# Patient Record
Sex: FEMALE | Race: WHITE | Employment: UNEMPLOYED | URBAN - METROPOLITAN AREA
[De-identification: names, ages, dates, MRNs, and addresses within clinical notes are randomized per-mention and may not be internally consistent; named-entity substitution may affect disease eponyms.]

---

## 2017-02-15 ENCOUNTER — OFFICE VISIT (OUTPATIENT)
Dept: SURGERY | Age: 52
End: 2017-02-15

## 2017-02-15 VITALS
WEIGHT: 214 LBS | HEIGHT: 63 IN | HEART RATE: 80 BPM | BODY MASS INDEX: 37.92 KG/M2 | SYSTOLIC BLOOD PRESSURE: 136 MMHG | DIASTOLIC BLOOD PRESSURE: 88 MMHG

## 2017-02-15 DIAGNOSIS — E66.09 NON MORBID OBESITY DUE TO EXCESS CALORIES: ICD-10-CM

## 2017-02-15 DIAGNOSIS — E03.9 HYPOTHYROIDISM, UNSPECIFIED TYPE: ICD-10-CM

## 2017-02-15 LAB
ANION GAP SERPL CALCULATED.3IONS-SCNC: 15 MEQ/L (ref 7–13)
BUN BLDV-MCNC: 8 MG/DL (ref 6–20)
CALCIUM SERPL-MCNC: 10.2 MG/DL (ref 8.6–10.2)
CHLORIDE BLD-SCNC: 105 MEQ/L (ref 98–107)
CO2: 23 MEQ/L (ref 22–29)
CREAT SERPL-MCNC: 0.69 MG/DL (ref 0.5–0.9)
GFR AFRICAN AMERICAN: >60
GFR NON-AFRICAN AMERICAN: >60
GLUCOSE BLD-MCNC: 86 MG/DL (ref 74–109)
GLUCOSE BLD-MCNC: 87 MG/DL
HBA1C MFR BLD: 5.7 %
POTASSIUM SERPL-SCNC: 4.4 MEQ/L (ref 3.5–5.1)
SODIUM BLD-SCNC: 143 MEQ/L (ref 132–144)
T4 FREE: 1.13 NG/DL (ref 0.93–1.7)
TSH SERPL DL<=0.05 MIU/L-ACNC: 2.59 UIU/ML (ref 0.27–4.2)

## 2017-02-15 PROCEDURE — 82962 GLUCOSE BLOOD TEST: CPT | Performed by: INTERNAL MEDICINE

## 2017-02-15 PROCEDURE — 99213 OFFICE O/P EST LOW 20 MIN: CPT | Performed by: INTERNAL MEDICINE

## 2017-02-15 PROCEDURE — 83036 HEMOGLOBIN GLYCOSYLATED A1C: CPT | Performed by: INTERNAL MEDICINE

## 2017-02-15 RX ORDER — LEVOTHYROXINE SODIUM 0.12 MG/1
125 TABLET ORAL DAILY
Qty: 30 TABLET | Refills: 5 | Status: SHIPPED | OUTPATIENT
Start: 2017-02-15 | End: 2017-04-27 | Stop reason: SDUPTHER

## 2017-04-04 ENCOUNTER — HOSPITAL ENCOUNTER (EMERGENCY)
Age: 52
Discharge: HOME | End: 2017-04-04
Payer: COMMERCIAL

## 2017-04-04 ENCOUNTER — APPOINTMENT (OUTPATIENT)
Dept: CT IMAGING | Age: 52
End: 2017-04-04
Payer: COMMERCIAL

## 2017-04-04 ENCOUNTER — APPOINTMENT (OUTPATIENT)
Dept: GENERAL RADIOLOGY | Age: 52
End: 2017-04-04
Payer: COMMERCIAL

## 2017-04-04 VITALS
HEIGHT: 63 IN | SYSTOLIC BLOOD PRESSURE: 163 MMHG | RESPIRATION RATE: 20 BRPM | BODY MASS INDEX: 33.66 KG/M2 | OXYGEN SATURATION: 99 % | WEIGHT: 190 LBS | HEART RATE: 92 BPM | DIASTOLIC BLOOD PRESSURE: 122 MMHG | TEMPERATURE: 98.1 F
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
## 2017-04-04 DIAGNOSIS — M54.2 NECK PAIN: ICD-10-CM

## 2017-04-04 DIAGNOSIS — S09.90XA CLOSED HEAD INJURY, INITIAL ENCOUNTER: Primary | ICD-10-CM

## 2017-04-04 DIAGNOSIS — M25.561 ACUTE PAIN OF RIGHT KNEE: ICD-10-CM

## 2017-04-04 DIAGNOSIS — S60.221A CONTUSION OF RIGHT HAND, INITIAL ENCOUNTER: ICD-10-CM

## 2017-04-04 PROCEDURE — 73090 X-RAY EXAM OF FOREARM: CPT

## 2017-04-04 PROCEDURE — 73562 X-RAY EXAM OF KNEE 3: CPT

## 2017-04-04 PROCEDURE — 29125 APPL SHORT ARM SPLINT STATIC: CPT

## 2017-04-04 PROCEDURE — 96372 THER/PROPH/DIAG INJ SC/IM: CPT

## 2017-04-04 PROCEDURE — 73130 X-RAY EXAM OF HAND: CPT

## 2017-04-04 PROCEDURE — 70450 CT HEAD/BRAIN W/O DYE: CPT

## 2017-04-04 PROCEDURE — 99284 EMERGENCY DEPT VISIT MOD MDM: CPT

## 2017-04-04 PROCEDURE — 73590 X-RAY EXAM OF LOWER LEG: CPT

## 2017-04-04 PROCEDURE — 72125 CT NECK SPINE W/O DYE: CPT

## 2017-04-04 PROCEDURE — 6360000002 HC RX W HCPCS: Performed by: PHYSICIAN ASSISTANT

## 2017-04-04 RX ORDER — ONDANSETRON 4 MG/1
4 TABLET, ORALLY DISINTEGRATING ORAL ONCE
Status: COMPLETED | OUTPATIENT
Start: 2017-04-04 | End: 2017-04-04

## 2017-04-04 RX ORDER — ORPHENADRINE CITRATE 30 MG/ML
60 INJECTION INTRAMUSCULAR; INTRAVENOUS ONCE
Status: COMPLETED | OUTPATIENT
Start: 2017-04-04 | End: 2017-04-04

## 2017-04-04 RX ORDER — MORPHINE SULFATE 4 MG/ML
4 INJECTION, SOLUTION INTRAMUSCULAR; INTRAVENOUS ONCE
Status: COMPLETED | OUTPATIENT
Start: 2017-04-04 | End: 2017-04-04

## 2017-04-04 RX ORDER — CYCLOBENZAPRINE HCL 10 MG
10 TABLET ORAL 3 TIMES DAILY PRN
Qty: 12 TABLET | Refills: 0 | Status: SHIPPED | OUTPATIENT
Start: 2017-04-04 | End: 2017-04-14

## 2017-04-04 RX ADMIN — ORPHENADRINE CITRATE 60 MG: 30 INJECTION INTRAMUSCULAR; INTRAVENOUS at 02:38

## 2017-04-04 RX ADMIN — MORPHINE SULFATE 4 MG: 4 INJECTION, SOLUTION INTRAMUSCULAR; INTRAVENOUS at 02:38

## 2017-04-04 RX ADMIN — ONDANSETRON 4 MG: 4 TABLET, ORALLY DISINTEGRATING ORAL at 02:38

## 2017-04-04 ASSESSMENT — PAIN SCALES - GENERAL
PAINLEVEL_OUTOF10: 10
PAINLEVEL_OUTOF10: 10

## 2017-04-04 ASSESSMENT — ENCOUNTER SYMPTOMS
NAUSEA: 0
COLOR CHANGE: 1
APNEA: 0
VOMITING: 0
VOICE CHANGE: 0
PHOTOPHOBIA: 0
ABDOMINAL DISTENTION: 0
ANAL BLEEDING: 0
COUGH: 0
ABDOMINAL PAIN: 0
SHORTNESS OF BREATH: 0
EYE DISCHARGE: 0

## 2017-04-04 ASSESSMENT — PAIN DESCRIPTION - ORIENTATION: ORIENTATION: RIGHT

## 2017-04-04 ASSESSMENT — PAIN DESCRIPTION - LOCATION: LOCATION: ARM;KNEE

## 2017-04-04 ASSESSMENT — PAIN DESCRIPTION - PAIN TYPE: TYPE: ACUTE PAIN

## 2017-04-27 ENCOUNTER — OFFICE VISIT (OUTPATIENT)
Dept: FAMILY MEDICINE CLINIC | Age: 52
End: 2017-04-27

## 2017-04-27 VITALS
TEMPERATURE: 99 F | WEIGHT: 208 LBS | HEART RATE: 78 BPM | BODY MASS INDEX: 36.86 KG/M2 | DIASTOLIC BLOOD PRESSURE: 82 MMHG | RESPIRATION RATE: 22 BRPM | OXYGEN SATURATION: 99 % | HEIGHT: 63 IN | SYSTOLIC BLOOD PRESSURE: 130 MMHG

## 2017-04-27 DIAGNOSIS — E78.5 HYPERLIPIDEMIA, UNSPECIFIED HYPERLIPIDEMIA TYPE: ICD-10-CM

## 2017-04-27 DIAGNOSIS — Z12.11 SCREEN FOR COLON CANCER: ICD-10-CM

## 2017-04-27 DIAGNOSIS — E03.9 HYPOTHYROIDISM, UNSPECIFIED TYPE: ICD-10-CM

## 2017-04-27 DIAGNOSIS — F33.41 RECURRENT MAJOR DEPRESSIVE DISORDER, IN PARTIAL REMISSION (HCC): ICD-10-CM

## 2017-04-27 DIAGNOSIS — E11.9 TYPE 2 DIABETES MELLITUS WITHOUT COMPLICATION, WITHOUT LONG-TERM CURRENT USE OF INSULIN (HCC): ICD-10-CM

## 2017-04-27 DIAGNOSIS — M79.7 FIBROMYALGIA: ICD-10-CM

## 2017-04-27 DIAGNOSIS — K21.9 GASTROESOPHAGEAL REFLUX DISEASE WITHOUT ESOPHAGITIS: ICD-10-CM

## 2017-04-27 DIAGNOSIS — I10 ESSENTIAL HYPERTENSION: Primary | ICD-10-CM

## 2017-04-27 DIAGNOSIS — Z12.31 ENCOUNTER FOR SCREENING MAMMOGRAM FOR MALIGNANT NEOPLASM OF BREAST: ICD-10-CM

## 2017-04-27 LAB
CREATININE URINE: 328.3 MG/DL
MICROALBUMIN UR-MCNC: 2 MG/DL
MICROALBUMIN/CREAT UR-RTO: 6.1 MG/G (ref 0–30)

## 2017-04-27 PROCEDURE — 99214 OFFICE O/P EST MOD 30 MIN: CPT | Performed by: NURSE PRACTITIONER

## 2017-04-27 RX ORDER — LEVOTHYROXINE SODIUM 0.12 MG/1
125 TABLET ORAL DAILY
Qty: 30 TABLET | Refills: 5 | Status: SHIPPED | OUTPATIENT
Start: 2017-04-27 | End: 2019-05-08 | Stop reason: SDUPTHER

## 2017-04-27 RX ORDER — TRAZODONE HYDROCHLORIDE 100 MG/1
100 TABLET ORAL NIGHTLY
Qty: 30 TABLET | Refills: 5 | Status: ON HOLD | OUTPATIENT
Start: 2017-04-27 | End: 2017-07-27 | Stop reason: HOSPADM

## 2017-04-27 RX ORDER — ATORVASTATIN CALCIUM 10 MG/1
10 TABLET, FILM COATED ORAL DAILY
Qty: 30 TABLET | Refills: 5 | Status: ON HOLD | OUTPATIENT
Start: 2017-04-27 | End: 2017-07-27 | Stop reason: HOSPADM

## 2017-04-27 RX ORDER — METOPROLOL TARTRATE 50 MG/1
50 TABLET, FILM COATED ORAL 2 TIMES DAILY
Qty: 60 TABLET | Refills: 5 | Status: SHIPPED | OUTPATIENT
Start: 2017-04-27 | End: 2018-04-06 | Stop reason: ALTCHOICE

## 2017-04-27 RX ORDER — AMLODIPINE BESYLATE 10 MG/1
10 TABLET ORAL DAILY
Qty: 30 TABLET | Refills: 5 | Status: SHIPPED | OUTPATIENT
Start: 2017-04-27

## 2017-04-27 RX ORDER — DULOXETIN HYDROCHLORIDE 60 MG/1
60 CAPSULE, DELAYED RELEASE ORAL 2 TIMES DAILY
Qty: 60 CAPSULE | Refills: 5 | Status: SHIPPED | OUTPATIENT
Start: 2017-04-27 | End: 2017-07-28

## 2017-04-27 RX ORDER — OMEPRAZOLE 20 MG/1
20 CAPSULE, DELAYED RELEASE ORAL DAILY
Qty: 30 CAPSULE | Refills: 5 | Status: ON HOLD | OUTPATIENT
Start: 2017-04-27 | End: 2017-07-27 | Stop reason: HOSPADM

## 2017-04-27 ASSESSMENT — ENCOUNTER SYMPTOMS
CONSTIPATION: 0
DIARRHEA: 0
BLOOD IN STOOL: 0

## 2017-07-21 ENCOUNTER — HOSPITAL ENCOUNTER (INPATIENT)
Age: 52
LOS: 5 days | Discharge: HOME | DRG: 751 | End: 2017-07-27
Attending: PSYCHIATRY & NEUROLOGY | Admitting: PSYCHIATRY & NEUROLOGY
Payer: COMMERCIAL

## 2017-07-21 DIAGNOSIS — F33.41 RECURRENT MAJOR DEPRESSIVE DISORDER, IN PARTIAL REMISSION (HCC): ICD-10-CM

## 2017-07-21 DIAGNOSIS — T63.441A BEE STING REACTION, ACCIDENTAL OR UNINTENTIONAL, INITIAL ENCOUNTER: Primary | ICD-10-CM

## 2017-07-21 DIAGNOSIS — M79.7 FIBROMYALGIA: ICD-10-CM

## 2017-07-21 LAB
BASOPHILS ABSOLUTE: 0 K/UL (ref 0–0.2)
BASOPHILS RELATIVE PERCENT: 0.6 %
BILIRUBIN URINE: NEGATIVE
BLOOD, URINE: NEGATIVE
CHP ED QC CHECK: NORMAL
CLARITY: CLEAR
COLOR: YELLOW
EOSINOPHILS ABSOLUTE: 0.1 K/UL (ref 0–0.7)
EOSINOPHILS RELATIVE PERCENT: 1 %
GLUCOSE URINE: NEGATIVE MG/DL
HCG(URINE) PREGNANCY TEST: NEGATIVE
HCT VFR BLD CALC: 42.2 % (ref 37–47)
HEMOGLOBIN: 14.4 G/DL (ref 12–16)
KETONES, URINE: NEGATIVE MG/DL
LEUKOCYTE ESTERASE, URINE: NEGATIVE
LYMPHOCYTES ABSOLUTE: 2.4 K/UL (ref 1–4.8)
LYMPHOCYTES RELATIVE PERCENT: 31.7 %
MCH RBC QN AUTO: 29.5 PG (ref 27–31.3)
MCHC RBC AUTO-ENTMCNC: 34.1 % (ref 33–37)
MCV RBC AUTO: 86.7 FL (ref 82–100)
MONOCYTES ABSOLUTE: 0.6 K/UL (ref 0.2–0.8)
MONOCYTES RELATIVE PERCENT: 7.5 %
NEUTROPHILS ABSOLUTE: 4.4 K/UL (ref 1.4–6.5)
NEUTROPHILS RELATIVE PERCENT: 59.2 %
NITRITE, URINE: NEGATIVE
PDW BLD-RTO: 13.4 % (ref 11.5–14.5)
PH UA: 6 (ref 5–9)
PLATELET # BLD: 258 K/UL (ref 130–400)
PREGNANCY TEST URINE, POC: NORMAL
PROTEIN UA: NEGATIVE MG/DL
RBC # BLD: 4.87 M/UL (ref 4.2–5.4)
SPECIFIC GRAVITY UA: 1.01 (ref 1–1.03)
UROBILINOGEN, URINE: 0.2 E.U./DL
WBC # BLD: 7.4 K/UL (ref 4.8–10.8)

## 2017-07-21 PROCEDURE — 80053 COMPREHEN METABOLIC PANEL: CPT

## 2017-07-21 PROCEDURE — 6370000000 HC RX 637 (ALT 250 FOR IP): Performed by: NURSE PRACTITIONER

## 2017-07-21 PROCEDURE — 82550 ASSAY OF CK (CPK): CPT

## 2017-07-21 PROCEDURE — 36415 COLL VENOUS BLD VENIPUNCTURE: CPT

## 2017-07-21 PROCEDURE — 80307 DRUG TEST PRSMV CHEM ANLYZR: CPT

## 2017-07-21 PROCEDURE — 84703 CHORIONIC GONADOTROPIN ASSAY: CPT

## 2017-07-21 PROCEDURE — 84443 ASSAY THYROID STIM HORMONE: CPT

## 2017-07-21 PROCEDURE — G0480 DRUG TEST DEF 1-7 CLASSES: HCPCS

## 2017-07-21 PROCEDURE — 81003 URINALYSIS AUTO W/O SCOPE: CPT

## 2017-07-21 PROCEDURE — 99285 EMERGENCY DEPT VISIT HI MDM: CPT

## 2017-07-21 PROCEDURE — 85025 COMPLETE CBC W/AUTO DIFF WBC: CPT

## 2017-07-21 RX ORDER — HYDROCODONE BITARTRATE AND ACETAMINOPHEN 5; 325 MG/1; MG/1
1 TABLET ORAL ONCE
Status: COMPLETED | OUTPATIENT
Start: 2017-07-21 | End: 2017-07-21

## 2017-07-21 RX ORDER — DIPHENHYDRAMINE HCL 50 MG
50 CAPSULE ORAL ONCE
Status: COMPLETED | OUTPATIENT
Start: 2017-07-21 | End: 2017-07-21

## 2017-07-21 RX ADMIN — HYDROCODONE BITARTRATE AND ACETAMINOPHEN 1 TABLET: 5; 325 TABLET ORAL at 23:41

## 2017-07-21 RX ADMIN — DIPHENHYDRAMINE HYDROCHLORIDE 50 MG: 50 CAPSULE ORAL at 23:41

## 2017-07-21 ASSESSMENT — PAIN DESCRIPTION - ORIENTATION: ORIENTATION: RIGHT

## 2017-07-21 ASSESSMENT — PAIN DESCRIPTION - DESCRIPTORS: DESCRIPTORS: SHOOTING

## 2017-07-21 ASSESSMENT — PAIN SCALES - GENERAL: PAINLEVEL_OUTOF10: 8

## 2017-07-21 ASSESSMENT — PAIN DESCRIPTION - LOCATION: LOCATION: LEG

## 2017-07-21 ASSESSMENT — PAIN DESCRIPTION - PAIN TYPE: TYPE: ACUTE PAIN

## 2017-07-22 LAB
ACETAMINOPHEN LEVEL: <15 UG/ML (ref 10–30)
ALBUMIN SERPL-MCNC: 4.6 G/DL (ref 3.9–4.9)
ALP BLD-CCNC: 84 U/L (ref 40–130)
ALT SERPL-CCNC: 50 U/L (ref 0–33)
AMPHETAMINE SCREEN, URINE: NORMAL
ANION GAP SERPL CALCULATED.3IONS-SCNC: 12 MEQ/L (ref 7–13)
AST SERPL-CCNC: 29 U/L (ref 0–35)
BARBITURATE SCREEN URINE: NORMAL
BENZODIAZEPINE SCREEN, URINE: NORMAL
BILIRUB SERPL-MCNC: 0.4 MG/DL (ref 0–1.2)
BUN BLDV-MCNC: 15 MG/DL (ref 6–20)
CALCIUM SERPL-MCNC: 9.4 MG/DL (ref 8.6–10.2)
CANNABINOID SCREEN URINE: NORMAL
CHLORIDE BLD-SCNC: 102 MEQ/L (ref 98–107)
CO2: 26 MEQ/L (ref 22–29)
COCAINE METABOLITE SCREEN URINE: NORMAL
CREAT SERPL-MCNC: 0.76 MG/DL (ref 0.5–0.9)
ETHANOL PERCENT: NORMAL G/DL
ETHANOL: <10 MG/DL (ref 0–0.08)
GFR AFRICAN AMERICAN: >60
GFR NON-AFRICAN AMERICAN: >60
GLOBULIN: 2.6 G/DL (ref 2.3–3.5)
GLUCOSE BLD-MCNC: 111 MG/DL (ref 60–115)
GLUCOSE BLD-MCNC: 93 MG/DL (ref 74–109)
Lab: NORMAL
OPIATE SCREEN URINE: NORMAL
PERFORMED ON: NORMAL
PHENCYCLIDINE SCREEN URINE: NORMAL
POTASSIUM SERPL-SCNC: 3.1 MEQ/L (ref 3.5–5.1)
SALICYLATE, SERUM: <0.3 MG/DL (ref 15–30)
SODIUM BLD-SCNC: 140 MEQ/L (ref 132–144)
TOTAL CK: 108 U/L (ref 0–170)
TOTAL PROTEIN: 7.2 G/DL (ref 6.4–8.1)
TSH SERPL DL<=0.05 MIU/L-ACNC: 1.56 UIU/ML (ref 0.27–4.2)

## 2017-07-22 PROCEDURE — 1240000000 HC EMOTIONAL WELLNESS R&B

## 2017-07-22 PROCEDURE — 6370000000 HC RX 637 (ALT 250 FOR IP): Performed by: PERSONAL EMERGENCY RESPONSE ATTENDANT

## 2017-07-22 PROCEDURE — 6370000000 HC RX 637 (ALT 250 FOR IP): Performed by: EMERGENCY MEDICINE

## 2017-07-22 PROCEDURE — 6370000000 HC RX 637 (ALT 250 FOR IP): Performed by: STUDENT IN AN ORGANIZED HEALTH CARE EDUCATION/TRAINING PROGRAM

## 2017-07-22 PROCEDURE — 6370000000 HC RX 637 (ALT 250 FOR IP): Performed by: PSYCHIATRY & NEUROLOGY

## 2017-07-22 RX ORDER — ACETAMINOPHEN 500 MG
1000 TABLET ORAL ONCE
Status: COMPLETED | OUTPATIENT
Start: 2017-07-22 | End: 2017-07-22

## 2017-07-22 RX ORDER — ACETAMINOPHEN 325 MG/1
650 TABLET ORAL EVERY 4 HOURS PRN
Status: DISCONTINUED | OUTPATIENT
Start: 2017-07-22 | End: 2017-07-27 | Stop reason: HOSPADM

## 2017-07-22 RX ORDER — ALPRAZOLAM 0.5 MG/1
0.5 TABLET ORAL 4 TIMES DAILY
COMMUNITY

## 2017-07-22 RX ORDER — HYDROXYZINE PAMOATE 50 MG/1
50 CAPSULE ORAL 3 TIMES DAILY PRN
Status: DISCONTINUED | OUTPATIENT
Start: 2017-07-22 | End: 2017-07-27 | Stop reason: HOSPADM

## 2017-07-22 RX ORDER — HYDROCHLOROTHIAZIDE 25 MG/1
25 TABLET ORAL DAILY
COMMUNITY
End: 2018-04-06 | Stop reason: ALTCHOICE

## 2017-07-22 RX ORDER — HYDROCHLOROTHIAZIDE 25 MG/1
25 TABLET ORAL DAILY
Status: DISCONTINUED | OUTPATIENT
Start: 2017-07-22 | End: 2017-07-27 | Stop reason: HOSPADM

## 2017-07-22 RX ORDER — ALPRAZOLAM 0.5 MG/1
0.5 TABLET ORAL 4 TIMES DAILY
Status: DISCONTINUED | OUTPATIENT
Start: 2017-07-22 | End: 2017-07-23

## 2017-07-22 RX ORDER — DIPHENHYDRAMINE HCL 50 MG
50 CAPSULE ORAL ONCE
Status: COMPLETED | OUTPATIENT
Start: 2017-07-22 | End: 2017-07-22

## 2017-07-22 RX ORDER — LEVOTHYROXINE SODIUM 0.12 MG/1
125 TABLET ORAL ONCE
Status: COMPLETED | OUTPATIENT
Start: 2017-07-22 | End: 2017-07-22

## 2017-07-22 RX ORDER — POTASSIUM CHLORIDE 20 MEQ/1
40 TABLET, EXTENDED RELEASE ORAL ONCE
Status: COMPLETED | OUTPATIENT
Start: 2017-07-22 | End: 2017-07-22

## 2017-07-22 RX ORDER — SIMVASTATIN 20 MG
20 TABLET ORAL DAILY
Status: ON HOLD | COMMUNITY
End: 2018-07-20 | Stop reason: HOSPADM

## 2017-07-22 RX ORDER — ALPRAZOLAM 0.5 MG/1
0.5 TABLET ORAL ONCE
Status: COMPLETED | OUTPATIENT
Start: 2017-07-22 | End: 2017-07-22

## 2017-07-22 RX ORDER — METOPROLOL TARTRATE 50 MG/1
50 TABLET, FILM COATED ORAL 2 TIMES DAILY
Status: DISCONTINUED | OUTPATIENT
Start: 2017-07-22 | End: 2017-07-27 | Stop reason: HOSPADM

## 2017-07-22 RX ORDER — AMLODIPINE BESYLATE 10 MG/1
10 TABLET ORAL DAILY
Status: DISCONTINUED | OUTPATIENT
Start: 2017-07-22 | End: 2017-07-27 | Stop reason: HOSPADM

## 2017-07-22 RX ADMIN — HYDROXYZINE PAMOATE 50 MG: 50 CAPSULE ORAL at 21:08

## 2017-07-22 RX ADMIN — DIPHENHYDRAMINE HYDROCHLORIDE 50 MG: 50 CAPSULE ORAL at 11:28

## 2017-07-22 RX ADMIN — AMLODIPINE BESYLATE 10 MG: 10 TABLET ORAL at 15:00

## 2017-07-22 RX ADMIN — DIPHENHYDRAMINE HYDROCHLORIDE 50 MG: 50 CAPSULE ORAL at 06:17

## 2017-07-22 RX ADMIN — ALPRAZOLAM 0.5 MG: 0.5 TABLET ORAL at 05:16

## 2017-07-22 RX ADMIN — ACETAMINOPHEN 1000 MG: 500 TABLET ORAL at 05:15

## 2017-07-22 RX ADMIN — LEVOTHYROXINE SODIUM 125 MCG: 125 TABLET ORAL at 06:17

## 2017-07-22 RX ADMIN — ALPRAZOLAM 0.5 MG: 0.5 TABLET ORAL at 21:09

## 2017-07-22 RX ADMIN — ALPRAZOLAM 0.5 MG: 0.5 TABLET ORAL at 16:22

## 2017-07-22 RX ADMIN — HYDROCHLOROTHIAZIDE 25 MG: 25 TABLET ORAL at 15:00

## 2017-07-22 RX ADMIN — POTASSIUM CHLORIDE 40 MEQ: 1500 TABLET, EXTENDED RELEASE ORAL at 09:37

## 2017-07-22 ASSESSMENT — ENCOUNTER SYMPTOMS
COUGH: 0
COLOR CHANGE: 1
ABDOMINAL PAIN: 0
DIARRHEA: 0
VOMITING: 0
SHORTNESS OF BREATH: 0
NAUSEA: 0

## 2017-07-22 ASSESSMENT — SLEEP AND FATIGUE QUESTIONNAIRES
RESTFUL SLEEP: NO
DIFFICULTY ARISING: NO
DIFFICULTY FALLING ASLEEP: YES
DO YOU USE A SLEEP AID: YES
DIFFICULTY STAYING ASLEEP: YES
AVERAGE NUMBER OF SLEEP HOURS: 3
SLEEP PATTERN: DIFFICULTY FALLING ASLEEP;DISTURBED/INTERRUPTED SLEEP;NIGHTMARES/TERRORS
DO YOU HAVE DIFFICULTY SLEEPING: YES

## 2017-07-22 ASSESSMENT — PAIN SCALES - GENERAL
PAINLEVEL_OUTOF10: 6
PAINLEVEL_OUTOF10: 8

## 2017-07-23 PROCEDURE — 6370000000 HC RX 637 (ALT 250 FOR IP): Performed by: PHYSICIAN ASSISTANT

## 2017-07-23 PROCEDURE — 1240000000 HC EMOTIONAL WELLNESS R&B

## 2017-07-23 PROCEDURE — 6370000000 HC RX 637 (ALT 250 FOR IP): Performed by: INTERNAL MEDICINE

## 2017-07-23 PROCEDURE — 6370000000 HC RX 637 (ALT 250 FOR IP): Performed by: PSYCHIATRY & NEUROLOGY

## 2017-07-23 RX ORDER — QUETIAPINE FUMARATE 50 MG/1
50 TABLET, FILM COATED ORAL 2 TIMES DAILY
Status: DISCONTINUED | OUTPATIENT
Start: 2017-07-23 | End: 2017-07-27 | Stop reason: HOSPADM

## 2017-07-23 RX ORDER — DOCUSATE SODIUM 100 MG/1
100 CAPSULE, LIQUID FILLED ORAL 2 TIMES DAILY
Status: DISCONTINUED | OUTPATIENT
Start: 2017-07-23 | End: 2017-07-27 | Stop reason: HOSPADM

## 2017-07-23 RX ORDER — DULOXETIN HYDROCHLORIDE 60 MG/1
60 CAPSULE, DELAYED RELEASE ORAL NIGHTLY
Status: DISCONTINUED | OUTPATIENT
Start: 2017-07-23 | End: 2017-07-27 | Stop reason: HOSPADM

## 2017-07-23 RX ORDER — PHENAZOPYRIDINE HYDROCHLORIDE 200 MG/1
200 TABLET, FILM COATED ORAL
Status: DISCONTINUED | OUTPATIENT
Start: 2017-07-23 | End: 2017-07-27 | Stop reason: HOSPADM

## 2017-07-23 RX ORDER — ALPRAZOLAM 0.5 MG/1
0.5 TABLET ORAL 4 TIMES DAILY PRN
Status: DISCONTINUED | OUTPATIENT
Start: 2017-07-23 | End: 2017-07-27 | Stop reason: HOSPADM

## 2017-07-23 RX ORDER — DIAPER,BRIEF,INFANT-TODD,DISP
EACH MISCELLANEOUS 3 TIMES DAILY
Status: DISCONTINUED | OUTPATIENT
Start: 2017-07-23 | End: 2017-07-27 | Stop reason: HOSPADM

## 2017-07-23 RX ORDER — DIPHENHYDRAMINE HCL 25 MG
50 TABLET ORAL EVERY 6 HOURS PRN
Status: DISCONTINUED | OUTPATIENT
Start: 2017-07-23 | End: 2017-07-27 | Stop reason: HOSPADM

## 2017-07-23 RX ADMIN — ALPRAZOLAM 0.5 MG: 0.5 TABLET ORAL at 15:02

## 2017-07-23 RX ADMIN — PHENAZOPYRIDINE HYDROCHLORIDE 200 MG: 200 TABLET ORAL at 12:26

## 2017-07-23 RX ADMIN — DULOXETINE HYDROCHLORIDE 60 MG: 60 CAPSULE, DELAYED RELEASE ORAL at 20:56

## 2017-07-23 RX ADMIN — LEVOTHYROXINE SODIUM 125 MCG: 25 TABLET ORAL at 06:28

## 2017-07-23 RX ADMIN — AMLODIPINE BESYLATE 10 MG: 10 TABLET ORAL at 09:15

## 2017-07-23 RX ADMIN — HYDROCORTISONE: 1 CREAM TOPICAL at 13:49

## 2017-07-23 RX ADMIN — HYDROCHLOROTHIAZIDE 25 MG: 25 TABLET ORAL at 09:15

## 2017-07-23 RX ADMIN — DOCUSATE SODIUM 100 MG: 100 CAPSULE, LIQUID FILLED ORAL at 12:26

## 2017-07-23 RX ADMIN — DIPHENHYDRAMINE HCL 50 MG: 25 TABLET ORAL at 00:52

## 2017-07-23 RX ADMIN — DIPHENHYDRAMINE HCL 50 MG: 25 TABLET ORAL at 09:22

## 2017-07-23 RX ADMIN — QUETIAPINE FUMARATE 50 MG: 50 TABLET, FILM COATED ORAL at 20:56

## 2017-07-23 RX ADMIN — ALPRAZOLAM 0.5 MG: 0.5 TABLET ORAL at 09:15

## 2017-07-23 RX ADMIN — PHENAZOPYRIDINE HYDROCHLORIDE 200 MG: 200 TABLET ORAL at 17:31

## 2017-07-23 RX ADMIN — DOCUSATE SODIUM 100 MG: 100 CAPSULE, LIQUID FILLED ORAL at 20:56

## 2017-07-23 RX ADMIN — QUETIAPINE FUMARATE 50 MG: 50 TABLET, FILM COATED ORAL at 12:26

## 2017-07-24 LAB
BACTERIA: NORMAL /HPF
BILIRUBIN URINE: NEGATIVE
BLOOD, URINE: NEGATIVE
CLARITY: CLEAR
COLOR: ABNORMAL
EPITHELIAL CELLS, UA: NORMAL /HPF
GLUCOSE URINE: >=1000 MG/DL
KETONES, URINE: NEGATIVE MG/DL
LEUKOCYTE ESTERASE, URINE: NEGATIVE
MUCUS: PRESENT
NITRITE, URINE: POSITIVE
PH UA: 6 (ref 5–9)
PROTEIN UA: NEGATIVE MG/DL
RBC UA: NORMAL /HPF (ref 0–2)
SPECIFIC GRAVITY UA: 1.02 (ref 1–1.03)
UROBILINOGEN, URINE: 1 E.U./DL
WBC UA: NORMAL /HPF (ref 0–5)

## 2017-07-24 PROCEDURE — 2580000003 HC RX 258: Performed by: NURSE PRACTITIONER

## 2017-07-24 PROCEDURE — 6370000000 HC RX 637 (ALT 250 FOR IP): Performed by: PSYCHIATRY & NEUROLOGY

## 2017-07-24 PROCEDURE — 6360000002 HC RX W HCPCS: Performed by: NURSE PRACTITIONER

## 2017-07-24 PROCEDURE — 1240000000 HC EMOTIONAL WELLNESS R&B

## 2017-07-24 PROCEDURE — 6370000000 HC RX 637 (ALT 250 FOR IP): Performed by: NURSE PRACTITIONER

## 2017-07-24 PROCEDURE — 81001 URINALYSIS AUTO W/SCOPE: CPT

## 2017-07-24 PROCEDURE — 6370000000 HC RX 637 (ALT 250 FOR IP): Performed by: INTERNAL MEDICINE

## 2017-07-24 PROCEDURE — 6370000000 HC RX 637 (ALT 250 FOR IP): Performed by: PHYSICIAN ASSISTANT

## 2017-07-24 PROCEDURE — 99232 SBSQ HOSP IP/OBS MODERATE 35: CPT | Performed by: PSYCHIATRY & NEUROLOGY

## 2017-07-24 RX ORDER — ONDANSETRON 4 MG/1
4 TABLET, FILM COATED ORAL EVERY 8 HOURS PRN
Status: DISCONTINUED | OUTPATIENT
Start: 2017-07-24 | End: 2017-07-27 | Stop reason: HOSPADM

## 2017-07-24 RX ORDER — 0.9 % SODIUM CHLORIDE 0.9 %
500 INTRAVENOUS SOLUTION INTRAVENOUS ONCE
Status: COMPLETED | OUTPATIENT
Start: 2017-07-24 | End: 2017-07-24

## 2017-07-24 RX ORDER — ONDANSETRON 2 MG/ML
4 INJECTION INTRAMUSCULAR; INTRAVENOUS ONCE
Status: COMPLETED | OUTPATIENT
Start: 2017-07-24 | End: 2017-07-24

## 2017-07-24 RX ORDER — NITROFURANTOIN 25; 75 MG/1; MG/1
100 CAPSULE ORAL EVERY 12 HOURS SCHEDULED
Status: DISCONTINUED | OUTPATIENT
Start: 2017-07-24 | End: 2017-07-27 | Stop reason: HOSPADM

## 2017-07-24 RX ORDER — ONDANSETRON 2 MG/ML
4 INJECTION INTRAMUSCULAR; INTRAVENOUS EVERY 6 HOURS PRN
Status: DISCONTINUED | OUTPATIENT
Start: 2017-07-24 | End: 2017-07-27 | Stop reason: HOSPADM

## 2017-07-24 RX ADMIN — SODIUM CHLORIDE 500 ML: 9 INJECTION, SOLUTION INTRAVENOUS at 14:28

## 2017-07-24 RX ADMIN — QUETIAPINE FUMARATE 50 MG: 50 TABLET, FILM COATED ORAL at 10:07

## 2017-07-24 RX ADMIN — ALPRAZOLAM 0.5 MG: 0.5 TABLET ORAL at 17:07

## 2017-07-24 RX ADMIN — QUETIAPINE FUMARATE 50 MG: 50 TABLET, FILM COATED ORAL at 20:27

## 2017-07-24 RX ADMIN — DIPHENHYDRAMINE HCL 50 MG: 25 TABLET ORAL at 11:02

## 2017-07-24 RX ADMIN — ONDANSETRON 4 MG: 2 INJECTION INTRAMUSCULAR; INTRAVENOUS at 14:30

## 2017-07-24 RX ADMIN — ALPRAZOLAM 0.5 MG: 0.5 TABLET ORAL at 11:02

## 2017-07-24 RX ADMIN — DOCUSATE SODIUM 100 MG: 100 CAPSULE, LIQUID FILLED ORAL at 20:27

## 2017-07-24 RX ADMIN — ONDANSETRON HYDROCHLORIDE 4 MG: 4 TABLET, FILM COATED ORAL at 20:27

## 2017-07-24 RX ADMIN — PHENAZOPYRIDINE HYDROCHLORIDE 200 MG: 200 TABLET ORAL at 13:21

## 2017-07-24 RX ADMIN — DOCUSATE SODIUM 100 MG: 100 CAPSULE, LIQUID FILLED ORAL at 10:07

## 2017-07-24 RX ADMIN — HYDROCORTISONE: 1 CREAM TOPICAL at 13:23

## 2017-07-24 RX ADMIN — DIPHENHYDRAMINE HCL 50 MG: 25 TABLET ORAL at 17:07

## 2017-07-24 RX ADMIN — NITROFURANTOIN (MONOHYDRATE/MACROCRYSTALS) 100 MG: 75; 25 CAPSULE ORAL at 20:28

## 2017-07-24 RX ADMIN — DULOXETINE HYDROCHLORIDE 60 MG: 60 CAPSULE, DELAYED RELEASE ORAL at 20:27

## 2017-07-24 RX ADMIN — ACETAMINOPHEN 650 MG: 325 TABLET ORAL at 14:29

## 2017-07-24 RX ADMIN — PHENAZOPYRIDINE HYDROCHLORIDE 200 MG: 200 TABLET ORAL at 10:06

## 2017-07-24 RX ADMIN — PHENAZOPYRIDINE HYDROCHLORIDE 200 MG: 200 TABLET ORAL at 16:22

## 2017-07-24 RX ADMIN — SODIUM CHLORIDE 500 ML: 9 INJECTION, SOLUTION INTRAVENOUS at 20:45

## 2017-07-24 RX ADMIN — HYDROCORTISONE: 1 CREAM TOPICAL at 11:02

## 2017-07-24 ASSESSMENT — PAIN SCALES - GENERAL: PAINLEVEL_OUTOF10: 8

## 2017-07-25 PROBLEM — F33.2 SEVERE EPISODE OF RECURRENT MAJOR DEPRESSIVE DISORDER, WITHOUT PSYCHOTIC FEATURES (HCC): Status: ACTIVE | Noted: 2017-07-25

## 2017-07-25 LAB
GLUCOSE BLD-MCNC: 109 MG/DL (ref 60–115)
PERFORMED ON: NORMAL

## 2017-07-25 PROCEDURE — 6370000000 HC RX 637 (ALT 250 FOR IP): Performed by: PSYCHIATRY & NEUROLOGY

## 2017-07-25 PROCEDURE — 6370000000 HC RX 637 (ALT 250 FOR IP): Performed by: PHYSICIAN ASSISTANT

## 2017-07-25 PROCEDURE — 99232 SBSQ HOSP IP/OBS MODERATE 35: CPT | Performed by: PSYCHIATRY & NEUROLOGY

## 2017-07-25 PROCEDURE — 1240000000 HC EMOTIONAL WELLNESS R&B

## 2017-07-25 PROCEDURE — 6370000000 HC RX 637 (ALT 250 FOR IP): Performed by: INTERNAL MEDICINE

## 2017-07-25 PROCEDURE — 6370000000 HC RX 637 (ALT 250 FOR IP): Performed by: NURSE PRACTITIONER

## 2017-07-25 RX ADMIN — HYDROCHLOROTHIAZIDE 25 MG: 25 TABLET ORAL at 09:08

## 2017-07-25 RX ADMIN — DIPHENHYDRAMINE HCL 50 MG: 25 TABLET ORAL at 09:26

## 2017-07-25 RX ADMIN — AMLODIPINE BESYLATE 10 MG: 10 TABLET ORAL at 09:08

## 2017-07-25 RX ADMIN — HYDROCORTISONE: 1 CREAM TOPICAL at 09:10

## 2017-07-25 RX ADMIN — PHENAZOPYRIDINE HYDROCHLORIDE 200 MG: 200 TABLET ORAL at 16:24

## 2017-07-25 RX ADMIN — NITROFURANTOIN (MONOHYDRATE/MACROCRYSTALS) 100 MG: 75; 25 CAPSULE ORAL at 21:09

## 2017-07-25 RX ADMIN — DOCUSATE SODIUM 100 MG: 100 CAPSULE, LIQUID FILLED ORAL at 21:09

## 2017-07-25 RX ADMIN — PHENAZOPYRIDINE HYDROCHLORIDE 200 MG: 200 TABLET ORAL at 11:57

## 2017-07-25 RX ADMIN — DIPHENHYDRAMINE HCL 50 MG: 25 TABLET ORAL at 16:24

## 2017-07-25 RX ADMIN — HYDROCORTISONE: 1 CREAM TOPICAL at 13:49

## 2017-07-25 RX ADMIN — HYDROCORTISONE: 1 CREAM TOPICAL at 21:09

## 2017-07-25 RX ADMIN — DIPHENHYDRAMINE HCL 50 MG: 25 TABLET ORAL at 23:10

## 2017-07-25 RX ADMIN — ALPRAZOLAM 0.5 MG: 0.5 TABLET ORAL at 09:26

## 2017-07-25 RX ADMIN — NITROFURANTOIN (MONOHYDRATE/MACROCRYSTALS) 100 MG: 75; 25 CAPSULE ORAL at 09:09

## 2017-07-25 RX ADMIN — PHENAZOPYRIDINE HYDROCHLORIDE 200 MG: 200 TABLET ORAL at 09:08

## 2017-07-25 RX ADMIN — LEVOTHYROXINE SODIUM 125 MCG: 25 TABLET ORAL at 06:23

## 2017-07-25 RX ADMIN — ALPRAZOLAM 0.5 MG: 0.5 TABLET ORAL at 23:10

## 2017-07-25 RX ADMIN — DULOXETINE HYDROCHLORIDE 60 MG: 60 CAPSULE, DELAYED RELEASE ORAL at 21:09

## 2017-07-25 RX ADMIN — DOCUSATE SODIUM 100 MG: 100 CAPSULE, LIQUID FILLED ORAL at 09:08

## 2017-07-25 RX ADMIN — QUETIAPINE FUMARATE 50 MG: 50 TABLET, FILM COATED ORAL at 21:09

## 2017-07-25 RX ADMIN — QUETIAPINE FUMARATE 50 MG: 50 TABLET, FILM COATED ORAL at 09:09

## 2017-07-25 RX ADMIN — ALPRAZOLAM 0.5 MG: 0.5 TABLET ORAL at 16:24

## 2017-07-26 PROCEDURE — 6370000000 HC RX 637 (ALT 250 FOR IP): Performed by: PSYCHIATRY & NEUROLOGY

## 2017-07-26 PROCEDURE — 6370000000 HC RX 637 (ALT 250 FOR IP): Performed by: PHYSICIAN ASSISTANT

## 2017-07-26 PROCEDURE — 90833 PSYTX W PT W E/M 30 MIN: CPT | Performed by: PSYCHIATRY & NEUROLOGY

## 2017-07-26 PROCEDURE — 99231 SBSQ HOSP IP/OBS SF/LOW 25: CPT | Performed by: PSYCHIATRY & NEUROLOGY

## 2017-07-26 PROCEDURE — 1240000000 HC EMOTIONAL WELLNESS R&B

## 2017-07-26 PROCEDURE — 6370000000 HC RX 637 (ALT 250 FOR IP): Performed by: NURSE PRACTITIONER

## 2017-07-26 RX ADMIN — DULOXETINE HYDROCHLORIDE 60 MG: 60 CAPSULE, DELAYED RELEASE ORAL at 21:37

## 2017-07-26 RX ADMIN — AMLODIPINE BESYLATE 10 MG: 10 TABLET ORAL at 09:07

## 2017-07-26 RX ADMIN — ALPRAZOLAM 0.5 MG: 0.5 TABLET ORAL at 15:34

## 2017-07-26 RX ADMIN — DOCUSATE SODIUM 100 MG: 100 CAPSULE, LIQUID FILLED ORAL at 21:37

## 2017-07-26 RX ADMIN — PHENAZOPYRIDINE HYDROCHLORIDE 200 MG: 200 TABLET ORAL at 09:07

## 2017-07-26 RX ADMIN — ALPRAZOLAM 0.5 MG: 0.5 TABLET ORAL at 09:18

## 2017-07-26 RX ADMIN — HYDROCHLOROTHIAZIDE 25 MG: 25 TABLET ORAL at 09:07

## 2017-07-26 RX ADMIN — QUETIAPINE FUMARATE 50 MG: 50 TABLET, FILM COATED ORAL at 21:37

## 2017-07-26 RX ADMIN — NITROFURANTOIN (MONOHYDRATE/MACROCRYSTALS) 100 MG: 75; 25 CAPSULE ORAL at 09:06

## 2017-07-26 RX ADMIN — QUETIAPINE FUMARATE 50 MG: 50 TABLET, FILM COATED ORAL at 09:07

## 2017-07-26 RX ADMIN — HYDROCORTISONE: 1 CREAM TOPICAL at 14:05

## 2017-07-26 RX ADMIN — NITROFURANTOIN (MONOHYDRATE/MACROCRYSTALS) 100 MG: 75; 25 CAPSULE ORAL at 21:37

## 2017-07-26 RX ADMIN — LEVOTHYROXINE SODIUM 125 MCG: 25 TABLET ORAL at 06:34

## 2017-07-26 RX ADMIN — PHENAZOPYRIDINE HYDROCHLORIDE 200 MG: 200 TABLET ORAL at 12:25

## 2017-07-26 RX ADMIN — ALPRAZOLAM 0.5 MG: 0.5 TABLET ORAL at 21:37

## 2017-07-26 RX ADMIN — DOCUSATE SODIUM 100 MG: 100 CAPSULE, LIQUID FILLED ORAL at 09:06

## 2017-07-26 RX ADMIN — PHENAZOPYRIDINE HYDROCHLORIDE 200 MG: 200 TABLET ORAL at 17:00

## 2017-07-27 VITALS
HEIGHT: 62 IN | WEIGHT: 203 LBS | TEMPERATURE: 98 F | HEART RATE: 68 BPM | BODY MASS INDEX: 37.36 KG/M2 | RESPIRATION RATE: 18 BRPM | DIASTOLIC BLOOD PRESSURE: 76 MMHG | OXYGEN SATURATION: 94 % | SYSTOLIC BLOOD PRESSURE: 108 MMHG

## 2017-07-27 PROCEDURE — 6370000000 HC RX 637 (ALT 250 FOR IP): Performed by: PSYCHIATRY & NEUROLOGY

## 2017-07-27 PROCEDURE — 99238 HOSP IP/OBS DSCHRG MGMT 30/<: CPT | Performed by: PSYCHIATRY & NEUROLOGY

## 2017-07-27 PROCEDURE — 6370000000 HC RX 637 (ALT 250 FOR IP): Performed by: NURSE PRACTITIONER

## 2017-07-27 PROCEDURE — 6370000000 HC RX 637 (ALT 250 FOR IP): Performed by: PHYSICIAN ASSISTANT

## 2017-07-27 RX ORDER — QUETIAPINE FUMARATE 50 MG/1
50 TABLET, FILM COATED ORAL 2 TIMES DAILY
Qty: 30 TABLET | Refills: 1 | Status: SHIPPED | OUTPATIENT
Start: 2017-07-27 | End: 2017-10-02

## 2017-07-27 RX ORDER — NITROFURANTOIN 25; 75 MG/1; MG/1
100 CAPSULE ORAL EVERY 12 HOURS SCHEDULED
Qty: 14 CAPSULE | Refills: 0 | Status: SHIPPED | OUTPATIENT
Start: 2017-07-24 | End: 2017-08-03

## 2017-07-27 RX ADMIN — NITROFURANTOIN (MONOHYDRATE/MACROCRYSTALS) 100 MG: 75; 25 CAPSULE ORAL at 09:10

## 2017-07-27 RX ADMIN — PHENAZOPYRIDINE HYDROCHLORIDE 200 MG: 200 TABLET ORAL at 09:10

## 2017-07-27 RX ADMIN — ALPRAZOLAM 0.5 MG: 0.5 TABLET ORAL at 11:00

## 2017-07-27 RX ADMIN — DOCUSATE SODIUM 100 MG: 100 CAPSULE, LIQUID FILLED ORAL at 09:10

## 2017-07-27 RX ADMIN — HYDROCORTISONE: 1 CREAM TOPICAL at 09:11

## 2017-07-27 RX ADMIN — HYDROCHLOROTHIAZIDE 25 MG: 25 TABLET ORAL at 09:10

## 2017-07-27 RX ADMIN — LEVOTHYROXINE SODIUM 125 MCG: 25 TABLET ORAL at 06:31

## 2017-07-27 RX ADMIN — QUETIAPINE FUMARATE 50 MG: 50 TABLET, FILM COATED ORAL at 09:10

## 2017-07-27 RX ADMIN — AMLODIPINE BESYLATE 10 MG: 10 TABLET ORAL at 09:10

## 2017-07-28 RX ORDER — DULOXETIN HYDROCHLORIDE 60 MG/1
60 CAPSULE, DELAYED RELEASE ORAL 2 TIMES DAILY
Qty: 15 CAPSULE | Refills: 2 | Status: SHIPPED | OUTPATIENT
Start: 2017-07-28 | End: 2018-07-11

## 2017-08-09 ENCOUNTER — HOSPITAL ENCOUNTER (EMERGENCY)
Facility: HOSPITAL | Age: 52
Discharge: HOME | End: 2017-08-09
Attending: EMERGENCY MEDICINE

## 2017-08-09 VITALS
OXYGEN SATURATION: 98 % | RESPIRATION RATE: 20 BRPM | DIASTOLIC BLOOD PRESSURE: 110 MMHG | TEMPERATURE: 97.8 F | SYSTOLIC BLOOD PRESSURE: 155 MMHG | HEART RATE: 76 BPM | WEIGHT: 199 LBS

## 2017-08-09 DIAGNOSIS — F32.A DEPRESSION, ACUTE: Primary | ICD-10-CM

## 2017-08-09 LAB
AMPHETAMINES SERPL QL SCN: NEGATIVE
BARBITURATES UR QL: NEGATIVE
BENZODIAZ UR QL: POSITIVE
COCAINE UR QL: NEGATIVE
ETHANOL EXG-MCNC: 0 MG/DL
HCG UR QL: NEGATIVE
METHADONE UR QL: NEGATIVE
OPIATES UR QL SCN: NEGATIVE
PCP UR QL: NEGATIVE
THC UR QL: NEGATIVE

## 2017-08-09 PROCEDURE — 81025 URINE PREGNANCY TEST: CPT | Performed by: EMERGENCY MEDICINE

## 2017-08-09 PROCEDURE — 99284 EMERGENCY DEPT VISIT MOD MDM: CPT

## 2017-08-09 PROCEDURE — 82075 ASSAY OF BREATH ETHANOL: CPT | Performed by: EMERGENCY MEDICINE

## 2017-08-09 PROCEDURE — 80307 DRUG TEST PRSMV CHEM ANLYZR: CPT | Performed by: EMERGENCY MEDICINE

## 2017-08-09 RX ORDER — ALPRAZOLAM 0.5 MG/1
TABLET ORAL 4 TIMES DAILY PRN
COMMUNITY

## 2017-08-09 RX ORDER — AMLODIPINE BESYLATE 10 MG/1
10 TABLET ORAL DAILY
COMMUNITY

## 2017-08-09 RX ORDER — QUETIAPINE FUMARATE 50 MG/1
50 TABLET, FILM COATED ORAL
COMMUNITY

## 2017-08-09 RX ORDER — HYDROCHLOROTHIAZIDE 12.5 MG/1
12.5 CAPSULE, GELATIN COATED ORAL DAILY
COMMUNITY

## 2017-08-09 RX ORDER — DULOXETIN HYDROCHLORIDE 60 MG/1
20 CAPSULE, DELAYED RELEASE ORAL DAILY
COMMUNITY

## 2017-08-09 RX ORDER — LEVOTHYROXINE SODIUM 0.12 MG/1
125 TABLET ORAL DAILY
COMMUNITY

## 2017-09-13 ENCOUNTER — HOSPITAL ENCOUNTER (EMERGENCY)
Age: 52
Discharge: HOME | End: 2017-09-13
Payer: COMMERCIAL

## 2017-09-13 ENCOUNTER — APPOINTMENT (OUTPATIENT)
Dept: CT IMAGING | Age: 52
End: 2017-09-13
Payer: COMMERCIAL

## 2017-09-13 VITALS
OXYGEN SATURATION: 99 % | TEMPERATURE: 98.6 F | BODY MASS INDEX: 36.14 KG/M2 | SYSTOLIC BLOOD PRESSURE: 155 MMHG | DIASTOLIC BLOOD PRESSURE: 67 MMHG | RESPIRATION RATE: 18 BRPM | HEIGHT: 63 IN | HEART RATE: 80 BPM | WEIGHT: 204 LBS

## 2017-09-13 DIAGNOSIS — R11.2 NON-INTRACTABLE VOMITING WITH NAUSEA, UNSPECIFIED VOMITING TYPE: ICD-10-CM

## 2017-09-13 DIAGNOSIS — R51.9 NONINTRACTABLE HEADACHE, UNSPECIFIED CHRONICITY PATTERN, UNSPECIFIED HEADACHE TYPE: ICD-10-CM

## 2017-09-13 DIAGNOSIS — R10.9 FLANK PAIN: Primary | ICD-10-CM

## 2017-09-13 DIAGNOSIS — M54.50 BILATERAL LOW BACK PAIN WITHOUT SCIATICA, UNSPECIFIED CHRONICITY: ICD-10-CM

## 2017-09-13 DIAGNOSIS — R10.84 GENERALIZED ABDOMINAL PAIN: ICD-10-CM

## 2017-09-13 LAB
ALBUMIN SERPL-MCNC: 4.6 G/DL (ref 3.9–4.9)
ALP BLD-CCNC: 79 U/L (ref 40–130)
ALT SERPL-CCNC: 55 U/L (ref 0–33)
AMPHETAMINE SCREEN, URINE: NORMAL
ANION GAP SERPL CALCULATED.3IONS-SCNC: 14 MEQ/L (ref 7–13)
AST SERPL-CCNC: 37 U/L (ref 0–35)
BACTERIA: NORMAL /HPF
BARBITURATE SCREEN URINE: NORMAL
BASOPHILS ABSOLUTE: 0 K/UL (ref 0–0.2)
BASOPHILS RELATIVE PERCENT: 0.6 %
BENZODIAZEPINE SCREEN, URINE: NORMAL
BILIRUB SERPL-MCNC: 0.4 MG/DL (ref 0–1.2)
BILIRUBIN URINE: NEGATIVE
BLOOD, URINE: NEGATIVE
BUN BLDV-MCNC: 9 MG/DL (ref 6–20)
CALCIUM SERPL-MCNC: 9.7 MG/DL (ref 8.6–10.2)
CANNABINOID SCREEN URINE: NORMAL
CHLORIDE BLD-SCNC: 103 MEQ/L (ref 98–107)
CLARITY: ABNORMAL
CO2: 25 MEQ/L (ref 22–29)
COCAINE METABOLITE SCREEN URINE: NORMAL
COLOR: YELLOW
CREAT SERPL-MCNC: 0.74 MG/DL (ref 0.5–0.9)
EOSINOPHILS ABSOLUTE: 0 K/UL (ref 0–0.7)
EOSINOPHILS RELATIVE PERCENT: 0.6 %
EPITHELIAL CELLS, UA: NORMAL /HPF
GFR AFRICAN AMERICAN: >60
GFR NON-AFRICAN AMERICAN: >60
GLOBULIN: 2.6 G/DL (ref 2.3–3.5)
GLUCOSE BLD-MCNC: 111 MG/DL (ref 74–109)
GLUCOSE URINE: NEGATIVE MG/DL
HCT VFR BLD CALC: 44 % (ref 37–47)
HEMOGLOBIN: 14.6 G/DL (ref 12–16)
KETONES, URINE: NEGATIVE MG/DL
LEUKOCYTE ESTERASE, URINE: ABNORMAL
LIPASE: 45 U/L (ref 13–60)
LYMPHOCYTES ABSOLUTE: 2.3 K/UL (ref 1–4.8)
LYMPHOCYTES RELATIVE PERCENT: 32.7 %
Lab: NORMAL
MCH RBC QN AUTO: 29 PG (ref 27–31.3)
MCHC RBC AUTO-ENTMCNC: 33.1 % (ref 33–37)
MCV RBC AUTO: 87.5 FL (ref 82–100)
MONOCYTES ABSOLUTE: 0.4 K/UL (ref 0.2–0.8)
MONOCYTES RELATIVE PERCENT: 5.9 %
MUCUS: PRESENT
NEUTROPHILS ABSOLUTE: 4.2 K/UL (ref 1.4–6.5)
NEUTROPHILS RELATIVE PERCENT: 60.2 %
NITRITE, URINE: NEGATIVE
OPIATE SCREEN URINE: NORMAL
PDW BLD-RTO: 13.5 % (ref 11.5–14.5)
PH UA: 7.5 (ref 5–9)
PHENCYCLIDINE SCREEN URINE: NORMAL
PLATELET # BLD: 252 K/UL (ref 130–400)
POTASSIUM SERPL-SCNC: 4 MEQ/L (ref 3.5–5.1)
PROTEIN UA: NEGATIVE MG/DL
RBC # BLD: 5.02 M/UL (ref 4.2–5.4)
RBC UA: NORMAL /HPF (ref 0–2)
SODIUM BLD-SCNC: 142 MEQ/L (ref 132–144)
SPECIFIC GRAVITY UA: 1.01 (ref 1–1.03)
TOTAL PROTEIN: 7.2 G/DL (ref 6.4–8.1)
URINE REFLEX TO CULTURE: YES
UROBILINOGEN, URINE: 0.2 E.U./DL
WBC # BLD: 7 K/UL (ref 4.8–10.8)
WBC UA: NORMAL /HPF (ref 0–5)

## 2017-09-13 PROCEDURE — 96375 TX/PRO/DX INJ NEW DRUG ADDON: CPT

## 2017-09-13 PROCEDURE — 36415 COLL VENOUS BLD VENIPUNCTURE: CPT

## 2017-09-13 PROCEDURE — 6360000002 HC RX W HCPCS: Performed by: NURSE PRACTITIONER

## 2017-09-13 PROCEDURE — 81001 URINALYSIS AUTO W/SCOPE: CPT

## 2017-09-13 PROCEDURE — 96376 TX/PRO/DX INJ SAME DRUG ADON: CPT

## 2017-09-13 PROCEDURE — 87086 URINE CULTURE/COLONY COUNT: CPT

## 2017-09-13 PROCEDURE — 80053 COMPREHEN METABOLIC PANEL: CPT

## 2017-09-13 PROCEDURE — 80307 DRUG TEST PRSMV CHEM ANLYZR: CPT

## 2017-09-13 PROCEDURE — 99284 EMERGENCY DEPT VISIT MOD MDM: CPT

## 2017-09-13 PROCEDURE — 96374 THER/PROPH/DIAG INJ IV PUSH: CPT

## 2017-09-13 PROCEDURE — 83690 ASSAY OF LIPASE: CPT

## 2017-09-13 PROCEDURE — 85025 COMPLETE CBC W/AUTO DIFF WBC: CPT

## 2017-09-13 PROCEDURE — 2580000003 HC RX 258: Performed by: NURSE PRACTITIONER

## 2017-09-13 PROCEDURE — 74176 CT ABD & PELVIS W/O CONTRAST: CPT

## 2017-09-13 RX ORDER — ONDANSETRON 2 MG/ML
4 INJECTION INTRAMUSCULAR; INTRAVENOUS ONCE
Status: COMPLETED | OUTPATIENT
Start: 2017-09-13 | End: 2017-09-13

## 2017-09-13 RX ORDER — MORPHINE SULFATE 4 MG/ML
4 INJECTION, SOLUTION INTRAMUSCULAR; INTRAVENOUS ONCE
Status: COMPLETED | OUTPATIENT
Start: 2017-09-13 | End: 2017-09-13

## 2017-09-13 RX ORDER — ACETAMINOPHEN 500 MG
1000 TABLET ORAL EVERY 6 HOURS PRN
Qty: 30 TABLET | Refills: 0 | Status: SHIPPED | OUTPATIENT
Start: 2017-09-13 | End: 2021-01-27

## 2017-09-13 RX ORDER — PHENAZOPYRIDINE HYDROCHLORIDE 200 MG/1
200 TABLET, FILM COATED ORAL 3 TIMES DAILY PRN
Qty: 9 TABLET | Refills: 0 | Status: SHIPPED | OUTPATIENT
Start: 2017-09-13 | End: 2018-04-06 | Stop reason: ALTCHOICE

## 2017-09-13 RX ORDER — 0.9 % SODIUM CHLORIDE 0.9 %
1000 INTRAVENOUS SOLUTION INTRAVENOUS ONCE
Status: COMPLETED | OUTPATIENT
Start: 2017-09-13 | End: 2017-09-13

## 2017-09-13 RX ORDER — FENTANYL CITRATE 50 UG/ML
50 INJECTION, SOLUTION INTRAMUSCULAR; INTRAVENOUS ONCE
Status: DISCONTINUED | OUTPATIENT
Start: 2017-09-13 | End: 2017-09-13

## 2017-09-13 RX ADMIN — ONDANSETRON 4 MG: 2 INJECTION INTRAMUSCULAR; INTRAVENOUS at 19:53

## 2017-09-13 RX ADMIN — SODIUM CHLORIDE 1000 ML: 9 INJECTION, SOLUTION INTRAVENOUS at 19:53

## 2017-09-13 RX ADMIN — MORPHINE SULFATE 4 MG: 4 INJECTION, SOLUTION INTRAMUSCULAR; INTRAVENOUS at 19:53

## 2017-09-13 RX ADMIN — MORPHINE SULFATE 4 MG: 4 INJECTION, SOLUTION INTRAMUSCULAR; INTRAVENOUS at 21:36

## 2017-09-13 ASSESSMENT — PAIN DESCRIPTION - LOCATION
LOCATION: ABDOMEN;BACK
LOCATION: HEAD

## 2017-09-13 ASSESSMENT — PAIN SCALES - GENERAL
PAINLEVEL_OUTOF10: 9
PAINLEVEL_OUTOF10: 6
PAINLEVEL_OUTOF10: 6
PAINLEVEL_OUTOF10: 9

## 2017-09-13 ASSESSMENT — PAIN DESCRIPTION - PROGRESSION: CLINICAL_PROGRESSION: GRADUALLY WORSENING

## 2017-09-13 ASSESSMENT — ENCOUNTER SYMPTOMS
VOMITING: 1
ABDOMINAL PAIN: 1
DIARRHEA: 0
COUGH: 0
SHORTNESS OF BREATH: 0
NAUSEA: 1

## 2017-09-13 ASSESSMENT — PAIN DESCRIPTION - PAIN TYPE: TYPE: ACUTE PAIN

## 2017-09-13 ASSESSMENT — PAIN DESCRIPTION - DESCRIPTORS: DESCRIPTORS: RADIATING;STABBING

## 2017-09-13 ASSESSMENT — PAIN DESCRIPTION - FREQUENCY: FREQUENCY: CONTINUOUS

## 2017-09-15 LAB — URINE CULTURE, ROUTINE: NORMAL

## 2017-10-02 RX ORDER — QUETIAPINE FUMARATE 100 MG/1
TABLET, FILM COATED ORAL
Qty: 60 TABLET | Refills: 0 | Status: SHIPPED | OUTPATIENT
Start: 2017-10-02 | End: 2018-07-11

## 2017-11-02 ENCOUNTER — HOSPITAL ENCOUNTER (EMERGENCY)
Age: 52
Discharge: HOME | End: 2017-11-02
Attending: STUDENT IN AN ORGANIZED HEALTH CARE EDUCATION/TRAINING PROGRAM
Payer: COMMERCIAL

## 2017-11-02 ENCOUNTER — APPOINTMENT (OUTPATIENT)
Dept: CT IMAGING | Age: 52
End: 2017-11-02
Payer: COMMERCIAL

## 2017-11-02 VITALS
BODY MASS INDEX: 35.44 KG/M2 | WEIGHT: 200 LBS | OXYGEN SATURATION: 100 % | HEIGHT: 63 IN | DIASTOLIC BLOOD PRESSURE: 80 MMHG | TEMPERATURE: 98.5 F | RESPIRATION RATE: 16 BRPM | HEART RATE: 86 BPM | SYSTOLIC BLOOD PRESSURE: 134 MMHG

## 2017-11-02 DIAGNOSIS — J18.9 PNEUMONIA DUE TO ORGANISM: Primary | ICD-10-CM

## 2017-11-02 DIAGNOSIS — R42 LIGHT-HEADED FEELING: ICD-10-CM

## 2017-11-02 DIAGNOSIS — M54.2 NECK PAIN: ICD-10-CM

## 2017-11-02 DIAGNOSIS — R51.9 NONINTRACTABLE EPISODIC HEADACHE, UNSPECIFIED HEADACHE TYPE: ICD-10-CM

## 2017-11-02 LAB
ALBUMIN SERPL-MCNC: 4.4 G/DL (ref 3.9–4.9)
ALP BLD-CCNC: 70 U/L (ref 40–130)
ALT SERPL-CCNC: 45 U/L (ref 0–33)
AMPHETAMINE SCREEN, URINE: ABNORMAL
ANION GAP SERPL CALCULATED.3IONS-SCNC: 12 MEQ/L (ref 7–13)
APTT: 24.5 SEC (ref 21.6–35.4)
AST SERPL-CCNC: 29 U/L (ref 0–35)
BACTERIA: NORMAL /HPF
BARBITURATE SCREEN URINE: ABNORMAL
BASOPHILS ABSOLUTE: 0.1 K/UL (ref 0–0.2)
BASOPHILS RELATIVE PERCENT: 0.6 %
BENZODIAZEPINE SCREEN, URINE: POSITIVE
BILIRUB SERPL-MCNC: 0.5 MG/DL (ref 0–1.2)
BILIRUBIN URINE: ABNORMAL
BLOOD, URINE: NEGATIVE
BUN BLDV-MCNC: 9 MG/DL (ref 6–20)
C-REACTIVE PROTEIN, HIGH SENSITIVITY: 6.1 MG/L (ref 0–5)
CALCIUM SERPL-MCNC: 9.7 MG/DL (ref 8.6–10.2)
CANNABINOID SCREEN URINE: ABNORMAL
CHLORIDE BLD-SCNC: 103 MEQ/L (ref 98–107)
CHOLESTEROL, TOTAL: 223 MG/DL (ref 0–199)
CHP ED QC CHECK: NORMAL
CLARITY: ABNORMAL
CO2: 28 MEQ/L (ref 22–29)
COCAINE METABOLITE SCREEN URINE: ABNORMAL
COLOR: YELLOW
CREAT SERPL-MCNC: 0.68 MG/DL (ref 0.5–0.9)
EKG ATRIAL RATE: 79 BPM
EKG P AXIS: 26 DEGREES
EKG P-R INTERVAL: 168 MS
EKG Q-T INTERVAL: 374 MS
EKG QRS DURATION: 88 MS
EKG QTC CALCULATION (BAZETT): 428 MS
EKG R AXIS: -18 DEGREES
EKG T AXIS: 16 DEGREES
EKG VENTRICULAR RATE: 79 BPM
EOSINOPHILS ABSOLUTE: 0 K/UL (ref 0–0.7)
EOSINOPHILS RELATIVE PERCENT: 0.3 %
GFR AFRICAN AMERICAN: >60
GFR NON-AFRICAN AMERICAN: >60
GLOBULIN: 2.5 G/DL (ref 2.3–3.5)
GLUCOSE BLD-MCNC: 90 MG/DL (ref 74–109)
GLUCOSE URINE: NEGATIVE MG/DL
HCT VFR BLD CALC: 40.3 % (ref 37–47)
HDLC SERPL-MCNC: 32 MG/DL (ref 40–59)
HEMOGLOBIN: 13.4 G/DL (ref 12–16)
INR BLD: 1
KETONES, URINE: NEGATIVE MG/DL
LACTIC ACID: 0.9 MMOL/L (ref 0.5–2.2)
LDL CHOLESTEROL CALCULATED: 154 MG/DL (ref 0–129)
LEUKOCYTE ESTERASE, URINE: ABNORMAL
LYMPHOCYTES ABSOLUTE: 2.2 K/UL (ref 1–4.8)
LYMPHOCYTES RELATIVE PERCENT: 21.6 %
Lab: ABNORMAL
MAGNESIUM: 2.1 MG/DL (ref 1.7–2.3)
MCH RBC QN AUTO: 28.6 PG (ref 27–31.3)
MCHC RBC AUTO-ENTMCNC: 33.3 % (ref 33–37)
MCV RBC AUTO: 86 FL (ref 82–100)
MONO TEST: NEGATIVE
MONOCYTES ABSOLUTE: 0.6 K/UL (ref 0.2–0.8)
MONOCYTES RELATIVE PERCENT: 6 %
NEUTROPHILS ABSOLUTE: 7.1 K/UL (ref 1.4–6.5)
NEUTROPHILS RELATIVE PERCENT: 71.5 %
NITRITE, URINE: NEGATIVE
OPIATE SCREEN URINE: ABNORMAL
PDW BLD-RTO: 13.2 % (ref 11.5–14.5)
PH UA: 6 (ref 5–9)
PHENCYCLIDINE SCREEN URINE: ABNORMAL
PLATELET # BLD: 251 K/UL (ref 130–400)
POC CREATININE WHOLE BLOOD: 1
POTASSIUM SERPL-SCNC: 3.3 MEQ/L (ref 3.5–5.1)
PREGNANCY TEST URINE, POC: NEGATIVE
PRO-BNP: 80 PG/ML
PROTEIN UA: NEGATIVE MG/DL
PROTHROMBIN TIME: 10.5 SEC (ref 8.1–13.7)
RAPID INFLUENZA  B AGN: NEGATIVE
RAPID INFLUENZA A AGN: NEGATIVE
RBC # BLD: 4.69 M/UL (ref 4.2–5.4)
RBC UA: NORMAL /HPF (ref 0–2)
S PYO AG THROAT QL: NEGATIVE
SEDIMENTATION RATE, ERYTHROCYTE: 14 MM (ref 0–30)
SODIUM BLD-SCNC: 143 MEQ/L (ref 132–144)
SPECIFIC GRAVITY UA: 1.02 (ref 1–1.03)
TOTAL CK: 105 U/L (ref 0–170)
TOTAL PROTEIN: 6.9 G/DL (ref 6.4–8.1)
TRIGL SERPL-MCNC: 185 MG/DL (ref 0–200)
TROPONIN: <0.01 NG/ML (ref 0–0.01)
TSH SERPL DL<=0.05 MIU/L-ACNC: 0.2 UIU/ML (ref 0.27–4.2)
URINE REFLEX TO CULTURE: YES
UROBILINOGEN, URINE: 0.2 E.U./DL
WBC # BLD: 10 K/UL (ref 4.8–10.8)
WBC UA: NORMAL /HPF (ref 0–5)

## 2017-11-02 PROCEDURE — 86403 PARTICLE AGGLUT ANTBDY SCRN: CPT

## 2017-11-02 PROCEDURE — 85652 RBC SED RATE AUTOMATED: CPT

## 2017-11-02 PROCEDURE — 84443 ASSAY THYROID STIM HORMONE: CPT

## 2017-11-02 PROCEDURE — 93005 ELECTROCARDIOGRAM TRACING: CPT

## 2017-11-02 PROCEDURE — 6360000004 HC RX CONTRAST MEDICATION: Performed by: STUDENT IN AN ORGANIZED HEALTH CARE EDUCATION/TRAINING PROGRAM

## 2017-11-02 PROCEDURE — 82550 ASSAY OF CK (CPK): CPT

## 2017-11-02 PROCEDURE — 87086 URINE CULTURE/COLONY COUNT: CPT

## 2017-11-02 PROCEDURE — 6360000002 HC RX W HCPCS: Performed by: STUDENT IN AN ORGANIZED HEALTH CARE EDUCATION/TRAINING PROGRAM

## 2017-11-02 PROCEDURE — 84484 ASSAY OF TROPONIN QUANT: CPT

## 2017-11-02 PROCEDURE — 83605 ASSAY OF LACTIC ACID: CPT

## 2017-11-02 PROCEDURE — 6370000000 HC RX 637 (ALT 250 FOR IP): Performed by: STUDENT IN AN ORGANIZED HEALTH CARE EDUCATION/TRAINING PROGRAM

## 2017-11-02 PROCEDURE — 99285 EMERGENCY DEPT VISIT HI MDM: CPT

## 2017-11-02 PROCEDURE — 87081 CULTURE SCREEN ONLY: CPT

## 2017-11-02 PROCEDURE — 71275 CT ANGIOGRAPHY CHEST: CPT

## 2017-11-02 PROCEDURE — 96374 THER/PROPH/DIAG INJ IV PUSH: CPT

## 2017-11-02 PROCEDURE — 85025 COMPLETE CBC W/AUTO DIFF WBC: CPT

## 2017-11-02 PROCEDURE — 86308 HETEROPHILE ANTIBODY SCREEN: CPT

## 2017-11-02 PROCEDURE — 85610 PROTHROMBIN TIME: CPT

## 2017-11-02 PROCEDURE — 36415 COLL VENOUS BLD VENIPUNCTURE: CPT

## 2017-11-02 PROCEDURE — 80053 COMPREHEN METABOLIC PANEL: CPT

## 2017-11-02 PROCEDURE — 85730 THROMBOPLASTIN TIME PARTIAL: CPT

## 2017-11-02 PROCEDURE — 83880 ASSAY OF NATRIURETIC PEPTIDE: CPT

## 2017-11-02 PROCEDURE — 80307 DRUG TEST PRSMV CHEM ANLYZR: CPT

## 2017-11-02 PROCEDURE — 87040 BLOOD CULTURE FOR BACTERIA: CPT

## 2017-11-02 PROCEDURE — 83735 ASSAY OF MAGNESIUM: CPT

## 2017-11-02 PROCEDURE — 2500000003 HC RX 250 WO HCPCS: Performed by: STUDENT IN AN ORGANIZED HEALTH CARE EDUCATION/TRAINING PROGRAM

## 2017-11-02 PROCEDURE — 96372 THER/PROPH/DIAG INJ SC/IM: CPT

## 2017-11-02 PROCEDURE — 2580000003 HC RX 258: Performed by: STUDENT IN AN ORGANIZED HEALTH CARE EDUCATION/TRAINING PROGRAM

## 2017-11-02 PROCEDURE — 87880 STREP A ASSAY W/OPTIC: CPT

## 2017-11-02 PROCEDURE — 70491 CT SOFT TISSUE NECK W/DYE: CPT

## 2017-11-02 PROCEDURE — 80061 LIPID PANEL: CPT

## 2017-11-02 PROCEDURE — 81001 URINALYSIS AUTO W/SCOPE: CPT

## 2017-11-02 PROCEDURE — 86141 C-REACTIVE PROTEIN HS: CPT

## 2017-11-02 RX ORDER — METRONIDAZOLE 500 MG/1
500 TABLET ORAL 3 TIMES DAILY
Qty: 30 TABLET | Refills: 0 | Status: SHIPPED | OUTPATIENT
Start: 2017-11-02 | End: 2017-11-12

## 2017-11-02 RX ORDER — METRONIDAZOLE 500 MG/1
500 TABLET ORAL ONCE
Status: COMPLETED | OUTPATIENT
Start: 2017-11-02 | End: 2017-11-02

## 2017-11-02 RX ORDER — PROMETHAZINE HYDROCHLORIDE 25 MG/ML
25 INJECTION, SOLUTION INTRAMUSCULAR; INTRAVENOUS ONCE
Status: COMPLETED | OUTPATIENT
Start: 2017-11-02 | End: 2017-11-02

## 2017-11-02 RX ORDER — PROMETHAZINE HYDROCHLORIDE 25 MG/1
25 TABLET ORAL EVERY 8 HOURS PRN
Qty: 7 TABLET | Refills: 0 | Status: SHIPPED | OUTPATIENT
Start: 2017-11-02 | End: 2017-11-09

## 2017-11-02 RX ORDER — AMOXICILLIN AND CLAVULANATE POTASSIUM 875; 125 MG/1; MG/1
1 TABLET, FILM COATED ORAL ONCE
Status: COMPLETED | OUTPATIENT
Start: 2017-11-02 | End: 2017-11-02

## 2017-11-02 RX ORDER — 0.9 % SODIUM CHLORIDE 0.9 %
1000 INTRAVENOUS SOLUTION INTRAVENOUS ONCE
Status: COMPLETED | OUTPATIENT
Start: 2017-11-02 | End: 2017-11-02

## 2017-11-02 RX ORDER — AMOXICILLIN AND CLAVULANATE POTASSIUM 875; 125 MG/1; MG/1
1 TABLET, FILM COATED ORAL 2 TIMES DAILY
Qty: 20 TABLET | Refills: 0 | Status: SHIPPED | OUTPATIENT
Start: 2017-11-02 | End: 2017-11-12

## 2017-11-02 RX ORDER — KETAMINE HYDROCHLORIDE 100 MG/ML
0.15 INJECTION, SOLUTION INTRAMUSCULAR; INTRAVENOUS ONCE
Status: COMPLETED | OUTPATIENT
Start: 2017-11-02 | End: 2017-11-02

## 2017-11-02 RX ADMIN — PROMETHAZINE HYDROCHLORIDE 25 MG: 25 INJECTION, SOLUTION INTRAMUSCULAR; INTRAVENOUS at 18:23

## 2017-11-02 RX ADMIN — KETAMINE HYDROCHLORIDE 10 MG: 100 INJECTION, SOLUTION, CONCENTRATE INTRAMUSCULAR; INTRAVENOUS at 16:50

## 2017-11-02 RX ADMIN — SODIUM CHLORIDE 1000 ML: 900 INJECTION, SOLUTION INTRAVENOUS at 16:47

## 2017-11-02 RX ADMIN — IOPAMIDOL 100 ML: 755 INJECTION, SOLUTION INTRAVENOUS at 17:30

## 2017-11-02 RX ADMIN — AMOXICILLIN AND CLAVULANATE POTASSIUM 1 TABLET: 875; 125 TABLET, FILM COATED ORAL at 18:08

## 2017-11-02 RX ADMIN — METRONIDAZOLE 500 MG: 500 TABLET ORAL at 18:08

## 2017-11-02 ASSESSMENT — ENCOUNTER SYMPTOMS
DIARRHEA: 0
SHORTNESS OF BREATH: 1
CHEST TIGHTNESS: 0
SINUS PRESSURE: 0
COUGH: 0
ABDOMINAL PAIN: 0
BACK PAIN: 0
VOMITING: 0
TROUBLE SWALLOWING: 0

## 2017-11-02 ASSESSMENT — PAIN DESCRIPTION - DESCRIPTORS
DESCRIPTORS: TIGHTNESS
DESCRIPTORS_2: THROBBING

## 2017-11-02 ASSESSMENT — PAIN DESCRIPTION - LOCATION
LOCATION_2: HEAD
LOCATION: CHEST

## 2017-11-02 ASSESSMENT — PAIN SCALES - GENERAL
PAINLEVEL_OUTOF10: 8
PAINLEVEL_OUTOF10: 9

## 2017-11-02 ASSESSMENT — PAIN DESCRIPTION - FREQUENCY: FREQUENCY: CONTINUOUS

## 2017-11-02 ASSESSMENT — PAIN DESCRIPTION - ORIENTATION: ORIENTATION: MID

## 2017-11-02 ASSESSMENT — PAIN DESCRIPTION - INTENSITY: RATING_2: 8

## 2017-11-02 ASSESSMENT — PAIN DESCRIPTION - DURATION: DURATION_2: CONTINUOUS

## 2017-11-02 NOTE — ED TRIAGE NOTES
Patient presents to the ER with c/o chest pain and SOB. Patient states she had a headache last night in which started the series of symptoms. Patient states she took two tylenol PM and went to sleep. Patient states she then awoke with a tightness in her neck as if she couldn't breathe and states she was making a wheezing noise. Patient states she was scared with the symptoms and she said it was accompanied by chest pain. Patient states she took 2 prescribed xanax to help because she was stressed. Patient states she thought it would help with the chest tightness and nervousness. Patient states she has been under a lot of stress lately with two deaths in the family. Patient denies any shortness of breath at this time. Patient EKG completed upon arrival to ER room 3. Patient holding conversation without difficulty. Respirations even and unlabored at this time. Patient mother accompanied patient to ER. Patient placed on continuous cardiac monitor.

## 2017-11-02 NOTE — ED PROVIDER NOTES
[facility] ED  eMERGENCY dEPARTMENT eNCOUnter      Pt Name: Fany Guerrier  MRN: [id number]  [place] 1965  Date of evaluation: 11/2/2017  Provider: Vania Pederson Gore Sharita       Chief Complaint   Patient presents with    Chest Pain         HISTORY OF PRESENT ILLNESS   (Location/Symptom, Timing/Onset, Context/Setting, Quality, Duration, Modifying Factors, Severity)  Note limiting factors. Fany Guerrier is a 47 y.o. female who presents to the emergency department with complaint of chest pain that is tight and burning with inspiration. Also c/o headache. Not the worst headache the Patient states that she frequently has had headaches described as migraines. Patient states that she has no visual changes. No hearing loss. Patient also complains of a severe sore throat with trouble swallowing. Simply touching the front of her neck causes the pain to get worse. Additionally the patient has a tightness to her chest and burning with inspiration. Patient believes it is the same as when she had a pulmonary embolus in the past.  Patient denies any fever or chills. Patient denies any diaphoresis. No vomiting. HPI    Nursing Notes were reviewed. REVIEW OF SYSTEMS    (2-9 systems for level 4, 10 or more for level 5)     Review of Systems   Constitutional: Negative for activity change, appetite change, chills, fever and unexpected weight change. HENT: Negative for drooling, ear pain, nosebleeds, sinus pressure and trouble swallowing. Respiratory: Positive for shortness of breath. Negative for cough and chest tightness. Cardiovascular: Positive for chest pain. Negative for leg swelling. Gastrointestinal: Negative for abdominal pain, diarrhea and vomiting. Endocrine: Negative for polydipsia and polyphagia. Genitourinary: Negative for dysuria, flank pain and frequency. Musculoskeletal: Positive for neck pain. Negative for back pain and myalgias.    Skin: Negative for MG TABLET    Take 1 tablet by mouth 3 times daily as needed for Pain    QUETIAPINE (SEROQUEL) 100 MG TABLET    TAKE 1 TABLET BY MOUTH TWICE DAILY    SIMVASTATIN (ZOCOR) 20 MG TABLET    Take 20 mg by mouth daily       ALLERGIES     Ciprofloxacin; Erythromycin; Ibuprofen; Ketorolac tromethamine; Mobic [meloxicam]; Other; Pseudoephedrine hcl; Sudafed [pseudoephedrine hcl]; Sympathomimetics; and Thorazine [chlorpromazine]    FAMILY HISTORY       Family History   Problem Relation Age of Onset    Cancer Maternal Grandfather      COLON    Cancer Paternal Aunt      LUNG    Cancer Other      BLOOD CANCER- UNSPECIFIED          SOCIAL HISTORY       Social History     Social History    Marital status:      Spouse name: N/A    Number of children: N/A    Years of education: N/A     Social History Main Topics    Smoking status: Never Smoker    Smokeless tobacco: Never Used    Alcohol use Yes      Comment: OCC.  Drug use: No    Sexual activity: Not Asked     Other Topics Concern    None     Social History Narrative    None       SCREENINGS             PHYSICAL EXAM    (up to 7 for level 4, 8 or more for level 5)     ED Triage Vitals [11/02/17 1511]   BP Temp Temp Source Pulse Resp SpO2 Height Weight   115/82 99.4 °F (37.4 °C) Oral 92 18 96 % 5' 2.5\" (1.588 m) 200 lb (90.7 kg)       Physical Exam   Constitutional: She is oriented to person, place, and time. She appears well-developed and well-nourished. No distress. No photophobia. No phonophobia. HENT:   Head: Normocephalic and atraumatic. Head is without Ro's sign. Right Ear: External ear normal.   Left Ear: External ear normal.   Nose: Nose normal.   Mouth/Throat: Oropharynx is clear and moist. No oropharyngeal exudate. Eyes: Conjunctivae and EOM are normal. Pupils are equal, round, and reactive to light. No foreign body present in the right eye. Left eye exhibits no exudate. No scleral icterus.    Neck: Normal range of motion and phonation normal. Neck supple. Normal carotid pulses, no hepatojugular reflux and no JVD present. Tracheal tenderness present. Carotid bruit is not present. No neck rigidity. No tracheal deviation present. No thyroid mass and no thyromegaly present. Cardiovascular: Normal rate, regular rhythm, normal heart sounds and intact distal pulses. Exam reveals no gallop, no distant heart sounds and no friction rub. No murmur heard. Pulmonary/Chest: Effort normal and breath sounds normal. No stridor. No respiratory distress. She has no wheezes. Abdominal: Soft. Bowel sounds are normal. She exhibits no distension, no pulsatile liver and no ascites. There is no hepatosplenomegaly. There is no tenderness. There is no rebound and no guarding. Musculoskeletal: Normal range of motion. She exhibits no edema or tenderness. Lymphadenopathy:        Head (right side): No submental adenopathy present. Head (left side): No submental adenopathy present. Neurological: She is alert and oriented to person, place, and time. She has normal reflexes. No cranial nerve deficit. Coordination normal.   Skin: Skin is warm and dry. No rash noted. She is not diaphoretic. No erythema. Psychiatric: She has a normal mood and affect. Her behavior is normal. Judgment and thought content normal.   Nursing note and vitals reviewed. DIAGNOSTIC RESULTS     EKG: All EKG's are interpreted by the Emergency Department Physician who either signs or Co-signs this chart in the absence of a cardiologist.    EKG: Normal sinus rhythm at 79 bpm, inverted T-wave in III, normal axis, remainder the EKG is normal ST segments, the QT interval 374 ms, no PVCs.     RADIOLOGY:   Non-plain film images such as CT, Ultrasound and MRI are read by the radiologist. Plain radiographic images are visualized and preliminarily interpreted by the emergency physician with the below findings:        Interpretation per the Radiologist below, if available at the time of this note:    CTA CHEST W WO CONTRAST   Final Result      PATCHY AIRSPACE INFILTRATE WITHIN THE RIGHT MIDDLE LOBE, SUSPICIOUS FOR MILD BRONCHOPNEUMONIA. NO EVIDENCE OF PULMONARY EMBOLI, OR OTHER SIGNIFICANT CHANGE FROM 1/10/2013 IDENTIFIED. CT SOFT TISSUE NECK W CONTRAST   Final Result      NEGATIVE NECK CT.                   ED BEDSIDE ULTRASOUND:   Performed by ED Physician - none    LABS:  Labs Reviewed   URINE DRUG SCREEN - Abnormal; Notable for the following:        Result Value    Benzodiazepine Screen, Urine POSITIVE (*)     All other components within normal limits   CBC WITH AUTO DIFFERENTIAL - Abnormal; Notable for the following:     Neutrophils # 7.1 (*)     All other components within normal limits   COMPREHENSIVE METABOLIC PANEL - Abnormal; Notable for the following:     Potassium 3.3 (*)     ALT 45 (*)     All other components within normal limits   LIPID PANEL - Abnormal; Notable for the following:     Cholesterol, Total 223 (*)     HDL 32 (*)     LDL Calculated 154 (*)     All other components within normal limits   TSH WITHOUT REFLEX - Abnormal; Notable for the following:     TSH 0.196 (*)     All other components within normal limits   HIGH SENSITIVITY CRP - Abnormal; Notable for the following:     CRP High Sensitivity 6.1 (*)     All other components within normal limits   URINE RT REFLEX TO CULTURE - Abnormal; Notable for the following:     Clarity, UA CLOUDY (*)     Bilirubin Urine SMALL (*)     Leukocyte Esterase, Urine SMALL (*)     All other components within normal limits   POC PREGNANCY UR-QUAL - Normal   POCT CREATININE - Normal   RAPID STREP SCREEN   RAPID INFLUENZA A/B ANTIGENS   CULTURE BLOOD #2   CULTURE BLOOD #1   URINE CULTURE   SEDIMENTATION RATE   LACTIC ACID, PLASMA   APTT   BRAIN NATRIURETIC PEPTIDE   CK   MAGNESIUM   PROTIME-INR   TROPONIN   MONONUCLEOSIS SCREEN   MICROSCOPIC URINALYSIS   TROPONIN   CULTURE BETA STREP CONFIRM PLATE       All other labs were

## 2017-11-03 PROCEDURE — 93010 ELECTROCARDIOGRAM REPORT: CPT | Performed by: INTERNAL MEDICINE

## 2017-11-04 LAB
GFR AFRICAN AMERICAN: >60
GFR NON-AFRICAN AMERICAN: 58
PERFORMED ON: ABNORMAL
POC CREATININE: 1 MG/DL (ref 0.6–1.1)
POC SAMPLE TYPE: ABNORMAL
S PYO THROAT QL CULT: NORMAL
URINE CULTURE, ROUTINE: NORMAL

## 2017-11-07 LAB
BLOOD CULTURE, ROUTINE: NORMAL
CULTURE, BLOOD 2: NORMAL

## 2018-03-19 ENCOUNTER — HOSPITAL ENCOUNTER (EMERGENCY)
Age: 53
Discharge: LEFT BEFORE DISCHARGE | End: 2018-03-19
Payer: COMMERCIAL

## 2018-04-06 ENCOUNTER — OFFICE VISIT (OUTPATIENT)
Dept: ENDOCRINOLOGY | Age: 53
End: 2018-04-06
Payer: COMMERCIAL

## 2018-04-06 VITALS
HEART RATE: 76 BPM | BODY MASS INDEX: 38.62 KG/M2 | HEIGHT: 63 IN | SYSTOLIC BLOOD PRESSURE: 122 MMHG | DIASTOLIC BLOOD PRESSURE: 89 MMHG | WEIGHT: 218 LBS

## 2018-04-06 DIAGNOSIS — E03.9 HYPOTHYROIDISM, UNSPECIFIED TYPE: ICD-10-CM

## 2018-04-06 DIAGNOSIS — E66.9 OBESITY (BMI 30-39.9): ICD-10-CM

## 2018-04-06 DIAGNOSIS — G47.33 OBSTRUCTIVE SLEEP APNEA SYNDROME: ICD-10-CM

## 2018-04-06 DIAGNOSIS — R53.83 FATIGUE, UNSPECIFIED TYPE: ICD-10-CM

## 2018-04-06 DIAGNOSIS — R60.9 EDEMA, UNSPECIFIED TYPE: ICD-10-CM

## 2018-04-06 LAB
ANION GAP SERPL CALCULATED.3IONS-SCNC: 15 MEQ/L (ref 7–13)
BUN BLDV-MCNC: 12 MG/DL (ref 6–20)
CALCIUM SERPL-MCNC: 10 MG/DL (ref 8.6–10.2)
CHLORIDE BLD-SCNC: 102 MEQ/L (ref 98–107)
CO2: 27 MEQ/L (ref 22–29)
CREAT SERPL-MCNC: 0.87 MG/DL (ref 0.5–0.9)
GFR AFRICAN AMERICAN: >60
GFR NON-AFRICAN AMERICAN: >60
GLUCOSE BLD-MCNC: 107 MG/DL
GLUCOSE BLD-MCNC: 95 MG/DL (ref 74–109)
HBA1C MFR BLD: 6.2 %
POTASSIUM SERPL-SCNC: 4.2 MEQ/L (ref 3.5–5.1)
SODIUM BLD-SCNC: 144 MEQ/L (ref 132–144)
T4 FREE: 1.97 NG/DL (ref 0.93–1.7)
TSH SERPL DL<=0.05 MIU/L-ACNC: 1.08 UIU/ML (ref 0.27–4.2)

## 2018-04-06 PROCEDURE — 99213 OFFICE O/P EST LOW 20 MIN: CPT | Performed by: INTERNAL MEDICINE

## 2018-04-06 PROCEDURE — 1036F TOBACCO NON-USER: CPT | Performed by: INTERNAL MEDICINE

## 2018-04-06 PROCEDURE — G8417 CALC BMI ABV UP PARAM F/U: HCPCS | Performed by: INTERNAL MEDICINE

## 2018-04-06 PROCEDURE — 82962 GLUCOSE BLOOD TEST: CPT | Performed by: INTERNAL MEDICINE

## 2018-04-06 PROCEDURE — 3014F SCREEN MAMMO DOC REV: CPT | Performed by: INTERNAL MEDICINE

## 2018-04-06 PROCEDURE — 83036 HEMOGLOBIN GLYCOSYLATED A1C: CPT | Performed by: INTERNAL MEDICINE

## 2018-04-06 PROCEDURE — 3044F HG A1C LEVEL LT 7.0%: CPT | Performed by: INTERNAL MEDICINE

## 2018-04-06 PROCEDURE — 3017F COLORECTAL CA SCREEN DOC REV: CPT | Performed by: INTERNAL MEDICINE

## 2018-04-06 PROCEDURE — G8427 DOCREV CUR MEDS BY ELIG CLIN: HCPCS | Performed by: INTERNAL MEDICINE

## 2018-04-06 PROCEDURE — 96372 THER/PROPH/DIAG INJ SC/IM: CPT | Performed by: INTERNAL MEDICINE

## 2018-04-06 RX ORDER — POTASSIUM CHLORIDE 750 MG/1
10 TABLET, EXTENDED RELEASE ORAL DAILY
Qty: 30 TABLET | Refills: 1 | Status: SHIPPED | OUTPATIENT
Start: 2018-04-06 | End: 2018-06-05 | Stop reason: SDUPTHER

## 2018-04-06 RX ORDER — CYANOCOBALAMIN 1000 UG/ML
1000 INJECTION INTRAMUSCULAR; SUBCUTANEOUS ONCE
Status: COMPLETED | OUTPATIENT
Start: 2018-04-06 | End: 2018-04-06

## 2018-04-06 RX ORDER — TRAZODONE HYDROCHLORIDE 100 MG/1
100 TABLET ORAL NIGHTLY
Status: ON HOLD | COMMUNITY
End: 2018-07-20 | Stop reason: HOSPADM

## 2018-04-06 RX ORDER — CETIRIZINE HYDROCHLORIDE 10 MG/1
TABLET ORAL
Refills: 3 | COMMUNITY
Start: 2018-04-04 | End: 2021-01-27

## 2018-04-06 RX ORDER — PHENTERMINE HYDROCHLORIDE 37.5 MG/1
37.5 TABLET ORAL
Qty: 30 TABLET | Refills: 0 | Status: SHIPPED | OUTPATIENT
Start: 2018-04-06 | End: 2018-05-08 | Stop reason: SDUPTHER

## 2018-04-06 RX ORDER — FUROSEMIDE 20 MG/1
20 TABLET ORAL DAILY
Qty: 60 TABLET | Refills: 3 | Status: SHIPPED | OUTPATIENT
Start: 2018-04-06 | End: 2018-07-11 | Stop reason: CLARIF

## 2018-04-06 RX ORDER — CLINDAMYCIN HYDROCHLORIDE 150 MG/1
CAPSULE ORAL
Refills: 0 | COMMUNITY
Start: 2018-03-04 | End: 2018-04-06 | Stop reason: ALTCHOICE

## 2018-04-06 RX ADMIN — CYANOCOBALAMIN 1000 MCG: 1000 INJECTION INTRAMUSCULAR; SUBCUTANEOUS at 16:41

## 2018-05-08 ENCOUNTER — OFFICE VISIT (OUTPATIENT)
Dept: ENDOCRINOLOGY | Age: 53
End: 2018-05-08
Payer: COMMERCIAL

## 2018-05-08 VITALS
SYSTOLIC BLOOD PRESSURE: 140 MMHG | BODY MASS INDEX: 37.56 KG/M2 | HEART RATE: 72 BPM | HEIGHT: 63 IN | DIASTOLIC BLOOD PRESSURE: 98 MMHG | WEIGHT: 212 LBS

## 2018-05-08 DIAGNOSIS — R53.83 FATIGUE, UNSPECIFIED TYPE: ICD-10-CM

## 2018-05-08 DIAGNOSIS — I10 ESSENTIAL HYPERTENSION: Primary | ICD-10-CM

## 2018-05-08 DIAGNOSIS — R60.9 EDEMA, UNSPECIFIED TYPE: ICD-10-CM

## 2018-05-08 DIAGNOSIS — M54.10 RADICULOPATHY WITH LOWER EXTREMITY SYMPTOMS: ICD-10-CM

## 2018-05-08 DIAGNOSIS — E66.9 OBESITY (BMI 30-39.9): ICD-10-CM

## 2018-05-08 PROCEDURE — 1036F TOBACCO NON-USER: CPT | Performed by: INTERNAL MEDICINE

## 2018-05-08 PROCEDURE — 3017F COLORECTAL CA SCREEN DOC REV: CPT | Performed by: INTERNAL MEDICINE

## 2018-05-08 PROCEDURE — 99213 OFFICE O/P EST LOW 20 MIN: CPT | Performed by: INTERNAL MEDICINE

## 2018-05-08 PROCEDURE — G8427 DOCREV CUR MEDS BY ELIG CLIN: HCPCS | Performed by: INTERNAL MEDICINE

## 2018-05-08 PROCEDURE — 96372 THER/PROPH/DIAG INJ SC/IM: CPT | Performed by: INTERNAL MEDICINE

## 2018-05-08 PROCEDURE — G8417 CALC BMI ABV UP PARAM F/U: HCPCS | Performed by: INTERNAL MEDICINE

## 2018-05-08 RX ORDER — CYANOCOBALAMIN 1000 UG/ML
1000 INJECTION INTRAMUSCULAR; SUBCUTANEOUS ONCE
Status: COMPLETED | OUTPATIENT
Start: 2018-05-08 | End: 2018-05-08

## 2018-05-08 RX ORDER — PHENTERMINE HYDROCHLORIDE 37.5 MG/1
37.5 TABLET ORAL
Qty: 30 TABLET | Refills: 0 | Status: SHIPPED | OUTPATIENT
Start: 2018-05-08 | End: 2018-06-07

## 2018-05-08 RX ORDER — HYDROCHLOROTHIAZIDE 25 MG/1
25 TABLET ORAL DAILY
Qty: 30 TABLET | Refills: 3 | Status: SHIPPED | OUTPATIENT
Start: 2018-05-08 | End: 2018-06-09 | Stop reason: ALTCHOICE

## 2018-05-08 RX ADMIN — CYANOCOBALAMIN 1000 MCG: 1000 INJECTION INTRAMUSCULAR; SUBCUTANEOUS at 17:08

## 2018-06-07 ENCOUNTER — OFFICE VISIT (OUTPATIENT)
Dept: ENDOCRINOLOGY | Age: 53
End: 2018-06-07
Payer: COMMERCIAL

## 2018-06-07 ENCOUNTER — HOSPITAL ENCOUNTER (EMERGENCY)
Age: 53
Discharge: HOME | End: 2018-06-07
Payer: COMMERCIAL

## 2018-06-07 VITALS
SYSTOLIC BLOOD PRESSURE: 131 MMHG | DIASTOLIC BLOOD PRESSURE: 89 MMHG | BODY MASS INDEX: 36.5 KG/M2 | WEIGHT: 206 LBS | HEART RATE: 91 BPM | HEIGHT: 63 IN

## 2018-06-07 VITALS
WEIGHT: 206 LBS | BODY MASS INDEX: 37.91 KG/M2 | TEMPERATURE: 98.8 F | HEART RATE: 99 BPM | HEIGHT: 62 IN | RESPIRATION RATE: 17 BRPM | OXYGEN SATURATION: 100 % | DIASTOLIC BLOOD PRESSURE: 96 MMHG | SYSTOLIC BLOOD PRESSURE: 132 MMHG

## 2018-06-07 DIAGNOSIS — E87.6 HYPOKALEMIA: ICD-10-CM

## 2018-06-07 DIAGNOSIS — E55.9 VITAMIN D DEFICIENCY: ICD-10-CM

## 2018-06-07 DIAGNOSIS — R73.9 HYPERGLYCEMIA: ICD-10-CM

## 2018-06-07 DIAGNOSIS — R63.5 WEIGHT GAIN: Primary | ICD-10-CM

## 2018-06-07 DIAGNOSIS — R42 DIZZINESS: Primary | ICD-10-CM

## 2018-06-07 DIAGNOSIS — E66.9 OBESITY (BMI 30-39.9): ICD-10-CM

## 2018-06-07 DIAGNOSIS — E03.9 HYPOTHYROIDISM, UNSPECIFIED TYPE: ICD-10-CM

## 2018-06-07 LAB
ALBUMIN SERPL-MCNC: 4.3 G/DL (ref 3.9–4.9)
ALP BLD-CCNC: 79 U/L (ref 40–130)
ALT SERPL-CCNC: 69 U/L (ref 0–33)
AMPHETAMINE SCREEN, URINE: NORMAL
ANION GAP SERPL CALCULATED.3IONS-SCNC: 16 MEQ/L (ref 7–13)
AST SERPL-CCNC: 54 U/L (ref 0–35)
BACTERIA: NORMAL /HPF
BARBITURATE SCREEN URINE: NORMAL
BASOPHILS ABSOLUTE: 0.1 K/UL (ref 0–0.2)
BASOPHILS RELATIVE PERCENT: 0.9 %
BENZODIAZEPINE SCREEN, URINE: NORMAL
BILIRUB SERPL-MCNC: 0.8 MG/DL (ref 0–1.2)
BILIRUBIN URINE: NEGATIVE
BLOOD, URINE: NEGATIVE
BUN BLDV-MCNC: 12 MG/DL (ref 6–20)
CALCIUM SERPL-MCNC: 10.1 MG/DL (ref 8.6–10.2)
CANNABINOID SCREEN URINE: NORMAL
CHLORIDE BLD-SCNC: 97 MEQ/L (ref 98–107)
CLARITY: ABNORMAL
CO2: 29 MEQ/L (ref 22–29)
COCAINE METABOLITE SCREEN URINE: NORMAL
COLOR: YELLOW
CREAT SERPL-MCNC: 1.09 MG/DL (ref 0.5–0.9)
EKG ATRIAL RATE: 86 BPM
EKG P AXIS: 44 DEGREES
EKG P-R INTERVAL: 188 MS
EKG Q-T INTERVAL: 382 MS
EKG QRS DURATION: 102 MS
EKG QTC CALCULATION (BAZETT): 457 MS
EKG R AXIS: -16 DEGREES
EKG T AXIS: 34 DEGREES
EKG VENTRICULAR RATE: 86 BPM
EOSINOPHILS ABSOLUTE: 0 K/UL (ref 0–0.7)
EOSINOPHILS RELATIVE PERCENT: 0.5 %
EPITHELIAL CELLS, UA: NORMAL /HPF
GFR AFRICAN AMERICAN: >60
GFR NON-AFRICAN AMERICAN: 52.8
GLOBULIN: 2.8 G/DL (ref 2.3–3.5)
GLUCOSE BLD-MCNC: 112 MG/DL (ref 74–109)
GLUCOSE URINE: NEGATIVE MG/DL
HCT VFR BLD CALC: 40.6 % (ref 37–47)
HEMOGLOBIN: 13.8 G/DL (ref 12–16)
KETONES, URINE: NEGATIVE MG/DL
LEUKOCYTE ESTERASE, URINE: ABNORMAL
LYMPHOCYTES ABSOLUTE: 1.7 K/UL (ref 1–4.8)
LYMPHOCYTES RELATIVE PERCENT: 26.1 %
Lab: NORMAL
MCH RBC QN AUTO: 29.6 PG (ref 27–31.3)
MCHC RBC AUTO-ENTMCNC: 33.9 % (ref 33–37)
MCV RBC AUTO: 87.2 FL (ref 82–100)
MONOCYTES ABSOLUTE: 0.5 K/UL (ref 0.2–0.8)
MONOCYTES RELATIVE PERCENT: 7.2 %
NEUTROPHILS ABSOLUTE: 4.2 K/UL (ref 1.4–6.5)
NEUTROPHILS RELATIVE PERCENT: 65.3 %
NITRITE, URINE: NEGATIVE
OPIATE SCREEN URINE: NORMAL
PDW BLD-RTO: 13.3 % (ref 11.5–14.5)
PH UA: 7 (ref 5–9)
PHENCYCLIDINE SCREEN URINE: NORMAL
PLATELET # BLD: 244 K/UL (ref 130–400)
POTASSIUM SERPL-SCNC: 3 MEQ/L (ref 3.5–5.1)
PROTEIN UA: NEGATIVE MG/DL
RBC # BLD: 4.66 M/UL (ref 4.2–5.4)
RBC UA: NORMAL /HPF (ref 0–2)
SODIUM BLD-SCNC: 142 MEQ/L (ref 132–144)
SPECIFIC GRAVITY UA: 1 (ref 1–1.03)
TOTAL PROTEIN: 7.1 G/DL (ref 6.4–8.1)
TROPONIN: <0.01 NG/ML (ref 0–0.01)
URINE REFLEX TO CULTURE: YES
UROBILINOGEN, URINE: 0.2 E.U./DL
WBC # BLD: 6.5 K/UL (ref 4.8–10.8)
WBC UA: NORMAL /HPF (ref 0–5)

## 2018-06-07 PROCEDURE — 6370000000 HC RX 637 (ALT 250 FOR IP): Performed by: NURSE PRACTITIONER

## 2018-06-07 PROCEDURE — 96372 THER/PROPH/DIAG INJ SC/IM: CPT | Performed by: INTERNAL MEDICINE

## 2018-06-07 PROCEDURE — 99213 OFFICE O/P EST LOW 20 MIN: CPT | Performed by: INTERNAL MEDICINE

## 2018-06-07 PROCEDURE — G8417 CALC BMI ABV UP PARAM F/U: HCPCS | Performed by: INTERNAL MEDICINE

## 2018-06-07 PROCEDURE — 99284 EMERGENCY DEPT VISIT MOD MDM: CPT

## 2018-06-07 PROCEDURE — 80307 DRUG TEST PRSMV CHEM ANLYZR: CPT

## 2018-06-07 PROCEDURE — 93005 ELECTROCARDIOGRAM TRACING: CPT

## 2018-06-07 PROCEDURE — 6360000002 HC RX W HCPCS: Performed by: NURSE PRACTITIONER

## 2018-06-07 PROCEDURE — 85025 COMPLETE CBC W/AUTO DIFF WBC: CPT

## 2018-06-07 PROCEDURE — 2580000003 HC RX 258: Performed by: NURSE PRACTITIONER

## 2018-06-07 PROCEDURE — 87086 URINE CULTURE/COLONY COUNT: CPT

## 2018-06-07 PROCEDURE — 1036F TOBACCO NON-USER: CPT | Performed by: INTERNAL MEDICINE

## 2018-06-07 PROCEDURE — 96374 THER/PROPH/DIAG INJ IV PUSH: CPT

## 2018-06-07 PROCEDURE — 81001 URINALYSIS AUTO W/SCOPE: CPT

## 2018-06-07 PROCEDURE — 36415 COLL VENOUS BLD VENIPUNCTURE: CPT

## 2018-06-07 PROCEDURE — 84484 ASSAY OF TROPONIN QUANT: CPT

## 2018-06-07 PROCEDURE — 3017F COLORECTAL CA SCREEN DOC REV: CPT | Performed by: INTERNAL MEDICINE

## 2018-06-07 PROCEDURE — 80053 COMPREHEN METABOLIC PANEL: CPT

## 2018-06-07 PROCEDURE — G8427 DOCREV CUR MEDS BY ELIG CLIN: HCPCS | Performed by: INTERNAL MEDICINE

## 2018-06-07 RX ORDER — POTASSIUM BICARBONATE 25 MEQ/1
25 TABLET, EFFERVESCENT ORAL ONCE
Status: COMPLETED | OUTPATIENT
Start: 2018-06-07 | End: 2018-06-07

## 2018-06-07 RX ORDER — ONDANSETRON 4 MG/1
4 TABLET, ORALLY DISINTEGRATING ORAL EVERY 8 HOURS PRN
Qty: 12 TABLET | Refills: 0 | Status: SHIPPED | OUTPATIENT
Start: 2018-06-07 | End: 2018-07-11 | Stop reason: ALTCHOICE

## 2018-06-07 RX ORDER — CYANOCOBALAMIN 1000 UG/ML
1000 INJECTION INTRAMUSCULAR; SUBCUTANEOUS ONCE
Status: COMPLETED | OUTPATIENT
Start: 2018-06-07 | End: 2018-06-07

## 2018-06-07 RX ORDER — 0.9 % SODIUM CHLORIDE 0.9 %
1000 INTRAVENOUS SOLUTION INTRAVENOUS ONCE
Status: COMPLETED | OUTPATIENT
Start: 2018-06-07 | End: 2018-06-07

## 2018-06-07 RX ORDER — PHENTERMINE HYDROCHLORIDE 37.5 MG/1
37.5 TABLET ORAL
Qty: 30 TABLET | Refills: 0 | Status: SHIPPED | OUTPATIENT
Start: 2018-06-07 | End: 2018-07-07

## 2018-06-07 RX ORDER — ONDANSETRON 2 MG/ML
4 INJECTION INTRAMUSCULAR; INTRAVENOUS ONCE
Status: COMPLETED | OUTPATIENT
Start: 2018-06-07 | End: 2018-06-07

## 2018-06-07 RX ORDER — ACETAMINOPHEN 500 MG
1000 TABLET ORAL ONCE
Status: COMPLETED | OUTPATIENT
Start: 2018-06-07 | End: 2018-06-07

## 2018-06-07 RX ADMIN — ONDANSETRON 4 MG: 2 INJECTION INTRAMUSCULAR; INTRAVENOUS at 20:21

## 2018-06-07 RX ADMIN — SODIUM CHLORIDE 1000 ML: 9 INJECTION, SOLUTION INTRAVENOUS at 18:37

## 2018-06-07 RX ADMIN — CYANOCOBALAMIN 1000 MCG: 1000 INJECTION INTRAMUSCULAR; SUBCUTANEOUS at 19:36

## 2018-06-07 RX ADMIN — POTASSIUM BICARBONATE 25 MEQ: 25 TABLET, EFFERVESCENT ORAL at 19:49

## 2018-06-07 RX ADMIN — ACETAMINOPHEN 1000 MG: 500 TABLET ORAL at 19:49

## 2018-06-07 ASSESSMENT — ENCOUNTER SYMPTOMS
CONSTIPATION: 0
BACK PAIN: 0
SHORTNESS OF BREATH: 0
VOMITING: 1
COLOR CHANGE: 0
COUGH: 0
VOICE CHANGE: 0
ABDOMINAL PAIN: 0
NAUSEA: 0
SORE THROAT: 0
TROUBLE SWALLOWING: 0
DIARRHEA: 0

## 2018-06-07 ASSESSMENT — PAIN DESCRIPTION - PAIN TYPE
TYPE: ACUTE PAIN
TYPE: ACUTE PAIN

## 2018-06-07 ASSESSMENT — PAIN DESCRIPTION - FREQUENCY
FREQUENCY: CONTINUOUS
FREQUENCY: CONTINUOUS

## 2018-06-07 ASSESSMENT — PAIN SCALES - GENERAL
PAINLEVEL_OUTOF10: 8

## 2018-06-07 ASSESSMENT — PAIN DESCRIPTION - DESCRIPTORS
DESCRIPTORS: ACHING
DESCRIPTORS: ACHING

## 2018-06-07 ASSESSMENT — PAIN DESCRIPTION - LOCATION
LOCATION: HEAD
LOCATION: HEAD

## 2018-06-08 PROCEDURE — 93010 ELECTROCARDIOGRAM REPORT: CPT | Performed by: INTERNAL MEDICINE

## 2018-06-09 LAB — URINE CULTURE, ROUTINE: NORMAL

## 2018-06-09 RX ORDER — POTASSIUM CHLORIDE 750 MG/1
10 TABLET, EXTENDED RELEASE ORAL DAILY
Qty: 30 TABLET | Refills: 2 | Status: SHIPPED | OUTPATIENT
Start: 2018-06-09 | End: 2018-07-11 | Stop reason: SDUPTHER

## 2018-06-20 ENCOUNTER — APPOINTMENT (OUTPATIENT)
Dept: GENERAL RADIOLOGY | Age: 53
End: 2018-06-20
Payer: COMMERCIAL

## 2018-06-20 ENCOUNTER — HOSPITAL ENCOUNTER (EMERGENCY)
Age: 53
Discharge: HOME | End: 2018-06-20
Attending: EMERGENCY MEDICINE
Payer: COMMERCIAL

## 2018-06-20 VITALS
HEART RATE: 86 BPM | OXYGEN SATURATION: 100 % | RESPIRATION RATE: 18 BRPM | TEMPERATURE: 98.7 F | SYSTOLIC BLOOD PRESSURE: 138 MMHG | BODY MASS INDEX: 36.07 KG/M2 | WEIGHT: 196 LBS | HEIGHT: 62 IN | DIASTOLIC BLOOD PRESSURE: 103 MMHG

## 2018-06-20 DIAGNOSIS — S93.402A MODERATE LEFT ANKLE SPRAIN, INITIAL ENCOUNTER: ICD-10-CM

## 2018-06-20 DIAGNOSIS — S80.01XA CONTUSION OF RIGHT KNEE, INITIAL ENCOUNTER: Primary | ICD-10-CM

## 2018-06-20 DIAGNOSIS — T14.8XXA ABRASION: ICD-10-CM

## 2018-06-20 PROCEDURE — 6370000000 HC RX 637 (ALT 250 FOR IP): Performed by: EMERGENCY MEDICINE

## 2018-06-20 PROCEDURE — 99284 EMERGENCY DEPT VISIT MOD MDM: CPT

## 2018-06-20 PROCEDURE — 73600 X-RAY EXAM OF ANKLE: CPT

## 2018-06-20 PROCEDURE — 73560 X-RAY EXAM OF KNEE 1 OR 2: CPT

## 2018-06-20 PROCEDURE — 73630 X-RAY EXAM OF FOOT: CPT

## 2018-06-20 RX ORDER — DIAPER,BRIEF,INFANT-TODD,DISP
EACH MISCELLANEOUS ONCE
Status: COMPLETED | OUTPATIENT
Start: 2018-06-20 | End: 2018-06-20

## 2018-06-20 RX ORDER — TRAMADOL HYDROCHLORIDE 50 MG/1
50 TABLET ORAL ONCE
Status: COMPLETED | OUTPATIENT
Start: 2018-06-20 | End: 2018-06-20

## 2018-06-20 RX ORDER — ACETAMINOPHEN 500 MG
1000 TABLET ORAL ONCE
Status: COMPLETED | OUTPATIENT
Start: 2018-06-20 | End: 2018-06-20

## 2018-06-20 RX ADMIN — TRAMADOL HYDROCHLORIDE 50 MG: 50 TABLET, FILM COATED ORAL at 15:32

## 2018-06-20 RX ADMIN — BACITRACIN ZINC 1 G: 500 OINTMENT TOPICAL at 17:24

## 2018-06-20 RX ADMIN — ACETAMINOPHEN 1000 MG: 500 TABLET ORAL at 17:13

## 2018-06-20 ASSESSMENT — PAIN SCALES - GENERAL
PAINLEVEL_OUTOF10: 10
PAINLEVEL_OUTOF10: 9

## 2018-06-20 ASSESSMENT — PAIN DESCRIPTION - PROGRESSION: CLINICAL_PROGRESSION: NOT CHANGED

## 2018-06-20 ASSESSMENT — ENCOUNTER SYMPTOMS
COLOR CHANGE: 0
SHORTNESS OF BREATH: 0
PHOTOPHOBIA: 0
VOMITING: 0
ABDOMINAL DISTENTION: 0
EYE DISCHARGE: 0
FACIAL SWELLING: 0
WHEEZING: 0
RHINORRHEA: 0
ABDOMINAL PAIN: 0

## 2018-06-20 ASSESSMENT — PAIN DESCRIPTION - FREQUENCY: FREQUENCY: CONTINUOUS

## 2018-06-20 ASSESSMENT — PAIN DESCRIPTION - ONSET: ONSET: ON-GOING

## 2018-06-20 ASSESSMENT — PAIN DESCRIPTION - LOCATION: LOCATION: KNEE;FOOT

## 2018-06-20 ASSESSMENT — PAIN DESCRIPTION - ORIENTATION: ORIENTATION: RIGHT;LEFT

## 2018-06-20 ASSESSMENT — PAIN DESCRIPTION - PAIN TYPE: TYPE: ACUTE PAIN

## 2018-06-20 ASSESSMENT — PAIN DESCRIPTION - DESCRIPTORS: DESCRIPTORS: ACHING

## 2018-07-11 ENCOUNTER — HOSPITAL ENCOUNTER (INPATIENT)
Age: 53
LOS: 9 days | Discharge: HOME | DRG: 751 | End: 2018-07-20
Attending: EMERGENCY MEDICINE | Admitting: PSYCHIATRY & NEUROLOGY
Payer: COMMERCIAL

## 2018-07-11 DIAGNOSIS — F32.2 SEVERE SINGLE CURRENT EPISODE OF MAJOR DEPRESSIVE DISORDER, WITHOUT PSYCHOTIC FEATURES (HCC): Primary | ICD-10-CM

## 2018-07-11 PROBLEM — F33.9 MAJOR DEPRESSION, RECURRENT (HCC): Status: ACTIVE | Noted: 2018-07-11

## 2018-07-11 LAB
ACETAMINOPHEN LEVEL: <5 UG/ML (ref 10–30)
ALBUMIN SERPL-MCNC: 4.5 G/DL (ref 3.9–4.9)
ALP BLD-CCNC: 78 U/L (ref 40–130)
ALT SERPL-CCNC: 93 U/L (ref 0–33)
AMPHETAMINE SCREEN, URINE: NORMAL
ANION GAP SERPL CALCULATED.3IONS-SCNC: 13 MEQ/L (ref 7–13)
AST SERPL-CCNC: 68 U/L (ref 0–35)
BARBITURATE SCREEN URINE: NORMAL
BASOPHILS ABSOLUTE: 0.1 K/UL (ref 0–0.2)
BASOPHILS RELATIVE PERCENT: 1.1 %
BENZODIAZEPINE SCREEN, URINE: NORMAL
BILIRUB SERPL-MCNC: 0.4 MG/DL (ref 0–1.2)
BILIRUBIN URINE: NEGATIVE
BLOOD, URINE: ABNORMAL
BUN BLDV-MCNC: 11 MG/DL (ref 6–20)
CALCIUM SERPL-MCNC: 9.3 MG/DL (ref 8.6–10.2)
CANNABINOID SCREEN URINE: NORMAL
CHLORIDE BLD-SCNC: 100 MEQ/L (ref 98–107)
CHP ED QC CHECK: NORMAL
CLARITY: CLEAR
CO2: 25 MEQ/L (ref 22–29)
COCAINE METABOLITE SCREEN URINE: NORMAL
COLOR: YELLOW
CREAT SERPL-MCNC: 0.86 MG/DL (ref 0.5–0.9)
EOSINOPHILS ABSOLUTE: 0.1 K/UL (ref 0–0.7)
EOSINOPHILS RELATIVE PERCENT: 0.9 %
ETHANOL PERCENT: 0.07 G/DL
ETHANOL PERCENT: 0.13 G/DL
ETHANOL PERCENT: 0.2 G/DL
ETHANOL: 147 MG/DL (ref 0–0.08)
ETHANOL: 222 MG/DL (ref 0–0.08)
ETHANOL: 84 MG/DL (ref 0–0.08)
GFR AFRICAN AMERICAN: >60
GFR NON-AFRICAN AMERICAN: >60
GLOBULIN: 2.8 G/DL (ref 2.3–3.5)
GLUCOSE BLD-MCNC: 114 MG/DL (ref 60–115)
GLUCOSE BLD-MCNC: 143 MG/DL (ref 74–109)
GLUCOSE BLD-MCNC: 79 MG/DL (ref 60–115)
GLUCOSE URINE: NEGATIVE MG/DL
HCT VFR BLD CALC: 39.8 % (ref 37–47)
HEMOGLOBIN: 13.8 G/DL (ref 12–16)
KETONES, URINE: NEGATIVE MG/DL
LEUKOCYTE ESTERASE, URINE: NEGATIVE
LYMPHOCYTES ABSOLUTE: 3.8 K/UL (ref 1–4.8)
LYMPHOCYTES RELATIVE PERCENT: 45 %
Lab: NORMAL
MCH RBC QN AUTO: 30.5 PG (ref 27–31.3)
MCHC RBC AUTO-ENTMCNC: 34.5 % (ref 33–37)
MCV RBC AUTO: 88.3 FL (ref 82–100)
MONOCYTES ABSOLUTE: 0.6 K/UL (ref 0.2–0.8)
MONOCYTES RELATIVE PERCENT: 7.4 %
NEUTROPHILS ABSOLUTE: 3.8 K/UL (ref 1.4–6.5)
NEUTROPHILS RELATIVE PERCENT: 45.6 %
NITRITE, URINE: NEGATIVE
OPIATE SCREEN URINE: NORMAL
PDW BLD-RTO: 13.5 % (ref 11.5–14.5)
PERFORMED ON: NORMAL
PERFORMED ON: NORMAL
PH UA: 6 (ref 5–9)
PHENCYCLIDINE SCREEN URINE: NORMAL
PLATELET # BLD: 250 K/UL (ref 130–400)
POTASSIUM SERPL-SCNC: 3.1 MEQ/L (ref 3.5–5.1)
PREGNANCY TEST URINE, POC: NORMAL
PROTEIN UA: NEGATIVE MG/DL
RBC # BLD: 4.51 M/UL (ref 4.2–5.4)
RBC UA: NORMAL /HPF (ref 0–2)
SODIUM BLD-SCNC: 138 MEQ/L (ref 132–144)
SPECIFIC GRAVITY UA: 1 (ref 1–1.03)
TOTAL CK: 135 U/L (ref 0–170)
TOTAL PROTEIN: 7.3 G/DL (ref 6.4–8.1)
TSH SERPL DL<=0.05 MIU/L-ACNC: 6.45 UIU/ML (ref 0.27–4.2)
URINE REFLEX TO CULTURE: YES
UROBILINOGEN, URINE: 0.2 E.U./DL
WBC # BLD: 8.4 K/UL (ref 4.8–10.8)
WBC UA: NORMAL /HPF (ref 0–5)

## 2018-07-11 PROCEDURE — 80307 DRUG TEST PRSMV CHEM ANLYZR: CPT

## 2018-07-11 PROCEDURE — G0480 DRUG TEST DEF 1-7 CLASSES: HCPCS

## 2018-07-11 PROCEDURE — 87086 URINE CULTURE/COLONY COUNT: CPT

## 2018-07-11 PROCEDURE — 81001 URINALYSIS AUTO W/SCOPE: CPT

## 2018-07-11 PROCEDURE — 85025 COMPLETE CBC W/AUTO DIFF WBC: CPT

## 2018-07-11 PROCEDURE — 84443 ASSAY THYROID STIM HORMONE: CPT

## 2018-07-11 PROCEDURE — 80053 COMPREHEN METABOLIC PANEL: CPT

## 2018-07-11 PROCEDURE — 82550 ASSAY OF CK (CPK): CPT

## 2018-07-11 PROCEDURE — 36415 COLL VENOUS BLD VENIPUNCTURE: CPT

## 2018-07-11 PROCEDURE — 1240000000 HC EMOTIONAL WELLNESS R&B

## 2018-07-11 PROCEDURE — 6370000000 HC RX 637 (ALT 250 FOR IP): Performed by: EMERGENCY MEDICINE

## 2018-07-11 PROCEDURE — 99285 EMERGENCY DEPT VISIT HI MDM: CPT

## 2018-07-11 RX ORDER — QUETIAPINE FUMARATE 25 MG/1
50 TABLET, FILM COATED ORAL NIGHTLY
Status: ON HOLD | COMMUNITY
End: 2018-07-20 | Stop reason: HOSPADM

## 2018-07-11 RX ORDER — DULOXETIN HYDROCHLORIDE 60 MG/1
60 CAPSULE, DELAYED RELEASE ORAL DAILY
Status: ON HOLD | COMMUNITY
End: 2018-07-20 | Stop reason: HOSPADM

## 2018-07-11 RX ORDER — ACETAMINOPHEN 500 MG
1000 TABLET ORAL ONCE
Status: COMPLETED | OUTPATIENT
Start: 2018-07-11 | End: 2018-07-11

## 2018-07-11 RX ORDER — ACETAMINOPHEN 160 MG
2000 TABLET,DISINTEGRATING ORAL DAILY
COMMUNITY

## 2018-07-11 RX ORDER — IBUPROFEN 800 MG/1
800 TABLET ORAL ONCE
Status: COMPLETED | OUTPATIENT
Start: 2018-07-11 | End: 2018-07-11

## 2018-07-11 RX ORDER — VITAMIN E 268 MG
200 CAPSULE ORAL DAILY
COMMUNITY

## 2018-07-11 RX ORDER — ACETAMINOPHEN 325 MG/1
650 TABLET ORAL ONCE
Status: DISCONTINUED | OUTPATIENT
Start: 2018-07-11 | End: 2018-07-11

## 2018-07-11 RX ADMIN — ACETAMINOPHEN 1000 MG: 500 TABLET ORAL at 21:25

## 2018-07-11 RX ADMIN — IBUPROFEN 800 MG: 800 TABLET ORAL at 21:25

## 2018-07-11 ASSESSMENT — PAIN SCALES - GENERAL
PAINLEVEL_OUTOF10: 8
PAINLEVEL_OUTOF10: 0
PAINLEVEL_OUTOF10: 9

## 2018-07-11 ASSESSMENT — PAIN DESCRIPTION - FREQUENCY: FREQUENCY: INTERMITTENT

## 2018-07-11 ASSESSMENT — ENCOUNTER SYMPTOMS
VOMITING: 0
ABDOMINAL PAIN: 0
WHEEZING: 0
PHOTOPHOBIA: 0
COUGH: 0
EYE DISCHARGE: 0
CHEST TIGHTNESS: 0
ABDOMINAL DISTENTION: 0
SHORTNESS OF BREATH: 0
SORE THROAT: 0

## 2018-07-11 ASSESSMENT — PAIN DESCRIPTION - DESCRIPTORS: DESCRIPTORS: ACHING

## 2018-07-11 ASSESSMENT — PAIN DESCRIPTION - LOCATION: LOCATION: GENERALIZED

## 2018-07-11 ASSESSMENT — PAIN DESCRIPTION - PAIN TYPE: TYPE: CHRONIC PAIN

## 2018-07-11 NOTE — ED NOTES
Pt in bed area quiet and cooperative with no problems noted, pt has even respirations with no C/O any kind expressed or noted     Gerardo Hutchison RN  07/11/18 6679

## 2018-07-11 NOTE — ED NOTES
Family at bedside to visit. Family wanting to know why she has not been assessed .  Informed waiting for her ETOH results     Bruna Wilson RN  07/11/18 0450

## 2018-07-11 NOTE — ED TRIAGE NOTES
Pt presents via [facility] from home with c/o suicidal ideation, possible intentional prescription drug overdose. Pt a&o x4. resp even. Pt tearful, cooperative. Pt states suicidal ideation x2 days due to recent eviction notice and multiple life stressors. Pt reports taking several prescription pills of unknown nature in attempts to kill self.

## 2018-07-11 NOTE — ED NOTES
Lab here to redraw repeat ETOH lab. Visitors remain @ bedside. Client drowsy.      Cammie Alvarado RN  07/11/18 7129

## 2018-07-11 NOTE — ED NOTES
Patient appears to be sleeping on cart  No s/s of distress  Will continue to monitor     South Miller RN  07/11/18 3407

## 2018-07-11 NOTE — ED PROVIDER NOTES
[facility]  eMERGENCY dEPARTMENT eNCOUnter      Pt Name: Bekah Henry  MRN: [id number]  [place] 1965  Date of evaluation: 7/11/2018  Provider: Genaro Kennedy MD    CHIEF COMPLAINT       Chief Complaint   Patient presents with    Suicidal         HISTORY OF PRESENT ILLNESS   (Location/Symptom, Timing/Onset, Context/Setting, Quality, Duration, Modifying Factors, Severity)  Note limiting factors. Bekah Henry is a 47 y.o. female who presents to the emergency department Following a drug overdose with suicidal thoughts. Patient reports taking a number of pills at home. She won't tell us what type or how many. She called her mom and told her she loved her and told her she took the pills. Mom called 911 and the patient's brought in for evaluation. Patient admits to depression and suicidal thoughts. She recently received an eviction notice. There is been a lot of family issues with her kids. She states she no longer wants to live. She has no specific complaints of pain. She was not, if she isn't drinking alcohol. This occurred in the past 2 hours. HPI    Nursing Notes were reviewed. REVIEW OF SYSTEMS    (2-9 systems for level 4, 10 or more for level 5)     Review of Systems   Constitutional: Negative for chills and diaphoresis. HENT: Negative for congestion, ear pain, mouth sores and sore throat. Eyes: Negative for photophobia and discharge. Respiratory: Negative for cough, chest tightness, shortness of breath and wheezing. Cardiovascular: Negative for chest pain and palpitations. Gastrointestinal: Negative for abdominal distention, abdominal pain and vomiting. Endocrine: Negative for cold intolerance. Genitourinary: Negative for difficulty urinating. Musculoskeletal: Negative for arthralgias. Skin: Negative for pallor and rash. Allergic/Immunologic: Negative for immunocompromised state. Neurological: Negative for dizziness and syncope.    Hematological: Negative for Acetaminophen Level <5 (*)     All other components within normal limits   VITAMIN D 25 HYDROXY - Abnormal; Notable for the following:     Vit D, 25-Hydroxy 26.8 (*)     All other components within normal limits   LIPID PANEL - Abnormal; Notable for the following:     Cholesterol, Total 213 (*)     Triglycerides 264 (*)     HDL 33 (*)     All other components within normal limits   BASIC METABOLIC PANEL - Abnormal; Notable for the following:     Sodium 146 (*)     Chloride 109 (*)     Glucose 112 (*)     All other components within normal limits   POCT URINE PREGNANCY - Normal   URINE CULTURE    Narrative:     ORDER#: [id number]                          ORDERED BY: Trina Doty  SOURCE: Urine Clean Catch                  COLLECTED:  07/11/18 05:30  ANTIBIOTICS AT ALAN.:                      RECEIVED :  07/11/18 09:08   CBC WITH AUTO DIFFERENTIAL   ETHANOL   URINE DRUG SCREEN   CK   MICROSCOPIC URINALYSIS   ETHANOL   ETHANOL   T4, FREE   BASIC METABOLIC PANEL   POCT GLUCOSE   POCT GLUCOSE   POCT GLUCOSE   POCT GLUCOSE       All other labs were within normal range or not returned as of this dictation. EMERGENCY DEPARTMENT COURSE and DIFFERENTIAL DIAGNOSIS/MDM:   Vitals:    Vitals:    07/14/18 1609 07/15/18 0854 07/15/18 0855 07/15/18 0858   BP: 126/83 116/78 105/75 114/75   Pulse:  90 73    Resp:  18     Temp:  98 °F (36.7 °C)     TempSrc:  Oral     SpO2:  96%     Weight:                MDM patient does not appear to have taken a significant overdose. She is medically cleared. CONSULTS:  IP CONSULT TO HOSPITALIST    PROCEDURES:  Unless otherwise noted below, none     Procedures    FINAL IMPRESSION      1.  Severe single current episode of major depressive disorder, without psychotic features [facility])          DISPOSITION/PLAN   DISPOSITION Admitted 07/11/2018 09:41:51 PM      PATIENT REFERRED TO:  Birgit Berg MD  [address] [address] MEDICATIONS:  Current Discharge Medication List             (Please note that portions of this note were completed with a voice recognition program.  Efforts were made to edit the dictations but occasionally words are mis-transcribed.)    Monika Singh MD (electronically signed)  Attending Emergency Physician          Monika Singh MD  07/16/18 5840

## 2018-07-11 NOTE — ED NOTES
Bed: 10  Expected date: 7/11/18  Expected time: 5:00 AM  Means of arrival: [facility]  Comments:  53yo F, suicidal ideation, overdose.      Vega Ross, RN  07/11/18 8798

## 2018-07-11 NOTE — ED NOTES
Patient assisted to rr for urine. Neg preg.  Behavior health nurse in with patient     Elliot Treviño RN  07/11/18 5365

## 2018-07-11 NOTE — ED TRIAGE NOTES
Pt sitting in recliner watching television. Appears somewhat anxious, tapping foot. Will continue to monitor.  Electronically signed by Etienne Bee LPN on 3/68/2262 at 4:07 PM

## 2018-07-11 NOTE — ED NOTES
Pt asleep with even respirations noted no problems or C/O any kind noted or expressed     Gerardo Hutchison RN  07/11/18 8034

## 2018-07-12 LAB
GLUCOSE BLD-MCNC: 104 MG/DL (ref 60–115)
GLUCOSE BLD-MCNC: 96 MG/DL (ref 60–115)
PERFORMED ON: NORMAL
PERFORMED ON: NORMAL
URINE CULTURE, ROUTINE: NORMAL

## 2018-07-12 PROCEDURE — 6370000000 HC RX 637 (ALT 250 FOR IP): Performed by: NURSE PRACTITIONER

## 2018-07-12 PROCEDURE — 6370000000 HC RX 637 (ALT 250 FOR IP): Performed by: PHYSICIAN ASSISTANT

## 2018-07-12 PROCEDURE — 6370000000 HC RX 637 (ALT 250 FOR IP): Performed by: EMERGENCY MEDICINE

## 2018-07-12 PROCEDURE — 1240000000 HC EMOTIONAL WELLNESS R&B

## 2018-07-12 PROCEDURE — 6370000000 HC RX 637 (ALT 250 FOR IP): Performed by: PSYCHIATRY & NEUROLOGY

## 2018-07-12 RX ORDER — MAGNESIUM HYDROXIDE/ALUMINUM HYDROXICE/SIMETHICONE 120; 1200; 1200 MG/30ML; MG/30ML; MG/30ML
30 SUSPENSION ORAL EVERY 6 HOURS PRN
Status: DISCONTINUED | OUTPATIENT
Start: 2018-07-12 | End: 2018-07-20 | Stop reason: HOSPADM

## 2018-07-12 RX ORDER — LEVOTHYROXINE SODIUM 0.12 MG/1
125 TABLET ORAL DAILY
Status: DISCONTINUED | OUTPATIENT
Start: 2018-07-13 | End: 2018-07-20 | Stop reason: HOSPADM

## 2018-07-12 RX ORDER — POTASSIUM CHLORIDE 20 MEQ/1
10 TABLET, EXTENDED RELEASE ORAL DAILY
Status: DISCONTINUED | OUTPATIENT
Start: 2018-07-12 | End: 2018-07-20 | Stop reason: HOSPADM

## 2018-07-12 RX ORDER — HALOPERIDOL 5 MG/ML
5 INJECTION INTRAMUSCULAR EVERY 6 HOURS PRN
Status: DISCONTINUED | OUTPATIENT
Start: 2018-07-12 | End: 2018-07-20 | Stop reason: HOSPADM

## 2018-07-12 RX ORDER — TRAZODONE HYDROCHLORIDE 100 MG/1
100 TABLET ORAL NIGHTLY
Status: DISCONTINUED | OUTPATIENT
Start: 2018-07-12 | End: 2018-07-20 | Stop reason: HOSPADM

## 2018-07-12 RX ORDER — HYDROXYZINE HYDROCHLORIDE 50 MG/ML
50 INJECTION, SOLUTION INTRAMUSCULAR EVERY 6 HOURS PRN
Status: DISCONTINUED | OUTPATIENT
Start: 2018-07-12 | End: 2018-07-20 | Stop reason: HOSPADM

## 2018-07-12 RX ORDER — BENZTROPINE MESYLATE 1 MG/ML
2 INJECTION INTRAMUSCULAR; INTRAVENOUS 2 TIMES DAILY PRN
Status: DISCONTINUED | OUTPATIENT
Start: 2018-07-12 | End: 2018-07-20 | Stop reason: HOSPADM

## 2018-07-12 RX ORDER — POTASSIUM CHLORIDE 20 MEQ/1
40 TABLET, EXTENDED RELEASE ORAL ONCE
Status: COMPLETED | OUTPATIENT
Start: 2018-07-12 | End: 2018-07-12

## 2018-07-12 RX ORDER — HALOPERIDOL 5 MG
5 TABLET ORAL EVERY 6 HOURS PRN
Status: DISCONTINUED | OUTPATIENT
Start: 2018-07-12 | End: 2018-07-20 | Stop reason: HOSPADM

## 2018-07-12 RX ORDER — AMLODIPINE BESYLATE 5 MG/1
10 TABLET ORAL ONCE
Status: COMPLETED | OUTPATIENT
Start: 2018-07-12 | End: 2018-07-12

## 2018-07-12 RX ORDER — AMLODIPINE BESYLATE 10 MG/1
10 TABLET ORAL DAILY
Status: DISCONTINUED | OUTPATIENT
Start: 2018-07-12 | End: 2018-07-20 | Stop reason: HOSPADM

## 2018-07-12 RX ORDER — LORAZEPAM 1 MG/1
1 TABLET ORAL ONCE
Status: COMPLETED | OUTPATIENT
Start: 2018-07-12 | End: 2018-07-12

## 2018-07-12 RX ORDER — ALPRAZOLAM 0.5 MG/1
0.5 TABLET ORAL 4 TIMES DAILY
Status: DISCONTINUED | OUTPATIENT
Start: 2018-07-12 | End: 2018-07-14

## 2018-07-12 RX ORDER — DULOXETIN HYDROCHLORIDE 60 MG/1
60 CAPSULE, DELAYED RELEASE ORAL DAILY
Status: DISCONTINUED | OUTPATIENT
Start: 2018-07-12 | End: 2018-07-13

## 2018-07-12 RX ORDER — CETIRIZINE HYDROCHLORIDE 10 MG/1
10 TABLET ORAL DAILY PRN
Status: DISCONTINUED | OUTPATIENT
Start: 2018-07-12 | End: 2018-07-20 | Stop reason: HOSPADM

## 2018-07-12 RX ORDER — ACETAMINOPHEN 500 MG
1000 TABLET ORAL ONCE
Status: COMPLETED | OUTPATIENT
Start: 2018-07-12 | End: 2018-07-12

## 2018-07-12 RX ORDER — PANTOPRAZOLE SODIUM 40 MG/1
40 TABLET, DELAYED RELEASE ORAL
Status: DISCONTINUED | OUTPATIENT
Start: 2018-07-13 | End: 2018-07-20 | Stop reason: HOSPADM

## 2018-07-12 RX ORDER — HYDROXYZINE PAMOATE 50 MG/1
50 CAPSULE ORAL EVERY 6 HOURS PRN
Status: DISCONTINUED | OUTPATIENT
Start: 2018-07-12 | End: 2018-07-20 | Stop reason: HOSPADM

## 2018-07-12 RX ORDER — ACETAMINOPHEN 325 MG/1
650 TABLET ORAL EVERY 4 HOURS PRN
Status: DISCONTINUED | OUTPATIENT
Start: 2018-07-12 | End: 2018-07-20 | Stop reason: HOSPADM

## 2018-07-12 RX ORDER — QUETIAPINE FUMARATE 50 MG/1
50 TABLET, FILM COATED ORAL NIGHTLY
Status: DISCONTINUED | OUTPATIENT
Start: 2018-07-12 | End: 2018-07-18

## 2018-07-12 RX ORDER — SIMVASTATIN 20 MG
20 TABLET ORAL DAILY
Status: DISCONTINUED | OUTPATIENT
Start: 2018-07-12 | End: 2018-07-13

## 2018-07-12 RX ORDER — ALPRAZOLAM 0.5 MG/1
0.5 TABLET ORAL ONCE
Status: COMPLETED | OUTPATIENT
Start: 2018-07-12 | End: 2018-07-12

## 2018-07-12 RX ADMIN — VITAMIN D, TAB 1000IU (100/BT) 2000 UNITS: 25 TAB at 21:29

## 2018-07-12 RX ADMIN — AMLODIPINE BESYLATE 10 MG: 5 TABLET ORAL at 13:40

## 2018-07-12 RX ADMIN — TRAZODONE HYDROCHLORIDE 100 MG: 100 TABLET ORAL at 21:29

## 2018-07-12 RX ADMIN — ALPRAZOLAM 0.5 MG: 0.5 TABLET ORAL at 21:29

## 2018-07-12 RX ADMIN — SIMVASTATIN 20 MG: 20 TABLET, FILM COATED ORAL at 21:30

## 2018-07-12 RX ADMIN — ALPRAZOLAM 0.5 MG: 0.5 TABLET ORAL at 01:12

## 2018-07-12 RX ADMIN — AMLODIPINE BESYLATE 10 MG: 10 TABLET ORAL at 21:29

## 2018-07-12 RX ADMIN — HYDROXYZINE PAMOATE 50 MG: 50 CAPSULE ORAL at 19:03

## 2018-07-12 RX ADMIN — VITAMIN E CAP 100 UNIT 200 UNITS: 100 CAP at 21:48

## 2018-07-12 RX ADMIN — ACETAMINOPHEN 1000 MG: 500 TABLET ORAL at 16:08

## 2018-07-12 RX ADMIN — LORAZEPAM 1 MG: 1 TABLET ORAL at 11:47

## 2018-07-12 RX ADMIN — ALUMINUM HYDROXIDE, MAGNESIUM HYDROXIDE, AND SIMETHICONE 30 ML: 200; 200; 20 SUSPENSION ORAL at 19:03

## 2018-07-12 RX ADMIN — POTASSIUM CHLORIDE 10 MEQ: 20 TABLET, EXTENDED RELEASE ORAL at 21:54

## 2018-07-12 RX ADMIN — QUETIAPINE FUMARATE 50 MG: 50 TABLET ORAL at 21:29

## 2018-07-12 RX ADMIN — DULOXETINE HYDROCHLORIDE 60 MG: 60 CAPSULE, DELAYED RELEASE ORAL at 21:31

## 2018-07-12 RX ADMIN — POTASSIUM CHLORIDE 40 MEQ: 20 TABLET, EXTENDED RELEASE ORAL at 21:48

## 2018-07-12 ASSESSMENT — PATIENT HEALTH QUESTIONNAIRE - PHQ9: SUM OF ALL RESPONSES TO PHQ QUESTIONS 1-9: 27

## 2018-07-12 ASSESSMENT — PAIN SCALES - GENERAL: PAINLEVEL_OUTOF10: 8

## 2018-07-12 NOTE — ED NOTES
Remains on list for [address] [facility] [address] [facility] [address] admit when bed available   lunch served     Amari Downs RN  07/12/18 9082

## 2018-07-12 NOTE — PROGRESS NOTES
Report called by Carissa Del Cid RN - CHARLINE. Pt is 52y/o white female. Involuntary admit of Dr. Yifan Whitaker with diagnosis of MDD recurrent w/o psychosis. Pt reportedly told mother that she was going to kill herself. Mothr called EMS and [employer]. Pt reports multiple stressors including issues with estanged children now up for adoption in [place], 22 y/o daughter in town having several children (3), 16y/o daughter moving out of pt's home abruptly, pt being evicted from home, pt loosing money gambling, pt sad with anniversary of GD death. Pt denies AVH's. C/o pain r/t contact with [employer] at her home. Pt home medications continued by admitting provider. Med history = as charted  Surgical History = as charted  Psych History =previous [facility] admissions  Does pt have [employer] and [employer]  no  MEDICATIONS IN HOSPITAL PHARMACY =  none

## 2018-07-12 NOTE — ED NOTES
Per Cassy Lopez RN- [address], there are no available female beds @ this time & there will be a discharge in the AM; therefore, Client will remain in the ED till a bed is available on [facility] tomorrow.      Naren Pierce RN  07/11/18 4877

## 2018-07-12 NOTE — ED NOTES
Dinner tray arrived;  Pt consumed 50% of meal. Electronically signed by Perlita Mcmillan LPN on 3/36/1025 at 9:82 PM       Perlita Mcmillan LPN  [birth year] [address] [facility]  70/21/14 0651

## 2018-07-12 NOTE — ED NOTES
Pt is asleep in bed with regular respirations. Has been placed with access center for transfer, due to capacity of unit here.      Kasandra Anne RN  07/12/18 0954

## 2018-07-12 NOTE — ED NOTES
Per Peg at access center, [facility] will accept pt for transfer if a pink slip can be faxed.      Marii Moreira RN  07/12/18 0535  Note entered in error     Marii Moreira RN  07/12/18 3724

## 2018-07-12 NOTE — ED NOTES
Pt requested ring returned that had been logged and secured with security, stating ring finger swelling has reduced. Ring obtained and returned to pt. Pt unable to put on finger due to swelling. Ring returned to security to be secured in locker.      Dalia Chapa LPN  65/56/74 2017

## 2018-07-13 ENCOUNTER — APPOINTMENT (OUTPATIENT)
Dept: CT IMAGING | Age: 53
DRG: 751 | End: 2018-07-13
Payer: COMMERCIAL

## 2018-07-13 LAB
CHOLESTEROL, TOTAL: 213 MG/DL (ref 0–199)
HDLC SERPL-MCNC: 33 MG/DL (ref 40–59)
LDL CHOLESTEROL CALCULATED: 127 MG/DL (ref 0–129)
TRIGL SERPL-MCNC: 264 MG/DL (ref 0–200)
VITAMIN D 25-HYDROXY: 26.8 NG/ML (ref 30–100)

## 2018-07-13 PROCEDURE — 82306 VITAMIN D 25 HYDROXY: CPT

## 2018-07-13 PROCEDURE — 36415 COLL VENOUS BLD VENIPUNCTURE: CPT

## 2018-07-13 PROCEDURE — 80061 LIPID PANEL: CPT

## 2018-07-13 PROCEDURE — 74176 CT ABD & PELVIS W/O CONTRAST: CPT

## 2018-07-13 PROCEDURE — 1240000000 HC EMOTIONAL WELLNESS R&B

## 2018-07-13 PROCEDURE — 6370000000 HC RX 637 (ALT 250 FOR IP): Performed by: PSYCHIATRY & NEUROLOGY

## 2018-07-13 RX ORDER — SIMVASTATIN 20 MG
40 TABLET ORAL DAILY
Status: DISCONTINUED | OUTPATIENT
Start: 2018-07-14 | End: 2018-07-13

## 2018-07-13 RX ORDER — SIMVASTATIN 20 MG
40 TABLET ORAL DAILY
Status: DISCONTINUED | OUTPATIENT
Start: 2018-07-13 | End: 2018-07-20 | Stop reason: HOSPADM

## 2018-07-13 RX ORDER — TRAZODONE HYDROCHLORIDE 50 MG/1
25 TABLET ORAL DAILY
Status: DISCONTINUED | OUTPATIENT
Start: 2018-07-13 | End: 2018-07-16

## 2018-07-13 RX ADMIN — PANTOPRAZOLE SODIUM 40 MG: 40 TABLET, DELAYED RELEASE ORAL at 06:09

## 2018-07-13 RX ADMIN — VITAMIN E CAP 100 UNIT 200 UNITS: 100 CAP at 13:49

## 2018-07-13 RX ADMIN — ALPRAZOLAM 0.5 MG: 0.5 TABLET ORAL at 17:25

## 2018-07-13 RX ADMIN — AMLODIPINE BESYLATE 10 MG: 10 TABLET ORAL at 13:50

## 2018-07-13 RX ADMIN — POTASSIUM CHLORIDE 10 MEQ: 20 TABLET, EXTENDED RELEASE ORAL at 13:54

## 2018-07-13 RX ADMIN — ALPRAZOLAM 0.5 MG: 0.5 TABLET ORAL at 09:52

## 2018-07-13 RX ADMIN — TRAZODONE HYDROCHLORIDE 100 MG: 100 TABLET ORAL at 21:04

## 2018-07-13 RX ADMIN — LEVOTHYROXINE SODIUM 125 MCG: 125 TABLET ORAL at 06:09

## 2018-07-13 RX ADMIN — QUETIAPINE FUMARATE 50 MG: 50 TABLET ORAL at 21:04

## 2018-07-13 RX ADMIN — DULOXETINE HYDROCHLORIDE 60 MG: 60 CAPSULE, DELAYED RELEASE ORAL at 09:53

## 2018-07-13 RX ADMIN — ALPRAZOLAM 0.5 MG: 0.5 TABLET ORAL at 13:49

## 2018-07-13 RX ADMIN — ALPRAZOLAM 0.5 MG: 0.5 TABLET ORAL at 21:04

## 2018-07-13 NOTE — PROGRESS NOTES
Pt. refused to attend the 0900 community meeting due to resting in room, despite staff encouragement. Electronically signed by Rogers [address] on 7/13/2018 at 9:59 AM

## 2018-07-13 NOTE — PROGRESS NOTES
Pt took rest of am meds, refused lunch at first , then stated she would eat. yogart , wrap and chips given , lunch tray was saved for awhile then taken back to dietary. Pt informed lab would be ordered tomorrow for her pot. And informed of increase in todays lab Zocor was increased. Pt expressed she was up set about taking more meds and that zocar makes her sick.

## 2018-07-13 NOTE — PROGRESS NOTES
Pt noted sleeping in all of am, refused breakfast this am reports feeling sick , retimed some meds for after lunch,  Informed rn. Pt stated depression #7-8, anxiety #9. Denied suicidal thoughts, voices . Ginger ale given with cymbalta and xanix this am. Pt refused offer for maylox.

## 2018-07-13 NOTE — PROGRESS NOTES
Pt. refused to attend the 1000 skills group, due to resting in room despite staff encouragement. Electronically signed by Sapphire Romeo, [place] [facility] on 7/13/2018 at 12:12 PM

## 2018-07-13 NOTE — CONSULTS
children: N/A    Years of education: N/A     Occupational History    Not on file. Social History Main Topics    Smoking status: Never Smoker    Smokeless tobacco: Never Used    Alcohol use Yes      Comment: OCC.     Drug use: No    Sexual activity: Not on file     Other Topics Concern    Not on file     Social History Narrative    No narrative on file     MEDICATIONS:    Current Facility-Administered Medications   Medication Dose Route Frequency Provider Last Rate Last Dose    ALPRAZolam Aym Achilles) tablet 0.5 mg  0.5 mg Oral 4x Daily Andrade Carrillo MD   0.5 mg at 07/12/18 2129    amLODIPine (NORVASC) tablet 10 mg  10 mg Oral Daily Andrade Carrillo MD   10 mg at 07/12/18 2129    cetirizine (ZYRTEC) tablet 10 mg  10 mg Oral Daily PRN Andrade Carrillo MD        vitamin D (CHOLECALCIFEROL) tablet 2,000 Units  2,000 Units Oral Daily Andrade Carrillo MD   2,000 Units at 07/12/18 2129    DULoxetine (CYMBALTA) extended release capsule 60 mg  60 mg Oral Daily Andrade Carrillo MD   60 mg at 07/12/18 2131    [START ON 7/13/2018] levothyroxine (SYNTHROID) tablet 125 mcg  125 mcg Oral Daily Andrade Carrillo MD        [START ON 7/13/2018] pantoprazole (PROTONIX) tablet 40 mg  40 mg Oral QAM AC Andrade Carrillo MD        potassium chloride (KLOR-CON M) extended release tablet 10 mEq  10 mEq Oral Daily Andrade Carrillo MD        QUEtiapine (SEROQUEL) tablet 50 mg  50 mg Oral Nightly Andrade Carrillo MD   50 mg at 07/12/18 2129    simvastatin (ZOCOR) tablet 20 mg  20 mg Oral Daily Andrade Carrillo MD   20 mg at 07/12/18 2130    traZODone (DESYREL) tablet 100 mg  100 mg Oral Nightly Andrade Carrillo MD   100 mg at 07/12/18 2129    vitamin E capsule 200 Units  200 Units Oral Daily Andrade Carrillo MD        acetaminophen (TYLENOL) tablet 650 mg  650 mg Oral Q4H PRN Andrade Carrillo MD        magnesium hydroxide (MILK OF MAGNESIA) 400 MG/5ML suspension 30 mL  30 mL Oral Daily PRN Andrade Carrillo MD        benztropine

## 2018-07-13 NOTE — CARE COORDINATION
Pt tearful during interview, poor eye contact. Pt is preoccupied with a multitude of stressors and only tangently engaging in any discussion with staff. Pt endorses a SI without plan or intent and contracts for safety. When asked a second or third time if pt has any SI the pt will not answer but rather redirect the  conversation. When discussing barbi for safety pt will immediately verbalize a contract. Pt had difficulty answering any question other than those involving her multiple stressors. Pt has no physical complaints and verbalizes anxiety and difficulty sleeping as her two greatest concerns. Pt does not feel she needed to come to the hospital, and states she is only here because her mother called.

## 2018-07-13 NOTE — PROGRESS NOTES
Patient was laying in her bed in her room. Patient had a flat affect, was worrisome and distracted. Patient stated she is depressed and stressed. Stressors are issues with her children, finances, taking care of her mother, owes money to the courts and got an eviction notice. Patient has poor coping skills. She was gambling at the Internet cafe. She felt suicidal, was drinking alcohol and taking tylenol PM.  She stated she feels overwhelmed and anxious. She denies any audio or visual hallucinations. She has low interests but enjoys watching TV.  Electronically signed by Lorie Mohr, [address] on 7/13/2018 at 11:59 AM

## 2018-07-13 NOTE — H&P
07/11/2018 >60.0  >60 Final    Comment: >60 mL/min/1.73m2 EGFR, calc. for ages 25 and older using the  MDRD formula (not corrected for weight), is valid for stable  renal function.       Calcium 07/11/2018 9.3  8.6 - 10.2 mg/dL Final    Total Protein 07/11/2018 7.3  6.4 - 8.1 g/dL Final    Alb 07/11/2018 4.5  3.9 - 4.9 g/dL Final    Total Bilirubin 07/11/2018 0.4  0.0 - 1.2 mg/dL Final    Alkaline Phosphatase 07/11/2018 78  40 - 130 U/L Final    ALT 07/11/2018 93* 0 - 33 U/L Final    AST 07/11/2018 68* 0 - 35 U/L Final    Globulin 07/11/2018 2.8  2.3 - 3.5 g/dL Final    WBC 07/11/2018 8.4  4.8 - 10.8 K/uL Final    RBC 07/11/2018 4.51  4.20 - 5.40 M/uL Final    Hemoglobin 07/11/2018 13.8  12.0 - 16.0 g/dL Final    Hematocrit 07/11/2018 39.8  37.0 - 47.0 % Final    MCV 07/11/2018 88.3  82.0 - 100.0 fL Final    MCH 07/11/2018 30.5  27.0 - 31.3 pg Final    MCHC 07/11/2018 34.5  33.0 - 37.0 % Final    RDW 07/11/2018 13.5  11.5 - 14.5 % Final    Platelets 91/22/4253 250  130 - 400 K/uL Final    Neutrophils % 07/11/2018 45.6  % Final    Lymphocytes % 07/11/2018 45.0  % Final    Monocytes % 07/11/2018 7.4  % Final    Eosinophils % 07/11/2018 0.9  % Final    Basophils % 07/11/2018 1.1  % Final    Neutrophils # 07/11/2018 3.8  1.4 - 6.5 K/uL Final    Lymphocytes # 07/11/2018 3.8  1.0 - 4.8 K/uL Final    Monocytes # 07/11/2018 0.6  0.2 - 0.8 K/uL Final    Eosinophils # 07/11/2018 0.1  0.0 - 0.7 K/uL Final    Basophils # 07/11/2018 0.1  0.0 - 0.2 K/uL Final    Ethanol Lvl 07/11/2018 222  mg/dL Final    Ethanol percent 07/11/2018 0.195  G/dL Final    Color, UA 07/11/2018 Yellow  Straw/Yellow Final    Clarity, UA 07/11/2018 Clear  Clear Final    Glucose, Ur 07/11/2018 Negative  Negative mg/dL Final    Bilirubin Urine 07/11/2018 Negative  Negative Final    Ketones, Urine 07/11/2018 Negative  Negative mg/dL Final    Specific Gravity, UA 07/11/2018 1.000  1.005 - 1.030 Final    on 07/12/2018 ACCU-CHEK   Final    POC Glucose 07/12/2018 96  60 - 115 mg/dl Final    Performed on 07/12/2018 ACCU-CHEK   Final    Vit D, 25-Hydroxy 07/13/2018 26.8* 30.0 - 100.0 ng/mL Final    Comment: (20-30 ng/mL)  Insufficiency    This assay accurately quantifies the sum of vitamin D3, 25-Hydroxy and  vitamin D2, 25-Hyroxy.  Cholesterol, Total 07/13/2018 213* 0 - 199 mg/dL Final    ATP III Cholesterol Classification is Borderline High.  Triglycerides 07/13/2018 264* 0 - 200 mg/dL Final    ATP III Triglycerides Classification is High.  HDL 07/13/2018 33* 40 - 59 mg/dL Final    Comment: ATP III HDL Cholestrol Classification is low. Expected Values:    Males:    >55 = No Risk            35-55 = Moderate Risk            <35 = High Risk    Females:  >65 = No Risk            45-65 = Moderate Risk            <45 = High Risk    NCEP Guidelines:   Third Report May 2001  >59 = negative risk factor for CHD  <40 = major risk factor for CHD      LDL Calculated 07/13/2018 127  0 - 129 mg/dL Final    ATP III LDL Classification is Near Optimal.           MEDICATIONS: Current Facility-Administered Medications: [START ON 7/14/2018] DULoxetine (CYMBALTA) extended release capsule 90 mg, 90 mg, Oral, Daily  traZODone (DESYREL) tablet 25 mg, 25 mg, Oral, Daily  simvastatin (ZOCOR) tablet 40 mg, 40 mg, Oral, Daily  [START ON 7/14/2018] vitamin D (CHOLECALCIFEROL) tablet 3,000 Units, 3,000 Units, Oral, Daily  ALPRAZolam (XANAX) tablet 0.5 mg, 0.5 mg, Oral, 4x Daily  amLODIPine (NORVASC) tablet 10 mg, 10 mg, Oral, Daily  cetirizine (ZYRTEC) tablet 10 mg, 10 mg, Oral, Daily PRN  levothyroxine (SYNTHROID) tablet 125 mcg, 125 mcg, Oral, Daily  pantoprazole (PROTONIX) tablet 40 mg, 40 mg, Oral, QAM AC  potassium chloride (KLOR-CON M) extended release tablet 10 mEq, 10 mEq, Oral, Daily  QUEtiapine (SEROQUEL) tablet 50 mg, 50 mg, Oral, Nightly  traZODone (DESYREL) tablet 100 mg, 100 mg, Oral, Nightly  vitamin E capsule 200 Units, 200 Units, Oral, Daily  acetaminophen (TYLENOL) tablet 650 mg, 650 mg, Oral, Q4H PRN  magnesium hydroxide (MILK OF MAGNESIA) 400 MG/5ML suspension 30 mL, 30 mL, Oral, Daily PRN  benztropine mesylate (COGENTIN) injection 2 mg, 2 mg, Intramuscular, BID PRN  aluminum & magnesium hydroxide-simethicone (MAALOX) 200-200-20 MG/5ML suspension 30 mL, 30 mL, Oral, Q6H PRN  hydrOXYzine (VISTARIL) capsule 50 mg, 50 mg, Oral, Q6H PRN **OR** hydrOXYzine (VISTARIL) injection 50 mg, 50 mg, Intramuscular, Q6H PRN  haloperidol (HALDOL) tablet 5 mg, 5 mg, Oral, Q6H PRN **OR** haloperidol lactate (HALDOL) injection 5 mg, 5 mg, Intramuscular, Q6H PRN     ASSESSMENT:     Major Depressive Disorder, recurrent. Mixed Borderline and Histrionic Personality disorder traits    PLAN: Patient admitted to 3 general program for further evaluation, treatment and safety. Daily vitals, regular diet provided. Patient will be started on Cymbalta 90 mg/day for depression and fibromyalgia; Xanax was continued since it was prescribed to her by outside provider and to prevent withdrawal (pharmacy was contacted to confirm prescription). PRN medications for agitation, anxiety and sleep are in place. Patient will be encouraged to participate in individual, group and milieu therapy. Patient will be discharged when clinically stable. Please note that case has been discussed with treatment team and notes have been reviewed.        Signed:  Katia Carlson  7/13/2018  6:39 PM

## 2018-07-13 NOTE — PROGRESS NOTES
Pt isolative resting in bed most of shift. Pt did come out of room for dinner and visitation. Pt has a sad/flat affect. Pt admits she is depressed and anxious. Pt denies SI/HI or AV H/D. Pt made comments about family conflict with mother and children. Pt reports a decreased appetite and fair sleep.

## 2018-07-14 LAB
ANION GAP SERPL CALCULATED.3IONS-SCNC: 10 MEQ/L (ref 7–13)
BUN BLDV-MCNC: 13 MG/DL (ref 6–20)
CALCIUM SERPL-MCNC: 9.1 MG/DL (ref 8.6–10.2)
CHLORIDE BLD-SCNC: 109 MEQ/L (ref 98–107)
CO2: 27 MEQ/L (ref 22–29)
CREAT SERPL-MCNC: 0.76 MG/DL (ref 0.5–0.9)
GFR AFRICAN AMERICAN: >60
GFR NON-AFRICAN AMERICAN: >60
GLUCOSE BLD-MCNC: 112 MG/DL (ref 74–109)
POTASSIUM SERPL-SCNC: 4 MEQ/L (ref 3.5–5.1)
SODIUM BLD-SCNC: 146 MEQ/L (ref 132–144)
T4 FREE: 1.28 NG/DL (ref 0.93–1.7)

## 2018-07-14 PROCEDURE — 80048 BASIC METABOLIC PNL TOTAL CA: CPT

## 2018-07-14 PROCEDURE — 1240000000 HC EMOTIONAL WELLNESS R&B

## 2018-07-14 PROCEDURE — 6370000000 HC RX 637 (ALT 250 FOR IP): Performed by: PSYCHIATRY & NEUROLOGY

## 2018-07-14 PROCEDURE — 6370000000 HC RX 637 (ALT 250 FOR IP): Performed by: NURSE PRACTITIONER

## 2018-07-14 PROCEDURE — 84439 ASSAY OF FREE THYROXINE: CPT

## 2018-07-14 PROCEDURE — 36415 COLL VENOUS BLD VENIPUNCTURE: CPT

## 2018-07-14 RX ORDER — ALPRAZOLAM 0.5 MG/1
0.5 TABLET ORAL 4 TIMES DAILY PRN
Status: DISCONTINUED | OUTPATIENT
Start: 2018-07-14 | End: 2018-07-20 | Stop reason: HOSPADM

## 2018-07-14 RX ADMIN — PANTOPRAZOLE SODIUM 40 MG: 40 TABLET, DELAYED RELEASE ORAL at 06:18

## 2018-07-14 RX ADMIN — VITAMIN D, TAB 1000IU (100/BT) 3000 UNITS: 25 TAB at 09:06

## 2018-07-14 RX ADMIN — TRAZODONE HYDROCHLORIDE 100 MG: 100 TABLET ORAL at 21:24

## 2018-07-14 RX ADMIN — QUETIAPINE FUMARATE 50 MG: 50 TABLET ORAL at 21:24

## 2018-07-14 RX ADMIN — LEVOTHYROXINE SODIUM 125 MCG: 125 TABLET ORAL at 06:18

## 2018-07-14 RX ADMIN — ALPRAZOLAM 0.5 MG: 0.5 TABLET ORAL at 19:01

## 2018-07-14 RX ADMIN — AMLODIPINE BESYLATE 10 MG: 10 TABLET ORAL at 16:09

## 2018-07-14 RX ADMIN — POTASSIUM CHLORIDE 10 MEQ: 20 TABLET, EXTENDED RELEASE ORAL at 09:06

## 2018-07-14 RX ADMIN — ALPRAZOLAM 0.5 MG: 0.5 TABLET ORAL at 09:52

## 2018-07-14 RX ADMIN — VITAMIN E CAP 100 UNIT 200 UNITS: 100 CAP at 09:06

## 2018-07-14 RX ADMIN — ALPRAZOLAM 0.5 MG: 0.5 TABLET ORAL at 14:10

## 2018-07-14 RX ADMIN — SIMVASTATIN 40 MG: 20 TABLET, FILM COATED ORAL at 21:24

## 2018-07-14 NOTE — PROGRESS NOTES
BEHAVIORAL HEALTH FOLLOW-UP NOTE I    7/14/2018    [x] Patient was seen and examined in person  [x] Chart reviewed  [x] Labs reviewed  [x] Patient's case discussed with staff/team    Chief Complaint:   Depressed mood, suicidal ideations    Interim History:     Patient states she is still depressed. She still c/o poor sleep; says she woke up three times. She said her appetite was poor. She still has suicidal ideations; denies homicidal ideations. Denies auditory hallucinations, but still has 'flashes' of \"shadows\". Still hopeless/helpless. Appetite:  [] Normal/Unchanged  [] Increased  [x] Decreased  SI [x] Present  [] Absent    Sleep:       [] Normal/Unchanged  [] Fair       [x] Poor          HI  []Present  [x] Absent    Energy:    [] Normal/Unchanged  [] Increased  [x] Decreased   Plan [] Present                          [x] Absent  [] N/A    Patient is [] able  [x] unable to CONTRACT FOR SAFETY   Aggression:  [] yes  [x] no  Medication side effects(SE):  [x] None(Psych.  Meds.) [] Other  Well tolerated: [] yes  [x] no    Examination:  /83 Comment: PULSE = 63  Pulse 76   Temp 97 °F (36.1 °C) (Oral)   Resp 18   Wt 202 lb (91.6 kg)   LMP 11/19/2013   SpO2 96%   BMI 36.95 kg/m²     Appearance: [x] casual  [] anxious  [] confused  [] blunted  [] silly  [] poor hygiene  [] poor grooming  Gait:  [x] stable  [] unstable  [] limping  [] shuffling  [] in wheel chair or other support    Mental Status:   Orientation: Date/Time: [x] yes  [] no Place: [x] yes  [] no     Person: [x] yes  [] no  Level of Consciousness: [x] alert  [] drowsy  [] tired  [] lethargic  [] distractable  [] asleep  [] could not be assessed    Manner:   [x] Cooperative  [] Guarded  [] Suspicious  [] Irritable  [] Hostile  [] Withdrawn  [] Other    Motor Activity:   [x] Normal  [] Agitation  [] Motor retardation  [] Tremor  [] Other    Musculoskeletal:   [x] Normal  [] Rigidity  [] Cogwheel  [] Flaccid  [] Tics/TD  [] Other    Speech:   [] Social History     Social History    Marital status:      Spouse name: N/A    Number of children: N/A    Years of education: N/A     Occupational History    Not on file. Social History Main Topics    Smoking status: Never Smoker    Smokeless tobacco: Never Used    Alcohol use Yes      Comment: OCC.  Drug use: No    Sexual activity: Not on file     Other Topics Concern    Not on file     Social History Narrative    No narrative on file       LABS:  Lab Results   Component Value Date     (H) 07/14/2018    BUN 13 07/14/2018    CREATININE 0.76 07/14/2018    TSH 6.450 (H) 07/11/2018    WBC 8.4 07/11/2018     No results found for: PHENYTOIN, PHENOBARB, VALPROATE, CBMZ  Lab Results   Component Value Date    INR 1.0 11/02/2017    PROTIME 10.5 11/02/2017     Lab Results   Component Value Date    APTT 24.5 11/02/2017     No results for input(s): ETOH in the last 72 hours. ROS:  [x] All negative/unchanged except if checked.  Explain positive(checked items) below:  [] Constitutional  [] Eyes  [] Ear/Nose/Mouth/Throat  [] Respiratory  [] CV  [] GI  []   [] Musculoskeletal  [] Skin/Breast  [] Neurological  [] Endocrine  [] Heme/Lymph  [] Allergic/Immunologic    Explanation:     MEDICATIONS:    Current Facility-Administered Medications:     ALPRAZolam (XANAX) tablet 0.5 mg, 0.5 mg, Oral, 4x Daily PRN, Eric Carr MD, 0.5 mg at 07/14/18 1901    DULoxetine (CYMBALTA) extended release capsule 90 mg, 90 mg, Oral, Daily, Eric Carr MD    traZODone (DESYREL) tablet 25 mg, 25 mg, Oral, Daily, Eric Carr MD    simvastatin (ZOCOR) tablet 40 mg, 40 mg, Oral, Daily, TYRA Herrera CNP    vitamin D (CHOLECALCIFEROL) tablet 3,000 Units, 3,000 Units, Oral, Daily, TYRA Jack CNP, 3,000 Units at 07/14/18 0906    amLODIPine (NORVASC) tablet 10 mg, 10 mg, Oral, Daily, Eric Carr MD, 10 mg at 07/14/18 1609    cetirizine (ZYRTEC) tablet 10 mg, 10 mg, Oral, Daily

## 2018-07-15 PROCEDURE — 6370000000 HC RX 637 (ALT 250 FOR IP): Performed by: NURSE PRACTITIONER

## 2018-07-15 PROCEDURE — 6370000000 HC RX 637 (ALT 250 FOR IP): Performed by: PSYCHIATRY & NEUROLOGY

## 2018-07-15 PROCEDURE — 1240000000 HC EMOTIONAL WELLNESS R&B

## 2018-07-15 PROCEDURE — 6370000000 HC RX 637 (ALT 250 FOR IP): Performed by: PHYSICIAN ASSISTANT

## 2018-07-15 RX ORDER — FUROSEMIDE 20 MG/1
20 TABLET ORAL DAILY
Status: COMPLETED | OUTPATIENT
Start: 2018-07-15 | End: 2018-07-17

## 2018-07-15 RX ORDER — DULOXETIN HYDROCHLORIDE 60 MG/1
120 CAPSULE, DELAYED RELEASE ORAL DAILY
Status: DISCONTINUED | OUTPATIENT
Start: 2018-07-16 | End: 2018-07-20 | Stop reason: HOSPADM

## 2018-07-15 RX ADMIN — FUROSEMIDE 20 MG: 20 TABLET ORAL at 13:12

## 2018-07-15 RX ADMIN — AMLODIPINE BESYLATE 10 MG: 10 TABLET ORAL at 08:58

## 2018-07-15 RX ADMIN — SIMVASTATIN 40 MG: 20 TABLET, FILM COATED ORAL at 08:54

## 2018-07-15 RX ADMIN — PANTOPRAZOLE SODIUM 40 MG: 40 TABLET, DELAYED RELEASE ORAL at 06:18

## 2018-07-15 RX ADMIN — VITAMIN D, TAB 1000IU (100/BT) 3000 UNITS: 25 TAB at 08:54

## 2018-07-15 RX ADMIN — HYDROXYZINE PAMOATE 50 MG: 50 CAPSULE ORAL at 20:51

## 2018-07-15 RX ADMIN — LEVOTHYROXINE SODIUM 125 MCG: 125 TABLET ORAL at 06:18

## 2018-07-15 RX ADMIN — ALPRAZOLAM 0.5 MG: 0.5 TABLET ORAL at 08:54

## 2018-07-15 RX ADMIN — DULOXETINE HYDROCHLORIDE 90 MG: 30 CAPSULE, DELAYED RELEASE ORAL at 08:54

## 2018-07-15 RX ADMIN — QUETIAPINE FUMARATE 50 MG: 50 TABLET ORAL at 20:25

## 2018-07-15 RX ADMIN — TRAZODONE HYDROCHLORIDE 100 MG: 100 TABLET ORAL at 20:25

## 2018-07-15 RX ADMIN — VITAMIN E CAP 100 UNIT 200 UNITS: 100 CAP at 08:54

## 2018-07-15 RX ADMIN — ALPRAZOLAM 0.5 MG: 0.5 TABLET ORAL at 20:25

## 2018-07-15 RX ADMIN — POTASSIUM CHLORIDE 10 MEQ: 20 TABLET, EXTENDED RELEASE ORAL at 08:54

## 2018-07-15 RX ADMIN — ALPRAZOLAM 0.5 MG: 0.5 TABLET ORAL at 13:12

## 2018-07-15 ASSESSMENT — LIFESTYLE VARIABLES: HISTORY_ALCOHOL_USE: NO

## 2018-07-15 NOTE — PLAN OF CARE
Problem: Altered Mood, Depressive Behavior:  Goal: Able to verbalize and/or display a decrease in depressive symptoms  Able to verbalize and/or display a decrease in depressive symptoms   Outcome: Ongoing    Goal: Able to verbalize support systems  Able to verbalize support systems   Outcome: Ongoing      Problem:  Activity:  Goal: Physical symptoms of sleep deprivation will improve  Physical symptoms of sleep deprivation will improve   Outcome: Ongoing    Goal: Sleeping patterns will improve  Sleeping patterns will improve   Outcome: Ongoing
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
## 2018-07-15 NOTE — BH NOTE
Pt attended Wellness, Recreation, and Wrap Up groups.     Electronically signed by Clyde Cruz on 7/14/2018 at 11:21 PM

## 2018-07-15 NOTE — PROGRESS NOTES
Pt up this morning with more motivation than yesterday. Pt flat/blunt; visible on unit during breakfast. Pt states she has always had depression and anxiety; pt currently rates depression 5/10 and anxiety 7/10. Pt denies SI/HI or AV H/D. Pt was seen by Fercho Banks. for pt c/o edema around right eye and BLE; see new orders. Pt reports overall she is feeling better. Pt verbalized her appetite has improved some,  but remains decreased. Pt reports fair sleep.

## 2018-07-15 NOTE — PROGRESS NOTES
Other    Speech:   [] Normal  [x] Soft  [x] Slow  [] Dysarthria  [] Incoherent  [] Other    Language:  [x] Normal  [] Expressive Aphasia  [] Fluent Aphasia  [] Other    Mood:  [] Euthymic  [x] Depressed  [] Irritable  [] Angry  [x] Anxious  [] Fearful  [] Apathetic  [] Euphoric  [] Other    Affect:  [] Euthymic  [x] Depressed  [] Blunted  [] Flat  [] Irritable  [] Angry  [x] Anxious    [] Labile  [] Expansive  [] Exaggerated  [] Other    Thought Process/Association:   [] Normal  [] Tangential  [] Circumstantial  [] Poverty of Thought  [] [place]  [] Disorganized  [x] Racing Thoughts  [] Flight of Ideas  [] Loose  [] Other    Thought Contents:   [x] Hopelessness  [x] Worthlessness  [] Hypochondriasis  [] Delusions  [] Paranoia  [x] Ruminations  [] Obsessions/Compulsions  [] Confused [] Hopeful   [] Future Oriented [] Other    Perception:  [] Normal  [x] Hallucinations  [] Auditory  [x] Visual  [] Olfactory  [] Tactile    [] Dissociation  [] Flashbacks  [] Other    Attention/Concentration:  [] Intact  [] Poor  [x] Distractible  [] Other    Cognition:  [x] Intact  [] Impaired   Insight: [] Intact  [x] Fair  [] Limited   Short Term Memory:  [x] Intact  [] Impaired  [] Poor  Judgement:  [] Intact  [x] Fair  [] Limited  Remote Memory:  [x] Intact  [] Impaired  [] Poor         BEHAVIORAL HEALTH FOLLOW-UP NOTE II    7/15/2018    PAST MEDICAL/PSYCHIATRIC HISTORY:   Past Medical History:   Diagnosis Date    Anemia     C. difficile colitis 1/2013    Chronic fatigue syndrome     Depression     Janet    Diabetes mellitus ([facility] [address]     Fibromyalgia     Fibromyalgia 3/24/2015    HTN (hypertension)     Hyperlipidemia     Hypothyroidism     Palpitations     [facility]    Following GYN procedure    Vitamin D deficiency        FAMILY/SOCIAL HISTORY:  Family History   Problem Relation Age of Onset    Cancer Maternal Grandfather         COLON    Cancer Paternal Aunt         LUNG    Cancer Other BLOOD CANCER- UNSPECIFIED     Social History     Social History    Marital status:      Spouse name: N/A    Number of children: N/A    Years of education: N/A     Occupational History    Not on file. Social History Main Topics    Smoking status: Never Smoker    Smokeless tobacco: Never Used    Alcohol use Yes      Comment: OCC.  Drug use: No    Sexual activity: Not on file     Other Topics Concern    Not on file     Social History Narrative    No narrative on file       LABS:  Lab Results   Component Value Date     (H) 07/14/2018    BUN 13 07/14/2018    CREATININE 0.76 07/14/2018    TSH 6.450 (H) 07/11/2018    WBC 8.4 07/11/2018     No results found for: PHENYTOIN, PHENOBARB, VALPROATE, CBMZ  Lab Results   Component Value Date    INR 1.0 11/02/2017    PROTIME 10.5 11/02/2017     Lab Results   Component Value Date    APTT 24.5 11/02/2017     No results for input(s): ETOH in the last 72 hours. ROS:  [x] All negative/unchanged except if checked.  Explain positive(checked items) below:  [] Constitutional  [] Eyes  [] Ear/Nose/Mouth/Throat  [] Respiratory  [] CV  [] GI  []   [] Musculoskeletal  [] Skin/Breast  [] Neurological  [] Endocrine  [] Heme/Lymph  [] Allergic/Immunologic    Explanation:     MEDICATIONS:    Current Facility-Administered Medications:     furosemide (LASIX) tablet 20 mg, 20 mg, Oral, Daily, Meghann Adkins PA-C, 20 mg at 07/15/18 1312    ALPRAZolam (XANAX) tablet 0.5 mg, 0.5 mg, Oral, 4x Daily PRN, Eric Carr MD, 0.5 mg at 07/15/18 1312    DULoxetine (CYMBALTA) extended release capsule 90 mg, 90 mg, Oral, Daily, Eric Carr MD, 90 mg at 07/15/18 0854    traZODone (DESYREL) tablet 25 mg, 25 mg, Oral, Daily, Eric Carr MD    simvastatin (ZOCOR) tablet 40 mg, 40 mg, Oral, Daily, TYRA Jack CNP, 40 mg at 07/15/18 0854    vitamin D (CHOLECALCIFEROL) tablet 3,000 Units, 3,000 Units, Oral, Daily, TYRA Jack CNP, 3,000 Units

## 2018-07-15 NOTE — PROGRESS NOTES
Group Therapy Note    Date: 7/15/2018  Start Time: 1000  End Time:  1100  Number of Participants: 17    Type of Group: Psychoeducation    Wellness Binder Information  Module Name:    Session Number:      Patient's Goal:  \"To feel better\"    Notes:  Patient remains quiet and stays to herself. She work fairly and independently on her project in group. Status After Intervention:  Unchanged    Participation Level:  Active Listener    Participation Quality: Appropriate      Speech:  quiet      Thought Process/Content: Linear      Affective Functioning: Flat      Mood: depressed      Level of consciousness:  Alert      Response to Learning: Progressing to goal      Endings: None Reported    Modes of Intervention: Education, Socialization and Activity      Discipline Responsible: Psychoeducational Specialist      Signature:  Liudmila Toth

## 2018-07-16 LAB
ANION GAP SERPL CALCULATED.3IONS-SCNC: 10 MEQ/L (ref 7–13)
BUN BLDV-MCNC: 13 MG/DL (ref 6–20)
CALCIUM SERPL-MCNC: 9 MG/DL (ref 8.6–10.2)
CHLORIDE BLD-SCNC: 106 MEQ/L (ref 98–107)
CO2: 26 MEQ/L (ref 22–29)
CREAT SERPL-MCNC: 0.86 MG/DL (ref 0.5–0.9)
GFR AFRICAN AMERICAN: >60
GFR NON-AFRICAN AMERICAN: >60
GLUCOSE BLD-MCNC: 106 MG/DL (ref 74–109)
LV EF: 60 %
LVEF MODALITY: NORMAL
POTASSIUM SERPL-SCNC: 3.9 MEQ/L (ref 3.5–5.1)
SODIUM BLD-SCNC: 142 MEQ/L (ref 132–144)

## 2018-07-16 PROCEDURE — 93306 TTE W/DOPPLER COMPLETE: CPT

## 2018-07-16 PROCEDURE — 99232 SBSQ HOSP IP/OBS MODERATE 35: CPT | Performed by: PSYCHIATRY & NEUROLOGY

## 2018-07-16 PROCEDURE — 6370000000 HC RX 637 (ALT 250 FOR IP): Performed by: PSYCHIATRY & NEUROLOGY

## 2018-07-16 PROCEDURE — 6370000000 HC RX 637 (ALT 250 FOR IP): Performed by: PHYSICIAN ASSISTANT

## 2018-07-16 PROCEDURE — 80048 BASIC METABOLIC PNL TOTAL CA: CPT

## 2018-07-16 PROCEDURE — 6370000000 HC RX 637 (ALT 250 FOR IP): Performed by: NURSE PRACTITIONER

## 2018-07-16 PROCEDURE — 36415 COLL VENOUS BLD VENIPUNCTURE: CPT

## 2018-07-16 PROCEDURE — 1240000000 HC EMOTIONAL WELLNESS R&B

## 2018-07-16 RX ADMIN — VITAMIN E CAP 100 UNIT 200 UNITS: 100 CAP at 10:09

## 2018-07-16 RX ADMIN — POTASSIUM CHLORIDE 10 MEQ: 20 TABLET, EXTENDED RELEASE ORAL at 10:09

## 2018-07-16 RX ADMIN — LEVOTHYROXINE SODIUM 125 MCG: 125 TABLET ORAL at 06:06

## 2018-07-16 RX ADMIN — ALPRAZOLAM 0.5 MG: 0.5 TABLET ORAL at 10:08

## 2018-07-16 RX ADMIN — SIMVASTATIN 40 MG: 20 TABLET, FILM COATED ORAL at 10:08

## 2018-07-16 RX ADMIN — PANTOPRAZOLE SODIUM 40 MG: 40 TABLET, DELAYED RELEASE ORAL at 06:06

## 2018-07-16 RX ADMIN — ALPRAZOLAM 0.5 MG: 0.5 TABLET ORAL at 20:48

## 2018-07-16 RX ADMIN — ALPRAZOLAM 0.5 MG: 0.5 TABLET ORAL at 15:09

## 2018-07-16 RX ADMIN — ACETAMINOPHEN 650 MG: 325 TABLET ORAL at 10:08

## 2018-07-16 RX ADMIN — QUETIAPINE FUMARATE 50 MG: 50 TABLET ORAL at 20:48

## 2018-07-16 RX ADMIN — FUROSEMIDE 20 MG: 20 TABLET ORAL at 10:09

## 2018-07-16 RX ADMIN — DULOXETINE HYDROCHLORIDE 120 MG: 60 CAPSULE, DELAYED RELEASE ORAL at 10:08

## 2018-07-16 RX ADMIN — AMLODIPINE BESYLATE 10 MG: 10 TABLET ORAL at 10:08

## 2018-07-16 RX ADMIN — VITAMIN D, TAB 1000IU (100/BT) 3000 UNITS: 25 TAB at 10:07

## 2018-07-16 RX ADMIN — TRAZODONE HYDROCHLORIDE 100 MG: 100 TABLET ORAL at 20:48

## 2018-07-16 ASSESSMENT — PAIN SCALES - GENERAL
PAINLEVEL_OUTOF10: 8
PAINLEVEL_OUTOF10: 6

## 2018-07-16 NOTE — CARE COORDINATION
Group Therapy Note    Date: 7/16/2018  Start Time: 1100  End Time:  noon  Number of Participants: 10    Type of Group: Psychotherapy    Wellness Binder Information  Module Name:    Session Number:      Patient's Goal:  How to best utilize supports in the community    Notes:  Patient attended support group    Status After Intervention:  Improved    Participation Level:  Active Listener    Participation Quality: Attentive      Speech:  normal      Thought Process/Content: Logical      Affective Functioning: Flat      Mood: calm      Level of consciousness:  Alert      Response to Learning: Progressing to goal      Endings: None Reported    Modes of Intervention: Support      Discipline Responsible: /Counselor      Signature:  Saint Muck, LISW-S

## 2018-07-16 NOTE — PROGRESS NOTES
BEHAVIORAL HEALTH FOLLOW-UP NOTE     7/16/2018     Patient was seen and examined in person, Chart reviewed   Patient's case discussed with staff/team    Chief Complaint: depression, Overdose    Interim History:     Pt is feeling depressed  Ruminating about her relationship with her daughter  Evicted from her house   [place] overwhelmed, hopeless and worthless  Passive death wishes ongoing  Report that the overdose was impulsive act. Appetite:   [] Normal/Unchanged  [] Increased  [x] Decreased      Sleep:       [] Normal/Unchanged  [x] Fair       [] Poor              Energy:    [] Normal/Unchanged  [] Increased  [x] Decreased        SI [x] Present  [] Absent    HI  []Present  [x] Absent     Aggression:  [] yes  [x] no    Patient is [] able  [x] unable to CONTRACT FOR SAFETY     PAST MEDICAL/PSYCHIATRIC HISTORY:   Past Medical History:   Diagnosis Date    Anemia     C. difficile colitis 1/2013    Chronic fatigue syndrome     Depression     [place]    Diabetes mellitus ([facility]     Fibromyalgia     Fibromyalgia 3/24/2015    HTN (hypertension)     Hyperlipidemia     Hypothyroidism     Palpitations     Pulmonary embolus ([facility] 10/12    Following GYN procedure    Vitamin D deficiency        FAMILY/SOCIAL HISTORY:  Family History   Problem Relation Age of Onset    Cancer Maternal Grandfather         COLON    Cancer Paternal Aunt         LUNG    Cancer Other         BLOOD CANCER- UNSPECIFIED     Social History     Social History    Marital status:      Spouse name: N/A    Number of children: N/A    Years of education: N/A     Occupational History    Not on file. Social History Main Topics    Smoking status: Never Smoker    Smokeless tobacco: Never Used    Alcohol use Yes      Comment: OCC.  Drug use: No    Sexual activity: Not on file     Other Topics Concern    Not on file     Social History Narrative    No narrative on file           ROS:  [x] All negative/unchanged except if checked. Explain positive(checked items) below:  [] Constitutional  [] Eyes  [] Ear/Nose/Mouth/Throat  [] Respiratory  [] CV  [] GI  []   [] Musculoskeletal  [] Skin/Breast  [] Neurological  [] Endocrine  [] Heme/Lymph  [] Allergic/Immunologic    Explanation:     MEDICATIONS:    Current Facility-Administered Medications:     furosemide (LASIX) tablet 20 mg, 20 mg, Oral, Daily, Meghann Adkins PA-C, 20 mg at 07/16/18 1009    DULoxetine (CYMBALTA) extended release capsule 120 mg, 120 mg, Oral, Daily, Aashish Orr MD, 120 mg at 07/16/18 1008    ALPRAZolam Jerrell Iraida) tablet 0.5 mg, 0.5 mg, Oral, 4x Daily PRN, Aashish Orr MD, 0.5 mg at 07/16/18 1008    traZODone (DESYREL) tablet 25 mg, 25 mg, Oral, Daily, Aashish Orr MD    simvastatin (ZOCOR) tablet 40 mg, 40 mg, Oral, Daily, Yordy Burnett, APRN - CNP, 40 mg at 07/16/18 1008    vitamin D (CHOLECALCIFEROL) tablet 3,000 Units, 3,000 Units, Oral, Daily, Jose Luis Ibrahim, APRN - CNP, 3,000 Units at 07/16/18 1007    amLODIPine (NORVASC) tablet 10 mg, 10 mg, Oral, Daily, Aashish Orr MD, 10 mg at 07/16/18 1008    cetirizine (ZYRTEC) tablet 10 mg, 10 mg, Oral, Daily PRN, Aashish Orr MD    levothyroxine (SYNTHROID) tablet 125 mcg, 125 mcg, Oral, Daily, Aashish Orr MD, 125 mcg at 07/16/18 0606    pantoprazole (PROTONIX) tablet 40 mg, 40 mg, Oral, QAM AC, Aashish Orr MD, 40 mg at 07/16/18 0606    potassium chloride (KLOR-CON M) extended release tablet 10 mEq, 10 mEq, Oral, Daily, Aashish Orr MD, 10 mEq at 07/16/18 1009    QUEtiapine (SEROQUEL) tablet 50 mg, 50 mg, Oral, Nightly, Aashish Orr MD, 50 mg at 07/15/18 2025    traZODone (DESYREL) tablet 100 mg, 100 mg, Oral, Nightly, Aashish Orr MD, 100 mg at 07/15/18 2025    vitamin E capsule 200 Units, 200 Units, Oral, Daily, Aashish Orr MD, 200 Units at 07/16/18 1009    acetaminophen (TYLENOL) tablet 650 mg, 650 mg, Oral, Q4H PRN, Aashish Orr MD, 650 mg at 07/16/18 1008    magnesium hydroxide (MILK OF MAGNESIA) 400 MG/5ML suspension 30 mL, 30 mL, Oral, Daily PRN, Pawel Cooper MD    benztropine mesylate (COGENTIN) injection 2 mg, 2 mg, Intramuscular, BID PRN, Pawel Cooper MD    aluminum & magnesium hydroxide-simethicone (MAALOX) 200-200-20 MG/5ML suspension 30 mL, 30 mL, Oral, Q6H PRN, Pawel Cooper MD, 30 mL at 07/12/18 1903    hydrOXYzine (VISTARIL) capsule 50 mg, 50 mg, Oral, Q6H PRN, 50 mg at 07/15/18 2051 **OR** hydrOXYzine (VISTARIL) injection 50 mg, 50 mg, Intramuscular, Q6H PRN, Pawel Cooper MD    haloperidol (HALDOL) tablet 5 mg, 5 mg, Oral, Q6H PRN **OR** haloperidol lactate (HALDOL) injection 5 mg, 5 mg, Intramuscular, Q6H PRN, Pawel Cooper MD      Examination:  /71   Pulse 79   Temp 98 °F (36.7 °C)   Resp 18   Wt 202 lb (91.6 kg)   LMP 11/19/2013   SpO2 93%   BMI 36.95 kg/m²   Gait - steady  Medication side effects(SE): no    Mental Status Examination:    Level of consciousness:  within normal limits   Appearance:  fair grooming and fair hygiene  Behavior/Motor:  psychomotor retardation  Attitude toward examiner:  cooperative  Speech:  slow   Mood: depressed  Affect:  mood congruent  Thought processes:  goal directed   Thought content:  Suicidal Ideation:  passive  Delusions:  no evidence of delusions  Perceptual Disturbance:  denies any perceptual disturbance  Cognition:  oriented to person, place, and time   Concentration intact  Insight fair   Judgement fair     ASSESSMENT:   Patient symptoms are:  [] Well controlled  [] Improving  [] Worsening  [x] No change      Diagnosis:   Principal Problem:    Severe episode of recurrent major depressive disorder, without psychotic features [facility]  Resolved Problems:    * No resolved hospital problems. *      LABS:    No results for input(s): WBC, HGB, PLT in the last 72 hours.   Recent Labs      07/14/18   0704  07/16/18   0616   NA  146*  142   K  4.0  3.9   CL  109*  106   CO2  27  26   BUN  13  13   CREATININE 0. 76  0.86   GLUCOSE  112*  106     No results for input(s): BILITOT, ALKPHOS, AST, ALT in the last 72 hours. Lab Results   Component Value Date    LABAMPH Neg 07/11/2018    BARBSCNU Neg 07/11/2018    LABBENZ Neg 07/11/2018    LABBENZ NotDTCD 09/14/2012    LABMETH Neg 12/16/2015    OPIATESCREENURINE Neg 07/11/2018    PHENCYCLIDINESCREENURINE Neg 07/11/2018    ETOH 84 07/11/2018     Lab Results   Component Value Date    TSH 6.450 07/11/2018     No results found for: LITHIUM  No results found for: VALPROATE, CBMZ      Treatment Plan:  Reviewed current Medications with the patient. D.C AM trazodone  Continue all other medication  Risks, benefits, side effects, drug-to-drug interactions and alternatives to treatment were discussed. Collateral information: pending  CD evaluation  Encourage patient to attend group and other milieu activities.   Discharge planning discussed with the patient and treatment team.    PSYCHOTHERAPY/COUNSELING:  [x] Therapeutic interview  [x] Supportive  [] CBT  [] Ongoing  [] Other    [x] Patient continues to need, on a daily basis, active treatment furnished directly by or requiring the supervision of inpatient psychiatric personnel      Anticipated Length of stay:            Electronically signed by Livia Neal MD on 7/16/2018 at 1:11 PM

## 2018-07-17 LAB
ANION GAP SERPL CALCULATED.3IONS-SCNC: 12 MEQ/L (ref 7–13)
BUN BLDV-MCNC: 13 MG/DL (ref 6–20)
CALCIUM SERPL-MCNC: 9 MG/DL (ref 8.6–10.2)
CHLORIDE BLD-SCNC: 106 MEQ/L (ref 98–107)
CO2: 26 MEQ/L (ref 22–29)
CREAT SERPL-MCNC: 0.79 MG/DL (ref 0.5–0.9)
GFR AFRICAN AMERICAN: >60
GFR NON-AFRICAN AMERICAN: >60
GLUCOSE BLD-MCNC: 111 MG/DL (ref 74–109)
POTASSIUM SERPL-SCNC: 4.7 MEQ/L (ref 3.5–5.1)
SODIUM BLD-SCNC: 144 MEQ/L (ref 132–144)

## 2018-07-17 PROCEDURE — 6370000000 HC RX 637 (ALT 250 FOR IP): Performed by: PSYCHIATRY & NEUROLOGY

## 2018-07-17 PROCEDURE — 99232 SBSQ HOSP IP/OBS MODERATE 35: CPT | Performed by: PSYCHIATRY & NEUROLOGY

## 2018-07-17 PROCEDURE — 1240000000 HC EMOTIONAL WELLNESS R&B

## 2018-07-17 PROCEDURE — 6370000000 HC RX 637 (ALT 250 FOR IP): Performed by: PHYSICIAN ASSISTANT

## 2018-07-17 PROCEDURE — 80048 BASIC METABOLIC PNL TOTAL CA: CPT

## 2018-07-17 PROCEDURE — 6370000000 HC RX 637 (ALT 250 FOR IP): Performed by: NURSE PRACTITIONER

## 2018-07-17 PROCEDURE — 36415 COLL VENOUS BLD VENIPUNCTURE: CPT

## 2018-07-17 RX ADMIN — ALPRAZOLAM 0.5 MG: 0.5 TABLET ORAL at 17:22

## 2018-07-17 RX ADMIN — VITAMIN E CAP 100 UNIT 200 UNITS: 100 CAP at 10:17

## 2018-07-17 RX ADMIN — FUROSEMIDE 20 MG: 20 TABLET ORAL at 10:17

## 2018-07-17 RX ADMIN — ALPRAZOLAM 0.5 MG: 0.5 TABLET ORAL at 21:49

## 2018-07-17 RX ADMIN — POTASSIUM CHLORIDE 10 MEQ: 20 TABLET, EXTENDED RELEASE ORAL at 10:18

## 2018-07-17 RX ADMIN — QUETIAPINE FUMARATE 50 MG: 50 TABLET ORAL at 21:41

## 2018-07-17 RX ADMIN — VITAMIN D, TAB 1000IU (100/BT) 3000 UNITS: 25 TAB at 10:18

## 2018-07-17 RX ADMIN — ALPRAZOLAM 0.5 MG: 0.5 TABLET ORAL at 10:17

## 2018-07-17 RX ADMIN — PANTOPRAZOLE SODIUM 40 MG: 40 TABLET, DELAYED RELEASE ORAL at 06:19

## 2018-07-17 RX ADMIN — LEVOTHYROXINE SODIUM 125 MCG: 125 TABLET ORAL at 06:19

## 2018-07-17 RX ADMIN — DULOXETINE HYDROCHLORIDE 120 MG: 60 CAPSULE, DELAYED RELEASE ORAL at 10:18

## 2018-07-17 RX ADMIN — AMLODIPINE BESYLATE 10 MG: 10 TABLET ORAL at 10:18

## 2018-07-17 RX ADMIN — TRAZODONE HYDROCHLORIDE 100 MG: 100 TABLET ORAL at 21:42

## 2018-07-17 RX ADMIN — SIMVASTATIN 40 MG: 20 TABLET, FILM COATED ORAL at 10:17

## 2018-07-17 NOTE — PROGRESS NOTES
Explain positive(checked items) below:  [] Constitutional  [] Eyes  [] Ear/Nose/Mouth/Throat  [] Respiratory  [] CV  [] GI  []   [] Musculoskeletal  [] Skin/Breast  [] Neurological  [] Endocrine  [] Heme/Lymph  [] Allergic/Immunologic    Explanation:     MEDICATIONS:    Current Facility-Administered Medications:     DULoxetine (CYMBALTA) extended release capsule 120 mg, 120 mg, Oral, Daily, Carter Ugalde MD, 120 mg at 07/17/18 1018    ALPRAZolam Royetta Field) tablet 0.5 mg, 0.5 mg, Oral, 4x Daily PRN, Carter Ugalde MD, 0.5 mg at 07/17/18 1722    simvastatin (ZOCOR) tablet 40 mg, 40 mg, Oral, Daily, TYRA Todd CNP, 40 mg at 07/17/18 1017    vitamin D (CHOLECALCIFEROL) tablet 3,000 Units, 3,000 Units, Oral, Daily, TYRA Todd CNP, 3,000 Units at 07/17/18 1018    amLODIPine (NORVASC) tablet 10 mg, 10 mg, Oral, Daily, Carter Ugalde MD, 10 mg at 07/17/18 1018    cetirizine (ZYRTEC) tablet 10 mg, 10 mg, Oral, Daily PRN, Carter Ugalde MD    levothyroxine (SYNTHROID) tablet 125 mcg, 125 mcg, Oral, Daily, Carter Ugalde MD, 125 mcg at 07/17/18 9528    pantoprazole (PROTONIX) tablet 40 mg, 40 mg, Oral, QAM AC, Carter Ugalde MD, 40 mg at 07/17/18 1543    potassium chloride (KLOR-CON M) extended release tablet 10 mEq, 10 mEq, Oral, Daily, Carter Ugalde MD, 10 mEq at 07/17/18 1018    QUEtiapine (SEROQUEL) tablet 50 mg, 50 mg, Oral, Nightly, Carter Ugalde MD, 50 mg at 07/16/18 2048    traZODone (DESYREL) tablet 100 mg, 100 mg, Oral, Nightly, Carter Ugalde MD, 100 mg at 07/16/18 2048    vitamin E capsule 200 Units, 200 Units, Oral, Daily, Carter Ugalde MD, 200 Units at 07/17/18 1017    acetaminophen (TYLENOL) tablet 650 mg, 650 mg, Oral, Q4H PRN, Carter Ugalde MD, 650 mg at 07/16/18 1008    magnesium hydroxide (MILK OF MAGNESIA) 400 MG/5ML suspension 30 mL, 30 mL, Oral, Daily PRN, Carter Ugalde MD    benztropine mesylate (COGENTIN) injection 2 mg, 2 mg, Intramuscular, BID PRN, Deann Martinez MD    aluminum & magnesium hydroxide-simethicone (MAALOX) 200-200-20 MG/5ML suspension 30 mL, 30 mL, Oral, Q6H PRN, Deann Martinez MD, 30 mL at 07/12/18 1903    hydrOXYzine (VISTARIL) capsule 50 mg, 50 mg, Oral, Q6H PRN, 50 mg at 07/15/18 2051 **OR** hydrOXYzine (VISTARIL) injection 50 mg, 50 mg, Intramuscular, Q6H PRN, Deann Martinez MD    haloperidol (HALDOL) tablet 5 mg, 5 mg, Oral, Q6H PRN **OR** haloperidol lactate (HALDOL) injection 5 mg, 5 mg, Intramuscular, Q6H PRN, Deann Martinez MD      Examination:  /71   Pulse 79   Temp 98 °F (36.7 °C)   Resp 18   Wt 202 lb (91.6 kg)   LMP 11/19/2013   SpO2 93%   BMI 36.95 kg/m²   Gait - steady  Medication side effects(SE): no    Mental Status Examination:    Level of consciousness:  within normal limits   Appearance:  fair grooming and fair hygiene  Behavior/Motor:  psychomotor retardation  Attitude toward examiner:  cooperative  Speech:  slow   Mood: depressed  Affect:  mood congruent  Thought processes:  goal directed   Thought content:  Suicidal Ideation:  passive  Delusions:  no evidence of delusions  Perceptual Disturbance:  denies any perceptual disturbance  Cognition:  oriented to person, place, and time   Concentration intact  Insight fair   Judgement fair     ASSESSMENT:   Patient symptoms are:  [] Well controlled  [] Improving  [] Worsening  [x] No change      Diagnosis:   Principal Problem:    Severe episode of recurrent major depressive disorder, without psychotic features ([facility]  Resolved Problems:    * No resolved hospital problems. *      LABS:    No results for input(s): WBC, HGB, PLT in the last 72 hours. Recent Labs      07/16/18   0616  07/17/18   0647   NA  142  144   K  3.9  4.7   CL  106  106   CO2  26  26   BUN  13  13   CREATININE  0.86  0.79   GLUCOSE  106  111*     No results for input(s): BILITOT, ALKPHOS, AST, ALT in the last 72 hours.   Lab Results   Component Value Date    LABAMPH Neg 07/11/2018 BARBSCNU Neg 07/11/2018    LABBENZ Neg 07/11/2018    LABBENZ NotDTCD 09/14/2012    LABMETH Neg 12/16/2015    OPIATESCREENURINE Neg 07/11/2018    PHENCYCLIDINESCREENURINE Neg 07/11/2018    ETOH 84 07/11/2018     Lab Results   Component Value Date    TSH 6.450 07/11/2018     No results found for: LITHIUM  No results found for: VALPROATE, CBMZ      Treatment Plan:  Reviewed current Medications with the patient. D.C AM trazodone  Continue all other medication  Risks, benefits, side effects, drug-to-drug interactions and alternatives to treatment were discussed. Collateral information: pending  CD evaluation  Encourage patient to attend group and other milieu activities.   Discharge planning discussed with the patient and treatment team.    PSYCHOTHERAPY/COUNSELING:  [x] Therapeutic interview  [x] Supportive  [] CBT  [] Ongoing  [] Other    [x] Patient continues to need, on a daily basis, active treatment furnished directly by or requiring the supervision of inpatient psychiatric personnel      Anticipated Length of stay:            Electronically signed by Adelina De Jesus MD on 7/17/2018 at 6:40 PM

## 2018-07-17 NOTE — PLAN OF CARE
Problem: Altered Mood, Depressive Behavior:  Intervention: Assess psychological signs and symptoms of depression  Continue with plan of care. Intervention: Assess nutritional intake  Continue with plan of care. Intervention: Assist with patient's verbalization of feelings regarding stressors precipitating current crisis  Continue with plan of care. Intervention: Safety plan on discharge  Continue with plan of care. Intervention: Manage a safe environment  Continue with plan of care. Goal: Able to verbalize and/or display a decrease in depressive symptoms  Able to verbalize and/or display a decrease in depressive symptoms   Outcome: Ongoing    Goal: Able to verbalize support systems  Able to verbalize support systems   Outcome: Ongoing      Problem: Activity:  Intervention: Encourage avoidance of caffeine and fluids before bedtime  Continue with plan of care. Intervention: Encourage regular bedtime schedules and rituals  Continue with plan of care. Intervention: Provide environment with minimal disturbances  Continue with plan of care. Intervention: Rest promotion  Continue with plan of care.     Goal: Physical symptoms of sleep deprivation will improve  Physical symptoms of sleep deprivation will improve   Outcome: Ongoing    Goal: Sleeping patterns will improve  Sleeping patterns will improve   Outcome: Ongoing

## 2018-07-17 NOTE — PROGRESS NOTES
Group Therapy Note    Date: 7/17/2018  Start Time: 1000  End Time:  1100  Number of Participants: 12    Type of Group: Psychoeducation    Wellness Binder Information  Module Name:    Session Number:      Patient's Goal:  \"To get better\"    Notes:  Patient was more motivated and work fairly well on her task in group. Status After Intervention:  Unchanged    Participation Level:  Active Listener    Participation Quality: Appropriate      Speech:  normal      Thought Process/Content: Linear      Affective Functioning: Congruent      Mood: calm      Level of consciousness:  Alert      Response to Learning: Progressing to goal      Endings: None Reported    Modes of Intervention: Education, Socialization and Activity      Discipline Responsible: Psychoeducational Specialist      Signature:  Dann Zheng

## 2018-07-17 NOTE — PROGRESS NOTES
Assessment done. Pt is calm and cooperative. Denies all. Depression of 6, anxiety of 8. Last BM was this morning and was soft formed. This nurse let pt into laundry room to wash clothes. Pt denies further needs at this time.

## 2018-07-18 PROCEDURE — 1240000000 HC EMOTIONAL WELLNESS R&B

## 2018-07-18 PROCEDURE — 6370000000 HC RX 637 (ALT 250 FOR IP): Performed by: PSYCHIATRY & NEUROLOGY

## 2018-07-18 PROCEDURE — 6370000000 HC RX 637 (ALT 250 FOR IP): Performed by: NURSE PRACTITIONER

## 2018-07-18 PROCEDURE — 99232 SBSQ HOSP IP/OBS MODERATE 35: CPT | Performed by: PSYCHIATRY & NEUROLOGY

## 2018-07-18 RX ORDER — QUETIAPINE FUMARATE 100 MG/1
100 TABLET, FILM COATED ORAL NIGHTLY
Status: DISCONTINUED | OUTPATIENT
Start: 2018-07-18 | End: 2018-07-20 | Stop reason: HOSPADM

## 2018-07-18 RX ADMIN — LEVOTHYROXINE SODIUM 125 MCG: 125 TABLET ORAL at 09:07

## 2018-07-18 RX ADMIN — HYDROXYZINE PAMOATE 50 MG: 50 CAPSULE ORAL at 00:54

## 2018-07-18 RX ADMIN — ALPRAZOLAM 0.5 MG: 0.5 TABLET ORAL at 09:08

## 2018-07-18 RX ADMIN — TRAZODONE HYDROCHLORIDE 100 MG: 100 TABLET ORAL at 21:52

## 2018-07-18 RX ADMIN — POTASSIUM CHLORIDE 10 MEQ: 20 TABLET, EXTENDED RELEASE ORAL at 09:09

## 2018-07-18 RX ADMIN — AMLODIPINE BESYLATE 10 MG: 10 TABLET ORAL at 13:54

## 2018-07-18 RX ADMIN — ALPRAZOLAM 0.5 MG: 0.5 TABLET ORAL at 17:31

## 2018-07-18 RX ADMIN — SIMVASTATIN 40 MG: 20 TABLET, FILM COATED ORAL at 09:08

## 2018-07-18 RX ADMIN — HYDROXYZINE PAMOATE 50 MG: 50 CAPSULE ORAL at 21:52

## 2018-07-18 RX ADMIN — DULOXETINE HYDROCHLORIDE 120 MG: 60 CAPSULE, DELAYED RELEASE ORAL at 09:08

## 2018-07-18 RX ADMIN — QUETIAPINE FUMARATE 100 MG: 100 TABLET ORAL at 21:52

## 2018-07-18 RX ADMIN — VITAMIN E CAP 100 UNIT 200 UNITS: 100 CAP at 09:07

## 2018-07-18 RX ADMIN — PANTOPRAZOLE SODIUM 40 MG: 40 TABLET, DELAYED RELEASE ORAL at 09:08

## 2018-07-18 RX ADMIN — VITAMIN D, TAB 1000IU (100/BT) 3000 UNITS: 25 TAB at 09:09
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
## 2018-07-18 NOTE — PROGRESS NOTES
Group Therapy Note    Date: 7/18/2018  Start Time: 1000  End Time:  1100  Number of Participants: 11    Type of Group: Psychoeducation    Wellness Binder Information  Module Name:    Session Number:      Patient's Goal:  \"Work on going home\"    Notes:  Patient was attentive and work well on her project in group. Status After Intervention:  Unchanged    Participation Level:  Active Listener    Participation Quality: Appropriate and Attentive      Speech:  normal      Thought Process/Content: Linear      Affective Functioning: Congruent      Mood: calm      Level of consciousness:  Alert      Response to Learning: Able to verbalize current knowledge/experience and Progressing to goal      Endings: None Reported    Modes of Intervention: Education, Socialization and Activity      Discipline Responsible: Psychoeducational Specialist      Signature:  Nini Bedolla

## 2018-07-18 NOTE — PLAN OF CARE
Problem: Altered Mood, Depressive Behavior:  Goal: Able to verbalize and/or display a decrease in depressive symptoms  Able to verbalize and/or display a decrease in depressive symptoms   Outcome: Not Met This Shift    Goal: Able to verbalize support systems  Able to verbalize support systems   Outcome: Ongoing      Problem:  Activity:  Goal: Physical symptoms of sleep deprivation will improve  Physical symptoms of sleep deprivation will improve   Outcome: Not Met This Shift    Goal: Sleeping patterns will improve  Sleeping patterns will improve   Outcome: Not Met This Shift
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
## 2018-07-19 PROCEDURE — 6370000000 HC RX 637 (ALT 250 FOR IP): Performed by: PSYCHIATRY & NEUROLOGY

## 2018-07-19 PROCEDURE — 6370000000 HC RX 637 (ALT 250 FOR IP): Performed by: NURSE PRACTITIONER

## 2018-07-19 PROCEDURE — 1240000000 HC EMOTIONAL WELLNESS R&B

## 2018-07-19 PROCEDURE — 99232 SBSQ HOSP IP/OBS MODERATE 35: CPT | Performed by: PSYCHIATRY & NEUROLOGY

## 2018-07-19 RX ADMIN — VITAMIN D, TAB 1000IU (100/BT) 3000 UNITS: 25 TAB at 09:41

## 2018-07-19 RX ADMIN — POTASSIUM CHLORIDE 10 MEQ: 20 TABLET, EXTENDED RELEASE ORAL at 09:41

## 2018-07-19 RX ADMIN — PANTOPRAZOLE SODIUM 40 MG: 40 TABLET, DELAYED RELEASE ORAL at 06:17

## 2018-07-19 RX ADMIN — ALPRAZOLAM 0.5 MG: 0.5 TABLET ORAL at 10:48

## 2018-07-19 RX ADMIN — DULOXETINE HYDROCHLORIDE 120 MG: 60 CAPSULE, DELAYED RELEASE ORAL at 09:41

## 2018-07-19 RX ADMIN — SIMVASTATIN 40 MG: 20 TABLET, FILM COATED ORAL at 09:41

## 2018-07-19 RX ADMIN — QUETIAPINE FUMARATE 100 MG: 100 TABLET ORAL at 21:31

## 2018-07-19 RX ADMIN — VITAMIN E CAP 100 UNIT 200 UNITS: 100 CAP at 09:41

## 2018-07-19 RX ADMIN — TRAZODONE HYDROCHLORIDE 100 MG: 100 TABLET ORAL at 21:31

## 2018-07-19 RX ADMIN — LEVOTHYROXINE SODIUM 125 MCG: 125 TABLET ORAL at 06:17

## 2018-07-19 RX ADMIN — ALPRAZOLAM 0.5 MG: 0.5 TABLET ORAL at 21:31

## 2018-07-19 NOTE — PROGRESS NOTES
Patient did attend 21:00 wrap-up group and participated well. Patient did not attend activities group, despite staff encouragement.

## 2018-07-19 NOTE — CARE COORDINATION
Patient did not attend group despite staff encouragement.   Electronically signed by Jack Putnam on 7/19/2018 at 1:40 PM
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
## 2018-07-19 NOTE — PROGRESS NOTES
Pt noted out on unit eating lunch with peers, noted taking nap this am , requested xanax for anxiety #8, after breakfest , rechecked Bp due to pt c/o of dizziness and Bp's were wnl, gave xanax pt stated this am depression and anxiety  were high #10 thinking about her checking over drafting. Pt stated she always tries to do thinks for her family and never for her self and becomes overwelmed.

## 2018-07-19 NOTE — PROGRESS NOTES
Spoke with pt. Pt denies  Si,hi,hallucinations. Admits to depression and anxiety a 7. States spoke with mom and her checking acct overdrawn of 140.00 and is very upsetting to her. Reports onset of feeling dizzy. bp sitting 128/90 p82, standing 97/73 p 82. Reports being on norvasc for htn. Encouraged to rise slowly and take fluids.

## 2018-07-20 VITALS
TEMPERATURE: 98 F | HEART RATE: 93 BPM | WEIGHT: 202 LBS | BODY MASS INDEX: 36.95 KG/M2 | DIASTOLIC BLOOD PRESSURE: 69 MMHG | RESPIRATION RATE: 18 BRPM | SYSTOLIC BLOOD PRESSURE: 105 MMHG | OXYGEN SATURATION: 95 %

## 2018-07-20 PROCEDURE — 6370000000 HC RX 637 (ALT 250 FOR IP): Performed by: PSYCHIATRY & NEUROLOGY

## 2018-07-20 PROCEDURE — 99239 HOSP IP/OBS DSCHRG MGMT >30: CPT | Performed by: PSYCHIATRY & NEUROLOGY

## 2018-07-20 PROCEDURE — 6370000000 HC RX 637 (ALT 250 FOR IP): Performed by: NURSE PRACTITIONER

## 2018-07-20 RX ORDER — TRAZODONE HYDROCHLORIDE 100 MG/1
100 TABLET ORAL NIGHTLY
Qty: 15 TABLET | Refills: 2 | Status: SHIPPED | OUTPATIENT
Start: 2018-07-20 | End: 2021-01-27

## 2018-07-20 RX ORDER — QUETIAPINE FUMARATE 100 MG/1
100 TABLET, FILM COATED ORAL NIGHTLY
Qty: 15 TABLET | Refills: 2 | Status: ON HOLD | OUTPATIENT
Start: 2018-07-20 | End: 2021-02-04 | Stop reason: SDUPTHER

## 2018-07-20 RX ORDER — SIMVASTATIN 40 MG
40 TABLET ORAL DAILY
Qty: 15 TABLET | Refills: 2 | Status: SHIPPED | OUTPATIENT
Start: 2018-07-21 | End: 2021-10-14

## 2018-07-20 RX ORDER — DULOXETIN HYDROCHLORIDE 60 MG/1
120 CAPSULE, DELAYED RELEASE ORAL DAILY
Qty: 30 CAPSULE | Refills: 2 | Status: SHIPPED | OUTPATIENT
Start: 2018-07-21 | End: 2018-10-23

## 2018-07-20 RX ADMIN — DULOXETINE HYDROCHLORIDE 120 MG: 60 CAPSULE, DELAYED RELEASE ORAL at 09:11

## 2018-07-20 RX ADMIN — PANTOPRAZOLE SODIUM 40 MG: 40 TABLET, DELAYED RELEASE ORAL at 06:27

## 2018-07-20 RX ADMIN — VITAMIN D, TAB 1000IU (100/BT) 3000 UNITS: 25 TAB at 09:11

## 2018-07-20 RX ADMIN — LEVOTHYROXINE SODIUM 125 MCG: 125 TABLET ORAL at 06:27

## 2018-07-20 RX ADMIN — SIMVASTATIN 40 MG: 20 TABLET, FILM COATED ORAL at 09:11

## 2018-07-20 RX ADMIN — HYDROXYZINE PAMOATE 50 MG: 50 CAPSULE ORAL at 00:02

## 2018-07-20 RX ADMIN — POTASSIUM CHLORIDE 10 MEQ: 20 TABLET, EXTENDED RELEASE ORAL at 09:14

## 2018-07-20 RX ADMIN — ALPRAZOLAM 0.5 MG: 0.5 TABLET ORAL at 14:14

## 2018-07-20 RX ADMIN — VITAMIN E CAP 100 UNIT 200 UNITS: 100 CAP at 09:11

## 2018-07-20 RX ADMIN — AMLODIPINE BESYLATE 10 MG: 10 TABLET ORAL at 09:11

## 2018-07-20 NOTE — PROGRESS NOTES
Pt denies SI/HI or AVH. Patient out social, eating well and sleep is good.  Electronically signed by Elizabeth Schwarz LPN on 5/54/5244 at 4:88 AM

## 2018-07-20 NOTE — PROGRESS NOTES
Patient attended 21:00 Wrap-up group and participated well. Patient also did attend activities group and worked well with the group.

## 2018-07-20 NOTE — PROGRESS NOTES
Group Therapy Note    Date: 7/20/2018  Start Time: 1000  End Time:  1100  Number of Participants: 11    Type of Group: Psychoeducation    Wellness Binder Information  Module Name:    Session Number:      Patient's Goal:  \"to go to groups\"    Notes:  Pt. attended the skill group. Status After Intervention:  Improved    Participation Level:  Active Listener and Interactive    Participation Quality: Appropriate, Attentive and Sharing      Speech:  normal      Thought Process/Content: Logical      Affective Functioning: Congruent      Mood: calm      Level of consciousness:  Alert, Oriented x4 and Attentive      Response to Learning: Progressing to goal      Endings: None Reported    Modes of Intervention: Education, Support, Socialization and Activity      Discipline Responsible: Psychoeducational Specialist      Signature:  Kathryn Grande
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
## 2018-09-13 ENCOUNTER — OFFICE VISIT (OUTPATIENT)
Dept: ENDOCRINOLOGY | Age: 53
End: 2018-09-13
Payer: COMMERCIAL

## 2018-09-13 VITALS
HEART RATE: 70 BPM | BODY MASS INDEX: 36.86 KG/M2 | SYSTOLIC BLOOD PRESSURE: 150 MMHG | DIASTOLIC BLOOD PRESSURE: 104 MMHG | WEIGHT: 208 LBS | HEIGHT: 63 IN

## 2018-09-13 DIAGNOSIS — E03.9 HYPOTHYROIDISM, UNSPECIFIED TYPE: Primary | ICD-10-CM

## 2018-09-13 DIAGNOSIS — E66.9 OBESITY (BMI 30-39.9): ICD-10-CM

## 2018-09-13 PROCEDURE — G8417 CALC BMI ABV UP PARAM F/U: HCPCS | Performed by: INTERNAL MEDICINE

## 2018-09-13 PROCEDURE — 3017F COLORECTAL CA SCREEN DOC REV: CPT | Performed by: INTERNAL MEDICINE

## 2018-09-13 PROCEDURE — 1036F TOBACCO NON-USER: CPT | Performed by: INTERNAL MEDICINE

## 2018-09-13 PROCEDURE — 99213 OFFICE O/P EST LOW 20 MIN: CPT | Performed by: INTERNAL MEDICINE

## 2018-09-13 PROCEDURE — G8427 DOCREV CUR MEDS BY ELIG CLIN: HCPCS | Performed by: INTERNAL MEDICINE

## 2018-09-20 NOTE — PROGRESS NOTES
Subjective:      Patient ID: Emily Mack is a 47 y.o. female. 4 month f/u   Other   This is a chronic (hypothyroidism) problem. The current episode started more than 1 year ago. The problem has been waxing and waning. Associated symptoms include fatigue. Treatments tried: synthyroid  The treatment provided moderate relief. Obesity Body mass index is 37.44 kg/m². [facility]       Results for Vanesa Hernandez (MRN [id number]) as of 9/20/2018 08:00   Ref.  Range 7/17/2018 06:47   Sodium Latest Ref Range: 132 - 144 mEq/L 144   Potassium Latest Ref Range: 3.5 - 5.1 mEq/L 4.7   Chloride Latest Ref Range: 98 - 107 mEq/L 106   CO2 Latest Ref Range: 22 - 29 mEq/L 26   BUN Latest Ref Range: 6 - 20 mg/dL 13   Creatinine Latest Ref Range: 0.50 - 0.90 mg/dL 0.79   Anion Gap Latest Ref Range: 7 - 13 mEq/L 12   GFR Non- Latest Ref Range: >60  >60.0   GFR African American Latest Ref Range: >60  >60.0   Glucose Latest Ref Range: 74 - 109 mg/dL 111 (H)   Calcium Latest Ref Range: 8.6 - 10.2 mg/dL 9.0     Lab Results   Component Value Date    TSH 6.450 (H) 07/11/2018    Q0PSOLY 1.25 02/06/2014    FT3 2.7 07/29/2012    T4FREE 1.28 07/14/2018         Patient Active Problem List   Diagnosis    Hypothyroidism    HTN (hypertension)    Depression    Anemia    Vitamin D deficiency    Pulmonary embolism (HCC)    Clostridium difficile colitis    Insomnia    Uncontrolled diabetes mellitus type 2 without complications ([facility] [address]    Recurrent colitis due to Clostridium difficile    Other acne    Depressive disorder, not elsewhere classified    Diverticulosis of small intestine    Endometriosis    Essential hypertension, benign    Secondary hyperparathyroidism, non-renal (HCC)    Neoplasm of uncertain behavior of skin    Obstructive sleep apnea    Other specified disease of hair and hair follicles    Vitamin D deficiency    Palpitations    [place] disease    Menopausal symptom    Obstructive sleep apnea syndrome    Hyperlipidemia    Atrophic vaginitis    Fibromyalgia    Severe episode of recurrent major depressive disorder, without psychotic features ([address]    Major depression, recurrent ([address]       Review of Systems   Constitutional: Positive for fatigue. Vitals:    09/13/18 1600 09/13/18 1603   BP: (!) 154/106 (!) 150/104   Site: Left Upper Arm Right Upper Arm   Position: Sitting Sitting   Cuff Size: Medium Adult Medium Adult   Pulse: 70    Weight: 208 lb (94.3 kg)    Height: 5' 2.5\" (1.588 m)        Objective:   Physical Exam   Constitutional: She appears well-developed and well-nourished. HENT:   Head: Normocephalic and atraumatic. Eyes: Conjunctivae are normal.   Neck: Neck supple. Cardiovascular: Normal rate. Musculoskeletal: Normal range of motion. Neurological: She is alert. Psychiatric: She exhibits a depressed mood. Assessment:       Diagnosis Orders   1. Hypothyroidism, unspecified type  T4, Free    TSH without Reflex   2. Obesity (BMI 30-39. 9)             Plan:      Orders Placed This Encounter   Procedures    T4, Free     Standing Status:   Future     Standing Expiration Date:   9/13/2019    TSH without Reflex     Standing Status:   Future     Standing Expiration Date:   9/13/2019     Continue synthroid 125 mcg daily           Pavel Alfred MD

## 2018-10-23 ENCOUNTER — APPOINTMENT (OUTPATIENT)
Dept: CT IMAGING | Age: 53
DRG: 751 | End: 2018-10-23
Payer: COMMERCIAL

## 2018-10-23 ENCOUNTER — HOSPITAL ENCOUNTER (INPATIENT)
Age: 53
LOS: 3 days | Discharge: HOME | DRG: 751 | End: 2018-10-26
Attending: STUDENT IN AN ORGANIZED HEALTH CARE EDUCATION/TRAINING PROGRAM | Admitting: PSYCHIATRY & NEUROLOGY
Payer: COMMERCIAL

## 2018-10-23 ENCOUNTER — APPOINTMENT (OUTPATIENT)
Dept: GENERAL RADIOLOGY | Age: 53
DRG: 751 | End: 2018-10-23
Payer: COMMERCIAL

## 2018-10-23 DIAGNOSIS — N30.00 ACUTE CYSTITIS WITHOUT HEMATURIA: ICD-10-CM

## 2018-10-23 DIAGNOSIS — E66.09 OBESITY DUE TO EXCESS CALORIES, UNSPECIFIED CLASSIFICATION, UNSPECIFIED WHETHER SERIOUS COMORBIDITY PRESENT: ICD-10-CM

## 2018-10-23 DIAGNOSIS — F10.920 ACUTE ALCOHOLIC INTOXICATION WITHOUT COMPLICATION (HCC): ICD-10-CM

## 2018-10-23 DIAGNOSIS — N23 RENAL COLIC: ICD-10-CM

## 2018-10-23 DIAGNOSIS — F33.40 RECURRENT MAJOR DEPRESSIVE DISORDER, IN REMISSION (HCC): Primary | ICD-10-CM

## 2018-10-23 DIAGNOSIS — R05.9 COUGH: ICD-10-CM

## 2018-10-23 PROBLEM — F32.2 MAJOR DEPRESSIVE DISORDER, SEVERE (HCC): Status: ACTIVE | Noted: 2018-10-23

## 2018-10-23 LAB
ACETAMINOPHEN LEVEL: <5 UG/ML (ref 10–30)
ALBUMIN SERPL-MCNC: 4.3 G/DL (ref 3.9–4.9)
ALP BLD-CCNC: 86 U/L (ref 40–130)
ALT SERPL-CCNC: 66 U/L (ref 0–33)
AMPHETAMINE SCREEN, URINE: NORMAL
ANION GAP SERPL CALCULATED.3IONS-SCNC: 16 MEQ/L (ref 7–13)
AST SERPL-CCNC: 53 U/L (ref 0–35)
BACTERIA: NORMAL /HPF
BARBITURATE SCREEN URINE: NORMAL
BASOPHILS ABSOLUTE: 0.1 K/UL (ref 0–0.2)
BASOPHILS RELATIVE PERCENT: 1.2 %
BENZODIAZEPINE SCREEN, URINE: NORMAL
BILIRUB SERPL-MCNC: 0.3 MG/DL (ref 0–1.2)
BILIRUBIN URINE: NEGATIVE
BLOOD, URINE: NEGATIVE
BUN BLDV-MCNC: 11 MG/DL (ref 6–20)
CALCIUM SERPL-MCNC: 9.6 MG/DL (ref 8.6–10.2)
CANNABINOID SCREEN URINE: NORMAL
CHLORIDE BLD-SCNC: 100 MEQ/L (ref 98–107)
CLARITY: CLEAR
CO2: 25 MEQ/L (ref 22–29)
COCAINE METABOLITE SCREEN URINE: NORMAL
COLOR: YELLOW
CREAT SERPL-MCNC: 0.7 MG/DL (ref 0.5–0.9)
EOSINOPHILS ABSOLUTE: 0.1 K/UL (ref 0–0.7)
EOSINOPHILS RELATIVE PERCENT: 1.6 %
EPITHELIAL CELLS, UA: NORMAL /HPF
ETHANOL PERCENT: 0.09 G/DL
ETHANOL PERCENT: 0.14 G/DL
ETHANOL: 102 MG/DL (ref 0–0.08)
ETHANOL: 160 MG/DL (ref 0–0.08)
GFR AFRICAN AMERICAN: >60
GFR NON-AFRICAN AMERICAN: >60
GLOBULIN: 2.6 G/DL (ref 2.3–3.5)
GLUCOSE BLD-MCNC: 163 MG/DL (ref 74–109)
GLUCOSE URINE: NEGATIVE MG/DL
HCG(URINE) PREGNANCY TEST: NEGATIVE
HCT VFR BLD CALC: 40.4 % (ref 37–47)
HEMOGLOBIN: 13.7 G/DL (ref 12–16)
KETONES, URINE: NEGATIVE MG/DL
LEUKOCYTE ESTERASE, URINE: ABNORMAL
LYMPHOCYTES ABSOLUTE: 3.5 K/UL (ref 1–4.8)
LYMPHOCYTES RELATIVE PERCENT: 43.1 %
Lab: NORMAL
MCH RBC QN AUTO: 30 PG (ref 27–31.3)
MCHC RBC AUTO-ENTMCNC: 33.9 % (ref 33–37)
MCV RBC AUTO: 88.6 FL (ref 82–100)
MONOCYTES ABSOLUTE: 0.6 K/UL (ref 0.2–0.8)
MONOCYTES RELATIVE PERCENT: 7.1 %
NEUTROPHILS ABSOLUTE: 3.8 K/UL (ref 1.4–6.5)
NEUTROPHILS RELATIVE PERCENT: 47 %
NITRITE, URINE: NEGATIVE
OPIATE SCREEN URINE: NORMAL
PDW BLD-RTO: 13.7 % (ref 11.5–14.5)
PH UA: 5 (ref 5–9)
PHENCYCLIDINE SCREEN URINE: NORMAL
PLATELET # BLD: 337 K/UL (ref 130–400)
POTASSIUM SERPL-SCNC: 3.4 MEQ/L (ref 3.5–5.1)
PROTEIN UA: NEGATIVE MG/DL
RBC # BLD: 4.56 M/UL (ref 4.2–5.4)
RBC UA: NORMAL /HPF (ref 0–2)
SALICYLATE, SERUM: <0.3 MG/DL (ref 15–30)
SODIUM BLD-SCNC: 141 MEQ/L (ref 132–144)
SPECIFIC GRAVITY UA: 1.01 (ref 1–1.03)
TOTAL CK: 91 U/L (ref 0–170)
TOTAL PROTEIN: 6.9 G/DL (ref 6.4–8.1)
TSH SERPL DL<=0.05 MIU/L-ACNC: 1.79 UIU/ML (ref 0.27–4.2)
UROBILINOGEN, URINE: 0.2 E.U./DL
WBC # BLD: 8 K/UL (ref 4.8–10.8)
WBC UA: NORMAL /HPF (ref 0–5)

## 2018-10-23 PROCEDURE — 71046 X-RAY EXAM CHEST 2 VIEWS: CPT

## 2018-10-23 PROCEDURE — 6370000000 HC RX 637 (ALT 250 FOR IP): Performed by: STUDENT IN AN ORGANIZED HEALTH CARE EDUCATION/TRAINING PROGRAM

## 2018-10-23 PROCEDURE — 84443 ASSAY THYROID STIM HORMONE: CPT

## 2018-10-23 PROCEDURE — G0480 DRUG TEST DEF 1-7 CLASSES: HCPCS

## 2018-10-23 PROCEDURE — 36415 COLL VENOUS BLD VENIPUNCTURE: CPT

## 2018-10-23 PROCEDURE — 84703 CHORIONIC GONADOTROPIN ASSAY: CPT

## 2018-10-23 PROCEDURE — 6370000000 HC RX 637 (ALT 250 FOR IP): Performed by: PSYCHIATRY & NEUROLOGY

## 2018-10-23 PROCEDURE — 81001 URINALYSIS AUTO W/SCOPE: CPT

## 2018-10-23 PROCEDURE — 85025 COMPLETE CBC W/AUTO DIFF WBC: CPT

## 2018-10-23 PROCEDURE — 99285 EMERGENCY DEPT VISIT HI MDM: CPT

## 2018-10-23 PROCEDURE — 6360000002 HC RX W HCPCS: Performed by: PHYSICIAN ASSISTANT

## 2018-10-23 PROCEDURE — 80307 DRUG TEST PRSMV CHEM ANLYZR: CPT

## 2018-10-23 PROCEDURE — 82550 ASSAY OF CK (CPK): CPT

## 2018-10-23 PROCEDURE — 1240000000 HC EMOTIONAL WELLNESS R&B

## 2018-10-23 PROCEDURE — 80053 COMPREHEN METABOLIC PANEL: CPT

## 2018-10-23 PROCEDURE — 74150 CT ABDOMEN W/O CONTRAST: CPT

## 2018-10-23 PROCEDURE — 90792 PSYCH DIAG EVAL W/MED SRVCS: CPT | Performed by: CLINICAL NURSE SPECIALIST

## 2018-10-23 RX ORDER — SIMVASTATIN 20 MG
40 TABLET ORAL DAILY
Status: DISCONTINUED | OUTPATIENT
Start: 2018-10-23 | End: 2018-10-23

## 2018-10-23 RX ORDER — ONDANSETRON 4 MG/1
4 TABLET, ORALLY DISINTEGRATING ORAL EVERY 8 HOURS PRN
Status: DISCONTINUED | OUTPATIENT
Start: 2018-10-23 | End: 2018-10-26 | Stop reason: HOSPADM

## 2018-10-23 RX ORDER — LEVOTHYROXINE SODIUM 0.12 MG/1
125 TABLET ORAL DAILY
Status: DISCONTINUED | OUTPATIENT
Start: 2018-10-23 | End: 2018-10-26 | Stop reason: HOSPADM

## 2018-10-23 RX ORDER — HALOPERIDOL 5 MG/ML
5 INJECTION INTRAMUSCULAR EVERY 6 HOURS PRN
Status: DISCONTINUED | OUTPATIENT
Start: 2018-10-23 | End: 2018-10-26 | Stop reason: HOSPADM

## 2018-10-23 RX ORDER — DULOXETIN HYDROCHLORIDE 20 MG/1
20 CAPSULE, DELAYED RELEASE ORAL DAILY
Status: ON HOLD | COMMUNITY
End: 2018-10-26 | Stop reason: HOSPADM

## 2018-10-23 RX ORDER — PANTOPRAZOLE SODIUM 40 MG/1
40 TABLET, DELAYED RELEASE ORAL
Status: DISCONTINUED | OUTPATIENT
Start: 2018-10-24 | End: 2018-10-26 | Stop reason: HOSPADM

## 2018-10-23 RX ORDER — POTASSIUM CHLORIDE 20 MEQ/1
10 TABLET, EXTENDED RELEASE ORAL DAILY
Status: DISCONTINUED | OUTPATIENT
Start: 2018-10-23 | End: 2018-10-26 | Stop reason: HOSPADM

## 2018-10-23 RX ORDER — NITROFURANTOIN 25; 75 MG/1; MG/1
100 CAPSULE ORAL ONCE
Status: COMPLETED | OUTPATIENT
Start: 2018-10-23 | End: 2018-10-23

## 2018-10-23 RX ORDER — ACETAMINOPHEN 325 MG/1
650 TABLET ORAL EVERY 4 HOURS PRN
Status: DISCONTINUED | OUTPATIENT
Start: 2018-10-23 | End: 2018-10-26 | Stop reason: HOSPADM

## 2018-10-23 RX ORDER — HYDROXYZINE PAMOATE 50 MG/1
50 CAPSULE ORAL EVERY 6 HOURS PRN
Status: DISCONTINUED | OUTPATIENT
Start: 2018-10-23 | End: 2018-10-23 | Stop reason: CLARIF

## 2018-10-23 RX ORDER — HALOPERIDOL 5 MG/ML
5 INJECTION INTRAMUSCULAR EVERY 4 HOURS PRN
Status: DISCONTINUED | OUTPATIENT
Start: 2018-10-23 | End: 2018-10-23 | Stop reason: CLARIF

## 2018-10-23 RX ORDER — MAGNESIUM HYDROXIDE/ALUMINUM HYDROXICE/SIMETHICONE 120; 1200; 1200 MG/30ML; MG/30ML; MG/30ML
30 SUSPENSION ORAL PRN
Status: DISCONTINUED | OUTPATIENT
Start: 2018-10-23 | End: 2018-10-26 | Stop reason: HOSPADM

## 2018-10-23 RX ORDER — AMLODIPINE BESYLATE 10 MG/1
10 TABLET ORAL DAILY
Status: DISCONTINUED | OUTPATIENT
Start: 2018-10-23 | End: 2018-10-26 | Stop reason: HOSPADM

## 2018-10-23 RX ORDER — HYDROXYZINE HYDROCHLORIDE 50 MG/ML
50 INJECTION, SOLUTION INTRAMUSCULAR EVERY 6 HOURS PRN
Status: DISCONTINUED | OUTPATIENT
Start: 2018-10-23 | End: 2018-10-26 | Stop reason: HOSPADM

## 2018-10-23 RX ORDER — TRAZODONE HYDROCHLORIDE 50 MG/1
50 TABLET ORAL NIGHTLY PRN
Status: DISCONTINUED | OUTPATIENT
Start: 2018-10-23 | End: 2018-10-26 | Stop reason: HOSPADM

## 2018-10-23 RX ORDER — HYDROXYZINE PAMOATE 50 MG/1
50 CAPSULE ORAL EVERY 6 HOURS PRN
Status: DISCONTINUED | OUTPATIENT
Start: 2018-10-23 | End: 2018-10-26 | Stop reason: HOSPADM

## 2018-10-23 RX ORDER — SIMVASTATIN 20 MG
40 TABLET ORAL NIGHTLY
Status: DISCONTINUED | OUTPATIENT
Start: 2018-10-23 | End: 2018-10-26 | Stop reason: HOSPADM

## 2018-10-23 RX ORDER — HYDROCHLOROTHIAZIDE 25 MG/1
25 TABLET ORAL DAILY
Status: DISCONTINUED | OUTPATIENT
Start: 2018-10-23 | End: 2018-10-26 | Stop reason: HOSPADM

## 2018-10-23 RX ORDER — HYDROCHLOROTHIAZIDE 25 MG/1
25 TABLET ORAL DAILY
COMMUNITY
End: 2019-10-22 | Stop reason: ALTCHOICE

## 2018-10-23 RX ORDER — BENZTROPINE MESYLATE 1 MG/ML
2 INJECTION INTRAMUSCULAR; INTRAVENOUS 2 TIMES DAILY PRN
Status: DISCONTINUED | OUTPATIENT
Start: 2018-10-23 | End: 2018-10-26 | Stop reason: HOSPADM

## 2018-10-23 RX ORDER — HALOPERIDOL 5 MG
5 TABLET ORAL EVERY 6 HOURS PRN
Status: DISCONTINUED | OUTPATIENT
Start: 2018-10-23 | End: 2018-10-26 | Stop reason: HOSPADM

## 2018-10-23 RX ADMIN — ACETAMINOPHEN 650 MG: 325 TABLET ORAL at 17:47

## 2018-10-23 RX ADMIN — HYDROXYZINE PAMOATE 50 MG: 50 CAPSULE ORAL at 17:58

## 2018-10-23 RX ADMIN — SIMVASTATIN 40 MG: 20 TABLET, FILM COATED ORAL at 20:37

## 2018-10-23 RX ADMIN — AMLODIPINE BESYLATE 10 MG: 10 TABLET ORAL at 17:47

## 2018-10-23 RX ADMIN — VITAMIN D, TAB 1000IU (100/BT) 2000 UNITS: 25 TAB at 17:47

## 2018-10-23 RX ADMIN — ONDANSETRON 4 MG: 4 TABLET, ORALLY DISINTEGRATING ORAL at 17:58

## 2018-10-23 RX ADMIN — HYDROCHLOROTHIAZIDE 25 MG: 25 TABLET ORAL at 17:47

## 2018-10-23 RX ADMIN — TRAZODONE HYDROCHLORIDE 50 MG: 50 TABLET ORAL at 20:37

## 2018-10-23 RX ADMIN — POTASSIUM CHLORIDE 10 MEQ: 20 TABLET, EXTENDED RELEASE ORAL at 17:48

## 2018-10-23 RX ADMIN — VITAMIN E CAP 100 UNIT 200 UNITS: 100 CAP at 17:48

## 2018-10-23 RX ADMIN — LEVOTHYROXINE SODIUM 125 MCG: 125 TABLET ORAL at 17:47

## 2018-10-23 RX ADMIN — NITROFURANTOIN MONOHYDRATE/MACROCRYSTALLINE 100 MG: 25; 75 CAPSULE ORAL at 13:21

## 2018-10-23 ASSESSMENT — PAIN DESCRIPTION - LOCATION: LOCATION: ABDOMEN;BACK;LEG

## 2018-10-23 ASSESSMENT — PAIN SCALES - GENERAL: PAINLEVEL_OUTOF10: 9

## 2018-10-23 ASSESSMENT — ENCOUNTER SYMPTOMS
TROUBLE SWALLOWING: 0
NAUSEA: 0
SHORTNESS OF BREATH: 0
CHEST TIGHTNESS: 0
ABDOMINAL PAIN: 0
COUGH: 1
SINUS PRESSURE: 0
VOMITING: 0
BACK PAIN: 0
DIARRHEA: 0

## 2018-10-23 ASSESSMENT — PAIN DESCRIPTION - FREQUENCY: FREQUENCY: CONTINUOUS

## 2018-10-23 ASSESSMENT — SLEEP AND FATIGUE QUESTIONNAIRES
DIFFICULTY FALLING ASLEEP: YES
DO YOU HAVE DIFFICULTY SLEEPING: YES
SLEEP PATTERN: DIFFICULTY FALLING ASLEEP;DISTURBED/INTERRUPTED SLEEP
DIFFICULTY ARISING: NO
DO YOU USE A SLEEP AID: NO
DIFFICULTY STAYING ASLEEP: YES
AVERAGE NUMBER OF SLEEP HOURS: 4
RESTFUL SLEEP: NO

## 2018-10-23 ASSESSMENT — PAIN DESCRIPTION - DESCRIPTORS: DESCRIPTORS: STABBING;CONSTANT

## 2018-10-23 ASSESSMENT — PAIN DESCRIPTION - PAIN TYPE: TYPE: CHRONIC PAIN

## 2018-10-23 NOTE — ED NOTES
Awaiting [facility] admit per report from [employer]. Resting with eyes closed.      Rafael Perera RN  10/23/18 3015

## 2018-10-23 NOTE — ED PROVIDER NOTES
Neck: Normal range of motion. Neck supple. No JVD present. No neck rigidity. No tracheal deviation present. No thyromegaly present. Cardiovascular: Normal rate, regular rhythm, normal heart sounds and intact distal pulses. Exam reveals no gallop, no distant heart sounds and no friction rub. No murmur heard. Pulmonary/Chest: Effort normal. No accessory muscle usage or stridor. No apnea, no tachypnea and no bradypnea. She is not intubated. No respiratory distress. She has no decreased breath sounds. She has no wheezes. She has rhonchi in the right lower field and the left lower field. She has no rales. She exhibits no tenderness. Abdominal: Soft. Normal appearance and bowel sounds are normal. She exhibits no shifting dullness, no distension, no pulsatile liver, no fluid wave, no abdominal bruit, no ascites and no mass. There is no hepatosplenomegaly. There is no tenderness. There is CVA tenderness. There is no rebound and no guarding. No hernia. Musculoskeletal: Normal range of motion. She exhibits no edema or tenderness. Lymphadenopathy:        Head (right side): No submental adenopathy present. Head (left side): No submental adenopathy present. She has no cervical adenopathy. Neurological: She is alert and oriented to person, place, and time. She has normal reflexes. She displays normal reflexes. No cranial nerve deficit or sensory deficit. She exhibits normal muscle tone. Coordination normal.   Skin: Skin is warm and dry. Capillary refill takes less than 2 seconds. No rash noted. She is not diaphoretic. No erythema. Psychiatric: Her speech is normal and behavior is normal. Her affect is blunt. Cognition and memory are normal. She expresses impulsivity. She expresses suicidal ideation. She expresses suicidal plans. Nursing note and vitals reviewed.       DIAGNOSTIC RESULTS     EKG: All EKG's are interpreted by the Emergency Department Physician who either signs or Co-signsthis chart in

## 2018-10-23 NOTE — ED NOTES
Dr Quiroz Reapfide to bedside for head to toe exam.     Carolyne Bliss.  Mevelyn Spruce  10/23/18 8372
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
## 2018-10-24 LAB
EKG ATRIAL RATE: 66 BPM
EKG P AXIS: 39 DEGREES
EKG P-R INTERVAL: 176 MS
EKG Q-T INTERVAL: 432 MS
EKG QRS DURATION: 84 MS
EKG QTC CALCULATION (BAZETT): 452 MS
EKG R AXIS: -10 DEGREES
EKG T AXIS: 15 DEGREES
EKG VENTRICULAR RATE: 66 BPM

## 2018-10-24 PROCEDURE — 1240000000 HC EMOTIONAL WELLNESS R&B

## 2018-10-24 PROCEDURE — 93005 ELECTROCARDIOGRAM TRACING: CPT

## 2018-10-24 PROCEDURE — 6360000002 HC RX W HCPCS: Performed by: PHYSICIAN ASSISTANT

## 2018-10-24 PROCEDURE — 6370000000 HC RX 637 (ALT 250 FOR IP): Performed by: PHYSICIAN ASSISTANT

## 2018-10-24 PROCEDURE — 6370000000 HC RX 637 (ALT 250 FOR IP): Performed by: PSYCHIATRY & NEUROLOGY

## 2018-10-24 PROCEDURE — 99222 1ST HOSP IP/OBS MODERATE 55: CPT | Performed by: PSYCHIATRY & NEUROLOGY

## 2018-10-24 RX ORDER — QUETIAPINE FUMARATE 100 MG/1
100 TABLET, FILM COATED ORAL NIGHTLY
Status: DISCONTINUED | OUTPATIENT
Start: 2018-10-24 | End: 2018-10-26 | Stop reason: HOSPADM

## 2018-10-24 RX ORDER — NITROFURANTOIN 25; 75 MG/1; MG/1
100 CAPSULE ORAL EVERY 12 HOURS SCHEDULED
Status: DISCONTINUED | OUTPATIENT
Start: 2018-10-24 | End: 2018-10-26 | Stop reason: HOSPADM

## 2018-10-24 RX ORDER — DULOXETIN HYDROCHLORIDE 20 MG/1
40 CAPSULE, DELAYED RELEASE ORAL DAILY
Status: DISCONTINUED | OUTPATIENT
Start: 2018-10-24 | End: 2018-10-26 | Stop reason: HOSPADM

## 2018-10-24 RX ORDER — TRAZODONE HYDROCHLORIDE 100 MG/1
100 TABLET ORAL NIGHTLY
Status: DISCONTINUED | OUTPATIENT
Start: 2018-10-24 | End: 2018-10-26 | Stop reason: HOSPADM

## 2018-10-24 RX ORDER — ALPRAZOLAM 0.5 MG/1
0.5 TABLET ORAL 3 TIMES DAILY PRN
Status: DISCONTINUED | OUTPATIENT
Start: 2018-10-24 | End: 2018-10-26 | Stop reason: HOSPADM

## 2018-10-24 RX ADMIN — ONDANSETRON 4 MG: 4 TABLET, ORALLY DISINTEGRATING ORAL at 21:07

## 2018-10-24 RX ADMIN — ONDANSETRON 4 MG: 4 TABLET, ORALLY DISINTEGRATING ORAL at 09:58

## 2018-10-24 RX ADMIN — POTASSIUM CHLORIDE 10 MEQ: 20 TABLET, EXTENDED RELEASE ORAL at 09:17

## 2018-10-24 RX ADMIN — SIMVASTATIN 40 MG: 20 TABLET, FILM COATED ORAL at 21:01

## 2018-10-24 RX ADMIN — QUETIAPINE FUMARATE 100 MG: 100 TABLET ORAL at 21:01

## 2018-10-24 RX ADMIN — AMLODIPINE BESYLATE 10 MG: 10 TABLET ORAL at 09:17

## 2018-10-24 RX ADMIN — DULOXETINE HYDROCHLORIDE 40 MG: 20 CAPSULE, DELAYED RELEASE ORAL at 09:53

## 2018-10-24 RX ADMIN — HYDROXYZINE PAMOATE 50 MG: 50 CAPSULE ORAL at 19:22

## 2018-10-24 RX ADMIN — HYDROCHLOROTHIAZIDE 25 MG: 25 TABLET ORAL at 09:16

## 2018-10-24 RX ADMIN — TRAZODONE HYDROCHLORIDE 100 MG: 100 TABLET ORAL at 21:01

## 2018-10-24 RX ADMIN — ALPRAZOLAM 0.5 MG: 0.5 TABLET ORAL at 19:28

## 2018-10-24 RX ADMIN — LEVOTHYROXINE SODIUM 125 MCG: 125 TABLET ORAL at 06:35

## 2018-10-24 RX ADMIN — VITAMIN E CAP 100 UNIT 200 UNITS: 100 CAP at 09:16

## 2018-10-24 RX ADMIN — ACETAMINOPHEN 650 MG: 325 TABLET ORAL at 21:01

## 2018-10-24 RX ADMIN — VITAMIN D, TAB 1000IU (100/BT) 2000 UNITS: 25 TAB at 09:16

## 2018-10-24 RX ADMIN — NITROFURANTOIN (MONOHYDRATE/MACROCRYSTALS) 100 MG: 75; 25 CAPSULE ORAL at 21:01

## 2018-10-24 RX ADMIN — NITROFURANTOIN (MONOHYDRATE/MACROCRYSTALS) 100 MG: 75; 25 CAPSULE ORAL at 09:53

## 2018-10-24 RX ADMIN — PANTOPRAZOLE SODIUM 40 MG: 40 TABLET, DELAYED RELEASE ORAL at 06:35

## 2018-10-24 RX ADMIN — ALPRAZOLAM 0.5 MG: 0.5 TABLET ORAL at 09:53

## 2018-10-24 RX ADMIN — ACETAMINOPHEN 650 MG: 325 TABLET ORAL at 09:53

## 2018-10-24 ASSESSMENT — PAIN SCALES - GENERAL
PAINLEVEL_OUTOF10: 9
PAINLEVEL_OUTOF10: 8
PAINLEVEL_OUTOF10: 3
PAINLEVEL_OUTOF10: 8
PAINLEVEL_OUTOF10: 5

## 2018-10-24 ASSESSMENT — LIFESTYLE VARIABLES: HISTORY_ALCOHOL_USE: NO

## 2018-10-24 NOTE — PROGRESS NOTES
Patient was anxious, was worrisome, preoccupied and talkative. Patient stated she is depressed and stressed. Stressors are finances, credit card bills, taking care of a kitten and relationship issues with her family. Patient kept talking about different issues with her family and became tearful. She stated she was drinking alcohol and was watching a movie when it reminded her of her boys being adopted. Patient did feel suicidal with a plan to overdose on pills. Patient stated she does have some physical pain. She denies any audio or visual hallucinations. She enjoys getting on the computer and gambling.  Electronically signed by Grace Salter, [address] on 10/24/2018 at 10:10 AM

## 2018-10-24 NOTE — CARE COORDINATION
FAMILY COLLATERAL NOTE    Family/Support Name: Gloria Israel  Contact #: [phone]  Relationship to Pt: mother      Placed call to above. Response:   called the number provided by patient. A voicemail was left requesting a return phone call.         NEHA Yun

## 2018-10-24 NOTE — CARE COORDINATION
Brief Intervention and Referral to Treatment Summary    Patient was provided PHQ-9, AUDIT and DAST Screening:      PHQ-9 Score: 0  AUDIT Score:  7  DAST Score:  0    Patients substance use is considered    Low Risk/Healthy  Moderate Risk x  Harmful  Dependent    Patients depression is considered:    Minimal x  Mild   Moderate  Moderately Severe  Severe    Brief Education Was Provided    Patient was receptive x  Patient was not receptive      Brief Intervention Is Provided (Only for AUDIT or DAST)    Patient reports readiness to decrease and/or stop use and a plan was discussed   Patient denies readiness to decrease and/or stop use and a plan was not discussed x      Recommendations/Referrals for Brief and/or Specialized Treatment Provided to Patient  Patient stated she drinks beer periodically. She does not believe her drinking is a problem. Patient is aware of a history of alcoholism in her family. This fact has not deterred her from drinking.      Electronically signed by Ronan Hinton on 10/24/2018 at 1:41 PM

## 2018-10-24 NOTE — CARE COORDINATION
BHI Biopsychosocial Assessment    Current Level of Psychosocial Functioning     Independent   Dependent    Minimal Assist x    Comments:  Patient lives on her own. She is unemployed and relies on government benefits to maintain a secure quality of life. Psychosocial High Risk Factors (check all that apply)    Unable to obtain meds   Chronic illness/pain    Substance abuse x  Lack of Family Support   Financial stress x  Isolation x  Inadequate Community Resources  Suicide attempt(s)  Not taking medications   Victim of crime   Developmental Delay  Unable to manage personal needs    Age 72 or older   Homeless  No transportation   Readmission within 30 days  Unemployment  Traumatic Event    Comments: Patient has at three high risk factors associated with this admission. Psychiatric Advanced Directives: None Reported. Family to Involve in Treatment: Patient provided her mother's contact information to complete collateral.    Sexual Orientation:  Patient is in neither a hetero or homosexual relationship. Patient Strengths: Patient is cooperative and friendly. Patient Barriers: None Identified. Opiate Education Provided:  N/A    CMHC/mental health history: Patient is admitted to the [facility] and has [facility] as her mental health provider. Plan of Care   medication management, group/individual therapies, family meetings, psycho -education, treatment team meetings to assist with stabilization    Initial Discharge Plan:  Patient will return home and continue mental health care at the [facility]. Clinical Summary:    Patient is a 47year old female who was admitted to the [facility] due to severe depression and suicidal ideation. Reportedly, patient has been isolating and feeling depressed for days/weeks. She decided to reach out to [facility] for help. Patient presented as depressed with minimal eye contact during the interview. She seemed to believe that her family is out to get her.  Nothing

## 2018-10-24 NOTE — H&P
Procedure Laterality Date    ANUS SURGERY  12/14/2011    anal fissure    COLON SURGERY      BIPOSY    COLONOSCOPY  8/2011    DILATION AND CURETTAGE OF UTERUS      MISCARRIAGE X2    HEMORRHOID SURGERY   8/24/10    EXTERIOR    HYSTERECTOMY      W/ D&C  5/2009    LAPAROSCOPY      SIGMOIDOSCOPY  12/14/11    RESULTING IN FISSURECTOMY       Allergies:   Ciprofloxacin; Erythromycin; Ibuprofen; Ketorolac tromethamine; Mobic [meloxicam]; Other; Pseudoephedrine hcl; Sudafed [pseudoephedrine hcl]; Sympathomimetics; and Thorazine [chlorpromazine]    Family History  Family History   Problem Relation Age of Onset    Cancer Maternal Grandfather         COLON    Cancer Paternal Aunt         LUNG    Cancer Other         BLOOD CANCER- UNSPECIFIED         Social History:  Born and Raised: [place] [facility] [place] [address] to [facility] [place] at 4years old. Describes Childhood: Bad, Luc . Education: Some college  Employment: Unemployed  Relationships: Single  Children: 12 children   Current Support: Mother    Legal Hx:   Police called on her \"for no reason\", arrested for animal neglect. Resisting arrest, obstructing official business. Access to weapons?:  No       REVIEW OF SYSTEMS:    ROS:  [x] All negative/unchanged except if checked.  Explain positive(checked items) below:  [] Constitutional  [] Eyes  [] Ear/Nose/Mouth/Throat  [] Respiratory  [] CV  [] GI  []   [] Musculoskeletal  [] Skin/Breast  [] Neurological  [] Endocrine  [] Heme/Lymph  [] Allergic/Immunologic    Explanation:       PHYSICAL EXAM:  Vitals:  /86   Pulse 88   Temp 97 °F (36.1 °C) (Oral)   Resp 18   Ht 5' 3\" (1.6 m)   Wt 208 lb (94.3 kg)   LMP 11/19/2013   SpO2 95%   BMI 36.85 kg/m²      Neurologic Exam:   Muscle Strength & Tone: full ROM, normal  Gait: normal gait   Involuntary Movements: No    Mental Status Examination:    Level of consciousness:  within normal limits   Appearance:  ill-appearing  Behavior/Motor:  no abnormalities noted  Attitude toward examiner:  cooperative  Speech:  slow   Mood: constricted, decreased range and depressed  Affect:  mood congruent  Thought processes:  linear and goal directed   Thought content:  Suicidal Ideation:  passive  Delusions:  no evidence of delusions  Perceptual Disturbance:  denies any perceptual disturbance  Cognition:  oriented to person, place, and time   Concentration intact  Memory intact  Mini Mental Status 30/30  Insight poor   Judgement good   Fund of Knowledge good      DIAGNOSIS:     (Axis I): Major depressive disorder; recurrent and severe   Generalized anxiety disorder   PTSD    Axis II:     Borderline traits    RISK ASSESSMENT:    SUICIDE: moderate   HOMICIDE: low  AGITATION/VIOLENCE: low  ELOPEMENT: low    LABS:  Recent Labs      10/23/18   0857   WBC  8.0   HGB  13.7   PLT  337     Recent Labs      10/23/18   0857   NA  141   K  3.4*   CL  100   CO2  25   BUN  11   CREATININE  0.70   GLUCOSE  163*     Recent Labs      10/23/18   0857   BILITOT  0.3   ALKPHOS  86   AST  53*   ALT  66*     Lab Results   Component Value Date    LABAMPH Neg 10/23/2018    BARBSCNU Neg 10/23/2018    LABBENZ Neg 10/23/2018    LABBENZ NotDTCD 09/14/2012    LABMETH Neg 12/16/2015    OPIATESCREENURINE Neg 10/23/2018    PHENCYCLIDINESCREENURINE Neg 10/23/2018    ETOH 102 10/23/2018     Lab Results   Component Value Date    TSH 1.790 10/23/2018     No results found for: LITHIUM  No results found for: VALPROATE, CBMZ  No results found for: LITHIUM, VALPROATE    Radiology  Xr Chest Standard (2 Vw)    Result Date: 10/23/2018  EXAM: CHEST, 2 VIEWS COMPARISON: 7/29/2016 REASON FOR EXAMINATION: SMOKING HISTORY, UPPER RESPIRATORY CONGESTION, RESPIRATORY RHONCHI AND COUGH FINDINGS:   Two views of the chest demonstrate normal appearance of the heart and mediastinum. There is a 3 mm benign granuloma in the RLL which appears unchanged since the prior chest film. Otherwise, the lungs appear clear.  There is a minimal

## 2018-10-25 PROCEDURE — 6370000000 HC RX 637 (ALT 250 FOR IP): Performed by: PHYSICIAN ASSISTANT

## 2018-10-25 PROCEDURE — 1240000000 HC EMOTIONAL WELLNESS R&B

## 2018-10-25 PROCEDURE — 90833 PSYTX W PT W E/M 30 MIN: CPT | Performed by: PSYCHIATRY & NEUROLOGY

## 2018-10-25 PROCEDURE — 99231 SBSQ HOSP IP/OBS SF/LOW 25: CPT | Performed by: PSYCHIATRY & NEUROLOGY

## 2018-10-25 PROCEDURE — 6370000000 HC RX 637 (ALT 250 FOR IP): Performed by: PSYCHIATRY & NEUROLOGY

## 2018-10-25 PROCEDURE — 6360000002 HC RX W HCPCS: Performed by: PHYSICIAN ASSISTANT

## 2018-10-25 RX ADMIN — ACETAMINOPHEN 650 MG: 325 TABLET ORAL at 12:45

## 2018-10-25 RX ADMIN — PANTOPRAZOLE SODIUM 40 MG: 40 TABLET, DELAYED RELEASE ORAL at 06:16

## 2018-10-25 RX ADMIN — HYDROCHLOROTHIAZIDE 25 MG: 25 TABLET ORAL at 09:28

## 2018-10-25 RX ADMIN — AMLODIPINE BESYLATE 10 MG: 10 TABLET ORAL at 09:28

## 2018-10-25 RX ADMIN — VITAMIN D, TAB 1000IU (100/BT) 2000 UNITS: 25 TAB at 09:29

## 2018-10-25 RX ADMIN — ALPRAZOLAM 0.5 MG: 0.5 TABLET ORAL at 20:45

## 2018-10-25 RX ADMIN — POTASSIUM CHLORIDE 10 MEQ: 20 TABLET, EXTENDED RELEASE ORAL at 09:29

## 2018-10-25 RX ADMIN — QUETIAPINE FUMARATE 100 MG: 100 TABLET ORAL at 20:46

## 2018-10-25 RX ADMIN — TRAZODONE HYDROCHLORIDE 100 MG: 100 TABLET ORAL at 20:45

## 2018-10-25 RX ADMIN — NITROFURANTOIN (MONOHYDRATE/MACROCRYSTALS) 100 MG: 75; 25 CAPSULE ORAL at 09:29

## 2018-10-25 RX ADMIN — SIMVASTATIN 40 MG: 20 TABLET, FILM COATED ORAL at 20:46

## 2018-10-25 RX ADMIN — ALPRAZOLAM 0.5 MG: 0.5 TABLET ORAL at 09:28

## 2018-10-25 RX ADMIN — ONDANSETRON 4 MG: 4 TABLET, ORALLY DISINTEGRATING ORAL at 12:46

## 2018-10-25 RX ADMIN — DULOXETINE HYDROCHLORIDE 40 MG: 20 CAPSULE, DELAYED RELEASE ORAL at 09:28

## 2018-10-25 RX ADMIN — ACETAMINOPHEN 650 MG: 325 TABLET ORAL at 20:45

## 2018-10-25 RX ADMIN — VITAMIN E CAP 100 UNIT 200 UNITS: 100 CAP at 09:29

## 2018-10-25 RX ADMIN — HYDROXYZINE PAMOATE 50 MG: 50 CAPSULE ORAL at 12:46

## 2018-10-25 RX ADMIN — LEVOTHYROXINE SODIUM 125 MCG: 125 TABLET ORAL at 06:15

## 2018-10-25 RX ADMIN — NITROFURANTOIN (MONOHYDRATE/MACROCRYSTALS) 100 MG: 75; 25 CAPSULE ORAL at 20:46

## 2018-10-25 ASSESSMENT — PAIN SCALES - GENERAL
PAINLEVEL_OUTOF10: 8
PAINLEVEL_OUTOF10: 8
PAINLEVEL_OUTOF10: 4

## 2018-10-25 NOTE — PLAN OF CARE
Problem: Altered Mood, Depressive Behavior:  Goal: Able to verbalize acceptance of life and situations over which he or she has no control  Able to verbalize acceptance of life and situations over which he or she has no control   Outcome: Ongoing    Goal: Able to verbalize and/or display a decrease in depressive symptoms  Able to verbalize and/or display a decrease in depressive symptoms   Outcome: Ongoing    Goal: Ability to disclose and discuss suicidal ideas will improve  Ability to disclose and discuss suicidal ideas will improve   Outcome: Ongoing    Goal: Able to verbalize support systems  Able to verbalize support systems   Outcome: Ongoing    Goal: Absence of self-harm  Absence of self-harm   Outcome: Ongoing

## 2018-10-25 NOTE — FLOWSHEET NOTE
Pt continues to come out on unit intermittently. Pt appears sad. Pt denies SI/HI/AVH and depression pt admits to anxiety \"always. \" Pt is preoccupied with obligations she has to take care of at home. Pt is hopeful for discharge tomorrow.

## 2018-10-25 NOTE — PLAN OF CARE
Problem: Substance Abuse:  Goal: Absence of drug withdrawal signs and symptoms  Absence of drug withdrawal signs and symptoms   Outcome: Met This Shift

## 2018-10-26 VITALS
BODY MASS INDEX: 36.86 KG/M2 | HEIGHT: 63 IN | OXYGEN SATURATION: 98 % | TEMPERATURE: 97 F | RESPIRATION RATE: 18 BRPM | SYSTOLIC BLOOD PRESSURE: 108 MMHG | DIASTOLIC BLOOD PRESSURE: 73 MMHG | WEIGHT: 208 LBS | HEART RATE: 76 BPM

## 2018-10-26 PROCEDURE — 93010 ELECTROCARDIOGRAM REPORT: CPT | Performed by: INTERNAL MEDICINE

## 2018-10-26 PROCEDURE — 6360000002 HC RX W HCPCS: Performed by: PHYSICIAN ASSISTANT

## 2018-10-26 PROCEDURE — 99239 HOSP IP/OBS DSCHRG MGMT >30: CPT | Performed by: PSYCHIATRY & NEUROLOGY

## 2018-10-26 PROCEDURE — 6370000000 HC RX 637 (ALT 250 FOR IP): Performed by: PSYCHIATRY & NEUROLOGY

## 2018-10-26 PROCEDURE — 6370000000 HC RX 637 (ALT 250 FOR IP): Performed by: PHYSICIAN ASSISTANT

## 2018-10-26 RX ORDER — NITROFURANTOIN 25; 75 MG/1; MG/1
100 CAPSULE ORAL EVERY 12 HOURS SCHEDULED
Qty: 10 CAPSULE | Refills: 0 | Status: SHIPPED | OUTPATIENT
Start: 2018-10-26 | End: 2018-11-05

## 2018-10-26 RX ORDER — DULOXETINE 40 MG/1
40 CAPSULE, DELAYED RELEASE ORAL DAILY
Qty: 30 CAPSULE | Refills: 3 | Status: ON HOLD | OUTPATIENT
Start: 2018-10-27 | End: 2021-02-04 | Stop reason: HOSPADM

## 2018-10-26 RX ADMIN — AMLODIPINE BESYLATE 10 MG: 10 TABLET ORAL at 08:59

## 2018-10-26 RX ADMIN — ONDANSETRON 4 MG: 4 TABLET, ORALLY DISINTEGRATING ORAL at 09:21

## 2018-10-26 RX ADMIN — VITAMIN D, TAB 1000IU (100/BT) 2000 UNITS: 25 TAB at 08:58

## 2018-10-26 RX ADMIN — DULOXETINE HYDROCHLORIDE 40 MG: 20 CAPSULE, DELAYED RELEASE ORAL at 08:59

## 2018-10-26 RX ADMIN — ALPRAZOLAM 0.5 MG: 0.5 TABLET ORAL at 13:28

## 2018-10-26 RX ADMIN — ALPRAZOLAM 0.5 MG: 0.5 TABLET ORAL at 09:21

## 2018-10-26 RX ADMIN — VITAMIN E CAP 100 UNIT 200 UNITS: 100 CAP at 08:59

## 2018-10-26 RX ADMIN — LEVOTHYROXINE SODIUM 125 MCG: 125 TABLET ORAL at 06:14

## 2018-10-26 RX ADMIN — PANTOPRAZOLE SODIUM 40 MG: 40 TABLET, DELAYED RELEASE ORAL at 06:14

## 2018-10-26 RX ADMIN — HYDROCHLOROTHIAZIDE 25 MG: 25 TABLET ORAL at 08:58

## 2018-10-26 RX ADMIN — NITROFURANTOIN (MONOHYDRATE/MACROCRYSTALS) 100 MG: 75; 25 CAPSULE ORAL at 08:58

## 2018-10-26 RX ADMIN — POTASSIUM CHLORIDE 10 MEQ: 20 TABLET, EXTENDED RELEASE ORAL at 08:58

## 2018-10-26 NOTE — PLAN OF CARE
Problem: Altered Mood, Depressive Behavior:  Goal: Able to verbalize acceptance of life and situations over which he or she has no control  Able to verbalize acceptance of life and situations over which he or she has no control   Outcome: Ongoing    Goal: Able to verbalize and/or display a decrease in depressive symptoms  Able to verbalize and/or display a decrease in depressive symptoms   Outcome: Ongoing    Goal: Ability to disclose and discuss suicidal ideas will improve  Ability to disclose and discuss suicidal ideas will improve   Outcome: Completed Date Met: 10/26/18    Goal: Able to verbalize support systems  Able to verbalize support systems   Outcome: Ongoing    Goal: Absence of self-harm  Absence of self-harm   Outcome: Completed Date Met: 10/26/18

## 2018-10-26 NOTE — PROGRESS NOTES
Pt. declined to attend the 0900 community meeting, despite staff encouragement.  Electronically signed by Mari Ramsay on 10/26/2018 at 9:45 AM

## 2019-01-07 RX ORDER — POTASSIUM CHLORIDE 750 MG/1
10 TABLET, EXTENDED RELEASE ORAL DAILY
Qty: 30 TABLET | Refills: 3 | Status: SHIPPED | OUTPATIENT
Start: 2019-01-07 | End: 2019-03-29

## 2019-03-29 ENCOUNTER — APPOINTMENT (OUTPATIENT)
Dept: CT IMAGING | Age: 54
End: 2019-03-29
Payer: COMMERCIAL

## 2019-03-29 ENCOUNTER — HOSPITAL ENCOUNTER (EMERGENCY)
Age: 54
Discharge: HOME | End: 2019-03-30
Attending: EMERGENCY MEDICINE
Payer: COMMERCIAL

## 2019-03-29 DIAGNOSIS — F43.10 PTSD (POST-TRAUMATIC STRESS DISORDER): ICD-10-CM

## 2019-03-29 DIAGNOSIS — S00.03XA CONTUSION OF SCALP, INITIAL ENCOUNTER: Primary | ICD-10-CM

## 2019-03-29 LAB
ACETAMINOPHEN LEVEL: <5 UG/ML (ref 10–30)
AMPHETAMINE SCREEN, URINE: NORMAL
BACTERIA: ABNORMAL /HPF
BARBITURATE SCREEN URINE: NORMAL
BASOPHILS ABSOLUTE: 0.1 K/UL (ref 0–0.2)
BASOPHILS RELATIVE PERCENT: 0.8 %
BENZODIAZEPINE SCREEN, URINE: NORMAL
BILIRUBIN URINE: NEGATIVE
BLOOD, URINE: NEGATIVE
CANNABINOID SCREEN URINE: NORMAL
CLARITY: CLEAR
COCAINE METABOLITE SCREEN URINE: NORMAL
COLOR: YELLOW
EOSINOPHILS ABSOLUTE: 0 K/UL (ref 0–0.7)
EOSINOPHILS RELATIVE PERCENT: 0.6 %
EPITHELIAL CELLS, UA: ABNORMAL /HPF (ref 0–5)
ETHANOL PERCENT: NORMAL G/DL
ETHANOL: <10 MG/DL (ref 0–0.08)
GLUCOSE URINE: NEGATIVE MG/DL
HCG(URINE) PREGNANCY TEST: NEGATIVE
HCT VFR BLD CALC: 39.3 % (ref 37–47)
HEMOGLOBIN: 13.1 G/DL (ref 12–16)
HYALINE CASTS: ABNORMAL /HPF (ref 0–5)
KETONES, URINE: NEGATIVE MG/DL
LEUKOCYTE ESTERASE, URINE: ABNORMAL
LYMPHOCYTES ABSOLUTE: 2 K/UL (ref 1–4.8)
LYMPHOCYTES RELATIVE PERCENT: 26.9 %
Lab: NORMAL
MCH RBC QN AUTO: 29.4 PG (ref 27–31.3)
MCHC RBC AUTO-ENTMCNC: 33.3 % (ref 33–37)
MCV RBC AUTO: 88.3 FL (ref 82–100)
MONOCYTES ABSOLUTE: 0.4 K/UL (ref 0.2–0.8)
MONOCYTES RELATIVE PERCENT: 5.5 %
NEUTROPHILS ABSOLUTE: 5 K/UL (ref 1.4–6.5)
NEUTROPHILS RELATIVE PERCENT: 66.2 %
NITRITE, URINE: NEGATIVE
OPIATE SCREEN URINE: NORMAL
PDW BLD-RTO: 13.8 % (ref 11.5–14.5)
PH UA: 7 (ref 5–9)
PHENCYCLIDINE SCREEN URINE: NORMAL
PLATELET # BLD: 281 K/UL (ref 130–400)
PROTEIN UA: NEGATIVE MG/DL
RBC # BLD: 4.45 M/UL (ref 4.2–5.4)
RBC UA: ABNORMAL /HPF (ref 0–5)
SALICYLATE, SERUM: <0.3 MG/DL (ref 15–30)
SPECIFIC GRAVITY UA: 1.01 (ref 1–1.03)
TOTAL CK: 163 U/L (ref 0–170)
TSH SERPL DL<=0.05 MIU/L-ACNC: 2.08 UIU/ML (ref 0.44–3.86)
URINE REFLEX TO CULTURE: YES
UROBILINOGEN, URINE: 0.2 E.U./DL
WBC # BLD: 7.5 K/UL (ref 4.8–10.8)
WBC UA: ABNORMAL /HPF (ref 0–5)

## 2019-03-29 PROCEDURE — 82550 ASSAY OF CK (CPK): CPT

## 2019-03-29 PROCEDURE — 6370000000 HC RX 637 (ALT 250 FOR IP): Performed by: EMERGENCY MEDICINE

## 2019-03-29 PROCEDURE — 80307 DRUG TEST PRSMV CHEM ANLYZR: CPT

## 2019-03-29 PROCEDURE — 36415 COLL VENOUS BLD VENIPUNCTURE: CPT

## 2019-03-29 PROCEDURE — 87086 URINE CULTURE/COLONY COUNT: CPT

## 2019-03-29 PROCEDURE — 99284 EMERGENCY DEPT VISIT MOD MDM: CPT

## 2019-03-29 PROCEDURE — 84703 CHORIONIC GONADOTROPIN ASSAY: CPT

## 2019-03-29 PROCEDURE — G0480 DRUG TEST DEF 1-7 CLASSES: HCPCS

## 2019-03-29 PROCEDURE — 84443 ASSAY THYROID STIM HORMONE: CPT

## 2019-03-29 PROCEDURE — 85025 COMPLETE CBC W/AUTO DIFF WBC: CPT

## 2019-03-29 PROCEDURE — 70450 CT HEAD/BRAIN W/O DYE: CPT

## 2019-03-29 PROCEDURE — 81001 URINALYSIS AUTO W/SCOPE: CPT

## 2019-03-29 RX ORDER — HYDROCODONE BITARTRATE AND ACETAMINOPHEN 5; 325 MG/1; MG/1
1 TABLET ORAL ONCE
Status: COMPLETED | OUTPATIENT
Start: 2019-03-29 | End: 2019-03-29

## 2019-03-29 RX ORDER — ALPRAZOLAM 0.5 MG/1
0.5 TABLET ORAL ONCE
Status: COMPLETED | OUTPATIENT
Start: 2019-03-29 | End: 2019-03-29

## 2019-03-29 RX ADMIN — HYDROCODONE BITARTRATE AND ACETAMINOPHEN 1 TABLET: 5; 325 TABLET ORAL at 20:20

## 2019-03-29 RX ADMIN — ALPRAZOLAM 0.5 MG: 0.5 TABLET ORAL at 22:38

## 2019-03-29 ASSESSMENT — PAIN SCALES - GENERAL
PAINLEVEL_OUTOF10: 9
PAINLEVEL_OUTOF10: 9

## 2019-03-29 ASSESSMENT — PAIN DESCRIPTION - LOCATION: LOCATION: EAR;HEAD

## 2019-03-29 ASSESSMENT — PAIN DESCRIPTION - ORIENTATION: ORIENTATION: LEFT
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
## 2019-03-30 VITALS
DIASTOLIC BLOOD PRESSURE: 89 MMHG | HEIGHT: 62 IN | HEART RATE: 80 BPM | TEMPERATURE: 98.1 F | RESPIRATION RATE: 16 BRPM | SYSTOLIC BLOOD PRESSURE: 159 MMHG | BODY MASS INDEX: 38.64 KG/M2 | OXYGEN SATURATION: 98 % | WEIGHT: 210 LBS

## 2019-03-30 NOTE — ED NOTES
Discharge instructions reviewed with patient and friend. Patient provided phone numbers for [employer] [facility] program. Patient encouraged to keep appointment with [facility] to continue counseling services. Patient ambulatory, in no acute distress upon discharge.       Gaby Laura RN  03/30/19 [id number]
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
## 2019-03-31 LAB — URINE CULTURE, ROUTINE: NORMAL

## 2019-04-15 ENCOUNTER — PATIENT MESSAGE (OUTPATIENT)
Dept: OTHER | Facility: CLINIC | Age: 54
End: 2019-04-15

## 2019-05-07 RX ORDER — POTASSIUM CHLORIDE 750 MG/1
10 TABLET, EXTENDED RELEASE ORAL DAILY
Qty: 30 TABLET | Refills: 0 | Status: SHIPPED | OUTPATIENT
Start: 2019-05-07 | End: 2019-06-07 | Stop reason: SDUPTHER

## 2019-05-08 ENCOUNTER — OFFICE VISIT (OUTPATIENT)
Dept: ENDOCRINOLOGY | Age: 54
End: 2019-05-08
Payer: COMMERCIAL

## 2019-05-08 ENCOUNTER — HOSPITAL ENCOUNTER (EMERGENCY)
Age: 54
Discharge: HOME | End: 2019-05-08
Attending: EMERGENCY MEDICINE
Payer: COMMERCIAL

## 2019-05-08 ENCOUNTER — APPOINTMENT (OUTPATIENT)
Dept: CT IMAGING | Age: 54
End: 2019-05-08
Payer: COMMERCIAL

## 2019-05-08 VITALS
DIASTOLIC BLOOD PRESSURE: 66 MMHG | SYSTOLIC BLOOD PRESSURE: 120 MMHG | HEART RATE: 62 BPM | HEIGHT: 62 IN | BODY MASS INDEX: 38.64 KG/M2 | WEIGHT: 210 LBS

## 2019-05-08 VITALS
SYSTOLIC BLOOD PRESSURE: 137 MMHG | RESPIRATION RATE: 12 BRPM | DIASTOLIC BLOOD PRESSURE: 88 MMHG | WEIGHT: 208 LBS | HEART RATE: 62 BPM | OXYGEN SATURATION: 99 % | TEMPERATURE: 98.3 F | BODY MASS INDEX: 38.28 KG/M2 | HEIGHT: 62 IN
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
## 2019-05-08 DIAGNOSIS — R51.9 ACUTE NONINTRACTABLE HEADACHE, UNSPECIFIED HEADACHE TYPE: ICD-10-CM

## 2019-05-08 DIAGNOSIS — F07.81 POST CONCUSSION SYNDROME: ICD-10-CM

## 2019-05-08 DIAGNOSIS — S16.1XXD STRAIN OF NECK MUSCLE, SUBSEQUENT ENCOUNTER: Primary | ICD-10-CM

## 2019-05-08 DIAGNOSIS — E53.8 B12 DEFICIENCY: ICD-10-CM

## 2019-05-08 DIAGNOSIS — E03.9 HYPOTHYROIDISM, UNSPECIFIED TYPE: Primary | ICD-10-CM

## 2019-05-08 LAB
CHP ED QC CHECK: NORMAL
GLUCOSE BLD-MCNC: 122 MG/DL
HBA1C MFR BLD: 6.3 %

## 2019-05-08 PROCEDURE — G8427 DOCREV CUR MEDS BY ELIG CLIN: HCPCS | Performed by: INTERNAL MEDICINE

## 2019-05-08 PROCEDURE — 99284 EMERGENCY DEPT VISIT MOD MDM: CPT

## 2019-05-08 PROCEDURE — 3017F COLORECTAL CA SCREEN DOC REV: CPT | Performed by: INTERNAL MEDICINE

## 2019-05-08 PROCEDURE — 1036F TOBACCO NON-USER: CPT | Performed by: INTERNAL MEDICINE

## 2019-05-08 PROCEDURE — 72125 CT NECK SPINE W/O DYE: CPT

## 2019-05-08 PROCEDURE — 70450 CT HEAD/BRAIN W/O DYE: CPT

## 2019-05-08 PROCEDURE — 6370000000 HC RX 637 (ALT 250 FOR IP): Performed by: NURSE PRACTITIONER

## 2019-05-08 PROCEDURE — 83036 HEMOGLOBIN GLYCOSYLATED A1C: CPT | Performed by: INTERNAL MEDICINE

## 2019-05-08 PROCEDURE — 82962 GLUCOSE BLOOD TEST: CPT | Performed by: INTERNAL MEDICINE

## 2019-05-08 PROCEDURE — G8417 CALC BMI ABV UP PARAM F/U: HCPCS | Performed by: INTERNAL MEDICINE

## 2019-05-08 PROCEDURE — 96372 THER/PROPH/DIAG INJ SC/IM: CPT | Performed by: INTERNAL MEDICINE

## 2019-05-08 PROCEDURE — 99213 OFFICE O/P EST LOW 20 MIN: CPT | Performed by: INTERNAL MEDICINE

## 2019-05-08 RX ORDER — HYDROCODONE BITARTRATE AND ACETAMINOPHEN 5; 325 MG/1; MG/1
1 TABLET ORAL EVERY 6 HOURS PRN
Qty: 12 TABLET | Refills: 0 | Status: SHIPPED | OUTPATIENT
Start: 2019-05-08 | End: 2019-05-11

## 2019-05-08 RX ORDER — METHOCARBAMOL 750 MG/1
750 TABLET, FILM COATED ORAL 4 TIMES DAILY
Status: ON HOLD | COMMUNITY
End: 2021-01-28

## 2019-05-08 RX ORDER — TIZANIDINE 4 MG/1
4 TABLET ORAL NIGHTLY PRN
Qty: 10 TABLET | Refills: 0 | Status: SHIPPED | OUTPATIENT
Start: 2019-05-08 | End: 2019-05-18

## 2019-05-08 RX ORDER — CYANOCOBALAMIN 1000 UG/ML
1000 INJECTION INTRAMUSCULAR; SUBCUTANEOUS ONCE
Status: COMPLETED | OUTPATIENT
Start: 2019-05-08 | End: 2019-05-08

## 2019-05-08 RX ORDER — LEVOTHYROXINE SODIUM 0.12 MG/1
125 TABLET ORAL DAILY
Qty: 30 TABLET | Refills: 5 | Status: SHIPPED | OUTPATIENT
Start: 2019-05-08 | End: 2019-10-22 | Stop reason: SDUPTHER

## 2019-05-08 RX ORDER — HYDROCODONE BITARTRATE AND ACETAMINOPHEN 5; 325 MG/1; MG/1
1 TABLET ORAL ONCE
Status: COMPLETED | OUTPATIENT
Start: 2019-05-08 | End: 2019-05-08

## 2019-05-08 RX ORDER — TIZANIDINE 4 MG/1
4 TABLET ORAL EVERY 6 HOURS PRN
COMMUNITY
End: 2019-05-08

## 2019-05-08 RX ADMIN — HYDROCODONE BITARTRATE AND ACETAMINOPHEN 1 TABLET: 5; 325 TABLET ORAL at 00:46

## 2019-05-08 RX ADMIN — CYANOCOBALAMIN 1000 MCG: 1000 INJECTION INTRAMUSCULAR; SUBCUTANEOUS at 17:13

## 2019-05-08 ASSESSMENT — ENCOUNTER SYMPTOMS
SHORTNESS OF BREATH: 0
PHOTOPHOBIA: 0
ABDOMINAL DISTENTION: 0
VOMITING: 0
NAUSEA: 0
EYE PAIN: 0
DIARRHEA: 0
WHEEZING: 0
TROUBLE SWALLOWING: 0
COUGH: 0
BACK PAIN: 1
CONSTIPATION: 0
ABDOMINAL PAIN: 0
SORE THROAT: 0
COLOR CHANGE: 0
CHEST TIGHTNESS: 0
RHINORRHEA: 0

## 2019-05-08 ASSESSMENT — PAIN - FUNCTIONAL ASSESSMENT: PAIN_FUNCTIONAL_ASSESSMENT: 0-10

## 2019-05-08 ASSESSMENT — PAIN SCALES - GENERAL
PAINLEVEL_OUTOF10: 8
PAINLEVEL_OUTOF10: 9
PAINLEVEL_OUTOF10: 9

## 2019-05-08 ASSESSMENT — PAIN DESCRIPTION - LOCATION
LOCATION: HEAD
LOCATION: NECK;HEAD

## 2019-05-08 ASSESSMENT — PAIN DESCRIPTION - DESCRIPTORS: DESCRIPTORS: ACHING

## 2019-05-08 ASSESSMENT — PAIN DESCRIPTION - PAIN TYPE
TYPE: ACUTE PAIN
TYPE: ACUTE PAIN

## 2019-05-08 NOTE — ED NOTES
Pt resting in bed, 0 c/o, 0 distress, a&ox4, skin w/d/pink, pulses palp. msp's intact, will monitor.      Arsh Ellison RN  05/08/19 [id number]
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
## 2019-05-08 NOTE — ED NOTES
Discharge Instructions given. Pt verbalizes understanding of instructions and need for follow up. Ambulatory at time of discharge. Left ED with family.        Loli Erickson RN  05/08/19 [id number]
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
## 2019-05-08 NOTE — ED NOTES
Pt medicated per orders, pt neeru. Well. msp's intact, speech clear, 0 distress, 0 n&v, a&ox4, waiting ct.      Eloy Jain RN  05/08/19 [id number]
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
## 2019-05-08 NOTE — ED NOTES
EULALIO Ramachandran at bedside for results review and discharge plan      Gio Driver RN  05/08/19 0864

## 2019-05-11 ENCOUNTER — HOSPITAL ENCOUNTER (EMERGENCY)
Age: 54
Discharge: HOME | End: 2019-05-11
Attending: EMERGENCY MEDICINE
Payer: COMMERCIAL

## 2019-05-11 VITALS
WEIGHT: 208 LBS | TEMPERATURE: 97.6 F | HEIGHT: 62 IN | HEART RATE: 54 BPM | DIASTOLIC BLOOD PRESSURE: 87 MMHG | OXYGEN SATURATION: 100 % | RESPIRATION RATE: 16 BRPM | BODY MASS INDEX: 38.28 KG/M2 | SYSTOLIC BLOOD PRESSURE: 119 MMHG

## 2019-05-11 DIAGNOSIS — K08.89 PAIN, DENTAL: Primary | ICD-10-CM

## 2019-05-11 DIAGNOSIS — F07.81 POST CONCUSSION SYNDROME: ICD-10-CM

## 2019-05-11 PROCEDURE — 6370000000 HC RX 637 (ALT 250 FOR IP): Performed by: PHYSICIAN ASSISTANT

## 2019-05-11 PROCEDURE — 99283 EMERGENCY DEPT VISIT LOW MDM: CPT

## 2019-05-11 RX ORDER — HYDROCODONE BITARTRATE AND ACETAMINOPHEN 5; 325 MG/1; MG/1
1 TABLET ORAL ONCE
Status: COMPLETED | OUTPATIENT
Start: 2019-05-11 | End: 2019-05-11

## 2019-05-11 RX ORDER — PENICILLIN V POTASSIUM 500 MG/1
500 TABLET ORAL 4 TIMES DAILY
Qty: 40 TABLET | Refills: 0 | Status: SHIPPED | OUTPATIENT
Start: 2019-05-11 | End: 2019-05-21

## 2019-05-11 RX ADMIN — HYDROCODONE BITARTRATE AND ACETAMINOPHEN 1 TABLET: 5; 325 TABLET ORAL at 14:00

## 2019-05-11 ASSESSMENT — ENCOUNTER SYMPTOMS
COUGH: 0
SORE THROAT: 0
SHORTNESS OF BREATH: 0
BACK PAIN: 0
VOMITING: 0
PHOTOPHOBIA: 0
TROUBLE SWALLOWING: 0
ABDOMINAL PAIN: 0

## 2019-05-11 ASSESSMENT — PAIN DESCRIPTION - LOCATION: LOCATION: JAW

## 2019-05-11 ASSESSMENT — PAIN SCALES - GENERAL
PAINLEVEL_OUTOF10: 8
PAINLEVEL_OUTOF10: 10

## 2019-05-11 ASSESSMENT — PAIN DESCRIPTION - FREQUENCY: FREQUENCY: CONTINUOUS

## 2019-05-11 ASSESSMENT — PAIN DESCRIPTION - DESCRIPTORS: DESCRIPTORS: STABBING

## 2019-05-11 ASSESSMENT — PAIN DESCRIPTION - ONSET: ONSET: ON-GOING

## 2019-05-11 ASSESSMENT — PAIN DESCRIPTION - ORIENTATION: ORIENTATION: RIGHT

## 2019-05-11 ASSESSMENT — PAIN DESCRIPTION - PAIN TYPE: TYPE: ACUTE PAIN
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
## 2019-06-10 RX ORDER — POTASSIUM CHLORIDE 750 MG/1
10 TABLET, EXTENDED RELEASE ORAL DAILY
Qty: 30 TABLET | Refills: 3 | Status: SHIPPED | OUTPATIENT
Start: 2019-06-10 | End: 2019-08-18

## 2019-08-18 ENCOUNTER — HOSPITAL ENCOUNTER (EMERGENCY)
Age: 54
Discharge: HOME | End: 2019-08-18
Attending: EMERGENCY MEDICINE
Payer: COMMERCIAL

## 2019-08-18 ENCOUNTER — APPOINTMENT (OUTPATIENT)
Dept: GENERAL RADIOLOGY | Age: 54
End: 2019-08-18
Payer: COMMERCIAL

## 2019-08-18 VITALS
RESPIRATION RATE: 19 BRPM | SYSTOLIC BLOOD PRESSURE: 141 MMHG | HEART RATE: 69 BPM | OXYGEN SATURATION: 99 % | BODY MASS INDEX: 39.56 KG/M2 | DIASTOLIC BLOOD PRESSURE: 95 MMHG | WEIGHT: 215 LBS | TEMPERATURE: 98 F | HEIGHT: 62 IN

## 2019-08-18 DIAGNOSIS — E87.6 HYPOKALEMIA: ICD-10-CM

## 2019-08-18 DIAGNOSIS — G89.29 CHRONIC NONINTRACTABLE HEADACHE, UNSPECIFIED HEADACHE TYPE: Primary | ICD-10-CM

## 2019-08-18 DIAGNOSIS — R51.9 CHRONIC NONINTRACTABLE HEADACHE, UNSPECIFIED HEADACHE TYPE: Primary | ICD-10-CM

## 2019-08-18 DIAGNOSIS — M54.12 CERVICAL RADICULOPATHY: ICD-10-CM

## 2019-08-18 DIAGNOSIS — R60.0 BILATERAL LOWER EXTREMITY EDEMA: ICD-10-CM

## 2019-08-18 LAB
ALBUMIN SERPL-MCNC: 4.5 G/DL (ref 3.5–4.6)
ALP BLD-CCNC: 97 U/L (ref 40–130)
ALT SERPL-CCNC: 36 U/L (ref 0–33)
ANION GAP SERPL CALCULATED.3IONS-SCNC: 14 MEQ/L (ref 9–15)
AST SERPL-CCNC: 28 U/L (ref 0–35)
BASOPHILS ABSOLUTE: 0.1 K/UL (ref 0–0.2)
BASOPHILS RELATIVE PERCENT: 0.7 %
BILIRUB SERPL-MCNC: 0.3 MG/DL (ref 0.2–0.7)
BILIRUBIN URINE: NEGATIVE
BLOOD, URINE: NEGATIVE
BUN BLDV-MCNC: 13 MG/DL (ref 6–20)
CALCIUM SERPL-MCNC: 9.8 MG/DL (ref 8.5–9.9)
CHLORIDE BLD-SCNC: 96 MEQ/L (ref 95–107)
CLARITY: CLEAR
CO2: 28 MEQ/L (ref 20–31)
COLOR: YELLOW
CREAT SERPL-MCNC: 0.79 MG/DL (ref 0.5–0.9)
EKG ATRIAL RATE: 75 BPM
EKG P AXIS: 40 DEGREES
EKG P-R INTERVAL: 178 MS
EKG Q-T INTERVAL: 396 MS
EKG QRS DURATION: 86 MS
EKG QTC CALCULATION (BAZETT): 442 MS
EKG R AXIS: -5 DEGREES
EKG T AXIS: 30 DEGREES
EKG VENTRICULAR RATE: 75 BPM
EOSINOPHILS ABSOLUTE: 0.1 K/UL (ref 0–0.7)
EOSINOPHILS RELATIVE PERCENT: 0.9 %
GFR AFRICAN AMERICAN: >60
GFR NON-AFRICAN AMERICAN: >60
GLOBULIN: 3.3 G/DL (ref 2.3–3.5)
GLUCOSE BLD-MCNC: 120 MG/DL (ref 70–99)
GLUCOSE URINE: NEGATIVE MG/DL
HCT VFR BLD CALC: 39.2 % (ref 37–47)
HEMOGLOBIN: 13.7 G/DL (ref 12–16)
KETONES, URINE: NEGATIVE MG/DL
LEUKOCYTE ESTERASE, URINE: NEGATIVE
LYMPHOCYTES ABSOLUTE: 3.1 K/UL (ref 1–4.8)
LYMPHOCYTES RELATIVE PERCENT: 36 %
MCH RBC QN AUTO: 30.4 PG (ref 27–31.3)
MCHC RBC AUTO-ENTMCNC: 35 % (ref 33–37)
MCV RBC AUTO: 86.9 FL (ref 82–100)
MONOCYTES ABSOLUTE: 0.8 K/UL (ref 0.2–0.8)
MONOCYTES RELATIVE PERCENT: 8.9 %
NEUTROPHILS ABSOLUTE: 4.7 K/UL (ref 1.4–6.5)
NEUTROPHILS RELATIVE PERCENT: 53.5 %
NITRITE, URINE: NEGATIVE
PDW BLD-RTO: 14.4 % (ref 11.5–14.5)
PH UA: 7 (ref 5–9)
PLATELET # BLD: 257 K/UL (ref 130–400)
POTASSIUM SERPL-SCNC: 3 MEQ/L (ref 3.4–4.9)
PROTEIN UA: NEGATIVE MG/DL
RBC # BLD: 4.51 M/UL (ref 4.2–5.4)
SODIUM BLD-SCNC: 138 MEQ/L (ref 135–144)
SPECIFIC GRAVITY UA: 1.01 (ref 1–1.03)
TOTAL PROTEIN: 7.8 G/DL (ref 6.3–8)
TROPONIN: <0.01 NG/ML (ref 0–0.01)
URINE REFLEX TO CULTURE: NORMAL
UROBILINOGEN, URINE: 0.2 E.U./DL
WBC # BLD: 8.7 K/UL (ref 4.8–10.8)

## 2019-08-18 PROCEDURE — 71046 X-RAY EXAM CHEST 2 VIEWS: CPT

## 2019-08-18 PROCEDURE — 81003 URINALYSIS AUTO W/O SCOPE: CPT

## 2019-08-18 PROCEDURE — 93005 ELECTROCARDIOGRAM TRACING: CPT | Performed by: EMERGENCY MEDICINE

## 2019-08-18 PROCEDURE — 36415 COLL VENOUS BLD VENIPUNCTURE: CPT

## 2019-08-18 PROCEDURE — 85025 COMPLETE CBC W/AUTO DIFF WBC: CPT

## 2019-08-18 PROCEDURE — 80053 COMPREHEN METABOLIC PANEL: CPT

## 2019-08-18 PROCEDURE — 84484 ASSAY OF TROPONIN QUANT: CPT

## 2019-08-18 PROCEDURE — 6370000000 HC RX 637 (ALT 250 FOR IP): Performed by: EMERGENCY MEDICINE

## 2019-08-18 PROCEDURE — 99284 EMERGENCY DEPT VISIT MOD MDM: CPT

## 2019-08-18 RX ORDER — NYSTATIN 100000 U/G
OINTMENT TOPICAL
Qty: 30 G | Refills: 0 | Status: ON HOLD | OUTPATIENT
Start: 2019-08-18 | End: 2021-02-04 | Stop reason: HOSPADM

## 2019-08-18 RX ORDER — POTASSIUM CHLORIDE 20 MEQ/1
40 TABLET, EXTENDED RELEASE ORAL DAILY
Qty: 14 TABLET | Refills: 0 | Status: SHIPPED | OUTPATIENT
Start: 2019-08-18 | End: 2021-01-27

## 2019-08-18 RX ORDER — POTASSIUM CHLORIDE 20 MEQ/1
40 TABLET, EXTENDED RELEASE ORAL ONCE
Status: COMPLETED | OUTPATIENT
Start: 2019-08-18 | End: 2019-08-18

## 2019-08-18 RX ORDER — ACETAMINOPHEN 500 MG
1000 TABLET ORAL ONCE
Status: COMPLETED | OUTPATIENT
Start: 2019-08-18 | End: 2019-08-18

## 2019-08-18 RX ADMIN — POTASSIUM CHLORIDE 40 MEQ: 20 TABLET, EXTENDED RELEASE ORAL at 05:30

## 2019-08-18 RX ADMIN — ACETAMINOPHEN 1000 MG: 500 TABLET ORAL at 05:30

## 2019-08-18 ASSESSMENT — ENCOUNTER SYMPTOMS
ABDOMINAL PAIN: 0
VOMITING: 0
SORE THROAT: 0
SHORTNESS OF BREATH: 0
DIARRHEA: 0
BACK PAIN: 0
NAUSEA: 0

## 2019-08-18 ASSESSMENT — PAIN DESCRIPTION - PAIN TYPE: TYPE: ACUTE PAIN

## 2019-08-18 ASSESSMENT — PAIN DESCRIPTION - DESCRIPTORS: DESCRIPTORS: ACHING

## 2019-08-18 ASSESSMENT — PAIN SCALES - GENERAL
PAINLEVEL_OUTOF10: 7
PAINLEVEL_OUTOF10: 9

## 2019-08-18 ASSESSMENT — PAIN DESCRIPTION - LOCATION: LOCATION: HEAD

## 2019-08-18 NOTE — ED TRIAGE NOTES
Patient c/o headache, bilateral arm numbness, x 1 week, also bilateral leg swelling \"for months\", said she wanted to start taking 2 of her water pills, her doctor told her not to. She rates the headache 7/10, denies any recent head injury or trauma.

## 2019-08-19 PROCEDURE — 93010 ELECTROCARDIOGRAM REPORT: CPT | Performed by: INTERNAL MEDICINE

## 2019-10-15 RX ORDER — POTASSIUM CHLORIDE 750 MG/1
10 TABLET, EXTENDED RELEASE ORAL DAILY
Qty: 30 TABLET | Refills: 0 | Status: SHIPPED | OUTPATIENT
Start: 2019-10-15 | End: 2019-10-22 | Stop reason: SDUPTHER

## 2019-10-22 ENCOUNTER — OFFICE VISIT (OUTPATIENT)
Dept: ENDOCRINOLOGY | Age: 54
End: 2019-10-22
Payer: COMMERCIAL

## 2019-10-22 VITALS
DIASTOLIC BLOOD PRESSURE: 102 MMHG | BODY MASS INDEX: 41.72 KG/M2 | HEART RATE: 64 BPM | HEIGHT: 61 IN | SYSTOLIC BLOOD PRESSURE: 149 MMHG | WEIGHT: 221 LBS

## 2019-10-22 DIAGNOSIS — E03.9 HYPOTHYROIDISM, UNSPECIFIED TYPE: ICD-10-CM

## 2019-10-22 DIAGNOSIS — R60.0 LOCALIZED EDEMA: Primary | ICD-10-CM

## 2019-10-22 DIAGNOSIS — E66.01 MORBID OBESITY (HCC): ICD-10-CM

## 2019-10-22 LAB
ANION GAP SERPL CALCULATED.3IONS-SCNC: 13 MEQ/L (ref 9–15)
ATYPICAL LYMPHOCYTE RELATIVE PERCENT: 3 %
BASOPHILS ABSOLUTE: 0.1 K/UL (ref 0–0.2)
BASOPHILS RELATIVE PERCENT: 1 %
BUN BLDV-MCNC: 11 MG/DL (ref 6–20)
CALCIUM SERPL-MCNC: 9.2 MG/DL (ref 8.5–9.9)
CHLORIDE BLD-SCNC: 102 MEQ/L (ref 95–107)
CHOLESTEROL, FASTING: 191 MG/DL (ref 0–199)
CO2: 26 MEQ/L (ref 20–31)
CORTISOL TOTAL: 11.9 UG/DL
CREAT SERPL-MCNC: 0.69 MG/DL (ref 0.5–0.9)
EOSINOPHILS ABSOLUTE: 0 K/UL (ref 0–0.7)
EOSINOPHILS RELATIVE PERCENT: 0.6 %
FOLATE: 7.7 NG/ML (ref 7.3–26.1)
GFR AFRICAN AMERICAN: >60
GFR NON-AFRICAN AMERICAN: >60
GLUCOSE BLD-MCNC: 86 MG/DL (ref 70–99)
HCT VFR BLD CALC: 39.9 % (ref 37–47)
HDLC SERPL-MCNC: 39 MG/DL (ref 40–59)
HEMOGLOBIN: 13.4 G/DL (ref 12–16)
LDL CHOLESTEROL CALCULATED: 109 MG/DL (ref 0–129)
LYMPHOCYTES ABSOLUTE: 2.8 K/UL (ref 1–4.8)
LYMPHOCYTES RELATIVE PERCENT: 36 %
MCH RBC QN AUTO: 29.4 PG (ref 27–31.3)
MCHC RBC AUTO-ENTMCNC: 33.6 % (ref 33–37)
MCV RBC AUTO: 87.6 FL (ref 82–100)
MONOCYTES ABSOLUTE: 0.3 K/UL (ref 0.2–0.8)
MONOCYTES RELATIVE PERCENT: 3.8 %
NEUTROPHILS ABSOLUTE: 4.2 K/UL (ref 1.4–6.5)
NEUTROPHILS RELATIVE PERCENT: 57 %
PDW BLD-RTO: 13.8 % (ref 11.5–14.5)
PLATELET # BLD: 305 K/UL (ref 130–400)
PLATELET SLIDE REVIEW: NORMAL
POTASSIUM SERPL-SCNC: 3.4 MEQ/L (ref 3.4–4.9)
PRO-BNP: 121 PG/ML
PROLACTIN: 9.6 NG/ML
RBC # BLD: 4.56 M/UL (ref 4.2–5.4)
SMUDGE CELLS: 1.9
SODIUM BLD-SCNC: 141 MEQ/L (ref 135–144)
T4 FREE: 1.3 NG/DL (ref 0.84–1.68)
TRIGLYCERIDE, FASTING: 213 MG/DL (ref 0–150)
TSH SERPL DL<=0.05 MIU/L-ACNC: 2.01 UIU/ML (ref 0.44–3.86)
VITAMIN B-12: 447 PG/ML (ref 232–1245)
WBC # BLD: 7.3 K/UL (ref 4.8–10.8)

## 2019-10-22 PROCEDURE — G8417 CALC BMI ABV UP PARAM F/U: HCPCS | Performed by: INTERNAL MEDICINE

## 2019-10-22 PROCEDURE — G8428 CUR MEDS NOT DOCUMENT: HCPCS | Performed by: INTERNAL MEDICINE

## 2019-10-22 PROCEDURE — G8484 FLU IMMUNIZE NO ADMIN: HCPCS | Performed by: INTERNAL MEDICINE

## 2019-10-22 PROCEDURE — 1036F TOBACCO NON-USER: CPT | Performed by: INTERNAL MEDICINE

## 2019-10-22 PROCEDURE — 3017F COLORECTAL CA SCREEN DOC REV: CPT | Performed by: INTERNAL MEDICINE

## 2019-10-22 PROCEDURE — 99213 OFFICE O/P EST LOW 20 MIN: CPT | Performed by: INTERNAL MEDICINE

## 2019-10-22 RX ORDER — POTASSIUM CHLORIDE 20 MEQ/1
20 TABLET, EXTENDED RELEASE ORAL DAILY
Qty: 30 TABLET | Refills: 2 | Status: SHIPPED | OUTPATIENT
Start: 2019-10-22 | End: 2020-01-21

## 2019-10-22 RX ORDER — LEVOTHYROXINE SODIUM 0.12 MG/1
125 TABLET ORAL DAILY
Qty: 30 TABLET | Refills: 5 | Status: SHIPPED | OUTPATIENT
Start: 2019-10-22 | End: 2019-11-25 | Stop reason: SDUPTHER

## 2019-10-22 RX ORDER — FUROSEMIDE 20 MG/1
20 TABLET ORAL DAILY
Qty: 60 TABLET | Refills: 3 | Status: ON HOLD | OUTPATIENT
Start: 2019-10-22 | End: 2021-02-04 | Stop reason: HOSPADM

## 2019-10-23 LAB — VITAMIN D 25-HYDROXY: 30.4 NG/ML (ref 30–100)

## 2019-11-13 RX ORDER — POTASSIUM CHLORIDE 750 MG/1
10 TABLET, EXTENDED RELEASE ORAL DAILY
Qty: 30 TABLET | Refills: 3 | Status: SHIPPED | OUTPATIENT
Start: 2019-11-13 | End: 2021-01-27

## 2019-11-25 ENCOUNTER — OFFICE VISIT (OUTPATIENT)
Dept: ENDOCRINOLOGY | Age: 54
End: 2019-11-25
Payer: COMMERCIAL

## 2019-11-25 VITALS
HEART RATE: 87 BPM | DIASTOLIC BLOOD PRESSURE: 95 MMHG | BODY MASS INDEX: 41.16 KG/M2 | SYSTOLIC BLOOD PRESSURE: 131 MMHG | HEIGHT: 61 IN | WEIGHT: 218 LBS

## 2019-11-25 DIAGNOSIS — E03.9 HYPOTHYROIDISM, UNSPECIFIED TYPE: Primary | ICD-10-CM

## 2019-11-25 DIAGNOSIS — F32.A DEPRESSION, UNSPECIFIED DEPRESSION TYPE: ICD-10-CM

## 2019-11-25 PROCEDURE — G8484 FLU IMMUNIZE NO ADMIN: HCPCS | Performed by: INTERNAL MEDICINE

## 2019-11-25 PROCEDURE — 99213 OFFICE O/P EST LOW 20 MIN: CPT | Performed by: INTERNAL MEDICINE

## 2019-11-25 PROCEDURE — 3017F COLORECTAL CA SCREEN DOC REV: CPT | Performed by: INTERNAL MEDICINE

## 2019-11-25 PROCEDURE — G8417 CALC BMI ABV UP PARAM F/U: HCPCS | Performed by: INTERNAL MEDICINE

## 2019-11-25 PROCEDURE — 1036F TOBACCO NON-USER: CPT | Performed by: INTERNAL MEDICINE

## 2019-11-25 PROCEDURE — G8428 CUR MEDS NOT DOCUMENT: HCPCS | Performed by: INTERNAL MEDICINE

## 2019-11-25 RX ORDER — LEVOTHYROXINE SODIUM 0.12 MG/1
125 TABLET ORAL DAILY
Qty: 30 TABLET | Refills: 5 | Status: SHIPPED | OUTPATIENT
Start: 2019-11-25 | End: 2020-03-13 | Stop reason: SDUPTHER

## 2020-01-21 RX ORDER — POTASSIUM CHLORIDE 20 MEQ/1
20 TABLET, EXTENDED RELEASE ORAL DAILY
Qty: 30 TABLET | Refills: 2 | Status: SHIPPED | OUTPATIENT
Start: 2020-01-21 | End: 2021-01-27

## 2020-03-07 ENCOUNTER — HOSPITAL ENCOUNTER (EMERGENCY)
Age: 55
Discharge: HOME | End: 2020-03-07
Attending: EMERGENCY MEDICINE
Payer: COMMERCIAL

## 2020-03-07 ENCOUNTER — APPOINTMENT (OUTPATIENT)
Dept: CT IMAGING | Age: 55
End: 2020-03-07
Payer: COMMERCIAL

## 2020-03-07 VITALS
OXYGEN SATURATION: 97 % | WEIGHT: 215 LBS | RESPIRATION RATE: 16 BRPM | DIASTOLIC BLOOD PRESSURE: 86 MMHG | BODY MASS INDEX: 39.56 KG/M2 | HEIGHT: 62 IN | SYSTOLIC BLOOD PRESSURE: 148 MMHG | HEART RATE: 89 BPM | TEMPERATURE: 97.9 F

## 2020-03-07 LAB
ETHANOL PERCENT: 0.16 G/DL
ETHANOL: 179 MG/DL (ref 0–0.08)

## 2020-03-07 PROCEDURE — 99284 EMERGENCY DEPT VISIT MOD MDM: CPT

## 2020-03-07 PROCEDURE — 6360000002 HC RX W HCPCS: Performed by: EMERGENCY MEDICINE

## 2020-03-07 PROCEDURE — 70486 CT MAXILLOFACIAL W/O DYE: CPT

## 2020-03-07 PROCEDURE — 96375 TX/PRO/DX INJ NEW DRUG ADDON: CPT

## 2020-03-07 PROCEDURE — 70450 CT HEAD/BRAIN W/O DYE: CPT

## 2020-03-07 PROCEDURE — 2580000003 HC RX 258: Performed by: EMERGENCY MEDICINE

## 2020-03-07 PROCEDURE — G0480 DRUG TEST DEF 1-7 CLASSES: HCPCS

## 2020-03-07 PROCEDURE — 96374 THER/PROPH/DIAG INJ IV PUSH: CPT

## 2020-03-07 PROCEDURE — 36415 COLL VENOUS BLD VENIPUNCTURE: CPT

## 2020-03-07 RX ORDER — HYDROCODONE BITARTRATE AND ACETAMINOPHEN 5; 325 MG/1; MG/1
1 TABLET ORAL EVERY 6 HOURS PRN
Qty: 10 TABLET | Refills: 0 | Status: SHIPPED | OUTPATIENT
Start: 2020-03-07 | End: 2020-03-10

## 2020-03-07 RX ORDER — ONDANSETRON 2 MG/ML
4 INJECTION INTRAMUSCULAR; INTRAVENOUS ONCE
Status: COMPLETED | OUTPATIENT
Start: 2020-03-07 | End: 2020-03-07

## 2020-03-07 RX ORDER — 0.9 % SODIUM CHLORIDE 0.9 %
1000 INTRAVENOUS SOLUTION INTRAVENOUS ONCE
Status: COMPLETED | OUTPATIENT
Start: 2020-03-07 | End: 2020-03-07

## 2020-03-07 RX ORDER — MORPHINE SULFATE 2 MG/ML
4 INJECTION, SOLUTION INTRAMUSCULAR; INTRAVENOUS ONCE
Status: COMPLETED | OUTPATIENT
Start: 2020-03-07 | End: 2020-03-07

## 2020-03-07 RX ORDER — LORAZEPAM 2 MG/ML
1 INJECTION INTRAMUSCULAR ONCE
Status: COMPLETED | OUTPATIENT
Start: 2020-03-07 | End: 2020-03-07

## 2020-03-07 RX ADMIN — MORPHINE SULFATE 4 MG: 2 INJECTION, SOLUTION INTRAMUSCULAR; INTRAVENOUS at 06:19

## 2020-03-07 RX ADMIN — LORAZEPAM 1 MG: 2 INJECTION INTRAMUSCULAR; INTRAVENOUS at 06:19

## 2020-03-07 RX ADMIN — SODIUM CHLORIDE 1000 ML: 9 INJECTION, SOLUTION INTRAVENOUS at 06:19

## 2020-03-07 RX ADMIN — ONDANSETRON 4 MG: 2 INJECTION INTRAMUSCULAR; INTRAVENOUS at 06:20

## 2020-03-07 ASSESSMENT — PAIN SCALES - GENERAL
PAINLEVEL_OUTOF10: 5
PAINLEVEL_OUTOF10: 9
PAINLEVEL_OUTOF10: 9

## 2020-03-07 ASSESSMENT — ENCOUNTER SYMPTOMS
EYE DISCHARGE: 0
SORE THROAT: 0
SHORTNESS OF BREATH: 0
VOMITING: 0
CHEST TIGHTNESS: 0
PHOTOPHOBIA: 0
WHEEZING: 0
ABDOMINAL DISTENTION: 0
ABDOMINAL PAIN: 0
COUGH: 0

## 2020-03-07 ASSESSMENT — PAIN DESCRIPTION - LOCATION: LOCATION: HEAD

## 2020-03-07 ASSESSMENT — PAIN DESCRIPTION - ORIENTATION: ORIENTATION: RIGHT;LEFT

## 2020-03-07 ASSESSMENT — PAIN DESCRIPTION - DESCRIPTORS
DESCRIPTORS: ACHING
DESCRIPTORS: ACHING

## 2020-03-07 ASSESSMENT — PAIN DESCRIPTION - PAIN TYPE
TYPE: ACUTE PAIN
TYPE: ACUTE PAIN

## 2020-03-07 ASSESSMENT — PAIN DESCRIPTION - FREQUENCY
FREQUENCY: CONTINUOUS
FREQUENCY: CONTINUOUS

## 2020-03-07 NOTE — ED NOTES
Attempted IV start, unsuccessful.   Pt taken to CT via cart, will attempt again when she returns from [address] [place] [address], [address] [place] [address]  03/07/20 5654

## 2020-03-07 NOTE — ED NOTES
Family at bedside. Pt states she is feeling a little better. Call bell in reach. Pt awaiting CT results.   Voices no concerns or complaints at this time     Cas Pino RN  03/07/20 2433

## 2020-03-07 NOTE — ED NOTES
Pt and family given DC instructions and RX, verbalized understanding. Pt ambulatory with steady gait. Skin warm and dry, resps even and unlabored. No acute distress noted at time of DC.        Vanesa Bahena RN  03/07/20 1901

## 2020-03-07 NOTE — ED NOTES
Patient's clothing was removed and patient was placed in a gown.    Warm blankets provided to patient for comfort  2 family members at [address]  03/07/20 5725
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
## 2020-03-13 ENCOUNTER — OFFICE VISIT (OUTPATIENT)
Dept: ENDOCRINOLOGY | Age: 55
End: 2020-03-13
Payer: COMMERCIAL

## 2020-03-13 VITALS
DIASTOLIC BLOOD PRESSURE: 89 MMHG | BODY MASS INDEX: 40.12 KG/M2 | SYSTOLIC BLOOD PRESSURE: 141 MMHG | WEIGHT: 218 LBS | HEIGHT: 62 IN | HEART RATE: 73 BPM

## 2020-03-13 LAB
CHP ED QC CHECK: NORMAL
GLUCOSE BLD-MCNC: 139 MG/DL

## 2020-03-13 PROCEDURE — G8417 CALC BMI ABV UP PARAM F/U: HCPCS | Performed by: INTERNAL MEDICINE

## 2020-03-13 PROCEDURE — 3017F COLORECTAL CA SCREEN DOC REV: CPT | Performed by: INTERNAL MEDICINE

## 2020-03-13 PROCEDURE — G8484 FLU IMMUNIZE NO ADMIN: HCPCS | Performed by: INTERNAL MEDICINE

## 2020-03-13 PROCEDURE — G8428 CUR MEDS NOT DOCUMENT: HCPCS | Performed by: INTERNAL MEDICINE

## 2020-03-13 PROCEDURE — 99213 OFFICE O/P EST LOW 20 MIN: CPT | Performed by: INTERNAL MEDICINE

## 2020-03-13 PROCEDURE — 82962 GLUCOSE BLOOD TEST: CPT | Performed by: INTERNAL MEDICINE

## 2020-03-13 PROCEDURE — 1036F TOBACCO NON-USER: CPT | Performed by: INTERNAL MEDICINE

## 2020-03-13 RX ORDER — LEVOTHYROXINE SODIUM 0.12 MG/1
125 TABLET ORAL DAILY
Qty: 30 TABLET | Refills: 5 | Status: SHIPPED | OUTPATIENT
Start: 2020-03-13 | End: 2020-03-13 | Stop reason: SDUPTHER

## 2020-03-13 RX ORDER — LEVOTHYROXINE SODIUM 0.12 MG/1
125 TABLET ORAL DAILY
Qty: 30 TABLET | Refills: 5 | Status: SHIPPED | OUTPATIENT
Start: 2020-03-13 | End: 2020-10-19

## 2020-03-25 PROBLEM — E55.9 VITAMIN D DEFICIENCY: Status: RESOLVED | Noted: 2020-03-25 | Resolved: 2020-03-24

## 2020-04-15 RX ORDER — POTASSIUM CHLORIDE 1500 MG/1
TABLET, FILM COATED, EXTENDED RELEASE ORAL
Qty: 30 TABLET | Refills: 1 | Status: SHIPPED | OUTPATIENT
Start: 2020-04-15 | End: 2020-06-24

## 2020-06-24 RX ORDER — POTASSIUM CHLORIDE 1500 MG/1
TABLET, FILM COATED, EXTENDED RELEASE ORAL
Qty: 30 TABLET | Refills: 1 | Status: SHIPPED | OUTPATIENT
Start: 2020-06-24 | End: 2021-01-27

## 2020-07-05 ENCOUNTER — APPOINTMENT (OUTPATIENT)
Dept: GENERAL RADIOLOGY | Age: 55
End: 2020-07-05
Payer: COMMERCIAL

## 2020-07-05 ENCOUNTER — HOSPITAL ENCOUNTER (EMERGENCY)
Age: 55
Discharge: HOME | End: 2020-07-05
Payer: COMMERCIAL

## 2020-07-05 VITALS
WEIGHT: 205 LBS | SYSTOLIC BLOOD PRESSURE: 122 MMHG | RESPIRATION RATE: 18 BRPM | HEIGHT: 62 IN | TEMPERATURE: 97.4 F | OXYGEN SATURATION: 96 % | BODY MASS INDEX: 37.73 KG/M2 | DIASTOLIC BLOOD PRESSURE: 88 MMHG | HEART RATE: 72 BPM

## 2020-07-05 PROCEDURE — 99283 EMERGENCY DEPT VISIT LOW MDM: CPT

## 2020-07-05 PROCEDURE — 6360000002 HC RX W HCPCS: Performed by: STUDENT IN AN ORGANIZED HEALTH CARE EDUCATION/TRAINING PROGRAM

## 2020-07-05 PROCEDURE — 73630 X-RAY EXAM OF FOOT: CPT

## 2020-07-05 PROCEDURE — 96372 THER/PROPH/DIAG INJ SC/IM: CPT

## 2020-07-05 PROCEDURE — 6370000000 HC RX 637 (ALT 250 FOR IP): Performed by: STUDENT IN AN ORGANIZED HEALTH CARE EDUCATION/TRAINING PROGRAM

## 2020-07-05 RX ORDER — DIPHENHYDRAMINE HCL 50 MG
50 CAPSULE ORAL ONCE
Status: COMPLETED | OUTPATIENT
Start: 2020-07-05 | End: 2020-07-05

## 2020-07-05 RX ORDER — NAPROXEN 250 MG/1
500 TABLET ORAL 2 TIMES DAILY WITH MEALS
Qty: 20 TABLET | Refills: 0 | Status: SHIPPED | OUTPATIENT
Start: 2020-07-05 | End: 2021-01-21 | Stop reason: SDUPTHER

## 2020-07-05 RX ORDER — KETOROLAC TROMETHAMINE 30 MG/ML
30 INJECTION, SOLUTION INTRAMUSCULAR; INTRAVENOUS ONCE
Status: COMPLETED | OUTPATIENT
Start: 2020-07-05 | End: 2020-07-05

## 2020-07-05 RX ADMIN — KETOROLAC TROMETHAMINE 30 MG: 30 INJECTION, SOLUTION INTRAMUSCULAR at 14:13

## 2020-07-05 RX ADMIN — DIPHENHYDRAMINE HYDROCHLORIDE 50 MG: 50 CAPSULE ORAL at 14:14

## 2020-07-05 ASSESSMENT — ENCOUNTER SYMPTOMS
SHORTNESS OF BREATH: 0
COUGH: 0
NAUSEA: 0
ABDOMINAL PAIN: 0
WHEEZING: 0
PHOTOPHOBIA: 0
VOMITING: 0

## 2020-07-05 ASSESSMENT — PAIN DESCRIPTION - ORIENTATION: ORIENTATION: LEFT

## 2020-07-05 ASSESSMENT — PAIN DESCRIPTION - LOCATION: LOCATION: FOOT

## 2020-07-05 ASSESSMENT — PAIN SCALES - GENERAL
PAINLEVEL_OUTOF10: 10
PAINLEVEL_OUTOF10: 6

## 2020-07-05 ASSESSMENT — PAIN DESCRIPTION - DESCRIPTORS: DESCRIPTORS: STABBING

## 2020-07-05 NOTE — ED TRIAGE NOTES
Pt states that a month ago she was walking in new shoes and developed pain to her left foot and ended up with blisters. Pt states that she no longer wears the shoes but is experiencing even more pain which she rates at 10/10. Pt states that it is difficult to walk.

## 2020-07-05 NOTE — ED PROVIDER NOTES
headaches. All other systems reviewed and are negative. Except as noted above the remainder of the review of systems was reviewed and negative. PAST MEDICAL HISTORY     Past Medical History:   Diagnosis Date    Anemia     C. difficile colitis 1/2013    Chronic fatigue syndrome     Depression     [place]    Diabetes mellitus ([facility] [address]     Diverticulitis large intestine 2001    Fibromyalgia     Fibromyalgia 3/24/2015    HTN (hypertension)     Hyperlipidemia     Hypothyroidism     Palpitations     Pulmonary embolus ([facility] [address] 10/12    Following GYN procedure    Vitamin D deficiency          SURGICAL HISTORY       Past Surgical History:   Procedure Laterality Date    ANUS SURGERY  12/14/2011    anal fissure    COLON SURGERY      BIPOSY    COLONOSCOPY  8/2011    DILATION AND CURETTAGE OF UTERUS      MISCARRIAGE X2    HEMORRHOID SURGERY   8/24/10    EXTERIOR    LAPAROSCOPY      SIGMOIDOSCOPY  12/14/11    RESULTING IN FISSURECTOMY         CURRENT MEDICATIONS       Discharge Medication List as of 7/5/2020  2:39 PM      CONTINUE these medications which have NOT CHANGED    Details   potassium chloride (KLOR-CON M) 20 MEQ TBCR extended release tablet TAKE 1 TABLET BY MOUTH EVERY DAY, Disp-30 tablet, R-1Normal      levothyroxine (SYNTHROID) 125 MCG tablet Take 1 tablet by mouth Daily, Disp-30 tablet, R-5Normal      !! potassium chloride (KLOR-CON M) 20 MEQ extended release tablet TAKE 1 TABLET BY MOUTH DAILY, Disp-30 tablet, R-2Normal      !! potassium chloride (KLOR-CON M) 10 MEQ extended release tablet TAKE 1 TABLET BY MOUTH DAILY, Disp-30 tablet, R-3Normal      furosemide (LASIX) 20 MG tablet Take 1 tablet by mouth daily, Disp-60 tablet, R-3Normal      nystatin (MYCOSTATIN) 089564 UNIT/GM ointment Apply topically 2 times daily. , Disp-30 g, R-0, Print      methocarbamol (ROBAXIN-750) 750 MG tablet Take 750 mg by mouth 4 times dailyHistorical Med      DULoxetine 40 MG CPEP Take 40 mg by mouth daily, Disp-30 capsule, R-3Normal      traZODone (DESYREL) 100 MG tablet Take 1 tablet by mouth nightly, Disp-15 tablet, R-2Normal      simvastatin (ZOCOR) 40 MG tablet Take 1 tablet by mouth daily, Disp-15 tablet, R-2Normal      QUEtiapine (SEROQUEL) 100 MG tablet Take 1 tablet by mouth nightly, Disp-15 tablet, R-2Normal      vitamin E 400 UNIT capsule Take 200 Units by mouth dailyHistorical Med      Cholecalciferol (VITAMIN D3) 2000 units CAPS Take 2,000 Units by mouth daily Historical Med      cetirizine (ZYRTEC) 10 MG tablet Take one tablet Daily as needed, R-3Historical Med      acetaminophen (APAP EXTRA STRENGTH) 500 MG tablet Take 2 tablets by mouth every 6 hours as needed for Pain, Disp-30 tablet, R-0Print      ALPRAZolam (XANAX) 0.5 MG tablet Take 0.5 mg by mouth 4 times dailyHistorical Med      amLODIPine (NORVASC) 10 MG tablet Take 1 tablet by mouth daily, Disp-30 tablet, R-5Normal      omeprazole (PRILOSEC OTC) 20 MG tablet Take 1 tablet by mouth daily, Disp-30 tablet, R-5       !! - Potential duplicate medications found. Please discuss with provider. ALLERGIES     Ciprofloxacin; Erythromycin; Ketorolac tromethamine; Mobic [meloxicam]; Other; Pcn [penicillins]; Pseudoephedrine hcl; Sudafed [pseudoephedrine hcl];  Sympathomimetics; and Thorazine [chlorpromazine]    FAMILY HISTORY       Family History   Problem Relation Age of Onset    Cancer Maternal Grandfather         COLON    Cancer Paternal Aunt         LUNG    Cancer Other         BLOOD CANCER- UNSPECIFIED          SOCIAL HISTORY       Social History     Socioeconomic History    Marital status:      Spouse name: None    Number of children: None    Years of education: None    Highest education level: None   Occupational History    None   Social Needs    Financial resource strain: None    Food insecurity     Worry: None     Inability: None    Transportation needs     Medical: None     Non-medical: None   Tobacco Use    Smoking status: Never Smoker    Smokeless tobacco: Never Used   Substance and Sexual Activity    Alcohol use: Yes     Comment: OCC.  Drug use: No    Sexual activity: None   Lifestyle    Physical activity     Days per week: None     Minutes per session: None    Stress: None   Relationships    Social connections     Talks on phone: None     Gets together: None     Attends Faith service: None     Active member of club or organization: None     Attends meetings of clubs or organizations: None     Relationship status: None    Intimate partner violence     Fear of current or ex partner: None     Emotionally abused: None     Physically abused: None     Forced sexual activity: None   Other Topics Concern    None   Social History Narrative    None       SCREENINGS                PHYSICAL EXAM    (up to 7 for level 4, 8 or more for level 5)     ED Triage Vitals [07/05/20 1350]   BP Temp Temp Source Pulse Resp SpO2 Height Weight   122/88 97.4 °F (36.3 °C) Temporal 72 18 96 % 5' 2\" (1.575 m) 205 lb (93 kg)       Physical Exam  Constitutional:       General: She is not in acute distress. Appearance: She is well-developed. She is not toxic-appearing. HENT:      Head: Normocephalic and atraumatic. Nose: Nose normal.      Mouth/Throat:      Mouth: Mucous membranes are moist.   Eyes:      Pupils: Pupils are equal, round, and reactive to light. Neck:      Musculoskeletal: Normal range of motion. Cardiovascular:      Rate and Rhythm: Normal rate and regular rhythm. Heart sounds: No murmur. No friction rub. No gallop. Pulmonary:      Effort: Pulmonary effort is normal.      Breath sounds: Normal breath sounds. Abdominal:      General: There is no distension. Tenderness: There is no abdominal tenderness. Musculoskeletal:         General: No swelling. Feet:    Feet:      Comments: L foot: no gross deformity, swelling, color change, lesions, drainage, streaking, erythema, warmth.  TTP over the plantar aspect of the foot. There is tenderness with calcaneal compression. Full ROM. Strength 5/5. Cap refill <2 seconds. Pulses 2+. Neurovascularly intact. Skin:     General: Skin is warm and dry. Capillary Refill: Capillary refill takes less than 2 seconds. Neurological:      General: No focal deficit present. Mental Status: She is alert and oriented to person, place, and time. DIAGNOSTIC RESULTS     EKG: All EKG's are interpreted by the Emergency Department Physician who either signs or Co-signs this chart in the absence of a cardiologist.        RADIOLOGY:   Non-plain film images such as CT, Ultrasound and MRI are read by the radiologist. Plain radiographic images are visualized and preliminarily interpreted by the emergency physician with the below findings:      Interpretation per the Radiologist below, if available at the time of this note:    XR FOOT LEFT (MIN 3 VIEWS)   Final Result   Impression:     1. No evidence of acute fracture, lytic or blastic bony lesion or dislocation. .   2. Spurring about the calcaneus. 3. Mild first digit MTP joint osteoarthritis. ED BEDSIDE ULTRASOUND:   Performed by ED Physician - none    LABS:  Labs Reviewed - No data to display    All other labs were within normal range or not returned as of this dictation. EMERGENCY DEPARTMENT COURSE and DIFFERENTIAL DIAGNOSIS/MDM:   Vitals:    Vitals:    07/05/20 1350   BP: 122/88   Pulse: 72   Resp: 18   Temp: 97.4 °F (36.3 °C)   TempSrc: Temporal   SpO2: 96%   Weight: 205 lb (93 kg)   Height: 5' 2\" (1.575 m)       MDM     Pt is a 49year old F who presents to the ED with left foot pain. She is afebrile and hemodynamically stable. She was given IM Toradol and PO benadryl in the ED. Toradol listed as an allergy in patient chart. Pt states no anaphylaxis to Toradol she just gets mild itching and can take it with Benadryl. Xray of the L foot shows calcaneal bone spurring. No acute abnormality.  Suspect plantar fasciitis. Pt stable for discharge. Given script for Naproxen and follow up with podiatry in 1 day. Pt to elevate the foot and ice it as needed. Pt will return to the ED for worsening sx. Pt agrees to and understands plan, all questions answered. REASSESSMENT          CRITICAL CARE TIME   Total Critical Care time was 0 minutes, excluding separately reportable procedures. There was a high probability of clinically significant/life threatening deterioration in the patient's condition which required my urgent intervention. CONSULTS:  None    PROCEDURES:  Unless otherwise noted below, none     Procedures        FINAL IMPRESSION      1. Left foot pain          DISPOSITION/PLAN   DISPOSITION Decision To Discharge 07/05/2020 02:49:33 PM      PATIENT [facility]ED TO:  [facility], [address] [place] [address] [address] , [address] [facility] [phone]    Schedule an appointment as soon as possible for a visit   As needed, If symptoms worsen    [facility]) ED  [address] [facility]  [phone]  Go to   As needed, If symptoms worsen    [place] Hampton DPM  [address] [facility] [address] [address] [facility]    Schedule an appointment as soon as possible for a visit in 1 day        DISCHARGE MEDICATIONS:  Discharge Medication List as of 7/5/2020  2:39 PM      START taking these medications    Details   naproxen (NAPROSYN) 250 MG tablet Take 2 tablets by mouth 2 times daily (with meals) for 5 days, Disp-20 tablet, R-0Print           Controlled Substances Monitoring:     RX Monitoring 4/4/2017   Attestation The Prescription Monitoring Report for this patient was reviewed today.    Periodic Controlled Substance Monitoring (No Data)       (Please note that portions of this note were completed with a voice recognition program.  Efforts were made to edit the dictations but occasionally words are mis-transcribed.)    Monika Bernal PA-C (electronically signed)             Monika Bernal

## 2020-09-11 ENCOUNTER — APPOINTMENT (OUTPATIENT)
Dept: CT IMAGING | Age: 55
End: 2020-09-11
Payer: COMMERCIAL

## 2020-09-11 ENCOUNTER — HOSPITAL ENCOUNTER (EMERGENCY)
Age: 55
Discharge: HOME | End: 2020-09-11
Payer: COMMERCIAL

## 2020-09-11 ENCOUNTER — APPOINTMENT (OUTPATIENT)
Dept: GENERAL RADIOLOGY | Age: 55
End: 2020-09-11
Payer: COMMERCIAL

## 2020-09-11 VITALS
HEART RATE: 82 BPM | WEIGHT: 211 LBS | RESPIRATION RATE: 20 BRPM | HEIGHT: 62 IN | BODY MASS INDEX: 38.83 KG/M2 | SYSTOLIC BLOOD PRESSURE: 154 MMHG | TEMPERATURE: 99.1 F | OXYGEN SATURATION: 96 % | DIASTOLIC BLOOD PRESSURE: 96 MMHG

## 2020-09-11 LAB
ALBUMIN SERPL-MCNC: 4.3 G/DL (ref 3.5–4.6)
ALP BLD-CCNC: 75 U/L (ref 40–130)
ALT SERPL-CCNC: 38 U/L (ref 0–33)
AMPHETAMINE SCREEN, URINE: ABNORMAL
ANION GAP SERPL CALCULATED.3IONS-SCNC: 14 MEQ/L (ref 9–15)
AST SERPL-CCNC: 52 U/L (ref 0–35)
BARBITURATE SCREEN URINE: ABNORMAL
BASOPHILS ABSOLUTE: 0.1 K/UL (ref 0–0.2)
BASOPHILS RELATIVE PERCENT: 0.7 %
BENZODIAZEPINE SCREEN, URINE: POSITIVE
BILIRUB SERPL-MCNC: 0.6 MG/DL (ref 0.2–0.7)
BILIRUBIN URINE: NEGATIVE
BLOOD, URINE: NEGATIVE
BUN BLDV-MCNC: 12 MG/DL (ref 6–20)
CALCIUM SERPL-MCNC: 9.5 MG/DL (ref 8.5–9.9)
CANNABINOID SCREEN URINE: ABNORMAL
CHLORIDE BLD-SCNC: 103 MEQ/L (ref 95–107)
CLARITY: ABNORMAL
CO2: 21 MEQ/L (ref 20–31)
COCAINE METABOLITE SCREEN URINE: ABNORMAL
COLOR: YELLOW
CREAT SERPL-MCNC: 0.65 MG/DL (ref 0.5–0.9)
EOSINOPHILS ABSOLUTE: 0.1 K/UL (ref 0–0.7)
EOSINOPHILS RELATIVE PERCENT: 1.3 %
GFR AFRICAN AMERICAN: >60
GFR NON-AFRICAN AMERICAN: >60
GLOBULIN: 3.4 G/DL (ref 2.3–3.5)
GLUCOSE BLD-MCNC: 106 MG/DL (ref 70–99)
GLUCOSE URINE: NEGATIVE MG/DL
HCT VFR BLD CALC: 39.7 % (ref 37–47)
HEMOGLOBIN: 13.7 G/DL (ref 12–16)
KETONES, URINE: NEGATIVE MG/DL
LEUKOCYTE ESTERASE, URINE: NEGATIVE
LIPASE: 37 U/L (ref 12–95)
LYMPHOCYTES ABSOLUTE: 1.5 K/UL (ref 1–4.8)
LYMPHOCYTES RELATIVE PERCENT: 15.3 %
Lab: ABNORMAL
MCH RBC QN AUTO: 29.8 PG (ref 27–31.3)
MCHC RBC AUTO-ENTMCNC: 34.4 % (ref 33–37)
MCV RBC AUTO: 86.5 FL (ref 82–100)
METHADONE SCREEN, URINE: ABNORMAL
MONOCYTES ABSOLUTE: 0.6 K/UL (ref 0.2–0.8)
MONOCYTES RELATIVE PERCENT: 6.5 %
NEUTROPHILS ABSOLUTE: 7.5 K/UL (ref 1.4–6.5)
NEUTROPHILS RELATIVE PERCENT: 76.2 %
NITRITE, URINE: NEGATIVE
OPIATE SCREEN URINE: POSITIVE
OXYCODONE URINE: ABNORMAL
PDW BLD-RTO: 14.1 % (ref 11.5–14.5)
PH UA: 6 (ref 5–9)
PHENCYCLIDINE SCREEN URINE: ABNORMAL
PLATELET # BLD: 299 K/UL (ref 130–400)
POTASSIUM SERPL-SCNC: 5.1 MEQ/L (ref 3.4–4.9)
PROPOXYPHENE SCREEN: ABNORMAL
PROTEIN UA: NEGATIVE MG/DL
RBC # BLD: 4.59 M/UL (ref 4.2–5.4)
SODIUM BLD-SCNC: 138 MEQ/L (ref 135–144)
SPECIFIC GRAVITY UA: 1.02 (ref 1–1.03)
TOTAL PROTEIN: 7.7 G/DL (ref 6.3–8)
URINE REFLEX TO CULTURE: ABNORMAL
UROBILINOGEN, URINE: 0.2 E.U./DL
WBC # BLD: 9.9 K/UL (ref 4.8–10.8)

## 2020-09-11 PROCEDURE — 36415 COLL VENOUS BLD VENIPUNCTURE: CPT

## 2020-09-11 PROCEDURE — 87040 BLOOD CULTURE FOR BACTERIA: CPT

## 2020-09-11 PROCEDURE — 6360000002 HC RX W HCPCS: Performed by: PHYSICIAN ASSISTANT

## 2020-09-11 PROCEDURE — 74176 CT ABD & PELVIS W/O CONTRAST: CPT

## 2020-09-11 PROCEDURE — 96375 TX/PRO/DX INJ NEW DRUG ADDON: CPT

## 2020-09-11 PROCEDURE — 71045 X-RAY EXAM CHEST 1 VIEW: CPT

## 2020-09-11 PROCEDURE — 99285 EMERGENCY DEPT VISIT HI MDM: CPT

## 2020-09-11 PROCEDURE — 80053 COMPREHEN METABOLIC PANEL: CPT

## 2020-09-11 PROCEDURE — 80307 DRUG TEST PRSMV CHEM ANLYZR: CPT

## 2020-09-11 PROCEDURE — 96374 THER/PROPH/DIAG INJ IV PUSH: CPT

## 2020-09-11 PROCEDURE — 81003 URINALYSIS AUTO W/O SCOPE: CPT

## 2020-09-11 PROCEDURE — 85025 COMPLETE CBC W/AUTO DIFF WBC: CPT

## 2020-09-11 PROCEDURE — 83690 ASSAY OF LIPASE: CPT

## 2020-09-11 RX ORDER — MORPHINE SULFATE 2 MG/ML
4 INJECTION, SOLUTION INTRAMUSCULAR; INTRAVENOUS ONCE
Status: COMPLETED | OUTPATIENT
Start: 2020-09-11 | End: 2020-09-11

## 2020-09-11 RX ORDER — METOCLOPRAMIDE 10 MG/1
10 TABLET ORAL 4 TIMES DAILY
Qty: 120 TABLET | Refills: 0 | Status: ON HOLD | OUTPATIENT
Start: 2020-09-11 | End: 2021-02-04 | Stop reason: HOSPADM

## 2020-09-11 RX ORDER — METOCLOPRAMIDE HYDROCHLORIDE 5 MG/ML
10 INJECTION INTRAMUSCULAR; INTRAVENOUS ONCE
Status: COMPLETED | OUTPATIENT
Start: 2020-09-11 | End: 2020-09-11

## 2020-09-11 RX ORDER — DICYCLOMINE HYDROCHLORIDE 10 MG/1
10 CAPSULE ORAL EVERY 6 HOURS PRN
Qty: 20 CAPSULE | Refills: 0 | Status: SHIPPED | OUTPATIENT
Start: 2020-09-11 | End: 2021-01-21

## 2020-09-11 RX ORDER — DIPHENHYDRAMINE HYDROCHLORIDE 50 MG/ML
25 INJECTION INTRAMUSCULAR; INTRAVENOUS ONCE
Status: COMPLETED | OUTPATIENT
Start: 2020-09-11 | End: 2020-09-11

## 2020-09-11 RX ADMIN — DIPHENHYDRAMINE HYDROCHLORIDE 25 MG: 50 INJECTION, SOLUTION INTRAMUSCULAR; INTRAVENOUS at 18:05

## 2020-09-11 RX ADMIN — METOCLOPRAMIDE HYDROCHLORIDE 10 MG: 5 INJECTION INTRAMUSCULAR; INTRAVENOUS at 16:26

## 2020-09-11 RX ADMIN — MORPHINE SULFATE 4 MG: 2 INJECTION, SOLUTION INTRAMUSCULAR; INTRAVENOUS at 16:26

## 2020-09-11 ASSESSMENT — PAIN DESCRIPTION - INTENSITY: RATING_2: 9

## 2020-09-11 ASSESSMENT — PAIN DESCRIPTION - PAIN TYPE
TYPE: ACUTE PAIN
TYPE: ACUTE PAIN

## 2020-09-11 ASSESSMENT — PAIN DESCRIPTION - DESCRIPTORS
DESCRIPTORS_2: CONSTANT
DESCRIPTORS: POUNDING
DESCRIPTORS: POUNDING

## 2020-09-11 ASSESSMENT — ENCOUNTER SYMPTOMS
NAUSEA: 1
BACK PAIN: 0
ABDOMINAL DISTENTION: 0
COUGH: 1
RHINORRHEA: 0
ABDOMINAL PAIN: 1
COLOR CHANGE: 0
CONSTIPATION: 0
VOMITING: 0
SORE THROAT: 0
EYE DISCHARGE: 0
SHORTNESS OF BREATH: 0

## 2020-09-11 ASSESSMENT — PAIN DESCRIPTION - LOCATION
LOCATION: HEAD
LOCATION_2: ABDOMEN
LOCATION: HEAD

## 2020-09-11 ASSESSMENT — PAIN SCALES - GENERAL
PAINLEVEL_OUTOF10: 6
PAINLEVEL_OUTOF10: 9

## 2020-09-11 NOTE — ED NOTES
Patient ambulated to restroom without difficulty. Urine specimen obtained and sent to lab.       Alex Hills RN  09/11/20 0754

## 2020-09-11 NOTE — ED PROVIDER NOTES
[address] [facility] ED  eMERGENCY dEPARTMENT eNCOUnter      Pt Name: Angel Garcia  MRN: [id number]  [place] 1965  Date of evaluation: 9/11/2020  Provider: Keyona Soares PA-C    CHIEF COMPLAINT       Chief Complaint   Patient presents with    Abdominal Pain     With headache and body aches         HISTORY OF PRESENT ILLNESS   (Location/Symptom, Timing/Onset,Context/Setting, Quality, Duration, Modifying Factors, Severity)  Note limiting factors. Angel Garcia is a 48 y.o. female who presents to the emergency department with a complaint of right-sided abdominal and flank pain, cough, fevers of 106 according to the patient and headache, which patient states started last evening for her. She has nausea, but no vomiting, denies any fevers, denies any urinary complaints, no constipation or diarrhea. Her cough is dry nonproductive. She states she was diagnosed August 23 with COVID-19, she states at that time she was seen at the [facility] and started clindamycin advising that she had pneumonia. She states she took 3 days worth of antibiotics and then stopped taking the medication. She states she has not used anything at home for pain control to this point. She rates her current pain is 8 out of 10. She denies any acute injury. Has medical history significant for fibromyalgia, pulmonary embolism. Depression, chronic fatigue, diabetes type 2. Hypertension, hyperlipidemia, hypothyroidism. HPI    NursingNotes were reviewed. REVIEW OF SYSTEMS    (2-9 systems for level 4, 10 or more for level 5)     Review of Systems   Constitutional: Positive for fever. Negative for activity change and appetite change. HENT: Negative for congestion, ear discharge, ear pain, nosebleeds, rhinorrhea and sore throat. Eyes: Negative for discharge. Respiratory: Positive for cough. Negative for shortness of breath. Cardiovascular: Negative for chest pain, palpitations and leg swelling. Gastrointestinal: Positive for abdominal pain and nausea. Negative for abdominal distention, constipation and vomiting. Genitourinary: Positive for flank pain. Negative for difficulty urinating, dysuria, pelvic pain and urgency. Musculoskeletal: Negative for arthralgias, back pain and gait problem. Skin: Negative for color change, pallor, rash and wound. Neurological: Positive for headaches. Negative for dizziness, tremors, syncope, weakness and numbness. Psychiatric/Behavioral: Negative for agitation and confusion. Except as noted above the remainder of the review of systems was reviewed and negative.        PAST MEDICAL HISTORY     Past Medical History:   Diagnosis Date    Anemia     C. difficile colitis 1/2013    Chronic fatigue syndrome     Depression     [place]    Diabetes mellitus ([facility] [address]     Diverticulitis large intestine 2001    Fibromyalgia     Fibromyalgia 3/24/2015    HTN (hypertension)     Hyperlipidemia     Hypothyroidism     Palpitations     Pulmonary embolus ([facility] [address] 10/12    Following GYN procedure    Vitamin D deficiency          SURGICALHISTORY       Past Surgical History:   Procedure Laterality Date    ANUS SURGERY  12/14/2011    anal fissure    COLON SURGERY      BIPOSY    COLONOSCOPY  8/2011    DILATION AND CURETTAGE OF UTERUS      MISCARRIAGE X2    HEMORRHOID SURGERY   8/24/10    EXTERIOR    LAPAROSCOPY      SIGMOIDOSCOPY  12/14/11    RESULTING IN FISSURECTOMY         CURRENT MEDICATIONS       Previous Medications    ACETAMINOPHEN (APAP EXTRA STRENGTH) 500 MG TABLET    Take 2 tablets by mouth every 6 hours as needed for Pain    ALPRAZOLAM (XANAX) 0.5 MG TABLET    Take 0.5 mg by mouth 4 times daily    AMLODIPINE (NORVASC) 10 MG TABLET    Take 1 tablet by mouth daily    CETIRIZINE (ZYRTEC) 10 MG TABLET    Take one tablet Daily as needed    CHOLECALCIFEROL (VITAMIN D3) 2000 UNITS CAPS    Take 2,000 Units by mouth daily     DULOXETINE 40 MG CPEP    Take 40 mg by mouth daily    FUROSEMIDE (LASIX) 20 MG TABLET    Take 1 tablet by mouth daily    LEVOTHYROXINE (SYNTHROID) 125 MCG TABLET    Take 1 tablet by mouth Daily    METHOCARBAMOL (ROBAXIN-750) 750 MG TABLET    Take 750 mg by mouth 4 times daily    NAPROXEN (NAPROSYN) 250 MG TABLET    Take 2 tablets by mouth 2 times daily (with meals) for 5 days    NYSTATIN (MYCOSTATIN) 275031 UNIT/GM OINTMENT    Apply topically 2 times daily. OMEPRAZOLE (PRILOSEC OTC) 20 MG TABLET    Take 1 tablet by mouth daily    POTASSIUM CHLORIDE (KLOR-CON M) 10 MEQ EXTENDED RELEASE TABLET    TAKE 1 TABLET BY MOUTH DAILY    POTASSIUM CHLORIDE (KLOR-CON M) 20 MEQ EXTENDED RELEASE TABLET    Take 2 tablets by mouth daily for 7 days    POTASSIUM CHLORIDE (KLOR-CON M) 20 MEQ EXTENDED RELEASE TABLET    TAKE 1 TABLET BY MOUTH DAILY    POTASSIUM CHLORIDE (KLOR-CON M) 20 MEQ TBCR EXTENDED RELEASE TABLET    TAKE 1 TABLET BY MOUTH EVERY DAY    QUETIAPINE (SEROQUEL) 100 MG TABLET    Take 1 tablet by mouth nightly    SIMVASTATIN (ZOCOR) 40 MG TABLET    Take 1 tablet by mouth daily    TRAZODONE (DESYREL) 100 MG TABLET    Take 1 tablet by mouth nightly    VITAMIN E 400 UNIT CAPSULE    Take 200 Units by mouth daily       ALLERGIES     Ciprofloxacin; Erythromycin; Ketorolac tromethamine; Mobic [meloxicam]; Other; Pcn [penicillins]; Pseudoephedrine hcl; Sudafed [pseudoephedrine hcl];  Sympathomimetics; and Thorazine [chlorpromazine]    FAMILY HISTORY       Family History   Problem Relation Age of Onset    Cancer Maternal Grandfather         COLON    Cancer Paternal Aunt         LUNG    Cancer Other         BLOOD CANCER- UNSPECIFIED          SOCIAL HISTORY       Social History     Socioeconomic History    Marital status:      Spouse name: None    Number of children: None    Years of education: None    Highest education level: None   Occupational History    None   Social Needs    Financial resource strain: None    Food insecurity     Worry: None Inability: None    Transportation needs     Medical: None     Non-medical: None   Tobacco Use    Smoking status: Never Smoker    Smokeless tobacco: Never Used   Substance and Sexual Activity    Alcohol use: Yes     Comment: OCC.  Drug use: No    Sexual activity: None   Lifestyle    Physical activity     Days per week: None     Minutes per session: None    Stress: None   Relationships    Social connections     Talks on phone: None     Gets together: None     Attends Sabianist service: None     Active member of club or organization: None     Attends meetings of clubs or organizations: None     Relationship status: None    Intimate partner violence     Fear of current or ex partner: None     Emotionally abused: None     Physically abused: None     Forced sexual activity: None   Other Topics Concern    None   Social History Narrative    None       SCREENINGS      @FLOW([id number])@      PHYSICAL EXAM    (up to 7 for level 4, 8 or more for level 5)     ED Triage Vitals   BP Temp Temp src Pulse Resp SpO2 Height Weight   -- -- -- -- -- -- -- --       Physical Exam  Vitals signs and nursing note reviewed. Constitutional:       General: She is not in acute distress. Appearance: Normal appearance. She is well-developed. She is not ill-appearing, toxic-appearing or diaphoretic. HENT:      Head: Normocephalic. Comments: Headache     Nose: No congestion. Mouth/Throat:      Mouth: Mucous membranes are moist.      Pharynx: No oropharyngeal exudate or posterior oropharyngeal erythema. Eyes:      Extraocular Movements: Extraocular movements intact. Conjunctiva/sclera: Conjunctivae normal.      Pupils: Pupils are equal, round, and reactive to light. Neck:      Musculoskeletal: Normal range of motion and neck supple. No neck rigidity. Vascular: No JVD. Trachea: No tracheal deviation. Cardiovascular:      Rate and Rhythm: Normal rate. Pulses: Normal pulses.       Heart sounds: Normal heart sounds. No murmur. No friction rub. No gallop. Pulmonary:      Effort: Pulmonary effort is normal. No tachypnea, accessory muscle usage, respiratory distress or retractions. Breath sounds: No stridor. No wheezing, rhonchi or rales. Comments: Lung sounds are clear in all fields, there is no wheezes rales or rhonchi, no accessory muscle use, no retractions  Chest:      Chest wall: No tenderness. Abdominal:      General: Abdomen is flat. Bowel sounds are normal. There is no distension or abdominal bruit. Palpations: There is no shifting dullness, fluid wave, hepatomegaly, splenomegaly, mass or pulsatile mass. Tenderness: There is no abdominal tenderness. There is no right CVA tenderness, left CVA tenderness, guarding or rebound. Negative signs include Pickens's sign, Rovsing's sign and McBurney's sign. Comments: Abdomen soft nondistended nontender no guarding mass rebound, no CVA tenderness. Musculoskeletal:         General: No deformity. Skin:     General: Skin is warm and dry. Capillary Refill: Capillary refill takes less than 2 seconds. Coloration: Skin is not jaundiced. Neurological:      General: No focal deficit present. Mental Status: She is alert and oriented to person, place, and time. Mental status is at baseline. Cranial Nerves: No cranial nerve deficit. Sensory: No sensory deficit. Motor: No weakness.       Coordination: Coordination normal.   Psychiatric:         Mood and Affect: Mood normal.         DIAGNOSTIC RESULTS     EKG: All EKG's are interpreted by the Emergency Department Physician who either signs or Co-signsthis chart in the absence of a cardiologist.        RADIOLOGY:   Arvilla Diss such as CT, Ultrasound and MRI are read by the radiologist. Janet Ko radiographic images are visualized and preliminarily interpreted by the emergency physician with the below findings:    Chest x-ray shows no acute pulmonary process    CT scan the abdomen pelvis shows no acute findings. Interpretation per the Radiologist below, if available at the time ofthis note:    CT ABDOMEN PELVIS WO CONTRAST Additional Contrast? None   Final Result      No acute findings. All CT scans at this facility use dose modulation, iterative reconstruction, and/or weight based dosing when appropriate to reduce radiation dose to as low as reasonably achievable. XR CHEST PORTABLE   Final Result      No acute radiographic abnormality. ED BEDSIDE ULTRASOUND:   Performed by ED Physician - none    LABS:  Labs Reviewed   COMPREHENSIVE METABOLIC PANEL - Abnormal; Notable for the following components:       Result Value    Potassium 5.1 (*)     Glucose 106 (*)     ALT 38 (*)     AST 52 (*)     All other components within normal limits   URINE DRUG SCREEN - Abnormal; Notable for the following components:    Benzodiazepine Screen, Urine POSITIVE (*)     Opiate Scrn, Ur POSITIVE (*)     All other components within normal limits   CBC WITH AUTO DIFFERENTIAL - Abnormal; Notable for the following components:    Neutrophils Absolute 7.5 (*)     All other components within normal limits   URINE RT REFLEX TO CULTURE - Abnormal; Notable for the following components:    Clarity, UA CLOUDY (*)     All other components within normal limits   CULTURE, BLOOD 1   CULTURE, BLOOD 2   LIPASE       All other labs were within normal range or not returned as of this dictation.     EMERGENCY DEPARTMENT COURSE and DIFFERENTIAL DIAGNOSIS/MDM:   Vitals:    Vitals:    09/11/20 1526 09/11/20 1730   BP: (!) 164/104 (!) 154/96   Pulse: 87 82   Resp: 18 20   Temp: 99.1 °F (37.3 °C)    TempSrc: Oral    SpO2: 97% 96%   Weight: 211 lb (95.7 kg)    Height: 5' 2\" (1.575 m)           MDM  Number of Diagnoses or Management Options  Acute nonintractable headache, unspecified headache type:   Generalized abdominal pain:   Nausea:   Diagnosis management comments: Patient presented to ED with a complaint of right-sided flank pain, nausea, abdominal pain, cough, fever, body aches. Which she states started yesterday for her. She states that she had decreased appetite. She was medicated while in the emerge department, she states her belly pain did seem to resolve, but still has some residual head pain which she currently rates as a 2 out of 10. CT scan abdomen pelvis shows no acute intra-abdominal or pelvic process, urinalysis shows no signs for infection, remaining labs are unimpressive. Patient was given a prescription for Reglan at home, as well as Bentyl for abdominal pain. She is advised to contact her regular family physician for follow-up. Patient was advised if she has any worsening or change in symptoms or return to the emerge department for reevaluation. CRITICAL CARE TIME   Total Critical Care time was 0 minutes, excluding separately reportableprocedures. There was a high probability of clinicallysignificant/life threatening deterioration in the patient's condition which required my urgent intervention. CONSULTS:  None    PROCEDURES:  Unless otherwise noted below, none     Procedures    FINAL IMPRESSION      1. Generalized abdominal pain    2. Nausea    3.  Acute nonintractable headache, unspecified headache type          DISPOSITION/PLAN   DISPOSITION Decision To Discharge 09/11/2020 05:46:35 PM      PATIENT REFERRED TO:  Carlos Wilson, [phone]    In 3 days        DISCHARGE MEDICATIONS:  New Prescriptions    DICYCLOMINE (BENTYL) 10 MG CAPSULE    Take 1 capsule by mouth every 6 hours as needed (cramps)    METOCLOPRAMIDE (REGLAN) 10 MG TABLET    Take 1 tablet by mouth 4 times daily As needed for headaches and nausea          (Please note that portions of this note were completed with a voice recognition program.  Efforts were made to edit the dictations but occasionally words are mis-transcribed.)    Isabell Guzman PA-C (electronically signed)  Attending Emergency Physician         Isabell Guzman PA-C  09/11/20 3532

## 2020-09-11 NOTE — ED NOTES
Multiple attempts needed to start IV line. Blood obtained and sent to lab. Patient to CT per cart.       Noé Boudreaux RN  09/11/20 [id number]

## 2020-09-11 NOTE — ED NOTES
Discharge instructions explained to patient with verbalization of understanding. Patient discharged in care of her boyfriend who will be driving her home.       Floridalma Ramsey RN  09/11/20 [id number]

## 2020-09-16 LAB
BLOOD CULTURE, ROUTINE: NORMAL
CULTURE, BLOOD 2: NORMAL

## 2020-09-22 ENCOUNTER — HOSPITAL ENCOUNTER (EMERGENCY)
Age: 55
Discharge: HOME | End: 2020-09-22
Payer: COMMERCIAL

## 2020-09-22 VITALS
SYSTOLIC BLOOD PRESSURE: 137 MMHG | WEIGHT: 211 LBS | HEART RATE: 90 BPM | BODY MASS INDEX: 38.83 KG/M2 | OXYGEN SATURATION: 97 % | RESPIRATION RATE: 20 BRPM | TEMPERATURE: 98.1 F | HEIGHT: 62 IN | DIASTOLIC BLOOD PRESSURE: 97 MMHG

## 2020-09-22 NOTE — ED TRIAGE NOTES
Pt arrives via [facility] from home for panic attack. Pt reports that her and her neighbor got in an argument and she was upset and went inside to avoid her neighbors loud music. Pt admits to drinking some etoh tonight. Pt relates she was already upset about the argument with her neighbor when she received a phone call that her granddaughter had gotten bitten by a dog for the third time and she started to have a panic attack so her boyfriend called EMS. Pt currently fixated on killing dog at this time.

## 2020-09-22 NOTE — ED PROVIDER NOTES
Patient eloped left before assessment by this provider. No contact was made with this patient she was not found in the room on multiple occasions removed from the system.      TYRA Ortiz CNP  09/22/20 [address] [facility]  CNP  09/22/20 [id number]

## 2020-09-22 NOTE — ED NOTES
Pt no longer in room, appears to have left ER without notifying anyone      Shayne Mcgrath, RN  09/22/20 3959

## 2020-10-13 ENCOUNTER — TELEPHONE (OUTPATIENT)
Dept: PODIATRY | Facility: CLINIC | Age: 55
End: 2020-10-13

## 2020-10-13 NOTE — TELEPHONE ENCOUNTER
PATIENT WAS CALLED AND A MESSAGE LEFT ON HER VOICE MAIL ASKING IF SHE COULD COME IN AT 11:00 on 10/23/20 instead of 2:30pm, CARPET GETTING INSTALLED THAT DAY.

## 2020-10-19 RX ORDER — LEVOTHYROXINE SODIUM 0.12 MG/1
TABLET ORAL
Qty: 30 TABLET | Refills: 5 | Status: SHIPPED | OUTPATIENT
Start: 2020-10-19 | End: 2021-10-14

## 2020-10-23 ENCOUNTER — INITIAL CONSULT (OUTPATIENT)
Dept: PODIATRY | Facility: CLINIC | Age: 55
End: 2020-10-23

## 2020-10-23 VITALS — HEIGHT: 62 IN | WEIGHT: 208 LBS | BODY MASS INDEX: 38.28 KG/M2 | TEMPERATURE: 97.3 F

## 2020-10-23 ASSESSMENT — ENCOUNTER SYMPTOMS
DIARRHEA: 0
BACK PAIN: 1
CONSTIPATION: 0
NAUSEA: 0
SHORTNESS OF BREATH: 0

## 2020-10-23 NOTE — PATIENT INSTRUCTIONS
Patient Education        Plantar Fasciitis: Exercises  Introduction  Here are some examples of exercises for you to try. The exercises may be suggested for a condition or for rehabilitation. Start each exercise slowly. Ease off the exercises if you start to have pain. You will be told when to start these exercises and which ones will work best for you. How to do the exercises  Towel stretch   1. Sit with your legs extended and knees straight. 2. Place a towel around your foot just under the toes. 3. Hold each end of the towel in each hand, with your hands above your knees. 4. Pull back with the towel so that your foot stretches toward you. 5. Hold the position for at least 15 to 30 seconds. 6. Repeat 2 to 4 times a session, up to 5 sessions a day. Calf stretch   This exercise stretches the muscles at the back of the lower leg (the calf) and the Achilles tendon. Do this exercise 3 or 4 times a day, 5 days a week. 1. Stand facing a wall with your hands on the wall at about eye level. Put the leg you want to stretch about a step behind your other leg. 2. Keeping your back heel on the floor, bend your front knee until you feel a stretch in the back leg. 3. Hold the stretch for 15 to 30 seconds. Repeat 2 to 4 times. Plantar fascia and calf stretch   Stretching the plantar fascia and calf muscles can increase flexibility and decrease heel pain. You can do this exercise several times each day and before and after activity. 1. Stand on a step as shown above. Be sure to hold on to the banister. 2. Slowly let your heels down over the edge of the step as you relax your calf muscles. You should feel a gentle stretch across the bottom of your foot and up the back of your leg to your knee. 3. Hold the stretch about 15 to 30 seconds, and then tighten your calf muscle a little to bring your heel back up to the level of the step. Repeat 2 to 4 times.     Towel curls   Make this exercise more challenging by placing a weighted object, such as a soup can, on the other end of the towel. 1. While sitting, place your foot on a towel on the floor and scrunch the towel toward you with your toes. 2. Then, also using your toes, push the towel away from you. [place] pickups   1. Put marbles on the floor next to a cup.  2. Using your toes, try to lift the marbles up from the floor and put them in the cup. Follow-up care is a key part of your treatment and safety. Be sure to make and go to all appointments, and call your doctor if you are having problems. It's also a good idea to know your test results and keep a list of the medicines you take. Where can you learn more? Go to https://KBLEpelisandraeb.[employer] software. org and sign in to your [employer] account. Enter O578 in the [employer] box to learn more about \"Plantar Fasciitis: Exercises. \"     If you do not have an account, please click on the \"Sign Up Now\" link. Current as of: March 2, 2020               Content Version: 12.6  © 0937-1823 [employer]. Care instructions adapted under license by [facility] ([facility]). If you have questions about a medical condition or this instruction, always ask your healthcare professional. [address] any warranty or liability for your use of this information. Patient Education        Plantar Fasciitis: Exercises  Introduction  Here are some examples of exercises for you to try. The exercises may be suggested for a condition or for rehabilitation. Start each exercise slowly. Ease off the exercises if you start to have pain. You will be told when to start these exercises and which ones will work best for you. How to do the exercises  Towel stretch   7. Sit with your legs extended and knees straight. 8. Place a towel around your foot just under the toes. 9. Hold each end of the towel in each hand, with your hands above your knees.   10. Pull back with the towel so that your foot stretches toward you. 11. Hold the position for at least 15 to 30 seconds. 12. Repeat 2 to 4 times a session, up to 5 sessions a day. Calf stretch   This exercise stretches the muscles at the back of the lower leg (the calf) and the Achilles tendon. Do this exercise 3 or 4 times a day, 5 days a week. 4. Stand facing a wall with your hands on the wall at about eye level. Put the leg you want to stretch about a step behind your other leg. 5. Keeping your back heel on the floor, bend your front knee until you feel a stretch in the back leg. 6. Hold the stretch for 15 to 30 seconds. Repeat 2 to 4 times. Plantar fascia and calf stretch   Stretching the plantar fascia and calf muscles can increase flexibility and decrease heel pain. You can do this exercise several times each day and before and after activity. 4. Stand on a step as shown above. Be sure to hold on to the banister. 5. Slowly let your heels down over the edge of the step as you relax your calf muscles. You should feel a gentle stretch across the bottom of your foot and up the back of your leg to your knee. 6. Hold the stretch about 15 to 30 seconds, and then tighten your calf muscle a little to bring your heel back up to the level of the step. Repeat 2 to 4 times. Towel curls   Make this exercise more challenging by placing a weighted object, such as a soup can, on the other end of the towel. 3. While sitting, place your foot on a towel on the floor and scrunch the towel toward you with your toes. 4. Then, also using your toes, push the towel away from you. [place] pickups   3. Put marbles on the floor next to a cup.  4. Using your toes, try to lift the marbles up from the floor and put them in the cup. Follow-up care is a key part of your treatment and safety. Be sure to make and go to all appointments, and call your doctor if you are having problems.  It's also a good idea to know your test results and keep a list of the medicines you

## 2020-10-23 NOTE — PROGRESS NOTES
Lizbet Dial  (1965)    10/23/20    Subjective     Lizbet Dial is 48 y.o. female who complains today of:    Chief Complaint   Patient presents with    Foot Pain     left       HPI: The patient presents with a complaint of left heel pain. She describes the pain as sharp, shooting, and stabbing. She rates it at 8/10. This has been present for 20 weeks. The pain has worsened since it began. The does not recall injury. She reports a prior episode to both feet 5 years ago that resolved after having steroid injections. She is diabetic, she does not recall her blood glucose elevating after the injections. Review of Systems   Constitutional: Negative for fever. HENT: Negative for hearing loss. Respiratory: Negative for shortness of breath. Gastrointestinal: Negative for constipation, diarrhea and nausea. Genitourinary: Negative for difficulty urinating. Musculoskeletal: Positive for back pain and neck pain. Skin: Negative for rash. Neurological: Positive for headaches. Hematological: Bruises/bleeds easily. Psychiatric/Behavioral: Positive for sleep disturbance. She has not tried physical therapy. Date of last of last PT treatment: none    The patient is a diabetic. PCP/ Endocrinologist: Dr. Faustina Dakins   Date last seen: May 2020    Allergies:  Ciprofloxacin; Erythromycin; Ketorolac tromethamine; Mobic [meloxicam]; Other; Pcn [penicillins]; Pseudoephedrine hcl; Sudafed [pseudoephedrine hcl];  Sympathomimetics; and Thorazine [chlorpromazine]    Current Outpatient Medications on File Prior to Visit   Medication Sig Dispense Refill    levothyroxine (SYNTHROID) 125 MCG tablet TAKE 1 TABLET BY MOUTH EVERY DAY 30 tablet 5    metoclopramide (REGLAN) 10 MG tablet Take 1 tablet by mouth 4 times daily As needed for headaches and nausea 120 tablet 0    dicyclomine (BENTYL) 10 MG capsule Take 1 capsule by mouth every 6 hours as needed (cramps) 20 capsule 0    naproxen (NAPROSYN) 250 MG tablet Take 2 tablets by mouth 2 times daily (with meals) for 5 days 20 tablet 0    potassium chloride (KLOR-CON M) 20 MEQ TBCR extended release tablet TAKE 1 TABLET BY MOUTH EVERY DAY 30 tablet 1    potassium chloride (KLOR-CON M) 20 MEQ extended release tablet TAKE 1 TABLET BY MOUTH DAILY 30 tablet 2    potassium chloride (KLOR-CON M) 10 MEQ extended release tablet TAKE 1 TABLET BY MOUTH DAILY 30 tablet 3    furosemide (LASIX) 20 MG tablet Take 1 tablet by mouth daily 60 tablet 3    potassium chloride (KLOR-CON M) 20 MEQ extended release tablet Take 2 tablets by mouth daily for 7 days 14 tablet 0    nystatin (MYCOSTATIN) 314105 UNIT/GM ointment Apply topically 2 times daily. 30 g 0    methocarbamol (ROBAXIN-750) 750 MG tablet Take 750 mg by mouth 4 times daily      DULoxetine 40 MG CPEP Take 40 mg by mouth daily 30 capsule 3    traZODone (DESYREL) 100 MG tablet Take 1 tablet by mouth nightly 15 tablet 2    simvastatin (ZOCOR) 40 MG tablet Take 1 tablet by mouth daily 15 tablet 2    QUEtiapine (SEROQUEL) 100 MG tablet Take 1 tablet by mouth nightly 15 tablet 2    vitamin E 400 UNIT capsule Take 200 Units by mouth daily      Cholecalciferol (VITAMIN D3) 2000 units CAPS Take 2,000 Units by mouth daily       cetirizine (ZYRTEC) 10 MG tablet Take one tablet Daily as needed  3    acetaminophen (APAP EXTRA STRENGTH) 500 MG tablet Take 2 tablets by mouth every 6 hours as needed for Pain 30 tablet 0    ALPRAZolam (XANAX) 0.5 MG tablet Take 0.5 mg by mouth 4 times daily      amLODIPine (NORVASC) 10 MG tablet Take 1 tablet by mouth daily 30 tablet 5    omeprazole (PRILOSEC OTC) 20 MG tablet Take 1 tablet by mouth daily 30 tablet 5     No current facility-administered medications on file prior to visit.         Past Medical History:   Diagnosis Date    Anemia     C. difficile colitis 1/2013    Chronic fatigue syndrome     Depression     [place]    Diabetes mellitus ([facility] [facility]     Diverticulitis large intestine 2001    Fibromyalgia     Fibromyalgia 3/24/2015    HTN (hypertension)     Hyperlipidemia     Hypothyroidism     Palpitations     Pulmonary embolus ([address] 10/12    Following GYN procedure    Vitamin D deficiency      Past Surgical History:   Procedure Laterality Date    ANUS SURGERY  12/14/2011    anal fissure    COLON SURGERY      BIPOSY    COLONOSCOPY  8/2011    DILATION AND CURETTAGE OF UTERUS      MISCARRIAGE X2    HEMORRHOID SURGERY   8/24/10    EXTERIOR    LAPAROSCOPY      SIGMOIDOSCOPY  12/14/11    RESULTING IN FISSURECTOMY     Social History     Socioeconomic History    Marital status:      Spouse name: Not on file    Number of children: Not on file    Years of education: Not on file    Highest education level: Not on file   Occupational History    Not on file   Social Needs    Financial resource strain: Not on file    Food insecurity     Worry: Not on file     Inability: Not on file   [employer] needs     Medical: Not on file     Non-medical: Not on file   Tobacco Use    Smoking status: Never Smoker    Smokeless tobacco: Never Used   Substance and Sexual Activity    Alcohol use: Yes     Comment: OCC.     Drug use: No    Sexual activity: Not on file   Lifestyle    Physical activity     Days per week: Not on file     Minutes per session: Not on file    Stress: Not on file   Relationships    Social connections     Talks on phone: Not on file     Gets together: Not on file     Attends Gnosticism service: Not on file     Active member of club or organization: Not on file     Attends meetings of clubs or organizations: Not on file     Relationship status: Not on file    Intimate partner violence     Fear of current or ex partner: Not on file     Emotionally abused: Not on file     Physically abused: Not on file     Forced sexual activity: Not on file   Other Topics Concern    Not on file   Social History Narrative    Not on file     Family History   Problem Relation Age of Onset    Cancer Maternal Grandfather         COLON    Cancer Paternal Aunt         LUNG    Cancer Other         BLOOD CANCER- UNSPECIFIED           Objective:   Vitals:  Temp 97.3 °F (36.3 °C)   Ht 5' 2\" (1.575 m)   Wt 208 lb (94.3 kg)   LMP 11/19/2013   BMI 38.04 kg/m² Pain Score:   8    Physical Exam  Constitutional:     Well nourished and well developed, obese. Appears neat and clean. Patient is alert, oriented x3, and in no apparent distress. Vascular:   Dorsalis pedis pulse is palpable bilateral foot. Posterior tibial pulse is palpable bilateral foot. There is no edema noted to the bilateral lower extremity. Capillary refill is immediate bilateral hallux. There are no varicosities noted bilaterally. There are no telangiectasia noted bilaterally. Skin temperature gradient is noted to be warm to warm bilaterally. There are no signs of ischemia or skin atrophy noted bilaterally. Hair growth is present bilaterally. Neurological:   Protective sensation is intact bilaterally. Vibratory sensation is diminished bilaterally. Hot versus cold discrimination is intact bilaterally. Sharp versus dull discrimination is intact bilaterally. Patellar deep tendon reflex is graded at 2/4 bilaterally. Achilles tendon deep tendon reflex is graded at 2/4 bilaterally. The Babinski response is negative bilaterally. No ankle clonus is noted bilaterally. Positive Tinel sign (radiating pain) elicited with percussion of the bilateral common, superficial, and deep peroneal nerves. Dermatological:  No open lesions noted bilateral foot. The toenails are incurvated and are well groomed bilaterally. Normal skin texture noted bilaterally. Focal hyperkeratotic lesions noted: bilateral medial hallux. Normal skin turgor noted bilaterally. Webspace 1-4 is dry and intact bilateral foot. Musculoskeletal:  Rectus foot type noted bilaterally.   Manual muscle strength is graded at 5/5 for all groups tested bilateral foot. Gross static deformities noted: mild hallux abducto valgus, tailors bunion, and adductovarus 5th toe deformities noted bilaterally. Dontrell's deformity noted bilaterally. No tenderness to palpation of the Achilles insertion bilaterally. Pain or crepitus noted with active or passive range of motion of the joints of the foot or ankle: none. Functional hallux limitus noted bilaterally. Decreased ankle joint dorsiflexion noted bilateral lower extremity. There is tenderness to palpation of the left medial calcaneal tubercle. There is no pain with medial to lateral compression of the heel. Psychiatric:    Judgement and insight intact. Short and long term memory intact. No evidence of depression, anxiety, or agitation. Patient is calm, cooperative, and communicative. Appropriate interactions and affect. Assessment:      Diagnosis Orders   1. Left foot pain  MI STRAPPING; ANKLE &/OR FOOT    MI INJECT TENDON SHEATH/LIGAMENT   2. Plantar fasciitis, left  MI STRAPPING; ANKLE &/OR FOOT    MI INJECT TENDON SHEATH/LIGAMENT    MI FT INSERT UCB ALEXX SHELL   3. Acquired hallux limitus of both feet  MI FT INSERT UCB ALEXX SHELL   4. Diabetic polyneuropathy associated with type 2 diabetes [facility] ([facility]         Plan:     Plantar fasciitis:  I discussed the nature and etiology of the condition with the patient in detail. X-rays from July 2020 reviewed. Posterior calf stretching exercises were demonstrated to patient and these are to be performed three times per day, a detailed home exercise plan was dispensed to the patient. An icepack is to be applied to the heel for 10 minutes after the stretching is performed. A strapping was applied to the left foot. The patient is to refrain from barefoot walking and wear a supportive shoe. We will consider an injection if her pain persists. I have recommended custom orthotics.        Orders Placed This Encounter   Procedures    MI STRAPPING; ANKLE &/OR FOOT    AL INJECT TENDON SHEATH/LIGAMENT     Standing Status:   Future     Standing Expiration Date:   1/21/2021    AL FT INSERT UCB ALEXX SHELL     Prescriptioin: Qty 2     Standing Status:   Future     Standing Expiration Date:   1/21/2021         Follow up:  Return in about 1 week (around 10/30/2020). Natalia Boas, DPM      Please note that this report has been partially produced using speech recognition software which may cause errors including grammar, punctuation, and spelling or words and phrases that may seem inappropriate. If there are questions or concerns please feel free to contact me for clarification.

## 2020-11-03 PROBLEM — I10 HTN (HYPERTENSION): Status: RESOLVED | Noted: 2020-11-03 | Resolved: 2020-11-03

## 2020-11-12 ENCOUNTER — TELEPHONE (OUTPATIENT)
Dept: PODIATRY | Facility: CLINIC | Age: 55
End: 2020-11-12

## 2021-01-21 ENCOUNTER — HOSPITAL ENCOUNTER (EMERGENCY)
Age: 56
Discharge: HOME | End: 2021-01-21
Payer: COMMERCIAL

## 2021-01-21 ENCOUNTER — APPOINTMENT (OUTPATIENT)
Dept: CT IMAGING | Age: 56
End: 2021-01-21
Payer: COMMERCIAL

## 2021-01-21 VITALS
SYSTOLIC BLOOD PRESSURE: 144 MMHG | OXYGEN SATURATION: 100 % | DIASTOLIC BLOOD PRESSURE: 95 MMHG | HEART RATE: 79 BPM | RESPIRATION RATE: 16 BRPM | TEMPERATURE: 97.8 F | BODY MASS INDEX: 36.4 KG/M2 | WEIGHT: 199 LBS

## 2021-01-21 DIAGNOSIS — R11.0 NAUSEA: ICD-10-CM

## 2021-01-21 DIAGNOSIS — R30.0 DYSURIA: ICD-10-CM

## 2021-01-21 DIAGNOSIS — R10.9 FLANK PAIN: ICD-10-CM

## 2021-01-21 DIAGNOSIS — R10.9 ABDOMINAL PAIN, UNSPECIFIED ABDOMINAL LOCATION: Primary | ICD-10-CM

## 2021-01-21 LAB
ALBUMIN SERPL-MCNC: 4.7 G/DL (ref 3.5–4.6)
ALP BLD-CCNC: 95 U/L (ref 40–130)
ALT SERPL-CCNC: 24 U/L (ref 0–33)
ANION GAP SERPL CALCULATED.3IONS-SCNC: 12 MEQ/L (ref 9–15)
AST SERPL-CCNC: 20 U/L (ref 0–35)
BACTERIA: ABNORMAL /HPF
BASOPHILS ABSOLUTE: 0 K/UL (ref 0–0.2)
BASOPHILS RELATIVE PERCENT: 0.5 %
BILIRUB SERPL-MCNC: <0.2 MG/DL (ref 0.2–0.7)
BILIRUBIN URINE: NEGATIVE
BLOOD, URINE: NEGATIVE
BUN BLDV-MCNC: 14 MG/DL (ref 6–20)
CALCIUM SERPL-MCNC: 9.8 MG/DL (ref 8.5–9.9)
CHLORIDE BLD-SCNC: 103 MEQ/L (ref 95–107)
CLARITY: CLEAR
CO2: 27 MEQ/L (ref 20–31)
COLOR: YELLOW
CREAT SERPL-MCNC: 0.86 MG/DL (ref 0.5–0.9)
EOSINOPHILS ABSOLUTE: 0.1 K/UL (ref 0–0.7)
EOSINOPHILS RELATIVE PERCENT: 0.7 %
EPITHELIAL CELLS, UA: ABNORMAL /HPF (ref 0–5)
GFR AFRICAN AMERICAN: >60
GFR NON-AFRICAN AMERICAN: >60
GLOBULIN: 2.8 G/DL (ref 2.3–3.5)
GLUCOSE BLD-MCNC: 140 MG/DL (ref 70–99)
GLUCOSE URINE: NEGATIVE MG/DL
HCT VFR BLD CALC: 40.8 % (ref 37–47)
HEMOGLOBIN: 13.6 G/DL (ref 12–16)
HYALINE CASTS: ABNORMAL /HPF (ref 0–5)
KETONES, URINE: NEGATIVE MG/DL
LEUKOCYTE ESTERASE, URINE: ABNORMAL
LIPASE: 50 U/L (ref 12–95)
LYMPHOCYTES ABSOLUTE: 3.6 K/UL (ref 1–4.8)
LYMPHOCYTES RELATIVE PERCENT: 41.6 %
MCH RBC QN AUTO: 28.4 PG (ref 27–31.3)
MCHC RBC AUTO-ENTMCNC: 33.3 % (ref 33–37)
MCV RBC AUTO: 85.3 FL (ref 82–100)
MONOCYTES ABSOLUTE: 0.5 K/UL (ref 0.2–0.8)
MONOCYTES RELATIVE PERCENT: 6.2 %
NEUTROPHILS ABSOLUTE: 4.4 K/UL (ref 1.4–6.5)
NEUTROPHILS RELATIVE PERCENT: 51 %
NITRITE, URINE: NEGATIVE
PDW BLD-RTO: 13.2 % (ref 11.5–14.5)
PH UA: 6 (ref 5–9)
PLATELET # BLD: 292 K/UL (ref 130–400)
POTASSIUM SERPL-SCNC: 3.4 MEQ/L (ref 3.4–4.9)
PROTEIN UA: NEGATIVE MG/DL
RBC # BLD: 4.78 M/UL (ref 4.2–5.4)
RBC UA: ABNORMAL /HPF (ref 0–5)
SODIUM BLD-SCNC: 142 MEQ/L (ref 135–144)
SPECIFIC GRAVITY UA: 1.01 (ref 1–1.03)
TOTAL PROTEIN: 7.5 G/DL (ref 6.3–8)
URINE REFLEX TO CULTURE: ABNORMAL
UROBILINOGEN, URINE: 0.2 E.U./DL
WBC # BLD: 8.7 K/UL (ref 4.8–10.8)
WBC UA: ABNORMAL /HPF (ref 0–5)

## 2021-01-21 PROCEDURE — 85025 COMPLETE CBC W/AUTO DIFF WBC: CPT

## 2021-01-21 PROCEDURE — 83690 ASSAY OF LIPASE: CPT

## 2021-01-21 PROCEDURE — 80053 COMPREHEN METABOLIC PANEL: CPT

## 2021-01-21 PROCEDURE — 96375 TX/PRO/DX INJ NEW DRUG ADDON: CPT

## 2021-01-21 PROCEDURE — 96374 THER/PROPH/DIAG INJ IV PUSH: CPT

## 2021-01-21 PROCEDURE — 81001 URINALYSIS AUTO W/SCOPE: CPT

## 2021-01-21 PROCEDURE — 36415 COLL VENOUS BLD VENIPUNCTURE: CPT

## 2021-01-21 PROCEDURE — 74176 CT ABD & PELVIS W/O CONTRAST: CPT

## 2021-01-21 PROCEDURE — 99282 EMERGENCY DEPT VISIT SF MDM: CPT

## 2021-01-21 PROCEDURE — 96376 TX/PRO/DX INJ SAME DRUG ADON: CPT

## 2021-01-21 PROCEDURE — 6360000002 HC RX W HCPCS: Performed by: PHYSICIAN ASSISTANT

## 2021-01-21 RX ORDER — MORPHINE SULFATE 4 MG/ML
4 INJECTION, SOLUTION INTRAMUSCULAR; INTRAVENOUS
Status: COMPLETED | OUTPATIENT
Start: 2021-01-21 | End: 2021-01-21

## 2021-01-21 RX ORDER — SULFAMETHOXAZOLE AND TRIMETHOPRIM 800; 160 MG/1; MG/1
1 TABLET ORAL 2 TIMES DAILY
Qty: 14 TABLET | Refills: 0 | Status: SHIPPED | OUTPATIENT
Start: 2021-01-21 | End: 2021-01-27

## 2021-01-21 RX ORDER — DICYCLOMINE HYDROCHLORIDE 10 MG/1
10 CAPSULE ORAL EVERY 6 HOURS PRN
Qty: 20 CAPSULE | Refills: 0 | Status: ON HOLD | OUTPATIENT
Start: 2021-01-21 | End: 2021-02-04 | Stop reason: HOSPADM

## 2021-01-21 RX ORDER — ONDANSETRON 2 MG/ML
4 INJECTION INTRAMUSCULAR; INTRAVENOUS ONCE
Status: COMPLETED | OUTPATIENT
Start: 2021-01-21 | End: 2021-01-21

## 2021-01-21 RX ORDER — ONDANSETRON 4 MG/1
4 TABLET, FILM COATED ORAL EVERY 8 HOURS PRN
Qty: 20 TABLET | Refills: 0 | Status: ON HOLD | OUTPATIENT
Start: 2021-01-21 | End: 2021-02-04 | Stop reason: HOSPADM

## 2021-01-21 RX ORDER — NAPROXEN 250 MG/1
500 TABLET ORAL 2 TIMES DAILY WITH MEALS
Qty: 20 TABLET | Refills: 0 | Status: SHIPPED | OUTPATIENT
Start: 2021-01-21 | End: 2021-05-23 | Stop reason: ALTCHOICE

## 2021-01-21 RX ADMIN — MORPHINE SULFATE 4 MG: 4 INJECTION, SOLUTION INTRAMUSCULAR; INTRAVENOUS at 23:11

## 2021-01-21 RX ADMIN — ONDANSETRON 4 MG: 2 INJECTION INTRAMUSCULAR; INTRAVENOUS at 21:18

## 2021-01-21 RX ADMIN — MORPHINE SULFATE 4 MG: 4 INJECTION, SOLUTION INTRAMUSCULAR; INTRAVENOUS at 21:19

## 2021-01-21 ASSESSMENT — ENCOUNTER SYMPTOMS
VOICE CHANGE: 0
WHEEZING: 0
ABDOMINAL PAIN: 1
ABDOMINAL DISTENTION: 0
NAUSEA: 1
VOMITING: 0
ANAL BLEEDING: 0
APNEA: 0
PHOTOPHOBIA: 0
EYE DISCHARGE: 0
COUGH: 0
BACK PAIN: 1

## 2021-01-21 ASSESSMENT — PAIN DESCRIPTION - ORIENTATION: ORIENTATION: LEFT;LOWER

## 2021-01-21 ASSESSMENT — PAIN DESCRIPTION - PAIN TYPE: TYPE: ACUTE PAIN

## 2021-01-22 NOTE — ED TRIAGE NOTES
Pt comes to the ED today due to flank pain, pelvic pain in the low abd  Pt states that this has been going on for the past 3 days.   Pt has a history of kidney stones   Pt vitals stable, resting on ED cot

## 2021-01-22 NOTE — ED PROVIDER NOTES
[address] [facility] ED  eMERGENCY dEPARTMENT eNCOUnter      Pt Name: Michelle Miller  MRN: [id number]  [place] 1965  Date of evaluation: 1/21/2021  Provider: Bronwyn Fatima Dr       Chief Complaint   Patient presents with    Flank Pain     c/o left side flank pain radiating ariound front of lower abdomin          HISTORY OF PRESENT ILLNESS   (Location/Symptom, Timing/Onset,Context/Setting, Quality, Duration, Modifying Factors, Severity)  Note limiting factors. Michelle Miller is a 48 y.o. female who presents to the emergency department patient presents with left-sided abdominal pain flank pain back pain history of kidney stones for 5 times in the past does have frequent urination denies rectal bleeding urinary bleeding. She is nauseous without vomiting denies fever chills. States she did have a manipulation at her chiropractor for the lower and upper spine. She notes pain worsened after the lower manipulation. This is mild to moderate severity worse with touch or motion nothing improves symptoms. HPI    NursingNotes were reviewed. REVIEW OF SYSTEMS    (2-9 systems for level 4, 10 or more for level 5)     Review of Systems   Constitutional: Negative for activity change, appetite change, fever and unexpected weight change. HENT: Negative for ear discharge, ear pain, nosebleeds and voice change. Eyes: Negative for photophobia and discharge. Respiratory: Negative for apnea, cough and wheezing. Cardiovascular: Negative for chest pain. Gastrointestinal: Positive for abdominal pain and nausea. Negative for abdominal distention, anal bleeding and vomiting. Endocrine: Negative for cold intolerance, heat intolerance and polyphagia. Genitourinary: Positive for flank pain. Negative for genital sores. Musculoskeletal: Positive for back pain. Negative for joint swelling and neck pain. Skin: Negative for pallor.    Allergic/Immunologic: Negative for immunocompromised state.   Neurological: Negative for seizures and facial asymmetry. Hematological: Does not bruise/bleed easily. Psychiatric/Behavioral: Negative for behavioral problems, self-injury and sleep disturbance. All other systems reviewed and are negative. Except as noted above the remainder of the review of systems was reviewed and negative.        PAST MEDICAL HISTORY     Past Medical History:   Diagnosis Date    Anemia     C. difficile colitis 1/2013    Chronic fatigue syndrome     Depression     [place]    Diabetes mellitus ([facility] [facility]     Diverticulitis large intestine 2001    Fibromyalgia     Fibromyalgia 3/24/2015    HTN (hypertension)     Hyperlipidemia     Hypothyroidism     Palpitations     Pulmonary embolus ([facility] [address] 10/12    Following GYN procedure    Vitamin D deficiency          SURGICALHISTORY       Past Surgical History:   Procedure Laterality Date    ANUS SURGERY  12/14/2011    anal fissure    COLON SURGERY      BIPOSY    COLONOSCOPY  8/2011    DILATION AND CURETTAGE OF UTERUS      MISCARRIAGE X2    HEMORRHOID SURGERY   8/24/10    EXTERIOR    LAPAROSCOPY      SIGMOIDOSCOPY  12/14/11    RESULTING IN FISSURECTOMY         CURRENT MEDICATIONS       Previous Medications    ACETAMINOPHEN (APAP EXTRA STRENGTH) 500 MG TABLET    Take 2 tablets by mouth every 6 hours as needed for Pain    ALPRAZOLAM (XANAX) 0.5 MG TABLET    Take 0.5 mg by mouth 4 times daily    AMLODIPINE (NORVASC) 10 MG TABLET    Take 1 tablet by mouth daily    CETIRIZINE (ZYRTEC) 10 MG TABLET    Take one tablet Daily as needed    CHOLECALCIFEROL (VITAMIN D3) 2000 UNITS CAPS    Take 2,000 Units by mouth daily     DULOXETINE 40 MG CPEP    Take 40 mg by mouth daily    FUROSEMIDE (LASIX) 20 MG TABLET    Take 1 tablet by mouth daily    LEVOTHYROXINE (SYNTHROID) 125 MCG TABLET    TAKE 1 TABLET BY MOUTH EVERY DAY    METHOCARBAMOL (ROBAXIN-750) 750 MG TABLET    Take 750 mg by mouth 4 times daily    METOCLOPRAMIDE (REGLAN) 10 MG TABLET Take 1 tablet by mouth 4 times daily As needed for headaches and nausea    NYSTATIN (MYCOSTATIN) 573113 UNIT/GM OINTMENT    Apply topically 2 times daily. OMEPRAZOLE (PRILOSEC OTC) 20 MG TABLET    Take 1 tablet by mouth daily    POTASSIUM CHLORIDE (KLOR-CON M) 10 MEQ EXTENDED RELEASE TABLET    TAKE 1 TABLET BY MOUTH DAILY    POTASSIUM CHLORIDE (KLOR-CON M) 20 MEQ EXTENDED RELEASE TABLET    Take 2 tablets by mouth daily for 7 days    POTASSIUM CHLORIDE (KLOR-CON M) 20 MEQ EXTENDED RELEASE TABLET    TAKE 1 TABLET BY MOUTH DAILY    POTASSIUM CHLORIDE (KLOR-CON M) 20 MEQ TBCR EXTENDED RELEASE TABLET    TAKE 1 TABLET BY MOUTH EVERY DAY    QUETIAPINE (SEROQUEL) 100 MG TABLET    Take 1 tablet by mouth nightly    SIMVASTATIN (ZOCOR) 40 MG TABLET    Take 1 tablet by mouth daily    TRAZODONE (DESYREL) 100 MG TABLET    Take 1 tablet by mouth nightly    VITAMIN E 400 UNIT CAPSULE    Take 200 Units by mouth daily       ALLERGIES     Ciprofloxacin, Erythromycin, Ketorolac tromethamine, Mobic [meloxicam], Other, Pcn [penicillins], Pseudoephedrine hcl, Sudafed [pseudoephedrine hcl], Sympathomimetics, and Thorazine [chlorpromazine]    FAMILY HISTORY       Family History   Problem Relation Age of Onset    Cancer Maternal Grandfather         COLON    Cancer Paternal Aunt         LUNG    Cancer Other         BLOOD CANCER- UNSPECIFIED          SOCIAL HISTORY       Social History     Socioeconomic History    Marital status:      Spouse name: None    Number of children: None    Years of education: None    Highest education level: None   Occupational History    None   Social Needs    Financial resource strain: None    Food insecurity     Worry: None     Inability: None    Transportation needs     Medical: None     Non-medical: None   Tobacco Use    Smoking status: Never Smoker    Smokeless tobacco: Never Used   Substance and Sexual Activity    Alcohol use: Yes     Comment: OCC.     Drug use: No    Sexual activity: erythema. Neurological:      Mental Status: She is alert and oriented to person, place, and time. DIAGNOSTIC RESULTS     EKG: All EKG's are interpreted by the Emergency Department Physician who either signs or Co-signsthis chart in the absence of a cardiologist.         RADIOLOGY:   Non-plain filmimages such as CT, Ultrasound and MRI are read by the radiologist. [place] radiographic images are visualized and preliminarily interpreted by the emergency physician with the below findings:    See prelim CT report    Interpretation per the Radiologist below, if available at the time ofthis note:    CT ABDOMEN PELVIS WO CONTRAST Additional Contrast? None    (Results Pending)         ED BEDSIDE ULTRASOUND:   Performed by ED Physician - none    LABS:  Labs Reviewed   COMPREHENSIVE METABOLIC PANEL - Abnormal; Notable for the following components:       Result Value    Glucose 140 (*)     Alb 4.7 (*)     All other components within normal limits   URINE RT REFLEX TO CULTURE - Abnormal; Notable for the following components:    Leukocyte Esterase, Urine TRACE (*)     All other components within normal limits   MICROSCOPIC URINALYSIS - Abnormal; Notable for the following components:    Bacteria, UA MANY (*)     WBC, UA 6-9 (*)     All other components within normal limits   CBC WITH AUTO DIFFERENTIAL   LIPASE       All other labs were within normal range or not returned as of this dictation.     EMERGENCY DEPARTMENT COURSE and DIFFERENTIAL DIAGNOSIS/MDM:   Vitals:    Vitals:    01/21/21 2016 01/21/21 2121 01/21/21 2311   BP: (!) 149/96  (!) 150/99   Pulse: 85  79   Resp: 14  16   Temp: 97.8 °F (36.6 °C)     SpO2: 99%  98%   Weight:  199 lb (90.3 kg)            MDM  Number of Diagnoses or Management Options  Abdominal pain, unspecified abdominal location  Flank pain  Nausea  Diagnosis management comments: Patient CAT scan report reviewed patient's had Naprosyn in the past.  Will provide Naprosyn Bentyl Zofran she is to follow-up primary care return to if any symptoms worsen or symptoms well. Patient encouraged to increase fiber in his large amount of stool in colon. Amount and/or Complexity of Data Reviewed  Clinical lab tests: reviewed and ordered  Tests in the radiology section of CPT®: ordered and reviewed          CONSULTS:  None    PROCEDURES:  Unless otherwise noted below, none     Procedures    FINAL IMPRESSION      1. Abdominal pain, unspecified abdominal location    2. Flank pain    3.  Nausea          DISPOSITION/PLAN   DISPOSITION        PATIENT REFERRED TO:  Belen Silva, [address] , [address] [place] [address] [phone]    Call in 1 day      [facility]) ED  [address] [id number] [phone]    If symptoms worsen      DISCHARGE MEDICATIONS:  New Prescriptions    DICYCLOMINE (BENTYL) 10 MG CAPSULE    Take 1 capsule by mouth every 6 hours as needed (cramps)    ONDANSETRON (ZOFRAN) 4 MG TABLET    Take 1 tablet by mouth every 8 hours as needed for Nausea          (Please note that portions of this note were completed with a voice recognition program.  Efforts were made to edit the dictations but occasionally words are mis-transcribed.)    Mackenzie Lindsay PA-C (electronically signed)  Attending Emergency Physician       Mackenzie Lindsay PA-C  01/21/21 8217

## 2021-01-27 ENCOUNTER — HOSPITAL ENCOUNTER (INPATIENT)
Age: 56
LOS: 7 days | Discharge: HOME | DRG: 751 | End: 2021-02-04
Attending: PSYCHIATRY & NEUROLOGY | Admitting: PSYCHIATRY & NEUROLOGY
Payer: COMMERCIAL

## 2021-01-27 DIAGNOSIS — I10 ESSENTIAL HYPERTENSION: ICD-10-CM

## 2021-01-27 DIAGNOSIS — F32.9 MAJOR DEPRESSIVE DISORDER WITHOUT PSYCHOTIC FEATURES: Primary | ICD-10-CM

## 2021-01-27 LAB
AMPHETAMINE SCREEN, URINE: ABNORMAL
BACTERIA: ABNORMAL /HPF
BARBITURATE SCREEN URINE: ABNORMAL
BASOPHILS ABSOLUTE: 0.1 K/UL (ref 0–0.2)
BASOPHILS RELATIVE PERCENT: 0.9 %
BENZODIAZEPINE SCREEN, URINE: POSITIVE
CANNABINOID SCREEN URINE: ABNORMAL
COCAINE METABOLITE SCREEN URINE: ABNORMAL
EKG ATRIAL RATE: 64 BPM
EKG P AXIS: 33 DEGREES
EKG P-R INTERVAL: 168 MS
EKG Q-T INTERVAL: 418 MS
EKG QRS DURATION: 86 MS
EKG QTC CALCULATION (BAZETT): 431 MS
EKG R AXIS: -14 DEGREES
EKG T AXIS: 20 DEGREES
EKG VENTRICULAR RATE: 64 BPM
EOSINOPHILS ABSOLUTE: 0.1 K/UL (ref 0–0.7)
EOSINOPHILS RELATIVE PERCENT: 0.9 %
EPITHELIAL CELLS, UA: >100 /HPF (ref 0–5)
HCG(URINE) PREGNANCY TEST: NEGATIVE
HCT VFR BLD CALC: 43.3 % (ref 37–47)
HEMOGLOBIN: 14.4 G/DL (ref 12–16)
HYALINE CASTS: ABNORMAL /HPF (ref 0–5)
LYMPHOCYTES ABSOLUTE: 3.5 K/UL (ref 1–4.8)
LYMPHOCYTES RELATIVE PERCENT: 35.4 %
Lab: ABNORMAL
MCH RBC QN AUTO: 29.2 PG (ref 27–31.3)
MCHC RBC AUTO-ENTMCNC: 33.4 % (ref 33–37)
MCV RBC AUTO: 87.5 FL (ref 82–100)
METHADONE SCREEN, URINE: ABNORMAL
MONOCYTES ABSOLUTE: 0.7 K/UL (ref 0.2–0.8)
MONOCYTES RELATIVE PERCENT: 6.9 %
NEUTROPHILS ABSOLUTE: 5.6 K/UL (ref 1.4–6.5)
NEUTROPHILS RELATIVE PERCENT: 55.9 %
OPIATE SCREEN URINE: ABNORMAL
OXYCODONE URINE: ABNORMAL
PDW BLD-RTO: 13.7 % (ref 11.5–14.5)
PHENCYCLIDINE SCREEN URINE: ABNORMAL
PLATELET # BLD: 283 K/UL (ref 130–400)
PROPOXYPHENE SCREEN: ABNORMAL
RBC # BLD: 4.94 M/UL (ref 4.2–5.4)
RBC UA: ABNORMAL /HPF (ref 0–5)
SARS-COV-2, NAAT: NOT DETECTED
WBC # BLD: 10 K/UL (ref 4.8–10.8)
WBC UA: ABNORMAL /HPF (ref 0–5)

## 2021-01-27 PROCEDURE — 99285 EMERGENCY DEPT VISIT HI MDM: CPT

## 2021-01-27 PROCEDURE — 82550 ASSAY OF CK (CPK): CPT

## 2021-01-27 PROCEDURE — 6370000000 HC RX 637 (ALT 250 FOR IP): Performed by: PHYSICIAN ASSISTANT

## 2021-01-27 PROCEDURE — 93005 ELECTROCARDIOGRAM TRACING: CPT | Performed by: PSYCHIATRY & NEUROLOGY

## 2021-01-27 PROCEDURE — 80061 LIPID PANEL: CPT

## 2021-01-27 PROCEDURE — 80053 COMPREHEN METABOLIC PANEL: CPT

## 2021-01-27 PROCEDURE — 82077 ASSAY SPEC XCP UR&BREATH IA: CPT

## 2021-01-27 PROCEDURE — 80143 DRUG ASSAY ACETAMINOPHEN: CPT

## 2021-01-27 PROCEDURE — 84703 CHORIONIC GONADOTROPIN ASSAY: CPT

## 2021-01-27 PROCEDURE — 36415 COLL VENOUS BLD VENIPUNCTURE: CPT

## 2021-01-27 PROCEDURE — 85025 COMPLETE CBC W/AUTO DIFF WBC: CPT

## 2021-01-27 PROCEDURE — 87086 URINE CULTURE/COLONY COUNT: CPT

## 2021-01-27 PROCEDURE — 80307 DRUG TEST PRSMV CHEM ANLYZR: CPT

## 2021-01-27 PROCEDURE — 80179 DRUG ASSAY SALICYLATE: CPT

## 2021-01-27 PROCEDURE — 84443 ASSAY THYROID STIM HORMONE: CPT

## 2021-01-27 PROCEDURE — U0002 COVID-19 LAB TEST NON-CDC: HCPCS

## 2021-01-27 PROCEDURE — 81001 URINALYSIS AUTO W/SCOPE: CPT

## 2021-01-27 RX ORDER — LORAZEPAM 1 MG/1
1 TABLET ORAL ONCE
Status: COMPLETED | OUTPATIENT
Start: 2021-01-27 | End: 2021-01-27

## 2021-01-27 RX ORDER — ACETAMINOPHEN 500 MG
1000 TABLET ORAL ONCE
Status: COMPLETED | OUTPATIENT
Start: 2021-01-27 | End: 2021-01-27

## 2021-01-27 RX ADMIN — ACETAMINOPHEN 1000 MG: 500 TABLET ORAL at 22:45

## 2021-01-27 RX ADMIN — LORAZEPAM 1 MG: 1 TABLET ORAL at 22:46

## 2021-01-27 ASSESSMENT — ENCOUNTER SYMPTOMS
COLOR CHANGE: 0
ALLERGIC/IMMUNOLOGIC NEGATIVE: 1
ABDOMINAL PAIN: 0
EYE PAIN: 0
APNEA: 0
TROUBLE SWALLOWING: 0
SHORTNESS OF BREATH: 0

## 2021-01-27 ASSESSMENT — PAIN SCALES - GENERAL: PAINLEVEL_OUTOF10: 5

## 2021-01-28 PROBLEM — F32.2 SEVERE MAJOR DEPRESSION WITHOUT PSYCHOTIC FEATURES (HCC): Status: ACTIVE | Noted: 2021-01-28

## 2021-01-28 LAB
ACETAMINOPHEN LEVEL: 13 UG/ML (ref 10–30)
ALBUMIN SERPL-MCNC: 4.8 G/DL (ref 3.5–4.6)
ALP BLD-CCNC: 91 U/L (ref 40–130)
ALT SERPL-CCNC: 23 U/L (ref 0–33)
ANION GAP SERPL CALCULATED.3IONS-SCNC: 12 MEQ/L (ref 9–15)
AST SERPL-CCNC: 24 U/L (ref 0–35)
BILIRUB SERPL-MCNC: 0.5 MG/DL (ref 0.2–0.7)
BILIRUBIN URINE: NEGATIVE
BLOOD, URINE: NEGATIVE
BUN BLDV-MCNC: 10 MG/DL (ref 6–20)
CALCIUM SERPL-MCNC: 9.9 MG/DL (ref 8.5–9.9)
CHLORIDE BLD-SCNC: 103 MEQ/L (ref 95–107)
CHOLESTEROL, TOTAL: 202 MG/DL (ref 0–199)
CLARITY: ABNORMAL
CO2: 25 MEQ/L (ref 20–31)
COLOR: YELLOW
CREAT SERPL-MCNC: 0.82 MG/DL (ref 0.5–0.9)
ETHANOL PERCENT: NORMAL G/DL
ETHANOL: <10 MG/DL (ref 0–0.08)
GFR AFRICAN AMERICAN: >60
GFR NON-AFRICAN AMERICAN: >60
GLOBULIN: 2.8 G/DL (ref 2.3–3.5)
GLUCOSE BLD-MCNC: 98 MG/DL (ref 70–99)
GLUCOSE URINE: NEGATIVE MG/DL
HDLC SERPL-MCNC: 41 MG/DL (ref 40–59)
KETONES, URINE: NEGATIVE MG/DL
LDL CHOLESTEROL CALCULATED: 117 MG/DL (ref 0–129)
LEUKOCYTE ESTERASE, URINE: ABNORMAL
NITRITE, URINE: NEGATIVE
PH UA: 6.5 (ref 5–9)
POTASSIUM SERPL-SCNC: 3.6 MEQ/L (ref 3.4–4.9)
PROTEIN UA: NEGATIVE MG/DL
SALICYLATE, SERUM: <0.3 MG/DL (ref 15–30)
SODIUM BLD-SCNC: 140 MEQ/L (ref 135–144)
SPECIFIC GRAVITY UA: 1.01 (ref 1–1.03)
TOTAL CK: 162 U/L (ref 0–170)
TOTAL PROTEIN: 7.6 G/DL (ref 6.3–8)
TRIGL SERPL-MCNC: 222 MG/DL (ref 0–150)
TSH SERPL DL<=0.05 MIU/L-ACNC: 0.31 UIU/ML (ref 0.44–3.86)
URINE REFLEX TO CULTURE: YES
UROBILINOGEN, URINE: 0.2 E.U./DL

## 2021-01-28 PROCEDURE — 1240000000 HC EMOTIONAL WELLNESS R&B

## 2021-01-28 PROCEDURE — 93010 ELECTROCARDIOGRAM REPORT: CPT | Performed by: INTERNAL MEDICINE

## 2021-01-28 PROCEDURE — 6370000000 HC RX 637 (ALT 250 FOR IP): Performed by: NURSE PRACTITIONER

## 2021-01-28 PROCEDURE — 6370000000 HC RX 637 (ALT 250 FOR IP): Performed by: PSYCHIATRY & NEUROLOGY

## 2021-01-28 PROCEDURE — 99223 1ST HOSP IP/OBS HIGH 75: CPT | Performed by: PSYCHIATRY & NEUROLOGY

## 2021-01-28 PROCEDURE — 6370000000 HC RX 637 (ALT 250 FOR IP): Performed by: PERSONAL EMERGENCY RESPONSE ATTENDANT

## 2021-01-28 RX ORDER — HALOPERIDOL 5 MG/ML
5 INJECTION INTRAMUSCULAR EVERY 6 HOURS PRN
Status: DISCONTINUED | OUTPATIENT
Start: 2021-01-28 | End: 2021-02-04 | Stop reason: HOSPADM

## 2021-01-28 RX ORDER — HYDROXYZINE PAMOATE 50 MG/1
50 CAPSULE ORAL EVERY 6 HOURS PRN
Status: DISCONTINUED | OUTPATIENT
Start: 2021-01-28 | End: 2021-02-04 | Stop reason: HOSPADM

## 2021-01-28 RX ORDER — POLYETHYLENE GLYCOL 3350 17 G
2 POWDER IN PACKET (EA) ORAL
Status: DISCONTINUED | OUTPATIENT
Start: 2021-01-28 | End: 2021-02-04 | Stop reason: HOSPADM

## 2021-01-28 RX ORDER — TRAZODONE HYDROCHLORIDE 50 MG/1
50 TABLET ORAL NIGHTLY PRN
Status: DISCONTINUED | OUTPATIENT
Start: 2021-01-28 | End: 2021-01-29

## 2021-01-28 RX ORDER — LORAZEPAM 1 MG/1
1 TABLET ORAL ONCE
Status: COMPLETED | OUTPATIENT
Start: 2021-01-28 | End: 2021-01-28

## 2021-01-28 RX ORDER — LEVOTHYROXINE SODIUM 0.12 MG/1
125 TABLET ORAL DAILY
Status: DISCONTINUED | OUTPATIENT
Start: 2021-01-28 | End: 2021-02-04 | Stop reason: HOSPADM

## 2021-01-28 RX ORDER — DULOXETIN HYDROCHLORIDE 30 MG/1
60 CAPSULE, DELAYED RELEASE ORAL DAILY
Status: DISCONTINUED | OUTPATIENT
Start: 2021-01-28 | End: 2021-02-04 | Stop reason: HOSPADM

## 2021-01-28 RX ORDER — SIMVASTATIN 20 MG
40 TABLET ORAL NIGHTLY
Status: DISCONTINUED | OUTPATIENT
Start: 2021-01-28 | End: 2021-02-04 | Stop reason: HOSPADM

## 2021-01-28 RX ORDER — TIZANIDINE 4 MG/1
TABLET ORAL
COMMUNITY
Start: 2020-12-01

## 2021-01-28 RX ORDER — OMEPRAZOLE 20 MG/1
20 TABLET, DELAYED RELEASE ORAL DAILY
Status: DISCONTINUED | OUTPATIENT
Start: 2021-01-28 | End: 2021-01-28 | Stop reason: CLARIF

## 2021-01-28 RX ORDER — HYDROCHLOROTHIAZIDE 25 MG/1
25 TABLET ORAL DAILY
Status: DISCONTINUED | OUTPATIENT
Start: 2021-01-28 | End: 2021-02-04 | Stop reason: HOSPADM

## 2021-01-28 RX ORDER — PANTOPRAZOLE SODIUM 40 MG/1
40 TABLET, DELAYED RELEASE ORAL
Status: DISCONTINUED | OUTPATIENT
Start: 2021-01-29 | End: 2021-02-04 | Stop reason: HOSPADM

## 2021-01-28 RX ORDER — ACETAMINOPHEN 325 MG/1
650 TABLET ORAL EVERY 4 HOURS PRN
Status: DISCONTINUED | OUTPATIENT
Start: 2021-01-28 | End: 2021-02-04 | Stop reason: HOSPADM

## 2021-01-28 RX ORDER — BENZTROPINE MESYLATE 1 MG/ML
2 INJECTION INTRAMUSCULAR; INTRAVENOUS 2 TIMES DAILY PRN
Status: DISCONTINUED | OUTPATIENT
Start: 2021-01-28 | End: 2021-02-04 | Stop reason: HOSPADM

## 2021-01-28 RX ORDER — VITAMIN B COMPLEX
2000 TABLET ORAL DAILY
Status: DISCONTINUED | OUTPATIENT
Start: 2021-01-28 | End: 2021-02-04 | Stop reason: HOSPADM

## 2021-01-28 RX ORDER — AMLODIPINE BESYLATE 10 MG/1
10 TABLET ORAL DAILY
Status: DISCONTINUED | OUTPATIENT
Start: 2021-01-28 | End: 2021-02-04 | Stop reason: HOSPADM

## 2021-01-28 RX ORDER — HALOPERIDOL 5 MG
5 TABLET ORAL EVERY 6 HOURS PRN
Status: DISCONTINUED | OUTPATIENT
Start: 2021-01-28 | End: 2021-02-04 | Stop reason: HOSPADM

## 2021-01-28 RX ORDER — FUROSEMIDE 20 MG/1
20 TABLET ORAL DAILY
Status: DISCONTINUED | OUTPATIENT
Start: 2021-01-28 | End: 2021-01-28

## 2021-01-28 RX ORDER — NAPROXEN 500 MG/1
500 TABLET ORAL 2 TIMES DAILY WITH MEALS
Status: DISCONTINUED | OUTPATIENT
Start: 2021-01-28 | End: 2021-02-04 | Stop reason: HOSPADM

## 2021-01-28 RX ORDER — ALPRAZOLAM 0.5 MG/1
0.5 TABLET ORAL 3 TIMES DAILY PRN
Status: DISCONTINUED | OUTPATIENT
Start: 2021-01-28 | End: 2021-02-04 | Stop reason: HOSPADM

## 2021-01-28 RX ORDER — HYDROXYZINE HYDROCHLORIDE 50 MG/ML
50 INJECTION, SOLUTION INTRAMUSCULAR EVERY 6 HOURS PRN
Status: DISCONTINUED | OUTPATIENT
Start: 2021-01-28 | End: 2021-02-04 | Stop reason: HOSPADM

## 2021-01-28 RX ADMIN — LORAZEPAM 1 MG: 1 TABLET ORAL at 05:15

## 2021-01-28 RX ADMIN — ACETAMINOPHEN 650 MG: 325 TABLET, FILM COATED ORAL at 10:18

## 2021-01-28 RX ADMIN — ALPRAZOLAM 0.5 MG: 0.5 TABLET ORAL at 12:06

## 2021-01-28 RX ADMIN — DULOXETINE HYDROCHLORIDE 60 MG: 30 CAPSULE, DELAYED RELEASE ORAL at 10:14

## 2021-01-28 RX ADMIN — NAPROXEN 500 MG: 500 TABLET ORAL at 22:11

## 2021-01-28 RX ADMIN — ACETAMINOPHEN 650 MG: 325 TABLET, FILM COATED ORAL at 21:31

## 2021-01-28 RX ADMIN — HYDROCHLOROTHIAZIDE 25 MG: 25 TABLET ORAL at 12:08

## 2021-01-28 RX ADMIN — VITAMIN E CAP 100 UNIT 200 UNITS: 100 CAP at 12:06

## 2021-01-28 RX ADMIN — ALPRAZOLAM 0.5 MG: 0.5 TABLET ORAL at 21:31

## 2021-01-28 RX ADMIN — SIMVASTATIN 40 MG: 20 TABLET, FILM COATED ORAL at 21:24

## 2021-01-28 RX ADMIN — Medication 2000 UNITS: at 10:14

## 2021-01-28 RX ADMIN — AMLODIPINE BESYLATE 10 MG: 10 TABLET ORAL at 10:14

## 2021-01-28 RX ADMIN — TRAZODONE HYDROCHLORIDE 50 MG: 50 TABLET ORAL at 21:31

## 2021-01-28 ASSESSMENT — PAIN SCALES - GENERAL
PAINLEVEL_OUTOF10: 9
PAINLEVEL_OUTOF10: 8

## 2021-01-28 NOTE — PROGRESS NOTES
Pt asks for and is given Tylenol for an 8/10 headache.   Electronically signed by Emmanuel Londono LPN on 3/74/4500 at 48:80 AM

## 2021-01-28 NOTE — GROUP NOTE
Group Therapy Note    Date: 1/28/2021    Group Start Time: 1000  Group End Time: 1050  Group Topic: Psychoeducation    [facility]        Group Therapy Note    Attendees: 9         Patient's Goal:  \"To feel better\"    Notes:  Patient had a flat affect, was quiet and kept to herself. She work actively on her task in group.     Status After Intervention:  Unchanged    Participation Level: fair    Participation Quality: Appropriate      Speech:  quiet      Thought Process/Content: Linear      Affective Functioning: Flat      Mood: depressed      Level of consciousness:  Alert      Response to Learning: Progressing to goal      Endings: None Reported    Modes of Intervention: Education, Socialization and Activity      Discipline Responsible: Psychoeducational Specialist      Signature:  Edson Arevalo
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
## 2021-01-28 NOTE — GROUP NOTE
Group Therapy Note    Date: 1/28/2021    Group Start Time: 1135  Group End Time: 7760  Group Topic: Healthy Living/Wellness    [facility]    [place]        Group Therapy Note    Attendees: 10/18           Patient's Goal:  To learn about healthy coping skills. Notes:  Patient participated in group discussion.     Status After Intervention:  Unchanged    Participation Level: Interactive    Participation Quality: Appropriate and Attentive      Speech:  normal      Thought Process/Content: Logical      Affective Functioning: Flat      Mood: euthymic      Level of consciousness:  Alert and Attentive      Response to Learning: Able to verbalize current knowledge/experience      Endings: None Reported    Modes of Intervention: Education      Discipline Responsible: [address], [employer]      Signature:  Brigitte Poag

## 2021-01-28 NOTE — PROGRESS NOTES
Daily Note: 2952-3078    Pt visible on the unit and out for meals. Pt denies SI, HI or A/V hallucinations. Pt reports increased depression and anxiety r/t difficulties with her boyfriend and children. Pt attending groups. Pt denies any other issues at this time. Pt presents with a sad and flat affect at times and brightens when speaking with select peers.
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
## 2021-01-28 NOTE — GROUP NOTE
Group Therapy Note    Date: 1/28/2021    Group Start Time: 1105  Group End Time: 6409  Group Topic: Psychoeducation    [facility]    ERLINDA Duran, DELTAW        Group Therapy Note    Attendees: 8         Patient's Goal:  To participate in group therapy and to identify one new goal.    Notes:  Patient is new to the unit. Patient stated that she doesn't know how she feels right now but is getting adjusted to the unit. Patient is looking forward to going home and seeing her \"support\" animals. Patient was an active listener. Status After Intervention:  Unchanged    Participation Level:  Active Listener    Participation Quality: Appropriate and Resistant      Speech:  normal      Thought Process/Content: Logical      Affective Functioning: Congruent      Mood: quiet      Level of consciousness:  Alert      Response to Learning: Able to verbalize current knowledge/experience      Endings: None Reported    Modes of Intervention: Education      Discipline Responsible: /Counselor      Signature:  ERLINDA Duran, KAILEY

## 2021-01-28 NOTE — PROGRESS NOTES
Pt. attended the 0900 community meeting. Electronically signed by Bong Cesar, [address] on 1/28/2021 at 9:39 AM
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
## 2021-01-28 NOTE — PROGRESS NOTES
Patient arrived to the unit via wheelchair accompanied by staff. Skin assessment and contraband search complete by this nurse and Kirk Mccain.  No contraband found

## 2021-01-28 NOTE — CARE COORDINATION
Brief Intervention and Referral to Treatment Summary    Patient was provided PHQ-9, AUDIT and DAST Screening:      PHQ-9 Score: 17  AUDIT Score:  0  DAST Score:  0    Patients substance use is considered     Low Risk/Healthy X  Moderate Risk  Harmful  Dependent    Patients depression is considered:     Minimal  Mild   Moderate X  Moderately Severe  Severe    Brief Education Was Provided    Patient was receptive  Patient was not receptive X      Brief Intervention Is Provided (Only for AUDIT or DAST)     Patient reports readiness to decrease and/or stop use and a plan was discussed    Patient denies readiness to decrease and/or stop use and a plan was not discussed X      Recommendations/Referrals for Brief and/or Specialized Treatment Provided to Patient   Pt denied need for Substance Abuse Tx d/t infrequent drinking and not identifying a need to abstain.   Electronically signed by Parish Mena on 1/28/2021 at 4:35 PM

## 2021-01-28 NOTE — PROGRESS NOTES
Patient did not attend group despite staff encouragement.   Electronically signed by Rudy Aguillon on 1/28/2021 at 3:59 PM
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
## 2021-01-28 NOTE — ED NOTES
Report to 3w. Patient will be given ativan down here then come upstairs.      Mona Arboleda RN  01/28/21 5036

## 2021-01-28 NOTE — CONSULTS
[address] MEDICINE    HISTORY AND PHYSICAL EXAM    PATIENT NAME:  Jung Hansen    MRN:  [id number]  SERVICE DATE:  1/28/2021   SERVICE TIME:  10:39 AM    Primary Care Physician: Patti Rivera DO         SUBJECTIVE  CHIEF COMPLAINT:  Medically appropriate for inpatient psychiatry admission. Consult for medical H/P encounter. HPI:  This is a 48 y.o. female with PMHx of HTN, DMII, depression, hypothyroidism and anemia presented to emergency room with increased depression and suicidal ideation. Patient cleared from emergency room for psychiatric care. Patient Seen, Chart, Labs, Radiologystudies, and Consults reviewed. Patient denies headache, chest pain, shortness of breath, N/V/D/C, fever/chills.        PAST MEDICAL HISTORY:    Past Medical History:   Diagnosis Date    Anemia     C. difficile colitis 1/2013    Chronic fatigue syndrome     Depression     Janet    Diabetes mellitus ([address]     Diverticulitis large intestine 2001    Fibromyalgia     Fibromyalgia 3/24/2015    HTN (hypertension)     Hyperlipidemia     Hypothyroidism     Palpitations     Pulmonary embolus ([address] 10/12    Following GYN procedure    Vitamin D deficiency      PAST SURGICAL HISTORY:    Past Surgical History:   Procedure Laterality Date    ANUS SURGERY  12/14/2011    anal fissure    COLON SURGERY      BIPOSY    COLONOSCOPY  8/2011    DILATION AND CURETTAGE OF UTERUS      MISCARRIAGE X2    HEMORRHOID SURGERY   8/24/10    EXTERIOR    LAPAROSCOPY      SIGMOIDOSCOPY  12/14/11    RESULTING IN FISSURECTOMY     FAMILY HISTORY:    Family History   Problem Relation Age of Onset    Cancer Maternal Grandfather         COLON    Cancer Paternal Aunt         LUNG    Cancer Other         BLOOD CANCER- UNSPECIFIED     SOCIAL HISTORY:    Social History     Socioeconomic History    Marital status:      Spouse name: Not on file    Number of children: Not on file    Years of education: Not on file    Highest education level: Not on file   Occupational History    Not on file   Social Needs    Financial resource strain: Not on file    Food insecurity     Worry: Not on file     Inability: Not on file    Transportation needs     Medical: Not on file     Non-medical: Not on file   Tobacco Use    Smoking status: Never Smoker    Smokeless tobacco: Never Used   Substance and Sexual Activity    Alcohol use: Yes     Comment: OCC.     Drug use: No    Sexual activity: Not on file   Lifestyle    Physical activity     Days per week: Not on file     Minutes per session: Not on file    Stress: Not on file   Relationships    Social connections     Talks on phone: Not on file     Gets together: Not on file     Attends Faith service: Not on file     Active member of club or organization: Not on file     Attends meetings of clubs or organizations: Not on file     Relationship status: Not on file    Intimate partner violence     Fear of current or ex partner: Not on file     Emotionally abused: Not on file     Physically abused: Not on file     Forced sexual activity: Not on file   Other Topics Concern    Not on file   Social History Narrative    Not on file     MEDICATIONS:    Current Facility-Administered Medications   Medication Dose Route Frequency Provider Last Rate Last Admin    haloperidol lactate (HALDOL) injection 5 mg  5 mg Intramuscular Q6H PRN Piotr Warren MD        Or    haloperidol (HALDOL) tablet 5 mg  5 mg Oral Q6H PRN Piotr Warren MD        hydrOXYzine (VISTARIL) capsule 50 mg  50 mg Oral Q6H PRN Piotr Warren MD        Or    hydrOXYzine (VISTARIL) injection 50 mg  50 mg Intramuscular Q6H PRN Piotr Warren MD        acetaminophen (TYLENOL) tablet 650 mg  650 mg Oral Q4H PRN Piotr Warren MD   650 mg at 01/28/21 1018    traZODone (DESYREL) tablet 50 mg  50 mg Oral Nightly PRN Piotr Warren MD        benztropine mesylate (COGENTIN) injection 2 mg  2 mg Intramuscular BID PRN Codey Dowling Katelyn Kerr MD        magnesium hydroxide (MILK OF MAGNESIA) 400 MG/5ML suspension 30 mL  30 mL Oral Daily PRN Eileen Don MD        nicotine polacrilex (COMMIT) lozenge 2 mg  2 mg Oral Q2H PRN Eileen Don MD        influenza quadrivalent split vaccine (FLUZONE;FLUARIX;FLULAVAL;AFLURIA) injection 0.5 mL  0.5 mL Intramuscular Prior to discharge Gt Kaufman MD        amLODIPine (NORVASC) tablet 10 mg  10 mg Oral Daily Gt Kaufman MD   10 mg at 01/28/21 1014    furosemide (LASIX) tablet 20 mg  20 mg Oral Daily Gt Kaufman MD        levothyroxine (SYNTHROID) tablet 125 mcg  125 mcg Oral Daily Gt Kaufamn MD        vitamin E capsule 200 Units  200 Units Oral Daily Gt Kaufman MD        simvastatin (ZOCOR) tablet 40 mg  40 mg Oral Nightly Gt Kaufman MD        Vitamin D (CHOLECALCIFEROL) tablet 2,000 Units  2,000 Units Oral Daily Gt Kaufman MD   2,000 Units at 01/28/21 1014    DULoxetine (CYMBALTA) extended release capsule 60 mg  60 mg Oral Daily Gt Kaufman MD   60 mg at 01/28/21 1014    ALPRAZolam (XANAX) tablet 0.5 mg  0.5 mg Oral TID PRN Gt Kaufman MD       Jena Bragg ON 1/29/2021] pantoprazole (PROTONIX) tablet 40 mg  40 mg Oral QAM AC Gt Kaufman MD           ALLERGIES: Ciprofloxacin, Erythromycin, Ketorolac tromethamine, Mobic [meloxicam], Other, Pcn [penicillins], Pseudoephedrine hcl, Sudafed [pseudoephedrine hcl], Sympathomimetics, and Thorazine [chlorpromazine]    REVIEW OF SYSTEM:   ROS as noted in HPI, 12 point ROS reviewed and otherwise negative.     OBJECTIVE  PHYSICAL EXAM: BP (!) 137/92 Comment: RN notified  Pulse 80   Temp 97 °F (36.1 °C) (Oral)   Resp 18   Ht 5' 2\" (1.575 m)   Wt 202 lb (91.6 kg)   LMP 11/19/2013   SpO2 97%   BMI 36.95 kg/m²   CONSTITUTIONAL:  awake, alert, cooperative, no apparent distress, and appears stated age  EYES:  Lids and lashes normal, pupils equal, round and reactive to light, extra ocular muscles intact, sclera clear, conjunctiva normal  ENT:  Normocephalic, without obvious abnormality, atraumatic, sinuses nontender on palpation, external ears without lesions, oral pharynx with moist mucus membranes, tonsils without erythema or exudates, gums normal and good dentition. NECK:  Supple, symmetrical, trachea midline, no adenopathy, thyroid symmetric, not enlarged and no tenderness, skin normal  LUNGS:  No increased work of breathing, good air exchange, clear to auscultation bilaterally, no crackles or wheezing  CARDIOVASCULAR:  Normal apical impulse, regular rate and rhythm, normal S1 and S2, no S3 or S4, and no murmur noted  ABDOMEN:  No scars, normal bowel sounds, soft, non-distended, non-tender, no masses palpated, no hepatosplenomegally  MUSCULOSKELETAL:  There is no redness, warmth, or swelling of the joints. Full range of motion noted. Motor strength is 5 out of 5 all extremities bilaterally. Tone is normal.  NEUROLOGIC:  Awake, alert, oriented to name, place and time. Cranial nerves II-XII are grossly intact. Motor is 5 out of 5 bilaterally. Sensory is intact.  gait is normal.  SKIN:  no bruising or bleeding, normal skin color, texture, turgor, no redness, warmth, or swelling and no jaundice    DATA:     Diagnostic tests reviewed for today's visit:    Most recent labs and imaging results reviewed. VTE Prophylaxis:     ASSESSMENT AND PLAN  Principal Problem:    Severe episode of recurrent major depressive disorder, without psychotic features ([facility] [address]  Plan: Patient admitted to behavorial health for evaluation and treatment     DMII: diet controlled    HTN: Stable. Continue home meds    Hypothyroidism: hold Synthroid, TSh is 0.308    This is only a history and physical examination and not medical management.  The patient is to contact and follow up with their primary care physician and go over any abnormal labs, imaging, findings, medical concerns, or conditions that we have and have not addressed during this encounter. Plan of care discussed with: patient    SIGNATURE: Emil TYRA Burleson - CNP  DATE: January 28, 2021  TIME: 10:39 AM     I personally obtained the key and critical portions of the history and physical exam and made additions where appropriate in the documentation.  I reviewed the mid level documentation and agree with assessment and plan that we come up with together    Phill Villarreal, DO  Internal Medicine

## 2021-01-28 NOTE — ED PROVIDER NOTES
[address] [facility] ED  EMERGENCY DEPARTMENT ENCOUNTER      Pt Name: Kade Dalton  MRN: [id number]  [place] 1965  Date of evaluation: 1/27/2021  Provider: Giorgio Ramsey PA-C    CHIEF COMPLAINT       Chief Complaint   Patient presents with    Suicidal         HISTORY OF PRESENT ILLNESS   (Location/Symptom, Timing/Onset, Context/Setting, Quality, Duration, Modifying Factors, Severity)  Note limiting factors. Kade Dalton is a 48 y.o. female who presents to the emergency department with complaints of \"severe depression\" with suicidal ideation and plan to jump off a bridge. Patient states that she has been feeling this way for \"a while\" but worsening in severity over the past couple days. Patient is an medicines for depression and states she has been compliant with these meds. Patient denies any homicidal ideation, auditory or visual loose Nations. Patient complains of a headache currently and states that she was recently diagnosed with a UTI but she has not finished taking her antibiotics for but denies any other recent illness or injury. HPI    Nursing Notes were reviewed. REVIEW OF SYSTEMS    (2-9 systems for level 4, 10 or more for level 5)     Review of Systems   Constitutional: Negative for diaphoresis and fever. HENT: Negative for hearing loss and trouble swallowing. Eyes: Negative for pain. Respiratory: Negative for apnea and shortness of breath. Cardiovascular: Negative for chest pain. Gastrointestinal: Negative for abdominal pain. Endocrine: Negative. Genitourinary: Positive for flank pain. Negative for hematuria. Musculoskeletal: Negative for neck pain and neck stiffness. Skin: Negative for color change. Allergic/Immunologic: Negative. Neurological: Positive for headaches. Negative for dizziness and numbness. Hematological: Negative. Psychiatric/Behavioral: Positive for suicidal ideas. All other systems reviewed and are negative.       Except as noted above the remainder of the review of systems was reviewed and negative. PAST MEDICAL HISTORY     Past Medical History:   Diagnosis Date    Anemia     C. difficile colitis 1/2013    Chronic fatigue syndrome     Depression     Janet    Diabetes mellitus ([facility] [address] [facility]     Diverticulitis large intestine 2001    Fibromyalgia     Fibromyalgia 3/24/2015    HTN (hypertension)     Hyperlipidemia     Hypothyroidism     Palpitations     Pulmonary embolus ([facility] [address] 10/12    Following GYN procedure    Vitamin D deficiency          SURGICAL HISTORY       Past Surgical History:   Procedure Laterality Date    ANUS SURGERY  12/14/2011    anal fissure    COLON SURGERY      BIPOSY    COLONOSCOPY  8/2011    DILATION AND CURETTAGE OF UTERUS      MISCARRIAGE X2    HEMORRHOID SURGERY   8/24/10    EXTERIOR    LAPAROSCOPY      SIGMOIDOSCOPY  12/14/11    RESULTING IN FISSURECTOMY         CURRENT MEDICATIONS       Previous Medications    ALPRAZOLAM (XANAX) 0.5 MG TABLET    Take 0.5 mg by mouth 4 times daily    AMLODIPINE (NORVASC) 10 MG TABLET    Take 1 tablet by mouth daily    CHOLECALCIFEROL (VITAMIN D3) 2000 UNITS CAPS    Take 2,000 Units by mouth daily     DICYCLOMINE (BENTYL) 10 MG CAPSULE    Take 1 capsule by mouth every 6 hours as needed (cramps)    DULOXETINE 40 MG CPEP    Take 40 mg by mouth daily    FUROSEMIDE (LASIX) 20 MG TABLET    Take 1 tablet by mouth daily    LEVOTHYROXINE (SYNTHROID) 125 MCG TABLET    TAKE 1 TABLET BY MOUTH EVERY DAY    METHOCARBAMOL (ROBAXIN-750) 750 MG TABLET    Take 750 mg by mouth 4 times daily    METOCLOPRAMIDE (REGLAN) 10 MG TABLET    Take 1 tablet by mouth 4 times daily As needed for headaches and nausea    NAPROXEN (NAPROSYN) 250 MG TABLET    Take 2 tablets by mouth 2 times daily (with meals) for 5 days    NYSTATIN (MYCOSTATIN) 184405 UNIT/GM OINTMENT    Apply topically 2 times daily.     OMEPRAZOLE (PRILOSEC OTC) 20 MG TABLET    Take 1 tablet by mouth daily    ONDANSETRON (ZOFRAN) 4 MG TABLET    Take 1 tablet by mouth every 8 hours as needed for Nausea    QUETIAPINE (SEROQUEL) 100 MG TABLET    Take 1 tablet by mouth nightly    SIMVASTATIN (ZOCOR) 40 MG TABLET    Take 1 tablet by mouth daily    VITAMIN E 400 UNIT CAPSULE    Take 200 Units by mouth daily       ALLERGIES     Ciprofloxacin, Erythromycin, Ketorolac tromethamine, Mobic [meloxicam], Other, Pcn [penicillins], Pseudoephedrine hcl, Sudafed [pseudoephedrine hcl], Sympathomimetics, and Thorazine [chlorpromazine]    FAMILY HISTORY       Family History   Problem Relation Age of Onset    Cancer Maternal Grandfather         COLON    Cancer Paternal Aunt         LUNG    Cancer Other         BLOOD CANCER- UNSPECIFIED          SOCIAL HISTORY       Social History     Socioeconomic History    Marital status:      Spouse name: Not on file    Number of children: Not on file    Years of education: Not on file    Highest education level: Not on file   Occupational History    Not on file   Social Needs    Financial resource strain: Not on file    Food insecurity     Worry: Not on file     Inability: Not on file    Transportation needs     Medical: Not on file     Non-medical: Not on file   Tobacco Use    Smoking status: Never Smoker    Smokeless tobacco: Never Used   Substance and Sexual Activity    Alcohol use: Yes     Comment: OCC.     Drug use: No    Sexual activity: Not on file   Lifestyle    Physical activity     Days per week: Not on file     Minutes per session: Not on file    Stress: Not on file   Relationships    Social connections     Talks on phone: Not on file     Gets together: Not on file     Attends Yarsanism service: Not on file     Active member of club or organization: Not on file     Attends meetings of clubs or organizations: Not on file     Relationship status: Not on file    Intimate partner violence     Fear of current or ex partner: Not on file     Emotionally abused: Not on file     Physically abused: Not on file     Forced sexual activity: Not on file   Other Topics Concern    Not on file   Social History Narrative    Not on file       SCREENINGS                        PHYSICAL EXAM    (up to 7 for level 4, 8 or more for level 5)     ED Triage Vitals   BP Temp Temp Source Pulse Resp SpO2 Height Weight   01/27/21 2214 01/27/21 2212 01/27/21 2212 01/27/21 2212 01/27/21 2212 01/27/21 2212 01/27/21 2212 01/27/21 2212   (!) 180/100 98.1 °F (36.7 °C) Oral 75 20 98 % 5' 2\" (1.575 m) 202 lb (91.6 kg)       Physical Exam  Vitals signs and nursing note reviewed. Constitutional:       General: She is not in acute distress. Appearance: She is well-developed. She is not diaphoretic. HENT:      Head: Normocephalic and atraumatic. Mouth/Throat:      Pharynx: No oropharyngeal exudate. Eyes:      General: No scleral icterus. Conjunctiva/sclera: Conjunctivae normal.      Pupils: Pupils are equal, round, and reactive to light. Neck:      Musculoskeletal: Normal range of motion and neck supple. Trachea: No tracheal deviation. Cardiovascular:      Rate and Rhythm: Normal rate. Heart sounds: Normal heart sounds. Pulmonary:      Effort: Pulmonary effort is normal. No respiratory distress. Breath sounds: Normal breath sounds. Abdominal:      General: Bowel sounds are normal. There is no distension. Palpations: Abdomen is soft. Musculoskeletal: Normal range of motion. Skin:     General: Skin is warm and dry. Findings: No erythema or rash. Neurological:      Mental Status: She is alert and oriented to person, place, and time. Cranial Nerves: No cranial nerve deficit. Motor: No abnormal muscle tone. Psychiatric:         Mood and Affect: Mood is depressed. Behavior: Behavior normal.         Thought Content: Thought content includes suicidal ideation. Thought content does not include homicidal ideation. Thought content includes suicidal plan.          Judgment: Judgment normal.         DIAGNOSTIC RESULTS     EKG: All EKG's are interpreted by the Emergency Department Physician who either signs or Co-signs this chart in the absence of a cardiologist.    Normal sinus rhythm, rate 64 bpm, no acute ST elevation or ischemic changes    RADIOLOGY:   Non-plain film images such as CT, Ultrasound and MRI are read by the radiologist. Plain radiographic images are visualized and preliminarily interpreted by the emergency physician with the below findings:      Interpretation per the Radiologist below, if available at the time of this note:    No orders to display         ED BEDSIDE ULTRASOUND:   Performed by ED Physician - none    LABS:  Labs Reviewed   COMPREHENSIVE METABOLIC PANEL - Abnormal; Notable for the following components:       Result Value    Albumin 4.8 (*)     All other components within normal limits   LIPID PANEL - Abnormal; Notable for the following components:    Cholesterol, Total 202 (*)     Triglycerides 222 (*)     All other components within normal limits   SALICYLATE LEVEL - Abnormal; Notable for the following components:    Salicylate, Serum <3.0 (*)     All other components within normal limits   TSH WITHOUT REFLEX - Abnormal; Notable for the following components:    TSH 0.308 (*)     All other components within normal limits   URINE DRUG SCREEN - Abnormal; Notable for the following components:    Benzodiazepine Screen, Urine POSITIVE (*)     All other components within normal limits   URINE RT REFLEX TO CULTURE - Abnormal; Notable for the following components:    Clarity, UA CLOUDY (*)     Leukocyte Esterase, Urine SMALL (*)     All other components within normal limits   ACETAMINOPHEN LEVEL   CK   CBC WITH AUTO DIFFERENTIAL   ETHANOL   PREGNANCY, URINE   COVID-19   MICROSCOPIC URINALYSIS       All other labs were within normal range or not returned as of this dictation.     EMERGENCY DEPARTMENT COURSE and DIFFERENTIAL DIAGNOSIS/MDM:   Vitals:    Vitals: 01/27/21 2212 01/27/21 2214 01/27/21 2219 01/27/21 2257   BP:  (!) 180/100  120/80   Pulse: 75  (!) 2 72   Resp: 20      Temp: 98.1 °F (36.7 °C)      TempSrc: Oral      SpO2: 98%      Weight: 202 lb (91.6 kg)      Height: 5' 2\" (1.575 m)              MDM      REASSESSMENT      Patient is medically cleared for psychiatric evaluation and care    CONSULTS:  None    PROCEDURES:  Unless otherwise noted below, none     Procedures        FINAL IMPRESSION      1. Major depressive disorder without psychotic features          DISPOSITION/PLAN   DISPOSITION Decision To Admit 01/28/2021 01:11:44 AM      PATIENT REFERRED TO:  No follow-up provider specified. DISCHARGE MEDICATIONS:  New Prescriptions    No medications on file     Controlled Substances Monitoring:     RX Monitoring 4/4/2017   Attestation The Prescription Monitoring Report for this patient was reviewed today.    Periodic Controlled Substance Monitoring (No Data)       (Please note that portions of this note were completed with a voice recognition program.  Efforts were made to edit the dictations but occasionally words are mis-transcribed.)    Maria Eugenia Feng PA-C (electronically signed)  Attending Emergency Physician            Maria Eugenia Feng PA-C  01/28/21 0112

## 2021-01-28 NOTE — SUICIDE SAFETY PLAN
SAFETY PLAN    A suicide Safety Plan is a document that supports someone when they are having thoughts of suicide. Warning Signs that indicate a suicidal crisis may be developing: What (situations, thoughts, feelings, body sensations, behaviors, etc.) do you experience that lets you know you are beginning to think about suicide? 1. When I feel like I'm trapped and can't get the thoughts out of my head. 2. Really anxious, can't calm down. 3. When I feel like I would be better off not even here. Internal Coping Strategies:  What things can I do (relaxation techniques, hobbies, physical activities, etc.) to take my mind off my problems without contacting another person? 1. Take anxiety medicine and lay still on couch with my dog. 2. Go for a walk to the corner. 3. Clean the house. People and social settings that provide distraction: Who can I call or where can I go to distract me? 1. Name: Deon Romeo Phone: Number in Phone  2. Name: Luisa Pans  Phone: Number in Phone  3. Name: Esperanza Phone: Number in Phone  4. Name: Mom  Phone: [phone]  5. Place: Sit outside on the porch on lawn furniture. 6. Place: Check e-mail or [employer] on tablet. People whom I can ask for help: Who can I call when I need help - for example, friends, family, clergy, someone else? 1. Name: Luis Angel  Phone: [phone]  2. Name: Mamta Alonso Phone: Number in Phone  3. Name: Shu Phone: Number in Phone    [facility] or [address] [place] [address] I can contact during a crisis: Who can I call for help - for example, my doctor, my psychiatrist, my psychologist, a mental health provider, a suicide hotline? 1. The [address]: 9-990.638.2937  2. Suicide Prevention Lifeline: 0-900-995-TALK (2472) 8. 273    Making the environment safe: How can I make my environment (house/apartment/living space) safer? For example, can I remove guns, medications, and other items? 1.  Not be alone
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
## 2021-01-29 LAB — URINE CULTURE, ROUTINE: NORMAL

## 2021-01-29 PROCEDURE — 6370000000 HC RX 637 (ALT 250 FOR IP): Performed by: NURSE PRACTITIONER

## 2021-01-29 PROCEDURE — 99222 1ST HOSP IP/OBS MODERATE 55: CPT | Performed by: INTERNAL MEDICINE

## 2021-01-29 PROCEDURE — 1240000000 HC EMOTIONAL WELLNESS R&B

## 2021-01-29 PROCEDURE — 6370000000 HC RX 637 (ALT 250 FOR IP): Performed by: PSYCHIATRY & NEUROLOGY

## 2021-01-29 PROCEDURE — 99232 SBSQ HOSP IP/OBS MODERATE 35: CPT | Performed by: PSYCHIATRY & NEUROLOGY

## 2021-01-29 RX ORDER — TIZANIDINE 4 MG/1
4 TABLET ORAL 2 TIMES DAILY
Status: DISCONTINUED | OUTPATIENT
Start: 2021-01-29 | End: 2021-02-04 | Stop reason: HOSPADM

## 2021-01-29 RX ORDER — QUETIAPINE FUMARATE 50 MG/1
50 TABLET, FILM COATED ORAL NIGHTLY
Status: DISCONTINUED | OUTPATIENT
Start: 2021-01-29 | End: 2021-01-31

## 2021-01-29 RX ORDER — LANOLIN ALCOHOL/MO/W.PET/CERES
3 CREAM (GRAM) TOPICAL NIGHTLY PRN
Status: DISCONTINUED | OUTPATIENT
Start: 2021-01-29 | End: 2021-02-04 | Stop reason: HOSPADM

## 2021-01-29 RX ADMIN — SIMVASTATIN 40 MG: 20 TABLET, FILM COATED ORAL at 20:18

## 2021-01-29 RX ADMIN — TIZANIDINE 4 MG: 4 TABLET ORAL at 20:18

## 2021-01-29 RX ADMIN — NAPROXEN 500 MG: 500 TABLET ORAL at 09:30

## 2021-01-29 RX ADMIN — AMLODIPINE BESYLATE 10 MG: 10 TABLET ORAL at 09:30

## 2021-01-29 RX ADMIN — PANTOPRAZOLE SODIUM 40 MG: 40 TABLET, DELAYED RELEASE ORAL at 06:35

## 2021-01-29 RX ADMIN — DULOXETINE HYDROCHLORIDE 60 MG: 30 CAPSULE, DELAYED RELEASE ORAL at 09:30

## 2021-01-29 RX ADMIN — Medication 2000 UNITS: at 09:30

## 2021-01-29 RX ADMIN — HYDROCHLOROTHIAZIDE 25 MG: 25 TABLET ORAL at 09:30

## 2021-01-29 RX ADMIN — ALPRAZOLAM 0.5 MG: 0.5 TABLET ORAL at 09:30

## 2021-01-29 RX ADMIN — VITAMIN E CAP 100 UNIT 200 UNITS: 100 CAP at 09:30

## 2021-01-29 RX ADMIN — QUETIAPINE FUMARATE 50 MG: 50 TABLET ORAL at 20:18

## 2021-01-29 RX ADMIN — TIZANIDINE 4 MG: 4 TABLET ORAL at 10:06

## 2021-01-29 RX ADMIN — NAPROXEN 500 MG: 500 TABLET ORAL at 17:07

## 2021-01-29 RX ADMIN — ALPRAZOLAM 0.5 MG: 0.5 TABLET ORAL at 17:07

## 2021-01-29 ASSESSMENT — SLEEP AND FATIGUE QUESTIONNAIRES
DO YOU HAVE DIFFICULTY SLEEPING: YES
AVERAGE NUMBER OF SLEEP HOURS: 3

## 2021-01-29 ASSESSMENT — PAIN SCALES - GENERAL: PAINLEVEL_OUTOF10: 10

## 2021-01-29 NOTE — PROGRESS NOTES
Pt requested Tylenol, Xanax ,& Trazodone for headache, anxiety. , and sleep. .will monitor for effect.

## 2021-01-29 NOTE — PROGRESS NOTES
Patient after getting a shower right before lunch and talking with staff in room is found napping. Patient is woken up to come out for lunch but wants to eat later. Pt is advised that we will hold her tray for a short amount of time. Patient does not end up getting up for lunch and is checked on several times in room and is found snoring. Patient c/o having a cough and asks for a cough drop. RN Srikanth Garcia is told about this.  Electronically signed by Maribell Mar LPN on 7/61/8424 at 8:98 PM
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
## 2021-01-29 NOTE — GROUP NOTE
Group Therapy Note    Date: 1/29/2021    Group Start Time: 1100  Group End Time: 1200  Group Topic: Psychoeducation    [facility] CHING Briseno, IGNACIAS        Group Therapy Note    Attendees: 7         Patient's Goal:  \"to finish my project\"    Notes:  Pt. attended the 1100 skill group. Pt. enjoys creating and working on her projects. Engaged in the group discussion and was helpful with clean up. Status After Intervention:  Improved    Participation Level:  Active Listener and Interactive    Participation Quality: Appropriate, Attentive and Sharing      Speech:  normal      Thought Process/Content: Logical      Affective Functioning: Flat      Mood: calm      Level of consciousness:  Alert, Oriented x4 and Attentive      Response to Learning: Progressing to goal      Endings: None Reported    Modes of Intervention: Education, Support, Socialization and Activity      Discipline Responsible: Psychoeducational Specialist      Signature:  Manisha Fleming

## 2021-01-29 NOTE — PROGRESS NOTES
Pt. declined to attend the 0900 community meeting, despite staff encouragement.  Electronically signed by Salomon Bob on 1/29/2021 at 9:44 AM
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
## 2021-01-29 NOTE — CARE COORDINATION
Patient did not attend group despite staff encouragement.   Electronically signed by Parish Mena on 1/29/2021 at 3:36 PM
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
## 2021-01-29 NOTE — GROUP NOTE
Group Therapy Note    Date: 1/28/2021    Group Start Time: [address] Time: 6213  Group Topic: Recreational    [facility]    Jomar Shaver        Group Therapy Note    Attendees: 10/18         Patient's Goal:  To play game of [address] [employer] with the group. Notes:  Patient played the game with peers.     Status After Intervention:  Improved    Participation Level: Interactive    Participation Quality: Appropriate and Attentive      Speech:  normal      Thought Process/Content: Logical      Affective Functioning: Flat      Mood: euthymic      Level of consciousness:  Alert and Attentive      Response to Learning: Progressing to goal      Endings: None Reported    Modes of Intervention: Activity      Discipline Responsible: [employer]      Signature:  Jomar Shaver

## 2021-01-29 NOTE — GROUP NOTE
Group Therapy Note    Date: 1/29/2021    Group Start Time: 5854  Group End Time: 1688  Group Topic: Healthy Living/Wellness    [facility] CHING Arndt        Group Therapy Note    Attendees: 11/15         Patient's Goal:  To learn about self esteem. Notes:  Patient participated in group discussion.     Status After Intervention:  Unchanged    Participation Level: Interactive    Participation Quality: Appropriate and Attentive      Speech:  hesitant      Thought Process/Content: Logical      Affective Functioning: Flat      Mood: euthymic      Level of consciousness:  Alert and Attentive      Response to Learning: Able to verbalize current knowledge/experience      Endings: None Reported    Modes of Intervention: Education      Discipline Responsible: [address], [email or web address]      Signature:  Sherin Arndt

## 2021-01-29 NOTE — GROUP NOTE
Group Therapy Note    Date: 1/29/2021    Group Start Time: 7301  Group End Time: 3353  Group Topic: Psychotherapy    [facility]    Mannie Rodriguez        Group Therapy Note    Attendees: Mervin - 7/11       Patient's Goal:  \"To stay awake. \"    Notes:  Patient shared that her medications make her \"sleepy\" but she would like to try to be in her room less today. Pt encouraged to discuss drowsiness with doctor. Status After Intervention:  Improved    Participation Level:  Active Listener and Minimal    Participation Quality: Appropriate, Attentive and Sharing      Speech:  normal      Thought Process/Content: Logical      Affective Functioning: Flat      Mood: depressed      Level of consciousness:  Attentive      Response to Learning: Able to verbalize current knowledge/experience and Progressing to goal      Endings: None Reported    Modes of Intervention: Education      Discipline Responsible: /Counselor      Signature:  Mannie Rodriguez

## 2021-01-30 PROCEDURE — 6370000000 HC RX 637 (ALT 250 FOR IP): Performed by: PSYCHIATRY & NEUROLOGY

## 2021-01-30 PROCEDURE — 6370000000 HC RX 637 (ALT 250 FOR IP): Performed by: NURSE PRACTITIONER

## 2021-01-30 PROCEDURE — 1240000000 HC EMOTIONAL WELLNESS R&B

## 2021-01-30 RX ADMIN — QUETIAPINE FUMARATE 50 MG: 50 TABLET ORAL at 21:26

## 2021-01-30 RX ADMIN — DULOXETINE HYDROCHLORIDE 60 MG: 30 CAPSULE, DELAYED RELEASE ORAL at 09:13

## 2021-01-30 RX ADMIN — NAPROXEN 500 MG: 500 TABLET ORAL at 09:13

## 2021-01-30 RX ADMIN — HYDROXYZINE PAMOATE 50 MG: 50 CAPSULE ORAL at 21:27

## 2021-01-30 RX ADMIN — NAPROXEN 500 MG: 500 TABLET ORAL at 17:11

## 2021-01-30 RX ADMIN — ALPRAZOLAM 0.5 MG: 0.5 TABLET ORAL at 09:54

## 2021-01-30 RX ADMIN — LEVOTHYROXINE SODIUM 125 MCG: 125 TABLET ORAL at 09:13

## 2021-01-30 RX ADMIN — VITAMIN E CAP 100 UNIT 200 UNITS: 100 CAP at 09:13

## 2021-01-30 RX ADMIN — SIMVASTATIN 40 MG: 20 TABLET, FILM COATED ORAL at 21:26

## 2021-01-30 RX ADMIN — TIZANIDINE 4 MG: 4 TABLET ORAL at 09:13

## 2021-01-30 RX ADMIN — Medication 3 MG: at 21:26

## 2021-01-30 RX ADMIN — PANTOPRAZOLE SODIUM 40 MG: 40 TABLET, DELAYED RELEASE ORAL at 06:13

## 2021-01-30 RX ADMIN — HYDROCHLOROTHIAZIDE 25 MG: 25 TABLET ORAL at 09:13

## 2021-01-30 RX ADMIN — AMLODIPINE BESYLATE 10 MG: 10 TABLET ORAL at 09:13

## 2021-01-30 RX ADMIN — TIZANIDINE 4 MG: 4 TABLET ORAL at 21:26

## 2021-01-30 RX ADMIN — Medication 2000 UNITS: at 09:13

## 2021-01-30 RX ADMIN — ALPRAZOLAM 0.5 MG: 0.5 TABLET ORAL at 16:47

## 2021-01-30 ASSESSMENT — PAIN SCALES - GENERAL
PAINLEVEL_OUTOF10: 7
PAINLEVEL_OUTOF10: 7

## 2021-01-30 NOTE — GROUP NOTE
Group Therapy Note    Date: 1/30/2021    Group Start Time: 1100  Group End Time: 1200  Group Topic: Psychoeducation    [facility]    Elfida So, CTRS        Group Therapy Note    Attendees: 7         Patient's Goal:  \"to feel better\"    Notes:  Pt. attended the 1100 skill group. Worked on project with interest and was engaged in the group discussion. Helpful with clean up. Feels better. Status After Intervention:  Improved    Participation Level:  Active Listener and Interactive    Participation Quality: Appropriate, Attentive and Sharing      Speech:  normal      Thought Process/Content: Logical      Affective Functioning: Congruent      Mood: calm      Level of consciousness:  Alert, Oriented x4 and Attentive      Response to Learning: Progressing to goal      Endings: None Reported    Modes of Intervention: Education, Support, Socialization and Activity      Discipline Responsible: Psychoeducational Specialist      Signature:  Tray Knott
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
## 2021-01-30 NOTE — GROUP NOTE
Group Therapy Note    Date: 1/29/2021    Group Start Time: 1825  Group End Time: 6401  Group Topic: Recreational    [facility]    Zia Camarillo        Group Therapy Note    Attendees: 11/15         Patient's Goal:  To play [employer] with the group. Notes:  Patient watched peers play the game. Status After Intervention:  Unchanged    Participation Level:  Active Listener    Participation Quality: Appropriate, Attentive and Supportive      Speech:  normal      Thought Process/Content: Logical      Affective Functioning: Flat      Mood: euthymic      Level of consciousness:  Alert and Attentive      Response to Learning: Progressing to goal      Endings: None Reported    Modes of Intervention: Activity      Discipline Responsible: [address], [facility] [employer]      Signature:  Zia Camarillo

## 2021-01-30 NOTE — GROUP NOTE
Group Therapy Note    Date: 1/30/2021    Group Start Time: 1330  Group End Time: 7995  Group Topic: Healthy Living/Wellness    [facility] TYRA Vance        Group Therapy Note    Attendees: 12         Patient's Goal:   Identify what is needed on a healthy path to wellness      Notes:  Sits on the periphery and shares only minimally.     Status After Intervention:  Unchanged    Participation Level: Minimal    Participation Quality: Appropriate, Attentive and Sharing      Speech:  normal      Thought Process/Content: Logical      Affective Functioning: Congruent      Mood: anxious and depressed      Level of consciousness:  Alert and Attentive      Response to Learning: Able to verbalize current knowledge/experience and Able to verbalize/acknowledge new learning      Endings: None Reported    Modes of Intervention: Education, Support and Socialization      Discipline Responsible: Registered Nurse      Signature:  TYRA Lay

## 2021-01-30 NOTE — CARE COORDINATION
FAMILY COLLATERAL NOTE    Family/Support Name: Vincenzo Ledesma #:[phone]  Relationship to Pt[de-identified]       Family/Support contact aware of hospitalization:yes  Presenting Symptoms/Current Concerns: Mother reports that she is unaware of what was going on that lead her to go the hosptial. No expressions of depression or thoughts of harm. Mother reports that patient is under a lot of stress from her children and relationships cause her a lot of distress. Mother reports there has been a lot of ups and downs and that patient takes on a lot in regards to her kids, and since there is 12 children, she tends to be worn thin. Top 3 Life Stressors: money/finances/ children- relationship/ strained relationship- has 12 children. Background History Relevant to Current Hospitalization:  Has been in hospital prior to this admission. From stress related. Family Mental Health/Substance Use History:   Unknown at this time. Support Network's Goal for Hospitalization: just to get better, and see her get back to her old self. Discharge Plan:   Back to home    Support Network Supportive of Discharge Plan: yes      Support can confirm Safety of Location and Security of Weapons: mother reports that to the best of her knowledge there is no weapons in home. Mother reports patient has can of mace but does not have any fire arms,      Support agreeable to Safeguard and Monitor Medications (including Prescription and OTC): Identified Barriers to Compliance with Discharge Plan: none at this time. Would need ongoing follow up. Recommendations for Support Network:   None at this time. Will keep in loop.        Melanie Velazquez, MSW, LSW

## 2021-01-30 NOTE — PROGRESS NOTES
Pt came to desk requesting xanax for 10/10 anxiety. Pt currently sitting in the dayroom coloring with peers.

## 2021-01-30 NOTE — PROGRESS NOTES
Patient did not attend group despite staff encouragement.   Electronically signed by Cristine Benitez on 1/29/2021 at 9:27 PM

## 2021-01-30 NOTE — GROUP NOTE
Group Therapy Note    Date: 1/30/2021    Group Start Time: 2000  Group End Time: 2030  Group Topic: Psychotherapy    [facility]    ERLINDA Acosta LSW        Group Therapy Note    Attendees: patients were engaged in group and willing to discuss what goal they had for today. Patients discussed moods and how they would work towards goal.         Patient's Goal:  Stay awake and engage in group    Notes:  Patient was present in group but groggy but willing to listen    Status After Intervention:  Improved    Participation Level: Active Listener    Participation Quality: Appropriate      Speech:  normal      Thought Process/Content: Logical      Affective Functioning: Congruent      Mood: euphoric      Level of consciousness:  Alert      Response to Learning: Able to verbalize current knowledge/experience      Endings: None Reported    Modes of Intervention: Support      Discipline Responsible: /Counselor      Signature:   ERLINDA Acosta LSW
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
## 2021-01-30 NOTE — GROUP NOTE
Group Therapy Note    Date: 2021    Group Start Time:   Group End Time:   Group Topic: Wrap-Up    [facility] CYND[facility] Elizalde        Group Therapy Note    Attendees: 11/15         Patient's Goal:  ***    Notes:  ***    Status After Intervention:  {Status After Intervention:459024320}    Participation Level: {Participation Level:362991535}    Participation Quality: {[facility] PARTICIPATION QUALITY:601395528}      Speech:  {[facility] CD_SPEECH:95141}      Thought Process/Content: {Thought Process/Content:989573490}      Affective Functioning: {Affective Functionin}      Mood: {Mood:743713844}      Level of consciousness:  {Level of consciousness:103691099}      Response to Learnin Dilcia Norberto [facility] Responses to Learnin}      Endings: {[facility] Endings:50260}    Modes of Intervention: {[facility] Modes of Intervention:453802187}      Discipline Responsible: {[facility] Multidisciplinary:059341740}      Signature:  Jorge Elizalde

## 2021-01-31 PROCEDURE — 1240000000 HC EMOTIONAL WELLNESS R&B

## 2021-01-31 PROCEDURE — 6370000000 HC RX 637 (ALT 250 FOR IP): Performed by: NURSE PRACTITIONER

## 2021-01-31 PROCEDURE — 6370000000 HC RX 637 (ALT 250 FOR IP): Performed by: PSYCHIATRY & NEUROLOGY

## 2021-01-31 RX ORDER — QUETIAPINE FUMARATE 100 MG/1
100 TABLET, FILM COATED ORAL NIGHTLY
Status: DISCONTINUED | OUTPATIENT
Start: 2021-01-31 | End: 2021-02-04 | Stop reason: HOSPADM

## 2021-01-31 RX ADMIN — LEVOTHYROXINE SODIUM 125 MCG: 125 TABLET ORAL at 09:04

## 2021-01-31 RX ADMIN — Medication 3 MG: at 20:46

## 2021-01-31 RX ADMIN — TIZANIDINE 4 MG: 4 TABLET ORAL at 09:04

## 2021-01-31 RX ADMIN — NAPROXEN 500 MG: 500 TABLET ORAL at 16:57

## 2021-01-31 RX ADMIN — VITAMIN E CAP 100 UNIT 200 UNITS: 100 CAP at 09:05

## 2021-01-31 RX ADMIN — PANTOPRAZOLE SODIUM 40 MG: 40 TABLET, DELAYED RELEASE ORAL at 06:29

## 2021-01-31 RX ADMIN — NAPROXEN 500 MG: 500 TABLET ORAL at 09:05

## 2021-01-31 RX ADMIN — ALPRAZOLAM 0.5 MG: 0.5 TABLET ORAL at 19:02

## 2021-01-31 RX ADMIN — QUETIAPINE FUMARATE 100 MG: 100 TABLET ORAL at 20:45

## 2021-01-31 RX ADMIN — TIZANIDINE 4 MG: 4 TABLET ORAL at 20:46

## 2021-01-31 RX ADMIN — ALPRAZOLAM 0.5 MG: 0.5 TABLET ORAL at 09:26

## 2021-01-31 RX ADMIN — HYDROXYZINE PAMOATE 50 MG: 50 CAPSULE ORAL at 14:36

## 2021-01-31 RX ADMIN — AMLODIPINE BESYLATE 10 MG: 10 TABLET ORAL at 09:05

## 2021-01-31 RX ADMIN — Medication 2000 UNITS: at 09:04

## 2021-01-31 RX ADMIN — HYDROXYZINE PAMOATE 50 MG: 50 CAPSULE ORAL at 22:24

## 2021-01-31 RX ADMIN — SIMVASTATIN 40 MG: 20 TABLET, FILM COATED ORAL at 20:46

## 2021-01-31 RX ADMIN — DULOXETINE HYDROCHLORIDE 60 MG: 30 CAPSULE, DELAYED RELEASE ORAL at 09:05

## 2021-01-31 RX ADMIN — HYDROCHLOROTHIAZIDE 25 MG: 25 TABLET ORAL at 09:04

## 2021-01-31 ASSESSMENT — PAIN SCALES - GENERAL: PAINLEVEL_OUTOF10: 8

## 2021-01-31 NOTE — GROUP NOTE
Group Therapy Note    Date: 1/31/2021    Group Start Time: 1100  Group End Time: 1200  Group Topic: Psychoeducation    [facility]Y Ray, CTRS        Group Therapy Note    Attendees: 9       Patient's Goal:  \"to fell better\"    Notes:  Pt. attended the 1100 skill group. Pt. was quiet but attentive. Feels better. Worked on project well. Status After Intervention:  Improved    Participation Level:  Active Listener    Participation Quality: Appropriate and Attentive      Speech:  normal      Thought Process/Content: Logical      Affective Functioning: Congruent      Mood: calm      Level of consciousness:  Alert, Oriented x4 and Attentive      Response to Learning: Progressing to goal      Endings: None Reported    Modes of Intervention: Education, Support, Socialization and Activity      Discipline Responsible: Psychoeducational Specialist      Signature:  Hans Rosales

## 2021-01-31 NOTE — PROGRESS NOTES
[facility] FOLLOW-UP NOTE  THE PATIENT WAS SEEN THROUGH TELEPSYCHIATRY, IN A REAL-TIME, AUDIO-VIDEO ENCOUNTER, WITH THE PATIENT IN [address] THE PROVIDER IN [place], [place]         1/31/2021     Patient was seen and examined in person, Chart reviewed   Patient's case discussed with staff/team    Chief Complaint: depression SI    Interim History:     Patient said she was feeling \"OK\". She said she was still depressed, but was feeling a little better. She said she was able to sleep, but was \"up once or twice\" because she had \"weird, bad dreams\". She said the suicidal ideation is still present, but less often. She denied homicidal ideation. She denied hallucinations, paranoia or other delusions.       Appetite:   [] Normal/Unchanged  [] Increased  [x] Decreased      Sleep:       [] Normal/Unchanged  [x] Fair       [] Poor              Energy:    [] Normal/Unchanged  [] Increased  [x] Decreased        SI [x] Present  [] Absent    HI  []Present  [x] Absent     Aggression:  [] yes  [x] no    Patient is [] able  [x] unable to CONTRACT FOR SAFETY     PAST MEDICAL/PSYCHIATRIC HISTORY:   Past Medical History:   Diagnosis Date    Anemia     C. difficile colitis 1/2013    Chronic fatigue syndrome     Depression     [place]    Diabetes mellitus ([facility]     Diverticulitis large intestine 2001    Fibromyalgia     Fibromyalgia 3/24/2015    HTN (hypertension)     Hyperlipidemia     Hypothyroidism     Palpitations     Pulmonary embolus ([facility] 10/12    Following GYN procedure    Vitamin D deficiency        FAMILY/SOCIAL HISTORY:  Family History   Problem Relation Age of Onset    Cancer Maternal Grandfather         COLON    Cancer Paternal Aunt         LUNG    Cancer Other         BLOOD CANCER- UNSPECIFIED     Social History     Socioeconomic History    Marital status:      Spouse name: Not on file    Number of children: Not on file    Years of education: Not on file    Highest education level: Not on file   Occupational History    Not on file   Social Needs    Financial resource strain: Not on file    Food insecurity     Worry: Not on file     Inability: Not on file    Transportation needs     Medical: Not on file     Non-medical: Not on file   Tobacco Use    Smoking status: Never Smoker    Smokeless tobacco: Never Used   Substance and Sexual Activity    Alcohol use: Yes     Comment: OCC.  Drug use: No    Sexual activity: Not on file   Lifestyle    Physical activity     Days per week: Not on file     Minutes per session: Not on file    Stress: Not on file   Relationships    Social connections     Talks on phone: Not on file     Gets together: Not on file     Attends Alevism service: Not on file     Active member of club or organization: Not on file     Attends meetings of clubs or organizations: Not on file     Relationship status: Not on file    Intimate partner violence     Fear of current or ex partner: Not on file     Emotionally abused: Not on file     Physically abused: Not on file     Forced sexual activity: Not on file   Other Topics Concern    Not on file   Social History Narrative    Not on file           ROS:  [x] All negative/unchanged except if checked.  Explain positive(checked items) below:  [] Constitutional  [] Eyes  [] Ear/Nose/Mouth/Throat  [] Respiratory  [] CV  [] GI  []   [] Musculoskeletal  [] Skin/Breast  [] Neurological  [] Endocrine  [] Heme/Lymph  [] Allergic/Immunologic    Explanation:     MEDICATIONS:    Current Facility-Administered Medications:     QUEtiapine (SEROQUEL) tablet 100 mg, 100 mg, Oral, Nightly, Maritza Garcia MD    tiZANidine (ZANAFLEX) tablet 4 mg, 4 mg, Oral, BID, TYRA Song CNP, 4 mg at 01/31/21 0904    melatonin tablet 3 mg, 3 mg, Oral, Nightly PRN, Brandyn Looney MD, 3 mg at 01/30/21 2126    haloperidol lactate (HALDOL) injection 5 mg, 5 mg, Intramuscular, Q6H PRN **OR** haloperidol (HALDOL) tablet 5 mg, 5 mg, Oral, Q6H PRN, Bishop Kwok MD    hydrOXYzine (VISTARIL) capsule 50 mg, 50 mg, Oral, Q6H PRN, 50 mg at 01/31/21 1436 **OR** hydrOXYzine (VISTARIL) injection 50 mg, 50 mg, Intramuscular, Q6H PRN, Bishop Kwok MD    acetaminophen (TYLENOL) tablet 650 mg, 650 mg, Oral, Q4H PRN, Bishop Kwok MD, 650 mg at 01/28/21 2131    benztropine mesylate (COGENTIN) injection 2 mg, 2 mg, Intramuscular, BID PRN, Bishop Kwok MD    magnesium hydroxide (MILK OF MAGNESIA) 400 MG/5ML suspension 30 mL, 30 mL, Oral, Daily PRN, Bishop Kwok MD    nicotine polacrilex (COMMIT) lozenge 2 mg, 2 mg, Oral, Q2H PRN, Bishop Kwok MD    influenza quadrivalent split vaccine (FLUZONE;FLUARIX;FLULAVAL;AFLURIA) injection 0.5 mL, 0.5 mL, Intramuscular, Prior to discharge, Светлана Null MD    amLODIPine (NORVASC) tablet 10 mg, 10 mg, Oral, Daily, Светлана Null MD, 10 mg at 01/31/21 0905    levothyroxine (SYNTHROID) tablet 125 mcg, 125 mcg, Oral, Daily, Светлана Null MD, 125 mcg at 01/31/21 1596    vitamin E capsule 200 Units, 200 Units, Oral, Daily, Светлана Null MD, 200 Units at 01/31/21 0905    simvastatin (ZOCOR) tablet 40 mg, 40 mg, Oral, Nightly, Светлана Null MD, 40 mg at 01/30/21 2126    Vitamin D (CHOLECALCIFEROL) tablet 2,000 Units, 2,000 Units, Oral, Daily, Светлана Null MD, 2,000 Units at 01/31/21 0904    DULoxetine (CYMBALTA) extended release capsule 60 mg, 60 mg, Oral, Daily, Светлана Null MD, 60 mg at 01/31/21 0905    ALPRAZolam (XANAX) tablet 0.5 mg, 0.5 mg, Oral, TID PRN, Светлана Null MD, 0.5 mg at 01/31/21 0926    pantoprazole (PROTONIX) tablet 40 mg, 40 mg, Oral, QAM AC, Светлана Null MD, 40 mg at 01/31/21 1809    hydroCHLOROthiazide (HYDRODIURIL) tablet 25 mg, 25 mg, Oral, Daily, Judah Lennox, APRN - CNP, 25 mg at 01/31/21 0904    naproxen (NAPROSYN) tablet 500 mg, 500 mg, Oral, BID WC, Annette Banda, RN, NP, 500 mg at 01/31/21 9688      Examination:  /63   Pulse 72   Temp 98 °F (36.7 °C) (Oral)   Resp 18   Ht 5' 2\" (1.575 m)   Wt 202 lb (91.6 kg)   LMP 11/19/2013   SpO2 96%   BMI 36.95 kg/m²   Gait - steady  Medication side effects(SE): no    Mental Status Examination:    Level of consciousness:  within normal limits   Appearance: fair grooming and fair hygiene  Behavior/Motor:  psychomotor retardation  Attitude toward examiner:  withdrawn  Speech:  slow   Mood: constricted, decreased range, depressed, anxious  Affect:  blunted  Thought processes:  slow   Thought content:  Suicidal Ideation:  Still present, although with lower frequency  Delusions:  no evidence of delusions  Perceptual Disturbance:  denies any perceptual disturbance  Cognition:  oriented to person, place, and time   Concentration poor  Insight fair   Judgement fair     ASSESSMENT:   Patient symptoms are:  [x] Well controlled  [] Improving  [] Worsening  [] No change      Diagnosis:   Principal Problem:    Severe episode of recurrent major depressive disorder, without psychotic features ([facility]  Resolved Problems:    * No resolved hospital problems. *      LABS:    No results for input(s): WBC, HGB, PLT in the last 72 hours. No results for input(s): NA, K, CL, CO2, BUN, CREATININE, GLUCOSE in the last 72 hours. No results for input(s): BILITOT, ALKPHOS, AST, ALT in the last 72 hours. Lab Results   Component Value Date    LABAMPH Neg 01/27/2021    BARBSCNU Neg 01/27/2021    LABBENZ POSITIVE 01/27/2021    LABBENZ NotDTCD 09/14/2012    LABMETH Neg 01/27/2021    OPIATESCREENURINE Neg 01/27/2021    PHENCYCLIDINESCREENURINE Neg 01/27/2021    ETOH <10 01/27/2021     Lab Results   Component Value Date    TSH 0.308 01/27/2021     No results found for: LITHIUM  No results found for: VALPROATE, CBMZ    RISK ASSESSMENT- high suicide risk    Treatment Plan:  Reviewed current Medications with the patient.    Medication as ordered  Risks, benefits, side effects, drug-to-drug interactions and alternatives to treatment were discussed. Collateral information:   CD evaluation  Encourage patient to attend group and other milieu activities.   Discharge planning discussed with the patient and treatment team.    PSYCHOTHERAPY/COUNSELING:  [x] Therapeutic interview  [x] Supportive  [] CBT  [] Ongoing  [] Other    [x] Patient continues to need, on a daily basis, active treatment furnished directly by or requiring the supervision of inpatient psychiatric personnel      Anticipated Length of stay:            Electronically signed by Edgar Leblanc MD on 1/31/2021 at 4:04 PM

## 2021-01-31 NOTE — GROUP NOTE
Group Therapy Note    Date: 1/30/2021    Group Start Time: 2100  Group End Time: 2145  Group Topic: Wrap-Up    [facility]    Ayala Silveira        Group Therapy Note    Attendees: 16/20       Patient's Goal:  \"To stay awake longer. \"    Notes:  Pt reports meeting their goal for today. Patient chose not to participate in a guided body scan meditation. Status After Intervention:  Unchanged    Participation Level:  Active Listener and Minimal    Participation Quality: Appropriate and Attentive      Speech:  hesitant      Thought Process/Content: Logical      Affective Functioning: Flat      Mood: depressed      Level of consciousness:  Alert and Attentive      Response to Learning: Able to verbalize current knowledge/experience and Progressing to goal      Endings: None Reported    Modes of Intervention: Support      Discipline Responsible: [address] [place]      Signature:  Ayala Silveira
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
## 2021-01-31 NOTE — PROGRESS NOTES
Patient did not attend group despite staff encouragement.   Electronically signed by Lanre Vega on 1/30/2021 at 10:51 PM

## 2021-02-01 PROCEDURE — 6370000000 HC RX 637 (ALT 250 FOR IP): Performed by: PSYCHIATRY & NEUROLOGY

## 2021-02-01 PROCEDURE — 99232 SBSQ HOSP IP/OBS MODERATE 35: CPT | Performed by: PSYCHIATRY & NEUROLOGY

## 2021-02-01 PROCEDURE — 1240000000 HC EMOTIONAL WELLNESS R&B

## 2021-02-01 PROCEDURE — 6370000000 HC RX 637 (ALT 250 FOR IP): Performed by: NURSE PRACTITIONER

## 2021-02-01 RX ADMIN — ALPRAZOLAM 0.5 MG: 0.5 TABLET ORAL at 17:15

## 2021-02-01 RX ADMIN — LEVOTHYROXINE SODIUM 125 MCG: 125 TABLET ORAL at 09:23

## 2021-02-01 RX ADMIN — Medication 3 MG: at 21:47

## 2021-02-01 RX ADMIN — VITAMIN E CAP 100 UNIT 200 UNITS: 100 CAP at 09:20

## 2021-02-01 RX ADMIN — TIZANIDINE 4 MG: 4 TABLET ORAL at 09:20

## 2021-02-01 RX ADMIN — DULOXETINE HYDROCHLORIDE 60 MG: 30 CAPSULE, DELAYED RELEASE ORAL at 09:20

## 2021-02-01 RX ADMIN — ALPRAZOLAM 0.5 MG: 0.5 TABLET ORAL at 09:21

## 2021-02-01 RX ADMIN — NAPROXEN 500 MG: 500 TABLET ORAL at 17:16

## 2021-02-01 RX ADMIN — HYDROCHLOROTHIAZIDE 25 MG: 25 TABLET ORAL at 09:20

## 2021-02-01 RX ADMIN — QUETIAPINE FUMARATE 100 MG: 100 TABLET ORAL at 21:28

## 2021-02-01 RX ADMIN — SIMVASTATIN 40 MG: 20 TABLET, FILM COATED ORAL at 21:28

## 2021-02-01 RX ADMIN — AMLODIPINE BESYLATE 10 MG: 10 TABLET ORAL at 09:20

## 2021-02-01 RX ADMIN — NAPROXEN 500 MG: 500 TABLET ORAL at 09:21

## 2021-02-01 RX ADMIN — Medication 2000 UNITS: at 09:21

## 2021-02-01 RX ADMIN — HYDROXYZINE PAMOATE 50 MG: 50 CAPSULE ORAL at 12:25

## 2021-02-01 RX ADMIN — PANTOPRAZOLE SODIUM 40 MG: 40 TABLET, DELAYED RELEASE ORAL at 06:28

## 2021-02-01 RX ADMIN — HYDROXYZINE PAMOATE 50 MG: 50 CAPSULE ORAL at 21:48

## 2021-02-01 RX ADMIN — TIZANIDINE 4 MG: 4 TABLET ORAL at 21:28
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
## 2021-02-01 NOTE — CARE COORDINATION
Group Therapy Note    Date: 2/1/2021  Start Time: 4574  End Time:  1510    Number of Participants: 12    Type of Group: Cognitive Skills    Patient's Goal:  To participate in mood management group. Notes: Patient declined to attend psychoeducation group at [phone] despite encouragement by staff.      Discipline Responsible: /Counselor    NEHA Hdez
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
## 2021-02-01 NOTE — GROUP NOTE
Group Therapy Note    Date: 2/1/2021    Group Start Time: 1100  Group End Time: 1150  Group Topic: Psychoeducation    [facility] Rodrigez        Group Therapy Note    Attendees: 9         Patient's Goal: \"Stay awake and go to the groups\"    Notes:  Patient was quiet, interacted a little and overall participation was fair in group. Status After Intervention:  Unchanged    Participation Level:  Active Listener    Participation Quality: Appropriate      Speech:  quiet      Thought Process/Content: Linear      Affective Functioning: Flat      Mood: calm      Level of consciousness:  Alert      Response to Learning: Progressing to goal      Endings: None Reported    Modes of Intervention: Education, Socialization and Activity      Discipline Responsible: Psychoeducational Specialist      Signature:  Sammy Donohue

## 2021-02-01 NOTE — PROGRESS NOTES
Patient did not attend group despite staff encouragement.   Electronically signed by Sloane Crockett on 1/31/2021 at 7:58 PM

## 2021-02-01 NOTE — PROGRESS NOTES
Pt asks for xanax prn this am.  Pt does no get up for breakfast. .Electronically signed by Fab Jordan LPN on 0/2/3124 at 93:70 AM

## 2021-02-01 NOTE — PROGRESS NOTES
Pt. declined to attend the 0900 community meeting, despite staff encouragement. Electronically signed by Brie Espinosa, [address] on 2/1/2021 at 9:44 AM

## 2021-02-01 NOTE — GROUP NOTE
Group Therapy Note    Date: 2/1/2021    Group Start Time: 6680  Group End Time: 7900  Group Topic: Recreational    [facility]    Joe Pantoja        Group Therapy Note    Attendees: 12/18         Patient's Goal:  To play Wii Dep-Xploraling with the group. Notes:  Patient watched peers play the game. Status After Intervention:  Unchanged    Participation Level:  Active Listener    Participation Quality: Appropriate, Attentive and Supportive      Speech:  hesitant      Thought Process/Content: Logical      Affective Functioning: Flat      Mood: euthymic      Level of consciousness:  Alert and Attentive      Response to Learning: Progressing to goal      Endings: None Reported    Modes of Intervention: Activity      Discipline Responsible: [address], [employer]      Signature:  Joe Pantoja

## 2021-02-01 NOTE — PROGRESS NOTES
Writer made pt aware of resource sheet that is provided to patients upon their admission in their folder. Pt informed writer of what resources may be helpful to her. Writer and pt went through the provided resource sheet and marked the numbers that she considered to be helpful.  Electronically signed by Duy Brownlee on 1/31/2021 at 11:13 PM

## 2021-02-01 NOTE — GROUP NOTE
Group Therapy Note    Date: 1/31/2021    Group Start Time: 2100  Group End Time: 2150  Group Topic: Wrap-Up    [facility]    Lily Youngblood        Group Therapy Note    Attendees: 16/21       Patient's Goal:  \"To feel better. \"    Notes:  Patient reports meeting their goal for today. Patient chose not to participate in a guided Progressive Muscle Relaxation (PMR). Status After Intervention:  Improved    Participation Level:  Active Listener and Interactive    Participation Quality: Appropriate, Attentive and Sharing      Speech:  normal      Thought Process/Content: Logical      Affective Functioning: Flat      Mood: euthymic      Level of consciousness:  Alert and Attentive      Response to Learning: Able to verbalize current knowledge/experience and Progressing to goal      Endings: None Reported    Modes of Intervention: Support      Discipline Responsible: [facility] [address] [employer]      Signature:  Lily Youngblood

## 2021-02-01 NOTE — PROGRESS NOTES
Pt is found walking in the otero and is asked how she is doing and is she ok. Patient states \"I almost choked at lunch time, no one saw me and I ran to my room and was able to get it out. \"  Patient was not observed to have any issues by any staff. Patient encouraged to put her hands up or get attention next time if this happens and not to run to her room. Do not seclude if this happens. Pt states, \"everyone was talking and no one saw me. I did not know what to do. Now I am very nervous. Pt just received before she sat down to eat her lunch some vistaril. Pt came out of room and ate a little later than other peers. Pt will be monitored.  Electronically signed by Lj Allen LPN on 8/7/7410 at 7:27 PM

## 2021-02-01 NOTE — GROUP NOTE
Group Therapy Note    Date: 2/1/2021    Group Start Time: 1005  Group End Time: 1050  Group Topic: Psychoeducation    [facility]    ERLINDA Galdamez LSW        Group Therapy Note    Attendee: 9         Patient's Goal:  To participate in group therapy and to identify one new goal.    Notes:  Patient is soft-spoken and had to repeat herself for others to hear. Patient shared her goal of having healthy relationships. Patient was noticeably  tired during the group session. Status After Intervention:  Unchanged    Participation Level:  Active Listener    Participation Quality: Sharing      Speech:  normal      Thought Process/Content: Logical      Affective Functioning: Congruent      Mood: tired      Level of consciousness:  Drowsy      Response to Learning: Able to verbalize current knowledge/experience      Endings: None Reported    Modes of Intervention: Education      Discipline Responsible: /Counselor      Signature:  ERLINDA Galdamez LSW

## 2021-02-02 PROCEDURE — 6370000000 HC RX 637 (ALT 250 FOR IP): Performed by: NURSE PRACTITIONER

## 2021-02-02 PROCEDURE — 99232 SBSQ HOSP IP/OBS MODERATE 35: CPT | Performed by: PSYCHIATRY & NEUROLOGY

## 2021-02-02 PROCEDURE — 6370000000 HC RX 637 (ALT 250 FOR IP): Performed by: PSYCHIATRY & NEUROLOGY

## 2021-02-02 PROCEDURE — 90833 PSYTX W PT W E/M 30 MIN: CPT | Performed by: PSYCHIATRY & NEUROLOGY

## 2021-02-02 PROCEDURE — 1240000000 HC EMOTIONAL WELLNESS R&B

## 2021-02-02 RX ADMIN — HYDROXYZINE PAMOATE 50 MG: 50 CAPSULE ORAL at 12:54

## 2021-02-02 RX ADMIN — TIZANIDINE 4 MG: 4 TABLET ORAL at 09:19

## 2021-02-02 RX ADMIN — AMLODIPINE BESYLATE 10 MG: 10 TABLET ORAL at 09:18

## 2021-02-02 RX ADMIN — SIMVASTATIN 40 MG: 20 TABLET, FILM COATED ORAL at 21:46

## 2021-02-02 RX ADMIN — ALPRAZOLAM 0.5 MG: 0.5 TABLET ORAL at 16:54

## 2021-02-02 RX ADMIN — NAPROXEN 500 MG: 500 TABLET ORAL at 09:18

## 2021-02-02 RX ADMIN — VITAMIN E CAP 100 UNIT 200 UNITS: 100 CAP at 09:30

## 2021-02-02 RX ADMIN — Medication 2000 UNITS: at 09:17

## 2021-02-02 RX ADMIN — HYDROXYZINE PAMOATE 50 MG: 50 CAPSULE ORAL at 21:48

## 2021-02-02 RX ADMIN — QUETIAPINE FUMARATE 100 MG: 100 TABLET ORAL at 21:46

## 2021-02-02 RX ADMIN — NAPROXEN 500 MG: 500 TABLET ORAL at 16:54

## 2021-02-02 RX ADMIN — HYDROCHLOROTHIAZIDE 25 MG: 25 TABLET ORAL at 09:17

## 2021-02-02 RX ADMIN — DULOXETINE HYDROCHLORIDE 60 MG: 30 CAPSULE, DELAYED RELEASE ORAL at 09:17

## 2021-02-02 RX ADMIN — ALPRAZOLAM 0.5 MG: 0.5 TABLET ORAL at 09:40

## 2021-02-02 RX ADMIN — TIZANIDINE 4 MG: 4 TABLET ORAL at 21:47

## 2021-02-02 RX ADMIN — PANTOPRAZOLE SODIUM 40 MG: 40 TABLET, DELAYED RELEASE ORAL at 06:29

## 2021-02-02 RX ADMIN — LEVOTHYROXINE SODIUM 125 MCG: 125 TABLET ORAL at 09:17

## 2021-02-02 ASSESSMENT — PAIN SCALES - GENERAL: PAINLEVEL_OUTOF10: 7

## 2021-02-02 NOTE — GROUP NOTE
Group Therapy Note    Date: 2/2/2021    Group Start Time: 1000  Group End Time: 1050  Group Topic: Psychotherapy    [facility] CH[facility] Landaverde, [facility]        Group Therapy Note    Attendees: 6         Patient's Goal:  To figure out a plan to put together  Notes:  Patient stated she will talk to  later one on one    Status After Intervention:  Improved    Participation Level: Interactive    Participation Quality: Appropriate      Speech:  normal      Thought Process/Content: Logical      Affective Functioning: Congruent      Mood: anxious      Level of consciousness:  Alert      Response to Learning: Progressing to goal      Endings: None Reported    Modes of Intervention: Support      Discipline Responsible: /Counselor      Signature:  Henok Landaverde, [facility]

## 2021-02-02 NOTE — PROGRESS NOTES
Pt is A & O X4.,States depression is #7-8.,anxiety is #9. Pt denies SI/HI/AVH. Pt states she is depressed about daughters not showing concern for pt since she is in the hospital.Likes to keep to self in room. ,does attend some groups. Pt showered yesterday. Pt states she is eating & sleeping good.
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
## 2021-02-02 NOTE — GROUP NOTE
Group Therapy Note    Date: 2/2/2021    Group Start Time: 0305  Group End Time: 5059  Group Topic: Healthy Living/Wellness    [facility]    Parish Mena        Group Therapy Note    Attendees: 8/15       Patient's Goal:  To discuss emotional distress tolerance and practice ways to move through and accept moments of distress. Notes:  Patient observed the Healthy Living Group. Status After Intervention:  Unchanged    Participation Level:  Active Listener and Minimal    Participation Quality: Appropriate and Attentive      Speech:  hesitant      Thought Process/Content: Logical      Affective Functioning: Flat      Mood: depressed      Level of consciousness:  Alert and Attentive      Response to Learning: Able to verbalize current knowledge/experience and Progressing to goal      Endings: None Reported    Modes of Intervention: Education      Discipline Responsible: [address], [employer]      Signature:  Parish Mena

## 2021-02-02 NOTE — PROGRESS NOTES
Donaldo Re[facility]. FOLLOW-UP NOTE       2/2/2021     Patient was seen and examined in person, Chart reviewed   Patient's case discussed with staff/team    Chief Complaint: depression SI    Interim History:     Pt report feeling depressed - rating her mood to be 7/10  Still ruminating about her stress at home  Slept better last night  C/O dry mouth and tiredness  Hopeless and worthless feeling  Pt worried about her son in [facility] who got adopted.  Adopted family does not want her son anymore  Appetite:   [] Normal/Unchanged  [] Increased  [x] Decreased      Sleep:       [] Normal/Unchanged  [] Fair       [x] Poor              Energy:    [] Normal/Unchanged  [] Increased  [x] Decreased        SI [x] Present  [] Absent    HI  []Present  [] Absent     Aggression:  [] yes  [] no    Patient is [] able  [x] unable to CONTRACT FOR SAFETY     PAST MEDICAL/PSYCHIATRIC HISTORY:   Past Medical History:   Diagnosis Date    Anemia     C. difficile colitis 1/2013    Chronic fatigue syndrome     Depression     [place]    Diabetes mellitus ([facility]     Diverticulitis large intestine 2001    Fibromyalgia     Fibromyalgia 3/24/2015    HTN (hypertension)     Hyperlipidemia     Hypothyroidism     Palpitations     Pulmonary embolus ([facility] 10/12    Following GYN procedure    Vitamin D deficiency        FAMILY/SOCIAL HISTORY:  Family History   Problem Relation Age of Onset    Cancer Maternal Grandfather         COLON    Cancer Paternal Aunt         LUNG    Cancer Other         BLOOD CANCER- UNSPECIFIED     Social History     Socioeconomic History    Marital status:      Spouse name: Not on file    Number of children: Not on file    Years of education: Not on file    Highest education level: Not on file   Occupational History    Not on file   Social Needs    Financial resource strain: Not on file    Food insecurity     Worry: Not on file     Inability: Not on file   [employer] needs     Medical: Not on file     Non-medical: Not on file   Tobacco Use    Smoking status: Never Smoker    Smokeless tobacco: Never Used   Substance and Sexual Activity    Alcohol use: Yes     Comment: OCC.  Drug use: No    Sexual activity: Not on file   Lifestyle    Physical activity     Days per week: Not on file     Minutes per session: Not on file    Stress: Not on file   Relationships    Social connections     Talks on phone: Not on file     Gets together: Not on file     Attends Spiritism service: Not on file     Active member of club or organization: Not on file     Attends meetings of clubs or organizations: Not on file     Relationship status: Not on file    Intimate partner violence     Fear of current or ex partner: Not on file     Emotionally abused: Not on file     Physically abused: Not on file     Forced sexual activity: Not on file   Other Topics Concern    Not on file   Social History Narrative    Not on file           ROS:  [x] All negative/unchanged except if checked.  Explain positive(checked items) below:  [] Constitutional  [] Eyes  [] Ear/Nose/Mouth/Throat  [] Respiratory  [] CV  [] GI  []   [] Musculoskeletal  [] Skin/Breast  [] Neurological  [] Endocrine  [] Heme/Lymph  [] Allergic/Immunologic    Explanation:     MEDICATIONS:    Current Facility-Administered Medications:     QUEtiapine (SEROQUEL) tablet 100 mg, 100 mg, Oral, Nightly, Coleman Lim MD, 100 mg at 02/01/21 2128    tiZANidine (ZANAFLEX) tablet 4 mg, 4 mg, Oral, BID, TYRA Griffith CNP, 4 mg at 02/02/21 0919    melatonin tablet 3 mg, 3 mg, Oral, Nightly PRN, Kim Maier MD, 3 mg at 02/01/21 2147    haloperidol lactate (HALDOL) injection 5 mg, 5 mg, Intramuscular, Q6H PRN **OR** haloperidol (HALDOL) tablet 5 mg, 5 mg, Oral, Q6H PRN, Coleman Lim MD    hydrOXYzine (VISTARIL) capsule 50 mg, 50 mg, Oral, Q6H PRN, 50 mg at 02/01/21 2148 **OR** hydrOXYzine (VISTARIL) injection 50 mg, 50 mg, Intramuscular, Q6H PRN, Ama Alford MD    acetaminophen (TYLENOL) tablet 650 mg, 650 mg, Oral, Q4H PRN, Ama Alford MD, 650 mg at 01/28/21 2131    benztropine mesylate (COGENTIN) injection 2 mg, 2 mg, Intramuscular, BID PRN, Ama Alford MD    magnesium hydroxide (MILK OF MAGNESIA) 400 MG/5ML suspension 30 mL, 30 mL, Oral, Daily PRN, Ama Alford MD    nicotine polacrilex (COMMIT) lozenge 2 mg, 2 mg, Oral, Q2H PRN, Ama Alford MD    influenza quadrivalent split vaccine (FLUZONE;FLUARIX;FLULAVAL;AFLURIA) injection 0.5 mL, 0.5 mL, Intramuscular, Prior to discharge, Andree Arenas MD    amLODIPine (NORVASC) tablet 10 mg, 10 mg, Oral, Daily, Andree Arenas MD, 10 mg at 02/02/21 4769    levothyroxine (SYNTHROID) tablet 125 mcg, 125 mcg, Oral, Daily, Andree Arenas MD, 125 mcg at 02/02/21 8064    vitamin E capsule 200 Units, 200 Units, Oral, Daily, Andree Arenas MD, 200 Units at 02/02/21 0930    simvastatin (ZOCOR) tablet 40 mg, 40 mg, Oral, Nightly, Andree Arenas MD, 40 mg at 02/01/21 2128    Vitamin D (CHOLECALCIFEROL) tablet 2,000 Units, 2,000 Units, Oral, Daily, Andree Arenas MD, 2,000 Units at 02/02/21 0917    DULoxetine (CYMBALTA) extended release capsule 60 mg, 60 mg, Oral, Daily, Andree Arenas MD, 60 mg at 02/02/21 4505    ALPRAZolam (XANAX) tablet 0.5 mg, 0.5 mg, Oral, TID PRN, Andree Arenas MD, 0.5 mg at 02/02/21 0940    pantoprazole (PROTONIX) tablet 40 mg, 40 mg, Oral, QAM AC, Andere Arenas MD, 40 mg at 02/02/21 8601    hydroCHLOROthiazide (HYDRODIURIL) tablet 25 mg, 25 mg, Oral, Daily, Leonard Skinner, APRN - CNP, 25 mg at 02/02/21 0917    naproxen (NAPROSYN) tablet 500 mg, 500 mg, Oral, BID WC, Annette Banda, RN, NP, 500 mg at 02/02/21 2311      Examination:  BP (!) 142/96 Comment: RN notified  Pulse 88   Temp 98 °F (36.7 °C) (Oral)   Resp 18   Ht 5' 2\" (1.575 m)   Wt 202 lb (91.6 kg)   LMP 11/19/2013   SpO2 100%   BMI 36.95 kg/m²   Gait - steady  Medication side effects(SE): no    Mental Status Examination:    Level of consciousness:  within normal limits   Appearance:  poor grooming and poor hygiene  Behavior/Motor:  psychomotor retardation  Attitude toward examiner:  withdrawn  Speech:  slow   Mood: constricted, decreased range, depressed and dysthymic  Affect:  blunted  Thought processes:  slow   Thought content:  Suicidal Ideation:  passive  Delusions:  no evidence of delusions  Perceptual Disturbance:  denies any perceptual disturbance  Cognition:  oriented to person, place, and time   Concentration poor  Insight poor   Judgement poor     ASSESSMENT:   Patient symptoms are:  [] Well controlled  [x] Improving  [] Worsening  [] No change      Diagnosis:   Principal Problem:    Severe episode of recurrent major depressive disorder, without psychotic features ([facility]  Resolved Problems:    * No resolved hospital problems. *      LABS:    No results for input(s): WBC, HGB, PLT in the last 72 hours. No results for input(s): NA, K, CL, CO2, BUN, CREATININE, GLUCOSE in the last 72 hours. No results for input(s): BILITOT, ALKPHOS, AST, ALT in the last 72 hours. Lab Results   Component Value Date    LABAMPH Neg 01/27/2021    BARBSCNU Neg 01/27/2021    LABBENZ POSITIVE 01/27/2021    LABBENZ NotDTCD 09/14/2012    LABMETH Neg 01/27/2021    OPIATESCREENURINE Neg 01/27/2021    PHENCYCLIDINESCREENURINE Neg 01/27/2021    ETOH <10 01/27/2021     Lab Results   Component Value Date    TSH 0.308 01/27/2021     No results found for: LITHIUM  No results found for: VALPROATE, CBMZ      Treatment Plan:  Reviewed current Medications with the patient. Medication as ordered  Risks, benefits, side effects, drug-to-drug interactions and alternatives to treatment were discussed. Collateral information:   CD evaluation  Encourage patient to attend group and other milieu activities.   Discharge planning discussed with the patient and treatment

## 2021-02-02 NOTE — GROUP NOTE
Group Therapy Note    Date: 2/1/2021    Group Start Time: 2100  Group End Time: 2112  Group Topic: Wrap-Up    [facility] I    Jw Garduno        Group Therapy Note    Attendees: 11/18         Patient's Goal:  \"go to groups\"    Notes:  Patient reported meeting their goal for the day. Patient chose to participate in a deep breathing exercise following wrap up group.     Status After Intervention:  Unchanged    Participation Level: Interactive    Participation Quality: Appropriate, Attentive and Sharing      Speech:  hesitant      Thought Process/Content: Logical      Affective Functioning: Congruent      Mood: euthymic      Level of consciousness:  Alert and Attentive      Response to Learning: Progressing to goal      Endings: None Reported    Modes of Intervention: Support      Discipline Responsible: [employer]      Signature:  Jw Garduno

## 2021-02-02 NOTE — GROUP NOTE
Group Therapy Note    Date: 2/2/2021    Group Start Time: 1100  Group End Time: 1150  Group Topic: Psychoeducation    [facility]    Tayler Bhat        Group Therapy Note    Attendees: 8         Patient's Goal:  \"Stay awake and attend the groups\"    Notes:  Patient work well on her project in group. Patient had a sad affect, shared openly on a 1:1 about her relationship with her boyfriend. Patient stated she wants a healthy life. Status After Intervention:  Unchanged    Participation Level:  Active Listener    Participation Quality: Appropriate      Speech:  quiet      Thought Process/Content: Linear      Affective Functioning: Flat      Mood: calm      Level of consciousness:  Alert      Response to Learning: Progressing to goal      Endings: None Reported    Modes of Intervention: Education, Socialization and Activity      Discipline Responsible: Psychoeducational Specialist      Signature:  Tayler Bhat
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
## 2021-02-02 NOTE — PROGRESS NOTES
Patient voiced anxiety 8/10 with 10 being the worst, medicated with Xanax per request. Will continue to monitor.

## 2021-02-03 PROCEDURE — 6370000000 HC RX 637 (ALT 250 FOR IP): Performed by: NURSE PRACTITIONER

## 2021-02-03 PROCEDURE — 6370000000 HC RX 637 (ALT 250 FOR IP): Performed by: PSYCHIATRY & NEUROLOGY

## 2021-02-03 PROCEDURE — 1240000000 HC EMOTIONAL WELLNESS R&B

## 2021-02-03 PROCEDURE — 99232 SBSQ HOSP IP/OBS MODERATE 35: CPT | Performed by: PSYCHIATRY & NEUROLOGY

## 2021-02-03 RX ADMIN — HYDROXYZINE PAMOATE 50 MG: 50 CAPSULE ORAL at 20:19

## 2021-02-03 RX ADMIN — NAPROXEN 500 MG: 500 TABLET ORAL at 17:22

## 2021-02-03 RX ADMIN — ALPRAZOLAM 0.5 MG: 0.5 TABLET ORAL at 09:17

## 2021-02-03 RX ADMIN — PANTOPRAZOLE SODIUM 40 MG: 40 TABLET, DELAYED RELEASE ORAL at 06:30

## 2021-02-03 RX ADMIN — SIMVASTATIN 40 MG: 20 TABLET, FILM COATED ORAL at 20:19

## 2021-02-03 RX ADMIN — VITAMIN E CAP 100 UNIT 200 UNITS: 100 CAP at 09:08

## 2021-02-03 RX ADMIN — HYDROCHLOROTHIAZIDE 25 MG: 25 TABLET ORAL at 09:08

## 2021-02-03 RX ADMIN — TIZANIDINE 4 MG: 4 TABLET ORAL at 20:20

## 2021-02-03 RX ADMIN — DULOXETINE HYDROCHLORIDE 60 MG: 30 CAPSULE, DELAYED RELEASE ORAL at 09:08

## 2021-02-03 RX ADMIN — NAPROXEN 500 MG: 500 TABLET ORAL at 09:08

## 2021-02-03 RX ADMIN — LEVOTHYROXINE SODIUM 125 MCG: 125 TABLET ORAL at 09:08

## 2021-02-03 RX ADMIN — ALPRAZOLAM 0.5 MG: 0.5 TABLET ORAL at 17:31

## 2021-02-03 RX ADMIN — QUETIAPINE FUMARATE 100 MG: 100 TABLET ORAL at 20:20

## 2021-02-03 RX ADMIN — AMLODIPINE BESYLATE 10 MG: 10 TABLET ORAL at 09:08

## 2021-02-03 RX ADMIN — HYDROXYZINE PAMOATE 50 MG: 50 CAPSULE ORAL at 13:25

## 2021-02-03 RX ADMIN — TIZANIDINE 4 MG: 4 TABLET ORAL at 09:08

## 2021-02-03 RX ADMIN — Medication 2000 UNITS: at 09:08

## 2021-02-03 NOTE — PROGRESS NOTES
With permission, BHT accompanied pt to [employer] where pt was allowed access to her cell phone in order to pay several bills that pt reported were due imminently. BHT reminded pt of rules re: cell phone use on 3W. Pt acknowledged and voiced understanding.  Electronically signed by Yesenia Brown on 2/2/2021 at 10:36 PM

## 2021-02-03 NOTE — PROGRESS NOTES
Pt out on unit, social with select peers, short time periods. Pt voiced concerns of discharge tomorrow, family stress, evasive, would not elaborate. Pt reports showering today. Pt reports good appetite. Pt reports good sleep, voiced slept well last night. Pt rates anxiety, 8 /10, request Xanax, medicated per request.Pt rates depression , 6/10. On a scale from 1 through 10, 10 being the highest. Pt reports attending groups. Pt denies SI, HI and A/V hallucinations. No voiced delusions at this time. Pt alert and oriented x 4. Will continue to monitor.

## 2021-02-03 NOTE — GROUP NOTE
Group Therapy Note    Date: 2/3/2021    Group Start Time: 1005  Group End Time: 6233  Group Topic: Psychotherapy    [facility] 3[facility] I    Jack Lees 55        Group Therapy Note    Attendees: 6         Patient's Goal: did not state a goal    Notes:  Left group early    Status After Intervention:  Improved    Participation Level: Interactive    Participation Quality: Appropriate      Speech:  normal      Thought Process/Content: Logical      Affective Functioning: Congruent      Mood: anxious      Level of consciousness:  Alert      Response to Learning: Progressing to goal      Endings: None Reported    Modes of Intervention: Support      Discipline Responsible: /Counselor      Signature:  Jack Lees

## 2021-02-03 NOTE — GROUP NOTE
Group Therapy Note    Date: 2/3/2021    Group Start Time: 0145  Group End Time: 0215  Group Topic: Healthy Living/Wellness    [facility] 3[facility] CYNDY Bell RN        Group Therapy Note    Attendees:          Patient's Goal:  Managing stress    Notes: Will use healthy ways to manage stress    Status After Intervention:  Improved    Participation Level:  Active Listener    Participation Quality: Appropriate      Speech:  normal      Thought Process/Content: Logical      Affective Functioning: Congruent      Mood: euthymic      Level of consciousness:  Alert and Oriented x4      Response to Learning: Progressing to goal      Endings: None Reported    Modes of Intervention: Education      Discipline Responsible: Registered Nurse      Signature:  Mavis Powell RN

## 2021-02-03 NOTE — PROGRESS NOTES
[facility]onaldo [facility] FOLLOW-UP NOTE       2/3/2021     Patient was seen and examined in person, Chart reviewed   Patient's case discussed with staff/team    Chief Complaint: depression SI    Interim History:     Pt feeling better today  Less depressed and anxious  Pt is planning to go back to stay with mom  Denies any active SI today  Still ruminating about her son  Appetite:   [] Normal/Unchanged  [] Increased  [x] Decreased      Sleep:       [] Normal/Unchanged  [x] Fair       [] Poor              Energy:    [] Normal/Unchanged  [] Increased  [x] Decreased        SI [] Present  [x] Absent    HI  []Present  [] Absent     Aggression:  [] yes  [] no    Patient is [x] able  [] unable to CONTRACT FOR SAFETY     PAST MEDICAL/PSYCHIATRIC HISTORY:   Past Medical History:   Diagnosis Date    Anemia     C. difficile colitis 1/2013    Chronic fatigue syndrome     Depression     [place]    Diabetes mellitus ([facility]     Diverticulitis large intestine 2001    Fibromyalgia     Fibromyalgia 3/24/2015    HTN (hypertension)     Hyperlipidemia     Hypothyroidism     Palpitations     [facility] 10/[facility]    Following GYN procedure    Vitamin D deficiency        FAMILY/SOCIAL HISTORY:  Family History   Problem Relation Age of Onset    Cancer Maternal Grandfather         COLON    Cancer Paternal Aunt         LUNG    Cancer Other         BLOOD CANCER- UNSPECIFIED     Social History     Socioeconomic History    Marital status:      Spouse name: Not on file    Number of children: Not on file    Years of education: Not on file    Highest education level: Not on file   Occupational History    Not on file   Social Needs    Financial resource strain: Not on file    Food insecurity     Worry: Not on file     Inability: Not on file    Transportation needs     Medical: Not on file     Non-medical: Not on file   Tobacco Use    Smoking status: Never Smoker    Smokeless tobacco: Never Used   Substance and Sexual Activity    Alcohol use: Yes     Comment: OCC.  Drug use: No    Sexual activity: Not on file   Lifestyle    Physical activity     Days per week: Not on file     Minutes per session: Not on file    Stress: Not on file   Relationships    Social connections     Talks on phone: Not on file     Gets together: Not on file     Attends [employer] service: Not on file     Active member of club or organization: Not on file     Attends meetings of clubs or organizations: Not on file     Relationship status: Not on file    Intimate partner violence     Fear of current or ex partner: Not on file     Emotionally abused: Not on file     Physically abused: Not on file     Forced sexual activity: Not on file   Other Topics Concern    Not on file   Social History Narrative    Not on file           ROS:  [x] All negative/unchanged except if checked.  Explain positive(checked items) below:  [] Constitutional  [] Eyes  [] Ear/Nose/Mouth/Throat  [] Respiratory  [] CV  [] GI  []   [] Musculoskeletal  [] Skin/Breast  [] Neurological  [] Endocrine  [] Heme/Lymph  [] Allergic/Immunologic    Explanation:     MEDICATIONS:    Current Facility-Administered Medications:     QUEtiapine (SEROQUEL) tablet 100 mg, 100 mg, Oral, Nightly, Piotr Warren MD, 100 mg at 02/02/21 2146    tiZANidine (ZANAFLEX) tablet 4 mg, 4 mg, Oral, BID, TYRA Llamas CNP, 4 mg at 02/03/21 0908    melatonin tablet 3 mg, 3 mg, Oral, Nightly PRN, Travis Covington MD, 3 mg at 02/01/21 2147    haloperidol lactate (HALDOL) injection 5 mg, 5 mg, Intramuscular, Q6H PRN **OR** haloperidol (HALDOL) tablet 5 mg, 5 mg, Oral, Q6H PRN, Piotr Warren MD    hydrOXYzine (VISTARIL) capsule 50 mg, 50 mg, Oral, Q6H PRN, 50 mg at 02/03/21 1325 **OR** hydrOXYzine (VISTARIL) injection 50 mg, 50 mg, Intramuscular, Q6H PRN, Piotr Warren MD    acetaminophen (TYLENOL) tablet 650 mg, 650 mg, Oral, Q4H PRN, Piotr Warren MD, 650 mg at 01/28/21 2131    benztropine mesylate (COGENTIN) injection 2 mg, 2 mg, Intramuscular, BID PRN, Rosaura Hall MD    magnesium hydroxide (MILK OF MAGNESIA) 400 MG/5ML suspension 30 mL, 30 mL, Oral, Daily PRN, Rosaura Hall MD    nicotine polacrilex (COMMIT) lozenge 2 mg, 2 mg, Oral, Q2H PRN, Rosaura Hall MD    influenza quadrivalent split vaccine (FLUZONE;FLUARIX;FLULAVAL;AFLURIA) injection 0.5 mL, 0.5 mL, Intramuscular, Prior to discharge, Thai Santana MD    amLODIPine (NORVASC) tablet 10 mg, 10 mg, Oral, Daily, Thai Santana MD, 10 mg at 02/03/21 0908    levothyroxine (SYNTHROID) tablet 125 mcg, 125 mcg, Oral, Daily, Thai Santana MD, 125 mcg at 02/03/21 0908    vitamin E capsule 200 Units, 200 Units, Oral, Daily, Thai Santana MD, 200 Units at 02/03/21 0908    simvastatin (ZOCOR) tablet 40 mg, 40 mg, Oral, Nightly, Thai Santana MD, 40 mg at 02/02/21 2146    Vitamin D (CHOLECALCIFEROL) tablet 2,000 Units, 2,000 Units, Oral, Daily, Thai Santana MD, 2,000 Units at 02/03/21 0908    DULoxetine (CYMBALTA) extended release capsule 60 mg, 60 mg, Oral, Daily, Thai Santana MD, 60 mg at 02/03/21 0908    ALPRAZolam (XANAX) tablet 0.5 mg, 0.5 mg, Oral, TID PRN, Thai Santana MD, 0.5 mg at 02/03/21 0917    pantoprazole (PROTONIX) tablet 40 mg, 40 mg, Oral, QAM AC, Thai Santana MD, 40 mg at 02/03/21 0630    hydroCHLOROthiazide (HYDRODIURIL) tablet 25 mg, 25 mg, Oral, Daily, TYRA Bales CNP, 25 mg at 02/03/21 0908    naproxen (NAPROSYN) tablet 500 mg, 500 mg, Oral, BID WC, Annette Banda, RN, NP, 500 mg at 02/03/21 0908      Examination:  /66   Pulse 85   Temp 98 °F (36.7 °C) (Oral)   Resp 18   Ht 5' 2\" (1.575 m)   Wt 202 lb (91.6 kg)   LMP 11/19/2013   SpO2 96%   BMI 36.95 kg/m²   Gait - steady  Medication side effects(SE): no    Mental Status Examination:    Level of consciousness:  within normal limits   Appearance: better  Behavior/Motor:  Less psychomotor retardation  Attitude toward examiner:  cooperative  Speech:  slow   Mood: less depressed  Affect:  blunted  Thought processes:  slow   Thought content:  Suicidal Ideation:  denies  Delusions:  no evidence of delusions  Perceptual Disturbance:  denies any perceptual disturbance  Cognition:  oriented to person, place, and time   Concentration better  Insight better  Judgement better    ASSESSMENT:   Patient symptoms are:  [] Well controlled  [x] Improving  [] Worsening  [] No change      Diagnosis:   Principal Problem:    Severe episode of recurrent major depressive disorder, without psychotic features ([facility]  Resolved Problems:    * No resolved hospital problems. *      LABS:    No results for input(s): WBC, HGB, PLT in the last 72 hours. No results for input(s): NA, K, CL, CO2, BUN, CREATININE, GLUCOSE in the last 72 hours. No results for input(s): BILITOT, ALKPHOS, AST, ALT in the last 72 hours. Lab Results   Component Value Date    LABAMPH Neg 01/27/2021    BARBSCNU Neg 01/27/2021    LABBENZ POSITIVE 01/27/2021    LABBENZ NotDTCD 09/14/2012    LABMETH Neg 01/27/2021    OPIATESCREENURINE Neg 01/27/2021    PHENCYCLIDINESCREENURINE Neg 01/27/2021    ETOH <10 01/27/2021     Lab Results   Component Value Date    TSH 0.308 01/27/2021     No results found for: LITHIUM  No results found for: VALPROATE, CBMZ      Treatment Plan:  Reviewed current Medications with the patient. Medication as ordered  Risks, benefits, side effects, drug-to-drug interactions and alternatives to treatment were discussed. Collateral information:   CD evaluation  Encourage patient to attend group and other milieu activities.   Discharge planning discussed with the patient and treatment team.    PSYCHOTHERAPY/COUNSELING:  [x] Therapeutic interview  [x] Supportive  [] CBT  [] Ongoing  [] Other  Patient was seen 1:1 for 20 minutes, other than E&M time spent, focusing on      - coping skills techniques     - Anxiety management techniques discussed including deep breathing exercise and PMR     - discussing patients strength and weakness      - Focusing on negative cognition and maladaptive thoughts, which is feeding and maintaining the depression symptoms    [x] Patient continues to need, on a daily basis, active treatment furnished directly by or requiring the supervision of inpatient psychiatric personnel      Anticipated Length of stay: tomorrow            Electronically signed by Fab Whyte MD on 2/3/2021 at 4:32 PM

## 2021-02-03 NOTE — GROUP NOTE
Group Therapy Note    Date: 2/2/2021    Group Start Time: 1830  Group End Time: 1915  Group Topic: Recreational    [facility]    Cesar Basurto        Group Therapy Note    Attendees: 10/15       Patient's Goal:  To participate in the Activity Group's game of [address]. Notes:  Patient actively participated in the Activity Group. Status After Intervention:  Unchanged    Participation Level:  Active Listener and Interactive    Participation Quality: Appropriate and Attentive      Speech:  normal      Thought Process/Content: Logical      Affective Functioning: Flat      Mood: depressed      Level of consciousness:  Alert and Attentive      Response to Learning: Able to verbalize current knowledge/experience      Endings: None Reported    Modes of Intervention: Activity      Discipline Responsible: [address] [place]      Signature:  Cesar Basurto

## 2021-02-03 NOTE — GROUP NOTE
Group Therapy Note    Date: 2/2/2021    Group Start Time: 2100  Group End Time: 2150  Group Topic: Wrap-Up    [facility] 3[facility] CHING Weber        Group Therapy Note    Attendees: 12/15       Patient's Goal:  \"To stay awake and attend groups. \"    Notes:  Patient reports partially meeting their goal for today. Patient chose not to participate in a guided meditation for non-judgement. Status After Intervention:  Unchanged    Participation Level:  Active Listener and Minimal    Participation Quality: Appropriate, Attentive and Resistant      Speech:  hesitant      Thought Process/Content: Logical      Affective Functioning: Flat      Mood: depressed      Level of consciousness:  Alert and Attentive      Response to Learning: Able to verbalize current knowledge/experience and Progressing to goal      Endings: None Reported    Modes of Intervention: Support      Discipline Responsible: [facility] [address] [employer]      Signature:  Jonathon Weber

## 2021-02-03 NOTE — GROUP NOTE
Group Therapy Note    Date: 2/3/2021    Group Start Time: 0226  Group End Time: 3979  Group Topic: Healthy Living/Wellness    [facility] Tinsley        Group Therapy Note    Attendees: 10/13       Patient's Goal:  To participate in a game of [email or web address].     Notes:  Patient briefly observed the Activity Group's game of [address] [email or web address] but departed early without participating.     Status After Intervention:  Unchanged    Participation Level: None    Participation Quality: Resistant      Speech:  mute      Thought Process/Content: Logical      Affective Functioning: Flat      Mood: euthymic      Level of consciousness:  Preoccupied      Response to Learning: Resistant      Endings: None Reported    Modes of Intervention: Activity      Discipline Responsible: /Counselor      Signature:  Javy Tinsley

## 2021-02-03 NOTE — GROUP NOTE
Group Therapy Note    Date: 2/3/2021    Group Start Time: 1100  Group End Time: 1200  Group Topic: Psychoeducation    [facility]    Elfida So, CTRS        Group Therapy Note    Attendees: 7         Patient's Goal:  \"to keep feeling better\"    Notes:  Pt. attended the 1100 skill group. Worked on project with attention to details. Talkative and engaged in the group discussion. Status After Intervention:  Improved    Participation Level:  Active Listener and Interactive    Participation Quality: Appropriate, Attentive and Sharing      Speech:  normal      Thought Process/Content: Logical      Affective Functioning: Congruent and Flat      Mood: calm      Level of consciousness:  Alert, Oriented x4 and Attentive      Response to Learning: Progressing to goal      Endings: None Reported    Modes of Intervention: Education, Support, Socialization and Activity      Discipline Responsible: Psychoeducational Specialist      Signature:  Tray Knott

## 2021-02-03 NOTE — CARE COORDINATION
Pt. Calm and cooperative, but reports anxiety 9/10 and depression 8/10. Pt. Remains flat and sad, but did laugh while telling a story about one of her teenage daughter. Reports \"ok\" sleep, but woke up twice during the night and had bad dreams. Pt. States she stays in her room because the masks bother her. Pt. States her boyfriend left the house.

## 2021-02-03 NOTE — GROUP NOTE
Group Therapy Note    Date: 2/3/2021    Group Start Time: 1430  Group End Time: 1510  Group Topic: Cognitive Skills    [facility]    NEHA Hurt        Group Therapy Note    Attendees: 8         Patient's Goal:  To participate in mood management group. Notes:  Patient was bright in affect and very social. She learned quite a bit of action planning. Patient was forthcoming about her difficulties with anger. Status After Intervention:  Improved    Participation Level: Active Listener    Participation Quality: Appropriate      Speech:  normal      Thought Process/Content: Logical      Affective Functioning: Congruent      Mood: elevated      Level of consciousness:  Alert      Response to Learning: Able to verbalize current knowledge/experience      Endings: None Reported    Modes of Intervention: Education      Discipline Responsible: /Counselor      Signature:   NEHA Hurt

## 2021-02-03 NOTE — PROGRESS NOTES
Pt isolating to room, voiced family stressors. Feeling helpless, tearful at times during conversation. Pt reports showering today. Pt reports poor appetite. Pt reports poor sleep, due to trouble falling asleep and staying asleep. Pt voiced racing thoughts. Pt rates anxiety, 7 /10. Pt rates depression. 8 /10. On a scale from 1 through 10, 10 being the highest. Pt reports attending groups. Pt denies SI, HI and A/V hallucinations. Pt alert and oriented x 4. Will continue to monitor.

## 2021-02-04 VITALS
OXYGEN SATURATION: 94 % | WEIGHT: 202 LBS | RESPIRATION RATE: 18 BRPM | HEART RATE: 96 BPM | SYSTOLIC BLOOD PRESSURE: 108 MMHG | TEMPERATURE: 98 F | DIASTOLIC BLOOD PRESSURE: 81 MMHG | BODY MASS INDEX: 37.17 KG/M2 | HEIGHT: 62 IN

## 2021-02-04 PROCEDURE — 99239 HOSP IP/OBS DSCHRG MGMT >30: CPT | Performed by: PSYCHIATRY & NEUROLOGY

## 2021-02-04 PROCEDURE — 6370000000 HC RX 637 (ALT 250 FOR IP): Performed by: NURSE PRACTITIONER

## 2021-02-04 PROCEDURE — 6370000000 HC RX 637 (ALT 250 FOR IP): Performed by: PSYCHIATRY & NEUROLOGY

## 2021-02-04 RX ORDER — HYDROCHLOROTHIAZIDE 25 MG/1
25 TABLET ORAL DAILY
Qty: 30 TABLET | Refills: 1 | Status: SHIPPED | OUTPATIENT
Start: 2021-02-05

## 2021-02-04 RX ORDER — QUETIAPINE FUMARATE 100 MG/1
100 TABLET, FILM COATED ORAL NIGHTLY
Qty: 15 TABLET | Refills: 2 | Status: ON HOLD | OUTPATIENT
Start: 2021-02-04 | End: 2021-10-21 | Stop reason: HOSPADM

## 2021-02-04 RX ORDER — DULOXETIN HYDROCHLORIDE 60 MG/1
60 CAPSULE, DELAYED RELEASE ORAL DAILY
Qty: 15 CAPSULE | Refills: 3 | Status: SHIPPED | OUTPATIENT
Start: 2021-02-05

## 2021-02-04 RX ADMIN — ALPRAZOLAM 0.5 MG: 0.5 TABLET ORAL at 08:29

## 2021-02-04 RX ADMIN — HYDROXYZINE PAMOATE 50 MG: 50 CAPSULE ORAL at 10:56

## 2021-02-04 RX ADMIN — HYDROCHLOROTHIAZIDE 25 MG: 25 TABLET ORAL at 08:29

## 2021-02-04 RX ADMIN — NAPROXEN 500 MG: 500 TABLET ORAL at 08:32

## 2021-02-04 RX ADMIN — DULOXETINE HYDROCHLORIDE 60 MG: 30 CAPSULE, DELAYED RELEASE ORAL at 08:29

## 2021-02-04 RX ADMIN — VITAMIN E CAP 100 UNIT 200 UNITS: 100 CAP at 08:29

## 2021-02-04 RX ADMIN — LEVOTHYROXINE SODIUM 125 MCG: 125 TABLET ORAL at 08:02

## 2021-02-04 RX ADMIN — AMLODIPINE BESYLATE 10 MG: 10 TABLET ORAL at 08:29

## 2021-02-04 RX ADMIN — PANTOPRAZOLE SODIUM 40 MG: 40 TABLET, DELAYED RELEASE ORAL at 06:30

## 2021-02-04 RX ADMIN — Medication 2000 UNITS: at 08:29

## 2021-02-04 NOTE — GROUP NOTE
Group Therapy Note    Date: 2/3/2021    Group Start Time: 8411  Group End Time: 1915  Group Topic: Wrap-Up    [facility] Albrecht        Group Therapy Note    Attendees: 12/16       Patient's Goal:  \"To go home tomorrow. \"    Notes:  Patient reports meeting their goal for today. Patient chose not to participate in a guided body scan meditation. Status After Intervention:  Unchanged    Participation Level:  Active Listener and Interactive    Participation Quality: Appropriate and Attentive      Speech:  normal      Thought Process/Content: Logical      Affective Functioning: Flat      Mood: depressed      Level of consciousness:  Alert and Attentive      Response to Learning: Able to verbalize current knowledge/experience and Progressing to goal      Endings: None Reported    Modes of Intervention: Support      Discipline Responsible: /Counselor      Signature:  Casi Albrecht

## 2021-02-04 NOTE — PROGRESS NOTES
Pt asks for Xanax for anxiety which she was gven. Patient refuses her Tizanitide due to possible discharge and this med makes her tired.  Electronically signed by Paola Martinez LPN on 6/0/9479 at 5:37 AM
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
## 2021-02-04 NOTE — PROGRESS NOTES
Pt comes to the desk asking for a vistaril which is given for anxiety.  Electronically signed by Jaret Arellano LPN on 5/5/0505 at 06:42 AM

## 2021-02-04 NOTE — CARE COORDINATION
Discharge instructions reviewed verbally and in writing including f/u appointments. Patient verbalizes understanding and signed as such. All belongings returned for discharge. Patient denies SI, HI, A/V hallucinations, mood is stable.    Discharged with own vehicle or transportation

## 2021-02-04 NOTE — GROUP NOTE
Group Therapy Note    Date: 2/4/2021    Group Start Time: 1000  Group End Time: 1050  Group Topic: Psychotherapy    [facility] CH[facility] Landaverde, [facility]        Group Therapy Note    Attendees: 6         Patient's Goal:  To be discharged today. Notes:  Patient stated that she feels better.      Status After Intervention:  Improved    Participation Level: Interactive    Participation Quality: Appropriate      Speech:  normal      Thought Process/Content: Logical      Affective Functioning: Congruent      Mood: anxious      Level of consciousness:  Alert      Response to Learning: Progressing to goal      Endings: None Reported    Modes of Intervention: Support      Discipline Responsible: /Counselor      Signature:  Henok Landaverde, [facility]

## 2021-02-04 NOTE — GROUP NOTE
Group Therapy Note    Date: 2/4/2021    Group Start Time: 1100  Group End Time: 1200  Group Topic: Psychoeducation    [facility] Vel[facility], [facility]        Group Therapy Note    Attendees: 8         Patient's Goal:  \"to go home\"    Notes:  Pt. attended the 1100 skill group. Pt. worked on project with attention to detail. Talkative and engaged in the group discussion. Happy to be going home. Status After Intervention:  Improved    Participation Level:  Active Listener and Interactive    Participation Quality: Appropriate, Attentive and Sharing      Speech:  normal      Thought Process/Content: Logical      Affective Functioning: Congruent      Mood: calm      Level of consciousness:  Alert, Oriented x4 and Attentive      Response to Learning: Progressing to goal      Endings: None Reported    Modes of Intervention: Education, Support, Socialization and Activity      Discipline Responsible: Psychoeducational Specialist      Signature:  Silvano Milligan

## 2021-02-15 ENCOUNTER — OFFICE VISIT (OUTPATIENT)
Dept: UROLOGY | Age: 56
End: 2021-02-15
Payer: COMMERCIAL

## 2021-02-15 VITALS
HEIGHT: 62 IN | DIASTOLIC BLOOD PRESSURE: 88 MMHG | TEMPERATURE: 97.6 F | HEART RATE: 67 BPM | WEIGHT: 202 LBS | SYSTOLIC BLOOD PRESSURE: 128 MMHG | BODY MASS INDEX: 37.17 KG/M2

## 2021-02-15 DIAGNOSIS — M62.830 BACK MUSCLE SPASM: Primary | ICD-10-CM

## 2021-02-15 PROCEDURE — 1111F DSCHRG MED/CURRENT MED MERGE: CPT | Performed by: UROLOGY

## 2021-02-15 PROCEDURE — 99202 OFFICE O/P NEW SF 15 MIN: CPT | Performed by: UROLOGY

## 2021-02-15 PROCEDURE — G8484 FLU IMMUNIZE NO ADMIN: HCPCS | Performed by: UROLOGY

## 2021-02-15 PROCEDURE — G8417 CALC BMI ABV UP PARAM F/U: HCPCS | Performed by: UROLOGY

## 2021-02-15 PROCEDURE — 3017F COLORECTAL CA SCREEN DOC REV: CPT | Performed by: UROLOGY

## 2021-02-15 PROCEDURE — 1036F TOBACCO NON-USER: CPT | Performed by: UROLOGY

## 2021-02-15 PROCEDURE — G8427 DOCREV CUR MEDS BY ELIG CLIN: HCPCS | Performed by: UROLOGY

## 2021-02-15 RX ORDER — M-VIT,TX,IRON,MINS/CALC/FOLIC 27MG-0.4MG
1 TABLET ORAL DAILY
COMMUNITY

## 2021-02-15 RX ORDER — NICOTINE POLACRILEX 2 MG
GUM BUCCAL
COMMUNITY

## 2021-02-15 NOTE — PROGRESS NOTES
[facility] EVALUATION NOTE                                                 H&P                                                                                                                                                 Reason for Visit  Right lower back pain    History of Present Illness  Patient referred here due to questionable hydronephrosis on CT  Review of CT shows improved right collecting system compared to x-rays from 2017 and 2018  Patient has had yearly CT scans for flank pain  Overall improvement in the right and left collecting system over those years  Normal creatinine clearance  Pain consistent with right lower back pain  Patient has history of fibromyalgia  Is scheduled to see pain specialist      Urologic Review of Systems/Symptoms  12 vaginal deliveries    Review of Systems  Hospitalization: None recent  All 14 categories of Review of Systems otherwise reviewed no other findings reported.     Past Medical History:   Diagnosis Date    Anemia     C. difficile colitis 1/2013    Chronic fatigue syndrome     Depression     Janet    Diabetes mellitus ([address]     Diverticulitis large intestine 2001    Fibromyalgia     Fibromyalgia 3/24/2015    HTN (hypertension)     Hyperlipidemia     Hypothyroidism     Palpitations     Pulmonary embolus ([address] 10/12    Following GYN procedure    Vitamin D deficiency      Past Surgical History:   Procedure Laterality Date    ANUS SURGERY  12/14/2011    anal fissure    COLON SURGERY      BIPOSY    COLONOSCOPY  8/2011    DILATION AND CURETTAGE OF UTERUS      MISCARRIAGE X2    HEMORRHOID SURGERY   8/24/10    EXTERIOR    LAPAROSCOPY      SIGMOIDOSCOPY  12/14/11    RESULTING IN FISSURECTOMY     Social History     Socioeconomic History    Marital status:      Spouse name: None    Number of children: None    Years of education: None    Highest education level: None   Occupational History    None   Social Needs    Financial resource  Cholecalciferol (VITAMIN D3) 2000 units CAPS Take 2,000 Units by mouth daily       ALPRAZolam (XANAX) 0.5 MG tablet Take 0.5 mg by mouth 4 times daily      amLODIPine (NORVASC) 10 MG tablet Take 1 tablet by mouth daily 30 tablet 5    omeprazole (PRILOSEC OTC) 20 MG tablet Take 1 tablet by mouth daily 30 tablet 5    naproxen (NAPROSYN) 250 MG tablet Take 2 tablets by mouth 2 times daily (with meals) for 5 days 20 tablet 0     No current facility-administered medications for this visit. Ciprofloxacin, Erythromycin, Ketorolac tromethamine, Mobic [meloxicam], Other, Pcn [penicillins], Pseudoephedrine hcl, Sudafed [pseudoephedrine hcl], Sympathomimetics, and Thorazine [chlorpromazine]  All reviewed and verified by Dr Lotus Agudelo on today's visit    No results found for: PSA, PSADIA  No results found for this visit on 02/15/21. Physical Exam  Vitals:    02/15/21 1316   BP: 128/88   Pulse: 67   Temp: 97.6 °F (36.4 °C)   TempSrc: Temporal   Weight: 202 lb (91.6 kg)   Height: 5' 2\" (1.575 m)     Constitutional: Not in distress. Head and Neck: Normal  Cardiovascular: Normal rate, BP reviewed. Normal  Pulmonary/Chest: Normal respiratory effort not short of breath  Abdominal: Not distended. Urologic Exam  CT is reviewed with patient. Musculoskeletal: Ambulatory. Patient has pain along the right paraspinous muscle group with mild spasms  Extremities: No edema  Neurological: Intact  Skin: Normal  Psychiatric: Alert and oriented x3. Assessment/Medical Necessity-Decision Making  History of incidental right mild hydronephrosis noted on CT my review of CT scan shows actual improvement in mild dilatation of the right collecting system compared to CT scan of 2017. No evidence of hydronephrosis on today's CT. No evidence of abnormalities of the urinary tract. Patient has no cystocele or pelvic prolapse either on CT.   Right para spinous muscle group spasm most likely fibromyalgia/chronic back pain patient has been

## 2021-03-11 ENCOUNTER — HOSPITAL ENCOUNTER (EMERGENCY)
Age: 56
Discharge: HOME | End: 2021-03-11
Payer: COMMERCIAL

## 2021-03-11 ENCOUNTER — APPOINTMENT (OUTPATIENT)
Dept: CT IMAGING | Age: 56
End: 2021-03-11
Payer: COMMERCIAL

## 2021-03-11 VITALS
BODY MASS INDEX: 37.73 KG/M2 | HEART RATE: 68 BPM | TEMPERATURE: 98 F | OXYGEN SATURATION: 100 % | SYSTOLIC BLOOD PRESSURE: 166 MMHG | DIASTOLIC BLOOD PRESSURE: 100 MMHG | RESPIRATION RATE: 18 BRPM | WEIGHT: 205 LBS | HEIGHT: 62 IN

## 2021-03-11 DIAGNOSIS — R10.9 RIGHT FLANK PAIN: Primary | ICD-10-CM

## 2021-03-11 LAB
ALBUMIN SERPL-MCNC: 4.5 G/DL (ref 3.5–4.6)
ALP BLD-CCNC: 101 U/L (ref 40–130)
ALT SERPL-CCNC: 25 U/L (ref 0–33)
AMPHETAMINE SCREEN, URINE: NORMAL
ANION GAP SERPL CALCULATED.3IONS-SCNC: 10 MEQ/L (ref 9–15)
AST SERPL-CCNC: 24 U/L (ref 0–35)
BARBITURATE SCREEN URINE: NORMAL
BASOPHILS ABSOLUTE: 0.1 K/UL (ref 0–0.2)
BASOPHILS RELATIVE PERCENT: 0.7 %
BENZODIAZEPINE SCREEN, URINE: NORMAL
BILIRUB SERPL-MCNC: 0.4 MG/DL (ref 0.2–0.7)
BILIRUBIN URINE: NEGATIVE
BLOOD, URINE: NEGATIVE
BUN BLDV-MCNC: 9 MG/DL (ref 6–20)
CALCIUM SERPL-MCNC: 10.1 MG/DL (ref 8.5–9.9)
CANNABINOID SCREEN URINE: NORMAL
CHLORIDE BLD-SCNC: 105 MEQ/L (ref 95–107)
CLARITY: CLEAR
CO2: 29 MEQ/L (ref 20–31)
COCAINE METABOLITE SCREEN URINE: NORMAL
COLOR: YELLOW
CREAT SERPL-MCNC: 0.79 MG/DL (ref 0.5–0.9)
EOSINOPHILS ABSOLUTE: 0.1 K/UL (ref 0–0.7)
EOSINOPHILS RELATIVE PERCENT: 1 %
GFR AFRICAN AMERICAN: >60
GFR NON-AFRICAN AMERICAN: >60
GLOBULIN: 3.1 G/DL (ref 2.3–3.5)
GLUCOSE BLD-MCNC: 109 MG/DL (ref 70–99)
GLUCOSE URINE: NEGATIVE MG/DL
HCT VFR BLD CALC: 41.9 % (ref 37–47)
HEMOGLOBIN: 14 G/DL (ref 12–16)
KETONES, URINE: NEGATIVE MG/DL
LEUKOCYTE ESTERASE, URINE: NEGATIVE
LIPASE: 40 U/L (ref 12–95)
LYMPHOCYTES ABSOLUTE: 2.8 K/UL (ref 1–4.8)
LYMPHOCYTES RELATIVE PERCENT: 35.2 %
Lab: NORMAL
MCH RBC QN AUTO: 29.1 PG (ref 27–31.3)
MCHC RBC AUTO-ENTMCNC: 33.4 % (ref 33–37)
MCV RBC AUTO: 87 FL (ref 82–100)
METHADONE SCREEN, URINE: NORMAL
MONOCYTES ABSOLUTE: 0.5 K/UL (ref 0.2–0.8)
MONOCYTES RELATIVE PERCENT: 6.1 %
NEUTROPHILS ABSOLUTE: 4.6 K/UL (ref 1.4–6.5)
NEUTROPHILS RELATIVE PERCENT: 57 %
NITRITE, URINE: NEGATIVE
OPIATE SCREEN URINE: NORMAL
OXYCODONE URINE: NORMAL
PDW BLD-RTO: 13.8 % (ref 11.5–14.5)
PH UA: 8 (ref 5–9)
PHENCYCLIDINE SCREEN URINE: NORMAL
PLATELET # BLD: 292 K/UL (ref 130–400)
POTASSIUM SERPL-SCNC: 3.6 MEQ/L (ref 3.4–4.9)
PROPOXYPHENE SCREEN: NORMAL
PROTEIN UA: NEGATIVE MG/DL
RBC # BLD: 4.81 M/UL (ref 4.2–5.4)
SODIUM BLD-SCNC: 144 MEQ/L (ref 135–144)
SPECIFIC GRAVITY UA: 1.01 (ref 1–1.03)
TOTAL PROTEIN: 7.6 G/DL (ref 6.3–8)
URINE REFLEX TO CULTURE: NORMAL
UROBILINOGEN, URINE: 0.2 E.U./DL
WBC # BLD: 8 K/UL (ref 4.8–10.8)

## 2021-03-11 PROCEDURE — 80307 DRUG TEST PRSMV CHEM ANLYZR: CPT

## 2021-03-11 PROCEDURE — 99283 EMERGENCY DEPT VISIT LOW MDM: CPT

## 2021-03-11 PROCEDURE — 80053 COMPREHEN METABOLIC PANEL: CPT

## 2021-03-11 PROCEDURE — 85025 COMPLETE CBC W/AUTO DIFF WBC: CPT

## 2021-03-11 PROCEDURE — 36415 COLL VENOUS BLD VENIPUNCTURE: CPT

## 2021-03-11 PROCEDURE — 74176 CT ABD & PELVIS W/O CONTRAST: CPT

## 2021-03-11 PROCEDURE — 6360000002 HC RX W HCPCS: Performed by: PHYSICIAN ASSISTANT

## 2021-03-11 PROCEDURE — 6370000000 HC RX 637 (ALT 250 FOR IP): Performed by: PHYSICIAN ASSISTANT

## 2021-03-11 PROCEDURE — 96372 THER/PROPH/DIAG INJ SC/IM: CPT

## 2021-03-11 PROCEDURE — 81003 URINALYSIS AUTO W/O SCOPE: CPT

## 2021-03-11 PROCEDURE — 83690 ASSAY OF LIPASE: CPT

## 2021-03-11 RX ORDER — ONDANSETRON 2 MG/ML
4 INJECTION INTRAMUSCULAR; INTRAVENOUS ONCE
Status: DISCONTINUED | OUTPATIENT
Start: 2021-03-11 | End: 2021-03-11

## 2021-03-11 RX ORDER — MORPHINE SULFATE 2 MG/ML
4 INJECTION, SOLUTION INTRAMUSCULAR; INTRAVENOUS ONCE
Status: DISCONTINUED | OUTPATIENT
Start: 2021-03-11 | End: 2021-03-11

## 2021-03-11 RX ORDER — TRAMADOL HYDROCHLORIDE 50 MG/1
50 TABLET ORAL EVERY 6 HOURS PRN
Qty: 12 TABLET | Refills: 0 | Status: SHIPPED | OUTPATIENT
Start: 2021-03-11 | End: 2021-03-14

## 2021-03-11 RX ORDER — ONDANSETRON 4 MG/1
4 TABLET, ORALLY DISINTEGRATING ORAL ONCE
Status: COMPLETED | OUTPATIENT
Start: 2021-03-11 | End: 2021-03-11

## 2021-03-11 RX ORDER — 0.9 % SODIUM CHLORIDE 0.9 %
1000 INTRAVENOUS SOLUTION INTRAVENOUS ONCE
Status: DISCONTINUED | OUTPATIENT
Start: 2021-03-11 | End: 2021-03-11 | Stop reason: HOSPADM

## 2021-03-11 RX ORDER — MORPHINE SULFATE 2 MG/ML
4 INJECTION, SOLUTION INTRAMUSCULAR; INTRAVENOUS ONCE
Status: COMPLETED | OUTPATIENT
Start: 2021-03-11 | End: 2021-03-11

## 2021-03-11 RX ADMIN — MORPHINE SULFATE 4 MG: 2 INJECTION, SOLUTION INTRAMUSCULAR; INTRAVENOUS at 15:28

## 2021-03-11 RX ADMIN — ONDANSETRON 4 MG: 4 TABLET, ORALLY DISINTEGRATING ORAL at 15:29

## 2021-03-11 ASSESSMENT — PAIN DESCRIPTION - LOCATION: LOCATION: FLANK

## 2021-03-11 ASSESSMENT — PAIN DESCRIPTION - PAIN TYPE: TYPE: ACUTE PAIN

## 2021-03-11 ASSESSMENT — ENCOUNTER SYMPTOMS
ABDOMINAL PAIN: 0
COLOR CHANGE: 0
ABDOMINAL DISTENTION: 0
SHORTNESS OF BREATH: 0
RHINORRHEA: 0
CONSTIPATION: 0
BACK PAIN: 0
SORE THROAT: 0
DIARRHEA: 0
VOMITING: 0
NAUSEA: 0
EYE DISCHARGE: 0

## 2021-03-11 ASSESSMENT — PAIN DESCRIPTION - ORIENTATION: ORIENTATION: RIGHT

## 2021-03-11 ASSESSMENT — PAIN DESCRIPTION - DESCRIPTORS: DESCRIPTORS: ACHING;BURNING

## 2021-03-11 ASSESSMENT — PAIN SCALES - GENERAL: PAINLEVEL_OUTOF10: 9

## 2021-03-11 NOTE — ED NOTES
Discharge  instructions given and reviewed. Patient verbalized understanding. Patient ambulated out of ED with a steady gait to POV.      Luis E Gonzalez RN  03/11/21 8546

## 2021-03-11 NOTE — ED PROVIDER NOTES
[address] [facility] ED  eMERGENCY dEPARTMENT eNCOUnter      Pt Name: Soledad Flores  MRN: [id number]  [place] 1965  Date of evaluation: 3/11/2021  Provider: Renetta Cortes PA-C    CHIEF COMPLAINT       Chief Complaint   Patient presents with    Flank Pain     Right flank          HISTORY OF PRESENT ILLNESS   (Location/Symptom, Timing/Onset,Context/Setting, Quality, Duration, Modifying Factors, Severity)  Note limiting factors. Soledad Flores is a 48 y.o. female who presents to the emergency department complaint of right flank pain patient states is been ongoing for the last approximate 1 week. She denies any nausea vomiting, she states increased frequency of urination, there is no burning, no discharge, no bleeding. She states her pain is currently 9 out of 10 she states it does increase with movement, and activity. She denies any recent falls or injuries. She denies any constipation or diarrhea. She does have a past history of chronic back pain but states this pain is higher than her typical chronic pain. Past medical history significant for fibromyalgia, anemia, depression, diabetes, hypertension, hyperlipidemia, hypothyroidism. HPI    NursingNotes were reviewed. REVIEW OF SYSTEMS    (2-9 systems for level 4, 10 or more for level 5)     Review of Systems   Constitutional: Negative for activity change and appetite change. HENT: Negative for congestion, ear discharge, ear pain, nosebleeds, rhinorrhea and sore throat. Eyes: Negative for discharge. Respiratory: Negative for shortness of breath. Cardiovascular: Negative for chest pain, palpitations and leg swelling. Gastrointestinal: Negative for abdominal distention, abdominal pain, constipation, diarrhea, nausea and vomiting. Genitourinary: Positive for flank pain. Negative for decreased urine volume, difficulty urinating, dysuria and urgency. Musculoskeletal: Negative for arthralgias and back pain.    Skin: Negative for color change, pallor, rash and wound. Neurological: Negative for dizziness, tremors, syncope, weakness, numbness and headaches. Psychiatric/Behavioral: Negative for agitation and confusion. Except as noted above the remainder of the review of systems was reviewed and negative.        PAST MEDICAL HISTORY     Past Medical History:   Diagnosis Date    Anemia     C. difficile colitis 1/2013    Chronic fatigue syndrome     Depression     [place]    Diabetes mellitus ([facility] [address]     Diverticulitis large intestine 2001    Fibromyalgia     Fibromyalgia 3/24/2015    HTN (hypertension)     Hyperlipidemia     Hypothyroidism     Palpitations     Pulmonary embolus ([facility] [address] 10/12    Following GYN procedure    Vitamin D deficiency          SURGICALHISTORY       Past Surgical History:   Procedure Laterality Date    ANUS SURGERY  12/14/2011    anal fissure    COLON SURGERY      BIPOSY    COLONOSCOPY  8/2011    DILATION AND CURETTAGE OF UTERUS      MISCARRIAGE X2    HEMORRHOID SURGERY   8/24/10    EXTERIOR    LAPAROSCOPY      SIGMOIDOSCOPY  12/14/11    RESULTING IN FISSURECTOMY         CURRENT MEDICATIONS       Discharge Medication List as of 3/11/2021  3:41 PM      CONTINUE these medications which have NOT CHANGED    Details   Multiple Vitamins-Minerals (THERAPEUTIC MULTIVITAMIN-MINERALS) tablet Take 1 tablet by mouth dailyHistorical Med      Biotin 1 MG CAPS Take by mouthHistorical Med      Coenzyme Q10 (COQ-10) 100 MG CAPS Take by mouthHistorical Med      DULoxetine (CYMBALTA) 60 MG extended release capsule Take 1 capsule by mouth daily, Disp-15 capsule, R-3Normal      hydroCHLOROthiazide (HYDRODIURIL) 25 MG tablet Take 1 tablet by mouth daily, Disp-30 tablet, R-1Normal      QUEtiapine (SEROQUEL) 100 MG tablet Take 1 tablet by mouth nightly, Disp-15 tablet, R-2Normal      tiZANidine (ZANAFLEX) 4 MG tablet TAKE 1 TABLET BY MOUTH TWICE A DAYHistorical Med      naproxen (NAPROSYN) 250 MG tablet Take 2 tablets by mouth 2 times daily (with meals) for 5 days, Disp-20 tablet, R-0Print      levothyroxine (SYNTHROID) 125 MCG tablet TAKE 1 TABLET BY MOUTH EVERY DAY, Disp-30 tablet,R-5Normal      simvastatin (ZOCOR) 40 MG tablet Take 1 tablet by mouth daily, Disp-15 tablet, R-2Normal      vitamin E 400 UNIT capsule Take 200 Units by mouth dailyHistorical Med      Cholecalciferol (VITAMIN D3) 2000 units CAPS Take 2,000 Units by mouth daily Historical Med      ALPRAZolam (XANAX) 0.5 MG tablet Take 0.5 mg by mouth 4 times dailyHistorical Med      amLODIPine (NORVASC) 10 MG tablet Take 1 tablet by mouth daily, Disp-30 tablet, R-5Normal      omeprazole (PRILOSEC OTC) 20 MG tablet Take 1 tablet by mouth daily, Disp-30 tablet, R-5             ALLERGIES     Ciprofloxacin, Erythromycin, Ketorolac tromethamine, Mobic [meloxicam], Other, Pcn [penicillins], Pseudoephedrine hcl, Sudafed [pseudoephedrine hcl], Sympathomimetics, and Thorazine [chlorpromazine]    FAMILY HISTORY       Family History   Problem Relation Age of Onset    Cancer Maternal Grandfather         COLON    Cancer Paternal Aunt         LUNG    Cancer Other         BLOOD CANCER- UNSPECIFIED          SOCIAL HISTORY       Social History     Socioeconomic History    Marital status:      Spouse name: None    Number of children: None    Years of education: None    Highest education level: None   Occupational History    None   Social Needs    Financial resource strain: None    Food insecurity     Worry: None     Inability: None    Transportation needs     Medical: None     Non-medical: None   Tobacco Use    Smoking status: Never Smoker    Smokeless tobacco: Never Used   Substance and Sexual Activity    Alcohol use: Yes     Comment: OCC.     Drug use: No    Sexual activity: None   Lifestyle    Physical activity     Days per week: None     Minutes per session: None    Stress: None   Relationships    Social connections     Talks on phone: None     Gets together: None     Attends Roman Catholic service: None     Active member of club or organization: None     Attends meetings of clubs or organizations: None     Relationship status: None    Intimate partner violence     Fear of current or ex partner: None     Emotionally abused: None     Physically abused: None     Forced sexual activity: None   Other Topics Concern    None   Social History Narrative    None       SCREENINGS      @FLOW([id number])@      PHYSICAL EXAM    (up to 7 for level 4, 8 or more for level 5)     ED Triage Vitals [03/11/21 1247]   BP Temp Temp Source Pulse Resp SpO2 Height Weight   (!) 166/100 98 °F (36.7 °C) Temporal 68 18 100 % 5' 2\" (1.575 m) 205 lb (93 kg)       Physical Exam  Vitals signs and nursing note reviewed. Constitutional:       General: She is not in acute distress. Appearance: Normal appearance. She is well-developed. She is not ill-appearing, toxic-appearing or diaphoretic. HENT:      Head: Normocephalic. Right Ear: Tympanic membrane normal.      Left Ear: Tympanic membrane normal.      Nose: No congestion. Mouth/Throat:      Mouth: Mucous membranes are moist.      Pharynx: No oropharyngeal exudate or posterior oropharyngeal erythema. Eyes:      Extraocular Movements: Extraocular movements intact. Conjunctiva/sclera: Conjunctivae normal.      Pupils: Pupils are equal, round, and reactive to light. Neck:      Musculoskeletal: Normal range of motion and neck supple. No neck rigidity. Vascular: No JVD. Trachea: No tracheal deviation. Cardiovascular:      Rate and Rhythm: Normal rate. Pulses: Normal pulses. Heart sounds: Normal heart sounds. No murmur. No friction rub. No gallop. Pulmonary:      Effort: Pulmonary effort is normal. No tachypnea, accessory muscle usage, respiratory distress or retractions. Breath sounds: No stridor. No wheezing, rhonchi or rales. Chest:      Chest wall: No tenderness.    Abdominal:      General: Abdomen is flat. Bowel sounds are normal. There is no distension or abdominal bruit. Palpations: There is no shifting dullness, fluid wave, hepatomegaly, splenomegaly, mass or pulsatile mass. Tenderness: There is no abdominal tenderness. There is no right CVA tenderness, left CVA tenderness, guarding or rebound. Negative signs include Pickens's sign, Rovsing's sign and McBurney's sign. Comments: Abdomen soft nondistended nontender no guarding mass or rebound, no CVA tenderness. No cut scrapes abrasions hematomas or rashes are noted. Musculoskeletal:         General: No deformity. Skin:     General: Skin is warm and dry. Capillary Refill: Capillary refill takes less than 2 seconds. Coloration: Skin is not jaundiced. Neurological:      General: No focal deficit present. Mental Status: She is alert and oriented to person, place, and time. Mental status is at baseline. Cranial Nerves: No cranial nerve deficit. Sensory: No sensory deficit. Motor: No weakness. Coordination: Coordination normal.   Psychiatric:         Mood and Affect: Mood normal.         DIAGNOSTIC RESULTS     EKG: All EKG's are interpreted by the Emergency Department Physician who either signs or Co-signsthis chart in the absence of a cardiologist.        RADIOLOGY:   Ellaree Anirudh such as CT, Ultrasound and MRI are read by the radiologist. Plain radiographic images are visualized and preliminarily interpreted by the emergency physician with the below findings:        Interpretation per the Radiologist below, if available at the time ofthis note:    CT ABDOMEN PELVIS WO CONTRAST Additional Contrast? None   Final Result      NO ACUTE INTRAABDOMINAL PROCESS OR SIGNIFICANT CHANGE FROM 1/21/2021 IDENTIFIED. CHRONIC MILD RIGHT HYDRONEPHROSIS, WITHOUT SIGNIFICANT URINARY TRACT CALCULI OR OTHER FINDINGS OF CONCERN IDENTIFIED.                      ED BEDSIDE ULTRASOUND:   Performed by ED Physician - none    LABS:  Labs Reviewed   COMPREHENSIVE METABOLIC PANEL - Abnormal; Notable for the following components:       Result Value    Glucose 109 (*)     Calcium 10.1 (*)     All other components within normal limits   URINE RT REFLEX TO CULTURE   URINE DRUG SCREEN   CBC WITH AUTO DIFFERENTIAL   LIPASE       All other labs were within normal range or not returned as of this dictation. EMERGENCY DEPARTMENT COURSE and DIFFERENTIAL DIAGNOSIS/MDM:   Vitals:    Vitals:    03/11/21 1247   BP: (!) 166/100   Pulse: 68   Resp: 18   Temp: 98 °F (36.7 °C)   TempSrc: Temporal   SpO2: 100%   Weight: 205 lb (93 kg)   Height: 5' 2\" (1.575 m)          MDM  Number of Diagnoses or Management Options  Right flank pain  Diagnosis management comments: Patient presented ED with complaint of right-sided flank pain which she states been ongoing x1 week. She denies any urinary complaints. She states she had similar episodes before in the past without a specific acute findings. CT scan of the abdomen pelvis was completed today shows no acute process, urine shows no signs for infection, and labs are unimpressive. Patient was diagnosed with nonspecific right-sided flank pain. She is advised to contact her family physician for follow-up in the next 2 to 3 days. She is also advised if she has any worsening or changes symptoms return to the ER for reevaluation. Patient presents ED with complaint of right flank pain which she states been ongoing x1 week. She denies any urinary complaints, no hematuria. No fevers, no nausea vomiting, she denies any acute injury. Does have a past history of chronic low back pain, but states her symptoms today are not consistent with her previous back pain is a cyst in the higher location. CT scan of the abdomen pelvis was completed due to right flank pain, there was no acute findings on today's scan, there is no signs for urinary tract infection, white blood cell count is within normal range.   The remaining labs are fairly unimpressive at this time. Patient does have some pain on palpation. This may be related to musculoskeletal type pain. Of note patient denies injury. She was given a prescription for Ultram for pain control, advised to contact her family doctor for follow-up in the next 2 to 3 days. If she has any worsening symptoms, she was advised to return to the ER. CRITICAL CARE TIME   Total Critical Care time was 0 minutes, excluding separately reportableprocedures. There was a high probability of clinicallysignificant/life threatening deterioration in the patient's condition which required my urgent intervention. CONSULTS:  None    PROCEDURES:  Unless otherwise noted below, none     Procedures    FINAL IMPRESSION      1. Right flank pain          DISPOSITION/PLAN   DISPOSITION Decision To Discharge 03/11/2021 03:34:41 PM      PATIENT REFERRED TO:  Meir Spain[address] , [address] [place] [address] [phone]    In 3 days        DISCHARGE MEDICATIONS:  Discharge Medication List as of 3/11/2021  3:41 PM      START taking these medications    Details   traMADol (ULTRAM) 50 MG tablet Take 1 tablet by mouth every 6 hours as needed for Pain for up to 3 days. , Disp-12 tablet, R-0Print                (Please note that portions of this note were completed with a voice recognition program.  Efforts were made to edit the dictations but occasionally words are mis-transcribed.)    Sofie Arceo PA-C (electronically signed)  Attending Emergency Physician         Sofie Arceo PA-C  03/11/21 [facility] Zelalem Valdez PA-C  03/12/21 4005

## 2021-05-23 ENCOUNTER — APPOINTMENT (OUTPATIENT)
Dept: GENERAL RADIOLOGY | Age: 56
End: 2021-05-23
Payer: COMMERCIAL

## 2021-05-23 ENCOUNTER — HOSPITAL ENCOUNTER (EMERGENCY)
Age: 56
Discharge: HOME | End: 2021-05-23
Attending: EMERGENCY MEDICINE
Payer: COMMERCIAL

## 2021-05-23 VITALS
TEMPERATURE: 97.3 F | WEIGHT: 201 LBS | HEART RATE: 91 BPM | OXYGEN SATURATION: 94 % | SYSTOLIC BLOOD PRESSURE: 140 MMHG | RESPIRATION RATE: 19 BRPM | HEIGHT: 62 IN | DIASTOLIC BLOOD PRESSURE: 103 MMHG | BODY MASS INDEX: 36.99 KG/M2

## 2021-05-23 DIAGNOSIS — S60.221A CONTUSION OF RIGHT HAND, INITIAL ENCOUNTER: Primary | ICD-10-CM

## 2021-05-23 PROCEDURE — 99283 EMERGENCY DEPT VISIT LOW MDM: CPT

## 2021-05-23 PROCEDURE — 73110 X-RAY EXAM OF WRIST: CPT

## 2021-05-23 PROCEDURE — 73130 X-RAY EXAM OF HAND: CPT

## 2021-05-23 RX ORDER — NAPROXEN 500 MG/1
500 TABLET ORAL 2 TIMES DAILY
Qty: 20 TABLET | Refills: 0 | Status: SHIPPED | OUTPATIENT
Start: 2021-05-23 | End: 2021-10-14

## 2021-05-23 ASSESSMENT — ENCOUNTER SYMPTOMS
BACK PAIN: 0
SHORTNESS OF BREATH: 0
COUGH: 0
ABDOMINAL PAIN: 0

## 2021-05-23 ASSESSMENT — PAIN DESCRIPTION - LOCATION: LOCATION: HAND;WRIST

## 2021-05-23 ASSESSMENT — PAIN DESCRIPTION - FREQUENCY: FREQUENCY: CONTINUOUS

## 2021-05-23 ASSESSMENT — PAIN SCALES - GENERAL: PAINLEVEL_OUTOF10: 8

## 2021-05-23 ASSESSMENT — PAIN DESCRIPTION - ORIENTATION: ORIENTATION: RIGHT

## 2021-05-23 ASSESSMENT — PAIN DESCRIPTION - PAIN TYPE: TYPE: ACUTE PAIN

## 2021-06-28 NOTE — ED PROVIDER NOTES
Food Insecurity:     Worried About Running Out of Food in the Last Year:     [address] in the Last Year:    Transportation Needs:     Lack of Transportation (Medical):  Lack of Transportation (Non-Medical):    Physical Activity:     Days of Exercise per Week:     Minutes of Exercise per Session:    Stress:     Feeling of Stress :    Social Connections:     Frequency of Communication with Friends and Family:     Frequency of Social Gatherings with Friends and Family:     Attends Orthodoxy Services:     Active Member of Clubs or Organizations:     Attends Club or Organization Meetings:     Marital Status:    Intimate Partner Violence:     Fear of Current or Ex-Partner:     Emotionally Abused:     Physically Abused:     Sexually Abused:        SCREENINGS      @FLOW([id number])@      PHYSICAL EXAM    (up to 7 for level 4, 8 or more for level 5)     ED Triage Vitals [05/23/21 1704]   BP Temp Temp Source Pulse Resp SpO2 Height Weight   (!) 140/103 97.3 °F (36.3 °C) Temporal 91 19 94 % 5' 2\" (1.575 m) 201 lb (91.2 kg)       Physical Exam  Vitals and nursing note reviewed. Constitutional:       Appearance: She is well-developed. HENT:      Head: Normocephalic and atraumatic. Right Ear: External ear normal.      Left Ear: External ear normal.   Eyes:      Conjunctiva/sclera: Conjunctivae normal.      Pupils: Pupils are equal, round, and reactive to light. Cardiovascular:      Rate and Rhythm: Normal rate and regular rhythm. Pulmonary:      Effort: Pulmonary effort is normal.      Breath sounds: Normal breath sounds. Abdominal:      General: Bowel sounds are normal. There is no distension. Palpations: Abdomen is soft. Tenderness: There is no abdominal tenderness. Musculoskeletal:         General: Normal range of motion. Right hand: Swelling and tenderness present. No deformity or bony tenderness. Normal strength. Normal sensation. Normal capillary refill. Normal pulse.
done

## 2021-09-10 VITALS
WEIGHT: 205 LBS | DIASTOLIC BLOOD PRESSURE: 105 MMHG | HEART RATE: 87 BPM | OXYGEN SATURATION: 99 % | BODY MASS INDEX: 37.73 KG/M2 | SYSTOLIC BLOOD PRESSURE: 161 MMHG | TEMPERATURE: 98.2 F | HEIGHT: 62 IN | RESPIRATION RATE: 20 BRPM

## 2021-09-10 PROCEDURE — 99282 EMERGENCY DEPT VISIT SF MDM: CPT

## 2021-09-11 ENCOUNTER — HOSPITAL ENCOUNTER (EMERGENCY)
Age: 56
Discharge: HOME | End: 2021-09-11
Payer: COMMERCIAL

## 2021-09-11 DIAGNOSIS — Z20.822 ENCOUNTER FOR LABORATORY TESTING FOR COVID-19 VIRUS: Primary | ICD-10-CM

## 2021-09-11 PROCEDURE — U0003 INFECTIOUS AGENT DETECTION BY NUCLEIC ACID (DNA OR RNA); SEVERE ACUTE RESPIRATORY SYNDROME CORONAVIRUS 2 (SARS-COV-2) (CORONAVIRUS DISEASE [COVID-19]), AMPLIFIED PROBE TECHNIQUE, MAKING USE OF HIGH THROUGHPUT TECHNOLOGIES AS DESCRIBED BY CMS-2020-01-R: HCPCS

## 2021-09-11 ASSESSMENT — ENCOUNTER SYMPTOMS
VOMITING: 0
COUGH: 0
PHOTOPHOBIA: 0
RHINORRHEA: 0
DIARRHEA: 0
NAUSEA: 0
ABDOMINAL PAIN: 0
SHORTNESS OF BREATH: 0
SORE THROAT: 0
BACK PAIN: 0
EYE PAIN: 0

## 2021-09-11 NOTE — ED PROVIDER NOTES
Laterality Date    ANUS SURGERY  12/14/2011    anal fissure    COLON SURGERY      BIPOSY    COLONOSCOPY  8/2011    DILATION AND CURETTAGE OF UTERUS      MISCARRIAGE X2    HEMORRHOID SURGERY   8/24/10    EXTERIOR    LAPAROSCOPY      SIGMOIDOSCOPY  12/14/11    RESULTING IN FISSURECTOMY         CURRENT MEDICATIONS       Discharge Medication List as of 9/11/2021  1:17 AM      CONTINUE these medications which have NOT CHANGED    Details   naproxen (NAPROSYN) 500 MG tablet Take 1 tablet by mouth 2 times daily for 20 doses, Disp-20 tablet, R-0Print      Multiple Vitamins-Minerals (THERAPEUTIC MULTIVITAMIN-MINERALS) tablet Take 1 tablet by mouth dailyHistorical Med      Biotin 1 MG CAPS Take by mouthHistorical Med      Coenzyme Q10 (COQ-10) 100 MG CAPS Take by mouthHistorical Med      DULoxetine (CYMBALTA) 60 MG extended release capsule Take 1 capsule by mouth daily, Disp-15 capsule, R-3Normal      hydroCHLOROthiazide (HYDRODIURIL) 25 MG tablet Take 1 tablet by mouth daily, Disp-30 tablet, R-1Normal      QUEtiapine (SEROQUEL) 100 MG tablet Take 1 tablet by mouth nightly, Disp-15 tablet, R-2Normal      tiZANidine (ZANAFLEX) 4 MG tablet TAKE 1 TABLET BY MOUTH TWICE A DAYHistorical Med      levothyroxine (SYNTHROID) 125 MCG tablet TAKE 1 TABLET BY MOUTH EVERY DAY, Disp-30 tablet,R-5Normal      simvastatin (ZOCOR) 40 MG tablet Take 1 tablet by mouth daily, Disp-15 tablet, R-2Normal      vitamin E 400 UNIT capsule Take 200 Units by mouth dailyHistorical Med      Cholecalciferol (VITAMIN D3) 2000 units CAPS Take 2,000 Units by mouth daily Historical Med      ALPRAZolam (XANAX) 0.5 MG tablet Take 0.5 mg by mouth 4 times dailyHistorical Med      amLODIPine (NORVASC) 10 MG tablet Take 1 tablet by mouth daily, Disp-30 tablet, R-5Normal      omeprazole (PRILOSEC OTC) 20 MG tablet Take 1 tablet by mouth daily, Disp-30 tablet, R-5             ALLERGIES     Ciprofloxacin, Erythromycin, Ketorolac tromethamine, Mobic [meloxicam], Other, Pcn [penicillins], Pseudoephedrine hcl, Sudafed [pseudoephedrine hcl], Sympathomimetics, and Thorazine [chlorpromazine]    FAMILY HISTORY       Family History   Problem Relation Age of Onset    Cancer Maternal Grandfather         COLON    Cancer Paternal Aunt         LUNG    Cancer Other         BLOOD CANCER- UNSPECIFIED          SOCIAL HISTORY       Social History     Socioeconomic History    Marital status:      Spouse name: Not on file    Number of children: Not on file    Years of education: Not on file    Highest education level: Not on file   Occupational History    Not on file   Tobacco Use    Smoking status: Never Smoker    Smokeless tobacco: Never Used   Vaping Use    Vaping Use: Never used   Substance and Sexual Activity    Alcohol use: Yes     Comment: OCC.  Drug use: No    Sexual activity: Not on file   Other Topics Concern    Not on file   Social History Narrative    Not on file     Social Determinants of Health     Financial Resource Strain:     Difficulty of Paying Living Expenses:    Food Insecurity:     Worried About Running Out of Food in the Last Year:     [address] in the Last Year:    Transportation Needs:     Lack of Transportation (Medical):      Lack of Transportation (Non-Medical):    Physical Activity:     Days of Exercise per Week:     Minutes of Exercise per Session:    Stress:     Feeling of Stress :    Social Connections:     Frequency of Communication with Friends and Family:     Frequency of Social Gatherings with Friends and Family:     Attends Uatsdin Services:     Active Member of Clubs or Organizations:     Attends Club or Organization Meetings:     Marital Status:    Intimate Partner Violence:     Fear of Current or Ex-Partner:     Emotionally Abused:     Physically Abused:     Sexually Abused:        SCREENINGS             PHYSICAL EXAM    (up to 7 for level 4, 8 or more for level 5)     ED Triage Vitals [09/10/21 2648] otherwise noted below, none     Procedures      FINAL IMPRESSION      1.  Encounter for laboratory testing for COVID-19 virus          DISPOSITION/PLAN   DISPOSITION Decision To Discharge 09/11/2021 01:05:44 AM          Kelsey Hanson (electronically signed)  Attending Emergency Physician       Heavenly Waggoner PA-C  09/11/21 0205

## 2021-09-13 ENCOUNTER — CARE COORDINATION (OUTPATIENT)
Dept: CARE COORDINATION | Age: 56
End: 2021-09-13

## 2021-09-13 NOTE — CARE COORDINATION
Call placed to patient for COVID-19 Risk Monitoring. Unable to reach patient by phone. Message left regarding the purpose of the call. Requested call back. Provided call back number. Second attempt. Call placed to patient for COVID-19 Risk Monitoring. Unable to reach patient by phone. Message left regarding the purpose of the call. Requested call back. Provided call back number. Patient's results not back at this time. Will make another attempt when results are posted if positive. Patient did not return call after 3 attempts to contact for ED Follow-up/Covid-19 Monitoring. No further outreach planned at this time. Episode resolved. Patient negative for COVID at this time.

## 2021-09-14 LAB
SARS-COV-2: NOT DETECTED
SOURCE: NORMAL

## 2021-10-14 ENCOUNTER — HOSPITAL ENCOUNTER (INPATIENT)
Age: 56
LOS: 7 days | Discharge: HOME | DRG: 751 | End: 2021-10-21
Attending: PSYCHIATRY & NEUROLOGY | Admitting: PSYCHIATRY & NEUROLOGY
Payer: COMMERCIAL

## 2021-10-14 DIAGNOSIS — F33.2 SEVERE EPISODE OF RECURRENT MAJOR DEPRESSIVE DISORDER, WITHOUT PSYCHOTIC FEATURES (HCC): Primary | ICD-10-CM

## 2021-10-14 LAB
ACETAMINOPHEN LEVEL: <5 UG/ML (ref 10–30)
ALBUMIN SERPL-MCNC: 4.5 G/DL (ref 3.5–4.6)
ALP BLD-CCNC: 88 U/L (ref 40–130)
ALT SERPL-CCNC: 29 U/L (ref 0–33)
AMPHETAMINE SCREEN, URINE: NORMAL
ANION GAP SERPL CALCULATED.3IONS-SCNC: 10 MEQ/L (ref 9–15)
AST SERPL-CCNC: 32 U/L (ref 0–35)
BARBITURATE SCREEN URINE: NORMAL
BASOPHILS ABSOLUTE: 0.1 K/UL (ref 0–0.2)
BASOPHILS RELATIVE PERCENT: 0.8 %
BENZODIAZEPINE SCREEN, URINE: NORMAL
BILIRUB SERPL-MCNC: <0.2 MG/DL (ref 0.2–0.7)
BILIRUBIN URINE: NEGATIVE
BLOOD, URINE: NEGATIVE
BUN BLDV-MCNC: 9 MG/DL (ref 6–20)
CALCIUM SERPL-MCNC: 9.7 MG/DL (ref 8.5–9.9)
CANNABINOID SCREEN URINE: NORMAL
CHLORIDE BLD-SCNC: 102 MEQ/L (ref 95–107)
CLARITY: CLEAR
CO2: 29 MEQ/L (ref 20–31)
COCAINE METABOLITE SCREEN URINE: NORMAL
COLOR: YELLOW
CREAT SERPL-MCNC: 0.91 MG/DL (ref 0.5–0.9)
EKG ATRIAL RATE: 70 BPM
EKG P AXIS: 31 DEGREES
EKG P-R INTERVAL: 194 MS
EKG Q-T INTERVAL: 432 MS
EKG QRS DURATION: 100 MS
EKG QTC CALCULATION (BAZETT): 466 MS
EKG R AXIS: -10 DEGREES
EKG T AXIS: 46 DEGREES
EKG VENTRICULAR RATE: 70 BPM
EOSINOPHILS ABSOLUTE: 0.1 K/UL (ref 0–0.7)
EOSINOPHILS RELATIVE PERCENT: 1.4 %
ETHANOL PERCENT: 0.08 G/DL
ETHANOL PERCENT: 0.22 G/DL
ETHANOL: 246 MG/DL (ref 0–0.08)
ETHANOL: 92 MG/DL (ref 0–0.08)
GFR AFRICAN AMERICAN: >60
GFR NON-AFRICAN AMERICAN: >60
GLOBULIN: 2.7 G/DL (ref 2.3–3.5)
GLUCOSE BLD-MCNC: 102 MG/DL (ref 70–99)
GLUCOSE URINE: NEGATIVE MG/DL
HCT VFR BLD CALC: 40 % (ref 37–47)
HEMOGLOBIN: 13.8 G/DL (ref 12–16)
KETONES, URINE: NEGATIVE MG/DL
LEUKOCYTE ESTERASE, URINE: NEGATIVE
LYMPHOCYTES ABSOLUTE: 3.9 K/UL (ref 1–4.8)
LYMPHOCYTES RELATIVE PERCENT: 51.4 %
Lab: NORMAL
MCH RBC QN AUTO: 29.7 PG (ref 27–31.3)
MCHC RBC AUTO-ENTMCNC: 34.4 % (ref 33–37)
MCV RBC AUTO: 86.4 FL (ref 82–100)
METHADONE SCREEN, URINE: NORMAL
MONOCYTES ABSOLUTE: 0.4 K/UL (ref 0.2–0.8)
MONOCYTES RELATIVE PERCENT: 5.5 %
NEUTROPHILS ABSOLUTE: 3.1 K/UL (ref 1.4–6.5)
NEUTROPHILS RELATIVE PERCENT: 40.9 %
NITRITE, URINE: NEGATIVE
OPIATE SCREEN URINE: NORMAL
OXYCODONE URINE: NORMAL
PDW BLD-RTO: 13.5 % (ref 11.5–14.5)
PH UA: 6.5 (ref 5–9)
PHENCYCLIDINE SCREEN URINE: NORMAL
PLATELET # BLD: 347 K/UL (ref 130–400)
POTASSIUM SERPL-SCNC: 3.2 MEQ/L (ref 3.4–4.9)
PROPOXYPHENE SCREEN: NORMAL
PROTEIN UA: NEGATIVE MG/DL
RBC # BLD: 4.63 M/UL (ref 4.2–5.4)
SALICYLATE, SERUM: <0.3 MG/DL (ref 15–30)
SARS-COV-2, NAAT: NOT DETECTED
SODIUM BLD-SCNC: 141 MEQ/L (ref 135–144)
SPECIFIC GRAVITY UA: 1 (ref 1–1.03)
TOTAL CK: 142 U/L (ref 0–170)
TOTAL PROTEIN: 7.2 G/DL (ref 6.3–8)
TSH SERPL DL<=0.05 MIU/L-ACNC: 2.01 UIU/ML (ref 0.44–3.86)
URINE REFLEX TO CULTURE: NORMAL
UROBILINOGEN, URINE: 0.2 E.U./DL
WBC # BLD: 7.6 K/UL (ref 4.8–10.8)

## 2021-10-14 PROCEDURE — 1240000000 HC EMOTIONAL WELLNESS R&B

## 2021-10-14 PROCEDURE — 81003 URINALYSIS AUTO W/O SCOPE: CPT

## 2021-10-14 PROCEDURE — 84443 ASSAY THYROID STIM HORMONE: CPT

## 2021-10-14 PROCEDURE — 80307 DRUG TEST PRSMV CHEM ANLYZR: CPT

## 2021-10-14 PROCEDURE — 93010 ELECTROCARDIOGRAM REPORT: CPT | Performed by: INTERNAL MEDICINE

## 2021-10-14 PROCEDURE — 83036 HEMOGLOBIN GLYCOSYLATED A1C: CPT

## 2021-10-14 PROCEDURE — 80179 DRUG ASSAY SALICYLATE: CPT

## 2021-10-14 PROCEDURE — 82077 ASSAY SPEC XCP UR&BREATH IA: CPT

## 2021-10-14 PROCEDURE — 87635 SARS-COV-2 COVID-19 AMP PRB: CPT

## 2021-10-14 PROCEDURE — 80053 COMPREHEN METABOLIC PANEL: CPT

## 2021-10-14 PROCEDURE — 80143 DRUG ASSAY ACETAMINOPHEN: CPT

## 2021-10-14 PROCEDURE — 6370000000 HC RX 637 (ALT 250 FOR IP): Performed by: NURSE PRACTITIONER

## 2021-10-14 PROCEDURE — 93005 ELECTROCARDIOGRAM TRACING: CPT | Performed by: STUDENT IN AN ORGANIZED HEALTH CARE EDUCATION/TRAINING PROGRAM

## 2021-10-14 PROCEDURE — 99285 EMERGENCY DEPT VISIT HI MDM: CPT

## 2021-10-14 PROCEDURE — 6370000000 HC RX 637 (ALT 250 FOR IP): Performed by: PSYCHIATRY & NEUROLOGY

## 2021-10-14 PROCEDURE — 82550 ASSAY OF CK (CPK): CPT

## 2021-10-14 PROCEDURE — 85025 COMPLETE CBC W/AUTO DIFF WBC: CPT

## 2021-10-14 PROCEDURE — 36415 COLL VENOUS BLD VENIPUNCTURE: CPT

## 2021-10-14 RX ORDER — HALOPERIDOL 5 MG
5 TABLET ORAL EVERY 6 HOURS PRN
Status: DISCONTINUED | OUTPATIENT
Start: 2021-10-14 | End: 2021-10-21 | Stop reason: HOSPADM

## 2021-10-14 RX ORDER — ACETAMINOPHEN 500 MG
1000 TABLET ORAL ONCE
Status: COMPLETED | OUTPATIENT
Start: 2021-10-14 | End: 2021-10-14

## 2021-10-14 RX ORDER — BUTALBITAL, ACETAMINOPHEN AND CAFFEINE 300; 40; 50 MG/1; MG/1; MG/1
1 CAPSULE ORAL EVERY 6 HOURS PRN
COMMUNITY

## 2021-10-14 RX ORDER — QUETIAPINE FUMARATE 100 MG/1
100 TABLET, FILM COATED ORAL NIGHTLY
Status: DISCONTINUED | OUTPATIENT
Start: 2021-10-14 | End: 2021-10-15

## 2021-10-14 RX ORDER — HALOPERIDOL 5 MG/ML
5 INJECTION INTRAMUSCULAR EVERY 6 HOURS PRN
Status: DISCONTINUED | OUTPATIENT
Start: 2021-10-14 | End: 2021-10-21 | Stop reason: HOSPADM

## 2021-10-14 RX ORDER — AMLODIPINE BESYLATE 10 MG/1
10 TABLET ORAL DAILY
Status: DISCONTINUED | OUTPATIENT
Start: 2021-10-14 | End: 2021-10-21 | Stop reason: HOSPADM

## 2021-10-14 RX ORDER — HYDROXYZINE HYDROCHLORIDE 50 MG/ML
50 INJECTION, SOLUTION INTRAMUSCULAR EVERY 6 HOURS PRN
Status: DISCONTINUED | OUTPATIENT
Start: 2021-10-14 | End: 2021-10-21 | Stop reason: HOSPADM

## 2021-10-14 RX ORDER — HYDROCHLOROTHIAZIDE 12.5 MG/1
25 TABLET ORAL DAILY
Status: DISCONTINUED | OUTPATIENT
Start: 2021-10-14 | End: 2021-10-21 | Stop reason: HOSPADM

## 2021-10-14 RX ORDER — TIZANIDINE 4 MG/1
4 TABLET ORAL 2 TIMES DAILY
Status: DISCONTINUED | OUTPATIENT
Start: 2021-10-14 | End: 2021-10-21 | Stop reason: HOSPADM

## 2021-10-14 RX ORDER — BUTALBITAL, ACETAMINOPHEN AND CAFFEINE 300; 40; 50 MG/1; MG/1; MG/1
1 CAPSULE ORAL EVERY 6 HOURS PRN
Status: DISCONTINUED | OUTPATIENT
Start: 2021-10-14 | End: 2021-10-21 | Stop reason: HOSPADM

## 2021-10-14 RX ORDER — SIMVASTATIN 20 MG
20 TABLET ORAL NIGHTLY
COMMUNITY

## 2021-10-14 RX ORDER — MECLIZINE HCL 12.5 MG/1
25 TABLET ORAL 2 TIMES DAILY
Status: DISCONTINUED | OUTPATIENT
Start: 2021-10-14 | End: 2021-10-21 | Stop reason: HOSPADM

## 2021-10-14 RX ORDER — MAGNESIUM HYDROXIDE/ALUMINUM HYDROXICE/SIMETHICONE 120; 1200; 1200 MG/30ML; MG/30ML; MG/30ML
30 SUSPENSION ORAL PRN
Status: DISCONTINUED | OUTPATIENT
Start: 2021-10-14 | End: 2021-10-21 | Stop reason: HOSPADM

## 2021-10-14 RX ORDER — DULOXETIN HYDROCHLORIDE 60 MG/1
60 CAPSULE, DELAYED RELEASE ORAL DAILY
Status: DISCONTINUED | OUTPATIENT
Start: 2021-10-14 | End: 2021-10-21 | Stop reason: HOSPADM

## 2021-10-14 RX ORDER — M-VIT,TX,IRON,MINS/CALC/FOLIC 27MG-0.4MG
1 TABLET ORAL DAILY
Status: DISCONTINUED | OUTPATIENT
Start: 2021-10-14 | End: 2021-10-21 | Stop reason: HOSPADM

## 2021-10-14 RX ORDER — VITAMIN B COMPLEX
2000 TABLET ORAL DAILY
Status: DISCONTINUED | OUTPATIENT
Start: 2021-10-14 | End: 2021-10-21 | Stop reason: HOSPADM

## 2021-10-14 RX ORDER — ALPRAZOLAM 0.5 MG/1
0.5 TABLET ORAL 4 TIMES DAILY
Status: DISCONTINUED | OUTPATIENT
Start: 2021-10-14 | End: 2021-10-21 | Stop reason: HOSPADM

## 2021-10-14 RX ORDER — TRAZODONE HYDROCHLORIDE 50 MG/1
50 TABLET ORAL NIGHTLY PRN
Status: DISCONTINUED | OUTPATIENT
Start: 2021-10-14 | End: 2021-10-21 | Stop reason: HOSPADM

## 2021-10-14 RX ORDER — BENZTROPINE MESYLATE 1 MG/ML
2 INJECTION INTRAMUSCULAR; INTRAVENOUS 2 TIMES DAILY PRN
Status: DISCONTINUED | OUTPATIENT
Start: 2021-10-14 | End: 2021-10-21 | Stop reason: HOSPADM

## 2021-10-14 RX ORDER — ACETAMINOPHEN 325 MG/1
650 TABLET ORAL EVERY 4 HOURS PRN
Status: DISCONTINUED | OUTPATIENT
Start: 2021-10-14 | End: 2021-10-21 | Stop reason: HOSPADM

## 2021-10-14 RX ORDER — MECLIZINE HYDROCHLORIDE 25 MG/1
25 TABLET ORAL 2 TIMES DAILY
COMMUNITY

## 2021-10-14 RX ORDER — HYDROXYZINE PAMOATE 50 MG/1
50 CAPSULE ORAL EVERY 6 HOURS PRN
Status: DISCONTINUED | OUTPATIENT
Start: 2021-10-14 | End: 2021-10-21 | Stop reason: HOSPADM

## 2021-10-14 RX ORDER — PANTOPRAZOLE SODIUM 20 MG/1
20 TABLET, DELAYED RELEASE ORAL
Status: DISCONTINUED | OUTPATIENT
Start: 2021-10-15 | End: 2021-10-21 | Stop reason: HOSPADM

## 2021-10-14 RX ORDER — ATORVASTATIN CALCIUM 20 MG/1
20 TABLET, FILM COATED ORAL DAILY
Status: DISCONTINUED | OUTPATIENT
Start: 2021-10-14 | End: 2021-10-21 | Stop reason: HOSPADM

## 2021-10-14 RX ORDER — LEVOTHYROXINE SODIUM 112 UG/1
112 TABLET ORAL DAILY
COMMUNITY

## 2021-10-14 RX ADMIN — ALPRAZOLAM 0.5 MG: 0.5 TABLET ORAL at 20:55

## 2021-10-14 RX ADMIN — ACETAMINOPHEN 1000 MG: 500 TABLET ORAL at 14:36

## 2021-10-14 RX ADMIN — MECLIZINE 25 MG: 12.5 TABLET ORAL at 20:54

## 2021-10-14 RX ADMIN — DULOXETINE 60 MG: 60 CAPSULE, DELAYED RELEASE ORAL at 17:19

## 2021-10-14 RX ADMIN — ATORVASTATIN CALCIUM 20 MG: 20 TABLET, FILM COATED ORAL at 17:18

## 2021-10-14 RX ADMIN — Medication 2000 UNITS: at 17:18

## 2021-10-14 RX ADMIN — TIZANIDINE 4 MG: 4 TABLET ORAL at 20:55

## 2021-10-14 RX ADMIN — HYDROCHLOROTHIAZIDE 25 MG: 12.5 TABLET ORAL at 17:18

## 2021-10-14 RX ADMIN — AMLODIPINE BESYLATE 10 MG: 10 TABLET ORAL at 17:19

## 2021-10-14 RX ADMIN — QUETIAPINE FUMARATE 100 MG: 100 TABLET ORAL at 20:54

## 2021-10-14 RX ADMIN — MULTIPLE VITAMINS W/ MINERALS TAB 1 TABLET: TAB at 17:19

## 2021-10-14 RX ADMIN — HYDROXYZINE PAMOATE 50 MG: 50 CAPSULE ORAL at 17:18

## 2021-10-14 ASSESSMENT — SLEEP AND FATIGUE QUESTIONNAIRES
SLEEP PATTERN: DIFFICULTY FALLING ASLEEP;RESTLESSNESS;NIGHTMARES/TERRORS;INSOMNIA
DIFFICULTY STAYING ASLEEP: YES
DO YOU HAVE DIFFICULTY SLEEPING: YES
DIFFICULTY FALLING ASLEEP: YES
AVERAGE NUMBER OF SLEEP HOURS: 7
DIFFICULTY ARISING: NO
DO YOU USE A SLEEP AID: YES
RESTFUL SLEEP: NO

## 2021-10-14 ASSESSMENT — PAIN SCALES - GENERAL: PAINLEVEL_OUTOF10: 10

## 2021-10-14 ASSESSMENT — ENCOUNTER SYMPTOMS
PHOTOPHOBIA: 0
SHORTNESS OF BREATH: 0
VOMITING: 0
ABDOMINAL PAIN: 0
COUGH: 0
WHEEZING: 0
NAUSEA: 0

## 2021-10-14 ASSESSMENT — PATIENT HEALTH QUESTIONNAIRE - PHQ9: SUM OF ALL RESPONSES TO PHQ QUESTIONS 1-9: 16

## 2021-10-14 NOTE — ED NOTES
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 Patient has had 12 children in her life time , maintained custody of none. Roxann Peoples is now 12 and recently moved from out of state to local area two months ago. He is now going into the job core program,and will again be out of state, \" I don't want him to go when he just came back into my life\" and \" he is like 350 lbs I am afraid for him to be teased and tormented. C-SSRS Summary:     Patient: C-SSRS Suicide Screening  1) Within the past month, have you wished you were dead or wished you could go to sleep and not wake up? : Yes  2) Have you actually had any thoughts of killing yourself? : Yes  4) Have you had these thoughts and had some intention of acting on them? : Yes  5) Have you started to work out or worked out the details of how to kill yourself? Do you intend to carry out this plan? : Yes  6) Have you ever done anything, started to do anything, or prepared to do anything to end your life?: Yes  Did this occur within the past 3 months? : No    Family: not present    Agency: [facility] for counseling services only          Abuse Assessment  Physical Abuse: Denies  Verbal Abuse: Denies  Emotional abuse: Denies  Financial Abuse: Denies  Sexual abuse: Denies  Elder abuse: No    Clinical Summary: See psychosocial, Pt admits to suicidal ideations with plan to run car into a tree with at least some degree of continued intent. She states she also OD'd about two months ago in an attempt to end her life that was unsuccessful and for which she did not seek treatment.  She

## 2021-10-14 NOTE — ED NOTES
Bed: 10  Expected date: 10/14/21  Expected time: 2:40 AM  Means of arrival: [facility]  Comments:  Intox female suicidal      Odalys Becker RN  10/14/21 [id number]

## 2021-10-14 NOTE — ED NOTES
Call into Dr. Eve Ro for disposition. Unable to leave message will try again. Pt given fluids in addition to lunch. Bandar Macias Sera  10/14/21 [id number]

## 2021-10-14 NOTE — PROGRESS NOTES
Pt did not attend recreation group despite staff encouragement. Electronically signed by Maliha Ray RN on 10/14/21 at 6:48 PM EDT

## 2021-10-14 NOTE — ED NOTES
Lab notified of need for ETOH redraw. Pt resting in chair without sign of distress. Jemma Martinez  10/14/21 [id number]

## 2021-10-14 NOTE — ED PROVIDER NOTES
[facility] [facility] ED  EMERGENCY DEPARTMENT ENCOUNTER      Pt Name: Crystal Cano  MRN: [id number]  [place] 1965  Date of evaluation: 10/14/2021  Provider: On license of [facility] - RENÉ CERVANTES PA-C    CHIEF COMPLAINT       Chief Complaint   Patient presents with    Suicidal         HISTORY OF PRESENT ILLNESS   (Location/Symptom, Timing/Onset, Context/Setting, Quality, Duration, Modifying Factors, Severity)  Note limiting factors. Crystal Cano is a 55 y.o. female who per chart reviews past medical history of hypothyroidism depression anemia PE type 2 diabetes hypertension hyperparathyroidism LASHONDA presents to the emergency department for evaluation of suicidal ideations and alcohol intox. Patient was found wandering on the street by [employer] stating she was suicidal.  She admits to drinking 8 [employer]'s tonight. Patient states that she is suicidal because her son is going to \"job андрей\" and she is afraid she will never see him again. + [place]. Denies drug use. States she does not drink daily. She denies AVH, HI. Denies medical complaints. HPI    Nursing Notes were reviewed. REVIEW OF SYSTEMS    (2-9 systems for level 4, 10 or more for level 5)     Review of Systems   Constitutional: Negative for chills and fever. HENT: Negative for congestion. Eyes: Negative for photophobia. Respiratory: Negative for cough, shortness of breath and wheezing. Cardiovascular: Negative for chest pain and palpitations. Gastrointestinal: Negative for abdominal pain, nausea and vomiting. Genitourinary: Negative for dysuria, frequency and hematuria. Musculoskeletal: Negative for myalgias. Allergic/Immunologic: Negative for immunocompromised state. Neurological: Negative for dizziness, weakness and headaches. Psychiatric/Behavioral: Positive for dysphoric mood and suicidal ideas. Negative for hallucinations. All other systems reviewed and are negative.       Except as noted above the remainder of the review of systems was reviewed and negative.        PAST MEDICAL HISTORY     Past Medical History:   Diagnosis Date    Anemia     C. difficile colitis 1/2013    Chronic fatigue syndrome     Depression     [place]    Diabetes mellitus ([facility] [address]     Diverticulitis large intestine 2001    Fibromyalgia     Fibromyalgia 3/24/2015    HTN (hypertension)     Hyperlipidemia     Hypothyroidism     Palpitations     Pulmonary embolus ([facility] [address] 10/12    Following GYN procedure    Vitamin D deficiency          SURGICAL HISTORY       Past Surgical History:   Procedure Laterality Date    ANUS SURGERY  12/14/2011    anal fissure    COLON SURGERY      BIPOSY    COLONOSCOPY  8/2011    DILATION AND CURETTAGE OF UTERUS      MISCARRIAGE X2    HEMORRHOID SURGERY   8/24/10    EXTERIOR    LAPAROSCOPY      SIGMOIDOSCOPY  12/14/11    RESULTING IN FISSURECTOMY         CURRENT MEDICATIONS       Previous Medications    ALPRAZOLAM (XANAX) 0.5 MG TABLET    Take 0.5 mg by mouth 4 times daily    AMLODIPINE (NORVASC) 10 MG TABLET    Take 1 tablet by mouth daily    BIOTIN 1 MG CAPS    Take by mouth    CHOLECALCIFEROL (VITAMIN D3) 2000 UNITS CAPS    Take 2,000 Units by mouth daily     COENZYME Q10 (COQ-10) 100 MG CAPS    Take by mouth    DULOXETINE (CYMBALTA) 60 MG EXTENDED RELEASE CAPSULE    Take 1 capsule by mouth daily    HYDROCHLOROTHIAZIDE (HYDRODIURIL) 25 MG TABLET    Take 1 tablet by mouth daily    LEVOTHYROXINE (SYNTHROID) 125 MCG TABLET    TAKE 1 TABLET BY MOUTH EVERY DAY    MULTIPLE VITAMINS-MINERALS (THERAPEUTIC MULTIVITAMIN-MINERALS) TABLET    Take 1 tablet by mouth daily    NAPROXEN (NAPROSYN) 500 MG TABLET    Take 1 tablet by mouth 2 times daily for 20 doses    OMEPRAZOLE (PRILOSEC OTC) 20 MG TABLET    Take 1 tablet by mouth daily    QUETIAPINE (SEROQUEL) 100 MG TABLET    Take 1 tablet by mouth nightly    SIMVASTATIN (ZOCOR) 40 MG TABLET    Take 1 tablet by mouth daily    TIZANIDINE (ZANAFLEX) 4 MG TABLET    TAKE 1 TABLET BY SCREENINGS                        PHYSICAL EXAM    (up to 7 for level 4, 8 or more for level 5)     ED Triage Vitals [10/14/21 0303]   BP Temp Temp Source Pulse Resp SpO2 Height Weight   121/88 97.9 °F (36.6 °C) Oral 82 18 96 % 5' 2\" (1.575 m) 205 lb (93 kg)       Physical Exam  Constitutional:       General: She is not in acute distress. Appearance: She is well-developed. She is not ill-appearing, toxic-appearing or diaphoretic. Comments: Pt appears intoxicated. Slurring speech, smells of ETOH. HENT:      Head: Normocephalic and atraumatic. Nose: Nose normal.      Mouth/Throat:      Mouth: Mucous membranes are moist.   Eyes:      Pupils: Pupils are equal, round, and reactive to light. Cardiovascular:      Rate and Rhythm: Normal rate and regular rhythm. Heart sounds: No murmur heard. No friction rub. No gallop. Pulmonary:      Effort: Pulmonary effort is normal.      Breath sounds: Normal breath sounds. No wheezing, rhonchi or rales. Abdominal:      General: Bowel sounds are normal. There is no distension. Palpations: Abdomen is soft. Tenderness: There is no abdominal tenderness. There is no guarding or rebound. Musculoskeletal:         General: No swelling. Cervical back: Normal range of motion. Right lower leg: No edema. Left lower leg: No edema. Skin:     General: Skin is warm and dry. Capillary Refill: Capillary refill takes less than 2 seconds. Findings: No rash. Neurological:      General: No focal deficit present. Mental Status: She is alert and oriented to person, place, and time. Psychiatric:         Mood and Affect: Affect is tearful. Speech: Speech is slurred. Behavior: Behavior is cooperative. Thought Content: Thought content includes suicidal ideation. Thought content does not include homicidal ideation. Thought content includes suicidal plan. Thought content does not include homicidal plan. DIAGNOSTIC RESULTS     EKG: All EKG's are interpreted by the Emergency Department Physician who either signs or Co-signs this chart in the absence of a cardiologist.    EKG shows NSR with HR 70, normal axis, normal intervals, no ST changes. RADIOLOGY:   Non-plain film images such as CT, Ultrasound and MRI are read by the radiologist. Plain radiographic images are visualized and preliminarily interpreted by the emergency physician with the below findings:    Interpretation per the Radiologist below, if available at the time of this note:    No orders to display         ED BEDSIDE ULTRASOUND:   Performed by ED Physician - none    LABS:  Labs Reviewed   COVID-19, RAPID   COMPREHENSIVE METABOLIC PANEL   URINE DRUG SCREEN   ETHANOL   CBC WITH AUTO DIFFERENTIAL   URINE RT REFLEX TO CULTURE   TSH WITHOUT REFLEX   CK   ACETAMINOPHEN LEVEL   SALICYLATE LEVEL       All other labs were within normal range or not returned as of this dictation. EMERGENCY DEPARTMENT COURSE and DIFFERENTIAL DIAGNOSIS/MDM:   Vitals:    Vitals:    10/14/21 0303   BP: 121/88   Pulse: 82   Resp: 18   Temp: 97.9 °F (36.6 °C)   TempSrc: Oral   SpO2: 96%   Weight: 205 lb (93 kg)   Height: 5' 2\" (1.575 m)       MDM  Medically clear for [address] [place] [address],[address]   Total Critical Care time was 0 minutes, excluding separately reportable procedures. There was a high probability of clinically significant/life threatening deterioration in the patient's condition which required my urgent intervention. CONSULTS:  None    PROCEDURES:  Unless otherwise noted below, none     Procedures        FINAL IMPRESSION      1. Severe episode of recurrent major depressive disorder, without psychotic features ([facility] [address]          DISPOSITION/PLAN   DISPOSITION  Admit to 3 W. Dr. Dominick Desouza:  No follow-up provider specified.     DISCHARGE MEDICATIONS:  New Prescriptions    No medications on file     Controlled Substances Monitoring:     RX Monitoring 4/4/2017   Attestation The Prescription Monitoring Report for this patient was reviewed today.    Periodic Controlled Substance Monitoring (No Data)       (Please note that portions of this note were completed with a voice recognition program.  Efforts were made to edit the dictations but occasionally words are mis-transcribed.)    Ophelia Severance, PA-C (electronically signed)             Belén Shane, DO  10/14/21 [facility]16 [facility],   10/14/21 6327

## 2021-10-14 NOTE — PROGRESS NOTES
Report received from [facility] from [place]. Pt is coming to unit on a pink slip with a diagnosis of depression. Pt admits to suicidal ideations with plan to run car into a tree with at least some degree of continued intent. She states she also OD'd about two months ago in an attempt to end her life that was unsuccessful and for which she did not seek treatment. She denies thoughts of harming others. She denies symptoms of psychosis. She reports only occasional use of alcohol but did drink to excess last night coming in with a BAL of 0.216. drug screen negative.  Covid (-)

## 2021-10-14 NOTE — ED NOTES
Patient is eyes closed with deep regular respirations. Patient did not move on placement of tray or announcement of its presents and appears under the influence of the alcohol to heavily at this time to assess. Diogo Resendiz  [facility]  10/14/21 8737

## 2021-10-14 NOTE — PROGRESS NOTES
Pt arrived to unit. Pt oriented to unit surroundings, policies and services. Pt shown to room and advised of the need to complete admission assessment and sign consents. Pt denies any needs at this time. Pt denies SI,HI,AVH, anxiety and depression 10/10, 10 being the worst. Pt tearful with assessment.

## 2021-10-15 LAB — HBA1C MFR BLD: 6.4 % (ref 4.8–5.9)

## 2021-10-15 PROCEDURE — 1240000000 HC EMOTIONAL WELLNESS R&B

## 2021-10-15 PROCEDURE — 6370000000 HC RX 637 (ALT 250 FOR IP): Performed by: NURSE PRACTITIONER

## 2021-10-15 PROCEDURE — 99223 1ST HOSP IP/OBS HIGH 75: CPT | Performed by: PSYCHIATRY & NEUROLOGY

## 2021-10-15 PROCEDURE — 6370000000 HC RX 637 (ALT 250 FOR IP): Performed by: PSYCHIATRY & NEUROLOGY

## 2021-10-15 RX ORDER — QUETIAPINE FUMARATE 50 MG/1
150 TABLET, EXTENDED RELEASE ORAL NIGHTLY
Status: DISCONTINUED | OUTPATIENT
Start: 2021-10-15 | End: 2021-10-19

## 2021-10-15 RX ORDER — LEVOTHYROXINE SODIUM 112 UG/1
112 TABLET ORAL DAILY
Status: DISCONTINUED | OUTPATIENT
Start: 2021-10-15 | End: 2021-10-21 | Stop reason: HOSPADM

## 2021-10-15 RX ORDER — POTASSIUM CHLORIDE 20 MEQ/1
20 TABLET, EXTENDED RELEASE ORAL 2 TIMES DAILY WITH MEALS
Status: COMPLETED | OUTPATIENT
Start: 2021-10-15 | End: 2021-10-15

## 2021-10-15 RX ADMIN — POTASSIUM CHLORIDE 20 MEQ: 1500 TABLET, EXTENDED RELEASE ORAL at 12:43

## 2021-10-15 RX ADMIN — BUTALBITAL, ACETAMINOPHEN AND CAFFEINE 1 CAPSULE: 300; 40; 50 CAPSULE ORAL at 17:15

## 2021-10-15 RX ADMIN — TIZANIDINE 4 MG: 4 TABLET ORAL at 20:53

## 2021-10-15 RX ADMIN — ATORVASTATIN CALCIUM 20 MG: 20 TABLET, FILM COATED ORAL at 09:40

## 2021-10-15 RX ADMIN — AMLODIPINE BESYLATE 10 MG: 10 TABLET ORAL at 09:40

## 2021-10-15 RX ADMIN — ALPRAZOLAM 0.5 MG: 0.5 TABLET ORAL at 20:53

## 2021-10-15 RX ADMIN — ALPRAZOLAM 0.5 MG: 0.5 TABLET ORAL at 17:10

## 2021-10-15 RX ADMIN — DULOXETINE 60 MG: 60 CAPSULE, DELAYED RELEASE ORAL at 09:30

## 2021-10-15 RX ADMIN — ALPRAZOLAM 0.5 MG: 0.5 TABLET ORAL at 12:43

## 2021-10-15 RX ADMIN — Medication 2000 UNITS: at 09:39

## 2021-10-15 RX ADMIN — PANTOPRAZOLE SODIUM 20 MG: 20 TABLET, DELAYED RELEASE ORAL at 06:32

## 2021-10-15 RX ADMIN — QUETIAPINE FUMARATE 150 MG: 50 TABLET, EXTENDED RELEASE ORAL at 20:53

## 2021-10-15 RX ADMIN — POTASSIUM CHLORIDE 20 MEQ: 1500 TABLET, EXTENDED RELEASE ORAL at 17:10

## 2021-10-15 RX ADMIN — TIZANIDINE 4 MG: 4 TABLET ORAL at 09:46

## 2021-10-15 RX ADMIN — ALPRAZOLAM 0.5 MG: 0.5 TABLET ORAL at 09:43

## 2021-10-15 RX ADMIN — LEVOTHYROXINE SODIUM 112 MCG: 112 TABLET ORAL at 11:46

## 2021-10-15 RX ADMIN — HYDROCHLOROTHIAZIDE 25 MG: 12.5 TABLET ORAL at 09:39

## 2021-10-15 RX ADMIN — MULTIPLE VITAMINS W/ MINERALS TAB 1 TABLET: TAB at 09:46

## 2021-10-15 ASSESSMENT — SLEEP AND FATIGUE QUESTIONNAIRES
AVERAGE NUMBER OF SLEEP HOURS: 7
DO YOU HAVE DIFFICULTY SLEEPING: NO
DO YOU USE A SLEEP AID: NO

## 2021-10-15 ASSESSMENT — LIFESTYLE VARIABLES: HISTORY_ALCOHOL_USE: YES

## 2021-10-15 NOTE — PROGRESS NOTES
Patient did not attend group despite staff encouragement.   Electronically signed by Armando Alfred on 10/14/2021 at 10:08 PM

## 2021-10-15 NOTE — PROGRESS NOTES
Pt refused to eat breakfast, out of room for multipurpose group, pt verbalized multiple stressors that made her feel suicidal, pt denies SI,HI,AVH, at this time. High anxiety and depression remains. Poor eye contact noted.

## 2021-10-15 NOTE — CONSULTS
[facility]  MEDICINE    HISTORY AND PHYSICAL EXAM    PATIENT NAME:  Crsytal Cano    MRN:  [id number]  SERVICE DATE:  10/15/2021   SERVICE TIME:  9:36 AM    Primary Care Physician: Dwayne Lorenzo DO         SUBJECTIVE  CHIEF COMPLAINT:  Medically appropriate for inpatient psychiatry admission. Consult for medical H/P encounter. HPI:  This is a 55 y.o. female who presents with depression and suicidal ideation and alcohol intoxication. Patient was reportedly found wandering on the street by [employer] indicating that she was suicidal.  In the ER, patient was found to be intoxicated with initial EtOH level of 0.216. Repeat several hours later was 0.081. At this point, the patient was evaluated by psychiatry deemed appropriate for behavioral health admission. Upon medical clearance, patient was transferred to the behavioral health unit. Hospitalist service has been placed on consult for medical evaluation. Patient was seen and examined. She endorses headache. States that she does have history of chronic headaches for which she takes as needed Fioricet. States that she does have dizziness at times for which she is prescribed meclizine. No dizziness at present. No chest pain, palpitations, shortness of breath. No abdominal pain or nausea. She does not smoke. Patient states that she occasionally drinks alcohol socially, but not regularly. Denies illicit drug use or abuse.     PAST MEDICAL HISTORY:    Past Medical History:   Diagnosis Date    Anemia     C. difficile colitis 1/2013    Chronic fatigue syndrome     Depression     [place]    Diabetes mellitus ([address]     Diverticulitis large intestine 2001    Fibromyalgia     Fibromyalgia 3/24/2015    HTN (hypertension)     Hyperlipidemia     Hypothyroidism     Palpitations     Pulmonary embolus ([address] 10/12    Following GYN procedure    Vitamin D deficiency      PAST SURGICAL HISTORY:    Past Surgical History:   Procedure Laterality Date    ANUS SURGERY  12/14/2011    anal fissure    COLON SURGERY      BIPOSY    COLONOSCOPY  8/2011    DILATION AND CURETTAGE OF UTERUS      MISCARRIAGE X2    HEMORRHOID SURGERY   8/24/10    EXTERIOR    LAPAROSCOPY      SIGMOIDOSCOPY  12/14/11    RESULTING IN FISSURECTOMY     FAMILY HISTORY:    Family History   Problem Relation Age of Onset    Cancer Maternal Grandfather         COLON    Cancer Paternal Aunt         LUNG    Cancer Other         BLOOD CANCER- UNSPECIFIED     SOCIAL HISTORY:    Social History     Socioeconomic History    Marital status:      Spouse name: Not on file    Number of children: Not on file    Years of education: Not on file    Highest education level: Not on file   Occupational History    Not on file   Tobacco Use    Smoking status: Never Smoker    Smokeless tobacco: Never Used   Vaping Use    Vaping Use: Never used   Substance and Sexual Activity    Alcohol use: Yes     Comment: OCC.  Drug use: No    Sexual activity: Not on file   Other Topics Concern    Not on file   Social History Narrative    Not on file     Social Determinants of Health     Financial Resource Strain:     Difficulty of Paying Living Expenses:    Food Insecurity:     Worried About Running Out of Food in the Last Year:     [address] in the Last Year:    Transportation Needs:     Lack of Transportation (Medical):      Lack of Transportation (Non-Medical):    Physical Activity:     Days of Exercise per Week:     Minutes of Exercise per Session:    Stress:     Feeling of Stress :    Social Connections:     Frequency of Communication with Friends and Family:     Frequency of Social Gatherings with Friends and Family:     Attends Spiritism Services:     Active Member of Clubs or Organizations:     Attends Club or Organization Meetings:     Marital Status:    Intimate Partner Violence:     Fear of Current or Ex-Partner:     Emotionally Abused:     Physically Abused:     Sexually Abused:      MEDICATIONS:    Current Facility-Administered Medications   Medication Dose Route Frequency Provider Last Rate Last Admin    ALPRAZolam Alverta Poncho) tablet 0.5 mg  0.5 mg Oral 4x Daily Belinda Valentin MD   0.5 mg at 10/14/21 2055    amLODIPine (NORVASC) tablet 10 mg  10 mg Oral Daily Belinda Valentin MD   10 mg at 10/14/21 1719    butalbital-APAP-caffeine -40 MG per capsule 1 capsule  1 capsule Oral Q6H PRN Belinda Valentin MD        DULoxetine (CYMBALTA) extended release capsule 60 mg  60 mg Oral Daily Belinda Valentin MD   60 mg at 10/14/21 1719    Vitamin D (CHOLECALCIFEROL) tablet 2,000 Units  2,000 Units Oral Daily Belinda Valentin MD   2,000 Units at 10/14/21 1718    hydroCHLOROthiazide (HYDRODIURIL) tablet 25 mg  25 mg Oral Daily Belinda Valentin MD   25 mg at 10/14/21 1718    meclizine (ANTIVERT) tablet 25 mg  25 mg Oral BID Belinda Valentin MD   25 mg at 10/14/21 2054    therapeutic multivitamin-minerals 1 tablet  1 tablet Oral Daily Belinda Valentin MD   1 tablet at 10/14/21 1719    pantoprazole (PROTONIX) tablet 20 mg  20 mg Oral QAM AC Belinda Valentin MD   20 mg at 10/15/21 8594    QUEtiapine (SEROQUEL) tablet 100 mg  100 mg Oral Nightly Belinda Valentin MD   100 mg at 10/14/21 2054    atorvastatin (LIPITOR) tablet 20 mg  20 mg Oral Daily Belinda Valentin MD   20 mg at 10/14/21 1718    tiZANidine (ZANAFLEX) tablet 4 mg  4 mg Oral BID Belinda Valentin MD   4 mg at 10/14/21 2055    acetaminophen (TYLENOL) tablet 650 mg  650 mg Oral Q4H PRN Belinda Valentin MD        magnesium hydroxide (MILK OF MAGNESIA) 400 MG/5ML suspension 30 mL  30 mL Oral Daily PRN Belinda Valentin MD        aluminum & magnesium hydroxide-simethicone (MAALOX) 200-200-20 MG/5ML suspension 30 mL  30 mL Oral PRN Belinda Valentin MD        haloperidol (HALDOL) tablet 5 mg  5 mg Oral Q6H PRN Belinda Valentin MD        Or    haloperidol lactate (HALDOL) injection 5 mg  5 mg IntraMUSCular Q6H range of motion noted. Motor strength is 5 out of 5 all extremities bilaterally. Tone is normal.  NEUROLOGIC:  Awake, alert, oriented to name, place and time. Cranial nerves II-XII are grossly intact. Motor is 5 out of 5 bilaterally. Sensory is intact.  gait is normal.  SKIN:  no bruising or bleeding, normal skin color, texture, turgor, no redness, warmth, or swelling and no jaundice    DATA:     Diagnostic tests reviewed for today's visit:    Most recent labs and imaging results reviewed. VTE Prophylaxis:     ASSESSMENT AND PLAN  Active Problems:    Major depressive disorder, recurrent, severe w/o psychotic behavior ([facility] [address]  Plan:     Major depressive disorder  -Patient admitted to behavorial health for evaluation and treatment    Hypothyroidism  -TSH 2.01. Continue levothyroxine    T2DM  -States diet controlled. Last A1c noted in system was 6.4 on 3/13/2020  -Recheck hemoglobin A1c    HTN  -Continue home amlodipine and hydrochlorothiazide. Trend BPs, titrate as indicated    LASHONDA  -Noncompliant with CPAP. Recommend outpatient follow-up with PCP for possible referral for sleep study    Hypokalemia  -Replete       This is only a history and physical examination and not medical management. The patient is to contact and follow up with their primary care physician and go over any abnormal labs, imaging, findings, medical concerns, or conditions that we have and have not addressed during this encounter.     Plan of care discussed with: patient    SIGNATURE: TYRA Moreno CNP  DATE: October 15, 2021  TIME: 9:36 AM

## 2021-10-15 NOTE — GROUP NOTE
Group Therapy Note    Date: 10/15/2021    Group Start Time: 1000  Group End Time: 1050  Group Topic: Psychoeducation    [facility]    Unruly Arnold        Group Therapy Note    Attendees: 8         Patient's Goal:  \"Stay focus\"    Notes:  Patient attended the 1000 skills group. Patient was mostly quiet but she did interact at times. She work fairly and independently on her task in group.     Status After Intervention:  Unchanged    Participation Level: Fair    Participation Quality: Appropriate      Speech:  Mostly quiet      Thought Process/Content: Linear      Affective Functioning: Flat      Mood: calm      Level of consciousness:  Alert      Response to Learning: Progressing to goal      Endings: None Reported    Modes of Intervention: Education, Socialization and Activity      Discipline Responsible: Psychoeducational Specialist      Signature:  Unruly Arnold

## 2021-10-15 NOTE — PROGRESS NOTES
Behavioral Services  Medicare Certification Upon Admission    I certify that this patient's inpatient psychiatric hospital admission is medically necessary for:    [x] (1) Treatment which could reasonably be expected to improve this patient's condition,       [x] (2) Or for diagnostic study;     AND     [x](2) The inpatient psychiatric services are provided while the individual is under the care of a physician and are included in the individualized plan of care.     Estimated length of stay/service 3-5 days    Plan for post-hospital care OP care    Electronically signed by Thien Cochran MD on 10/15/2021 at 10:20 AM

## 2021-10-15 NOTE — CARE COORDINATION
Psychosocial Assessment    Current Level of Psychosocial Functioning     Independent x  Dependent    Minimal Assist     Comments:    Patient lives alone. She is unemployed. She is connected to the [facility]. She states she is medication compliant. She has a history of depression. Prior admission to  in January 2021      Psychosocial High Risk Factors (check all that apply)    Unable to obtain meds   Chronic illness/pain  x  Substance abuse   Lack of Family Support x  Financial stress   Isolation   Inadequate Community Resources  Suicide attempt(s)  Not taking medications   Victim of crime   Developmental Delay  Unable to manage personal needs    Age 73 or older   Homeless  No transportation   Readmission within 30 days  Unemployment  Traumatic Event    Family/Supports identified: Mother is supportive  Sexual Orientation:    Patient is in a heterosexual relationship (3yrs)  Patient Strengths: medication compliant, connected to an outside provider    Patient Barriers:   unemployed  Safety plan:  completed  CMHC/MH history:  Previous admission to  1/2021  Plan of Care:  medication management, group/individual therapies, family meetings, psycho -education, treatment team meetings to assist with stabilization    Initial Discharge Plan: To return to her home. Clinical Summary:    Per notes, patient is a 55 y.o. female who per chart reviews past medical history of hypothyroidism depression anemia PE type 2 diabetes hypertension hyperparathyroidism LASHONDA presented to the emergency department for evaluation of suicidal ideations and alcohol intox. Patient was found wandering on the street by [employer] stating she was suicidal.  Per notes, patient admitted to suicidal ideations with plan to run car into a tree with at least some degree of continued intent.    She states she also OD'd about two months ago in an attempt to end her life that was unsuccessful and for which she did not seek treatment. She denied thoughts of harming others. She denied symptoms of psychosis. She reports only occasional use of alcohol but did drink to excess prior to coming in with a BAL of 0.216. Patient was cooperative during this assessment. She stated that she \"doesn't want it to look like she has a drinking problem in our charts, and that she is a social drinker (once a month.) She admitted to being depressed over her son going to \"job андрей\" and she is afraid she will never see him again. She is currently linked to the [facility]. She plans to discharge back to her home.  will assist with discharge per doctor's orders.

## 2021-10-15 NOTE — PROGRESS NOTES
Patient did not attend group despite staff encouragement.   Electronically signed by Marylene Muss on 10/14/2021 at 10:08 PM

## 2021-10-15 NOTE — CARE COORDINATION
Brief Intervention and Referral to Treatment Summary    Patient was provided PHQ-9, AUDIT and DAST Screening:      PHQ-9 Score: 16  AUDIT Score:  4  DAST Score:   0    Patients substance use is considered     Low Risk/Healthy   Moderate Risk x  Harmful  Dependent    Patients depression is considered:     Minimal  Mild   Moderate x  Moderately Severe  Severe    Brief Education Was Provided    Patient was receptive  Patient was not receptive x      Brief Intervention Is Provided (Only for AUDIT or DAST)     Patient reports readiness to decrease and/or stop use and a plan was discussed   Patient denies readiness to decrease and/or stop use and a plan was not discussed x      Recommendations/Referrals for Brief and/or Specialized Treatment Provided to Patient   Patient denied drug use. Patient reports that she is a social drinker and does not need to cut down or stop drinking. As a result, no recommendations or referrals were provided to the patient at this time.

## 2021-10-15 NOTE — CARE COORDINATION
Group Therapy Note    Date: 10/15/2021  Start Time: 1100  End Time:  1150  Number of Participants: 6    Type of Group: Psychotherapy    Wellness Binder Information  Module Name:    Session Number:      Patient's Goal:  Coping with fear    Notes:  Engaged with peers and group     Status After Intervention:  Unchanged    Participation Level:  Active Listener and Interactive    Participation Quality: Appropriate, Attentive and Sharing      Speech:  normal      Thought Process/Content: Logical      Affective Functioning: Congruent      Mood: anxious      Level of consciousness:  Oriented x4      Response to Learning: Able to verbalize current knowledge/experience, Able to verbalize/acknowledge new learning and Able to retain information      Endings: None Reported    Modes of Intervention: Education and Support      Discipline Responsible: /Counselor      Signature:  Sunita Reyes

## 2021-10-15 NOTE — SUICIDE SAFETY PLAN
SAFETY PLAN    A suicide Safety Plan is a document that supports someone when they are having thoughts of suicide. Warning Signs that indicate a suicidal crisis may be developing: What (situations, thoughts, feelings, body sensations, behaviors, etc.) do you experience that lets you know you are beginning to think about suicide? 1. When I fell like I am trapped and can't get the thoughts out of my head. 2. Really anxious, can't calm down  3. When I feel like I would be better off not even here. Internal Coping Strategies:  What things can I do (relaxation techniques, hobbies, physical activities, etc.) to take my mind off my problems without contacting another person? 1. Take anxiety medicine and lay still on couch with my dog  2. Go for a walk to the corner  3. Clean the house    People and social settings that provide distraction: Who can I call or where can I go to distract me? 1. Name: Emi Fish Phone: # in phone  2. Name: [facility]  Phone:  # in phone  3. Place:  Sit outside on the porch on lawn furniture. 4. Place: Check e-mail or [employer] on tablet    People whom I can ask for help: Who can I call when I need help - for example, friends, family, clergy, someone else? 1. Name: my mom      Phone: [phone]  2. Name: Kailee Jerome Phone: # in phone  3. Name: Shu Phone: # in phone    Professionals or [address] [place] [address] I can contact during a crisis: Who can I call for help - for example, my doctor, my psychiatrist, my psychologist, a mental health provider, a suicide hotline? 1. Clinician Name: Connie Hooper [facility])   Phone: [phone]        2. Suicide Prevention Lifeline: 3-100-267-TALK (2348)    3. [address] [place] [address] Emergency Services -  for example, [address] [place] [address] suicide hotline, [address] [place] [address]: [employer]   Emergency Services [phone]  Emergency Services Phone: [phone]    Making the environment safe:  How can I make my environment (house/apartment/living space) safer? For example, can I remove guns, medications, and other items? 1.not be alone  2.  Keep away the weapons
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
## 2021-10-15 NOTE — PROGRESS NOTES
Pt to nurse's station requesting \"pants\". Hospital, unit safe pants provided to pt and she states \"oh, no I wanted regular pants\". Explained to patient that we had these, paper hospital pants or her own clothing. Noted that patient was currently wearing hospital pants with no complaint. Pt took pants that were offered and thanked this nurse.

## 2021-10-15 NOTE — GROUP NOTE
Group Therapy Note    Date: 10/15/2021    Group Start Time: 1630  Group End Time: 1700  Group Topic: Healthy Living/Wellness    [facility]    [address] [place]        Group Therapy Note    Attendees: 9         Patient's Goal:  To learn about self esteem and ways to have a positive self esteem     Notes:  Pt was an active listener in group      Status After Intervention:  Unchanged    Participation Level:  Active Listener    Participation Quality: Appropriate      Speech:  normal      Thought Process/Content: Logical      Affective Functioning: Congruent      Mood: euthymic      Level of consciousness:  Alert      Response to Learning: Able to verbalize current knowledge/experience      Endings: None Reported    Modes of Intervention: Activity      Discipline Responsible: Behavorial Health Tech      Signature:  Salomon Sams

## 2021-10-15 NOTE — PROGRESS NOTES
Pt noted to be in the day room eating dinner, no issues noted. Pt verbalized she was just too tired to get up earlier today, pt focused on medications. Pt denies SI,HI,AVH, pt hopeful for seeing counselor at [facility] once discharged.
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
## 2021-10-16 PROCEDURE — 6370000000 HC RX 637 (ALT 250 FOR IP): Performed by: PSYCHIATRY & NEUROLOGY

## 2021-10-16 PROCEDURE — 1240000000 HC EMOTIONAL WELLNESS R&B

## 2021-10-16 PROCEDURE — 6370000000 HC RX 637 (ALT 250 FOR IP): Performed by: NURSE PRACTITIONER

## 2021-10-16 RX ADMIN — MECLIZINE 25 MG: 12.5 TABLET ORAL at 08:52

## 2021-10-16 RX ADMIN — LEVOTHYROXINE SODIUM 112 MCG: 112 TABLET ORAL at 06:38

## 2021-10-16 RX ADMIN — MULTIPLE VITAMINS W/ MINERALS TAB 1 TABLET: TAB at 08:52

## 2021-10-16 RX ADMIN — QUETIAPINE FUMARATE 150 MG: 50 TABLET, EXTENDED RELEASE ORAL at 21:43

## 2021-10-16 RX ADMIN — ALPRAZOLAM 0.5 MG: 0.5 TABLET ORAL at 12:20

## 2021-10-16 RX ADMIN — Medication 2000 UNITS: at 08:52

## 2021-10-16 RX ADMIN — PANTOPRAZOLE SODIUM 20 MG: 20 TABLET, DELAYED RELEASE ORAL at 06:38

## 2021-10-16 RX ADMIN — TIZANIDINE 4 MG: 4 TABLET ORAL at 08:52

## 2021-10-16 RX ADMIN — ATORVASTATIN CALCIUM 20 MG: 20 TABLET, FILM COATED ORAL at 08:52

## 2021-10-16 RX ADMIN — ALPRAZOLAM 0.5 MG: 0.5 TABLET ORAL at 08:52

## 2021-10-16 RX ADMIN — ALPRAZOLAM 0.5 MG: 0.5 TABLET ORAL at 21:43

## 2021-10-16 RX ADMIN — ALPRAZOLAM 0.5 MG: 0.5 TABLET ORAL at 17:09

## 2021-10-16 RX ADMIN — AMLODIPINE BESYLATE 10 MG: 10 TABLET ORAL at 08:53

## 2021-10-16 RX ADMIN — HYDROCHLOROTHIAZIDE 25 MG: 12.5 TABLET ORAL at 08:52

## 2021-10-16 RX ADMIN — DULOXETINE 60 MG: 60 CAPSULE, DELAYED RELEASE ORAL at 08:52

## 2021-10-16 RX ADMIN — MECLIZINE 25 MG: 12.5 TABLET ORAL at 21:43

## 2021-10-16 RX ADMIN — BUTALBITAL, ACETAMINOPHEN AND CAFFEINE 1 CAPSULE: 300; 40; 50 CAPSULE ORAL at 17:32

## 2021-10-16 RX ADMIN — TIZANIDINE 4 MG: 4 TABLET ORAL at 21:43

## 2021-10-16 NOTE — GROUP NOTE
Group Therapy Note    Date: 10/15/2021    Group Start Time: 2015  Group End Time: 2030  Group Topic: Wrap-Up    [facility]    Joan Ramos        Group Therapy Note    Attendees: 6         Patient's Goal:  To stay focused all day     Notes:  Pt reported that she met her goal for the day    Status After Intervention:  Improved    Participation Level:  Active Listener    Participation Quality: Appropriate and Attentive      Speech:  normal      Thought Process/Content: Logical      Affective Functioning: Congruent      Mood: euthymic      Level of consciousness:  Alert and Oriented x4      Response to Learning: Able to verbalize current knowledge/experience      Endings: None Reported    Modes of Intervention: Activity      Discipline Responsible: Behavorial Health Tech      Signature:  Joan Ramos

## 2021-10-16 NOTE — GROUP NOTE
Group Therapy Note    Date: 10/16/2021    Group Start Time: 1100  Group End Time: 8038  Group Topic: Psychoeducation    [facility] ERLINDA Louis LSW        Group Therapy Note    Attendees: 9/11         Patient's Goal:  To participate in a goal oriented group    Notes:  Patient's goal is to [place] how to deal with things that are not in her control\"    Status After Intervention:  Unchanged    Participation Level: Interactive    Participation Quality: Appropriate      Speech:  normal      Thought Process/Content: Logical      Affective Functioning: Congruent      Mood: calm      Level of consciousness:  Alert      Response to Learning: Able to verbalize current knowledge/experience      Endings: None Reported    Modes of Intervention: Education      Discipline Responsible: /Counselor      Signature:  ERLINDA Casanova, KAILEY

## 2021-10-16 NOTE — PROGRESS NOTES
Patient did not attend group despite staff encouragement.   Electronically signed by aCrmine Rodriguez on 10/16/2021 at 7:01 PM

## 2021-10-16 NOTE — GROUP NOTE
Group Therapy Note    Date: 10/15/2021    Group Start Time: 1930  Group End Time: 2015  Group Topic: Recreational    [facility]    Partha Mention        Group Therapy Note    Attendees: 9         Patient's Goal:  To participate in a game of headbands with peers    Notes:  Pt participated in group    Status After Intervention:  Unchanged    Participation Level:  Active Listener and Interactive    Participation Quality: Appropriate, Attentive and Sharing      Speech:  normal      Thought Process/Content: Logical  Linear      Affective Functioning: Congruent      Mood: euthymic      Level of consciousness:  Alert      Response to Learning: Able to verbalize current knowledge/experience      Endings: None Reported    Modes of Intervention: Activity      Discipline Responsible: Behavorial Health Tech      Signature:  Partha Bradley

## 2021-10-16 NOTE — PROGRESS NOTES
Pt. declined to attend the 0900 community meeting, despite staff encouragement. Electronically signed by Leandro Kmi [address] on 10/16/2021 at 10:02 AM

## 2021-10-16 NOTE — FLOWSHEET NOTE
Pt has been in her room most of day. Noted to be out for one group for a short amount of time. Pt reports no appetite. Pt has not ate breakfast or lunch. Pt reports eating cookies and drinking fluids. Pt reports she as a lot on her mind and is sad. Pt rates anxiety 5-6/10. Pt does not want to elaborate with nurse. Pt denies SI/HI/AVH.

## 2021-10-16 NOTE — GROUP NOTE
Group Therapy Note    Date: 10/16/2021    Group Start Time: 1000  Group End Time: 1055  Group Topic: Psychoeducation    [facility]    Jp Faust        Group Therapy Note    Attendees: 8         Patient's Goal:  \"Stay awake\"    Notes:  Patient attended the 1000 skills group. Patient had a flat affect, was tired but she work fairly on her task. She was mostly quiet in group and did not interact with her peers.     Status After Intervention:  Unchanged    Participation Level: Fair    Participation Quality: Appropriate      Speech:  quiet      Thought Process/Content: Linear      Affective Functioning: Flat      Mood: calm      Level of consciousness:  Alert      Response to Learning: Progressing to goal      Endings: None Reported    Modes of Intervention: Education, Socialization and Activity      Discipline Responsible: Psychoeducational Specialist      Signature:  Jp Faust

## 2021-10-16 NOTE — FLOWSHEET NOTE
Pt noted to be at group and then left to go back to room. Pt breakfast tray was in room untouched. Pt reports she was not hungry for breakfast.  Informed pt lunch would be soon. Pt does not open eyes to look at nurse entire assessment. Pt talking quietly, whiny at times. When asked about depression, pt replied, \"I don't know, just sad. \"  When asked what was making her sad, pt replied, \"I don't know. \"  Pt rates anxiety 5/10. Pt reports that she slept good but has been in the bed most of the morning. Only noted to one group and left to retreat back to room. Pt denies SI/HI/AVH at this time.

## 2021-10-16 NOTE — CARE COORDINATION
Family/Support Name: Gerardo Olvera  Contact #: [phone]  Relationship to Patient: mother    10/16/21 @ 12:25pm    Placed call to above for collateral information, left a voicemail to return the call.

## 2021-10-17 PROCEDURE — 6370000000 HC RX 637 (ALT 250 FOR IP): Performed by: PSYCHIATRY & NEUROLOGY

## 2021-10-17 PROCEDURE — 1240000000 HC EMOTIONAL WELLNESS R&B

## 2021-10-17 PROCEDURE — 6370000000 HC RX 637 (ALT 250 FOR IP): Performed by: NURSE PRACTITIONER

## 2021-10-17 RX ADMIN — TIZANIDINE 4 MG: 4 TABLET ORAL at 20:51

## 2021-10-17 RX ADMIN — AMLODIPINE BESYLATE 10 MG: 10 TABLET ORAL at 09:27

## 2021-10-17 RX ADMIN — HYDROCHLOROTHIAZIDE 25 MG: 12.5 TABLET ORAL at 09:27

## 2021-10-17 RX ADMIN — TIZANIDINE 4 MG: 4 TABLET ORAL at 09:27

## 2021-10-17 RX ADMIN — ATORVASTATIN CALCIUM 20 MG: 20 TABLET, FILM COATED ORAL at 09:27

## 2021-10-17 RX ADMIN — LEVOTHYROXINE SODIUM 112 MCG: 112 TABLET ORAL at 06:41

## 2021-10-17 RX ADMIN — ACETAMINOPHEN 650 MG: 325 TABLET ORAL at 17:52

## 2021-10-17 RX ADMIN — MULTIPLE VITAMINS W/ MINERALS TAB 1 TABLET: TAB at 09:26

## 2021-10-17 RX ADMIN — DULOXETINE 60 MG: 60 CAPSULE, DELAYED RELEASE ORAL at 09:27

## 2021-10-17 RX ADMIN — ALPRAZOLAM 0.5 MG: 0.5 TABLET ORAL at 09:27

## 2021-10-17 RX ADMIN — MECLIZINE 25 MG: 12.5 TABLET ORAL at 20:51

## 2021-10-17 RX ADMIN — ALPRAZOLAM 0.5 MG: 0.5 TABLET ORAL at 13:08

## 2021-10-17 RX ADMIN — QUETIAPINE FUMARATE 150 MG: 50 TABLET, EXTENDED RELEASE ORAL at 20:50

## 2021-10-17 RX ADMIN — HYDROXYZINE PAMOATE 50 MG: 50 CAPSULE ORAL at 17:52

## 2021-10-17 RX ADMIN — ALPRAZOLAM 0.5 MG: 0.5 TABLET ORAL at 20:50

## 2021-10-17 RX ADMIN — PANTOPRAZOLE SODIUM 20 MG: 20 TABLET, DELAYED RELEASE ORAL at 06:41

## 2021-10-17 RX ADMIN — ALPRAZOLAM 0.5 MG: 0.5 TABLET ORAL at 16:37

## 2021-10-17 RX ADMIN — Medication 2000 UNITS: at 09:27

## 2021-10-17 ASSESSMENT — PAIN SCALES - GENERAL: PAINLEVEL_OUTOF10: 5

## 2021-10-17 NOTE — PROGRESS NOTES
Pt. declined to attend the 0900 community meeting, despite staff encouragement. Electronically signed by Austin Abrams [address] on 10/17/2021 at 9:51 AM

## 2021-10-17 NOTE — GROUP NOTE
Group Therapy Note    Date: 10/17/2021    Group Start Time: 1000  Group End Time: 1050  Group Topic: Psychoeducation    [facility]    Lulu Abernathy        Group Therapy Note    Attendees: 9         Patient's Goal:  \"Stay awake\"    Notes: Patient attended the 1000 skills group. Patient was attentive, had good concentration in group and participated well. Status After Intervention:  Unchanged    Participation Level: Good    Participation Quality: Appropriate      Speech:  normal      Thought Process/Content: Linear      Affective Functioning: Flat      Mood: calm      Level of consciousness:  Alert and Attentive      Response to Learning: Progressing to goal      Endings: None Reported    Modes of Intervention: Education, Socialization and Activity      Discipline Responsible: Psychoeducational Specialist      Signature:  Elaine Richey attended the 1000 skills group.

## 2021-10-17 NOTE — FLOWSHEET NOTE
Pt has been isolative to room most of day. Does not engage with nurse. Does not respond back to nurse verbally. Shrugged shoulders, reports \"sad\". Pt did come out for dinner and currently sitting at TV with other peers. Pt denies SI/HI/AVH. Pt did utilize vistaril for high anxiety and depression, along with a headache. Pt c/o that environment on unit is causing anxiety and headache.

## 2021-10-17 NOTE — PROGRESS NOTES
Patient did not attend group despite staff encouragement.   Electronically signed by Ary Maurice on 10/16/2021 at 9:49 PM

## 2021-10-17 NOTE — CARE COORDINATION
Group Therapy Note    Date: 10/17/2021  Start Time: 1100  End Time:  9462    Number of Participants: 5    Type of Group: Psychotherapy    Patient's Goal:  To participate in a goal oriented group. Notes: Patient declined to attend psychoeducation group at 1100 despite encouragement by staff.      Discipline Responsible: /Counselor    NEHA Art

## 2021-10-17 NOTE — PROGRESS NOTES
Patient did not attend group despite staff encouragement.   Electronically signed by Victoriano Muñiz on 10/16/2021 at 9:49 PM

## 2021-10-17 NOTE — CARE COORDINATION
FAMILY COLLATERAL NOTE    Family/Support Name: Lois Bean  Contact #: [phone]  Relationship to Pt: mother      Placed call to above. Response:   called number provided by patient. When answered, a recording indicated that the voicemail box was full and not accepting messages. As a result, no collateral could be completed.          NEHA Shea

## 2021-10-18 PROCEDURE — 6370000000 HC RX 637 (ALT 250 FOR IP): Performed by: NURSE PRACTITIONER

## 2021-10-18 PROCEDURE — 99232 SBSQ HOSP IP/OBS MODERATE 35: CPT | Performed by: PSYCHIATRY & NEUROLOGY

## 2021-10-18 PROCEDURE — 1240000000 HC EMOTIONAL WELLNESS R&B

## 2021-10-18 PROCEDURE — 6370000000 HC RX 637 (ALT 250 FOR IP): Performed by: PSYCHIATRY & NEUROLOGY

## 2021-10-18 RX ADMIN — QUETIAPINE FUMARATE 150 MG: 50 TABLET, EXTENDED RELEASE ORAL at 20:50

## 2021-10-18 RX ADMIN — LEVOTHYROXINE SODIUM 112 MCG: 112 TABLET ORAL at 06:33

## 2021-10-18 RX ADMIN — PANTOPRAZOLE SODIUM 20 MG: 20 TABLET, DELAYED RELEASE ORAL at 06:33

## 2021-10-18 RX ADMIN — ALPRAZOLAM 0.5 MG: 0.5 TABLET ORAL at 12:57

## 2021-10-18 RX ADMIN — ALPRAZOLAM 0.5 MG: 0.5 TABLET ORAL at 20:51

## 2021-10-18 RX ADMIN — TIZANIDINE 4 MG: 4 TABLET ORAL at 20:51

## 2021-10-18 RX ADMIN — DULOXETINE 60 MG: 60 CAPSULE, DELAYED RELEASE ORAL at 09:04

## 2021-10-18 RX ADMIN — MULTIPLE VITAMINS W/ MINERALS TAB 1 TABLET: TAB at 09:04

## 2021-10-18 RX ADMIN — AMLODIPINE BESYLATE 10 MG: 10 TABLET ORAL at 09:04

## 2021-10-18 RX ADMIN — HYDROCHLOROTHIAZIDE 25 MG: 12.5 TABLET ORAL at 09:04

## 2021-10-18 RX ADMIN — MAGNESIUM HYDROXIDE 30 ML: 400 SUSPENSION ORAL at 12:58

## 2021-10-18 RX ADMIN — Medication 2000 UNITS: at 09:04

## 2021-10-18 RX ADMIN — ALPRAZOLAM 0.5 MG: 0.5 TABLET ORAL at 09:04

## 2021-10-18 RX ADMIN — ALPRAZOLAM 0.5 MG: 0.5 TABLET ORAL at 18:15

## 2021-10-18 RX ADMIN — MECLIZINE 25 MG: 12.5 TABLET ORAL at 20:51

## 2021-10-18 RX ADMIN — ATORVASTATIN CALCIUM 20 MG: 20 TABLET, FILM COATED ORAL at 09:04

## 2021-10-18 RX ADMIN — BUTALBITAL, ACETAMINOPHEN AND CAFFEINE 1 CAPSULE: 300; 40; 50 CAPSULE ORAL at 10:57

## 2021-10-18 ASSESSMENT — PAIN SCALES - GENERAL: PAINLEVEL_OUTOF10: 3

## 2021-10-18 NOTE — PROGRESS NOTES
Patient asks for MOM and is given after lunch. Pt will be monitored.   Electronically signed by Floyd Triplett LPN on 75/13/0359 at 1:00 PM

## 2021-10-18 NOTE — GROUP NOTE
Group Therapy Note    Date: 10/18/2021    Group Start Time: 1645  Group End Time: 0644  Group Topic: Healthy Living/Wellness    [address] [place] [address] [address], RN        Group Therapy Note    Attendees 7/16       Patient's Goal: participated in recreation and socialization group      Status After Intervention:  Unchanged    Participation Level: Interactive    Participation Quality: Appropriate      Speech:  normal      Thought Process/Content: Linear      Affective Functioning: Flat      Mood: depressed      Level of consciousness:  Alert      Response to Learning: Able to verbalize current knowledge/experience            Discipline Responsible: Registered Nurse      Signature:  Verona Burrell RN

## 2021-10-18 NOTE — PROGRESS NOTES
Patient did not attend group despite staff encouragement.   Electronically signed by Edgar Mclean on 10/17/2021 at 9:25 PM

## 2021-10-18 NOTE — PROGRESS NOTES
Morning Community Meeting Topics    Juleemel Goldenneymar attended the morning community meeting on 10/18/21. Topics discussed today     [x] Introduction   Day of the week and date   Mask distribution   Current mask requirements  [x]Teams   Explanation of  Green and Blue team criteria   Nurses assigned to each team for today   Explanation about green and blue paper  o Date  o Patient's Name  o Patient's Nurse  o Goals  [x] Visitation   Announce the visiting hours for the day   Announce which team is allowed to have visitors for the day   Review any updated Covid 19 requirements for visitors during visitation. o Vaccine Card or negative Covid test within 24 hours of visit  o State Identification   Patients are reminded to alert the  at least 1 hour before visitation   [x] Unit Orientation   Coffee pot use   Phone location  [employer] locations  [employer] and dryer location  [place] area expectations   Staff rounds expectation  [x] Meals    Educated patient to [employer]  o The patient is encouraged to fill out the menu to get preferences at mealtime  o The patient is educated that if they do not fill out the menu, they will get the standard tray  o The coffee pot is decaf, patient encouraged to order regular coffee from menu.    Educated patient to the meal process   Patient encouraged to eat snacks provided twice daily  o Snacks may stay in patient room     [x] Discharge Process   Discharge expectations   Fill out the survey after discharge   [x] Hygiene   Daily showers encouraged  o Showers availability discussed    Daily dressing encouraged  o Discussed wearing street clothing   Education provided on where to place linens and clothing  o Linens in the hamper  o PERSONAL CLOTHING DOES NOT [address] [facility] HAMPER   Education provided on washer and dryer usage  o Asked to place room number on dryer for identification of contents  [] Phone   Discussion on responsibilities and fede   Open hours for phone usage   How to dial outside numbers from phone  [x] Group    Patient encouraged to attend group provided   Time of Group Meetings discussed   Gentle reminder that attendance is a Physician order   Expectations of group discussed  [x] Movement   Chair exercises completed    Stretching completed     Electronically signed by Roz Duran OT on 10/18/2021 at 10:17 AM

## 2021-10-18 NOTE — GROUP NOTE
Group Therapy Note    Date: 10/18/2021    Group Start Time: 1500  Group End Time: 7924  Group Topic: Relaxation    [facility]    Minna Williamson RN        Group Therapy Note    Attendees:8/16         Patient's Goal:   Exploring the benefits of a  journal       Status After Intervention:  Improved    Participation Level: Interactive    Participation Quality: Appropriate      Speech:  normal      Thought Process/Content: Linear      Affective Functioning: Congruent      Mood: euthymic      Level of consciousness:  Oriented x4      Response to Learning: Able to verbalize/acknowledge new learning      Endings: None Reported    Modes of Intervention: Education and Exploration      Discipline Responsible: Registered Nurse      Signature:  Minna Williamson RN

## 2021-10-18 NOTE — PROGRESS NOTES
Pt assessment completed in pt assigned room, pt is focused on guinea pigs she had at home they have since passed away pt reports feeling sad, pt also focused on her son not loving her. Pt denies SI,HI,AVH, anxiety and depression remain. Pt tearful at times, last BM unknown, pt refused prune juice, offered MOM. Pt verbalized she isolates to her room because the unit is loud. Pt noted to be coloring pages while sitting in bed.

## 2021-10-18 NOTE — CARE COORDINATION
Group Therapy Note    Date: 10/18/2021  Start Time: 1400  End Time:  0390    Number of Participants: 7    Type of Group: Cognitive Skills    Patient's Goal:  To participate in mood management group. Notes: Patient declined to attend psychoeducation group at 1400 despite encouragement by staff.      Discipline Responsible: /Counselor    NEHA Corral

## 2021-10-18 NOTE — PROGRESS NOTES
Pt noted to be out of her room and  more social with peers watching TV, pt has flat sad affect when speaking about her son and family, denies SI,HI,AVH, anxiety and depression remain.

## 2021-10-18 NOTE — PROGRESS NOTES
BEHAVIORAL HEALTH FOLLOW-UP NOTE     10/18/2021     Patient was seen and examined in person, Chart reviewed   Patient's case discussed with staff/team    Chief Complaint: depressed mood, suicidal ideation    Interim History:     Patient report feeling depressed  Ruminating about her grand daughter being so evil   Pt feel hopeless and worthless  Appeared tired with poor concentration  Has been secluding herself in her room  Appetite:   [] Normal/Unchanged  [] Increased  [] Decreased      Sleep:       [] Normal/Unchanged  [x] Fair       [] Poor              Energy:    [] Normal/Unchanged  [] Increased  [x] Decreased        SI [] Present  [x] Absent    HI  []Present  [x] Absent     Aggression:  [] yes  [x] no    Patient is [] able  [x] unable to CONTRACT FOR SAFETY     PAST MEDICAL/PSYCHIATRIC HISTORY:   Past Medical History:   Diagnosis Date    Anemia     C. difficile colitis 01/2013    Chronic fatigue syndrome     Depression     [place]    Diabetes mellitus ([facility] [address]     Diverticulitis large intestine 2001    Fibromyalgia     Fibromyalgia 03/24/2015    GERD (gastroesophageal reflux disease)     HTN (hypertension)     Hyperlipidemia     Hypothyroidism     Palpitations     Pulmonary embolus ([facility] [address] 10/2012    Following GYN procedure    Vitamin D deficiency        FAMILY/SOCIAL HISTORY:  Family History   Problem Relation Age of Onset    COPD Mother     Lung Cancer Father     COPD Maternal Grandmother     Cancer Maternal Grandfather         COLON    Colon Cancer Paternal Grandfather     Cancer Paternal Aunt         LUNG    Cancer Other         BLOOD CANCER- UNSPECIFIED    Coronary Art Dis Maternal Aunt     Other Maternal Aunt         Brain aneurysm     Social History     Socioeconomic History    Marital status:      Spouse name: Not on file    Number of children: Not on file    Years of education: Not on file    Highest education level: Not on file   Occupational History    Not on file Tobacco Use    Smoking status: Never Smoker    Smokeless tobacco: Never Used   Vaping Use    Vaping Use: Never used   Substance and Sexual Activity    Alcohol use: Yes     Comment: OCC.  Drug use: No    Sexual activity: Not on file   Other Topics Concern    Not on file   Social History Narrative    Not on file     Social Determinants of Health     Financial Resource Strain:     Difficulty of Paying Living Expenses:    Food Insecurity:     Worried About Running Out of Food in the Last Year:     [address] in the Last Year:    Transportation Needs:     Lack of Transportation (Medical):  Lack of Transportation (Non-Medical):    Physical Activity:     Days of Exercise per Week:     Minutes of Exercise per Session:    Stress:     Feeling of Stress :    Social Connections:     Frequency of Communication with Friends and Family:     Frequency of Social Gatherings with Friends and Family:     Attends Buddhist Services:     Active Member of Clubs or Organizations:     Attends Club or Organization Meetings:     Marital Status:    Intimate Partner Violence:     Fear of Current or Ex-Partner:     Emotionally Abused:     Physically Abused:     Sexually Abused:            ROS:  [x] All negative/unchanged except if checked.  Explain positive(checked items) below:  [] Constitutional  [] Eyes  [] Ear/Nose/Mouth/Throat  [] Respiratory  [] CV  [] GI  []   [] Musculoskeletal  [] Skin/Breast  [] Neurological  [] Endocrine  [] Heme/Lymph  [] Allergic/Immunologic    Explanation:     MEDICATIONS:    Current Facility-Administered Medications:     QUEtiapine (SEROQUEL XR) extended release tablet 150 mg, 150 mg, Oral, Nightly, Andrew Chakraborty MD, 150 mg at 10/17/21 2050    levothyroxine (SYNTHROID) tablet 112 mcg, 112 mcg, Oral, Daily, Lucina Randle, APRN - CNP, 112 mcg at 10/18/21 0016    ALPRAZolam (XANAX) tablet 0.5 mg, 0.5 mg, Oral, 4x Daily, Andrew Chakraborty MD, 0.5 mg at 10/18/21 0904   amLODIPine (NORVASC) tablet 10 mg, 10 mg, Oral, Daily, Fernando Macias MD, 10 mg at 10/18/21 0904    butalbital-APAP-caffeine -40 MG per capsule 1 capsule, 1 capsule, Oral, Q6H PRN, Fernando Macias MD, 1 capsule at 10/16/21 1732    DULoxetine (CYMBALTA) extended release capsule 60 mg, 60 mg, Oral, Daily, Fernando Macias MD, 60 mg at 10/18/21 0904    Vitamin D (CHOLECALCIFEROL) tablet 2,000 Units, 2,000 Units, Oral, Daily, Fernando Macias MD, 2,000 Units at 10/18/21 0904    hydroCHLOROthiazide (HYDRODIURIL) tablet 25 mg, 25 mg, Oral, Daily, Fernando Macias MD, 25 mg at 10/18/21 0904    meclizine (ANTIVERT) tablet 25 mg, 25 mg, Oral, BID, Fernando Macias MD, 25 mg at 10/17/21 2051    therapeutic multivitamin-minerals 1 tablet, 1 tablet, Oral, Daily, Fernando Macias MD, 1 tablet at 10/18/21 0904    pantoprazole (PROTONIX) tablet 20 mg, 20 mg, Oral, QAM AC, Fernando Macias MD, 20 mg at 10/18/21 9480    atorvastatin (LIPITOR) tablet 20 mg, 20 mg, Oral, Daily, Fernando Macias MD, 20 mg at 10/18/21 0904    tiZANidine (ZANAFLEX) tablet 4 mg, 4 mg, Oral, BID, Fernando Macias MD, 4 mg at 10/17/21 2051    acetaminophen (TYLENOL) tablet 650 mg, 650 mg, Oral, Q4H PRN, Fernando Macias MD, 650 mg at 10/17/21 1752    magnesium hydroxide (MILK OF MAGNESIA) 400 MG/5ML suspension 30 mL, 30 mL, Oral, Daily PRN, Fernando Macias MD    aluminum & magnesium hydroxide-simethicone (MAALOX) 200-200-20 MG/5ML suspension 30 mL, 30 mL, Oral, PRN, Fernando Macias MD    haloperidol (HALDOL) tablet 5 mg, 5 mg, Oral, Q6H PRN **OR** haloperidol lactate (HALDOL) injection 5 mg, 5 mg, IntraMUSCular, Q6H PRN, Fernando Macias MD    benztropine mesylate (COGENTIN) injection 2 mg, 2 mg, IntraMUSCular, BID PRN, Fernando Macias MD    traZODone (DESYREL) tablet 50 mg, 50 mg, Oral, Nightly PRN, Fernando Macias MD    hydrOXYzine (VISTARIL) capsule 50 mg, 50 mg, Oral, Q6H PRN, 50 mg at 10/17/21 1902 **OR** hydrOXYzine (VISTARIL) injection 50 mg, 50 mg, IntraMUSCular, Q6H PRN, Lupis Martin MD      Examination:  BP (!) 137/92   Pulse 65   Temp 98.2 °F (36.8 °C)   Resp 18   Ht 5' 2\" (1.575 m)   Wt 205 lb (93 kg)   LMP 11/19/2013   SpO2 100%   BMI 37.49 kg/m²   Gait - steady  Medication side effects(SE): none reported    Mental Status Examination:    Level of consciousness:  within normal limits   Appearance:  fair grooming and fair hygiene  Behavior/Motor:  psychomotor retardation  Attitude toward examiner:  cooperative and fair eye contact  Speech:  spontaneous, normal rate, normal volume and slow   Mood: decreased range, depressed and dysthymic  Affect:  mood congruent, depressed, sad  Thought processes:  coherent and slow   Thought content:  Homocidal ideation denies  Suicidal Ideation:  denies suicidal ideation  Delusions:  no evidence of delusions  Perceptual Disturbance:  denies any perceptual disturbance  Cognition:  oriented to person, place, and time   Concentration distractible  Insight fair   Judgement fair     ASSESSMENT:   Patient symptoms are:  [] Well controlled  [] Improving  [] Worsening  [] No change      Diagnosis:   Major depressive disorder; recurrent and severe  Generalized anxiety disorder  PTSD    LABS:    No results for input(s): WBC, HGB, PLT in the last 72 hours. No results for input(s): NA, K, CL, CO2, BUN, CREATININE, GLUCOSE in the last 72 hours. No results for input(s): BILITOT, ALKPHOS, AST, ALT in the last 72 hours.   Lab Results   Component Value Date    LABAMPH Neg 10/14/2021    BARBSCNU Neg 10/14/2021    LABBENZ Neg 10/14/2021    LABBENZ NotDTCD 09/14/2012    LABMETH Neg 10/14/2021    OPIATESCREENURINE Neg 10/14/2021    PHENCYCLIDINESCREENURINE Neg 10/14/2021    ETOH 92 10/14/2021     Lab Results   Component Value Date    TSH 2.010 10/14/2021     No results found for: LITHIUM  No results found for: VALPROATE, CBMZ      RISK ASSESSMENT:   Suicide risk: high    Treatment Plan:  Reviewed current Medications with the patient. Will continue current medications  Risks, benefits, side effects, drug-to-drug interactions and alternatives to treatment were discussed. Collateral information: pending  CD evaluation   Encourage patient to attend group and other milieu activities.   Discharge planning discussed with the patient and treatment team.    PSYCHOTHERAPY/COUNSELING:  [x] Therapeutic interview  [x] Supportive  [] CBT  [] Ongoing  [] Other    [x] Patient continues to need, on a daily basis, active treatment furnished directly by or requiring the supervision of inpatient psychiatric personnel      Anticipated Length of stay:            Electronically signed by Yael Jimenez MD on 10/18/2021 at 10:45 AM

## 2021-10-18 NOTE — GROUP NOTE
Group Therapy Note    Date: 10/18/2021    Group Start Time: 1100  Group End Time: 1200  Group Topic: Recreational    [facility]    Frank Mesa        Group Therapy Note    Attendees: 10/17         Patient's Goal:  \"try and stay awake\"    Notes:  Patient actively participated in [employer] with group. Patient worked on a \"connect the [employer] while being social with peers. Offered support to upset peer. Status After Intervention:  Improved    Participation Level:  Active Listener and Interactive    Participation Quality: Sharing and Supportive      Speech:  normal      Thought Process/Content: Logical      Affective Functioning: Congruent      Mood: euthymic      Level of consciousness:  Alert and Attentive      Response to Learning: Progressing to goal      Endings: None Reported    Modes of Intervention: Activity      Discipline Responsible: [address], [employer]      Signature:  Frank Mesa

## 2021-10-18 NOTE — PROGRESS NOTES
Freestyle [employer] strips sent to [employer]. Patient did not attend group despite staff encouragement.   Electronically signed by Marylene Muss on 10/17/2021 at 10:37 PM

## 2021-10-18 NOTE — GROUP NOTE
Group Therapy Note    Date: 10/18/2021    Group Start Time: 6115  Group End Time: 1097  Group Topic: Healthy Living/Wellness    [facility]    Tatum Meo        Group Therapy Note    Attendees: 8/15         Patient's Goal:  To learn about ten keys to happier living    Notes:  Patient very minimally participated in group. Patient seen gazing with a blank stare often.      Status After Intervention:  Unchanged    Participation Level: Minimal    Participation Quality: Resistant      Speech:  quiet      Thought Process/Content: Unable to assess      Affective Functioning: Flat      Mood: dysphoric      Level of consciousness:  Alert and Inattentive      Response to Learning: Resistant      Endings: None Reported    Modes of Intervention: Education      Discipline Responsible: [address] [facility] [address]      Signature:  Rc Severino

## 2021-10-18 NOTE — PROGRESS NOTES
Patient refuses Zanaflex and meclizine stating \"the Jaylon Knows makes me too tired in the morning. I take the meclizine as needed at home. \"

## 2021-10-18 NOTE — PROGRESS NOTES
Patient did not attend group despite staff encouragement.   Electronically signed by Scott De Paz on 10/17/2021 at 8:16 PM

## 2021-10-18 NOTE — PROGRESS NOTES
Patient comes to the  c/o 10/10 HA. Patient is given her [address]  Electronically signed by Warner Orr LPN on 12/29/7310 at 10:59 AM

## 2021-10-19 PROCEDURE — 6370000000 HC RX 637 (ALT 250 FOR IP): Performed by: NURSE PRACTITIONER

## 2021-10-19 PROCEDURE — 99232 SBSQ HOSP IP/OBS MODERATE 35: CPT | Performed by: PSYCHIATRY & NEUROLOGY

## 2021-10-19 PROCEDURE — 1240000000 HC EMOTIONAL WELLNESS R&B

## 2021-10-19 PROCEDURE — 6370000000 HC RX 637 (ALT 250 FOR IP): Performed by: PSYCHIATRY & NEUROLOGY

## 2021-10-19 RX ORDER — QUETIAPINE 200 MG/1
200 TABLET, FILM COATED, EXTENDED RELEASE ORAL NIGHTLY
Status: DISCONTINUED | OUTPATIENT
Start: 2021-10-19 | End: 2021-10-21 | Stop reason: HOSPADM

## 2021-10-19 RX ADMIN — ALPRAZOLAM 0.5 MG: 0.5 TABLET ORAL at 13:15

## 2021-10-19 RX ADMIN — AMLODIPINE BESYLATE 10 MG: 10 TABLET ORAL at 08:40

## 2021-10-19 RX ADMIN — MULTIPLE VITAMINS W/ MINERALS TAB 1 TABLET: TAB at 08:39

## 2021-10-19 RX ADMIN — Medication 2000 UNITS: at 08:39

## 2021-10-19 RX ADMIN — MAGNESIUM HYDROXIDE 30 ML: 400 SUSPENSION ORAL at 13:15

## 2021-10-19 RX ADMIN — ATORVASTATIN CALCIUM 20 MG: 20 TABLET, FILM COATED ORAL at 08:39

## 2021-10-19 RX ADMIN — LEVOTHYROXINE SODIUM 112 MCG: 112 TABLET ORAL at 06:31

## 2021-10-19 RX ADMIN — ALPRAZOLAM 0.5 MG: 0.5 TABLET ORAL at 17:37

## 2021-10-19 RX ADMIN — QUETIAPINE FUMARATE 200 MG: 200 TABLET, EXTENDED RELEASE ORAL at 21:16

## 2021-10-19 RX ADMIN — ALPRAZOLAM 0.5 MG: 0.5 TABLET ORAL at 21:16

## 2021-10-19 RX ADMIN — ALPRAZOLAM 0.5 MG: 0.5 TABLET ORAL at 08:40

## 2021-10-19 RX ADMIN — TIZANIDINE 4 MG: 4 TABLET ORAL at 21:17

## 2021-10-19 RX ADMIN — MECLIZINE 25 MG: 12.5 TABLET ORAL at 21:16

## 2021-10-19 RX ADMIN — BUTALBITAL, ACETAMINOPHEN AND CAFFEINE 1 CAPSULE: 300; 40; 50 CAPSULE ORAL at 12:00

## 2021-10-19 RX ADMIN — HYDROCHLOROTHIAZIDE 25 MG: 12.5 TABLET ORAL at 08:40

## 2021-10-19 RX ADMIN — PANTOPRAZOLE SODIUM 20 MG: 20 TABLET, DELAYED RELEASE ORAL at 06:31

## 2021-10-19 RX ADMIN — DULOXETINE 60 MG: 60 CAPSULE, DELAYED RELEASE ORAL at 08:39

## 2021-10-19 ASSESSMENT — SLEEP AND FATIGUE QUESTIONNAIRES
AVERAGE NUMBER OF SLEEP HOURS: 7
DO YOU HAVE DIFFICULTY SLEEPING: NO
SLEEP PATTERN: NORMAL
DO YOU USE A SLEEP AID: NO

## 2021-10-19 ASSESSMENT — PAIN SCALES - GENERAL: PAINLEVEL_OUTOF10: 4

## 2021-10-19 NOTE — PROGRESS NOTES
Patient did not attend group despite staff encouragement.   Electronically signed by Steven Banks on 10/19/2021 at 5:51 PM

## 2021-10-19 NOTE — GROUP NOTE
Group Therapy Note    Date: 10/18/2021    Group Start Time: 2005  Group End Time: 2025  Group Topic: Wrap-Up    [facility]    Aysha Wen RN    To participate in wrap up group and share whether or not their goal for the day was met. Group Therapy Note    Attendees: 9/17         Patient's Goal: To stay awake. Notes:  Pt states she thinks she met her goal today. Pt states another patient said something nice about her today which made this patient feel good. Status After Intervention:  Improved    Participation Level:  Active Listener and Interactive    Participation Quality: Appropriate, Attentive and Sharing      Speech:  normal      Thought Process/Content: Logical      Affective Functioning: Congruent      Mood: euthymic      Level of consciousness:  Alert and Oriented x4      Response to Learning: Capable of insight and Progressing to goal      Endings: None Reported    Modes of Intervention: Support      Discipline Responsible: Registered Nurse      Signature:  Aysha Wen RN

## 2021-10-19 NOTE — PROGRESS NOTES
Pt states depression is */10,anxiety is #10/10; Denies SI/HI/AVH. Receives Xanax 0.5mg po for anxiety. Pt states appetite is good,sleeps 5-6 hrs. per night. Pt does not attend groups. I s visible on unit in dayroom today.
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
## 2021-10-19 NOTE — GROUP NOTE
Group Therapy Note    Date: 10/18/2021    Group Start Time: 1000  Group End Time: 1050  Group Topic: Psychotherapy    [facility]    Nikky [facility]        Group Therapy Note    Attendees: 12         Patient's Goal:  ***    Notes:  ***    Status After Intervention:  {Status After Intervention:897379386}    Participation Level: {Participation Level:031831446}    Participation Quality: {[facility] PARTICIPATION QUALITY:103373401}      Speech:  {[facility] CD_SPEECH:18371}      Thought Process/Content: {Thought Process/Content:966120378}      Affective Functioning: {Affective Functionin}      Mood: {Mood:495839837}      Level of consciousness:  {Level of consciousness:745272734}      Response to Learnin [facility] Responses to Learnin}      Endings: {[facility] Endings:47007}    Modes of Intervention: {[facility] Modes of Intervention:408745400}      Discipline Responsible: {[facility] Multidisciplinary:356603701}      Signature:  Madeleine Strickland, [facility]

## 2021-10-19 NOTE — PROGRESS NOTES
BEHAVIORAL HEALTH FOLLOW-UP NOTE     10/19/2021     Patient was seen and examined in person, Chart reviewed   Patient's case discussed with staff/team    Chief Complaint: depressed mood, suicidal ideation    Interim History:     Patient report no change in her mood  Feel lethargic however she is pushing herself to attend groups  Ruminating about her grand daughter being so evil   Pt feel hopeless and worthless  Appeared tired with poor concentration  Still has passive SI- prefer to be not wake up from sleep  Appetite:   [] Normal/Unchanged  [] Increased  [] Decreased      Sleep:       [] Normal/Unchanged  [x] Fair       [] Poor              Energy:    [] Normal/Unchanged  [] Increased  [x] Decreased        SI [x] Present  [] Absent    HI  []Present  [x] Absent     Aggression:  [] yes  [x] no    Patient is [] able  [x] unable to CONTRACT FOR SAFETY     PAST MEDICAL/PSYCHIATRIC HISTORY:   Past Medical History:   Diagnosis Date    Anemia     C. difficile colitis 01/2013    Chronic fatigue syndrome     Depression     [place]    [facility] mellitus [facility] [address]     Diverticulitis large intestine 2001    Fibromyalgia     Fibromyalgia 03/24/2015    GERD (gastroesophageal reflux disease)     HTN (hypertension)     Hyperlipidemia     Hypothyroidism     Palpitations     [facility]    Following GYN procedure    Vitamin D deficiency        FAMILY/SOCIAL HISTORY:  Family History   Problem Relation Age of Onset    COPD Mother     Lung Cancer Father     COPD Maternal Grandmother     Cancer Maternal Grandfather         COLON    Colon Cancer Paternal Grandfather     Cancer Paternal Aunt         LUNG    Cancer Other         BLOOD CANCER- UNSPECIFIED    Coronary Art Dis Maternal Aunt     Other Maternal Aunt         Brain aneurysm     Social History     Socioeconomic History    Marital status:      Spouse name: Not on file    Number of children: Not on file    Years of education: Not on file  Highest education level: Not on file   Occupational History    Not on file   Tobacco Use    Smoking status: Never Smoker    Smokeless tobacco: Never Used   Vaping Use    Vaping Use: Never used   Substance and Sexual Activity    Alcohol use: Yes     Comment: OCC.  Drug use: No    Sexual activity: Not on file   Other Topics Concern    Not on file   Social History Narrative    Not on file     Social Determinants of Health     Financial Resource Strain:     Difficulty of Paying Living Expenses:    Food Insecurity:     Worried About Running Out of Food in the Last Year:     [address] in the Last Year:    Transportation Needs:     Lack of Transportation (Medical):  Lack of Transportation (Non-Medical):    Physical Activity:     Days of Exercise per Week:     Minutes of Exercise per Session:    Stress:     Feeling of Stress :    Social Connections:     Frequency of Communication with Friends and Family:     Frequency of Social Gatherings with Friends and Family:     Attends Church Services:     Active Member of Clubs or Organizations:     Attends Club or Organization Meetings:     Marital Status:    Intimate Partner Violence:     Fear of Current or Ex-Partner:     Emotionally Abused:     Physically Abused:     Sexually Abused:            ROS:  [x] All negative/unchanged except if checked.  Explain positive(checked items) below:  [] Constitutional  [] Eyes  [] Ear/Nose/Mouth/Throat  [] Respiratory  [] CV  [] GI  []   [] Musculoskeletal  [] Skin/Breast  [] Neurological  [] Endocrine  [] Heme/Lymph  [] Allergic/Immunologic    Explanation:     MEDICATIONS:    Current Facility-Administered Medications:     QUEtiapine (SEROQUEL XR) extended release tablet 150 mg, 150 mg, Oral, Nightly, Thais Dale MD, 150 mg at 10/18/21 2050    levothyroxine (SYNTHROID) tablet 112 mcg, 112 mcg, Oral, Daily, TYRA Lehman - CNP, 112 mcg at 10/19/21 0631    ALPRAZolam Ramiro Thompson) tablet 0.5 mg, 0.5 mg, Oral, 4x Daily, Jaswinder Yang MD, 0.5 mg at 10/19/21 1315    amLODIPine (NORVASC) tablet 10 mg, 10 mg, Oral, Daily, Jaswinder Yang MD, 10 mg at 10/19/21 0840    butalbital-APAP-caffeine -40 MG per capsule 1 capsule, 1 capsule, Oral, Q6H PRN, Jaswinder Yang MD, 1 capsule at 10/19/21 1200    DULoxetine (CYMBALTA) extended release capsule 60 mg, 60 mg, Oral, Daily, Jaswinder Yang MD, 60 mg at 10/19/21 0839    Vitamin D (CHOLECALCIFEROL) tablet 2,000 Units, 2,000 Units, Oral, Daily, Jaswinder Yang MD, 2,000 Units at 10/19/21 0839    hydroCHLOROthiazide (HYDRODIURIL) tablet 25 mg, 25 mg, Oral, Daily, Jaswinder Yang MD, 25 mg at 10/19/21 0840    meclizine (ANTIVERT) tablet 25 mg, 25 mg, Oral, BID, Jaswinder Yang MD, 25 mg at 10/18/21 2051    therapeutic multivitamin-minerals 1 tablet, 1 tablet, Oral, Daily, Jaswinder Yang MD, 1 tablet at 10/19/21 0839    pantoprazole (PROTONIX) tablet 20 mg, 20 mg, Oral, QAM AC, Jaswinder Yang MD, 20 mg at 10/19/21 0631    atorvastatin (LIPITOR) tablet 20 mg, 20 mg, Oral, Daily, Jaswinder Yang MD, 20 mg at 10/19/21 0839    tiZANidine (ZANAFLEX) tablet 4 mg, 4 mg, Oral, BID, Jaswinder Yang MD, 4 mg at 10/18/21 2051    acetaminophen (TYLENOL) tablet 650 mg, 650 mg, Oral, Q4H PRN, Jaswinder Yang MD, 650 mg at 10/17/21 1752    magnesium hydroxide (MILK OF MAGNESIA) 400 MG/5ML suspension 30 mL, 30 mL, Oral, Daily PRN, Jaswinder Yang MD, 30 mL at 10/19/21 1315    aluminum & magnesium hydroxide-simethicone (MAALOX) 200-200-20 MG/5ML suspension 30 mL, 30 mL, Oral, PRN, Jaswinder Yang MD    haloperidol (HALDOL) tablet 5 mg, 5 mg, Oral, Q6H PRN **OR** haloperidol lactate (HALDOL) injection 5 mg, 5 mg, IntraMUSCular, Q6H PRN, Jaswinder Yang MD    benztropine mesylate (COGENTIN) injection 2 mg, 2 mg, IntraMUSCular, BID PRN, Jaswinder Yang MD    traZODone (DESYREL) tablet 50 mg, 50 mg, Oral, Nightly PRN, Orlando Lindo Britt Smith MD    hydrOXYzine (VISTARIL) capsule 50 mg, 50 mg, Oral, Q6H PRN, 50 mg at 10/17/21 1752 **OR** hydrOXYzine (VISTARIL) injection 50 mg, 50 mg, IntraMUSCular, Q6H PRN, Iris Rodriguez MD      Examination:  /81   Pulse 65   Temp 97.9 °F (36.6 °C)   Resp 18   Ht 5' 2\" (1.575 m)   Wt 205 lb (93 kg)   LMP 11/19/2013   SpO2 96%   BMI 37.49 kg/m²   Gait - steady  Medication side effects(SE): none reported    Mental Status Examination:    Level of consciousness:  within normal limits   Appearance:  fair grooming and fair hygiene  Behavior/Motor:  psychomotor retardation  Attitude toward examiner:  cooperative and fair eye contact  Speech:  spontaneous, normal rate, normal volume and slow   Mood: decreased range, depressed and dysthymic  Affect:  mood congruent, depressed, sad  Thought processes:  coherent and slow   Thought content:  Homocidal ideation denies  Suicidal Ideation:  passive suicidal ideation  Delusions:  no evidence of delusions  Perceptual Disturbance:  denies any perceptual disturbance  Cognition:  oriented to person, place, and time   Concentration distractible  Insight fair   Judgement fair     ASSESSMENT:   Patient symptoms are:  [] Well controlled  [x] Improving  [] Worsening  [] No change      Diagnosis:   Major depressive disorder; recurrent and severe  Generalized anxiety disorder  PTSD    LABS:    No results for input(s): WBC, HGB, PLT in the last 72 hours. No results for input(s): NA, K, CL, CO2, BUN, CREATININE, GLUCOSE in the last 72 hours. No results for input(s): BILITOT, ALKPHOS, AST, ALT in the last 72 hours.   Lab Results   Component Value Date    LABAMPH Neg 10/14/2021    BARBSCNU Neg 10/14/2021    LABBENZ Neg 10/14/2021    LABBENZ NotDTCD 09/14/2012    LABMETH Neg 10/14/2021    OPIATESCREENURINE Neg 10/14/2021    PHENCYCLIDINESCREENURINE Neg 10/14/2021    ETOH 92 10/14/2021     Lab Results   Component Value Date    TSH 2.010 10/14/2021     No results found for: LITHIUM  No results found for: VALPROATE, CBMZ      RISK ASSESSMENT:   Suicide risk: high    Treatment Plan:  Reviewed current Medications with the patient. Will continue current medications  Increase seroquel to 200 mg at night    Risks, benefits, side effects, drug-to-drug interactions and alternatives to treatment were discussed. Collateral information: pending  CD evaluation   Encourage patient to attend group and other milieu activities.   Discharge planning discussed with the patient and treatment team.    PSYCHOTHERAPY/COUNSELING:  [x] Therapeutic interview  [x] Supportive  [] CBT  [] Ongoing  [] Other    [x] Patient continues to need, on a daily basis, active treatment furnished directly by or requiring the supervision of inpatient psychiatric personnel      Anticipated Length of stay: d/c by Thursday if stable            Electronically signed by Alok Dixon MD on 10/19/2021 at 3:06 PM

## 2021-10-19 NOTE — GROUP NOTE
Group Therapy Note    Date: 10/18/2021    Group Start Time: 1945  Group End Time: 2005  Group Topic: Recreational    [facility]    Dominga Estrada RN    To participate in a game of heads up. Group Therapy Note    Attendees: 10/17           Notes:  Pt attended recreation group and actively participated. Status After Intervention:  Improved    Participation Level:  Active Listener and Interactive    Participation Quality: Appropriate and Attentive      Speech:  normal      Thought Process/Content: Logical      Affective Functioning: Congruent      Mood: euthymic      Level of consciousness:  Alert and Oriented x4      Response to Learning: Capable of insight      Endings: None Reported    Modes of Intervention: Support and Socialization      Discipline Responsible: Registered Nurse      Signature:  Dominga Estrada RN

## 2021-10-19 NOTE — PROGRESS NOTES
Pt. declined to attend the 0900 community meeting, despite staff encouragement. Electronically signed by Javed Horne, [facility] on 10/19/2021 at 9:51 AM

## 2021-10-19 NOTE — GROUP NOTE
Group Therapy Note    Date: 10/19/2021    Group Start Time: 1000  Group End Time: 1050  Group Topic: Psychoeducation    [facility] Abrams        Group Therapy Note    Attendees: 13         Patient's Goal:  \"Stay positive\"    Notes:  Patient attended the 1000 skills group. Patient was attentive, she work diligently on her task and her affect has improved. Status After Intervention:  Improved    Participation Level:  Active Listener    Participation Quality: Appropriate      Speech:  normal      Thought Process/Content: Linear      Affective Functioning: Congruent      Mood: calm      Level of consciousness:  Alert      Response to Learning: Progressing to goal      Endings: None Reported    Modes of Intervention: Education, Socialization and Activity      Discipline Responsible: Psychoeducational Specialist      Signature:  Austin Abrams

## 2021-10-20 PROCEDURE — 1240000000 HC EMOTIONAL WELLNESS R&B

## 2021-10-20 PROCEDURE — 90833 PSYTX W PT W E/M 30 MIN: CPT | Performed by: PSYCHIATRY & NEUROLOGY

## 2021-10-20 PROCEDURE — 99232 SBSQ HOSP IP/OBS MODERATE 35: CPT | Performed by: PSYCHIATRY & NEUROLOGY

## 2021-10-20 PROCEDURE — 6370000000 HC RX 637 (ALT 250 FOR IP): Performed by: PSYCHIATRY & NEUROLOGY

## 2021-10-20 PROCEDURE — 6370000000 HC RX 637 (ALT 250 FOR IP): Performed by: NURSE PRACTITIONER

## 2021-10-20 RX ADMIN — ALPRAZOLAM 0.5 MG: 0.5 TABLET ORAL at 20:12

## 2021-10-20 RX ADMIN — AMLODIPINE BESYLATE 10 MG: 10 TABLET ORAL at 09:19

## 2021-10-20 RX ADMIN — HYDROCHLOROTHIAZIDE 25 MG: 12.5 TABLET ORAL at 09:19

## 2021-10-20 RX ADMIN — ALPRAZOLAM 0.5 MG: 0.5 TABLET ORAL at 17:07

## 2021-10-20 RX ADMIN — MECLIZINE 25 MG: 12.5 TABLET ORAL at 20:11

## 2021-10-20 RX ADMIN — Medication 2000 UNITS: at 09:16

## 2021-10-20 RX ADMIN — TIZANIDINE 4 MG: 4 TABLET ORAL at 20:12

## 2021-10-20 RX ADMIN — ALPRAZOLAM 0.5 MG: 0.5 TABLET ORAL at 12:47

## 2021-10-20 RX ADMIN — DULOXETINE 60 MG: 60 CAPSULE, DELAYED RELEASE ORAL at 09:16

## 2021-10-20 RX ADMIN — MULTIPLE VITAMINS W/ MINERALS TAB 1 TABLET: TAB at 09:15

## 2021-10-20 RX ADMIN — PANTOPRAZOLE SODIUM 20 MG: 20 TABLET, DELAYED RELEASE ORAL at 06:11

## 2021-10-20 RX ADMIN — ATORVASTATIN CALCIUM 20 MG: 20 TABLET, FILM COATED ORAL at 09:19

## 2021-10-20 RX ADMIN — LEVOTHYROXINE SODIUM 112 MCG: 112 TABLET ORAL at 06:11

## 2021-10-20 RX ADMIN — QUETIAPINE FUMARATE 200 MG: 200 TABLET, EXTENDED RELEASE ORAL at 20:11

## 2021-10-20 RX ADMIN — ALPRAZOLAM 0.5 MG: 0.5 TABLET ORAL at 09:16

## 2021-10-20 NOTE — PROGRESS NOTES
[facility] FOLLOW-UP NOTE     10/20/2021     Patient was seen and examined in person, Chart reviewed   Patient's case discussed with staff/team    Chief Complaint: depressed mood, suicidal ideation    Interim History:     Patient report feeling better  Attending groups  Denies active SI or HI  No hopeless feeling  Ruminating about her stressor  Appetite:   [x] Normal/Unchanged  [] Increased  [] Decreased      Sleep:       [] Normal/Unchanged  [x] Fair       [] Poor              Energy:    [x] Normal/Unchanged  [] Increased  [] Decreased        SI [] Present  [x] Absent    HI  []Present  [x] Absent     Aggression:  [] yes  [x] no    Patient is [x] able  [] unable to CONTRACT FOR SAFETY     PAST MEDICAL/PSYCHIATRIC HISTORY:   Past Medical History:   Diagnosis Date    Anemia     C. difficile colitis 01/2013    Chronic fatigue syndrome     Depression     [place]    Diabetes mellitus ([facility]     Diverticulitis large intestine 2001    Fibromyalgia     Fibromyalgia 03/24/2015    GERD (gastroesophageal reflux disease)     HTN (hypertension)     Hyperlipidemia     Hypothyroidism     Palpitations     Pulmonary embolus ([facility] 10/2012    Following GYN procedure    Vitamin D deficiency        FAMILY/SOCIAL HISTORY:  Family History   Problem Relation Age of Onset    COPD Mother     Lung Cancer Father     COPD Maternal Grandmother     Cancer Maternal Grandfather         COLON    Colon Cancer Paternal Grandfather     Cancer Paternal Aunt         LUNG    Cancer Other         BLOOD CANCER- UNSPECIFIED    Coronary Art Dis Maternal Aunt     Other Maternal Aunt         Brain aneurysm     Social History     Socioeconomic History    Marital status:      Spouse name: Not on file    Number of children: Not on file    Years of education: Not on file    Highest education level: Not on file   Occupational History    Not on file   Tobacco Use    Smoking status: Never Smoker    Smokeless tobacco: Never Used   Vaping Use    Vaping Use: Never used   Substance and Sexual Activity    Alcohol use: Yes     Comment: OCC.  Drug use: No    Sexual activity: Not on file   Other Topics Concern    Not on file   Social History Narrative    Not on file     Social Determinants of Health     Financial Resource Strain:     Difficulty of Paying Living Expenses:    Food Insecurity:     Worried About Running Out of Food in the Last Year:     [address] in the Last Year:    Transportation Needs:     Lack of Transportation (Medical):  Lack of Transportation (Non-Medical):    Physical Activity:     Days of Exercise per Week:     Minutes of Exercise per Session:    Stress:     Feeling of Stress :    Social Connections:     Frequency of Communication with Friends and Family:     Frequency of Social Gatherings with Friends and Family:     Attends [employer] Services:     Active Member of Clubs or Organizations:     Attends Club or Organization Meetings:     Marital Status:    Intimate Partner Violence:     Fear of Current or Ex-Partner:     Emotionally Abused:     Physically Abused:     Sexually Abused:            ROS:  [x] All negative/unchanged except if checked.  Explain positive(checked items) below:  [] Constitutional  [] Eyes  [] Ear/Nose/Mouth/Throat  [] Respiratory  [] CV  [] GI  []   [] Musculoskeletal  [] Skin/Breast  [] Neurological  [] Endocrine  [] Heme/Lymph  [] Allergic/Immunologic    Explanation:     MEDICATIONS:    Current Facility-Administered Medications:     QUEtiapine (SEROQUEL XR) extended release tablet 200 mg, 200 mg, Oral, Nightly, Iris Rodriguez MD, 200 mg at 10/19/21 2116    levothyroxine (SYNTHROID) tablet 112 mcg, 112 mcg, Oral, Daily, Lucina Randle APRN - CNP, 112 mcg at 10/20/21 0611    ALPRAZolam (XANAX) tablet 0.5 mg, 0.5 mg, Oral, 4x Daily, Iris Rodriguez MD, 0.5 mg at 10/20/21 1707    amLODIPine (NORVASC) tablet 10 mg, 10 mg, Oral, Daily, Iris Rodriguez MD, 10 mg at 10/20/21 0919    butalbital-APAP-caffeine -40 MG per capsule 1 capsule, 1 capsule, Oral, Q6H PRN, Porter Abdi MD, 1 capsule at 10/19/21 1200    DULoxetine (CYMBALTA) extended release capsule 60 mg, 60 mg, Oral, Daily, Porter Abdi MD, 60 mg at 10/20/21 0916    Vitamin D (CHOLECALCIFEROL) tablet 2,000 Units, 2,000 Units, Oral, Daily, Porter Abdi MD, 2,000 Units at 10/20/21 0916    hydroCHLOROthiazide (HYDRODIURIL) tablet 25 mg, 25 mg, Oral, Daily, Porter Abid MD, 25 mg at 10/20/21 0919    meclizine (ANTIVERT) tablet 25 mg, 25 mg, Oral, BID, Porter Abdi MD, 25 mg at 10/19/21 2116    therapeutic multivitamin-minerals 1 tablet, 1 tablet, Oral, Daily, Porter Abdi MD, 1 tablet at 10/20/21 0915    pantoprazole (PROTONIX) tablet 20 mg, 20 mg, Oral, QAM AC, Porter Abdi MD, 20 mg at 10/20/21 0611    atorvastatin (LIPITOR) tablet 20 mg, 20 mg, Oral, Daily, Porter Abdi MD, 20 mg at 10/20/21 0919    tiZANidine (ZANAFLEX) tablet 4 mg, 4 mg, Oral, BID, Porter Abdi MD, 4 mg at 10/19/21 2117    acetaminophen (TYLENOL) tablet 650 mg, 650 mg, Oral, Q4H PRN, Porter Abdi MD, 650 mg at 10/17/21 1752    magnesium hydroxide (MILK OF MAGNESIA) 400 MG/5ML suspension 30 mL, 30 mL, Oral, Daily PRN, Porter Abdi MD, 30 mL at 10/19/21 1315    aluminum & magnesium hydroxide-simethicone (MAALOX) 200-200-20 MG/5ML suspension 30 mL, 30 mL, Oral, PRN, Porter Abdi MD    haloperidol (HALDOL) tablet 5 mg, 5 mg, Oral, Q6H PRN **OR** haloperidol lactate (HALDOL) injection 5 mg, 5 mg, IntraMUSCular, Q6H PRN, Portre Abdi MD    benztropine mesylate (COGENTIN) injection 2 mg, 2 mg, IntraMUSCular, BID PRN, Porter Abdi MD    traZODone (DESYREL) tablet 50 mg, 50 mg, Oral, Nightly PRN, Porter Abdi MD    hydrOXYzine (VISTARIL) capsule 50 mg, 50 mg, Oral, Q6H PRN, 50 mg at 10/17/21 3152 **OR** hydrOXYzine (VISTARIL) injection 50 mg, 50 mg, IntraMUSCular, Q6H PRN, Renate Govea MD      Examination:  /84   Pulse 70   Temp 99.5 °F (37.5 °C)   Resp 18   Ht 5' 2\" (1.575 m)   Wt 205 lb (93 kg)   LMP 11/19/2013   SpO2 98%   BMI 37.49 kg/m²   Gait - steady  Medication side effects(SE): none reported    Mental Status Examination:    Level of consciousness:  within normal limits   Appearance:  fair grooming and fair hygiene  Behavior/Motor:  No psychomotor retardation  Attitude toward examiner:  cooperative and fair eye contact  Speech:  spontaneous, normal rate, normal volume and slow   Mood: less depressed  Affect:  mood congruent,   Thought processes:  coherent   Thought content:  Homocidal ideation denies  Suicidal Ideation:  denies suicidal ideation  Delusions:  no evidence of delusions  Perceptual Disturbance:  denies any perceptual disturbance  Cognition:  oriented to person, place, and time   Concentration distractible  Insight fair   Judgement fair     ASSESSMENT:   Patient symptoms are:  [] Well controlled  [x] Improving  [] Worsening  [] No change      Diagnosis:   Major depressive disorder; recurrent and severe  Generalized anxiety disorder  PTSD    LABS:    No results for input(s): WBC, HGB, PLT in the last 72 hours. No results for input(s): NA, K, CL, CO2, BUN, CREATININE, GLUCOSE in the last 72 hours. No results for input(s): BILITOT, ALKPHOS, AST, ALT in the last 72 hours. Lab Results   Component Value Date    LABAMPH Neg 10/14/2021    BARBSCNU Neg 10/14/2021    LABBENZ Neg 10/14/2021    LABBENZ NotDTCD 09/14/2012    LABMETH Neg 10/14/2021    OPIATESCREENURINE Neg 10/14/2021    PHENCYCLIDINESCREENURINE Neg 10/14/2021    ETOH 92 10/14/2021     Lab Results   Component Value Date    TSH 2.010 10/14/2021     No results found for: LITHIUM  No results found for: VALPROATE, CBMZ      RISK ASSESSMENT:     Treatment Plan:  Reviewed current Medications with the patient.  Will continue current medications  Risks, benefits, side effects, drug-to-drug interactions and alternatives to treatment were discussed. Collateral information: pending  CD evaluation   Encourage patient to attend group and other milieu activities.   Discharge planning discussed with the patient and treatment team.    PSYCHOTHERAPY/COUNSELING:  [x] Therapeutic interview  [x] Supportive  [] CBT  [] Ongoing  [] Other  Patient was seen 1:1 for 20 minutes, other than E&M time spent, focusing on      - coping skills techniques     - Anxiety management techniques discussed including deep breathing exercise and PMR     - discussing patients strength and weakness      - Focusing on negative cognition and maladaptive thoughts, which is feeding and maintaining the depression symptoms     - DBT techniques explained to the patient    [x] Patient continues to need, on a daily basis, active treatment furnished directly by or requiring the supervision of inpatient psychiatric personnel      Anticipated Length of stay: d/c by Thursday if stable            Electronically signed by Jayme Rizvi MD on 10/20/2021 at 5:58 PM

## 2021-10-20 NOTE — CARE COORDINATION
Pt calm and cooperative. Flat and sad affect and avoids eye contact. Soft spoken. Pt denies all.  8/10 depression and 10/10 anxiety, \"but I'm always anxious. \"  Pt slept in in the morning and reports good sleep and appetite.

## 2021-10-20 NOTE — PROGRESS NOTES
Patient did not attend group despite staff encouragement.   Electronically signed by Mary Posey on 10/20/2021 at 5:26 PM

## 2021-10-20 NOTE — CARE COORDINATION
FAMILY COLLATERAL NOTE    Family/Support Name: Sissy Tobias  Contact #: [phone]  Relationship to Pt: mother      Placed call to above. Response:   called number provided by patient. When answered, there was a message stating the voicemail box was full. As result, no collateral could be completed.          NEHA Bernabe

## 2021-10-20 NOTE — CARE COORDINATION
Patient did not attend group despite staff encouragement.   .Electronically signed by Ranelle Felty, [address] on 10/20/2021 at 3:39 PM

## 2021-10-20 NOTE — PROGRESS NOTES
Patient did not attend group despite staff encouragement.   Electronically signed by Polina Pollock on 10/19/2021 at 9:52 PM

## 2021-10-20 NOTE — PROGRESS NOTES
Pt. declined to attend the 0900 community meeting, despite staff encouragement. Electronically signed by Rosio Herman, [address] on 10/20/2021 at 10:01 AM

## 2021-10-20 NOTE — PROGRESS NOTES
Pt states depression is #7/10,anxiety is #10/10. Pt receives Xanax 0.5mg po for anxiety 4X qd. Denies SI/HI/AVH. Pt states she sleeps approx. 7hrs per night. Appetite is good. Is visible on the unit in dayroom in the afternoon/evening.

## 2021-10-20 NOTE — GROUP NOTE
Group Therapy Note    Date: 10/19/2021    Group Start Time: 5417  Group End Time: 9661  Group Topic: Recreational    [facility]    Anne Marie Hernandez        Group Therapy Note    Attendees: 10/20         Patient's Goal:  To participate in [employer]    Notes:  Patient actively participated in group. Patient was social with select peers.     Status After Intervention:  Improved    Participation Level: Interactive    Participation Quality: Appropriate      Speech:  normal      Thought Process/Content: Logical      Affective Functioning: Flat      Mood: euthymic      Level of consciousness:  Alert      Response to Learning: Progressing to goal      Endings: None Reported    Modes of Intervention: Activity      Discipline Responsible: [address] [email or web address]      Signature:  Anne Marie Hernandez

## 2021-10-20 NOTE — GROUP NOTE
Group Therapy Note    Date: 10/20/2021    Group Start Time: 1430  Group End Time: 1500  Group Topic: Healthy Living/Wellness    [facility] CHING Patel RN        Group Therapy Note    Attendees: 17/20         Patient's Goal:  Discuss self esteem and its importance. Notes:  Interactive    Status After Intervention:  Unchanged    Participation Level:  Active Listener and Interactive    Participation Quality: Appropriate, Attentive and Sharing      Speech:  normal      Thought Process/Content: Logical  Linear      Affective Functioning: Congruent      Mood: euthymic      Level of consciousness:  Alert and Oriented x4      Response to Learning: Able to verbalize current knowledge/experience and Able to verbalize/acknowledge new learning      Endings: None Reported    Modes of Intervention: Education, Support and Socialization      Discipline Responsible: Registered Nurse      Signature:  Agustin Patel RN

## 2021-10-20 NOTE — GROUP NOTE
Group Therapy Note    Date: 10/20/2021    Group Start Time: 1000  Group End Time: 1050  Group Topic: Psychoeducation    [facility]    Yvonne Bar        Group Therapy Note    Attendees: 9         Patient's Goal:  \"To feel better\"     Notes:  Patient attended the 1000 skills group. Patient was relax, more talkative, shared openly and participated well in group. Status After Intervention:  Improved    Participation Level:  Active Listener    Participation Quality: Appropriate      Speech:  normal      Thought Process/Content: Linear      Affective Functioning: Congruent      Mood: calm      Level of consciousness:  Alert      Response to Learning: Able to retain information      Endings: None Reported    Modes of Intervention: Education, Socialization and Activity      Discipline Responsible: Psychoeducational Specialist      Signature:  Yvonne Bar

## 2021-10-20 NOTE — PROGRESS NOTES
Pt refuses zanaflex and meclizine in am due to making her tired and taking the meclizine only prn at home.  Electronically signed by Israel Vora LPN on 87/54/3271 at 9:21 AM

## 2021-10-21 VITALS
WEIGHT: 205 LBS | BODY MASS INDEX: 37.73 KG/M2 | HEART RATE: 68 BPM | TEMPERATURE: 97.9 F | RESPIRATION RATE: 18 BRPM | OXYGEN SATURATION: 98 % | HEIGHT: 62 IN | DIASTOLIC BLOOD PRESSURE: 88 MMHG | SYSTOLIC BLOOD PRESSURE: 115 MMHG

## 2021-10-21 PROCEDURE — 6370000000 HC RX 637 (ALT 250 FOR IP): Performed by: NURSE PRACTITIONER

## 2021-10-21 PROCEDURE — 99239 HOSP IP/OBS DSCHRG MGMT >30: CPT | Performed by: PSYCHIATRY & NEUROLOGY

## 2021-10-21 PROCEDURE — 6370000000 HC RX 637 (ALT 250 FOR IP): Performed by: PSYCHIATRY & NEUROLOGY

## 2021-10-21 RX ORDER — QUETIAPINE 200 MG/1
200 TABLET, FILM COATED, EXTENDED RELEASE ORAL NIGHTLY
Qty: 15 TABLET | Refills: 3 | Status: SHIPPED | OUTPATIENT
Start: 2021-10-21

## 2021-10-21 RX ADMIN — ALPRAZOLAM 0.5 MG: 0.5 TABLET ORAL at 09:28

## 2021-10-21 RX ADMIN — ATORVASTATIN CALCIUM 20 MG: 20 TABLET, FILM COATED ORAL at 09:28

## 2021-10-21 RX ADMIN — BUTALBITAL, ACETAMINOPHEN AND CAFFEINE 1 CAPSULE: 300; 40; 50 CAPSULE ORAL at 12:03

## 2021-10-21 RX ADMIN — PANTOPRAZOLE SODIUM 20 MG: 20 TABLET, DELAYED RELEASE ORAL at 06:14

## 2021-10-21 RX ADMIN — ALPRAZOLAM 0.5 MG: 0.5 TABLET ORAL at 13:17

## 2021-10-21 RX ADMIN — MULTIPLE VITAMINS W/ MINERALS TAB 1 TABLET: TAB at 09:28

## 2021-10-21 RX ADMIN — HYDROCHLOROTHIAZIDE 25 MG: 12.5 TABLET ORAL at 09:28

## 2021-10-21 RX ADMIN — Medication 2000 UNITS: at 09:28

## 2021-10-21 RX ADMIN — LEVOTHYROXINE SODIUM 112 MCG: 112 TABLET ORAL at 06:13

## 2021-10-21 RX ADMIN — DULOXETINE 60 MG: 60 CAPSULE, DELAYED RELEASE ORAL at 09:28

## 2021-10-21 RX ADMIN — AMLODIPINE BESYLATE 10 MG: 10 TABLET ORAL at 09:28

## 2021-10-21 ASSESSMENT — SLEEP AND FATIGUE QUESTIONNAIRES
AVERAGE NUMBER OF SLEEP HOURS: 7
SLEEP PATTERN: NORMAL
DO YOU USE A SLEEP AID: NO
DO YOU HAVE DIFFICULTY SLEEPING: NO

## 2021-10-21 ASSESSMENT — PAIN SCALES - GENERAL: PAINLEVEL_OUTOF10: 4

## 2021-10-21 NOTE — GROUP NOTE
Group Therapy Note    Date: 10/21/2021    Group Start Time: 1000  Group End Time: 1050  Group Topic: Psychoeducation    [facility]        Group Therapy Note    Attendees: 9         Patient's Goal:  \"To go home, take my meds and not drink so much\"    Notes:  Patient attended the 1000 skills group. Patient was relax, attentive and participated well in group. Status After Intervention:  Improved    Participation Level:  Active Listener    Participation Quality: Appropriate      Speech:  normal      Thought Process/Content: Linear      Affective Functioning: Congruent      Mood: calm      Level of consciousness:  Alert      Response to Learning: Able to retain information      Endings: None Reported    Modes of Intervention: Education, Socialization and Activity      Discipline Responsible: Psychoeducational Specialist      Signature:  Louie Osuna

## 2021-10-21 NOTE — CARE COORDINATION
Discharge instructions reviewed verbally and in writing including f/u appointments. Patient verbalizes understanding and signed as such. All belongings returned for discharge. Patient denies SI, HI, A/V hallucinations, mood is stable.    Discharged with family for transport home None

## 2021-10-21 NOTE — GROUP NOTE
Group Therapy Note    Date: 10/20/2021    Group Start Time: 2045  Group End Time: 2100  Group Topic: Wrap-Up    [facility]    Mary Posey        Group Therapy Note    Attendees: 10/20         Patient's Goal:  \"to stay awake\"    Notes:  Patient reported meeting their goal for the day. Patient is looking forward to discharge tomorrow.     Status After Intervention:  Unchanged    Participation Level: Interactive    Participation Quality: Appropriate, Attentive and Sharing      Speech:  normal      Thought Process/Content: Logical      Affective Functioning: Congruent      Mood: euthymic      Level of consciousness:  Alert and Attentive      Response to Learning: Progressing to goal      Endings: None Reported    Modes of Intervention: Support      Discipline Responsible: Explain My Surgery      Signature:  Mary Posey

## 2021-10-21 NOTE — PROGRESS NOTES
Pt. declined to attend the 0900 community meeting, despite staff encouragement. Electronically signed by Jennifer Riggs, [address] on 10/21/2021 at 12:24 PM
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
## 2021-10-21 NOTE — PROGRESS NOTES
Patient did not attend group despite staff encouragement.   Electronically signed by Griselda Kee on 10/20/2021 at 9:09 PM

## 2021-11-04 ENCOUNTER — HOSPITAL ENCOUNTER (EMERGENCY)
Age: 56
Discharge: HOME | End: 2021-11-04
Payer: COMMERCIAL

## 2021-11-04 VITALS
OXYGEN SATURATION: 99 % | HEIGHT: 62 IN | BODY MASS INDEX: 37.17 KG/M2 | DIASTOLIC BLOOD PRESSURE: 103 MMHG | WEIGHT: 202 LBS | RESPIRATION RATE: 16 BRPM | SYSTOLIC BLOOD PRESSURE: 201 MMHG | HEART RATE: 70 BPM | TEMPERATURE: 98.5 F

## 2021-11-04 DIAGNOSIS — K04.7 DENTAL INFECTION: Primary | ICD-10-CM

## 2021-11-04 PROCEDURE — 6370000000 HC RX 637 (ALT 250 FOR IP): Performed by: PHYSICIAN ASSISTANT

## 2021-11-04 PROCEDURE — 99283 EMERGENCY DEPT VISIT LOW MDM: CPT

## 2021-11-04 RX ORDER — IBUPROFEN 800 MG/1
800 TABLET ORAL EVERY 8 HOURS PRN
Qty: 20 TABLET | Refills: 0 | Status: SHIPPED | OUTPATIENT
Start: 2021-11-04

## 2021-11-04 RX ORDER — HYDROCODONE BITARTRATE AND ACETAMINOPHEN 5; 325 MG/1; MG/1
1 TABLET ORAL EVERY 6 HOURS PRN
Qty: 10 TABLET | Refills: 0 | Status: SHIPPED | OUTPATIENT
Start: 2021-11-04 | End: 2021-11-07

## 2021-11-04 RX ORDER — HYDROCODONE BITARTRATE AND ACETAMINOPHEN 5; 325 MG/1; MG/1
1 TABLET ORAL ONCE
Status: COMPLETED | OUTPATIENT
Start: 2021-11-04 | End: 2021-11-04

## 2021-11-04 RX ORDER — CLINDAMYCIN HYDROCHLORIDE 150 MG/1
300 CAPSULE ORAL ONCE
Status: COMPLETED | OUTPATIENT
Start: 2021-11-04 | End: 2021-11-04

## 2021-11-04 RX ORDER — LIDOCAINE HYDROCHLORIDE 20 MG/ML
10 SOLUTION OROPHARYNGEAL PRN
Qty: 100 ML | Refills: 0 | Status: SHIPPED | OUTPATIENT
Start: 2021-11-04

## 2021-11-04 RX ORDER — CLINDAMYCIN HYDROCHLORIDE 300 MG/1
300 CAPSULE ORAL 3 TIMES DAILY
Qty: 30 CAPSULE | Refills: 0 | Status: SHIPPED | OUTPATIENT
Start: 2021-11-04 | End: 2021-11-14

## 2021-11-04 RX ADMIN — CLINDAMYCIN HYDROCHLORIDE 300 MG: 150 CAPSULE ORAL at 16:54

## 2021-11-04 RX ADMIN — HYDROCODONE BITARTRATE AND ACETAMINOPHEN 1 TABLET: 5; 325 TABLET ORAL at 16:54

## 2021-11-04 ASSESSMENT — PAIN DESCRIPTION - DESCRIPTORS: DESCRIPTORS: ACHING

## 2021-11-04 ASSESSMENT — PAIN DESCRIPTION - PAIN TYPE: TYPE: ACUTE PAIN

## 2021-11-04 ASSESSMENT — ENCOUNTER SYMPTOMS
SHORTNESS OF BREATH: 0
TROUBLE SWALLOWING: 0
COLOR CHANGE: 0
APNEA: 0
EYE PAIN: 0
ALLERGIC/IMMUNOLOGIC NEGATIVE: 1
ABDOMINAL PAIN: 0

## 2021-11-04 ASSESSMENT — PAIN DESCRIPTION - LOCATION: LOCATION: TEETH

## 2021-11-04 ASSESSMENT — PAIN DESCRIPTION - FREQUENCY: FREQUENCY: CONTINUOUS

## 2021-11-04 ASSESSMENT — PAIN DESCRIPTION - ORIENTATION: ORIENTATION: RIGHT

## 2021-11-04 ASSESSMENT — PAIN SCALES - GENERAL
PAINLEVEL_OUTOF10: 10
PAINLEVEL_OUTOF10: 10

## 2021-11-04 NOTE — ED NOTES
Discharge instructions given. Verbalized understanding. Denies further questions or concerns. A&Ox4. Ambulates from ED with steady gait.       Ernestine Wong RN  11/04/21 6102

## 2021-11-04 NOTE — ED PROVIDER NOTES
[address] [facility] ED  eMERGENCYdEPARTMENT eNCOUnter      Pt Name: Ruben Harada  MRN: [id number]  [place] 1965  Date of evaluation: 11/4/2021  Provider:Jono Lyles PA-C    CHIEF COMPLAINT       Chief Complaint   Patient presents with    Dental Pain     PT C/O RIGHT SIDE DENTAL PAIN         HISTORY OF PRESENT ILLNESS  (Location/Symptom, Timing/Onset, Context/Setting, Quality, Duration, Modifying Factors, Severity.)   Ruben Harada is a 55 y.o. female who presents to the emergency department with complaints of right maxillary dental pain. Patient states that she was in an accident prior and had caps done on her teeth. Patient believes that 1 of these capsules become infected. Patient tried calling the dentist but the dentist states that he will not see her until she pays her $500 bill. Patient states that she saw another dentist who cannot get her until Monday. Patient complains of 10 out of 10 pain which is worse by eating or drinking anything whether be hot or cold. No sore throats or difficulty swallowing. HPI    Nursing Notes were reviewed and I agree. REVIEW OF SYSTEMS    (2-9 systems for level 4, 10 or more for level 5)     Review of Systems   Constitutional: Negative for diaphoresis and fever. HENT: Positive for dental problem. Negative for hearing loss and trouble swallowing. Eyes: Negative for pain. Respiratory: Negative for apnea and shortness of breath. Cardiovascular: Negative for chest pain. Gastrointestinal: Negative for abdominal pain. Endocrine: Negative. Genitourinary: Negative for hematuria. Musculoskeletal: Negative for neck pain and neck stiffness. Skin: Negative for color change. Allergic/Immunologic: Negative. Neurological: Negative for dizziness and numbness. Hematological: Negative. Psychiatric/Behavioral: Negative. All other systems reviewed and are negative.        Except as noted above the remainder of the review of systems was reviewed and negative.        PAST MEDICAL HISTORY     Past Medical History:   Diagnosis Date    Anemia     C. difficile colitis 01/2013    Chronic fatigue syndrome     Depression     [employer]    Diabetes mellitus ([facility]     Diverticulitis large intestine 2001    Fibromyalgia     Fibromyalgia 03/24/2015    GERD (gastroesophageal reflux disease)     HTN (hypertension)     Hyperlipidemia     Hypothyroidism     Palpitations     Pulmonary embolus ([facility] [address] [facility] 10/2012    Following GYN procedure    Vitamin D deficiency          SURGICAL HISTORY       Past Surgical History:   Procedure Laterality Date    ANUS SURGERY  12/14/2011    anal fissure    COLON SURGERY      BIPOSY    COLONOSCOPY  8/2011    DILATION AND CURETTAGE OF UTERUS      MISCARRIAGE X2    HEMORRHOID SURGERY   8/24/10    EXTERIOR    LAPAROSCOPY      SIGMOIDOSCOPY  12/14/11    RESULTING IN FISSURECTOMY         CURRENT MEDICATIONS       Previous Medications    ALPRAZOLAM (XANAX) 0.5 MG TABLET    Take 0.5 mg by mouth 4 times daily    AMLODIPINE (NORVASC) 10 MG TABLET    Take 1 tablet by mouth daily    BIOTIN 1 MG CAPS    Take by mouth    BUTALBITAL-APAP-CAFFEINE -40 MG CAPS PER CAPSULE    Take 1 capsule by mouth every 6 hours as needed for Headaches    CHOLECALCIFEROL (VITAMIN D3) 2000 UNITS CAPS    Take 2,000 Units by mouth daily     COENZYME Q10 (COQ-10) 100 MG CAPS    Take by mouth    DULOXETINE (CYMBALTA) 60 MG EXTENDED RELEASE CAPSULE    Take 1 capsule by mouth daily    HYDROCHLOROTHIAZIDE (HYDRODIURIL) 25 MG TABLET    Take 1 tablet by mouth daily    LEVOTHYROXINE (SYNTHROID) 112 MCG TABLET    Take 112 mcg by mouth Daily    MECLIZINE (ANTIVERT) 25 MG TABLET    Take 25 mg by mouth 2 times daily    MULTIPLE VITAMINS-MINERALS (THERAPEUTIC MULTIVITAMIN-MINERALS) TABLET    Take 1 tablet by mouth daily    OMEPRAZOLE (PRILOSEC OTC) 20 MG TABLET    Take 1 tablet by mouth daily    QUETIAPINE (SEROQUEL XR) 200 MG EXTENDED RELEASE TABLET    Take 1 tablet by mouth nightly    SIMVASTATIN (ZOCOR) 20 MG TABLET    Take 20 mg by mouth nightly    TIZANIDINE (ZANAFLEX) 4 MG TABLET    TAKE 1 TABLET BY MOUTH TWICE A DAY    VITAMIN E 400 UNIT CAPSULE    Take 200 Units by mouth daily       ALLERGIES     Ciprofloxacin, Erythromycin, Ketorolac tromethamine, Mobic [meloxicam], Other, Pcn [penicillins], Pseudoephedrine hcl, Sudafed [pseudoephedrine hcl], Sympathomimetics, and Thorazine [chlorpromazine]    FAMILY HISTORY       Family History   Problem Relation Age of Onset    COPD Mother     Lung Cancer Father     COPD Maternal Grandmother     Cancer Maternal Grandfather         COLON    Colon Cancer Paternal Grandfather     Cancer Paternal Aunt         LUNG    Cancer Other         BLOOD CANCER- UNSPECIFIED    Coronary Art Dis Maternal Aunt     Other Maternal Aunt         Brain aneurysm          SOCIAL HISTORY       Social History     Socioeconomic History    Marital status:      Spouse name: None    Number of children: None    Years of education: None    Highest education level: None   Occupational History    None   Tobacco Use    Smoking status: Never Smoker    Smokeless tobacco: Never Used   Vaping Use    Vaping Use: Never used   Substance and Sexual Activity    Alcohol use: Yes     Comment: OCC.  Drug use: No    Sexual activity: None   Other Topics Concern    None   Social History Narrative    None     Social Determinants of Health     Financial Resource Strain:     Difficulty of Paying Living Expenses:    Food Insecurity:     Worried About Running Out of Food in the Last Year:     [address] in the Last Year:    Transportation Needs:     Lack of Transportation (Medical):      Lack of Transportation (Non-Medical):    Physical Activity:     Days of Exercise per Week:     Minutes of Exercise per Session:    Stress:     Feeling of Stress :    Social Connections:     Frequency of Communication with Friends and Family:     Frequency of Social Gatherings with Friends and Family:     Attends Mandaeism Services:     Active Member of Clubs or Organizations:     Attends Club or Organization Meetings:     Marital Status:    Intimate Partner Violence:     Fear of Current or Ex-Partner:     Emotionally Abused:     Physically Abused:     Sexually Abused:        SCREENINGS           PHYSICAL EXAM    (up to 7 forlevel 4, 8 or more for level 5)     ED Triage Vitals   BP Temp Temp Source Pulse Resp SpO2 Height Weight   11/04/21 1638 11/04/21 1636 11/04/21 1636 11/04/21 1636 11/04/21 1636 11/04/21 1636 11/04/21 1636 11/04/21 1636   (!) 201/103 98.5 °F (36.9 °C) Oral 70 16 99 % 5' 2\" (1.575 m) 202 lb (91.6 kg)       Physical Exam  Vitals and nursing note reviewed. Constitutional:       General: She is not in acute distress. Appearance: She is well-developed. She is not diaphoretic. HENT:      Head: Normocephalic and atraumatic. Mouth/Throat:      Dentition: Dental tenderness and gingival swelling present. Pharynx: No oropharyngeal exudate. Eyes:      General: No scleral icterus. Conjunctiva/sclera: Conjunctivae normal.      Pupils: Pupils are equal, round, and reactive to light. Neck:      Trachea: No tracheal deviation. Cardiovascular:      Rate and Rhythm: Normal rate. Heart sounds: Normal heart sounds. Pulmonary:      Effort: Pulmonary effort is normal. No respiratory distress. Breath sounds: Normal breath sounds. Abdominal:      General: Bowel sounds are normal. There is no distension. Palpations: Abdomen is soft. Musculoskeletal:         General: Normal range of motion. Cervical back: Normal range of motion and neck supple. Skin:     General: Skin is warm and dry. Findings: No erythema or rash. Neurological:      Mental Status: She is alert and oriented to person, place, and time. Cranial Nerves: No cranial nerve deficit. Motor: No abnormal muscle tone. Psychiatric:         Behavior: Behavior normal.         Thought Content: Thought content normal.         Judgment: Judgment normal.           DIAGNOSTIC RESULTS     RADIOLOGY:   Non-plain film images such as CT, Ultrasound and MRI are read by the radiologist. Plain radiographic images are visualized and preliminarilyinterpreted by Harish Del Rosario PA-C with the below findings:      Interpretation per the Radiologist below, if available at the time of this note:    No orders to display       LABS:  Labs Reviewed - No data to display    All other labs were within normal range or not returnedas of this dictation. EMERGENCYDEPARTMENT COURSE and DIFFERENTIAL DIAGNOSIS/MDM:   Vitals:    Vitals:    11/04/21 1636 11/04/21 1638   BP:  (!) 201/103   Pulse: 70    Resp: 16    Temp: 98.5 °F (36.9 °C)    TempSrc: Oral    SpO2: 99%    Weight: 202 lb (91.6 kg)    Height: 5' 2\" (1.575 m)        REASSESSMENT      Patient presented with right maxillary dental pain secondary to an nonabscessed infection. Patient was placed on clindamycin antibiotic which she states she has tolerated in the past, supportive therapy to control pain and advised to keep her follow-up appointment with dentistry as scheduled  MDM    PROCEDURES:    Procedures      FINAL IMPRESSION      1. Dental infection          DISPOSITION/PLAN   DISPOSITION Discharge - Pending Orders Complete 11/04/2021 04:44:22 PM      PATIENT REFERRED TO:  [facility]  [facility]  [phone]  Call in 2 days        DISCHARGE MEDICATIONS:  New Prescriptions    CLINDAMYCIN (CLEOCIN) 300 MG CAPSULE    Take 1 capsule by mouth 3 times daily for 10 days    HYDROCODONE-ACETAMINOPHEN (NORCO) 5-325 MG PER TABLET    Take 1 tablet by mouth every 6 hours as needed for Pain for up to 3 days. Intended supply: 3 days.  Take lowest dose possible to manage pain    IBUPROFEN (ADVIL;MOTRIN) 800 MG TABLET    Take 1 tablet by mouth every 8 hours as needed for Pain or Fever    LIDOCAINE VISCOUS HCL (XYLOCAINE) 2 % SOLN SOLUTION    Take 10 mLs by mouth as needed for Irritation or Dental Pain       (Please note that portions of this note were completed with a voice recognition program.  Efforts were made to edit the dictations but occasionally words are mis-transcribed.)    KRYSTA Peña PA-C  11/04/21 0980

## 2021-12-15 PROCEDURE — 99284 EMERGENCY DEPT VISIT MOD MDM: CPT

## 2021-12-15 PROCEDURE — 96372 THER/PROPH/DIAG INJ SC/IM: CPT

## 2021-12-16 ENCOUNTER — APPOINTMENT (OUTPATIENT)
Dept: GENERAL RADIOLOGY | Age: 56
End: 2021-12-16
Payer: COMMERCIAL

## 2021-12-16 ENCOUNTER — HOSPITAL ENCOUNTER (EMERGENCY)
Age: 56
Discharge: HOME | End: 2021-12-16
Attending: EMERGENCY MEDICINE
Payer: COMMERCIAL

## 2021-12-16 VITALS
DIASTOLIC BLOOD PRESSURE: 101 MMHG | OXYGEN SATURATION: 96 % | SYSTOLIC BLOOD PRESSURE: 167 MMHG | WEIGHT: 206 LBS | HEIGHT: 62 IN | BODY MASS INDEX: 37.91 KG/M2 | TEMPERATURE: 98.4 F | HEART RATE: 89 BPM | RESPIRATION RATE: 18 BRPM

## 2021-12-16 DIAGNOSIS — U07.1 COVID-19: Primary | ICD-10-CM

## 2021-12-16 LAB
SARS-COV-2, NAAT: DETECTED
STREP GRP A PCR: NEGATIVE

## 2021-12-16 PROCEDURE — 87651 STREP A DNA AMP PROBE: CPT

## 2021-12-16 PROCEDURE — 6370000000 HC RX 637 (ALT 250 FOR IP): Performed by: EMERGENCY MEDICINE

## 2021-12-16 PROCEDURE — 71045 X-RAY EXAM CHEST 1 VIEW: CPT

## 2021-12-16 PROCEDURE — 6360000002 HC RX W HCPCS: Performed by: EMERGENCY MEDICINE

## 2021-12-16 PROCEDURE — 87635 SARS-COV-2 COVID-19 AMP PRB: CPT

## 2021-12-16 RX ORDER — KETOROLAC TROMETHAMINE 30 MG/ML
30 INJECTION, SOLUTION INTRAMUSCULAR; INTRAVENOUS ONCE
Status: DISCONTINUED | OUTPATIENT
Start: 2021-12-16 | End: 2021-12-16

## 2021-12-16 RX ORDER — ONDANSETRON 4 MG/1
4 TABLET, ORALLY DISINTEGRATING ORAL ONCE
Status: COMPLETED | OUTPATIENT
Start: 2021-12-16 | End: 2021-12-16

## 2021-12-16 RX ORDER — DIPHENHYDRAMINE HCL 25 MG
25 TABLET ORAL ONCE
Status: COMPLETED | OUTPATIENT
Start: 2021-12-16 | End: 2021-12-16

## 2021-12-16 RX ORDER — DIPHENHYDRAMINE HCL 25 MG
25 TABLET ORAL
Status: DISCONTINUED | OUTPATIENT
Start: 2021-12-16 | End: 2021-12-16

## 2021-12-16 RX ORDER — KETOROLAC TROMETHAMINE 30 MG/ML
30 INJECTION, SOLUTION INTRAMUSCULAR; INTRAVENOUS ONCE
Status: COMPLETED | OUTPATIENT
Start: 2021-12-16 | End: 2021-12-16

## 2021-12-16 RX ADMIN — KETOROLAC TROMETHAMINE 30 MG: 30 INJECTION, SOLUTION INTRAMUSCULAR; INTRAVENOUS at 04:13

## 2021-12-16 RX ADMIN — ONDANSETRON 4 MG: 4 TABLET, ORALLY DISINTEGRATING ORAL at 04:13

## 2021-12-16 RX ADMIN — DIPHENHYDRAMINE HCL 25 MG: 25 TABLET ORAL at 04:13

## 2021-12-16 ASSESSMENT — PAIN SCALES - GENERAL
PAINLEVEL_OUTOF10: 10
PAINLEVEL_OUTOF10: 10

## 2021-12-16 ASSESSMENT — PAIN DESCRIPTION - DESCRIPTORS: DESCRIPTORS: ACHING

## 2021-12-16 ASSESSMENT — PAIN DESCRIPTION - PAIN TYPE: TYPE: ACUTE PAIN

## 2021-12-16 ASSESSMENT — ENCOUNTER SYMPTOMS
SORE THROAT: 1
COUGH: 1

## 2021-12-16 ASSESSMENT — PAIN DESCRIPTION - LOCATION: LOCATION: HEAD;THROAT

## 2021-12-16 NOTE — ED PROVIDER NOTES
[address] [facility] ED  EMERGENCY MEDICINE     Pt Name: Vlad Young  MRN: [id number]  [place] 1965  Date of evaluation: 12/15/2021  PCP:    Brandon Solorzano DO  Provider: Roxie Abarca, [facility]       Chief Complaint   Patient presents with    Pharyngitis     pt c/o sore throat for 2 days. son has dx of strep       HISTORY OF PRESENT ILLNESS    HPI     69-year-old female presents to the emergency department with complaint of sore throat for the past 2 days. Patient was just diagnosed with strep. Denies any sick contacts. She is not vaccinated for Covid. She states that she is having full body aches as well as a headache. Denies fever. Has not taken anything for this at home. Triage notes and Nursing notes were reviewed by myself. Any discrepancies are addressed above.     PAST MEDICAL HISTORY     Past Medical History:   Diagnosis Date    Anemia     C. difficile colitis 01/2013    Chronic fatigue syndrome     Depression     [place]    Diabetes mellitus ([address]     Diverticulitis large intestine 2001    Fibromyalgia     Fibromyalgia 03/24/2015    GERD (gastroesophageal reflux disease)     HTN (hypertension)     Hyperlipidemia     Hypothyroidism     Palpitations     Pulmonary embolus ([address] 10/2012    Following GYN procedure    Vitamin D deficiency        SURGICAL HISTORY       Past Surgical History:   Procedure Laterality Date    ANUS SURGERY  12/14/2011    anal fissure    COLON SURGERY      BIPOSY    COLONOSCOPY  8/2011    DILATION AND CURETTAGE OF UTERUS      MISCARRIAGE X2    HEMORRHOID SURGERY   8/24/10    EXTERIOR    LAPAROSCOPY      SIGMOIDOSCOPY  12/14/11    RESULTING IN FISSURECTOMY       CURRENT MEDICATIONS       Discharge Medication List as of 12/16/2021  4:52 AM      CONTINUE these medications which have NOT CHANGED    Details   lidocaine viscous hcl (XYLOCAINE) 2 % SOLN solution Take 10 mLs by mouth as needed for Irritation or Dental Pain, Disp-100 mL, R-0Print      ibuprofen (ADVIL;MOTRIN) 800 MG tablet Take 1 tablet by mouth every 8 hours as needed for Pain or Fever, Disp-20 tablet, R-0Print      QUEtiapine (SEROQUEL XR) 200 MG extended release tablet Take 1 tablet by mouth nightly, Disp-15 tablet, R-3Normal      meclizine (ANTIVERT) 25 MG tablet Take 25 mg by mouth 2 times dailyHistorical Med      butalbital-APAP-caffeine -40 MG CAPS per capsule Take 1 capsule by mouth every 6 hours as needed for HeadachesHistorical Med      simvastatin (ZOCOR) 20 MG tablet Take 20 mg by mouth nightlyHistorical Med      levothyroxine (SYNTHROID) 112 MCG tablet Take 112 mcg by mouth DailyHistorical Med      Multiple Vitamins-Minerals (THERAPEUTIC MULTIVITAMIN-MINERALS) tablet Take 1 tablet by mouth dailyHistorical Med      Biotin 1 MG CAPS Take by mouthHistorical Med      Coenzyme Q10 (COQ-10) 100 MG CAPS Take by mouthHistorical Med      DULoxetine (CYMBALTA) 60 MG extended release capsule Take 1 capsule by mouth daily, Disp-15 capsule, R-3Normal      hydroCHLOROthiazide (HYDRODIURIL) 25 MG tablet Take 1 tablet by mouth daily, Disp-30 tablet, R-1Normal      tiZANidine (ZANAFLEX) 4 MG tablet TAKE 1 TABLET BY MOUTH TWICE A DAYHistorical Med      vitamin E 400 UNIT capsule Take 200 Units by mouth dailyHistorical Med      Cholecalciferol (VITAMIN D3) 2000 units CAPS Take 2,000 Units by mouth daily Historical Med      ALPRAZolam (XANAX) 0.5 MG tablet Take 0.5 mg by mouth 4 times dailyHistorical Med      amLODIPine (NORVASC) 10 MG tablet Take 1 tablet by mouth daily, Disp-30 tablet, R-5Normal      omeprazole (PRILOSEC OTC) 20 MG tablet Take 1 tablet by mouth daily, Disp-30 tablet, R-5             ALLERGIES       Allergies   Allergen Reactions    Ciprofloxacin Other (See Comments)     Had C. Diff with Cipro. Advised not to use it again.      Erythromycin      hives    Ketorolac Tromethamine Other (See Comments)     Makes heart race    Mobic [Meloxicam]      \"hives\" \"itching\"  Other     Pcn [Penicillins]     Pseudoephedrine Hcl Other (See Comments)     Heart palpitations    Sudafed [Pseudoephedrine Hcl]      \"increased heart rate\"    Sympathomimetics     Thorazine [Chlorpromazine] Other (See Comments)     Makes heart race \"breathing\" \"shakes\"       FAMILY HISTORY       Family History   Problem Relation Age of Onset    COPD Mother     Lung Cancer Father     COPD Maternal Grandmother     Cancer Maternal Grandfather         COLON    Colon Cancer Paternal Grandfather     Cancer Paternal Aunt         LUNG    Cancer Other         BLOOD CANCER- UNSPECIFIED    Coronary Art Dis Maternal Aunt     Other Maternal Aunt         Brain aneurysm        SOCIAL HISTORY       Social History     Socioeconomic History    Marital status:      Spouse name: Not on file    Number of children: Not on file    Years of education: Not on file    Highest education level: Not on file   Occupational History    Not on file   Tobacco Use    Smoking status: Never Smoker    Smokeless tobacco: Never Used   Vaping Use    Vaping Use: Never used   Substance and Sexual Activity    Alcohol use: Yes     Comment: OCC.  Drug use: No    Sexual activity: Not on file   Other Topics Concern    Not on file   Social History Narrative    Not on file     Social Determinants of Health     Financial Resource Strain:     Difficulty of Paying Living Expenses: Not on file   Food Insecurity:     Worried About Running Out of Food in the Last Year: Not on file    Bethanie of Food in the Last Year: Not on file   Transportation Needs:     Lack of Transportation (Medical): Not on file    Lack of Transportation (Non-Medical):  Not on file   Physical Activity:     Days of Exercise per Week: Not on file    Minutes of Exercise per Session: Not on file   Stress:     Feeling of Stress : Not on file   Social Connections:     Frequency of Communication with Friends and Family: Not on file    Frequency of Social Gatherings with Friends and Family: Not on file    Attends Confucianism Services: Not on file    Active Member of Clubs or Organizations: Not on file    Attends Club or Organization Meetings: Not on file    Marital Status: Not on file   Intimate Partner Violence:     Fear of Current or Ex-Partner: Not on file    Emotionally Abused: Not on file    Physically Abused: Not on file    Sexually Abused: Not on file   Housing Stability:     Unable to Pay for Housing in the Last Year: Not on file    Number of [place] in the Last Year: Not on file    Unstable Housing in the Last Year: Not on file       REVIEW OF SYSTEMS     Review of Systems   Constitutional: Positive for fatigue. HENT: Positive for sore throat. Respiratory: Positive for cough. Musculoskeletal: Positive for myalgias. Except as noted above the remainder of the review of systems was reviewed and is negative. SCREENINGS                        PHYSICAL EXAM    (up to 7 for level 4, 8 or more for level 5)     ED Triage Vitals [12/16/21 0042]   BP Temp Temp Source Pulse Resp SpO2 Height Weight   (!) 167/101 98.4 °F (36.9 °C) Oral 89 18 96 % 5' 2\" (1.575 m) 206 lb (93.4 kg)       Physical Exam  Constitutional:       General: She is not in acute distress. Appearance: Normal appearance. She is normal weight. She is not ill-appearing. HENT:      Head: Normocephalic and atraumatic. Right Ear: External ear normal.      Left Ear: External ear normal.      Nose: Nose normal. No congestion or rhinorrhea. Mouth/Throat:      Mouth: Mucous membranes are moist.      Pharynx: No oropharyngeal exudate or posterior oropharyngeal erythema. Eyes:      General:         Right eye: No discharge. Left eye: No discharge. Extraocular Movements: Extraocular movements intact. Pupils: Pupils are equal, round, and reactive to light. Cardiovascular:      Rate and Rhythm: Normal rate and regular rhythm.       Pulses: Normal pulses. Pulmonary:      Effort: Pulmonary effort is normal.      Breath sounds: Normal breath sounds. Abdominal:      General: Abdomen is flat. Bowel sounds are normal. There is no distension. Tenderness: There is no abdominal tenderness. There is no right CVA tenderness, left CVA tenderness, guarding or rebound. Musculoskeletal:         General: No swelling or tenderness. Normal range of motion. Cervical back: Normal range of motion and neck supple. Right lower leg: No edema. Left lower leg: No edema. Skin:     General: Skin is warm and dry. Capillary Refill: Capillary refill takes less than 2 seconds. Neurological:      General: No focal deficit present. Mental Status: She is alert. Psychiatric:         Mood and Affect: Mood normal.           DIAGNOSTIC RESULTS     EKG:(none if blank)  All EKGs are interpreted by the Emergency Department Physician who either signs or Co-signs this chart in the absence of a cardiologist.        RADIOLOGY: (none if blank)   I directly visualized the following images and reviewed the radiologist interpretations. Interpretation per the Radiologist below, if available at the time of this note:  XR CHEST PORTABLE    (Results Pending)       LABS:  Labs Reviewed   COVID-19, RAPID - Abnormal; Notable for the following components:       Result Value    SARS-CoV-2, NAAT DETECTED (*)     All other components within normal limits    Narrative:     Rosa MOONEY tel. [phone],   RAPID STREP SCREEN       All other labs were within normal range or not returned as of this dictation. Please note, any cultures that may have been sent were not resulted at the time of this patient visit.     EMERGENCY DEPARTMENT COURSE and Medical Decision Making:     Vitals:    Vitals:    12/16/21 0042   BP: (!) 167/101   Pulse: 89   Resp: 18   Temp: 98.4 °F (36.9 °C)   TempSrc: Oral   SpO2: 96%   Weight: 206 lb (93.4 kg)   Height: 5' 2\" (1.575 m)       PROCEDURES: (None if blank)  Procedures       MDM    Patient's fever came down with Toradol and Zofran. She does feel better after these medications. Was diagnosed with Covid today. Strict return precautions and follow up instructions were discussed with the patient with which the patient agrees    ED Medications administered this visit:    Medications   ondansetron (ZOFRAN-ODT) disintegrating tablet 4 mg (4 mg Oral Given 12/16/21 0413)   ketorolac (TORADOL) injection 30 mg (30 mg IntraMUSCular Given 12/16/21 0413)   diphenhydrAMINE (BENADRYL) tablet 25 mg (25 mg Oral Given 12/16/21 0413)         FINAL IMPRESSION      1. COVID-19          DISPOSITION/PLAN   DISPOSITION Decision To Discharge 12/16/2021 04:30:40 AM      PATIENT REFERRED TO:  No follow-up provider specified.     DISCHARGE MEDICATIONS:  Discharge Medication List as of 12/16/2021  4:52 AM                 Roxie Abarca DO (electronically signed)  Attending Physician, Emergency Department          Kael Doe [phone]

## 2021-12-16 NOTE — ED TRIAGE NOTES
Pt states her son has dx of strep. Pt c/o sore scratchy throat for 2 days. Pt c/o headache, chills and nausea. Pt a/o x 4 calm, skin p/w/d resp even and non-labored. No sob or acute distress noted.

## 2021-12-17 ENCOUNTER — CARE COORDINATION (OUTPATIENT)
Dept: CARE COORDINATION | Age: 56
End: 2021-12-17

## 2021-12-20 ENCOUNTER — CARE COORDINATION (OUTPATIENT)
Dept: CARE COORDINATION | Age: 56
End: 2021-12-20

## 2021-12-20 NOTE — CARE COORDINATION
ACM called patient left message requesting a return call for ED follow-up Covid monitoring this is second and final attempt. Contact information supplied.

## 2022-01-28 ENCOUNTER — HOSPITAL ENCOUNTER (EMERGENCY)
Age: 57
Discharge: HOME | End: 2022-01-28
Payer: COMMERCIAL

## 2022-01-28 ENCOUNTER — APPOINTMENT (OUTPATIENT)
Dept: GENERAL RADIOLOGY | Age: 57
End: 2022-01-28
Payer: COMMERCIAL

## 2022-01-28 VITALS
BODY MASS INDEX: 36.4 KG/M2 | SYSTOLIC BLOOD PRESSURE: 144 MMHG | DIASTOLIC BLOOD PRESSURE: 81 MMHG | RESPIRATION RATE: 16 BRPM | OXYGEN SATURATION: 96 % | TEMPERATURE: 98.6 F | HEART RATE: 70 BPM | WEIGHT: 199 LBS

## 2022-01-28 DIAGNOSIS — S39.012A STRAIN OF LUMBAR REGION, INITIAL ENCOUNTER: Primary | ICD-10-CM

## 2022-01-28 LAB
BILIRUBIN URINE: NEGATIVE
BLOOD, URINE: NEGATIVE
CLARITY: CLEAR
COLOR: YELLOW
GLUCOSE URINE: NEGATIVE MG/DL
KETONES, URINE: NEGATIVE MG/DL
LEUKOCYTE ESTERASE, URINE: NEGATIVE
NITRITE, URINE: NEGATIVE
PH UA: 5.5 (ref 5–9)
PROTEIN UA: NEGATIVE MG/DL
SPECIFIC GRAVITY UA: 1.02 (ref 1–1.03)
URINE REFLEX TO CULTURE: NORMAL
UROBILINOGEN, URINE: 0.2 E.U./DL

## 2022-01-28 PROCEDURE — 81003 URINALYSIS AUTO W/O SCOPE: CPT

## 2022-01-28 PROCEDURE — 73502 X-RAY EXAM HIP UNI 2-3 VIEWS: CPT

## 2022-01-28 PROCEDURE — 72110 X-RAY EXAM L-2 SPINE 4/>VWS: CPT

## 2022-01-28 PROCEDURE — 6370000000 HC RX 637 (ALT 250 FOR IP): Performed by: STUDENT IN AN ORGANIZED HEALTH CARE EDUCATION/TRAINING PROGRAM

## 2022-01-28 PROCEDURE — 99284 EMERGENCY DEPT VISIT MOD MDM: CPT

## 2022-01-28 PROCEDURE — 72220 X-RAY EXAM SACRUM TAILBONE: CPT

## 2022-01-28 RX ORDER — LIDOCAINE 50 MG/G
1 PATCH TOPICAL DAILY
Qty: 10 PATCH | Refills: 1 | Status: SHIPPED | OUTPATIENT
Start: 2022-01-28 | End: 2022-02-07

## 2022-01-28 RX ORDER — HYDROCODONE BITARTRATE AND ACETAMINOPHEN 5; 325 MG/1; MG/1
1 TABLET ORAL EVERY 6 HOURS PRN
Qty: 12 TABLET | Refills: 0 | Status: SHIPPED | OUTPATIENT
Start: 2022-01-28 | End: 2022-01-31

## 2022-01-28 RX ORDER — ONDANSETRON 4 MG/1
4 TABLET, ORALLY DISINTEGRATING ORAL ONCE
Status: COMPLETED | OUTPATIENT
Start: 2022-01-28 | End: 2022-01-28

## 2022-01-28 RX ORDER — ACETAMINOPHEN 500 MG
1000 TABLET ORAL 3 TIMES DAILY PRN
Qty: 30 TABLET | Refills: 1 | Status: SHIPPED | OUTPATIENT
Start: 2022-01-28 | End: 2022-06-25

## 2022-01-28 RX ORDER — TRIAMCINOLONE ACETONIDE 0.25 MG/G
OINTMENT TOPICAL
Qty: 15 G | Refills: 0 | Status: SHIPPED | OUTPATIENT
Start: 2022-01-28 | End: 2022-02-04

## 2022-01-28 RX ORDER — OXYCODONE HYDROCHLORIDE AND ACETAMINOPHEN 5; 325 MG/1; MG/1
1 TABLET ORAL ONCE
Status: COMPLETED | OUTPATIENT
Start: 2022-01-28 | End: 2022-01-28

## 2022-01-28 RX ADMIN — ONDANSETRON 4 MG: 4 TABLET, ORALLY DISINTEGRATING ORAL at 10:10

## 2022-01-28 RX ADMIN — OXYCODONE AND ACETAMINOPHEN 1 TABLET: 5; 325 TABLET ORAL at 10:10

## 2022-01-28 ASSESSMENT — ENCOUNTER SYMPTOMS
DIARRHEA: 0
SORE THROAT: 0
SHORTNESS OF BREATH: 0
ABDOMINAL PAIN: 1
NAUSEA: 0
CHEST TIGHTNESS: 0
BACK PAIN: 1
EYE PAIN: 0

## 2022-01-28 ASSESSMENT — PAIN SCALES - GENERAL
PAINLEVEL_OUTOF10: 10
PAINLEVEL_OUTOF10: 10

## 2022-01-28 ASSESSMENT — PAIN DESCRIPTION - DESCRIPTORS: DESCRIPTORS: CRAMPING;ACHING;STABBING

## 2022-01-28 ASSESSMENT — PAIN DESCRIPTION - ORIENTATION: ORIENTATION: DISTAL

## 2022-01-28 ASSESSMENT — PAIN DESCRIPTION - PAIN TYPE: TYPE: ACUTE PAIN;CHRONIC PAIN

## 2022-01-28 ASSESSMENT — PAIN DESCRIPTION - FREQUENCY: FREQUENCY: CONTINUOUS

## 2022-01-28 NOTE — ED TRIAGE NOTES
Pt states lose balance and slid down stairs a few days ago  Pt states that she has pain in lower back, tailbone, hips and lower pelvis area. Pt states that she does previously have pain in back but \"not this bad\". Pt states that she has been taking tylenol and ibuprofen alternately for the last few days  Pt is alert and oriented times 4. Pt denies hitting head  Pt denies any bruising to areas.

## 2022-01-28 NOTE — Clinical Note
Chris Rinaldi was seen and treated in our emergency department on 1/28/2022. She may return to work on 01/31/2022. If you have any questions or concerns, please don't hesitate to call.       Haja Mcconnell PA-C

## 2022-01-28 NOTE — ED PROVIDER NOTES
[address] [facility] ED  eMERGENCYdEPARTMENT eNCOUnter      Pt Name: Larae Boast  MRN: [id number]  [place] 1965  Date of evaluation: 1/28/2022  Provider:Smith Mccarty PA-C    CHIEF COMPLAINT           HPI  Larae Boast is a 55 y.o. female per chart review has a h/o depression, hypothyroid, PE, LASHONDA, fibromyalgia presenting with low back pain. Pt report sudden onset, severe, worsening, sharp radiating down the right leg low back pain. Pt denies IV drug use, foot drop, saddle anesthesia, bowel/bladder changes, fever, chills, SOB, CP. Pt does endorse some abdominal pain. ROS  Review of Systems   Constitutional: Negative for chills, fatigue and fever. HENT: Negative for ear pain, hearing loss and sore throat. Eyes: Negative for pain and visual disturbance. Respiratory: Negative for chest tightness and shortness of breath. Cardiovascular: Negative for chest pain. Gastrointestinal: Positive for abdominal pain. Negative for diarrhea and nausea. Endocrine: Negative for cold intolerance. Genitourinary: Negative for hematuria. Musculoskeletal: Positive for back pain. Skin: Negative for rash and wound. Neurological: Negative for dizziness and headaches. Psychiatric/Behavioral: Negative for behavioral problems and confusion. Except as noted above the remainder of the review of systems was reviewed and negative.        PAST MEDICAL HISTORY     Past Medical History:   Diagnosis Date    Anemia     C. difficile colitis 01/2013    Chronic fatigue syndrome     Depression     [place]    Diabetes mellitus ([address]     Diverticulitis large intestine 2001    Fibromyalgia     Fibromyalgia 03/24/2015    GERD (gastroesophageal reflux disease)     HTN (hypertension)     Hyperlipidemia     Hypothyroidism     Palpitations     Pulmonary embolus ([address] 10/2012    Following GYN procedure    Vitamin D deficiency          SURGICAL HISTORY       Past Surgical History:   Procedure Laterality Date    ANUS SURGERY  12/14/2011    anal fissure    COLON SURGERY      BIPOSY    COLONOSCOPY  8/2011    DILATION AND CURETTAGE OF UTERUS      MISCARRIAGE X2    HEMORRHOID SURGERY   8/24/10    EXTERIOR    LAPAROSCOPY      SIGMOIDOSCOPY  12/14/11    RESULTING IN FISSURECTOMY         CURRENTMEDICATIONS       Discharge Medication List as of 1/28/2022 10:50 AM      CONTINUE these medications which have NOT CHANGED    Details   lidocaine viscous hcl (XYLOCAINE) 2 % SOLN solution Take 10 mLs by mouth as needed for Irritation or Dental Pain, Disp-100 mL, R-0Print      ibuprofen (ADVIL;MOTRIN) 800 MG tablet Take 1 tablet by mouth every 8 hours as needed for Pain or Fever, Disp-20 tablet, R-0Print      QUEtiapine (SEROQUEL XR) 200 MG extended release tablet Take 1 tablet by mouth nightly, Disp-15 tablet, R-3Normal      meclizine (ANTIVERT) 25 MG tablet Take 25 mg by mouth 2 times dailyHistorical Med      butalbital-APAP-caffeine -40 MG CAPS per capsule Take 1 capsule by mouth every 6 hours as needed for HeadachesHistorical Med      simvastatin (ZOCOR) 20 MG tablet Take 20 mg by mouth nightlyHistorical Med      levothyroxine (SYNTHROID) 112 MCG tablet Take 112 mcg by mouth DailyHistorical Med      Multiple Vitamins-Minerals (THERAPEUTIC MULTIVITAMIN-MINERALS) tablet Take 1 tablet by mouth dailyHistorical Med      Biotin 1 MG CAPS Take by mouthHistorical Med      Coenzyme Q10 (COQ-10) 100 MG CAPS Take by mouthHistorical Med      DULoxetine (CYMBALTA) 60 MG extended release capsule Take 1 capsule by mouth daily, Disp-15 capsule, R-3Normal      hydroCHLOROthiazide (HYDRODIURIL) 25 MG tablet Take 1 tablet by mouth daily, Disp-30 tablet, R-1Normal      tiZANidine (ZANAFLEX) 4 MG tablet TAKE 1 TABLET BY MOUTH TWICE A DAYHistorical Med      vitamin E 400 UNIT capsule Take 200 Units by mouth dailyHistorical Med      Cholecalciferol (VITAMIN D3) 2000 units CAPS Take 2,000 Units by mouth daily Historical Med      ALPRAZolam (XANAX) 0.5 MG tablet Take 0.5 mg by mouth 4 times dailyHistorical Med      amLODIPine (NORVASC) 10 MG tablet Take 1 tablet by mouth daily, Disp-30 tablet, R-5Normal      omeprazole (PRILOSEC OTC) 20 MG tablet Take 1 tablet by mouth daily, Disp-30 tablet, R-5             ALLERGIES     Ciprofloxacin, Erythromycin, Ketorolac tromethamine, Mobic [meloxicam], Other, Pcn [penicillins], Pseudoephedrine hcl, Sudafed [pseudoephedrine hcl], Sympathomimetics, and Thorazine [chlorpromazine]    FAMILY HISTORY       Family History   Problem Relation Age of Onset    COPD Mother     Lung Cancer Father     COPD Maternal Grandmother     Cancer Maternal Grandfather         COLON    Colon Cancer Paternal Grandfather     Cancer Paternal Aunt         LUNG    Cancer Other         BLOOD CANCER- UNSPECIFIED    Coronary Art Dis Maternal Aunt     Other Maternal Aunt         Brain aneurysm          SOCIAL HISTORY       Social History     Socioeconomic History    Marital status:      Spouse name: None    Number of children: None    Years of education: None    Highest education level: None   Occupational History    None   Tobacco Use    Smoking status: Never Smoker    Smokeless tobacco: Never Used   Vaping Use    Vaping Use: Never used   Substance and Sexual Activity    Alcohol use: Not Currently     Comment: OCC.  Drug use: No    Sexual activity: None   Other Topics Concern    None   Social History Narrative    None     Social Determinants of Health     Financial Resource Strain:     Difficulty of Paying Living Expenses: Not on file   Food Insecurity:     Worried About Running Out of Food in the Last Year: Not on file    Bethanie of Food in the Last Year: Not on file   Transportation Needs:     Lack of Transportation (Medical): Not on file    Lack of Transportation (Non-Medical):  Not on file   Physical Activity:     Days of Exercise per Week: Not on file    Minutes of Exercise per Session: Not on file Stress:     Feeling of Stress : Not on file   Social Connections:     Frequency of Communication with Friends and Family: Not on file    Frequency of Social Gatherings with Friends and Family: Not on file    Attends Christian Services: Not on file    Active Member of [address] [place] [address] [employer] or Organizations: Not on file    Attends Club or Organization Meetings: Not on file    Marital Status: Not on file   Intimate Partner Violence:     Fear of Current or Ex-Partner: Not on file    Emotionally Abused: Not on file    Physically Abused: Not on file    Sexually Abused: Not on file   Housing Stability:     Unable to Pay for Housing in the Last Year: Not on file    Number of [place] in the Last Year: Not on file    Unstable Housing in the Last Year: Not on file         PHYSICAL EXAM       ED Triage Vitals [01/28/22 0907]   BP Temp Temp Source Pulse Resp SpO2 Height Weight   (!) 156/106 98.6 °F (37 °C) Oral 71 18 96 % -- 199 lb (90.3 kg)       Physical Exam  Constitutional:       Appearance: Normal appearance. HENT:      Head: Normocephalic and atraumatic. Right Ear: External ear normal.      Left Ear: External ear normal.      Nose: Nose normal.      Mouth/Throat:      Mouth: Mucous membranes are moist.   Eyes:      Extraocular Movements: Extraocular movements intact. Conjunctiva/sclera: Conjunctivae normal.   Cardiovascular:      Rate and Rhythm: Normal rate and regular rhythm. Heart sounds: Normal heart sounds. Pulmonary:      Effort: Pulmonary effort is normal.      Breath sounds: Normal breath sounds. No stridor. No wheezing or rhonchi. Abdominal:      Palpations: Abdomen is soft. Tenderness: There is no abdominal tenderness. There is no right CVA tenderness, left CVA tenderness, guarding or rebound. Negative signs include Rovsing's sign and psoas sign. Musculoskeletal:         General: Normal range of motion. Arms:       Cervical back: Normal range of motion and neck supple.

## 2022-04-14 ENCOUNTER — HOSPITAL ENCOUNTER (EMERGENCY)
Age: 57
Discharge: HOME | End: 2022-04-14
Payer: COMMERCIAL

## 2022-04-14 ENCOUNTER — APPOINTMENT (OUTPATIENT)
Dept: GENERAL RADIOLOGY | Age: 57
End: 2022-04-14
Payer: COMMERCIAL

## 2022-04-14 ENCOUNTER — APPOINTMENT (OUTPATIENT)
Dept: CT IMAGING | Age: 57
End: 2022-04-14
Payer: COMMERCIAL

## 2022-04-14 VITALS
HEIGHT: 62 IN | SYSTOLIC BLOOD PRESSURE: 127 MMHG | OXYGEN SATURATION: 93 % | HEART RATE: 84 BPM | DIASTOLIC BLOOD PRESSURE: 88 MMHG | WEIGHT: 230 LBS | RESPIRATION RATE: 16 BRPM | BODY MASS INDEX: 42.33 KG/M2 | TEMPERATURE: 98.3 F

## 2022-04-14 DIAGNOSIS — M79.605 BILATERAL LEG PAIN: ICD-10-CM

## 2022-04-14 DIAGNOSIS — M79.604 BILATERAL LEG PAIN: ICD-10-CM

## 2022-04-14 DIAGNOSIS — M25.532 LEFT WRIST PAIN: ICD-10-CM

## 2022-04-14 DIAGNOSIS — M79.602 LEFT ARM PAIN: ICD-10-CM
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
## 2022-04-14 DIAGNOSIS — S09.90XA CLOSED HEAD INJURY, INITIAL ENCOUNTER: ICD-10-CM

## 2022-04-14 LAB
ALBUMIN SERPL-MCNC: 4.5 G/DL (ref 3.5–4.6)
ALP BLD-CCNC: 78 U/L (ref 40–130)
ALT SERPL-CCNC: 21 U/L (ref 0–33)
ANION GAP SERPL CALCULATED.3IONS-SCNC: 14 MEQ/L (ref 9–15)
AST SERPL-CCNC: 23 U/L (ref 0–35)
BASOPHILS ABSOLUTE: 0 K/UL (ref 0–0.2)
BASOPHILS RELATIVE PERCENT: 0.4 %
BILIRUB SERPL-MCNC: 0.5 MG/DL (ref 0.2–0.7)
BUN BLDV-MCNC: 14 MG/DL (ref 6–20)
CALCIUM SERPL-MCNC: 10 MG/DL (ref 8.5–9.9)
CHLORIDE BLD-SCNC: 99 MEQ/L (ref 95–107)
CO2: 26 MEQ/L (ref 20–31)
CREAT SERPL-MCNC: 0.91 MG/DL (ref 0.5–0.9)
EOSINOPHILS ABSOLUTE: 0 K/UL (ref 0–0.7)
EOSINOPHILS RELATIVE PERCENT: 0.4 %
ETHANOL PERCENT: NORMAL G/DL
ETHANOL: <10 MG/DL (ref 0–0.08)
GFR AFRICAN AMERICAN: >60
GFR AFRICAN AMERICAN: >60
GFR NON-AFRICAN AMERICAN: 57
GFR NON-AFRICAN AMERICAN: >60
GLOBULIN: 2.9 G/DL (ref 2.3–3.5)
GLUCOSE BLD-MCNC: 108 MG/DL (ref 70–99)
HCT VFR BLD CALC: 41.7 % (ref 37–47)
HEMOGLOBIN: 14.2 G/DL (ref 12–16)
LYMPHOCYTES ABSOLUTE: 2.3 K/UL (ref 1–4.8)
LYMPHOCYTES RELATIVE PERCENT: 33.8 %
MCH RBC QN AUTO: 28.9 PG (ref 27–31.3)
MCHC RBC AUTO-ENTMCNC: 33.9 % (ref 33–37)
MCV RBC AUTO: 85.2 FL (ref 82–100)
MONOCYTES ABSOLUTE: 0.5 K/UL (ref 0.2–0.8)
MONOCYTES RELATIVE PERCENT: 7.8 %
NEUTROPHILS ABSOLUTE: 3.9 K/UL (ref 1.4–6.5)
NEUTROPHILS RELATIVE PERCENT: 57.6 %
PDW BLD-RTO: 13.7 % (ref 11.5–14.5)
PERFORMED ON: ABNORMAL
PLATELET # BLD: 269 K/UL (ref 130–400)
POC CREATININE WHOLE BLOOD: 1
POC CREATININE: 1 MG/DL (ref 0.6–1.2)
POC SAMPLE TYPE: ABNORMAL
POTASSIUM SERPL-SCNC: 2.8 MEQ/L (ref 3.4–4.9)
RBC # BLD: 4.9 M/UL (ref 4.2–5.4)
SODIUM BLD-SCNC: 139 MEQ/L (ref 135–144)
TOTAL PROTEIN: 7.4 G/DL (ref 6.3–8)
WBC # BLD: 6.7 K/UL (ref 4.8–10.8)

## 2022-04-14 PROCEDURE — 6370000000 HC RX 637 (ALT 250 FOR IP)

## 2022-04-14 PROCEDURE — 85025 COMPLETE CBC W/AUTO DIFF WBC: CPT

## 2022-04-14 PROCEDURE — 73562 X-RAY EXAM OF KNEE 3: CPT

## 2022-04-14 PROCEDURE — 70450 CT HEAD/BRAIN W/O DYE: CPT

## 2022-04-14 PROCEDURE — 36415 COLL VENOUS BLD VENIPUNCTURE: CPT

## 2022-04-14 PROCEDURE — 6360000004 HC RX CONTRAST MEDICATION

## 2022-04-14 PROCEDURE — 73110 X-RAY EXAM OF WRIST: CPT

## 2022-04-14 PROCEDURE — 72128 CT CHEST SPINE W/O DYE: CPT

## 2022-04-14 PROCEDURE — 80053 COMPREHEN METABOLIC PANEL: CPT

## 2022-04-14 PROCEDURE — 73090 X-RAY EXAM OF FOREARM: CPT

## 2022-04-14 PROCEDURE — 96374 THER/PROPH/DIAG INJ IV PUSH: CPT

## 2022-04-14 PROCEDURE — 82077 ASSAY SPEC XCP UR&BREATH IA: CPT

## 2022-04-14 PROCEDURE — 74177 CT ABD & PELVIS W/CONTRAST: CPT

## 2022-04-14 PROCEDURE — 73590 X-RAY EXAM OF LOWER LEG: CPT

## 2022-04-14 PROCEDURE — 6360000002 HC RX W HCPCS

## 2022-04-14 PROCEDURE — 96375 TX/PRO/DX INJ NEW DRUG ADDON: CPT

## 2022-04-14 PROCEDURE — 6370000000 HC RX 637 (ALT 250 FOR IP): Performed by: EMERGENCY MEDICINE

## 2022-04-14 PROCEDURE — 71260 CT THORAX DX C+: CPT

## 2022-04-14 PROCEDURE — 72131 CT LUMBAR SPINE W/O DYE: CPT

## 2022-04-14 PROCEDURE — 99285 EMERGENCY DEPT VISIT HI MDM: CPT

## 2022-04-14 PROCEDURE — 72125 CT NECK SPINE W/O DYE: CPT

## 2022-04-14 PROCEDURE — 29125 APPL SHORT ARM SPLINT STATIC: CPT

## 2022-04-14 RX ORDER — HYDROCODONE BITARTRATE AND ACETAMINOPHEN 5; 325 MG/1; MG/1
1 TABLET ORAL ONCE
Status: COMPLETED | OUTPATIENT
Start: 2022-04-14 | End: 2022-04-14

## 2022-04-14 RX ORDER — POTASSIUM CHLORIDE 20 MEQ/1
40 TABLET, EXTENDED RELEASE ORAL ONCE
Status: COMPLETED | OUTPATIENT
Start: 2022-04-14 | End: 2022-04-14

## 2022-04-14 RX ORDER — HYDROCODONE BITARTRATE AND ACETAMINOPHEN 5; 325 MG/1; MG/1
1 TABLET ORAL EVERY 6 HOURS PRN
Qty: 10 TABLET | Refills: 0 | Status: SHIPPED | OUTPATIENT
Start: 2022-04-14 | End: 2022-04-17

## 2022-04-14 RX ORDER — ONDANSETRON 2 MG/ML
4 INJECTION INTRAMUSCULAR; INTRAVENOUS ONCE
Status: COMPLETED | OUTPATIENT
Start: 2022-04-14 | End: 2022-04-14

## 2022-04-14 RX ORDER — MORPHINE SULFATE 2 MG/ML
2 INJECTION, SOLUTION INTRAMUSCULAR; INTRAVENOUS ONCE
Status: COMPLETED | OUTPATIENT
Start: 2022-04-14 | End: 2022-04-14

## 2022-04-14 RX ADMIN — IOPAMIDOL 100 ML: 612 INJECTION, SOLUTION INTRAVENOUS at 02:12

## 2022-04-14 RX ADMIN — ONDANSETRON 4 MG: 2 INJECTION INTRAMUSCULAR; INTRAVENOUS at 01:21

## 2022-04-14 RX ADMIN — MORPHINE SULFATE 2 MG: 2 INJECTION, SOLUTION INTRAMUSCULAR; INTRAVENOUS at 01:21

## 2022-04-14 RX ADMIN — HYDROCODONE BITARTRATE AND ACETAMINOPHEN 1 TABLET: 5; 325 TABLET ORAL at 04:13

## 2022-04-14 RX ADMIN — POTASSIUM CHLORIDE 40 MEQ: 1500 TABLET, EXTENDED RELEASE ORAL at 03:48

## 2022-04-14 ASSESSMENT — PAIN DESCRIPTION - LOCATION
LOCATION: ARM
LOCATION: GENERALIZED

## 2022-04-14 ASSESSMENT — PAIN DESCRIPTION - FREQUENCY: FREQUENCY: CONTINUOUS

## 2022-04-14 ASSESSMENT — PAIN DESCRIPTION - ONSET: ONSET: SUDDEN

## 2022-04-14 ASSESSMENT — PAIN - FUNCTIONAL ASSESSMENT: PAIN_FUNCTIONAL_ASSESSMENT: 0-10

## 2022-04-14 ASSESSMENT — ENCOUNTER SYMPTOMS
VOMITING: 0
ABDOMINAL PAIN: 1
BACK PAIN: 1
SHORTNESS OF BREATH: 0
COLOR CHANGE: 0
COUGH: 0
PHOTOPHOBIA: 0
DIARRHEA: 0
NAUSEA: 0
FACIAL SWELLING: 0
RHINORRHEA: 0

## 2022-04-14 ASSESSMENT — PAIN SCALES - GENERAL
PAINLEVEL_OUTOF10: 9

## 2022-04-14 ASSESSMENT — PAIN DESCRIPTION - ORIENTATION: ORIENTATION: LEFT

## 2022-04-14 ASSESSMENT — PAIN DESCRIPTION - PAIN TYPE
TYPE: ACUTE PAIN
TYPE: ACUTE PAIN
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
## 2022-04-14 NOTE — ED NOTES
Patient clothing removed and placed in property bag, jewelry removed and placed in container sealed with patient label. Valuables given to friend/visitor who will remain in ER with patient.       Sandip Xavier RN  04/14/22 0039

## 2022-05-27 ENCOUNTER — HOSPITAL ENCOUNTER (EMERGENCY)
Age: 57
Discharge: HOME | End: 2022-05-27
Payer: COMMERCIAL

## 2022-05-27 ENCOUNTER — APPOINTMENT (OUTPATIENT)
Dept: GENERAL RADIOLOGY | Age: 57
End: 2022-05-27
Payer: COMMERCIAL

## 2022-05-27 VITALS
BODY MASS INDEX: 36.44 KG/M2 | DIASTOLIC BLOOD PRESSURE: 105 MMHG | HEIGHT: 62 IN | WEIGHT: 198 LBS | RESPIRATION RATE: 16 BRPM | HEART RATE: 70 BPM | TEMPERATURE: 97.8 F | OXYGEN SATURATION: 97 % | SYSTOLIC BLOOD PRESSURE: 162 MMHG

## 2022-05-27 DIAGNOSIS — M54.41 ACUTE RIGHT-SIDED LOW BACK PAIN WITH RIGHT-SIDED SCIATICA: Primary | ICD-10-CM

## 2022-05-27 DIAGNOSIS — S30.0XXA LUMBAR CONTUSION, INITIAL ENCOUNTER: ICD-10-CM

## 2022-05-27 PROCEDURE — 96372 THER/PROPH/DIAG INJ SC/IM: CPT

## 2022-05-27 PROCEDURE — 6370000000 HC RX 637 (ALT 250 FOR IP): Performed by: PHYSICIAN ASSISTANT

## 2022-05-27 PROCEDURE — 6360000002 HC RX W HCPCS: Performed by: PHYSICIAN ASSISTANT

## 2022-05-27 PROCEDURE — 99284 EMERGENCY DEPT VISIT MOD MDM: CPT

## 2022-05-27 PROCEDURE — 72110 X-RAY EXAM L-2 SPINE 4/>VWS: CPT

## 2022-05-27 RX ORDER — MORPHINE SULFATE 4 MG/ML
4 INJECTION, SOLUTION INTRAMUSCULAR; INTRAVENOUS ONCE
Status: COMPLETED | OUTPATIENT
Start: 2022-05-27 | End: 2022-05-27

## 2022-05-27 RX ORDER — TRAMADOL HYDROCHLORIDE 50 MG/1
50 TABLET ORAL EVERY 6 HOURS PRN
Qty: 12 TABLET | Refills: 0 | Status: SHIPPED | OUTPATIENT
Start: 2022-05-27 | End: 2022-05-30

## 2022-05-27 RX ORDER — ONDANSETRON 4 MG/1
4 TABLET, ORALLY DISINTEGRATING ORAL ONCE
Status: COMPLETED | OUTPATIENT
Start: 2022-05-27 | End: 2022-05-27

## 2022-05-27 RX ADMIN — ONDANSETRON 4 MG: 4 TABLET, ORALLY DISINTEGRATING ORAL at 01:16

## 2022-05-27 RX ADMIN — MORPHINE SULFATE 4 MG: 4 INJECTION, SOLUTION INTRAMUSCULAR; INTRAVENOUS at 01:15

## 2022-05-27 ASSESSMENT — PAIN - FUNCTIONAL ASSESSMENT
PAIN_FUNCTIONAL_ASSESSMENT: 0-10
PAIN_FUNCTIONAL_ASSESSMENT: PREVENTS OR INTERFERES SOME ACTIVE ACTIVITIES AND ADLS

## 2022-05-27 ASSESSMENT — ENCOUNTER SYMPTOMS
ALLERGIC/IMMUNOLOGIC NEGATIVE: 1
BACK PAIN: 1
SHORTNESS OF BREATH: 0
APNEA: 0
EYE PAIN: 0
COLOR CHANGE: 0
ABDOMINAL PAIN: 0
TROUBLE SWALLOWING: 0

## 2022-05-27 ASSESSMENT — PAIN SCALES - GENERAL: PAINLEVEL_OUTOF10: 9

## 2022-05-27 ASSESSMENT — PAIN DESCRIPTION - PAIN TYPE: TYPE: ACUTE PAIN

## 2022-05-27 ASSESSMENT — PAIN DESCRIPTION - DESCRIPTORS: DESCRIPTORS: SHARP

## 2022-05-27 ASSESSMENT — PAIN DESCRIPTION - FREQUENCY: FREQUENCY: CONTINUOUS

## 2022-05-27 ASSESSMENT — PAIN DESCRIPTION - LOCATION: LOCATION: BACK;BUTTOCKS
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
## 2022-05-27 NOTE — ED PROVIDER NOTES
[facility] ED  eMERGENCYdEPARTMENT eNCOUnter      Pt Name: Radha Jones  MRN: [id number]  [place] 1965  Date of evaluation: 5/27/2022  Provider:David Candance Bushy, PA-C    CHIEF COMPLAINT       Chief Complaint   Patient presents with    Back Pain     PT C/O BACK PAIN THAT RADIATES INTO HER BUTTOCKS [facility]         HISTORY OF PRESENT ILLNESS  (Location/Symptom, Timing/Onset, Context/Setting, Quality, Duration, Modifying Factors, Severity.)   Radha Jones is a 55 y.o. female who presents to the emergency department with complaints of right-sided lower back pain that radiates into the buttock region ongoing since yesterday. [facility] and while getting off a ride she struck her right SI joint region against a metal pole. Patient states that since then she has had a burning sensation to her lower back that radiates into the buttock. Patient does state that she has had multiple recent lower back injuries and has been dealing with chronic pain from those but this is a new and different type of pain. She denies any saddle paresthesias or loss of bowel bladder control    HPI    Nursing Notes were reviewed and I agree. REVIEW OF SYSTEMS    (2-9 systems for level 4, 10 or more for level 5)     Review of Systems   Constitutional: Negative for diaphoresis and fever. HENT: Negative for hearing loss and trouble swallowing. Eyes: Negative for pain. Respiratory: Negative for apnea and shortness of breath. Cardiovascular: Negative for chest pain. Gastrointestinal: Negative for abdominal pain. Endocrine: Negative. Genitourinary: Negative for hematuria. Musculoskeletal: Positive for back pain. Negative for neck pain and neck stiffness. Skin: Negative for color change. Allergic/Immunologic: Negative. Neurological: Negative for dizziness and numbness. Hematological: Negative. Psychiatric/Behavioral: Negative.     All other systems reviewed and are negative. Except as noted above the remainder of the review of systems was reviewed and negative.        PAST MEDICAL HISTORY     Past Medical History:   Diagnosis Date    Anemia     C. difficile colitis 01/2013    Chronic fatigue syndrome     Depression     [place]    Diabetes mellitus ([facility]     Diverticulitis large intestine 2001    Fibromyalgia     Fibromyalgia 03/24/2015    GERD (gastroesophageal reflux disease)     HTN (hypertension)     Hyperlipidemia     Hypothyroidism     Palpitations     Pulmonary embolus ([facility] [facility] 10/2012    Following GYN procedure    Vitamin D deficiency          SURGICAL HISTORY       Past Surgical History:   Procedure Laterality Date    ANUS SURGERY  12/14/2011    anal fissure    COLON SURGERY      BIPOSY    COLONOSCOPY  8/2011    DILATION AND CURETTAGE OF UTERUS      MISCARRIAGE X2    HEMORRHOID SURGERY   8/24/10    EXTERIOR    LAPAROSCOPY      SIGMOIDOSCOPY  12/14/11    RESULTING IN FISSURECTOMY         CURRENT MEDICATIONS       Previous Medications    ACETAMINOPHEN (TYLENOL) 500 MG TABLET    Take 2 tablets by mouth 3 times daily as needed for Pain    ALPRAZOLAM (XANAX) 0.5 MG TABLET    Take 0.5 mg by mouth 4 times daily    AMLODIPINE (NORVASC) 10 MG TABLET    Take 1 tablet by mouth daily    BIOTIN 1 MG CAPS    Take by mouth    BUTALBITAL-APAP-CAFFEINE -40 MG CAPS PER CAPSULE    Take 1 capsule by mouth every 6 hours as needed for Headaches    CHOLECALCIFEROL (VITAMIN D3) 2000 UNITS CAPS    Take 2,000 Units by mouth daily     COENZYME Q10 (COQ-10) 100 MG CAPS    Take by mouth    DULOXETINE (CYMBALTA) 60 MG EXTENDED RELEASE CAPSULE    Take 1 capsule by mouth daily    HYDROCHLOROTHIAZIDE (HYDRODIURIL) 25 MG TABLET    Take 1 tablet by mouth daily    IBUPROFEN (ADVIL;MOTRIN) 800 MG TABLET    Take 1 tablet by mouth every 8 hours as needed for Pain or Fever    LEVOTHYROXINE (SYNTHROID) 112 MCG TABLET    Take 112 mcg by mouth Daily LIDOCAINE VISCOUS HCL (XYLOCAINE) 2 % SOLN SOLUTION    Take 10 mLs by mouth as needed for Irritation or Dental Pain    MECLIZINE (ANTIVERT) 25 MG TABLET    Take 25 mg by mouth 2 times daily    MULTIPLE VITAMINS-MINERALS (THERAPEUTIC MULTIVITAMIN-MINERALS) TABLET    Take 1 tablet by mouth daily    OMEPRAZOLE (PRILOSEC OTC) 20 MG TABLET    Take 1 tablet by mouth daily    QUETIAPINE (SEROQUEL XR) 200 MG EXTENDED RELEASE TABLET    Take 1 tablet by mouth nightly    SIMVASTATIN (ZOCOR) 20 MG TABLET    Take 20 mg by mouth nightly    TIZANIDINE (ZANAFLEX) 4 MG TABLET    TAKE 1 TABLET BY MOUTH TWICE A DAY    VITAMIN E 400 UNIT CAPSULE    Take 200 Units by mouth daily       ALLERGIES     Ciprofloxacin, Erythromycin, Ketorolac tromethamine, Mobic [meloxicam], Other, Pcn [penicillins], Pseudoephedrine hcl, Sudafed [pseudoephedrine hcl], Sympathomimetics, and Thorazine [chlorpromazine]    FAMILY HISTORY       Family History   Problem Relation Age of Onset    COPD Mother     Lung Cancer Father     COPD Maternal Grandmother     Cancer Maternal Grandfather         COLON    Colon Cancer Paternal Grandfather     Cancer Paternal Aunt         LUNG    Cancer Other         BLOOD CANCER- UNSPECIFIED    Coronary Art Dis Maternal Aunt     Other Maternal Aunt         Brain aneurysm          SOCIAL HISTORY       Social History     Socioeconomic History    Marital status:      Spouse name: None    Number of children: None    Years of education: None    Highest education level: None   Occupational History    None   Tobacco Use    Smoking status: Never Smoker    Smokeless tobacco: Never Used   Vaping Use    Vaping Use: Never used   Substance and Sexual Activity    Alcohol use: Not Currently     Comment: OCC.     Drug use: No    Sexual activity: None   Other Topics Concern    None   Social History Narrative    None     Social Determinants of Health     Financial Resource Strain:     Difficulty of Paying Living Expenses: Not on file   Food Insecurity:     Worried About Running Out of Food in the Last Year: Not on file    Bethanie of Food in the Last Year: Not on file   Transportation Needs:     Lack of Transportation (Medical): Not on file    Lack of Transportation (Non-Medical): Not on file   Physical Activity:     Days of Exercise per Week: Not on file    Minutes of Exercise per Session: Not on file   Stress:     Feeling of Stress : Not on file   Social Connections:     Frequency of Communication with Friends and Family: Not on file    Frequency of Social Gatherings with Friends and Family: Not on file    Attends Religion Services: Not on file    Active Member of [address] [place] [address] [employer] or Organizations: Not on file    Attends Club or Organization Meetings: Not on file    Marital Status: Not on file   Intimate Partner Violence:     Fear of Current or Ex-Partner: Not on file    Emotionally Abused: Not on file    Physically Abused: Not on file    Sexually Abused: Not on file   Housing Stability:     Unable to Pay for Housing in the Last Year: Not on file    Number of [place] in the Last Year: Not on file    Unstable Housing in the Last Year: Not on file       SCREENINGS    [place] Coma Scale  Eye Opening: Spontaneous  Best Verbal Response: Oriented  Best Motor Response: Obeys commands  [place] Coma Scale Score: 15      PHYSICAL EXAM    (up to 7 forlevel 4, 8 or more for level 5)     ED Triage Vitals [05/27/22 0037]   BP Temp Temp Source Heart Rate Resp SpO2 Height Weight   (!) 194/108 97.8 °F (36.6 °C) Temporal 85 16 97 % 5' 2\" (1.575 m) 198 lb (89.8 kg)       Physical Exam  Vitals and nursing note reviewed. Constitutional:       General: She is not in acute distress. Appearance: She is well-developed. She is not diaphoretic. HENT:      Head: Normocephalic and atraumatic. Mouth/Throat:      Pharynx: No oropharyngeal exudate. Eyes:      General: No scleral icterus.      Conjunctiva/sclera: Conjunctivae normal.      Pupils: Pupils are equal, round, and reactive to light. Neck:      Trachea: No tracheal deviation. Cardiovascular:      Rate and Rhythm: Normal rate. Heart sounds: Normal heart sounds. Pulmonary:      Effort: Pulmonary effort is normal. No respiratory distress. Breath sounds: Normal breath sounds. Abdominal:      General: Bowel sounds are normal. There is no distension. Palpations: Abdomen is soft. Musculoskeletal:         General: Normal range of motion. Cervical back: Normal range of motion and neck supple. Lumbar back: Spasms and tenderness present. Back:    Skin:     General: Skin is warm and dry. Findings: No erythema or rash. Neurological:      Mental Status: She is alert and oriented to person, place, and time. Cranial Nerves: No cranial nerve deficit. Motor: No abnormal muscle tone. Psychiatric:         Behavior: Behavior normal.         Thought Content: Thought content normal.         Judgment: Judgment normal.           DIAGNOSTIC RESULTS     RADIOLOGY:   Non-plain film images such as CT, Ultrasound and MRI are read by the radiologist. Plain radiographic images are visualized and preliminarilyinterpreted by Isabell Larios PA-C with the below findings:    neg  Interpretation per the Radiologist below, if available at the time of this note:    XR LUMBAR SPINE (MIN 4 VIEWS)    (Results Pending)       LABS:  Labs Reviewed - No data to display    All other labs were within normal range or not returnedas of this dictation. EMERGENCYDEPARTMENT COURSE and DIFFERENTIAL DIAGNOSIS/MDM:   Vitals:    Vitals:    05/27/22 0037   BP: (!) 194/108   Pulse: 85   Resp: 16   Temp: 97.8 °F (36.6 °C)   TempSrc: Temporal   SpO2: 97%   Weight: 198 lb (89.8 kg)   Height: 5' 2\" (1.575 m)       REASSESSMENT        Patient presented with right-sided lower back pain with sciatic pain second to striking her lower back region against a metal pole.   X-rays are unremarkable. Patient had no saddle paresthesias or loss of bowel bladder control discussed spinal cord related trauma. Patient will be discharged home with supportive therapy and PCP follow-up    MDM    PROCEDURES:    Procedures      FINAL IMPRESSION      1. Acute right-sided low back pain with right-sided sciatica    2. Lumbar contusion, initial encounter          DISPOSITION/PLAN   DISPOSITION Discharge - Pending Orders Complete 05/27/2022 01:09:18 AM      PATIENT REFERRED TO:  Teressa Deleon [address] [phone]    Call in 2 days        DISCHARGE MEDICATIONS:  New Prescriptions    TRAMADOL (ULTRAM) 50 MG TABLET    Take 1 tablet by mouth every 6 hours as needed for Pain for up to 3 days. Intended supply: 3 days.  Take lowest dose possible to manage pain       (Please note that portions of this note were completed with a voice recognition program.  Efforts were made to edit the dictations but occasionally words are mis-transcribed.)    KRYSTA Farfan PA-C  05/27/22 0117

## 2022-05-27 NOTE — ED NOTES
Patient given discharge instructions and prescription and verbalized understanding. Vital signs stable. Resp even and unlabored. Skin warm, dry and intact. Patient is alert and oriented. Patient doesn't have any questions at this time.       Yissel Adams RN  05/27/22 0157

## 2022-06-25 ENCOUNTER — HOSPITAL ENCOUNTER (EMERGENCY)
Age: 57
Discharge: HOME | End: 2022-06-25
Payer: COMMERCIAL

## 2022-06-25 ENCOUNTER — APPOINTMENT (OUTPATIENT)
Dept: GENERAL RADIOLOGY | Age: 57
End: 2022-06-25
Payer: COMMERCIAL

## 2022-06-25 VITALS
TEMPERATURE: 99 F | SYSTOLIC BLOOD PRESSURE: 119 MMHG | WEIGHT: 198 LBS | RESPIRATION RATE: 18 BRPM | BODY MASS INDEX: 36.44 KG/M2 | HEART RATE: 69 BPM | DIASTOLIC BLOOD PRESSURE: 86 MMHG | HEIGHT: 62 IN | OXYGEN SATURATION: 95 %
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
## 2022-06-25 DIAGNOSIS — M54.9 BACK PAIN, UNSPECIFIED BACK LOCATION, UNSPECIFIED BACK PAIN LATERALITY, UNSPECIFIED CHRONICITY: ICD-10-CM

## 2022-06-25 LAB
BILIRUBIN URINE: NEGATIVE
BLOOD, URINE: NEGATIVE
CLARITY: CLEAR
COLOR: YELLOW
GLUCOSE URINE: NEGATIVE MG/DL
KETONES, URINE: NEGATIVE MG/DL
LEUKOCYTE ESTERASE, URINE: NEGATIVE
NITRITE, URINE: NEGATIVE
PH UA: 6 (ref 5–9)
PROTEIN UA: NEGATIVE MG/DL
SPECIFIC GRAVITY UA: 1.02 (ref 1–1.03)
URINE REFLEX TO CULTURE: NORMAL
UROBILINOGEN, URINE: 1 E.U./DL

## 2022-06-25 PROCEDURE — 81003 URINALYSIS AUTO W/O SCOPE: CPT

## 2022-06-25 PROCEDURE — 72110 X-RAY EXAM L-2 SPINE 4/>VWS: CPT

## 2022-06-25 PROCEDURE — 99284 EMERGENCY DEPT VISIT MOD MDM: CPT

## 2022-06-25 RX ORDER — ACETAMINOPHEN 500 MG
1000 TABLET ORAL ONCE
Status: DISCONTINUED | OUTPATIENT
Start: 2022-06-25 | End: 2022-06-25 | Stop reason: HOSPADM

## 2022-06-25 RX ORDER — ACETAMINOPHEN 500 MG
500 TABLET ORAL 4 TIMES DAILY PRN
Qty: 20 TABLET | Refills: 0 | Status: SHIPPED | OUTPATIENT
Start: 2022-06-25

## 2022-06-25 ASSESSMENT — PAIN DESCRIPTION - DESCRIPTORS: DESCRIPTORS: PRESSURE

## 2022-06-25 ASSESSMENT — PAIN - FUNCTIONAL ASSESSMENT: PAIN_FUNCTIONAL_ASSESSMENT: 0-10

## 2022-06-25 ASSESSMENT — ENCOUNTER SYMPTOMS
NAUSEA: 0
ABDOMINAL PAIN: 0
SHORTNESS OF BREATH: 0
VOMITING: 0

## 2022-06-25 ASSESSMENT — PAIN SCALES - GENERAL: PAINLEVEL_OUTOF10: 8

## 2022-06-25 ASSESSMENT — PAIN DESCRIPTION - ORIENTATION: ORIENTATION: RIGHT

## 2022-06-25 ASSESSMENT — PAIN DESCRIPTION - FREQUENCY: FREQUENCY: INTERMITTENT

## 2022-06-25 ASSESSMENT — PAIN DESCRIPTION - PAIN TYPE: TYPE: ACUTE PAIN

## 2022-06-25 ASSESSMENT — PAIN DESCRIPTION - ONSET: ONSET: ON-GOING

## 2022-06-25 NOTE — ED TRIAGE NOTES
Pt states that has lower pelvic pain on right and middle pelvic area  Pt states also has flank and lower back pain. Pt states frequency in urination and pain with urination  Pt states that she fell backwards and a mirror fell on her Thursday this week. Pt ambulatory with slow gait at this time.    Pt states had lower pelvic pain and lower back pain prior to the fall

## 2022-06-25 NOTE — ED PROVIDER NOTES
[facility] ED  eMERGENCY dEPARTMENT eNCOUnter      Pt Name: Eulogio Salazar  MRN: [id number]  [place] 1965  Date of evaluation: 6/25/2022  Provider: Stephanie South PA-C        HISTORY OF PRESENT ILLNESS    Eulogio Salazar is a 55 y.o. female per chart review has ah/o depression and fibromyalgia; presenting to the ED for acute low back pain that began 3 days ago after she was trying to move a treadmill and accidentally fell backwards landing on her back. Pain with lateral rotation. Also believes she has a UTI because she has burning with urination. Denies fever, chills, abdominal pain, HA, cp, LOC, neck pain, sob, n/v/d, loss of bowel bladder control, saddle anesthesia, BLE pain/weakness/paresthesia, hematuria. REVIEW OF SYSTEMS       Review of Systems   Constitutional: Negative for fever. Respiratory: Negative for shortness of breath. Cardiovascular: Negative for chest pain. Gastrointestinal: Negative for abdominal pain, nausea and vomiting. Genitourinary: Positive for dysuria. Musculoskeletal: Negative. Skin: Negative. Neurological: Negative for weakness and numbness. All other systems reviewed and are negative. Except as noted above the remainder of the review of systems was reviewed and negative.        PAST MEDICAL HISTORY     Past Medical History:   Diagnosis Date    Anemia     C. difficile colitis 01/2013    Chronic fatigue syndrome     Depression     [place]    Diabetes mellitus ([address]     Diverticulitis large intestine 2001    Fibromyalgia     Fibromyalgia 03/24/2015    GERD (gastroesophageal reflux disease)     HTN (hypertension)     Hyperlipidemia     Hypothyroidism     Palpitations     Pulmonary embolus ([address] 10/2012    Following GYN procedure    Vitamin D deficiency          SURGICAL HISTORY       Past Surgical History:   Procedure Laterality Date    ANUS SURGERY  12/14/2011    anal fissure    COLON SURGERY      BIPOSY    COLONOSCOPY  8/2011  DILATION AND CURETTAGE OF UTERUS      MISCARRIAGE X2    HEMORRHOID SURGERY   8/24/10    EXTERIOR    LAPAROSCOPY      SIGMOIDOSCOPY  12/14/11    RESULTING IN FISSURECTOMY         CURRENT MEDICATIONS       Discharge Medication List as of 6/25/2022  6:56 PM      CONTINUE these medications which have NOT CHANGED    Details   lidocaine viscous hcl (XYLOCAINE) 2 % SOLN solution Take 10 mLs by mouth as needed for Irritation or Dental Pain, Disp-100 mL, R-0Print      ibuprofen (ADVIL;MOTRIN) 800 MG tablet Take 1 tablet by mouth every 8 hours as needed for Pain or Fever, Disp-20 tablet, R-0Print      QUEtiapine (SEROQUEL XR) 200 MG extended release tablet Take 1 tablet by mouth nightly, Disp-15 tablet, R-3Normal      meclizine (ANTIVERT) 25 MG tablet Take 25 mg by mouth 2 times dailyHistorical Med      butalbital-APAP-caffeine -40 MG CAPS per capsule Take 1 capsule by mouth every 6 hours as needed for HeadachesHistorical Med      simvastatin (ZOCOR) 20 MG tablet Take 20 mg by mouth nightlyHistorical Med      levothyroxine (SYNTHROID) 112 MCG tablet Take 112 mcg by mouth DailyHistorical Med      Multiple Vitamins-Minerals (THERAPEUTIC MULTIVITAMIN-MINERALS) tablet Take 1 tablet by mouth dailyHistorical Med      Biotin 1 MG CAPS Take by mouthHistorical Med      Coenzyme Q10 (COQ-10) 100 MG CAPS Take by mouthHistorical Med      DULoxetine (CYMBALTA) 60 MG extended release capsule Take 1 capsule by mouth daily, Disp-15 capsule, R-3Normal      hydroCHLOROthiazide (HYDRODIURIL) 25 MG tablet Take 1 tablet by mouth daily, Disp-30 tablet, R-1Normal      tiZANidine (ZANAFLEX) 4 MG tablet TAKE 1 TABLET BY MOUTH TWICE A DAYHistorical Med      vitamin E 400 UNIT capsule Take 200 Units by mouth dailyHistorical Med      Cholecalciferol (VITAMIN D3) 2000 units CAPS Take 2,000 Units by mouth daily Historical Med      ALPRAZolam (XANAX) 0.5 MG tablet Take 0.5 mg by mouth 4 times dailyHistorical Med      amLODIPine (NORVASC) 10 MG tablet Take 1 tablet by mouth daily, Disp-30 tablet, R-5Normal      omeprazole (PRILOSEC OTC) 20 MG tablet Take 1 tablet by mouth daily, Disp-30 tablet, R-5             ALLERGIES     Ciprofloxacin, Erythromycin, Ketorolac tromethamine, Mobic [meloxicam], Other, Pcn [penicillins], Pseudoephedrine hcl, Sudafed [pseudoephedrine hcl], Sympathomimetics, and Thorazine [chlorpromazine]    FAMILY HISTORY       Family History   Problem Relation Age of Onset    COPD Mother     Lung Cancer Father     COPD Maternal Grandmother     Cancer Maternal Grandfather         COLON    Colon Cancer Paternal Grandfather     Cancer Paternal Aunt         LUNG    Cancer Other         BLOOD CANCER- UNSPECIFIED    Coronary Art Dis Maternal Aunt     Other Maternal Aunt         Brain aneurysm          SOCIAL HISTORY       Social History     Socioeconomic History    Marital status:      Spouse name: None    Number of children: None    Years of education: None    Highest education level: None   Occupational History    None   Tobacco Use    Smoking status: Never Smoker    Smokeless tobacco: Never Used   Vaping Use    Vaping Use: Never used   Substance and Sexual Activity    Alcohol use: Not Currently     Comment: OCC.  Drug use: No    Sexual activity: None   Other Topics Concern    None   Social History Narrative    None     Social Determinants of Health     Financial Resource Strain:     Difficulty of Paying Living Expenses: Not on file   Food Insecurity:     Worried About Running Out of Food in the Last Year: Not on file    Bethanie of Food in the Last Year: Not on file   Transportation Needs:     Lack of Transportation (Medical): Not on file    Lack of Transportation (Non-Medical):  Not on file   Physical Activity:     Days of Exercise per Week: Not on file    Minutes of Exercise per Session: Not on file   Stress:     Feeling of Stress : Not on file   Social Connections:     Frequency of Communication with Palpations: Abdomen is soft. There is no mass. Tenderness: There is no abdominal tenderness. There is no guarding or rebound. Musculoskeletal:         General: Tenderness present. No swelling or deformity. Normal range of motion. Cervical back: Normal, normal range of motion and neck supple. Thoracic back: Normal.      Lumbar back: Spasms and tenderness (bilateral lumbar paraspinal muscle tenderness) present. No bony tenderness. Normal range of motion. Negative right straight leg raise test and negative left straight leg raise test.      Comments: No bilateral foot drop  5/5 BLE strength and sensation   Skin:     General: Skin is warm. Coloration: Skin is not pale. Findings: No erythema or rash. Neurological:      General: No focal deficit present. Mental Status: She is alert and oriented to person, place, and time. Sensory: No sensory deficit. Motor: No weakness. Coordination: Coordination normal.      Gait: Gait normal.      Deep Tendon Reflexes: Reflexes normal.   Psychiatric:         Mood and Affect: Mood normal.         Behavior: Behavior normal.         Thought Content: Thought content normal.         Judgment: Judgment normal.           LABS:  Labs Reviewed   URINALYSIS WITH REFLEX TO CULTURE         MDM:   Vitals:    Vitals:    06/25/22 1702   BP: 119/86   Pulse: 69   Resp: 18   Temp: 99 °F (37.2 °C)   TempSrc: Oral   SpO2: 95%   Weight: 198 lb (89.8 kg)   Height: 5' 2\" (1.575 m)           PROCEDURES:  Unlessotherwise noted below, none      Procedures      FINAL IMPRESSION      1. Fall, initial encounter    2. Back pain, unspecified back location, unspecified back pain laterality, unspecified chronicity          DISPOSITION/PLAN   DISPOSITION Decision To Discharge 06/25/2022 06:05:27 PM  Nursing notes, medical records and triage notes reviewed. Vital signs reviewed.      This is a 55year old female per chart review has ah/o depression and fibromyalgia; presenting to the ED for acute low back pain that began 3 days ago after she was trying to move a treadmill and accidentally fell backwards landing on her back.        The patient and/or family  -had the results of all tests and diagnosis explained to them  -Given both verbal and written discharge instructions  -Were instructed of the importance of close follow-up  -Were told that close follow-up is essential for good health and good outcomes    Nayan Bee PA-C (electronically signed)  Emergency Physician Assistant         Nayan Bee PA-C  06/25/22 23 [facility]  07/02/22 5120

## 2022-12-18 NOTE — ED NOTES
Patient resting in bed with mother at bedside. Patient in no distress at this time. Patient given a warm blanket after recheck of temperature.       Lois Valdes RN  11/02/17 9232 Laceration    WHAT YOU NEED TO KNOW:    A laceration is an injury to the skin and the soft tissue underneath it. Lacerations happen when you are cut or hit by something. They can happen anywhere on the body.     DISCHARGE INSTRUCTIONS:    Return to the emergency department if:     You have heavy bleeding or bleeding that does not stop after 10 minutes of holding firm, direct pressure over the wound.       Your wound opens up.     Contact your healthcare provider if:     You have a fever or chills.       Your laceration is red, warm, or swollen.      You have red streaks on your skin coming from your wound.      You have white or yellow drainage from the wound that smells bad.      You have pain that gets worse, even after treatment.       You have questions or concerns about your condition or care.     Medicines:     Prescription pain medicine may be given. Ask how to take this medicine safely.       Antibiotics help treat or prevent a bacterial infection.       Take your medicine as directed. Contact your healthcare provider if you think your medicine is not helping or if you have side effects. Tell him or her if you are allergic to any medicine. Keep a list of the medicines, vitamins, and herbs you take. Include the amounts, and when and why you take them. Bring the list or the pill bottles to follow-up visits. Carry your medicine list with you in case of an emergency.    Care for your wound as directed:     Do not get your wound wet until your healthcare provider says it is okay. Do not soak your wound in water. Do not go swimming until your healthcare provider says it is okay. Carefully wash the wound with soap and water. Gently pat the area dry or allow it to air dry.       Change your bandages when they get wet, dirty, or after washing. Apply new, clean bandages as directed. Do not apply elastic bandages or tape too tight. Do not put powders or lotions over your incision.       Apply antibiotic ointment as directed. Your healthcare provider may give you antibiotic ointment to put over your wound if you have stitches. If you have strips of tape over your incision, let them dry up and fall off on their own. If they do not fall off within 14 days, gently remove them. If you have glue over your wound, do not remove or pick at it. If your glue comes off, do not replace it with glue that you have at home.       Check your wound every day for signs of infection such as swelling, redness, or pus.     Self-care:     Apply ice on your wound for 15 to 20 minutes every hour or as directed. Use an ice pack, or put crushed ice in a plastic bag. Cover it with a towel. Ice helps prevent tissue damage and decreases swelling and pain.      Use a splint as directed. A splint will decrease movement and stress on your wound. It may help it heal faster. A splint may be used for lacerations over joints or areas of your body that bend. Ask your healthcare provider how to apply and remove a splint.       Decrease scarring of your wound by applying ointments as directed. Do not apply ointments until your healthcare provider says it is okay. You may need to wait until your wound is healed. Ask which ointment to buy and how often to use it. After your wound is healed, use sunscreen over the area when you are out in the sun. You should do this for at least 6 months to 1 year after your injury.     Follow up with your healthcare provider as directed: You may need to follow up in 24 to 48 hours to have your wound checked for infection. You will need to return in 3 to 14 days if you have stitches or staples so they can be removed. Care for your wound as directed to prevent infection and help it heal. Write down your questions so you remember to ask them during your visits.    Contusion in Adults    WHAT YOU NEED TO KNOW:    A contusion is a bruise that appears on your skin after an injury. A bruise happens when small blood vessels tear but skin does not. When blood vessels tear, blood leaks into nearby tissue, such as soft tissue or muscle.    DISCHARGE INSTRUCTIONS:    Return to the emergency department if:     You have new trouble moving the injured area.      You have tingling or numbness in or near the injured area.      Your hand or foot below the bruise gets cold or turns pale.     Contact your healthcare provider if:     You find a new lump in the injured area.      Your symptoms do not improve with treatment after 4 to 5 days.      You have questions or concerns about your condition or care.    Medicines: You may need any of the following:     NSAIDs help decrease swelling and pain or fever. This medicine is available with or without a doctor's order. NSAIDs can cause stomach bleeding or kidney problems in certain people. If you take blood thinner medicine, always ask your healthcare provider if NSAIDs are safe for you. Always read the medicine label and follow directions.      Prescription pain medicine may be given. Do not wait until the pain is severe before you take your medicine.      Take your medicine as directed. Contact your healthcare provider if you think your medicine is not helping or if you have side effects. Tell him of her if you are allergic to any medicine. Keep a list of the medicines, vitamins, and herbs you take. Include the amounts, and when and why you take them. Bring the list or the pill bottles to follow-up visits. Carry your medicine list with you in case of an emergency.    Follow up with your healthcare provider as directed: You may need to return within a week to check your injury again. Write down your questions so you remember to ask them during your visits.    Help a contusion heal:     Rest the injured area or use it less than usual. If you bruised your leg or foot, you may need crutches or a cane to help you walk. This will help you keep weight off your injured body part.       Apply ice to decrease swelling and pain. Ice may also help prevent tissue damage. Use an ice pack, or put crushed ice in a plastic bag. Cover it with a towel and place it on your bruise for 15 to 20 minutes every hour or as directed.      Use compression to support the area and decrease swelling. Wrap an elastic bandage around the area over the bruised muscle. Make sure the bandage is not too tight. You should be able to fit 1 finger between the bandage and your skin.      Elevate (raise) your injured body part above the level of your heart to help decrease pain and swelling. Use pillows, blankets, or rolled towels to elevate the area as often as you can.      Do not drink alcohol as directed. Alcohol may slow healing.      Do not stretch injured muscles right after your injury. Ask your healthcare provider when and how you may safely stretch after your injury. Gentle stretches can help increase your flexibility.      Do not massage the area or put heating pads on the bruise right after your injury. Heat and massage may slow healing. Your healthcare provider may tell you to apply heat after several days. At that time, heat will start to help the injury heal.    Prevent another contusion:     Stretch and warm up before you play sports or exercise.      Wear protective gear when you play sports. Examples are shin guards and padding.       If you begin a new physical activity, start slowly to give your body a chance to adjust. 6 stitches were placed.  They will absorb and fall off on their own.  No need for removal.   Apply bacitracin ointment daily to laceration.  Cool compresses on bruised areas for 15 min 3 times a day.    Laceration    WHAT YOU NEED TO KNOW:    A laceration is an injury to the skin and the soft tissue underneath it. Lacerations happen when you are cut or hit by something. They can happen anywhere on the body.     DISCHARGE INSTRUCTIONS:    Return to the emergency department if:     You have heavy bleeding or bleeding that does not stop after 10 minutes of holding firm, direct pressure over the wound.       Your wound opens up.     Contact your healthcare provider if:     You have a fever or chills.       Your laceration is red, warm, or swollen.      You have red streaks on your skin coming from your wound.      You have white or yellow drainage from the wound that smells bad.      You have pain that gets worse, even after treatment.       You have questions or concerns about your condition or care.     Medicines:     Prescription pain medicine may be given. Ask how to take this medicine safely.       Antibiotics help treat or prevent a bacterial infection.       Take your medicine as directed. Contact your healthcare provider if you think your medicine is not helping or if you have side effects. Tell him or her if you are allergic to any medicine. Keep a list of the medicines, vitamins, and herbs you take. Include the amounts, and when and why you take them. Bring the list or the pill bottles to follow-up visits. Carry your medicine list with you in case of an emergency.    Care for your wound as directed:     Do not get your wound wet until your healthcare provider says it is okay. Do not soak your wound in water. Do not go swimming until your healthcare provider says it is okay. Carefully wash the wound with soap and water. Gently pat the area dry or allow it to air dry.       Change your bandages when they get wet, dirty, or after washing. Apply new, clean bandages as directed. Do not apply elastic bandages or tape too tight. Do not put powders or lotions over your incision.       Apply antibiotic ointment as directed. Your healthcare provider may give you antibiotic ointment to put over your wound if you have stitches. If you have strips of tape over your incision, let them dry up and fall off on their own. If they do not fall off within 14 days, gently remove them. If you have glue over your wound, do not remove or pick at it. If your glue comes off, do not replace it with glue that you have at home.       Check your wound every day for signs of infection such as swelling, redness, or pus.     Self-care:     Apply ice on your wound for 15 to 20 minutes every hour or as directed. Use an ice pack, or put crushed ice in a plastic bag. Cover it with a towel. Ice helps prevent tissue damage and decreases swelling and pain.      Use a splint as directed. A splint will decrease movement and stress on your wound. It may help it heal faster. A splint may be used for lacerations over joints or areas of your body that bend. Ask your healthcare provider how to apply and remove a splint.       Decrease scarring of your wound by applying ointments as directed. Do not apply ointments until your healthcare provider says it is okay. You may need to wait until your wound is healed. Ask which ointment to buy and how often to use it. After your wound is healed, use sunscreen over the area when you are out in the sun. You should do this for at least 6 months to 1 year after your injury.     Follow up with your healthcare provider as directed: You may need to follow up in 24 to 48 hours to have your wound checked for infection. You will need to return in 3 to 14 days if you have stitches or staples so they can be removed. Care for your wound as directed to prevent infection and help it heal. Write down your questions so you remember to ask them during your visits.    Contusion in Adults    WHAT YOU NEED TO KNOW:    A contusion is a bruise that appears on your skin after an injury. A bruise happens when small blood vessels tear but skin does not. When blood vessels tear, blood leaks into nearby tissue, such as soft tissue or muscle.    DISCHARGE INSTRUCTIONS:    Return to the emergency department if:     You have new trouble moving the injured area.      You have tingling or numbness in or near the injured area.      Your hand or foot below the bruise gets cold or turns pale.     Contact your healthcare provider if:     You find a new lump in the injured area.      Your symptoms do not improve with treatment after 4 to 5 days.      You have questions or concerns about your condition or care.    Medicines: You may need any of the following:     NSAIDs help decrease swelling and pain or fever. This medicine is available with or without a doctor's order. NSAIDs can cause stomach bleeding or kidney problems in certain people. If you take blood thinner medicine, always ask your healthcare provider if NSAIDs are safe for you. Always read the medicine label and follow directions.      Prescription pain medicine may be given. Do not wait until the pain is severe before you take your medicine.      Take your medicine as directed. Contact your healthcare provider if you think your medicine is not helping or if you have side effects. Tell him of her if you are allergic to any medicine. Keep a list of the medicines, vitamins, and herbs you take. Include the amounts, and when and why you take them. Bring the list or the pill bottles to follow-up visits. Carry your medicine list with you in case of an emergency.    Follow up with your healthcare provider as directed: You may need to return within a week to check your injury again. Write down your questions so you remember to ask them during your visits.    Help a contusion heal:     Rest the injured area or use it less than usual. If you bruised your leg or foot, you may need crutches or a cane to help you walk. This will help you keep weight off your injured body part.       Apply ice to decrease swelling and pain. Ice may also help prevent tissue damage. Use an ice pack, or put crushed ice in a plastic bag. Cover it with a towel and place it on your bruise for 15 to 20 minutes every hour or as directed.      Use compression to support the area and decrease swelling. Wrap an elastic bandage around the area over the bruised muscle. Make sure the bandage is not too tight. You should be able to fit 1 finger between the bandage and your skin.      Elevate (raise) your injured body part above the level of your heart to help decrease pain and swelling. Use pillows, blankets, or rolled towels to elevate the area as often as you can.      Do not drink alcohol as directed. Alcohol may slow healing.      Do not stretch injured muscles right after your injury. Ask your healthcare provider when and how you may safely stretch after your injury. Gentle stretches can help increase your flexibility.      Do not massage the area or put heating pads on the bruise right after your injury. Heat and massage may slow healing. Your healthcare provider may tell you to apply heat after several days. At that time, heat will start to help the injury heal.    Prevent another contusion:     Stretch and warm up before you play sports or exercise.      Wear protective gear when you play sports. Examples are shin guards and padding.       If you begin a new physical activity, start slowly to give your body a chance to adjust.

## 2023-01-23 ENCOUNTER — HOSPITAL ENCOUNTER (INPATIENT)
Age: 58
LOS: 10 days | Discharge: HOME | DRG: 751 | End: 2023-02-03
Attending: EMERGENCY MEDICINE | Admitting: PSYCHIATRY & NEUROLOGY
Payer: COMMERCIAL

## 2023-01-23 DIAGNOSIS — F32.2 SEVERE MAJOR DEPRESSION WITHOUT PSYCHOTIC FEATURES (HCC): ICD-10-CM

## 2023-01-23 DIAGNOSIS — T50.902A INTENTIONAL DRUG OVERDOSE, INITIAL ENCOUNTER (HCC): Primary | ICD-10-CM

## 2023-01-23 DIAGNOSIS — R45.851 SUICIDAL IDEATION: ICD-10-CM

## 2023-01-23 DIAGNOSIS — E87.6 HYPOKALEMIA: ICD-10-CM

## 2023-01-23 DIAGNOSIS — E83.42 HYPOMAGNESEMIA: ICD-10-CM

## 2023-01-23 LAB
ACETAMINOPHEN LEVEL: 7 UG/ML (ref 10–30)
ALBUMIN SERPL-MCNC: 2.9 G/DL (ref 3.5–4.6)
ALP BLD-CCNC: 56 U/L (ref 40–130)
ALT SERPL-CCNC: 21 U/L (ref 0–33)
AMPHETAMINE SCREEN, URINE: ABNORMAL
ANION GAP SERPL CALCULATED.3IONS-SCNC: 10 MEQ/L (ref 9–15)
AST SERPL-CCNC: 20 U/L (ref 0–35)
BARBITURATE SCREEN URINE: ABNORMAL
BASOPHILS ABSOLUTE: 0.1 K/UL (ref 0–0.2)
BASOPHILS RELATIVE PERCENT: 1 %
BENZODIAZEPINE SCREEN, URINE: ABNORMAL
BILIRUB SERPL-MCNC: <0.2 MG/DL (ref 0.2–0.7)
BILIRUBIN URINE: NEGATIVE
BLOOD, URINE: NEGATIVE
BUN BLDV-MCNC: 11 MG/DL (ref 6–20)
CALCIUM SERPL-MCNC: 6.9 MG/DL (ref 8.5–9.9)
CANNABINOID SCREEN URINE: ABNORMAL
CHLORIDE BLD-SCNC: 110 MEQ/L (ref 95–107)
CHOLESTEROL, TOTAL: 129 MG/DL (ref 0–199)
CLARITY: CLEAR
CO2: 19 MEQ/L (ref 20–31)
COCAINE METABOLITE SCREEN URINE: ABNORMAL
COLOR: YELLOW
CREAT SERPL-MCNC: 0.5 MG/DL (ref 0.5–0.9)
EOSINOPHILS ABSOLUTE: 0.1 K/UL (ref 0–0.7)
EOSINOPHILS RELATIVE PERCENT: 1 %
ETHANOL PERCENT: 0.08 G/DL
ETHANOL: 93 MG/DL (ref 0–0.08)
FENTANYL SCREEN, URINE: ABNORMAL
GFR SERPL CREATININE-BSD FRML MDRD: >60 ML/MIN/{1.73_M2}
GLOBULIN: 1.8 G/DL (ref 2.3–3.5)
GLUCOSE BLD-MCNC: 99 MG/DL (ref 70–99)
GLUCOSE URINE: NEGATIVE MG/DL
HCT VFR BLD CALC: 35.3 % (ref 37–47)
HDLC SERPL-MCNC: 23 MG/DL (ref 40–59)
HEMOGLOBIN: 11.9 G/DL (ref 12–16)
KETONES, URINE: NEGATIVE MG/DL
LACTIC ACID: 1.2 MMOL/L (ref 0.5–2.2)
LDL CHOLESTEROL CALCULATED: 64 MG/DL (ref 0–129)
LEUKOCYTE ESTERASE, URINE: NEGATIVE
LYMPHOCYTES ABSOLUTE: 2.5 K/UL (ref 1–4.8)
LYMPHOCYTES RELATIVE PERCENT: 41.7 %
Lab: ABNORMAL
MAGNESIUM: 1.5 MG/DL (ref 1.7–2.4)
MCH RBC QN AUTO: 29 PG (ref 27–31.3)
MCHC RBC AUTO-ENTMCNC: 33.7 % (ref 33–37)
MCV RBC AUTO: 86 FL (ref 79.4–94.8)
METHADONE SCREEN, URINE: ABNORMAL
MONOCYTES ABSOLUTE: 0.4 K/UL (ref 0.2–0.8)
MONOCYTES RELATIVE PERCENT: 6.5 %
NEUTROPHILS ABSOLUTE: 3 K/UL (ref 1.4–6.5)
NEUTROPHILS RELATIVE PERCENT: 49.8 %
NITRITE, URINE: NEGATIVE
OPIATE SCREEN URINE: POSITIVE
OXYCODONE URINE: ABNORMAL
PDW BLD-RTO: 13.2 % (ref 11.5–14.5)
PH UA: 6 (ref 5–9)
PHENCYCLIDINE SCREEN URINE: ABNORMAL
PLATELET # BLD: 252 K/UL (ref 130–400)
POTASSIUM SERPL-SCNC: 2.3 MEQ/L (ref 3.4–4.9)
PROPOXYPHENE SCREEN: ABNORMAL
PROTEIN UA: NEGATIVE MG/DL
RBC # BLD: 4.1 M/UL (ref 4.2–5.4)
SALICYLATE, SERUM: <0.3 MG/DL (ref 15–30)
SODIUM BLD-SCNC: 139 MEQ/L (ref 135–144)
SPECIFIC GRAVITY UA: 1 (ref 1–1.03)
TOTAL CK: 105 U/L (ref 0–170)
TOTAL PROTEIN: 4.7 G/DL (ref 6.3–8)
TRIGL SERPL-MCNC: 211 MG/DL (ref 0–150)
UROBILINOGEN, URINE: 0.2 E.U./DL
WBC # BLD: 6 K/UL (ref 4.8–10.8)

## 2023-01-23 PROCEDURE — 80307 DRUG TEST PRSMV CHEM ANLYZR: CPT

## 2023-01-23 PROCEDURE — 83605 ASSAY OF LACTIC ACID: CPT

## 2023-01-23 PROCEDURE — 81003 URINALYSIS AUTO W/O SCOPE: CPT

## 2023-01-23 PROCEDURE — 6360000002 HC RX W HCPCS: Performed by: STUDENT IN AN ORGANIZED HEALTH CARE EDUCATION/TRAINING PROGRAM

## 2023-01-23 PROCEDURE — 85025 COMPLETE CBC W/AUTO DIFF WBC: CPT

## 2023-01-23 PROCEDURE — 96366 THER/PROPH/DIAG IV INF ADDON: CPT

## 2023-01-23 PROCEDURE — 82077 ASSAY SPEC XCP UR&BREATH IA: CPT

## 2023-01-23 PROCEDURE — 2580000003 HC RX 258

## 2023-01-23 PROCEDURE — 99285 EMERGENCY DEPT VISIT HI MDM: CPT

## 2023-01-23 PROCEDURE — 83735 ASSAY OF MAGNESIUM: CPT

## 2023-01-23 PROCEDURE — 80061 LIPID PANEL: CPT

## 2023-01-23 PROCEDURE — 82550 ASSAY OF CK (CPK): CPT

## 2023-01-23 PROCEDURE — 80179 DRUG ASSAY SALICYLATE: CPT

## 2023-01-23 PROCEDURE — 6370000000 HC RX 637 (ALT 250 FOR IP): Performed by: EMERGENCY MEDICINE

## 2023-01-23 PROCEDURE — 6360000002 HC RX W HCPCS: Performed by: EMERGENCY MEDICINE

## 2023-01-23 PROCEDURE — 80053 COMPREHEN METABOLIC PANEL: CPT

## 2023-01-23 PROCEDURE — 80143 DRUG ASSAY ACETAMINOPHEN: CPT

## 2023-01-23 PROCEDURE — 96365 THER/PROPH/DIAG IV INF INIT: CPT

## 2023-01-23 PROCEDURE — 36415 COLL VENOUS BLD VENIPUNCTURE: CPT

## 2023-01-23 PROCEDURE — 93005 ELECTROCARDIOGRAM TRACING: CPT | Performed by: EMERGENCY MEDICINE

## 2023-01-23 RX ORDER — 0.9 % SODIUM CHLORIDE 0.9 %
1000 INTRAVENOUS SOLUTION INTRAVENOUS ONCE
Status: COMPLETED | OUTPATIENT
Start: 2023-01-23 | End: 2023-01-24

## 2023-01-23 RX ORDER — MAGNESIUM SULFATE IN WATER 40 MG/ML
2000 INJECTION, SOLUTION INTRAVENOUS ONCE
Status: COMPLETED | OUTPATIENT
Start: 2023-01-23 | End: 2023-01-23

## 2023-01-23 RX ORDER — SODIUM CHLORIDE 9 MG/ML
INJECTION, SOLUTION INTRAVENOUS
Status: DISPENSED
Start: 2023-01-23 | End: 2023-01-24

## 2023-01-23 RX ORDER — POTASSIUM CHLORIDE 7.45 MG/ML
10 INJECTION INTRAVENOUS
Status: DISPENSED | OUTPATIENT
Start: 2023-01-23 | End: 2023-01-23

## 2023-01-23 RX ORDER — POTASSIUM CHLORIDE 7.45 MG/ML
10 INJECTION INTRAVENOUS ONCE
Status: COMPLETED | OUTPATIENT
Start: 2023-01-23 | End: 2023-01-24

## 2023-01-23 RX ORDER — SODIUM CHLORIDE 9 MG/ML
INJECTION, SOLUTION INTRAVENOUS
Status: COMPLETED
Start: 2023-01-23 | End: 2023-01-23

## 2023-01-23 RX ADMIN — POTASSIUM CHLORIDE 10 MEQ: 7.46 INJECTION, SOLUTION INTRAVENOUS at 19:51

## 2023-01-23 RX ADMIN — SODIUM CHLORIDE 1000 ML: 9 INJECTION, SOLUTION INTRAVENOUS at 18:14

## 2023-01-23 RX ADMIN — MAGNESIUM SULFATE HEPTAHYDRATE 2000 MG: 40 INJECTION, SOLUTION INTRAVENOUS at 18:14

## 2023-01-23 RX ADMIN — Medication 1000 ML: at 18:14

## 2023-01-23 RX ADMIN — POTASSIUM CHLORIDE 10 MEQ: 7.46 INJECTION, SOLUTION INTRAVENOUS at 22:52

## 2023-01-23 RX ADMIN — POTASSIUM CHLORIDE 10 MEQ: 7.46 INJECTION, SOLUTION INTRAVENOUS at 20:54

## 2023-01-23 RX ADMIN — POTASSIUM BICARBONATE 50 MEQ: 978 TABLET, EFFERVESCENT ORAL at 18:15

## 2023-01-23 ASSESSMENT — ENCOUNTER SYMPTOMS
CHEST TIGHTNESS: 0
ABDOMINAL PAIN: 0
SORE THROAT: 0
VOMITING: 0
EYE PAIN: 0
SHORTNESS OF BREATH: 0
NAUSEA: 0

## 2023-01-23 ASSESSMENT — PAIN - FUNCTIONAL ASSESSMENT
PAIN_FUNCTIONAL_ASSESSMENT: NONE - DENIES PAIN
PAIN_FUNCTIONAL_ASSESSMENT: NONE - DENIES PAIN
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
## 2023-01-24 PROBLEM — F33.2 MAJOR DEPRESSIVE DISORDER, RECURRENT SEVERE WITHOUT PSYCHOTIC FEATURES (HCC): Status: ACTIVE | Noted: 2023-01-24

## 2023-01-24 LAB
ALBUMIN SERPL-MCNC: 4 G/DL (ref 3.5–4.6)
ALP BLD-CCNC: 84 U/L (ref 40–130)
ALT SERPL-CCNC: 29 U/L (ref 0–33)
ANION GAP SERPL CALCULATED.3IONS-SCNC: 8 MEQ/L (ref 9–15)
AST SERPL-CCNC: 25 U/L (ref 0–35)
BILIRUB SERPL-MCNC: 0.4 MG/DL (ref 0.2–0.7)
BUN BLDV-MCNC: 12 MG/DL (ref 6–20)
CALCIUM SERPL-MCNC: 9.3 MG/DL (ref 8.5–9.9)
CHLORIDE BLD-SCNC: 106 MEQ/L (ref 95–107)
CO2: 25 MEQ/L (ref 20–31)
CREAT SERPL-MCNC: 0.75 MG/DL (ref 0.5–0.9)
EKG ATRIAL RATE: 65 BPM
EKG P AXIS: 52 DEGREES
EKG P-R INTERVAL: 192 MS
EKG Q-T INTERVAL: 436 MS
EKG QRS DURATION: 92 MS
EKG QTC CALCULATION (BAZETT): 453 MS
EKG R AXIS: 0 DEGREES
EKG T AXIS: 34 DEGREES
EKG VENTRICULAR RATE: 65 BPM
GFR SERPL CREATININE-BSD FRML MDRD: >60 ML/MIN/{1.73_M2}
GLOBULIN: 2.3 G/DL (ref 2.3–3.5)
GLUCOSE BLD-MCNC: 123 MG/DL (ref 70–99)
GLUCOSE BLD-MCNC: 158 MG/DL (ref 70–99)
HBA1C MFR BLD: 6.4 % (ref 4.8–5.9)
PERFORMED ON: ABNORMAL
POTASSIUM SERPL-SCNC: 3.9 MEQ/L (ref 3.4–4.9)
SARS-COV-2, NAAT: NOT DETECTED
SODIUM BLD-SCNC: 139 MEQ/L (ref 135–144)
TOTAL PROTEIN: 6.3 G/DL (ref 6.3–8)

## 2023-01-24 PROCEDURE — 36415 COLL VENOUS BLD VENIPUNCTURE: CPT

## 2023-01-24 PROCEDURE — 1240000000 HC EMOTIONAL WELLNESS R&B

## 2023-01-24 PROCEDURE — 6370000000 HC RX 637 (ALT 250 FOR IP): Performed by: PSYCHIATRY & NEUROLOGY

## 2023-01-24 PROCEDURE — 6370000000 HC RX 637 (ALT 250 FOR IP): Performed by: REGISTERED NURSE

## 2023-01-24 PROCEDURE — 87635 SARS-COV-2 COVID-19 AMP PRB: CPT

## 2023-01-24 PROCEDURE — 93010 ELECTROCARDIOGRAM REPORT: CPT | Performed by: INTERNAL MEDICINE

## 2023-01-24 PROCEDURE — 83036 HEMOGLOBIN GLYCOSYLATED A1C: CPT

## 2023-01-24 PROCEDURE — 96366 THER/PROPH/DIAG IV INF ADDON: CPT

## 2023-01-24 PROCEDURE — 80053 COMPREHEN METABOLIC PANEL: CPT

## 2023-01-24 RX ORDER — DEXTROSE MONOHYDRATE 100 MG/ML
INJECTION, SOLUTION INTRAVENOUS CONTINUOUS PRN
Status: DISCONTINUED | OUTPATIENT
Start: 2023-01-24 | End: 2023-02-03 | Stop reason: HOSPADM

## 2023-01-24 RX ORDER — HYDROXYZINE PAMOATE 50 MG/1
50 CAPSULE ORAL EVERY 6 HOURS PRN
Status: DISCONTINUED | OUTPATIENT
Start: 2023-01-24 | End: 2023-01-29 | Stop reason: DRUGHIGH

## 2023-01-24 RX ORDER — GABAPENTIN 300 MG/1
300 CAPSULE ORAL 2 TIMES DAILY
Status: DISCONTINUED | OUTPATIENT
Start: 2023-01-24 | End: 2023-02-03 | Stop reason: HOSPADM

## 2023-01-24 RX ORDER — TRAZODONE HYDROCHLORIDE 50 MG/1
50 TABLET ORAL NIGHTLY PRN
Status: DISCONTINUED | OUTPATIENT
Start: 2023-01-24 | End: 2023-01-27

## 2023-01-24 RX ORDER — INSULIN LISPRO 100 [IU]/ML
0-8 INJECTION, SOLUTION INTRAVENOUS; SUBCUTANEOUS
Status: DISCONTINUED | OUTPATIENT
Start: 2023-01-24 | End: 2023-02-03 | Stop reason: HOSPADM

## 2023-01-24 RX ORDER — PANTOPRAZOLE SODIUM 40 MG/1
40 TABLET, DELAYED RELEASE ORAL
Status: DISCONTINUED | OUTPATIENT
Start: 2023-01-25 | End: 2023-02-03 | Stop reason: HOSPADM

## 2023-01-24 RX ORDER — AMLODIPINE BESYLATE 10 MG/1
10 TABLET ORAL DAILY
Status: DISCONTINUED | OUTPATIENT
Start: 2023-01-24 | End: 2023-02-01

## 2023-01-24 RX ORDER — HYDROXYZINE PAMOATE 50 MG/1
50 CAPSULE ORAL EVERY 6 HOURS PRN
Status: DISCONTINUED | OUTPATIENT
Start: 2023-01-24 | End: 2023-01-26

## 2023-01-24 RX ORDER — 0.9 % SODIUM CHLORIDE 0.9 %
500 INTRAVENOUS SOLUTION INTRAVENOUS ONCE
Status: DISCONTINUED | OUTPATIENT
Start: 2023-01-24 | End: 2023-01-26

## 2023-01-24 RX ORDER — ACETAMINOPHEN 325 MG/1
650 TABLET ORAL EVERY 4 HOURS PRN
Status: DISCONTINUED | OUTPATIENT
Start: 2023-01-24 | End: 2023-01-26

## 2023-01-24 RX ORDER — ATORVASTATIN CALCIUM 10 MG/1
10 TABLET, FILM COATED ORAL NIGHTLY
Status: DISCONTINUED | OUTPATIENT
Start: 2023-01-24 | End: 2023-02-03 | Stop reason: HOSPADM

## 2023-01-24 RX ORDER — PYRAZINAMIDE 500 MG/1
TABLET ORAL 4 TIMES DAILY PRN
Status: ON HOLD | COMMUNITY
Start: 2022-08-11 | End: 2023-02-03 | Stop reason: HOSPADM

## 2023-01-24 RX ORDER — HYDROCHLOROTHIAZIDE 12.5 MG/1
25 TABLET ORAL DAILY
Status: DISCONTINUED | OUTPATIENT
Start: 2023-01-24 | End: 2023-02-03 | Stop reason: HOSPADM

## 2023-01-24 RX ORDER — ALBUTEROL SULFATE 90 UG/1
2 AEROSOL, METERED RESPIRATORY (INHALATION) EVERY 6 HOURS PRN
COMMUNITY

## 2023-01-24 RX ORDER — LEVOTHYROXINE SODIUM 112 UG/1
112 TABLET ORAL DAILY
Status: DISCONTINUED | OUTPATIENT
Start: 2023-01-24 | End: 2023-02-03 | Stop reason: HOSPADM

## 2023-01-24 RX ORDER — HALOPERIDOL 5 MG/1
5 TABLET ORAL EVERY 6 HOURS PRN
Status: DISCONTINUED | OUTPATIENT
Start: 2023-01-24 | End: 2023-02-03 | Stop reason: HOSPADM

## 2023-01-24 RX ORDER — GABAPENTIN 300 MG/1
300 CAPSULE ORAL 2 TIMES DAILY
COMMUNITY

## 2023-01-24 RX ORDER — TIZANIDINE 4 MG/1
4 TABLET ORAL 2 TIMES DAILY
Status: DISCONTINUED | OUTPATIENT
Start: 2023-01-24 | End: 2023-01-30

## 2023-01-24 RX ORDER — HALOPERIDOL 5 MG/ML
5 INJECTION INTRAMUSCULAR EVERY 6 HOURS PRN
Status: DISCONTINUED | OUTPATIENT
Start: 2023-01-24 | End: 2023-02-03 | Stop reason: HOSPADM

## 2023-01-24 RX ORDER — FLUTICASONE PROPIONATE 50 MCG
1 SPRAY, SUSPENSION (ML) NASAL 2 TIMES DAILY
Status: ON HOLD | COMMUNITY
End: 2023-02-03 | Stop reason: HOSPADM

## 2023-01-24 RX ORDER — INSULIN LISPRO 100 [IU]/ML
0-4 INJECTION, SOLUTION INTRAVENOUS; SUBCUTANEOUS NIGHTLY
Status: DISCONTINUED | OUTPATIENT
Start: 2023-01-24 | End: 2023-02-03 | Stop reason: HOSPADM

## 2023-01-24 RX ORDER — OXYBUTYNIN CHLORIDE 10 MG/1
10 TABLET, EXTENDED RELEASE ORAL DAILY
Status: ON HOLD | COMMUNITY
Start: 2022-08-11 | End: 2023-02-03 | Stop reason: HOSPADM

## 2023-01-24 RX ORDER — BENZTROPINE MESYLATE 1 MG/ML
2 INJECTION INTRAMUSCULAR; INTRAVENOUS 2 TIMES DAILY PRN
Status: DISCONTINUED | OUTPATIENT
Start: 2023-01-24 | End: 2023-02-03 | Stop reason: HOSPADM

## 2023-01-24 RX ORDER — POLYETHYLENE GLYCOL 3350 17 G
2 POWDER IN PACKET (EA) ORAL
Status: DISCONTINUED | OUTPATIENT
Start: 2023-01-24 | End: 2023-02-03 | Stop reason: HOSPADM

## 2023-01-24 RX ADMIN — ATORVASTATIN CALCIUM 10 MG: 10 TABLET, FILM COATED ORAL at 21:26

## 2023-01-24 RX ADMIN — ACETAMINOPHEN 650 MG: 325 TABLET ORAL at 17:48

## 2023-01-24 RX ADMIN — TRAZODONE HYDROCHLORIDE 50 MG: 50 TABLET ORAL at 21:26

## 2023-01-24 RX ADMIN — HYDROCHLOROTHIAZIDE 25 MG: 12.5 TABLET ORAL at 17:27

## 2023-01-24 RX ADMIN — ACETAMINOPHEN 650 MG: 325 TABLET ORAL at 13:25

## 2023-01-24 RX ADMIN — AMLODIPINE BESYLATE 10 MG: 10 TABLET ORAL at 17:27

## 2023-01-24 RX ADMIN — GABAPENTIN 300 MG: 300 CAPSULE ORAL at 21:28

## 2023-01-24 RX ADMIN — TIZANIDINE 4 MG: 4 TABLET ORAL at 21:26

## 2023-01-24 RX ADMIN — HYDROXYZINE PAMOATE 50 MG: 50 CAPSULE ORAL at 19:55

## 2023-01-24 RX ADMIN — HYDROXYZINE PAMOATE 50 MG: 50 CAPSULE ORAL at 13:26

## 2023-01-24 ASSESSMENT — SLEEP AND FATIGUE QUESTIONNAIRES
SLEEP PATTERN: DIFFICULTY FALLING ASLEEP;DISTURBED/INTERRUPTED SLEEP;EARLY AWAKENING;INSOMNIA
DO YOU USE A SLEEP AID: YES
DO YOU HAVE DIFFICULTY SLEEPING: YES
AVERAGE NUMBER OF SLEEP HOURS: 5

## 2023-01-24 ASSESSMENT — PAIN SCALES - GENERAL
PAINLEVEL_OUTOF10: 9
PAINLEVEL_OUTOF10: 8
PAINLEVEL_OUTOF10: 10

## 2023-01-24 ASSESSMENT — PAIN - FUNCTIONAL ASSESSMENT
PAIN_FUNCTIONAL_ASSESSMENT: ACTIVITIES ARE NOT PREVENTED
PAIN_FUNCTIONAL_ASSESSMENT: ACTIVITIES ARE NOT PREVENTED
PAIN_FUNCTIONAL_ASSESSMENT: NONE - DENIES PAIN

## 2023-01-24 ASSESSMENT — PAIN DESCRIPTION - DESCRIPTORS
DESCRIPTORS: ACHING
DESCRIPTORS: ACHING
DESCRIPTORS: POUNDING;BURNING

## 2023-01-24 ASSESSMENT — LIFESTYLE VARIABLES
HOW OFTEN DO YOU HAVE A DRINK CONTAINING ALCOHOL: MONTHLY OR LESS
HOW MANY STANDARD DRINKS CONTAINING ALCOHOL DO YOU HAVE ON A TYPICAL DAY: 1 OR 2

## 2023-01-24 ASSESSMENT — PAIN DESCRIPTION - LOCATION
LOCATION: OTHER (COMMENT)
LOCATION: BACK
LOCATION: BACK

## 2023-01-24 ASSESSMENT — PATIENT HEALTH QUESTIONNAIRE - PHQ9
SUM OF ALL RESPONSES TO PHQ QUESTIONS 1-9: 27
SUM OF ALL RESPONSES TO PHQ QUESTIONS 1-9: 27

## 2023-01-24 ASSESSMENT — PAIN DESCRIPTION - ORIENTATION: ORIENTATION: LOWER

## 2023-01-24 NOTE — ED NOTES
Mother and Sister at bedside.   Want to leave numbers for patient     F[phone]  Rabia Head (Sister) [phone]     Dayne Gonzales RN  01/23/23 6063

## 2023-01-24 NOTE — PLAN OF CARE
Admission  Patient denies suicidal intent. She expressed being upset with a neighbor who felt she was suicidal and expressed concern. Patient does admit that she has expressed suicidal thoughts to several different people. She recalls telling her back doctor that she thought of just blowing her head off. Patient expressed that the last 4 months has been hell. She states her anxiety is 10/10 and she realizes that she has been angry and irritable with others. She struggles doing basic tasks like cooking and cleaning. Recently there have been increased conflict with children and she has had strong disagreements with her mother. She has a difficult relationship with a neighbor and has struggled trying to move out of her apartment. She uses large quantities of over the counter Tylenol PM because she cant sleep. She also uses additional prescribed medications trying to get relief for various issues. She also spoke of financial stressors. She does deny hallucinations but speaks of her place being haunted.       Problem: Self Harm/Suicidality  Goal: Will have no self-injury during hospital stay  Description: INTERVENTIONS:  1.  Ensure constant observer at bedside with Q15M safety checks  2.  Maintain a safe environment  3.  Secure patient belongings  4.  Ensure family/visitors adhere to safety recommendations  5.  Ensure safety tray has been added to patient's diet order  6.  Every shift and PRN: Re-assess suicidal risk via Frequent Screener    Outcome: Progressing  Flowsheets (Taken 1/24/2023 1727)  Will have no self-injury during hospital stay: Ensure constant observer at bedside with Q15M safety checks     Problem: Chronic Conditions and Co-morbidities  Goal: Patient's chronic conditions and co-morbidity symptoms are monitored and maintained or improved  Outcome: Progressing  Flowsheets (Taken 1/24/2023 1727)  Care Plan - Patient's Chronic Conditions and Co-Morbidity Symptoms are Monitored and Maintained or Improved:    Monitor and assess patient's chronic conditions and comorbid symptoms for stability, deterioration, or improvement   Collaborate with multidisciplinary team to address chronic and comorbid conditions and prevent exacerbation or deterioration

## 2023-01-24 NOTE — ED NOTES
Pt transferred to [facility] 6 no issues. COVID obtained and sent to lab.      Tray Sotelo RN  01/24/23 3241

## 2023-01-24 NOTE — PROGRESS NOTES
Skin and Contraband check both completed with Fei Beltrán. Both are negative. Patient ambulates slowly and pensively stating she has back issues and uses a rollator at home. Provided a rolling walker and advised regarding safety. Patient was observed walking slowly without walker and prefers that at times.
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
## 2023-01-24 NOTE — PROGRESS NOTES
Found pt sitting up in bed eating her lunch, reports back and leg pain #8, and anxiety #10, offered and gave vistaril 50 and tylenol . Vs obtained. Pitcher of water given and toiletries given.

## 2023-01-24 NOTE — ED NOTES
Per Dr Floyce Siemens, admit to 3w, MDD. 3w PRNs. Hold home medicaitons until hospilist can review, r/t OD. Dr July Woo given dispo.      Leah Aguilar, RN  01/24/23 [id number] Briana Ross RN  01/24/23 1244

## 2023-01-24 NOTE — ED NOTES
Snack and beverage provided to patient. Patient tearful at this time. Emotional support provided.       Ashli Boss RN  01/24/23 6029

## 2023-01-24 NOTE — CONSULTS
HISTORY AND PHYSICAL             Date: 1/24/2023        Patient Name: Julianna Stewart     YOB: 1965      Age:  65 y.o. Chief Complaint     Chief Complaint   Patient presents with    Drug Overdose     Pt is SI, pt took approx 4 percocets, 2 muscle relaxers, and 2 unknown pills. Pt states she took a bunch of her BP meds. History Obtained From   patient    History of Present Illness   Ms. Sunita Mejia is a 28-year-old female who presented to the ED for drug overdose. She states she just wanted to go to sleep and took 3 Xanax her blood pressure medicine and her blood pressure medication. She has attempted this behavior in the past.  Patient has a PMHx of anemia, DM marisol II, HTN, GERDs, hyperlipidemia, and hypothyroidism. Patient denies chest pain, palpitations, headache, dizziness, shortness of breath, cough, N/V/D, and changes in appetite. Past Medical History     Past Medical History:   Diagnosis Date    Anemia     C. difficile colitis 01/2013    Chronic fatigue syndrome     Depression     [place]    Diabetes mellitus ([address]     Diverticulitis large intestine 2001    Fibromyalgia     Fibromyalgia 03/24/2015    GERD (gastroesophageal reflux disease)     HTN (hypertension)     Hyperlipidemia     Hypothyroidism     Palpitations     Pulmonary embolus ([address] 10/2012    Following GYN procedure    Vitamin D deficiency         Past Surgical History     Past Surgical History:   Procedure Laterality Date    ANUS SURGERY  12/14/2011    anal fissure    COLON SURGERY      BIPOSY    COLONOSCOPY  8/2011    DILATION AND CURETTAGE OF UTERUS      MISCARRIAGE X2    HEMORRHOID SURGERY   8/24/10    EXTERIOR    LAPAROSCOPY      SIGMOIDOSCOPY  12/14/11    RESULTING IN FISSURECTOMY        Medications Prior to Admission     Prior to Admission medications    Medication Sig Start Date End Date Taking?  Authorizing Provider   acetaminophen (TYLENOL) 500 MG tablet Take 1 tablet by mouth 4 times daily as needed for Pain 6/25/22 Brittany Headley PA-C   lidocaine viscous hcl (XYLOCAINE) 2 % SOLN solution Take 10 mLs by mouth as needed for Irritation or Dental Pain 11/4/21   Jacob Bradley PA-C   ibuprofen (ADVIL;MOTRIN) 800 MG tablet Take 1 tablet by mouth every 8 hours as needed for Pain or Fever 11/4/21   Jacob Bradley PA-C   QUEtiapine (SEROQUEL XR) 200 MG extended release tablet Take 1 tablet by mouth nightly  Patient not taking: Reported on 1/24/2023 10/21/21   Sherron Crabtree MD   meclizine (ANTIVERT) 25 MG tablet Take 25 mg by mouth 2 times daily  Patient not taking: Reported on 1/24/2023    Historical Provider, MD   butalbital-APAP-caffeine -40 MG CAPS per capsule Take 1 capsule by mouth every 6 hours as needed for Headaches    Historical Provider, MD   simvastatin (ZOCOR) 20 MG tablet Take 20 mg by mouth nightly    Historical Provider, MD   levothyroxine (SYNTHROID) 112 MCG tablet Take 112 mcg by mouth Daily    Historical Provider, MD   Multiple Vitamins-Minerals (THERAPEUTIC MULTIVITAMIN-MINERALS) tablet Take 1 tablet by mouth daily    Historical Provider, MD   Biotin 1 MG CAPS Take by mouth    Historical Provider, MD   Coenzyme Q10 (COQ-10) 100 MG CAPS Take by mouth  Patient not taking: Reported on 1/24/2023    Historical Provider, MD   DULoxetine (CYMBALTA) 60 MG extended release capsule Take 1 capsule by mouth daily  Patient taking differently: Take 30 mg by mouth daily 2/5/21   Sherron Crabtree MD   hydroCHLOROthiazide (HYDRODIURIL) 25 MG tablet Take 1 tablet by mouth daily  Patient taking differently: Take 50 mg by mouth daily 2/5/21   Sherron Crabtree MD   tiZANidine (ZANAFLEX) 4 MG tablet TAKE 1 TABLET BY MOUTH TWICE A DAY 12/1/20   Historical Provider, MD   vitamin E 400 UNIT capsule Take 200 Units by mouth daily  Patient not taking: Reported on 1/24/2023    Historical Provider, MD   Cholecalciferol (VITAMIN D3) 2000 units CAPS Take 2,000 Units by mouth daily     Historical Provider, MD   ALPRAZolam Humaira Cypher) 0.5 MG tablet Take 0.5 mg by mouth 4 times daily    Historical Provider, MD   amLODIPine (NORVASC) 10 MG tablet Take 1 tablet by mouth daily  Patient taking differently: Take 5 mg by mouth daily 4/27/17   TYRA Ferro - CNP   omeprazole (PRILOSEC OTC) 20 MG tablet Take 1 tablet by mouth daily 7/1/15   Dee Armas MD        Allergies   Ciprofloxacin, Erythromycin, Ketorolac tromethamine, Mobic [meloxicam], Other, Pcn [penicillins], Pseudoephedrine hcl, Sudafed [pseudoephedrine hcl], Sympathomimetics, and Thorazine [chlorpromazine]    Social History     Social History       Tobacco History       Smoking Status  Never      Smokeless Tobacco Use  Never              Alcohol History       Alcohol Use Status  Not Currently Comment  OCC.               Drug Use       Drug Use Status  No              Sexual Activity       Sexually Active  Not Asked                    Family History     Family History   Problem Relation Age of Onset    COPD Mother     Lung Cancer Father     COPD Maternal Grandmother     Cancer Maternal Grandfather         COLON    Colon Cancer Paternal Grandfather     Cancer Paternal Aunt         LUNG    Cancer Other         BLOOD CANCER- UNSPECIFIED    Coronary Art Dis Maternal Aunt     Other Maternal Aunt         Brain aneurysm       Review of Systems   12 point review of systems reviewed with patient with pertinent positives listed in HPI otherwise ROS negative    Physical Exam   BP (!) 154/99 Comment: left arm  Pulse 80   Temp 98.6 °F (37 °C) (Oral)   Resp 18   Ht 5' 2\" (1.575 m)   Wt 215 lb (97.5 kg)   LMP 11/19/2013   SpO2 97%   BMI 39.32 kg/m²     CONSTITUTIONAL:  awake, alert, cooperative, no apparent distress, and appears stated age  EYES:  Lids and lashes normal, pupils equal, round and reactive to light, extra ocular muscles intact, sclera clear, conjunctiva normal  ENT:  Normocephalic, without obvious abnormality, atraumatic, sinuses nontender on palpation, external ears without lesions, oral pharynx with moist mucus membranes, tonsils without erythema or exudates, gums normal and good dentition. NECK:  Supple, symmetrical, trachea midline, no adenopathy, thyroid symmetric, not enlarged and no tenderness, skin normal  HEMATOLOGIC/LYMPHATICS:  no cervical lymphadenopathy and no supraclavicular lymphadenopathy  BACK:  Symmetric, no curvature, spinous processes are non-tender on palpation, paraspinous muscles are non-tender on palpation, no costal vertebral tenderness  LUNGS:  No increased work of breathing, good air exchange, clear to auscultation bilaterally, no crackles or wheezing  CARDIOVASCULAR:  Normal apical impulse, regular rate and rhythm, normal S1 and S2, no S3 or S4, and no murmur noted  ABDOMEN:  No scars, normal bowel sounds, soft, non-distended, non-tender, no masses palpated, no hepatosplenomegally  MUSCULOSKELETAL:  There is no redness, warmth, or swelling of the joints. Full range of motion noted. Motor strength is 5 out of 5 all extremities bilaterally. Tone is normal.  NEUROLOGIC:  Awake, alert, oriented to name, place and time. Cranial nerves II-XII are grossly intact. Motor is 5 out of 5 bilaterally. Cerebellar finger to nose, heel to shin intact. Sensory is intact.   Babinski down going, Romberg negative, and gait is normal.  SKIN:  no bruising or bleeding, normal skin color, texture, turgor, no redness, warmth, or swelling, no rashes, and no lesions    Labs      Recent Results (from the past 24 hour(s))   COVID-19, Rapid    Collection Time: 01/24/23  2:07 AM    Specimen: Nasopharyngeal Swab   Result Value Ref Range    SARS-CoV-2, NAAT Not Detected Not Detected   Comprehensive Metabolic Panel    Collection Time: 01/24/23  7:21 AM   Result Value Ref Range    Sodium 139 135 - 144 mEq/L    Potassium 3.9 3.4 - 4.9 mEq/L    Chloride 106 95 - 107 mEq/L    CO2 25 20 - 31 mEq/L    Anion Gap 8 (L) 9 - 15 mEq/L    Glucose 123 (H) 70 - 99 mg/dL    BUN 12 6 - 20 mg/dL Creatinine 0.75 0.50 - 0.90 mg/dL    Est, Glom Filt Rate >60.0 >60    Calcium 9.3 8.5 - 9.9 mg/dL    Total Protein 6.3 6.3 - 8.0 g/dL    Albumin 4.0 3.5 - 4.6 g/dL    Total Bilirubin 0.4 0.2 - 0.7 mg/dL    Alkaline Phosphatase 84 40 - 130 U/L    ALT 29 0 - 33 U/L    AST 25 0 - 35 U/L    Globulin 2.3 2.3 - 3.5 g/dL        Imaging/Diagnostics Last 24 Hours   No results found. Assessment      Hospital Problems             Last Modified POA    * (Principal) Major depressive disorder, recurrent severe without psychotic features ([facility] [address] 1/24/2023 Yes       Plan   *Anemia-resolved H&H 11.9 and 35.3  * DM marisol II-monitor blood sugars before meals and at bedtime with insulin sliding scale coverage. * HTN-Norvasc 10 mg daily, hydrochlorothiazide 25 mg daily, monitor BMP  *GERDs-omeprazole 20 mg daily  *Hyperlipidemia-Lipitor 10 mg daily  *Hypothyroidism-Synthroid 112 mcg daily    Plan of care discussed with patient. Medications reviewed and resumed as appropriate. Patient appropriate for psych services. Thank you for consult. Hospital medicine managing acute needs. Patient will need to follow-up with PCP for chronic disease management. Time spent with patient 35 minutes.       Consultations Ordered:  IP CONSULT TO HOSPITALIST    Electronically signed by TYRA Montalvo CNP on 1/24/23 at 5:06 PM EST

## 2023-01-24 NOTE — ED NOTES
Ate 80% breakfast tray. Taking PO fluids well. Viewing TV with no acute distress noted. Continue to await call back from [facility] for disposition.      Adali Barnett RN  01/24/23 0236

## 2023-01-24 NOTE — PROGRESS NOTES
Patient did not attend group despite staff encouragement.  Electronically signed by Brittany Bejarano on 1/24/2023 at 3:03 PM

## 2023-01-24 NOTE — ED NOTES
Reviewed case with Dr. Ana Saucedo, patient is to remain ED observation due to overdose and hypokalemia. He would like CMP to be redrawn when appropriate and potassium to be WNL prior to admission.                Carmen Wasserman RN  01/24/23 0832

## 2023-01-24 NOTE — ED NOTES
CMP drawn earlier by Phlebotomist. Tolerated lab draw well. Awaiting medical clearance per report from [facility]. Awake @ this time, sitting up in bed with no complaints voiced & no acute distress noted.      Lashanda Mendez RN  01/24/23 9135

## 2023-01-25 PROBLEM — T50.902A INTENTIONAL DRUG OVERDOSE (HCC): Status: ACTIVE | Noted: 2023-01-25

## 2023-01-25 LAB
GLUCOSE BLD-MCNC: 117 MG/DL (ref 70–99)
GLUCOSE BLD-MCNC: 134 MG/DL (ref 70–99)
GLUCOSE BLD-MCNC: 145 MG/DL (ref 70–99)
GLUCOSE BLD-MCNC: 156 MG/DL (ref 70–99)
PERFORMED ON: ABNORMAL

## 2023-01-25 PROCEDURE — 6370000000 HC RX 637 (ALT 250 FOR IP): Performed by: PSYCHIATRY & NEUROLOGY

## 2023-01-25 PROCEDURE — 99223 1ST HOSP IP/OBS HIGH 75: CPT | Performed by: PSYCHIATRY & NEUROLOGY

## 2023-01-25 PROCEDURE — 99254 IP/OBS CNSLTJ NEW/EST MOD 60: CPT | Performed by: PAIN MEDICINE

## 2023-01-25 PROCEDURE — 6370000000 HC RX 637 (ALT 250 FOR IP): Performed by: REGISTERED NURSE

## 2023-01-25 PROCEDURE — 1240000000 HC EMOTIONAL WELLNESS R&B

## 2023-01-25 RX ORDER — DULOXETIN HYDROCHLORIDE 20 MG/1
40 CAPSULE, DELAYED RELEASE ORAL DAILY
Status: DISCONTINUED | OUTPATIENT
Start: 2023-01-25 | End: 2023-02-03 | Stop reason: HOSPADM

## 2023-01-25 RX ORDER — ALPRAZOLAM 0.5 MG/1
0.5 TABLET ORAL 4 TIMES DAILY
Status: DISCONTINUED | OUTPATIENT
Start: 2023-01-25 | End: 2023-02-03 | Stop reason: HOSPADM

## 2023-01-25 RX ORDER — HYDROCODONE BITARTRATE AND ACETAMINOPHEN 5; 325 MG/1; MG/1
1 TABLET ORAL 2 TIMES DAILY PRN
Status: DISCONTINUED | OUTPATIENT
Start: 2023-01-25 | End: 2023-02-03 | Stop reason: HOSPADM

## 2023-01-25 RX ADMIN — DULOXETINE HYDROCHLORIDE 40 MG: 20 CAPSULE, DELAYED RELEASE ORAL at 12:12

## 2023-01-25 RX ADMIN — ALPRAZOLAM 0.5 MG: 0.5 TABLET ORAL at 17:45

## 2023-01-25 RX ADMIN — GABAPENTIN 300 MG: 300 CAPSULE ORAL at 09:39

## 2023-01-25 RX ADMIN — TRAZODONE HYDROCHLORIDE 50 MG: 50 TABLET ORAL at 21:39

## 2023-01-25 RX ADMIN — TIZANIDINE 4 MG: 4 TABLET ORAL at 21:29

## 2023-01-25 RX ADMIN — TIZANIDINE 4 MG: 4 TABLET ORAL at 09:39

## 2023-01-25 RX ADMIN — AMLODIPINE BESYLATE 10 MG: 10 TABLET ORAL at 09:39

## 2023-01-25 RX ADMIN — ALPRAZOLAM 0.5 MG: 0.5 TABLET ORAL at 12:12

## 2023-01-25 RX ADMIN — HYDROCHLOROTHIAZIDE 25 MG: 12.5 TABLET ORAL at 09:39

## 2023-01-25 RX ADMIN — LEVOTHYROXINE SODIUM 112 MCG: 112 TABLET ORAL at 06:25

## 2023-01-25 RX ADMIN — ACETAMINOPHEN 650 MG: 325 TABLET ORAL at 09:50

## 2023-01-25 RX ADMIN — ALPRAZOLAM 0.5 MG: 0.5 TABLET ORAL at 21:29

## 2023-01-25 RX ADMIN — PANTOPRAZOLE SODIUM 40 MG: 40 TABLET, DELAYED RELEASE ORAL at 06:25

## 2023-01-25 RX ADMIN — GABAPENTIN 300 MG: 300 CAPSULE ORAL at 21:29

## 2023-01-25 RX ADMIN — ATORVASTATIN CALCIUM 10 MG: 10 TABLET, FILM COATED ORAL at 21:29

## 2023-01-25 RX ADMIN — HYDROXYZINE PAMOATE 50 MG: 50 CAPSULE ORAL at 09:49

## 2023-01-25 ASSESSMENT — PAIN DESCRIPTION - DESCRIPTORS
DESCRIPTORS: BURNING;THROBBING
DESCRIPTORS: ACHING

## 2023-01-25 ASSESSMENT — PAIN SCALES - GENERAL: PAINLEVEL_OUTOF10: 10

## 2023-01-25 ASSESSMENT — PAIN DESCRIPTION - LOCATION
LOCATION: BACK
LOCATION: BACK

## 2023-01-25 NOTE — PLAN OF CARE
Pt cooperative with assessment. Pt denies thoughts to harm self/ others. States a friend who is a [employer]\" called on her behalf to have pt admitted to the hospital. Pt admits to saying she does not want to live like this, meaning live in so much pain. Pt states she has spinal stenosis and is afraid to have back surgery but plans to within the next month or so because she is sick of being in pain. Pt reports still doing normal things such as outings with family but she needs to take frequent breaks and uses a Rolator with a seat so she can sit down. Pt denies SI, HI, or AVH. Reports stable on current medication regimen. Pt plans on reading up on back surgery and surgeons to find the best option for her. Pt reports mom has no control of bowel and bladder and does not want to end up like her. Pt does not feel the need to be here and is hopeful to get discharged soon. Pt admits to increased irritability d/t her pain. Problem: Self Harm/Suicidality  Goal: Will have no self-injury during hospital stay  Description: INTERVENTIONS:  1. Ensure constant observer at bedside with Q15M safety checks  2. Maintain a safe environment  3. Secure patient belongings  4. Ensure family/visitors adhere to safety recommendations  5. Ensure safety tray has been added to patient's diet order  6.   Every shift and PRN: Re-assess suicidal risk via Frequent Screener    1/24/2023 2157 by Fabiola Koyanagi, RN  Outcome: Rozanna Fat  1/24/2023 1738 by TYRA Foreman  Outcome: Progressing  Flowsheets (Taken 1/24/2023 1727)  Will have no self-injury during hospital stay: Ensure constant observer at bedside with Q15M safety checks     Problem: Chronic Conditions and Co-morbidities  Goal: Patient's chronic conditions and co-morbidity symptoms are monitored and maintained or improved  1/24/2023 2157 by Fabiola Koyanagi, RN  Outcome: Progressing  1/24/2023 1738 by TYRA Foreman  Outcome: Progressing  Flowsheets (Taken 1/24/2023 1727)  Care Plan - Patient's Chronic Conditions and Co-Morbidity Symptoms are Monitored and Maintained or Improved:   Monitor and assess patient's chronic conditions and comorbid symptoms for stability, deterioration, or improvement   Collaborate with multidisciplinary team to address chronic and comorbid conditions and prevent exacerbation or deterioration     Problem: ABCDS Injury Assessment  Goal: Absence of physical injury  Outcome: Progressing     Problem: Safety - Adult  Goal: Free from fall injury  Outcome: Progressing     Problem: Risk for Elopement  Goal: Patient will not exit the unit/facility without proper excort  Outcome: Progressing  Flowsheets (Taken 1/24/2023 1738 by TYRA Solomon)  Nursing Interventions for Elopement Risk: Assist with personal care needs such as toileting, eating, dressing, as needed to reduce the risk of wandering

## 2023-01-25 NOTE — PROGRESS NOTES
Call placed to pt's pharmacy- [facility] on [facility]. Medications verified. New orders received per Dr. Casey Mckeon for pt's home medications of gabapentin 300 mg PO BID and Zanaflex 4 mg BID.

## 2023-01-25 NOTE — PROGRESS NOTES
Pt. presents pleasant, expansive. Familiar with this writer and [facility]WT from past admissions. Denies the need to be here. \"It was a misunderstanding and my friend overreacted. I was upset with my kids and tired of the pain and hurting. I just wanted to sleep. \"  Denies any suicidal thoughts or intentions. Denies hi. Denies [address]  \"but there is a ghost in my house. \" Reports chronic back pain with no relief, which are triggers her anger and for her to snap. Poor sleep and ok appetite. Forgetful at times. Rarely drinks ETOH and denies smoking marijuana or using any other drugs. Resource folder given and explained.  Stressors include  1.pain  2.my kids  3.money issues  *play bingo, spend time with my kids, do crafts, watch movies  Electronically signed by Elbert Rene on 1/25/2023 at 11:23 AM

## 2023-01-25 NOTE — PLAN OF CARE
Pt isolative to room majority of the shift. Seen laying in bed with eyes closed. Poor eye contact and concentration. Crying at times during assessment. Remains focused on surgery, medications and situation with children. Explains she \"got into it\" with her son and daughter. Son told pt he never wanted to talk to her again. Talks about poor relationship with children. States she took her medication to \"go to sleep\". When asked if this was a suicide attempt, pt states, \"I doubt I would have  from that\". Currently denies any SI, HI or AVH. Does rate her depression and anxiety 10/10. Reports working with [employer] for approximately 20 years but has not been in the last year. Would like to get connected for services again. Pt admits to oversleeping and a decreased appetite. Encouraged to attend groups and socialize with peers. Remains isolative to room. Problem: Self Harm/Suicidality  Goal: Will have no self-injury during hospital stay  Description: INTERVENTIONS:  1. Ensure constant observer at bedside with Q15M safety checks  2. Maintain a safe environment  3. Secure patient belongings  4. Ensure family/visitors adhere to safety recommendations  5. Ensure safety tray has been added to patient's diet order  6.   Every shift and PRN: Re-assess suicidal risk via Frequent Screener    2023 1517 by Aroldo Camarillo RN  Outcome: Progressing  2023 1515 by Aroldo Camarillo RN  Outcome: Progressing     Problem: Chronic Conditions and Co-morbidities  Goal: Patient's chronic conditions and co-morbidity symptoms are monitored and maintained or improved  2023 1517 by Aroldo Camarillo RN  Outcome: Progressing  2023 1515 by Aroldo Camarillo RN  Outcome: Progressing     Problem: ABCDS Injury Assessment  Goal: Absence of physical injury  2023 1517 by Aroldo Camarillo RN  Outcome: Progressing  2023 1515 by Aroldo Camarillo RN  Outcome: Progressing     Problem: Safety - Adult  Goal: Free from fall injury  1/25/2023 1517 by Emma Gauthier RN  Outcome: Progressing  1/25/2023 1515 by Emma Gauthier RN  Outcome: Progressing     Problem: Risk for Elopement  Goal: Patient will not exit the unit/facility without proper excort  1/25/2023 1517 by Emma Gauthier RN  Outcome: Progressing  1/25/2023 1515 by Emma Gauthier RN  Outcome: Progressing  Flowsheets (Taken 1/25/2023 1509)  Nursing Interventions for Elopement Risk:   Assist with personal care needs such as toileting, eating, dressing, as needed to reduce the risk of wandering   Collaborate with family members/caregivers to mitigate the elopement risk   Collaborate with treatment team for nicotine replacement     Problem: Coping  Goal: Pt/Family able to verbalize concerns and demonstrate effective coping strategies  Description: INTERVENTIONS:  1. Assist patient/family to identify coping skills, available support systems and cultural and spiritual values  2. Provide emotional support, including active listening and acknowledgement of concerns of patient and caregivers  3. Reduce environmental stimuli, as able  4. Instruct patient/family in relaxation techniques, as appropriate  5. Assess for spiritual pain/suffering and initiate Spiritual Care, Psychosocial Clinical Specialist consults as needed  Outcome: Progressing     Problem: Depression/Self Harm  Goal: Effect of psychiatric condition will be minimized and patient will be protected from self harm  Description: INTERVENTIONS:  1. Assess impact of patient's symptoms on level of functioning, self care needs and offer support as indicated  2. Assess patient/family knowledge of depression, impact on illness and need for teaching  3. Provide emotional support, presence and reassurance  4. Assess for possible suicidal thoughts or ideation.  If patient expresses suicidal thoughts or statements do not leave alone, initiate Suicide Precautions, move to a room close to the nursing station and obtain sitter  5.  Initiate consults as appropriate with Mental Health Professional, Spiritual Care, Psychosocial CNS, and consider a recommendation to the LIP for a Psychiatric Consultation  Outcome: Progressing
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
## 2023-01-25 NOTE — PROGRESS NOTES
Pt. refused to attend the 0900 community meeting. Electronically signed by Noemí Salazar on 1/25/2023 at 9:32 AM

## 2023-01-25 NOTE — CARE COORDINATION
Brief Intervention and Referral to Treatment Summary    Patient was provided PHQ-9, AUDIT-C and DAST Screening:      PHQ-9 Score: 27  AUDIT-C Score: 2  DAST Score: 0     Patients substance use is considered     Low Risk/Healthy x  Moderate Risk  Harmful  Dependent    Patients depression is considered:     Minimal  Mild   Moderate  Moderately Severe  Severe x    Brief Education Was Provided N/A    Patient was receptive  Patient was not receptive      Brief Intervention Is Provided (Only for AUDIT-C or DAST) N/A    Patient reports readiness to decrease and/or stop use and a plan was discussed   Patient denies readiness to decrease and/or stop use and a plan was not discussed      Recommendations/Referrals for Brief and/or Specialized Treatment Provided to Patient    Patient denied any current issues with drugs or alcohol. As a result, no brief education or intervention was completed.     Electronically signed by NEHA Pollock on 1/25/2023 at 9:10 AM

## 2023-01-25 NOTE — GROUP NOTE
Group Therapy Note    Date: 1/25/2023    Group Start Time: 1000  Group End Time: 1050  Group Topic: Recreational    [id number]    Noemí Salazar        Group Therapy Note    Attendees: 13       Patient's Goal:  To attend group    Notes:  Pt was attentive     Status After Intervention:  Unchanged    Participation Level: Active Listener    Participation Quality: Appropriate      Speech:  normal      Thought Process/Content: Logical      Affective Functioning: Congruent      Mood: euthymic      Level of consciousness:  Alert      Response to Learning: Able to verbalize current knowledge/experience      Endings: None Reported    Modes of Intervention: Activity      Discipline Responsible: [employer]      Signature:   Brittany Bejarano

## 2023-01-25 NOTE — CONSULTS
Department of Chronic Pain Managment  Attending Progress Note      SUBJECTIVE  Low back and Rt Leg pain. OBJECTIVE  Pt is 65 yr old lady. H/o Sleep apnoea, not using her C-pap machien, was told by her surgeon she needs surgery For L4-5 disc Extrusion rt side with foraminal stenosis, But was told that this was a scary surgery so she went into anxiety and on Xanax takes gabapentin and 1-2 tabs of t#3s for pain.     Medications  Current Facility-Administered Medications: DULoxetine (CYMBALTA) extended release capsule 40 mg, 40 mg, Oral, Daily  ALPRAZolam (XANAX) tablet 0.5 mg, 0.5 mg, Oral, 4x daily  HYDROcodone-acetaminophen (NORCO) 5-325 MG per tablet 1 tablet, 1 tablet, Oral, BID PRN  0.9 % sodium chloride bolus, 500 mL, IntraVENous, Once  haloperidol (HALDOL) tablet 5 mg, 5 mg, Oral, Q6H PRN **OR** haloperidol lactate (HALDOL) injection 5 mg, 5 mg, IntraMUSCular, Q6H PRN  hydrOXYzine pamoate (VISTARIL) capsule 50 mg, 50 mg, Oral, Q6H PRN **OR** hydrOXYzine pamoate (VISTARIL) capsule 50 mg, 50 mg, Oral, Q6H PRN  acetaminophen (TYLENOL) tablet 650 mg, 650 mg, Oral, Q4H PRN  traZODone (DESYREL) tablet 50 mg, 50 mg, Oral, Nightly PRN  benztropine mesylate (COGENTIN) injection 2 mg, 2 mg, IntraMUSCular, BID PRN  magnesium hydroxide (MILK OF MAGNESIA) 400 MG/5ML suspension 30 mL, 30 mL, Oral, Daily PRN  nicotine polacrilex (COMMIT) lozenge 2 mg, 2 mg, Oral, Q1H PRN  amLODIPine (NORVASC) tablet 10 mg, 10 mg, Oral, Daily  hydroCHLOROthiazide (HYDRODIURIL) tablet 25 mg, 25 mg, Oral, Daily  levothyroxine (SYNTHROID) tablet 112 mcg, 112 mcg, Oral, Daily  atorvastatin (LIPITOR) tablet 10 mg, 10 mg, Oral, Nightly  pantoprazole (PROTONIX) tablet 40 mg, 40 mg, Oral, QAM AC  glucose chewable tablet 16 g, 4 tablet, Oral, PRN  dextrose bolus 10% 125 mL, 125 mL, IntraVENous, PRN **OR** dextrose bolus 10% 250 mL, 250 mL, IntraVENous, PRN  glucagon (rDNA) injection 1 mg, 1 mg, SubCUTAneous, PRN  dextrose 10 % infusion, , IntraVENous, Continuous PRN  insulin lispro (HUMALOG) injection vial 0-8 Units, 0-8 Units, SubCUTAneous, TID WC  insulin lispro (HUMALOG) injection vial 0-4 Units, 0-4 Units, SubCUTAneous, Nightly  tiZANidine (ZANAFLEX) tablet 4 mg, 4 mg, Oral, BID  gabapentin (NEURONTIN) capsule 300 mg, 300 mg, Oral, BID  ROS: 12 Systems have been reviewed as follows. Constitutional: Fever, weight gain, weight loss, appetite change, night sweats, fatigue, chills. Eyes : blurry, double vision, vision, loss, tearing, redness, pain, sensitivity to light, glaucoma. Ears: nose, mouth, and throat: Hearing loss, ringing in the ears, ear pain, nasal congestion, nasal drainage, nosebleeds, mouth, throat, irritation tooth problem.    Cardiovascular: chest pain, pressure, heart racing, palpitations, sweating, leg swelling, high or low blood pressure   Pulmonary: Cough, yellow or green sputum, blood and sputum, shortness of breath, wheezing   Gastrointestinal: Nausea, vomiting, diarrhea, constipation, pain, blood in stool, or vomitus, heartburn, difficulty swallowing   Genitourinary: incontinence, abnormal bleeding, abnormal discharge, urinary frequency, urinary hesitancy, pain, impotence sexual problem, infection, urinary retention   Musculoskeletal: Pain, stiffness, joint, redness or warmth, arthritis, back pain, weakness, muscle wasting, sprain or fracture   Neuro: Weight weakness, dizziness, change in voice, change in taste change in vision, change in hearing, loss, or change of sensation, trouble walking, balance problems coordination problems, shaking, speech problem   Endocrine: cold or heat intolerance, blood sugar problem, weight gain or loss missed periods hot flashes, sweats, change in body hair, change in libido, increased thirst, increased urination   Heme/lymph: Swelling, bleeding, problem anemia, bruising, enlarged lymph nodes   Allergic/immunologic: H. plus nasal drip, watery itchy eyes, nasal drainage, immunosuppressed Physical  VITALS:  BP (!) 174/92   Pulse 71   Temp 98.2 °F (36.8 °C) (Oral)   Resp 18   Ht 5' 2\" (1.575 m)   Wt 215 lb (97.5 kg)   LMP 11/19/2013   SpO2 97%   BMI 39.32 kg/m²   CONSTITUTIONAL:  awake, alert, cooperative, no apparent distress, and appears stated age  EYES:  Lids and lashes normal, pupils equal, round and reactive to light, extra ocular muscles intact, sclera clear, conjunctiva normal  ENT:  Normocephalic, without obvious abnormality, atraumatic, sinuses nontender on palpation, external ears without lesions, oral pharynx with moist mucus membranes, tonsils without erythema or exudates, gums normal and good dentition. NECK:  Supple, symmetrical, trachea midline, no adenopathy, thyroid symmetric, not enlarged and no tenderness, skin normal  HEMATOLOGIC/LYMPHATICS:  no cervical lymphadenopathy  BACK:  Symmetric, no curvature, spinous processes are non-tender on palpation, paraspinous muscles are non-tender on palpation, no costal vertebral tenderness and Pain on SLR Rt side. LUNGS:  No increased work of breathing, good air exchange, clear to auscultation bilaterally, no crackles or wheezing  CARDIOVASCULAR:  Normal apical impulse, regular rate and rhythm, normal S1 and S2, no S3 or S4, and no murmur noted  ABDOMEN:  No scars, normal bowel sounds, soft, non-distended, non-tender, no masses palpated, no hepatosplenomegally  MUSCULOSKELETAL:  There is no redness, warmth, or swelling of the joints. Full range of motion noted. Motor strength is 5 out of 5 all extremities bilaterally. Tone is normal.  NEUROLOGIC:  Awake, alert, oriented to name, place and time. Cranial nerves II-XII are grossly intact. Motor is 5 out of 5 bilaterally. Cerebellar finger to nose, heel to shin intact. Sensory is intact.   Babinski down going, Romberg negative, and gait is normal.  SKIN:  no bruising or bleeding  Data  MRI shows extrusion to Rt at L4-5    ASSESSMENT AND PLAN    Discussed the risks of sleep apnoea with benzos and  opioids will start small dose Norcos as we do not have T#3s at hospital.

## 2023-01-25 NOTE — PROGRESS NOTES
Behavioral Services  Medicare Certification Upon Admission    I certify that this patient's inpatient psychiatric hospital admission is medically necessary for:    [x] (1) Treatment which could reasonably be expected to improve this patient's condition,       [x] (2) Or for diagnostic study;     AND     [x](2) The inpatient psychiatric services are provided while the individual is under the care of a physician and are included in the individualized plan of care.     Estimated length of stay/service 3-5    Plan for post-hospital care OP care    Electronically signed by Joshua Marquez MD on 1/25/2023 at 10:30 AM

## 2023-01-25 NOTE — CARE COORDINATION
B[facility] Biopsychosocial Assessment    Current Level of Psychosocial Functioning     Independent   Dependent    Minimal Assist  x    Comments:  Patient lives on her own and is unemployed. She receives several forms of government assistance to maintain a reasonable quality of life. Psychosocial High Risk Factors (check all that apply)    Unable to obtain meds   Chronic illness/pain  x (back problem)  Substance abuse   Lack of Family Support   Financial stress x  Isolation   Inadequate Community Resources x  Suicide attempt(s) x  Not taking medications x  Victim of crime   Developmental Delay  Unable to manage personal needs    Age 72 or older   Homeless  No transportation   Readmission within 30 days  Unemployment x  Traumatic Event    Comments: Patient has at least six high risk factors associated with this admission. Psychiatric Advanced Directives: None Reported. Family to Involve in Treatment: Patient provided her boyfriend's contact information to completed collateral.     Sexual Orientation:  Patient is currently in a heterosexual relationship. Patient Strengths: Patient was cooperative and engaging. Patient Barriers: Patient is no longer connected at the [facility]. Opiate Education Provided:  N/A    CMHC/mental health history: Patient has had several admissions on the [facility]. Plan of Care   medication management, group/individual therapies, family meetings, psycho -education, treatment team meetings to assist with stabilization    Initial Discharge Plan:  Patient is returning home and will follow the recommendations of the treatment team.       Clinical Summary:    Patient is a 65year old female who was admitted to the [facility] due to a self harm attempt. Reportedly, patient overdosed on several prescription medications. When interviewed, patient presented as depressed, lethargic and confused. She was forthcoming about her self harm attempt.  Patient stated she has a severe back problem that causes periodic bouts with pain. When in pain she believes she cannot tolerate the pain and wants to take her own life. Patient was connected with the [facility] but stopped going about a year ago. She said she receives her mental health medication prescriptions from her family doctor. Patient was able to complete a safety plan which includes her boyfriend as someone she would contact if and when she is in crisis.      Electronically signed by NEHA Groves on 1/25/2023 at 9:28 AM

## 2023-01-25 NOTE — PROGRESS NOTES
Explained and gave am meds, gave Neurontin 300 mg ,Zanaflex 4mg  tylenol for back pain and vistaril 50mg for anxiety #10.

## 2023-01-25 NOTE — PROGRESS NOTES
Pt is noted to be laying in bed eyes open explained and gave home meds xanax 0.5 and Cymbalta 2-20mg , pt was agreeable , denied questions.

## 2023-01-26 LAB
GLUCOSE BLD-MCNC: 112 MG/DL (ref 70–99)
GLUCOSE BLD-MCNC: 135 MG/DL (ref 70–99)
GLUCOSE BLD-MCNC: 136 MG/DL (ref 70–99)
GLUCOSE BLD-MCNC: 152 MG/DL (ref 70–99)
PERFORMED ON: ABNORMAL

## 2023-01-26 PROCEDURE — 99232 SBSQ HOSP IP/OBS MODERATE 35: CPT | Performed by: PSYCHIATRY & NEUROLOGY

## 2023-01-26 PROCEDURE — 6370000000 HC RX 637 (ALT 250 FOR IP): Performed by: REGISTERED NURSE

## 2023-01-26 PROCEDURE — 6370000000 HC RX 637 (ALT 250 FOR IP): Performed by: PAIN MEDICINE

## 2023-01-26 PROCEDURE — 1240000000 HC EMOTIONAL WELLNESS R&B

## 2023-01-26 PROCEDURE — 6370000000 HC RX 637 (ALT 250 FOR IP): Performed by: PSYCHIATRY & NEUROLOGY

## 2023-01-26 RX ADMIN — DULOXETINE HYDROCHLORIDE 40 MG: 20 CAPSULE, DELAYED RELEASE ORAL at 09:18

## 2023-01-26 RX ADMIN — GABAPENTIN 300 MG: 300 CAPSULE ORAL at 21:16

## 2023-01-26 RX ADMIN — ATORVASTATIN CALCIUM 10 MG: 10 TABLET, FILM COATED ORAL at 21:16

## 2023-01-26 RX ADMIN — GABAPENTIN 300 MG: 300 CAPSULE ORAL at 09:18

## 2023-01-26 RX ADMIN — TIZANIDINE 4 MG: 4 TABLET ORAL at 21:16

## 2023-01-26 RX ADMIN — LEVOTHYROXINE SODIUM 112 MCG: 112 TABLET ORAL at 06:19

## 2023-01-26 RX ADMIN — ALPRAZOLAM 0.5 MG: 0.5 TABLET ORAL at 14:11

## 2023-01-26 RX ADMIN — AMLODIPINE BESYLATE 10 MG: 10 TABLET ORAL at 09:18

## 2023-01-26 RX ADMIN — TRAZODONE HYDROCHLORIDE 50 MG: 50 TABLET ORAL at 22:01

## 2023-01-26 RX ADMIN — HYDROCHLOROTHIAZIDE 25 MG: 12.5 TABLET ORAL at 09:17

## 2023-01-26 RX ADMIN — TIZANIDINE 4 MG: 4 TABLET ORAL at 09:18

## 2023-01-26 RX ADMIN — ALPRAZOLAM 0.5 MG: 0.5 TABLET ORAL at 21:16

## 2023-01-26 RX ADMIN — ACETAMINOPHEN 650 MG: 325 TABLET ORAL at 09:17

## 2023-01-26 RX ADMIN — HYDROCODONE BITARTRATE AND ACETAMINOPHEN 1 TABLET: 5; 325 TABLET ORAL at 16:29

## 2023-01-26 RX ADMIN — PANTOPRAZOLE SODIUM 40 MG: 40 TABLET, DELAYED RELEASE ORAL at 06:19

## 2023-01-26 RX ADMIN — ALPRAZOLAM 0.5 MG: 0.5 TABLET ORAL at 09:29

## 2023-01-26 RX ADMIN — ALPRAZOLAM 0.5 MG: 0.5 TABLET ORAL at 17:26

## 2023-01-26 ASSESSMENT — PAIN - FUNCTIONAL ASSESSMENT: PAIN_FUNCTIONAL_ASSESSMENT: ACTIVITIES ARE NOT PREVENTED

## 2023-01-26 ASSESSMENT — PAIN SCALES - GENERAL
PAINLEVEL_OUTOF10: 9
PAINLEVEL_OUTOF10: 9

## 2023-01-26 ASSESSMENT — PAIN DESCRIPTION - LOCATION
LOCATION: BACK
LOCATION: BACK

## 2023-01-26 ASSESSMENT — PAIN DESCRIPTION - DESCRIPTORS: DESCRIPTORS: ACHING;STABBING

## 2023-01-26 NOTE — GROUP NOTE
Group Therapy Note    Date: 1/26/2023    Group Start Time: 4274  Group End Time: 8219  Group Topic: Activity    [facility] 3[facility] I    Melissa Alcantara        Group Therapy Note    Attendees: 11/19       Patient's Goal:  To participate in an activity such as coloring, word search, etc.    Notes:  Patient actively participated.     Status After Intervention:  Improved    Participation Level: Interactive    Participation Quality: Appropriate and Attentive      Speech:  normal      Thought Process/Content: Logical      Affective Functioning: Congruent      Mood: euthymic      Level of consciousness:  Alert and Attentive      Response to Learning: Progressing to goal      Endings: None Reported    Modes of Intervention: Activity      Discipline Responsible: [address]      Signature:  Melissa Alcantara

## 2023-01-26 NOTE — GROUP NOTE
Group Therapy Note    Date: 1/26/2023    Group Start Time: 1000  Group End Time: 1055  Group Topic: Recreational    [facility] 3W LEVII    Noemí Salazar        Group Therapy Note    Attendees: 14       Patient's Goal:  To attend groups    Notes:  Pt was attentive     Status After Intervention:  Unchanged    Participation Level: Interactive    Participation Quality: Attentive      Speech:  normal      Thought Process/Content: Logical      Affective Functioning: Congruent      Mood: euthymic      Level of consciousness:  Alert      Response to Learning: Able to verbalize current knowledge/experience      Endings: None Reported    Modes of Intervention: Activity      Discipline Responsible: [employer]      Signature:   Karla Raymundo

## 2023-01-26 NOTE — GROUP NOTE
Group Therapy Note    Date: 1/25/2023    Group Start Time: 0730  Group End Time: 0800  Group Topic: Wrap-Up    [facility]    Sol Chino RN        Group Therapy Note    Attendees: 17/20       Patient's Goal:  To go home. Notes:  Did not meet goal. D/c date unknown. Status After Intervention:  Unchanged    Participation Level:  Active Listener and Interactive    Participation Quality: Appropriate      Speech:  normal      Thought Process/Content: Logical      Affective Functioning: Congruent      Mood: depressed      Level of consciousness:  Alert and Oriented x4      Response to Learning: Able to verbalize current knowledge/experience      Endings: None Reported    Modes of Intervention: Support      Discipline Responsible: Registered Nurse      Signature:  Sol Chino RN

## 2023-01-26 NOTE — GROUP NOTE
Group Therapy Note    Date: 1/26/2023    Group Start Time: 0608  Group End Time: 1700  Group Topic: Wrap-Up    [facility] 3W CHING Barbour        Group Therapy Note    Attendees: 11/19       Patient's Goal:  Scott Ng to stay awake\"    Notes:  Pt reports not meeting her goal.

## 2023-01-26 NOTE — PLAN OF CARE
Problem: Self Harm/Suicidality  Goal: Will have no self-injury during hospital stay  Description: INTERVENTIONS:  1. Ensure constant observer at bedside with Q15M safety checks  2. Maintain a safe environment  3. Secure patient belongings  4. Ensure family/visitors adhere to safety recommendations  5. Ensure safety tray has been added to patient's diet order  6.   Every shift and PRN: Re-assess suicidal risk via Frequent Screener    1/25/2023 2112 by Kamryn Wallace RN  Outcome: Progressing  1/25/2023 1517 by Donnie Ward RN  Outcome: Progressing  1/25/2023 1515 by Donnie Ward RN  Outcome: Progressing     Problem: Chronic Conditions and Co-morbidities  Goal: Patient's chronic conditions and co-morbidity symptoms are monitored and maintained or improved  1/25/2023 2112 by Kamryn Wallace RN  Outcome: Progressing  1/25/2023 1517 by Donnie Ward RN  Outcome: Progressing  1/25/2023 1515 by Donnie Ward RN  Outcome: Progressing     Problem: ABCDS Injury Assessment  Goal: Absence of physical injury  1/25/2023 2112 by Kamryn Wallace RN  Outcome: Progressing  1/25/2023 1517 by Donnie Ward RN  Outcome: Progressing  1/25/2023 1515 by Donnie Ward RN  Outcome: Progressing     Problem: Safety - Adult  Goal: Free from fall injury  1/25/2023 2112 by Kamryn Wallace RN  Outcome: Progressing  1/25/2023 1517 by Donnie Ward RN  Outcome: Progressing  1/25/2023 1515 by Donnie Ward RN  Outcome: Progressing     Problem: Risk for Elopement  Goal: Patient will not exit the unit/facility without proper excort  1/25/2023 2112 by Kamryn Wallace RN  Outcome: Progressing  1/25/2023 1517 by Donnie Ward RN  Outcome: Progressing  1/25/2023 1515 by Donnie Ward RN  Outcome: Progressing  Flowsheets (Taken 1/25/2023 1509)  Nursing Interventions for Elopement Risk:   Assist with personal care needs such as toileting, eating, dressing, as needed to reduce the risk of wandering   Collaborate with family members/caregivers to mitigate the elopement risk   Collaborate with treatment team for nicotine replacement     Problem: Coping  Goal: Pt/Family able to verbalize concerns and demonstrate effective coping strategies  Description: INTERVENTIONS:  1. Assist patient/family to identify coping skills, available support systems and cultural and spiritual values  2. Provide emotional support, including active listening and acknowledgement of concerns of patient and caregivers  3. Reduce environmental stimuli, as able  4. Instruct patient/family in relaxation techniques, as appropriate  5. Assess for spiritual pain/suffering and initiate Spiritual Care, Psychosocial Clinical Specialist consults as needed  1/25/2023 2112 by Kamryn Wallace RN  Outcome: Progressing  1/25/2023 1517 by oDnnie Ward RN  Outcome: Progressing     Problem: Depression/Self Harm  Goal: Effect of psychiatric condition will be minimized and patient will be protected from self harm  Description: INTERVENTIONS:  1. Assess impact of patient's symptoms on level of functioning, self care needs and offer support as indicated  2. Assess patient/family knowledge of depression, impact on illness and need for teaching  3. Provide emotional support, presence and reassurance  4. Assess for possible suicidal thoughts or ideation. If patient expresses suicidal thoughts or statements do not leave alone, initiate Suicide Precautions, move to a room close to the nursing station and obtain sitter  5. Initiate consults as appropriate with Mental Health Professional, Spiritual Care, Psychosocial CNS, and consider a recommendation to the LIP for a Psychiatric Consultation  1/25/2023 2112 by Kamryn Wallace RN  Outcome: Progressing  1/25/2023 1517 by Donnie Ward RN  Outcome: Progressing   Pt on phone or watching TV with peers. Pt reports she tries  to be social, thou no one wants to be social with her.  Pt feels she maybe unapproachable. Encouraged pt to engage in group conversation with peers. Pt denies SI,HI,A/V hallucinations. Contracts for safety on the unit. Reports her anxiety is a 10/10 because she remains on the unit and was not d/c home today. Reports depression level 8/10, with 10 being the worst. Reports the Dr started her on cymbalta today and hopes he does not raise the dosage to 120mg. Reports at 120mg she will begin to hear and see things. Reports does take desyrel for sleep,thou causes her to have vivid,bad nightmares. Pt reports has been experiencing increased fatigue, and reports feeling very tired. Poor eyes contact,sad worrisome affect.

## 2023-01-26 NOTE — PROGRESS NOTES
[facility] FOLLOW-UP NOTE       1/26/2023     Patient was seen and examined in person, Chart reviewed   Patient's case discussed with staff/team    Chief Complaint: SI derepression    Interim History:     Pt appears flat, blunted, depressed  Pt states she wants to be social, but believes she is not approachable   Poor eye contact  Anhedonia, feelings of helplessness and hopelessness  Continues to endorse pain from spinal stenosis, states she saw the pain management doctor yesterday      Appetite:   [x] Normal/Unchanged  [] Increased  [] Decreased      Sleep:       [x] Normal/Unchanged  [] Fair       [] Poor              Energy:    [x] Normal/Unchanged  [] Increased  [] Decreased        SI [] Present  [x] Absent    HI  []Present  [x] Absent     Aggression:  [] yes  [x] no    Patient is [x] able  [] unable to CONTRACT FOR SAFETY     PAST MEDICAL/PSYCHIATRIC HISTORY:   Past Medical History:   Diagnosis Date    Anemia     C. difficile colitis 01/2013    Chronic fatigue syndrome     Depression     Janet    Diabetes mellitus ([facility]     Diverticulitis large intestine 2001    Fibromyalgia     Fibromyalgia 03/24/2015    GERD (gastroesophageal reflux disease)     HTN (hypertension)     Hyperlipidemia     Hypothyroidism     Palpitations     Pulmonary embolus ([facility] 10/2012    Following GYN procedure    Vitamin D deficiency        FAMILY/SOCIAL HISTORY:  Family History   Problem Relation Age of Onset    COPD Mother     Lung Cancer Father     COPD Maternal Grandmother     Cancer Maternal Grandfather         COLON    Colon Cancer Paternal Grandfather     Cancer Paternal Aunt         LUNG    Cancer Other         BLOOD CANCER- UNSPECIFIED    Coronary Art Dis Maternal Aunt     Other Maternal Aunt         Brain aneurysm     Social History     Socioeconomic History    Marital status:      Spouse name: Not on file    Number of children: Not on file    Years of education: Not on file Highest education level: Not on file   Occupational History    Not on file   Tobacco Use    Smoking status: Never    Smokeless tobacco: Never   Vaping Use    Vaping Use: Never used   Substance and Sexual Activity    Alcohol use: Not Currently     Comment: OCC. Drug use: No    Sexual activity: Not on file   Other Topics Concern    Not on file   Social History Narrative    Not on file     Social Determinants of Health     Financial Resource Strain: Not on file   Food Insecurity: Not on file   Transportation Needs: Not on file   Physical Activity: Not on file   Stress: Not on file   Social Connections: Not on file   Intimate Partner Violence: Not on file   Housing Stability: Not on file           ROS:  [x] All negative/unchanged except if checked.  Explain positive(checked items) below:  [] Constitutional  [] Eyes  [] Ear/Nose/Mouth/Throat  [] Respiratory  [] CV  [] GI  []   [] Musculoskeletal  [] Skin/Breast  [] Neurological  [] Endocrine  [] Heme/Lymph  [] Allergic/Immunologic    Explanation:     MEDICATIONS:    Current Facility-Administered Medications:     DULoxetine (CYMBALTA) extended release capsule 40 mg, 40 mg, Oral, Daily, Volodymyr Odell MD, 40 mg at 01/26/23 0918    ALPRAZolam (XANAX) tablet 0.5 mg, 0.5 mg, Oral, 4x daily, Volodymyr Odell MD, 0.5 mg at 01/26/23 1411    HYDROcodone-acetaminophen (NORCO) 5-325 MG per tablet 1 tablet, 1 tablet, Oral, BID PRN, Fish Corado MD    haloperidol (HALDOL) tablet 5 mg, 5 mg, Oral, Q6H PRN **OR** haloperidol lactate (HALDOL) injection 5 mg, 5 mg, IntraMUSCular, Q6H PRN, Volodymyr Odell MD    hydrOXYzine pamoate (VISTARIL) capsule 50 mg, 50 mg, Oral, Q6H PRN **OR** hydrOXYzine pamoate (VISTARIL) capsule 50 mg, 50 mg, Oral, Q6H PRN, Volodymyr Odell MD, 50 mg at 01/25/23 0949    traZODone (DESYREL) tablet 50 mg, 50 mg, Oral, Nightly PRN, Volodymyr Odell MD, 50 mg at 01/25/23 2139    benztropine mesylate (COGENTIN) injection 2 mg, 2 mg, IntraMUSCular, BID PRN, Kat Adam MD    magnesium hydroxide (MILK OF MAGNESIA) 400 MG/5ML suspension 30 mL, 30 mL, Oral, Daily PRN, Kat Adam MD    nicotine polacrilex (COMMIT) lozenge 2 mg, 2 mg, Oral, Q1H PRN, Kat Adam MD    amLODIPine (NORVASC) tablet 10 mg, 10 mg, Oral, Daily, Rudine Running, APRN - CNP, 10 mg at 01/26/23 2746    hydroCHLOROthiazide (HYDRODIURIL) tablet 25 mg, 25 mg, Oral, Daily, Rudine Running, APRN - CNP, 25 mg at 01/26/23 7257    levothyroxine (SYNTHROID) tablet 112 mcg, 112 mcg, Oral, Daily, Rudine Running, APRN - CNP, 112 mcg at 01/26/23 1282    atorvastatin (LIPITOR) tablet 10 mg, 10 mg, Oral, Nightly, Rudine Running, APRN - CNP, 10 mg at 01/25/23 2129    pantoprazole (PROTONIX) tablet 40 mg, 40 mg, Oral, QAM AC, Rudine Running, APRN - CNP, 40 mg at 01/26/23 0619    glucose chewable tablet 16 g, 4 tablet, Oral, PRN, Rudine Running, APRN - CNP    dextrose bolus 10% 125 mL, 125 mL, IntraVENous, PRN **OR** dextrose bolus 10% 250 mL, 250 mL, IntraVENous, PRN, Rudine Running, APRN - CNP    glucagon (rDNA) injection 1 mg, 1 mg, SubCUTAneous, PRN, Rudine Running, APRN - CNP    dextrose 10 % infusion, , IntraVENous, Continuous PRN, Rudine Running, APRN - CNP    insulin lispro (HUMALOG) injection vial 0-8 Units, 0-8 Units, SubCUTAneous, TID WC, Rudine Running, APRN - CNP    insulin lispro (HUMALOG) injection vial 0-4 Units, 0-4 Units, SubCUTAneous, Nightly, Rudine Running, APRN - CNP    tiZANidine (ZANAFLEX) tablet 4 mg, 4 mg, Oral, BID, Alecia Hooper MD, 4 mg at 01/26/23 1628    gabapentin (NEURONTIN) capsule 300 mg, 300 mg, Oral, BID, Alecia Hooper MD, 300 mg at 01/26/23 2064      Examination:  BP (!) 122/94   Pulse 94   Temp 98.2 °F (36.8 °C)   Resp 18   Ht 5' 2\" (1.575 m)   Wt 215 lb (97.5 kg)   LMP 11/19/2013   SpO2 94%   BMI 39.32 kg/m²   Gait - steady  Medication side effects(SE): denies     Mental Status Examination:    Level of consciousness:  within normal limits   Appearance:  fair grooming and fair hygiene  Behavior/Motor:  no abnormalities noted  Attitude toward examiner:  cooperative and withdrawn  Speech:  spontaneous, normal rate, and normal volume   Mood: constricted and depressed  Affect:  blunted  Thought processes:  circumstantial   Thought content:  Suicidal Ideation:  denies suicidal ideation  Delusions:  no evidence of delusions  Perceptual Disturbance:  denies any perceptual disturbance  Cognition:  oriented to person, place, and time   Concentration distractible  Insight fair   Judgement fair     ASSESSMENT:   Patient symptoms are:  [] Well controlled  [x] Improving  [] Worsening  [] No change      Diagnosis:   Principal Problem:    Major depressive disorder, recurrent severe without psychotic features ([facility]  Active Problems:    Intentional drug overdose ([facility]  Resolved Problems:    * No resolved hospital problems. *      LABS:    Recent Labs     01/23/23  1700   WBC 6.0   HGB 11.9*        Recent Labs     01/23/23  1700 01/24/23  0721    139   K 2.3* 3.9   * 106   CO2 19* 25   BUN 11 12   CREATININE 0.50 0.75   GLUCOSE 99 123*     Recent Labs     01/23/23  1700 01/24/23  0721   BILITOT <0.2 0.4   ALKPHOS 56 84   AST 20 25   ALT 21 29     Lab Results   Component Value Date/Time    LABAMPH Neg 01/23/2023 05:00 PM    BARBSCNU Neg 01/23/2023 05:00 PM    LABBENZ Neg 01/23/2023 05:00 PM    LABBENZ NotDTCD 09/14/2012 11:35 AM    LABMETH Neg 01/23/2023 05:00 PM    OPIATESCREENURINE POSITIVE 01/23/2023 05:00 PM    PHENCYCLIDINESCREENURINE Neg 01/23/2023 05:00 PM    ETOH 93 01/23/2023 05:00 PM     Lab Results   Component Value Date/Time    TSH 0.99 11/11/2022 02:44 PM     No results found for: LITHIUM  No results found for: VALPROATE, CBMZ    RISK ASSESSMENT: suicide high     Treatment Plan:  Reviewed current Medications with the patient.    Monitoring efficacy and tolerability of current med regime   Risks, benefits, side effects, drug-to-drug interactions and alternatives to treatment were discussed. Collateral information: see social workers notes   CD evaluation denies  Encourage patient to attend group and other milieu activities. Discharge planning discussed with the patient and treatment team.    PSYCHOTHERAPY/COUNSELING:  [x] Therapeutic interview  [x] Supportive  [] CBT  [] Ongoing  [] Other    [x] Patient continues to need, on a daily basis, active treatment furnished directly by or requiring the supervision of inpatient psychiatric personnel      Anticipated Length of stay:5 days         COSIGN : yes     Electronically signed by TYRA Desir CNP on 1/26/2023 at 3:06 PM     Addendum:  Molly Kemp seen with NP after attending the team meeting, discussing the patient with the nursing and social work staff. I participated during the interview process as well as the treatment plan and spent more than 70% of the time with the nurse practitioner helping me in documentation.   I agree with the above documentation of clinical finding and treatment plan    Physician Signature: Electronically signed by Gray Nicole MD on 1/26/2023 at 3:14 PM

## 2023-01-26 NOTE — PROGRESS NOTES
Off Note Pt says her weakness and loss of bladder and bowel controll are better, Her pain is her worst Problem, we started Norco yesterday and she has not asked for any yet, will continue the same advised of symptoms of nerve compromise and Need for surgery if she develops these.

## 2023-01-26 NOTE — PROGRESS NOTES
Gave am meds 1- 300 mg Neurontin, Zanaflex, tylenol for back pain, pt informed she wants her xanax , that she will think about getting off of it .  Noted walking down the otero with a steady gait,

## 2023-01-26 NOTE — PROGRESS NOTES
Pt just awoken is now talking with peers at the tv area,  offered and gave xanax 0.5 for #8 anxiety. Pt stated I hope I can stay awake. Groups were encouraged.

## 2023-01-26 NOTE — PROGRESS NOTES
Pt. refused to attend the 0900 community meeting.  Electronically signed by Ilda Ibrahim on 1/26/2023 at 9:35 AM

## 2023-01-26 NOTE — PLAN OF CARE
Pt is out on the unit and social with peers. Pt is very flat/ sad. States her anxiety and depression are 8/10. Denies current SI, HI, AVH. Pt is walking with a slow and steady gate without a walker. Problem: Self Harm/Suicidality  Goal: Will have no self-injury during hospital stay  Description: INTERVENTIONS:  1. Ensure constant observer at bedside with Q15M safety checks  2. Maintain a safe environment  3. Secure patient belongings  4. Ensure family/visitors adhere to safety recommendations  5. Ensure safety tray has been added to patient's diet order  6. Every shift and PRN: Re-assess suicidal risk via Frequent Screener    Outcome: Progressing     Problem: Chronic Conditions and Co-morbidities  Goal: Patient's chronic conditions and co-morbidity symptoms are monitored and maintained or improved  Outcome: Progressing     Problem: ABCDS Injury Assessment  Goal: Absence of physical injury  Outcome: Progressing     Problem: Safety - Adult  Goal: Free from fall injury  Outcome: Progressing     Problem: Risk for Elopement  Goal: Patient will not exit the unit/facility without proper excort  Outcome: Progressing     Problem: Coping  Goal: Pt/Family able to verbalize concerns and demonstrate effective coping strategies  Description: INTERVENTIONS:  1. Assist patient/family to identify coping skills, available support systems and cultural and spiritual values  2. Provide emotional support, including active listening and acknowledgement of concerns of patient and caregivers  3. Reduce environmental stimuli, as able  4. Instruct patient/family in relaxation techniques, as appropriate  5. Assess for spiritual pain/suffering and initiate Spiritual Care, Psychosocial Clinical Specialist consults as needed  Outcome: Progressing     Problem: Depression/Self Harm  Goal: Effect of psychiatric condition will be minimized and patient will be protected from self harm  Description: INTERVENTIONS:  1.  Assess impact of patient's symptoms on level of functioning, self care needs and offer support as indicated  2. Assess patient/family knowledge of depression, impact on illness and need for teaching  3. Provide emotional support, presence and reassurance  4. Assess for possible suicidal thoughts or ideation. If patient expresses suicidal thoughts or statements do not leave alone, initiate Suicide Precautions, move to a room close to the nursing station and obtain sitter  5.  Initiate consults as appropriate with Mental Health Professional, Spiritual Care, Psychosocial CNS, and consider a recommendation to the LIP for a Psychiatric Consultation  Outcome: Progressing

## 2023-01-27 LAB
GLUCOSE BLD-MCNC: 125 MG/DL (ref 70–99)
GLUCOSE BLD-MCNC: 127 MG/DL (ref 70–99)
GLUCOSE BLD-MCNC: 140 MG/DL (ref 70–99)
GLUCOSE BLD-MCNC: 140 MG/DL (ref 70–99)
PERFORMED ON: ABNORMAL

## 2023-01-27 PROCEDURE — 6370000000 HC RX 637 (ALT 250 FOR IP): Performed by: PSYCHIATRY & NEUROLOGY

## 2023-01-27 PROCEDURE — 6370000000 HC RX 637 (ALT 250 FOR IP): Performed by: PAIN MEDICINE

## 2023-01-27 PROCEDURE — 99232 SBSQ HOSP IP/OBS MODERATE 35: CPT | Performed by: PSYCHIATRY & NEUROLOGY

## 2023-01-27 PROCEDURE — 6370000000 HC RX 637 (ALT 250 FOR IP): Performed by: REGISTERED NURSE

## 2023-01-27 PROCEDURE — 1240000000 HC EMOTIONAL WELLNESS R&B

## 2023-01-27 PROCEDURE — 90833 PSYTX W PT W E/M 30 MIN: CPT | Performed by: PSYCHIATRY & NEUROLOGY

## 2023-01-27 RX ORDER — TRAZODONE HYDROCHLORIDE 50 MG/1
25 TABLET ORAL NIGHTLY PRN
Status: DISCONTINUED | OUTPATIENT
Start: 2023-01-27 | End: 2023-01-30

## 2023-01-27 RX ADMIN — HYDROCHLOROTHIAZIDE 25 MG: 12.5 TABLET ORAL at 08:50

## 2023-01-27 RX ADMIN — HYDROCODONE BITARTRATE AND ACETAMINOPHEN 1 TABLET: 5; 325 TABLET ORAL at 06:28

## 2023-01-27 RX ADMIN — GABAPENTIN 300 MG: 300 CAPSULE ORAL at 20:31

## 2023-01-27 RX ADMIN — ALPRAZOLAM 0.5 MG: 0.5 TABLET ORAL at 12:55

## 2023-01-27 RX ADMIN — ALPRAZOLAM 0.5 MG: 0.5 TABLET ORAL at 08:50

## 2023-01-27 RX ADMIN — TIZANIDINE 4 MG: 4 TABLET ORAL at 20:31

## 2023-01-27 RX ADMIN — ATORVASTATIN CALCIUM 10 MG: 10 TABLET, FILM COATED ORAL at 20:31

## 2023-01-27 RX ADMIN — PANTOPRAZOLE SODIUM 40 MG: 40 TABLET, DELAYED RELEASE ORAL at 05:59

## 2023-01-27 RX ADMIN — DULOXETINE HYDROCHLORIDE 40 MG: 20 CAPSULE, DELAYED RELEASE ORAL at 08:49

## 2023-01-27 RX ADMIN — TIZANIDINE 4 MG: 4 TABLET ORAL at 08:49

## 2023-01-27 RX ADMIN — TRAZODONE HYDROCHLORIDE 25 MG: 50 TABLET ORAL at 21:38

## 2023-01-27 RX ADMIN — HYDROCODONE BITARTRATE AND ACETAMINOPHEN 1 TABLET: 5; 325 TABLET ORAL at 19:29

## 2023-01-27 RX ADMIN — LEVOTHYROXINE SODIUM 112 MCG: 112 TABLET ORAL at 05:59

## 2023-01-27 RX ADMIN — AMLODIPINE BESYLATE 10 MG: 10 TABLET ORAL at 08:50

## 2023-01-27 RX ADMIN — ALPRAZOLAM 0.5 MG: 0.5 TABLET ORAL at 20:31

## 2023-01-27 RX ADMIN — GABAPENTIN 300 MG: 300 CAPSULE ORAL at 08:49

## 2023-01-27 RX ADMIN — ALPRAZOLAM 0.5 MG: 0.5 TABLET ORAL at 17:13

## 2023-01-27 ASSESSMENT — PAIN DESCRIPTION - LOCATION
LOCATION: HIP;BACK;LEG
LOCATION: BACK;HIP
LOCATION: BACK

## 2023-01-27 ASSESSMENT — PAIN - FUNCTIONAL ASSESSMENT
PAIN_FUNCTIONAL_ASSESSMENT: PREVENTS OR INTERFERES WITH ALL ACTIVE AND SOME PASSIVE ACTIVITIES
PAIN_FUNCTIONAL_ASSESSMENT: PREVENTS OR INTERFERES WITH MANY ACTIVE NOT PASSIVE ACTIVITIES

## 2023-01-27 ASSESSMENT — PAIN DESCRIPTION - DESCRIPTORS
DESCRIPTORS: BURNING;POUNDING;THROBBING
DESCRIPTORS: BURNING;ACHING;SHARP;STABBING

## 2023-01-27 ASSESSMENT — PAIN DESCRIPTION - ORIENTATION
ORIENTATION: RIGHT;LEFT
ORIENTATION: LOWER;LEFT;RIGHT

## 2023-01-27 ASSESSMENT — PAIN SCALES - GENERAL
PAINLEVEL_OUTOF10: 9
PAINLEVEL_OUTOF10: 8

## 2023-01-27 NOTE — PROGRESS NOTES
Donaldo Reina [facility]. FOLLOW-UP NOTE       1/27/2023     Patient was seen and examined in person, Chart reviewed   Patient's case discussed with staff/team    Chief Complaint: SI derepression    Interim History:     Pt appears flat, blunted, depressed  Endorse significant fear of loosing control of bowels and bladder due to medical condition; states isolated episode of incontinence last night while sleeping- believes trazodone may have been too strong  Poor eye contact  Anhedonia, feelings of helplessness and hopelessness  Reports significant difficulty completing basic tasks, such as laundry, walking, carrying lunch try, moving chairs   Pt continues state chronic pain remains significant stressor to depression anxiety     Appetite:   [x] Normal/Unchanged  [] Increased  [] Decreased      Sleep:       [x] Normal/Unchanged  [] Fair       [] Poor              Energy:    [x] Normal/Unchanged  [] Increased  [] Decreased        SI [] Present  [x] Absent    HI  []Present  [x] Absent     Aggression:  [] yes  [x] no    Patient is [x] able  [] unable to CONTRACT FOR SAFETY     PAST MEDICAL/PSYCHIATRIC HISTORY:   Past Medical History:   Diagnosis Date    Anemia     C. difficile colitis 01/2013    Chronic fatigue syndrome     Depression     Janet    Diabetes mellitus ([facility]     Diverticulitis large intestine 2001    Fibromyalgia     Fibromyalgia 03/24/2015    GERD (gastroesophageal reflux disease)     HTN (hypertension)     Hyperlipidemia     Hypothyroidism     Palpitations     Pulmonary embolus ([facility] 10/2012    Following GYN procedure    Vitamin D deficiency        FAMILY/SOCIAL HISTORY:  Family History   Problem Relation Age of Onset    COPD Mother     Lung Cancer Father     COPD Maternal Grandmother     Cancer Maternal Grandfather         COLON    Colon Cancer Paternal Grandfather     Cancer Paternal Aunt         LUNG    Cancer Other         BLOOD CANCER- UNSPECIFIED    Coronary Art Dis Maternal Aunt     Other Maternal Aunt         Brain aneurysm     Social History     Socioeconomic History    Marital status:      Spouse name: Not on file    Number of children: Not on file    Years of education: Not on file    Highest education level: Not on file   Occupational History    Not on file   Tobacco Use    Smoking status: Never    Smokeless tobacco: Never   Vaping Use    Vaping Use: Never used   Substance and Sexual Activity    Alcohol use: Not Currently     Comment: OCC. Drug use: No    Sexual activity: Not on file   Other Topics Concern    Not on file   Social History Narrative    Not on file     Social Determinants of Health     Financial Resource Strain: Not on file   Food Insecurity: Not on file   Transportation Needs: Not on file   Physical Activity: Not on file   Stress: Not on file   Social Connections: Not on file   Intimate Partner Violence: Not on file   Housing Stability: Not on file           ROS:  [x] All negative/unchanged except if checked.  Explain positive(checked items) below:  [] Constitutional  [] Eyes  [] Ear/Nose/Mouth/Throat  [] Respiratory  [] CV  [] GI  []   [] Musculoskeletal  [] Skin/Breast  [] Neurological  [] Endocrine  [] Heme/Lymph  [] Allergic/Immunologic    Explanation:     MEDICATIONS:    Current Facility-Administered Medications:     DULoxetine (CYMBALTA) extended release capsule 40 mg, 40 mg, Oral, Daily, María Elena Gray MD, 40 mg at 01/27/23 0849    ALPRAZolam (XANAX) tablet 0.5 mg, 0.5 mg, Oral, 4x daily, María Elena Gray MD, 0.5 mg at 01/27/23 0850    HYDROcodone-acetaminophen (NORCO) 5-325 MG per tablet 1 tablet, 1 tablet, Oral, BID PRN, Melisa Albright MD, 1 tablet at 01/27/23 7343    haloperidol (HALDOL) tablet 5 mg, 5 mg, Oral, Q6H PRN **OR** haloperidol lactate (HALDOL) injection 5 mg, 5 mg, IntraMUSCular, Q6H PRN, María Elena Gray MD    hydrOXYzine pamoate (VISTARIL) capsule 50 mg, 50 mg, Oral, Q6H PRN **OR** [DISCONTINUED] hydrOXYzine pamoate (VISTARIL) capsule 50 mg, 50 mg, Oral, Q6H PRN, Cristian Betancourt MD, 50 mg at 01/25/23 0949    traZODone (DESYREL) tablet 50 mg, 50 mg, Oral, Nightly PRN, Cristian Betancourt MD, 50 mg at 01/26/23 2201    benztropine mesylate (COGENTIN) injection 2 mg, 2 mg, IntraMUSCular, BID PRN, Cristian Betancourt MD    magnesium hydroxide (MILK OF MAGNESIA) 400 MG/5ML suspension 30 mL, 30 mL, Oral, Daily PRN, Cristian Betancourt MD    nicotine polacrilex (COMMIT) lozenge 2 mg, 2 mg, Oral, Q1H PRN, Cristian Betancourt MD    amLODIPine (NORVASC) tablet 10 mg, 10 mg, Oral, Daily, Randolphsanjuana Avalos, APRN - CNP, 10 mg at 01/27/23 0850    hydroCHLOROthiazide (HYDRODIURIL) tablet 25 mg, 25 mg, Oral, Daily, Randolphsanjuana Avalos, APRN - CNP, 25 mg at 01/27/23 0850    levothyroxine (SYNTHROID) tablet 112 mcg, 112 mcg, Oral, Daily, Randolph Robbie, APRN - CNP, 112 mcg at 01/27/23 0559    atorvastatin (LIPITOR) tablet 10 mg, 10 mg, Oral, Nightly, Randolph Robbie, APRN - CNP, 10 mg at 01/26/23 2116    pantoprazole (PROTONIX) tablet 40 mg, 40 mg, Oral, QAM AC, Randolph Robbie, APRN - CNP, 40 mg at 01/27/23 0559    glucose chewable tablet 16 g, 4 tablet, Oral, PRN, Agustín Avalos, APRN - CNP    dextrose bolus 10% 125 mL, 125 mL, IntraVENous, PRN **OR** dextrose bolus 10% 250 mL, 250 mL, IntraVENous, PRN, Agustín Avalos APRN - CNP    glucagon (rDNA) injection 1 mg, 1 mg, SubCUTAneous, PRN, Agustín Avalos, APRN - CNP    dextrose 10 % infusion, , IntraVENous, Continuous PRN, Agustín Avalos APRN - CNP    insulin lispro (HUMALOG) injection vial 0-8 Units, 0-8 Units, SubCUTAneous, TID WC, TYRA Luke CNP    insulin lispro (HUMALOG) injection vial 0-4 Units, 0-4 Units, SubCUTAneous, Nightly, TYRA Luke CNP    tiZANidine (ZANAFLEX) tablet 4 mg, 4 mg, Oral, BID, Jose Juan Dodge MD, 4 mg at 01/27/23 0849    gabapentin (NEURONTIN) capsule 300 mg, 300 mg, Oral, BID, Jose Juan Dodge MD, 300 mg at 01/27/23 0849      Examination:  /88   Pulse 65   Temp 98.1 °F (36.7 °C) (Oral)   Resp 18   Ht 5' 2\" (1.575 m)   Wt 215 lb (97.5 kg)   LMP 11/19/2013   SpO2 96%   BMI 39.32 kg/m²   Gait - steady  Medication side effects(SE): denies     Mental Status Examination:    Level of consciousness:  within normal limits   Appearance:  fair grooming and fair hygiene  Behavior/Motor:  no abnormalities noted  Attitude toward examiner:  cooperative and withdrawn  Speech:  spontaneous, normal rate, and normal volume   Mood: constricted and depressed  Affect:  blunted  Thought processes:  circumstantial   Thought content:  Suicidal Ideation:  denies suicidal ideation  Delusions:  no evidence of delusions  Perceptual Disturbance:  denies any perceptual disturbance  Cognition:  oriented to person, place, and time   Concentration distractible  Insight fair   Judgement fair     ASSESSMENT:   Patient symptoms are:  [] Well controlled  [x] Improving  [] Worsening  [] No change      Diagnosis:   Principal Problem:    Major depressive disorder, recurrent severe without psychotic features [facility]  Active Problems:    Intentional drug overdose ([facility]  Resolved Problems:    * No resolved hospital problems. *      LABS:    No results for input(s): WBC, HGB, PLT in the last 72 hours. No results for input(s): NA, K, CL, CO2, BUN, CREATININE, GLUCOSE in the last 72 hours. No results for input(s): BILITOT, ALKPHOS, AST, ALT in the last 72 hours.     Lab Results   Component Value Date/Time    LABAMPH Neg 01/23/2023 05:00 PM    BARBSCNU Neg 01/23/2023 05:00 PM    LABBENZ Neg 01/23/2023 05:00 PM    LABBENZ NotDTCD 09/14/2012 11:35 AM    LABMETH Neg 01/23/2023 05:00 PM    OPIATESCREENURINE POSITIVE 01/23/2023 05:00 PM    PHENCYCLIDINESCREENURINE Neg 01/23/2023 05:00 PM    ETOH 93 01/23/2023 05:00 PM     Lab Results   Component Value Date/Time    TSH 0.99 11/11/2022 02:44 PM     No results found for: LITHIUM  No results found for: VALPROATE, CBMZ    RISK ASSESSMENT: suicide high     Treatment Plan:  Reviewed current Medications with the patient. Reduce trazodone 25mg QHS PRN  Risks, benefits, side effects, drug-to-drug interactions and alternatives to treatment were discussed. Collateral information: see social workers notes   CD evaluation denies  Encourage patient to attend group and other milieu activities. Discharge planning discussed with the patient and treatment team.    PSYCHOTHERAPY/COUNSELING:  [x] Therapeutic interview  [x] Supportive  [] CBT  [] Ongoing  [] Other    [x] Patient continues to need, on a daily basis, active treatment furnished directly by or requiring the supervision of inpatient psychiatric personnel      Anticipated Length of stay: 4 days         COSIGN : yes     Electronically signed by TYRA Tamayo CNP on 1/27/2023 at 12:03 PM       Addendum:  Chelsie Lang seen with NP after attending the team meeting, discussing the patient with the nursing and social work staff. I participated during the interview process as well as the treatment plan and spent more than 70% of the time with the nurse practitioner helping me in documentation.   I agree with the above documentation of clinical finding and treatment plan  Patient was seen 1:1 for 20 minutes, other than E&M time spent, focusing on      - coping skills techniques     - Anxiety management techniques discussed including deep breathing exercise and PMR     - discussing patients strength and weakness     - Focusing on negative cognition and maladaptive thoughts, which is feeding and maintaining the depression symptoms       Physician Signature: Electronically signed by Krys Benjamin MD on 1/27/2023 at 4:09 PM

## 2023-01-27 NOTE — PROGRESS NOTES
Pt. declined to attend the 0900 community meeting, despite staff encouragement.  GOAL : \" to be less afraid of wahts happening to me\" Electronically signed by Carey Colmenares on 1/27/2023 at 9:57 AM

## 2023-01-27 NOTE — PLAN OF CARE
Patient is alert and orient x 4, has been out on unit and social with select peers. Patient has flat, sad affect with fair eye contact. Patient deies SI/HI and hallucinations at this time but endorses anxiety and depression, stating \"it's pretty bad. \"  Patient has been ambulating with steady gait and no walker. She reports poor sleep but appetite is okay. Problem: Self Harm/Suicidality  Goal: Will have no self-injury during hospital stay  Description: INTERVENTIONS:  1. Ensure constant observer at bedside with Q15M safety checks  2. Maintain a safe environment  3. Secure patient belongings  4. Ensure family/visitors adhere to safety recommendations  5. Ensure safety tray has been added to patient's diet order  6.   Every shift and PRN: Re-assess suicidal risk via Frequent Screener    1/26/2023 2232 by Yaneth Flores RN  Outcome: Progressing  Flowsheets (Taken 1/26/2023 1239 by Anuj Hoang RN)  Will have no self-injury during hospital stay: Ensure constant observer at bedside with Q15M safety checks  1/26/2023 1234 by Anuj Hoang RN  Outcome: Progressing     Problem: Chronic Conditions and Co-morbidities  Goal: Patient's chronic conditions and co-morbidity symptoms are monitored and maintained or improved  1/26/2023 2232 by Yaneth Flores RN  Outcome: Progressing  Flowsheets (Taken 1/26/2023 1239 by Anuj Hoang RN)  Care Plan - Patient's Chronic Conditions and Co-Morbidity Symptoms are Monitored and Maintained or Improved:   Monitor and assess patient's chronic conditions and comorbid symptoms for stability, deterioration, or improvement   Collaborate with multidisciplinary team to address chronic and comorbid conditions and prevent exacerbation or deterioration   Update acute care plan with appropriate goals if chronic or comorbid symptoms are exacerbated and prevent overall improvement and discharge  1/26/2023 1234 by Anuj Hoang RN  Outcome: Progressing     Problem: ABCDS Injury Assessment  Goal: Absence of physical injury  1/26/2023 2232 by Venancio Jurado RN  Outcome: Progressing  1/26/2023 1234 by Arianna Cartwright RN  Outcome: Progressing     Problem: Safety - Adult  Goal: Free from fall injury  1/26/2023 2232 by Venancio Jurado RN  Outcome: Progressing  1/26/2023 1234 by Arianna Cartwright RN  Outcome: Progressing     Problem: Risk for Elopement  Goal: Patient will not exit the unit/facility without proper excort  1/26/2023 2232 by Venancio Jurado RN  Outcome: Progressing  Flowsheets (Taken 1/26/2023 1239 by Arianna Cartwright RN)  Nursing Interventions for Elopement Risk:   Assist with personal care needs such as toileting, eating, dressing, as needed to reduce the risk of wandering   Collaborate with family members/caregivers to mitigate the elopement risk   Collaborate with treatment team for nicotine replacement  1/26/2023 1234 by Arianna Cartwright RN  Outcome: Progressing     Problem: Coping  Goal: Pt/Family able to verbalize concerns and demonstrate effective coping strategies  Description: INTERVENTIONS:  1. Assist patient/family to identify coping skills, available support systems and cultural and spiritual values  2. Provide emotional support, including active listening and acknowledgement of concerns of patient and caregivers  3. Reduce environmental stimuli, as able  4. Instruct patient/family in relaxation techniques, as appropriate  5.  Assess for spiritual pain/suffering and initiate Spiritual Care, Psychosocial Clinical Specialist consults as needed  1/26/2023 2232 by Venancio Jurado RN  Outcome: Progressing  Flowsheets (Taken 1/26/2023 1239 by Arianna Cartwright RN)  Patient/family able to verbalize anxieties, fears, and concerns, and demonstrate effective coping:   Assist patient/family to identify coping skills, available support systems and cultural and spiritual values   Provide emotional support, including active listening and acknowledgement of concerns of patient and caregivers   Reduce environmental stimuli, as able  1/26/2023 1234 by Graham Caputo RN  Outcome: Progressing     Problem: Depression/Self Harm  Goal: Effect of psychiatric condition will be minimized and patient will be protected from self harm  Description: INTERVENTIONS:  1. Assess impact of patient's symptoms on level of functioning, self care needs and offer support as indicated  2. Assess patient/family knowledge of depression, impact on illness and need for teaching  3. Provide emotional support, presence and reassurance  4. Assess for possible suicidal thoughts or ideation. If patient expresses suicidal thoughts or statements do not leave alone, initiate Suicide Precautions, move to a room close to the nursing station and obtain sitter  5. Initiate consults as appropriate with Mental Health Professional, Spiritual Care, Psychosocial CNS, and consider a recommendation to the LIP for a Psychiatric Consultation  1/26/2023 2232 by Tori Moritz, RN  Outcome: Progressing  Flowsheets  Taken 1/26/2023 2229 by Tori Moritz, RN  Effect of psychiatric condition will be minimized and patient will be protected from self harm:   Assess impact of patients symptoms on level of functioning, self care needs and offer support as indicated   Assess patient/family knowledge of depression, impact on illness and need for teaching   Provide emotional support, presence and reassurance   Assess for suicidal thoughts or ideation.  If patient expresses suicidal thoughts or statements do not leave alone, initiate Suicide Precautions, move near nurse station, obtain sitter  Taken 1/26/2023 1242 by Graham Caputo RN  Effect of psychiatric condition will be minimized and patient will be protected from self harm:   Assess impact of patients symptoms on level of functioning, self care needs and offer support as indicated   Assess patient/family knowledge of depression, impact on illness and need for teaching   Provide emotional support, presence and reassurance   Assess for suicidal thoughts or ideation.  If patient expresses suicidal thoughts or statements do not leave alone, initiate Suicide Precautions, move near nurse station, obtain sitter   Initiate consults as appropriate with Mental Health Professional, Spiritual Care, Psychosocial CNS, and consider a recommendation to the LIP for a Psychiatric Consultation  Taken 1/26/2023 1239 by Esha Sterling RN  Effect of psychiatric condition will be minimized and patient will be protected from self harm:   Assess impact of patients symptoms on level of functioning, self care needs and offer support as indicated   Assess patient/family knowledge of depression, impact on illness and need for teaching   Provide emotional support, presence and reassurance  1/26/2023 1234 by Esha Sterling RN  Outcome: Progressing

## 2023-01-27 NOTE — PLAN OF CARE
Pt has been out more today. Pt was out for breakfast and is attending groups. Pt is flat/sad but brightens during conversation. Pt was incontinent of urine in the night. Pt showered today and appears well kempt. Pt talks about how she is having a hard time with her youngest being away at [employer]. Pt states she has been having disagreements with him and it is causing her a lot of stress. Pt also talks about her chronic pain from her spinal stenosis and states \"if I have to have this pain every day of my life, there's no sense in living. \" Pt is hopeful for surgery to help her. Pt is walking well with a slow and steady gate without her walker. Pt states her anxiety and depression are still high at a 7/10. Denies active SI, HI, AVH. Pt is eating better today than she did yesterday. Reports poor sleep. Problem: Self Harm/Suicidality  Goal: Will have no self-injury during hospital stay  Description: INTERVENTIONS:  1. Ensure constant observer at bedside with Q15M safety checks  2. Maintain a safe environment  3. Secure patient belongings  4. Ensure family/visitors adhere to safety recommendations  5. Ensure safety tray has been added to patient's diet order  6.   Every shift and PRN: Re-assess suicidal risk via Frequent Screener    1/27/2023 1037 by Teri Lew RN  Outcome: Progressing  1/26/2023 2232 by Lisa Giron RN  Outcome: Progressing  Flowsheets (Taken 1/26/2023 1239 by Teri Lew RN)  Will have no self-injury during hospital stay: Ensure constant observer at bedside with Q15M safety checks     Problem: Chronic Conditions and Co-morbidities  Goal: Patient's chronic conditions and co-morbidity symptoms are monitored and maintained or improved  1/27/2023 1037 by Teri Lew RN  Outcome: Progressing  1/26/2023 2232 by Lisa Giron RN  Outcome: Progressing  Flowsheets (Taken 1/26/2023 1239 by Teri Lew, RN)  Care Plan - Patient's Chronic Conditions and Co-Morbidity Symptoms are Monitored and Maintained or Improved:   Monitor and assess patient's chronic conditions and comorbid symptoms for stability, deterioration, or improvement   Collaborate with multidisciplinary team to address chronic and comorbid conditions and prevent exacerbation or deterioration   Update acute care plan with appropriate goals if chronic or comorbid symptoms are exacerbated and prevent overall improvement and discharge     Problem: ABCDS Injury Assessment  Goal: Absence of physical injury  1/27/2023 1037 by Smith Ko RN  Outcome: Progressing  1/26/2023 2232 by Claudean Budds, RN  Outcome: Progressing     Problem: Safety - Adult  Goal: Free from fall injury  1/27/2023 1037 by Smith Ko RN  Outcome: Progressing  1/26/2023 2232 by Claudean Budds, RN  Outcome: Progressing     Problem: Risk for Elopement  Goal: Patient will not exit the unit/facility without proper excort  1/27/2023 1037 by Smith Ko RN  Outcome: Progressing  1/26/2023 2232 by Claudean Budds, RN  Outcome: Progressing  Flowsheets (Taken 1/26/2023 1239 by Smith Ko RN)  Nursing Interventions for Elopement Risk:   Assist with personal care needs such as toileting, eating, dressing, as needed to reduce the risk of wandering   Collaborate with family members/caregivers to mitigate the elopement risk   Collaborate with treatment team for nicotine replacement     Problem: Coping  Goal: Pt/Family able to verbalize concerns and demonstrate effective coping strategies  Description: INTERVENTIONS:  1. Assist patient/family to identify coping skills, available support systems and cultural and spiritual values  2. Provide emotional support, including active listening and acknowledgement of concerns of patient and caregivers  3. Reduce environmental stimuli, as able  4. Instruct patient/family in relaxation techniques, as appropriate  5.  Assess for spiritual pain/suffering and initiate Spiritual Care, Psychosocial Clinical Specialist consults as needed  1/27/2023 1037 by Yi Franks RN  Outcome: Progressing  1/26/2023 2232 by Raj Stallings RN  Outcome: Progressing  Flowsheets (Taken 1/26/2023 1239 by Yi Franks RN)  Patient/family able to verbalize anxieties, fears, and concerns, and demonstrate effective coping:   Assist patient/family to identify coping skills, available support systems and cultural and spiritual values   Provide emotional support, including active listening and acknowledgement of concerns of patient and caregivers   Reduce environmental stimuli, as able     Problem: Depression/Self Harm  Goal: Effect of psychiatric condition will be minimized and patient will be protected from self harm  Description: INTERVENTIONS:  1. Assess impact of patient's symptoms on level of functioning, self care needs and offer support as indicated  2. Assess patient/family knowledge of depression, impact on illness and need for teaching  3. Provide emotional support, presence and reassurance  4. Assess for possible suicidal thoughts or ideation. If patient expresses suicidal thoughts or statements do not leave alone, initiate Suicide Precautions, move to a room close to the nursing station and obtain sitter  5. Initiate consults as appropriate with Mental Health Professional, Spiritual Care, Psychosocial CNS, and consider a recommendation to the LIP for a Psychiatric Consultation  1/27/2023 1037 by Yi Franks RN  Outcome: Progressing  1/26/2023 2232 by Raj Stallings RN  Outcome: Progressing  Flowsheets  Taken 1/26/2023 2229 by Raj Stallings RN  Effect of psychiatric condition will be minimized and patient will be protected from self harm:   Assess impact of patients symptoms on level of functioning, self care needs and offer support as indicated   Assess patient/family knowledge of depression, impact on illness and need for teaching   Provide emotional support, presence and reassurance   Assess for suicidal thoughts or ideation.  If patient expresses suicidal thoughts or statements do not leave alone, initiate Suicide Precautions, move near nurse station, obtain sitter  Taken 1/26/2023 1242 by Antonette Schwarz RN  Effect of psychiatric condition will be minimized and patient will be protected from self harm:   Assess impact of patients symptoms on level of functioning, self care needs and offer support as indicated   Assess patient/family knowledge of depression, impact on illness and need for teaching   Provide emotional support, presence and reassurance   Assess for suicidal thoughts or ideation.  If patient expresses suicidal thoughts or statements do not leave alone, initiate Suicide Precautions, move near nurse station, obtain sitter   Initiate consults as appropriate with Mental Health Professional, Spiritual Care, Psychosocial CNS, and consider a recommendation to the LIP for a Psychiatric Consultation  Taken 1/26/2023 1239 by Antonette Schwarz RN  Effect of psychiatric condition will be minimized and patient will be protected from self harm:   Assess impact of patients symptoms on level of functioning, self care needs and offer support as indicated   Assess patient/family knowledge of depression, impact on illness and need for teaching   Provide emotional support, presence and reassurance

## 2023-01-27 NOTE — GROUP NOTE
Group Therapy Note    Date: 1/27/2023    Group Start Time: 1400  Group End Time: 1430  Group Topic: Healthy Living/Wellness    [facility]    Sherry Zhao RN        Group Therapy Note    Attendees: 10/20       Patient's Goal:  learn about time management     Notes:      Status After Intervention:  Unchanged    Participation Level:  Active Listener    Participation Quality: Appropriate      Speech:  normal      Thought Process/Content: Logical      Affective Functioning: Congruent      Mood: euthymic      Level of consciousness:  Alert      Response to Learning: Progressing to goal      Endings: none reported    Modes of Intervention: Education      Discipline Responsible: Registered Nurse      Signature:  Sherry Zhao RN

## 2023-01-27 NOTE — GROUP NOTE
Group Therapy Note    Date: 1/27/2023    Group Start Time: 1000  Group End Time: 1100  Group Topic: Psychoeducation    [facility]    [place], CTRS        Group Therapy Note    Attendees: 16       Patient's Goal:  \"to be less afraid of whats happening to me\"    Notes:  Pt. attended the 1000 skill group. Worked creatively on project. Feels better. Relaxed and talkative. Status After Intervention:  Improved    Participation Level:  Active Listener and Interactive    Participation Quality: Appropriate, Attentive, and Sharing      Speech:  normal      Thought Process/Content: Logical      Affective Functioning: Congruent      Mood:  calm      Level of consciousness:  Alert, Oriented x4, and Attentive      Response to Learning: Progressing to goal      Endings: None Reported    Modes of Intervention: Education, Support, Socialization, and Activity      Discipline Responsible: Psychoeducational Specialist      Signature:  Madelaine Luo

## 2023-01-28 LAB
GLUCOSE BLD-MCNC: 121 MG/DL (ref 70–99)
GLUCOSE BLD-MCNC: 124 MG/DL (ref 70–99)
GLUCOSE BLD-MCNC: 133 MG/DL (ref 70–99)
GLUCOSE BLD-MCNC: 152 MG/DL (ref 70–99)
PERFORMED ON: ABNORMAL

## 2023-01-28 PROCEDURE — 1240000000 HC EMOTIONAL WELLNESS R&B

## 2023-01-28 PROCEDURE — 6370000000 HC RX 637 (ALT 250 FOR IP): Performed by: REGISTERED NURSE

## 2023-01-28 PROCEDURE — 6370000000 HC RX 637 (ALT 250 FOR IP): Performed by: PSYCHIATRY & NEUROLOGY

## 2023-01-28 PROCEDURE — 6370000000 HC RX 637 (ALT 250 FOR IP): Performed by: PAIN MEDICINE

## 2023-01-28 RX ADMIN — ATORVASTATIN CALCIUM 10 MG: 10 TABLET, FILM COATED ORAL at 21:42

## 2023-01-28 RX ADMIN — TIZANIDINE 4 MG: 4 TABLET ORAL at 21:42

## 2023-01-28 RX ADMIN — HYDROCHLOROTHIAZIDE 25 MG: 12.5 TABLET ORAL at 09:26

## 2023-01-28 RX ADMIN — DULOXETINE HYDROCHLORIDE 40 MG: 20 CAPSULE, DELAYED RELEASE ORAL at 09:26

## 2023-01-28 RX ADMIN — ALPRAZOLAM 0.5 MG: 0.5 TABLET ORAL at 21:43

## 2023-01-28 RX ADMIN — PANTOPRAZOLE SODIUM 40 MG: 40 TABLET, DELAYED RELEASE ORAL at 06:41

## 2023-01-28 RX ADMIN — ALPRAZOLAM 0.5 MG: 0.5 TABLET ORAL at 09:26

## 2023-01-28 RX ADMIN — TRAZODONE HYDROCHLORIDE 25 MG: 50 TABLET ORAL at 21:45

## 2023-01-28 RX ADMIN — ALPRAZOLAM 0.5 MG: 0.5 TABLET ORAL at 17:49

## 2023-01-28 RX ADMIN — TIZANIDINE 4 MG: 4 TABLET ORAL at 09:35

## 2023-01-28 RX ADMIN — HYDROCODONE BITARTRATE AND ACETAMINOPHEN 1 TABLET: 5; 325 TABLET ORAL at 23:10

## 2023-01-28 RX ADMIN — HYDROCODONE BITARTRATE AND ACETAMINOPHEN 1 TABLET: 5; 325 TABLET ORAL at 09:35

## 2023-01-28 RX ADMIN — LEVOTHYROXINE SODIUM 112 MCG: 112 TABLET ORAL at 06:41

## 2023-01-28 RX ADMIN — GABAPENTIN 300 MG: 300 CAPSULE ORAL at 21:43

## 2023-01-28 RX ADMIN — GABAPENTIN 300 MG: 300 CAPSULE ORAL at 09:27

## 2023-01-28 RX ADMIN — AMLODIPINE BESYLATE 10 MG: 10 TABLET ORAL at 09:27

## 2023-01-28 RX ADMIN — ALPRAZOLAM 0.5 MG: 0.5 TABLET ORAL at 13:47

## 2023-01-28 ASSESSMENT — PAIN SCALES - GENERAL
PAINLEVEL_OUTOF10: 8
PAINLEVEL_OUTOF10: 6

## 2023-01-28 ASSESSMENT — PAIN DESCRIPTION - ORIENTATION
ORIENTATION: LEFT;RIGHT
ORIENTATION: RIGHT;LEFT
ORIENTATION: LEFT;RIGHT

## 2023-01-28 ASSESSMENT — PAIN DESCRIPTION - DESCRIPTORS
DESCRIPTORS: ACHING
DESCRIPTORS: ACHING
DESCRIPTORS: TINGLING;BURNING;STABBING

## 2023-01-28 ASSESSMENT — PAIN DESCRIPTION - LOCATION
LOCATION: BACK
LOCATION: BACK;LEG

## 2023-01-28 ASSESSMENT — PAIN SCALES - WONG BAKER: WONGBAKER_NUMERICALRESPONSE: 2

## 2023-01-28 NOTE — PLAN OF CARE
Pt visible on unit more this afternoon. Socializing more with peers. Flat/sad affect with fair eye contact. Appears well kempt. States she feels \"lonely and wants to go home\". Rates her anxiety 10/10 and depression 8/10. Remains focused on relationship with her sons. Hopeful these relationships will improve with time. Denies any SI, HI or AVH. Reports poor sleep last night but a good appetite. Spoke with Dr. Santosh Rodriguez about a possible increase in PRN norco if xanax were decreased. Dr. Santosh Rodriguez would like xanax D/C prior to any increase in pain medication. Pt aware. Parish hose applied. Currently in day room socializing with select peers. Problem: Self Harm/Suicidality  Goal: Will have no self-injury during hospital stay  Description: INTERVENTIONS:  1. Ensure constant observer at bedside with Q15M safety checks  2. Maintain a safe environment  3. Secure patient belongings  4. Ensure family/visitors adhere to safety recommendations  5. Ensure safety tray has been added to patient's diet order  6.   Every shift and PRN: Re-assess suicidal risk via Frequent Screener    1/28/2023 1319 by James Kaplan RN  Outcome: Progressing  1/28/2023 1244 by James Kaplan RN  Outcome: Progressing  Flowsheets (Taken 1/28/2023 1237)  Will have no self-injury during hospital stay:   Ensure constant observer at bedside with Q15M safety checks   Maintain a safe environment   Secure patient belongings   Every shift and PRN: Re-assess suicidal risk via Frequent Screener     Problem: Chronic Conditions and Co-morbidities  Goal: Patient's chronic conditions and co-morbidity symptoms are monitored and maintained or improved  1/28/2023 1319 by James Kaplan RN  Outcome: Progressing  1/28/2023 1244 by James Kaplan RN  Outcome: Progressing  Flowsheets (Taken 1/28/2023 1237)  Care Plan - Patient's Chronic Conditions and Co-Morbidity Symptoms are Monitored and Maintained or Improved:   Monitor and assess patient's chronic conditions and comorbid symptoms for stability, deterioration, or improvement   Collaborate with multidisciplinary team to address chronic and comorbid conditions and prevent exacerbation or deterioration     Problem: ABCDS Injury Assessment  Goal: Absence of physical injury  1/28/2023 1319 by Jasper Lyon RN  Outcome: Progressing  1/28/2023 1244 by Jasper Lyon RN  Outcome: Progressing     Problem: Safety - Adult  Goal: Free from fall injury  1/28/2023 1319 by Jasper Lyon RN  Outcome: Progressing  1/28/2023 1244 by Jasper Lyon RN  Outcome: Progressing     Problem: Risk for Elopement  Goal: Patient will not exit the unit/facility without proper excort  1/28/2023 1319 by Jasper Lyon RN  Outcome: Progressing  1/28/2023 1244 by Jasper Lyon RN  Outcome: Progressing  Flowsheets (Taken 1/28/2023 1237)  Nursing Interventions for Elopement Risk:   Collaborate with treatment team for drug withdrawal symptoms treatment   Collaborate with treatment team for nicotine replacement   Communicate/escalate to /other team member the risk of elopement   Communicate to physician the risk for elopement   Communicate/escalate to nursing supervisor the risk of elopement   Communicate/escalate to charge nurse the risk of elopement     Problem: Coping  Goal: Pt/Family able to verbalize concerns and demonstrate effective coping strategies  Description: INTERVENTIONS:  1. Assist patient/family to identify coping skills, available support systems and cultural and spiritual values  2. Provide emotional support, including active listening and acknowledgement of concerns of patient and caregivers  3. Reduce environmental stimuli, as able  4. Instruct patient/family in relaxation techniques, as appropriate  5.  Assess for spiritual pain/suffering and initiate Spiritual Care, Psychosocial Clinical Specialist consults as needed  1/28/2023 1319 by Jasper Lyon RN  Outcome: Progressing  1/28/2023 1244 by Echo Donaldson, RN  Outcome: Progressing  Flowsheets (Taken 1/28/2023 1237)  Patient/family able to verbalize anxieties, fears, and concerns, and demonstrate effective coping:   Assist patient/family to identify coping skills, available support systems and cultural and spiritual values   Provide emotional support, including active listening and acknowledgement of concerns of patient and caregivers   Reduce environmental stimuli, as able     Problem: Depression/Self Harm  Goal: Effect of psychiatric condition will be minimized and patient will be protected from self harm  Description: INTERVENTIONS:  1. Assess impact of patient's symptoms on level of functioning, self care needs and offer support as indicated  2. Assess patient/family knowledge of depression, impact on illness and need for teaching  3. Provide emotional support, presence and reassurance  4. Assess for possible suicidal thoughts or ideation. If patient expresses suicidal thoughts or statements do not leave alone, initiate Suicide Precautions, move to a room close to the nursing station and obtain sitter  5.  Initiate consults as appropriate with Mental Health Professional, Spiritual Care, Psychosocial CNS, and consider a recommendation to the LIP for a Psychiatric Consultation  1/28/2023 1319 by Echo Donaldson, RN  Outcome: Progressing  1/28/2023 1244 by Echo Donaldson, RN  Outcome: Progressing  Flowsheets (Taken 1/28/2023 1237)  Effect of psychiatric condition will be minimized and patient will be protected from self harm:   Assess impact of patients symptoms on level of functioning, self care needs and offer support as indicated   Assess patient/family knowledge of depression, impact on illness and need for teaching   Provide emotional support, presence and reassurance

## 2023-01-28 NOTE — PROGRESS NOTES
Explained and gave all am meds, pt requested pain med Norco 5-525, xanax 0.5 , Neurontin 300 mg Zanaflex 4mg, Anxiety # 10, pain # 8.  Pt is in good spirits, social with select peers sitting in the dinning room most of am

## 2023-01-28 NOTE — PROGRESS NOTES
"REPORT OF MEDICAL NUTRITION THERAPY    Patient Name: Armando Domingo  MRN: [id number],  Age: 36 year old,  Gender: male    Encounter Date: 1/4/2018,  Encounter Time:30 minute follow-up    Provider: Diana Carreon, FAWN, LD, CDE    Referral received to provide Medical Nutrition Therapy for patient for treatment of morbid obesity, prediabetes and dysmetabolic syndrome X.    NUTRITION HISTORY:    Armando admits that the holidays were challenging but he did feel he limited excess intake more than he would have in past holiday seasons.  He has been trying to include more vegetables and has done well with this.  He and his wife have altered some recipes to increase health promotion.  He is very motivated to cook more and improve focus on lifestyle change and weight loss.    He has not been able to initiate exercise plan, this impacted by the holidays.  Today he reports determination to make this happen now.    He bought a home blood pressure monitor but has not used it.  Would like to have blood pressure checked today.    Goals set at last visit:  1. Add a Men's formula multi vitamin and mineral complex such as Centrum for Men. (Will try now.)  2. Focus on increasing activity.(Will try now.)  3. Continue to use portion control in balanced meals. (Continue this goal.)  4. Eat more vegetables. (Continue this goal.)      DATA:  HT:  5' 11.5\"  WT:  348 lb (348.7 lb)  BMI: 47.86    Wt Readings from Last 5 Encounters:   01/04/18 (!) 157.9 kg (348 lb)   12/04/17 (!) 157.4 kg (347 lb 1.6 oz)   11/09/17 (!) 156.2 kg (344 lb 4.8 oz)   10/05/17 (!) 157.9 kg (348 lb)   08/10/17 (!) 159.7 kg (352 lb)     BP Readings from Last 5 Encounters:   01/04/18 122/84   10/05/17 124/86   08/10/17 118/74   07/03/17 130/86   06/19/17 120/70       LABS:  Glucose   Date Value Ref Range Status   07/03/2017 105 (H) 70 - 99 mg/dL Final     Comment:     Fasting specimen   ]  Recent Labs   Lab Test  07/03/17   0702  05/09/16   0717  11/19/14   0813  " [facility]. FOLLOW-UP NOTE   THE PATIENT WAS SEEN THROUGH TELEPSYCHIATRY, IN A REAL-TIME, AUDIO-VIDEO ENCOUNTER, WITH THE PATIENT IN [place], [place] AND THE PROVIDER IN [place], [place]      1/28/2023     Patient was seen and examined in person, Chart reviewed   Patient's case discussed with staff/team    Chief Complaint: SI derepression    Interim History:     Patient said she felt \"OK\". She said she slept OK, although her trazodone dose was decreased. She said she was still depressed, and that her appetite was 'OK'. She denied having any suicidal or homicidal ideation. She denied having auditory or visual hallucinations. She denied paranoia or other delusions. She asked whether her pain medications could be increased, since she felt the frequency was not enough to control her pain.     Appetite:   [x] Normal/Unchanged  [] Increased  [] Decreased      Sleep:       [x] Normal/Unchanged  [] Fair       [] Poor              Energy:    [x] Normal/Unchanged  [] Increased  [] Decreased        SI [] Present  [x] Absent    HI  []Present  [x] Absent     Aggression:  [] yes  [x] no    Patient is [x] able  [] unable to CONTRACT FOR SAFETY     PAST MEDICAL/PSYCHIATRIC HISTORY:   Past Medical History:   Diagnosis Date    Anemia     C. difficile colitis 01/2013    Chronic fatigue syndrome     Depression     [place]    Diabetes mellitus ([address]     Diverticulitis large intestine 2001    Fibromyalgia     Fibromyalgia 03/24/2015    GERD (gastroesophageal reflux disease)     HTN (hypertension)     Hyperlipidemia     Hypothyroidism     Palpitations     Pulmonary embolus ([address] 10/2012    Following GYN procedure    Vitamin D deficiency        FAMILY/SOCIAL HISTORY:  Family History   Problem Relation Age of Onset    COPD Mother     Lung Cancer Father     COPD Maternal Grandmother     Cancer Maternal Grandfather         COLON    Colon Cancer Paternal Grandfather     Cancer Paternal Aunt         LUNG    Cancer 10/02/13   0715   CHOL  185  167  190  174   HDL  41  31*  43  37*   LDL  121*  112*  129  120   TRIG  116  118  89  87   CHOLHDLRATIO   --    --   4.4  4.7       MEDICATIONS:    Current Outpatient Prescriptions   Medication     lisinopril-hydrochlorothiazide (PRINZIDE/ZESTORETIC) 20-12.5 MG per tablet     omeprazole (PRILOSEC) 20 MG CR capsule     aspirin 81 MG tablet     No current facility-administered medications for this visit.      ASSESSMENT:    Armando remains motivated and continued some good new habits such as increasing vegetables but holidays were a difficult time to achieve all goals.  He is focused on them now.    INTERVENTION:  Food intake pattern and activity plan reviewed.  Progress reviewed.  We discussed what was working and what needs change.  Ideas for new variety in health promoting meals discussed.  Ideas for easy inclusion of more vegetables discussed.  New goals developed.  Patient encouraged to continue with healthy changes and commended for changes he has made.      PLAN:    Armando will continue previous goals, and add new goals:  1. Add a Men's formula multi vitamin and mineral complex such as Centrum for Men.  2. Focus on increasing activity.  3. Continue to use portion control in balanced meals.  4. Eat more vegetables.    New goals:  1. Plan meals in advance for the week.  Make a game out of it with wife and daughter, pulling choices randomly out of a group submitted by each of them.  2. Make more home made soups to use for lunches.        FOLLOW-UP  Patient will keep a food record and return in 1 month.      [address] [address]  [phone]  [phone]    Date: 1/11/2018  Time: 4:09 PM   Other         BLOOD CANCER- UNSPECIFIED    Coronary Art Dis Maternal Aunt     Other Maternal Aunt         Brain aneurysm     Social History     Socioeconomic History    Marital status:      Spouse name: Not on file    Number of children: Not on file    Years of education: Not on file    Highest education level: Not on file   Occupational History    Not on file   Tobacco Use    Smoking status: Never    Smokeless tobacco: Never   Vaping Use    Vaping Use: Never used   Substance and Sexual Activity    Alcohol use: Not Currently     Comment: OCC. Drug use: No    Sexual activity: Not on file   Other Topics Concern    Not on file   Social History Narrative    Not on file     Social Determinants of Health     Financial Resource Strain: Not on file   Food Insecurity: Not on file   Transportation Needs: Not on file   Physical Activity: Not on file   Stress: Not on file   Social Connections: Not on file   Intimate Partner Violence: Not on file   Housing Stability: Not on file           ROS:  [x] All negative/unchanged except if checked.  Explain positive(checked items) below:  [] Constitutional  [] Eyes  [] Ear/Nose/Mouth/Throat  [] Respiratory  [] CV  [] GI  []   [] Musculoskeletal  [] Skin/Breast  [] Neurological  [] Endocrine  [] Heme/Lymph  [] Allergic/Immunologic    Explanation:     MEDICATIONS:    Current Facility-Administered Medications:     traZODone (DESYREL) tablet 25 mg, 25 mg, Oral, Nightly PRN, Dylon Dia, APRN - CNP, 25 mg at 01/27/23 2138    DULoxetine (CYMBALTA) extended release capsule 40 mg, 40 mg, Oral, Daily, Tristen Rinaldi MD, 40 mg at 01/28/23 0926    ALPRAZolam (XANAX) tablet 0.5 mg, 0.5 mg, Oral, 4x daily, Tristen Rinaldi MD, 0.5 mg at 01/28/23 1347    HYDROcodone-acetaminophen (NORCO) 5-325 MG per tablet 1 tablet, 1 tablet, Oral, BID PRN, Bria Diaz MD, 1 tablet at 01/28/23 0935    haloperidol (HALDOL) tablet 5 mg, 5 mg, Oral, Q6H PRN **OR** haloperidol lactate (HALDOL) injection 5 mg, 5 mg, IntraMUSCular, Q6H PRN, Enrique Wasserman MD    hydrOXYzine pamoate (VISTARIL) capsule 50 mg, 50 mg, Oral, Q6H PRN **OR** [DISCONTINUED] hydrOXYzine pamoate (VISTARIL) capsule 50 mg, 50 mg, Oral, Q6H PRN, Enrique Wasserman MD, 50 mg at 01/25/23 0949    benztropine mesylate (COGENTIN) injection 2 mg, 2 mg, IntraMUSCular, BID PRN, Enrique Wasserman MD    magnesium hydroxide (MILK OF MAGNESIA) 400 MG/5ML suspension 30 mL, 30 mL, Oral, Daily PRN, Enrique Wasserman MD    nicotine polacrilex (COMMIT) lozenge 2 mg, 2 mg, Oral, Q1H PRN, Enrique Wasserman MD    amLODIPine (NORVASC) tablet 10 mg, 10 mg, Oral, Daily, TYRA Lynch CNP, 10 mg at 01/28/23 3013    hydroCHLOROthiazide (HYDRODIURIL) tablet 25 mg, 25 mg, Oral, Daily, TYRA Lynch - CNP, 25 mg at 01/28/23 9132    levothyroxine (SYNTHROID) tablet 112 mcg, 112 mcg, Oral, Daily, TYRA Lynch CNP, 112 mcg at 01/28/23 0641    atorvastatin (LIPITOR) tablet 10 mg, 10 mg, Oral, Nightly, TYRA Lynch - CNP, 10 mg at 01/27/23 2031    pantoprazole (PROTONIX) tablet 40 mg, 40 mg, Oral, QAM AC, TYRA Lynch - CNP, 40 mg at 01/28/23 0641    glucose chewable tablet 16 g, 4 tablet, Oral, PRN, TYRA Lynch - CNP    dextrose bolus 10% 125 mL, 125 mL, IntraVENous, PRN **OR** dextrose bolus 10% 250 mL, 250 mL, IntraVENous, PRN, TYRA Lynch - CNP    glucagon (rDNA) injection 1 mg, 1 mg, SubCUTAneous, PRN, TYRA Lynch - CNP    dextrose 10 % infusion, , IntraVENous, Continuous PRN, TYRA Lynch CNP    insulin lispro (HUMALOG) injection vial 0-8 Units, 0-8 Units, SubCUTAneous, TID WC, TYRA Lynch - CNP    insulin lispro (HUMALOG) injection vial 0-4 Units, 0-4 Units, SubCUTAneous, Nightly, TYRA Lynch CNP    tiZANidine (ZANAFLEX) tablet 4 mg, 4 mg, Oral, BID, Pablito Biggs MD, 4 mg at 01/28/23 0935    gabapentin (NEURONTIN) capsule 300 mg, 300 mg, Oral, BID, Pablito Biggs MD, 300 mg at 01/28/23 6400      Examination:  /88   Pulse 66   Temp 98.6 °F (37 °C)   Resp 18   Ht 5' 2\" (1.575 m)   Wt 215 lb (97.5 kg)   LMP 11/19/2013   SpO2 98%   BMI 39.32 kg/m²   Gait - steady  Medication side effects(SE): denies     Mental Status Examination:    Level of consciousness:  within normal limits   Appearance:  fair grooming and fair hygiene  Behavior/Motor:  no abnormalities noted  Attitude toward examiner:  cooperative and withdrawn  Speech: spontaneous, normal rate, and normal volume   Mood: constricted and depressed  Affect: blunted  Thought processes: less circumstantial   Thought content:  Suicidal Ideation:  denies suicidal ideation  Delusions:  no evidence of delusions  Perceptual Disturbance:  denies any perceptual disturbance  Cognition:  oriented to person, place, and time   Concentration distractible  Insight fair   Judgement fair     ASSESSMENT:   Patient symptoms are:  [] Well controlled  [x] Improving  [] Worsening  [] No change      Diagnosis:   Principal Problem:    Major depressive disorder, recurrent severe without psychotic features ([facility]  Active Problems:    Intentional drug overdose ([facility]  Resolved Problems:    * No resolved hospital problems. *      LABS:    No results for input(s): WBC, HGB, PLT in the last 72 hours. No results for input(s): NA, K, CL, CO2, BUN, CREATININE, GLUCOSE in the last 72 hours. No results for input(s): BILITOT, ALKPHOS, AST, ALT in the last 72 hours.     Lab Results   Component Value Date/Time    LABAMPH Neg 01/23/2023 05:00 PM    BARBSCNU Neg 01/23/2023 05:00 PM    LABBENZ Neg 01/23/2023 05:00 PM    LABBENZ NotDTCD 09/14/2012 11:35 AM    LABMETH Neg 01/23/2023 05:00 PM    OPIATESCREENURINE POSITIVE 01/23/2023 05:00 PM    PHENCYCLIDINESCREENURINE Neg 01/23/2023 05:00 PM    ETOH 93 01/23/2023 05:00 PM     Lab Results   Component Value Date/Time    TSH 0.99 11/11/2022 02:44 PM     No results found for: LITHIUM  No results found for: VALPROATE, CBMZ    RISK ASSESSMENT: suicide high     Treatment Plan:  Reviewed current Medications with the patient. Reduce trazodone 25mg QHS PRN  Risks, benefits, side effects, drug-to-drug interactions and alternatives to treatment were discussed. Collateral information: see social workers notes   CD evaluation denies  Encourage patient to attend group and other milieu activities.   Discharge planning discussed with the patient and treatment team.    PSYCHOTHERAPY/COUNSELING:  [x] Therapeutic interview  [x] Supportive  [] CBT  [] Ongoing  [] Other    [x] Patient continues to need, on a daily basis, active treatment furnished directly by or requiring the supervision of inpatient psychiatric personnel      Anticipated Length of stay: 4 days            Electronically signed by Rahul Pop MD on 1/28/2023 at 3:40 PM

## 2023-01-28 NOTE — FLOWSHEET NOTE
Pt noted to isolate in her room most of the afternoon. Pt does come out for meals and when asked about depression pt stated \" I feel lonely\". Pt endorses improved sleep with medication. Pt appears flat with a blunted affect . Pt endorses chronic pain . She denies SI,HI,AVH.

## 2023-01-28 NOTE — GROUP NOTE
Group Therapy Note    Date: 1/28/2023    Group Start Time: 1000  Group End Time: 1100  Group Topic: Psychoeducation    [facility]    Miriam Garsiaos, CTRS        Group Therapy Note    Attendees: 13       Patient's Goal:  \"to feel better\"    Notes:  Pt. attended the 1000 skill group. Pt. was encouraging to others. Flirtatious with male pt. but accepted redirection well. Status After Intervention:  Improved    Participation Level:  Active Listener and Interactive    Participation Quality: Appropriate, Attentive, Sharing, and Inappropriate      Speech:  normal      Thought Process/Content: Logical      Affective Functioning: Congruent      Mood:  calm      Level of consciousness:  Alert, Oriented x4, and Attentive      Response to Learning: Able to change behavior and Progressing to goal      Endings: None Reported    Modes of Intervention: Education, Support, Socialization, and Activity      Discipline Responsible: Psychoeducational Specialist      Signature:  Abhishek Goldberg

## 2023-01-28 NOTE — PROGRESS NOTES
When asked about depression pt stated that she doesn't feel depressed but she does feel lonely. She did state that she is here because of her relationship with her sons. She feels like no one here wants to talk with her and that makes her feel worse. Pt in and out of room most of the evening, some socializing with peers seen. Denies SI/HI/AVH. Pt reports that her appetite is good. Pt reports Trazodone helping her get a full night sleep and did request it today at bedtime. Pt c/o chronic pain in her lower back, hips and legs, rating 9/10. Pain medication requested and given. She states her goal is to go home. BS taken as ordered and stable. 20F with morbid obesity and diabetes complains that "my whole body feels numb" after smoking marijuana 2 hours ago. Pt says she has smoked in the past, never had a similar reaction, said that she bought the marijuana on the street and was told it was "medical marijuana," doesn't believe it was laced with other substances. Denies use of other illicit drugs or alcohol. Pt tachycardic to the 130s, has palpitations on ROS but no chest pain, dyspnea, n/v, or diaphoresis. No blurred vision, weakness, headache, or trauma. 20F with morbid obesity and diabetes complains that "my whole body feels numb" after smoking marijuana 2 hours ago. Pt says she has smoked in the past, never had a similar reaction, said that she bought the marijuana on the street and was told it was "medical marijuana," doesn't believe it was laced with other substances. Denies use of other illicit drugs or alcohol. Pt tachycardic to the 130s, has palpitations on ROS but no chest pain, dyspnea, n/v, or diaphoresis. No blurred vision, weakness, headache, or trauma.  LMP ~1mo ago.

## 2023-01-28 NOTE — PROGRESS NOTES
Morning Community Meeting Topics    Luciano Hughes attended the morning community meeting on 1/28/23. Topics discussed today     [x] Introduction  Day of the week and date  Mask distribution  Current mask requirements  [x]Teams  Explanation of  Green and Blue team criteria  Nurses assigned to each team for today  Explanation about green and blue paper  Date  Patient's Name  Patient's Nurse  Goals  [x] Visitation  Announce the visiting hours for the day  Announce which team is allowed to have visitors for the day  Review any updated Covid 19 requirements for visitors during visitation  Vaccine Card or negative Covid test within 48 hours of visit  State Identification  Patients are reminded to alert the  at least 1 hour before visitation   [x] Unit Orientation  Coffee use  Phone location and etiquette  Shower locations  [place] and dryer location and process  Common area expectations  Staff rounds expectation  [x] Meals   Educate patient to the menu  The patient is encouraged to fill out the menu to get preferences at mealtime  The patient is educated that if they do not fill out the menu, they will get the standard tray  The coffee pot is decaf, patient encouraged to order regular coffee from menu.   Educate patient to the meal process  Patient encouraged to eat snacks provided twice daily  Snacks may stay in patient room     [x] Discharge Process  Discharge expectations  Fill out the survey after discharge   [x] Hygiene  Daily showers encouraged  Showers availability discussed   Daily dressing encouraged  Discussed wearing street clothing  Education provided on where to place linens and clothing  Linens in the hamper  personal clothing does not go into the linen hamper  [x] Group   Patient encouraged to attend group provided  Time of Group Meetings discussed  Gentle reminder that attendance is a Physician order  [x] Movement  Chair exercises completed  Stretching completed  Notes:  GOAL : \" to feel better\" Electronically signed by Adonis Bernstein on 1/28/2023 at 11:51 AM

## 2023-01-28 NOTE — PROGRESS NOTES
Pt reports having sleep apnea, pt states she can remember waking up gasping for air, stated that there were times her boyfriend told her she fell asleep with food in  her mouth, pt reports she takes tylenol pms, a lot at home. Pt reports it will be hard to get off of xanax as she  has been on them a long time,

## 2023-01-29 LAB
GLUCOSE BLD-MCNC: 122 MG/DL (ref 70–99)
GLUCOSE BLD-MCNC: 126 MG/DL (ref 70–99)
GLUCOSE BLD-MCNC: 130 MG/DL (ref 70–99)
GLUCOSE BLD-MCNC: 140 MG/DL (ref 70–99)
PERFORMED ON: ABNORMAL

## 2023-01-29 PROCEDURE — 1240000000 HC EMOTIONAL WELLNESS R&B

## 2023-01-29 PROCEDURE — 6370000000 HC RX 637 (ALT 250 FOR IP): Performed by: PAIN MEDICINE

## 2023-01-29 PROCEDURE — 6370000000 HC RX 637 (ALT 250 FOR IP): Performed by: PSYCHIATRY & NEUROLOGY

## 2023-01-29 PROCEDURE — 6370000000 HC RX 637 (ALT 250 FOR IP): Performed by: REGISTERED NURSE

## 2023-01-29 RX ORDER — HYDROXYZINE PAMOATE 25 MG/1
25 CAPSULE ORAL 3 TIMES DAILY PRN
Status: DISCONTINUED | OUTPATIENT
Start: 2023-01-29 | End: 2023-02-03 | Stop reason: HOSPADM

## 2023-01-29 RX ORDER — ACETAMINOPHEN 325 MG/1
650 TABLET ORAL EVERY 6 HOURS PRN
Status: DISCONTINUED | OUTPATIENT
Start: 2023-01-29 | End: 2023-02-03 | Stop reason: HOSPADM

## 2023-01-29 RX ADMIN — GABAPENTIN 300 MG: 300 CAPSULE ORAL at 21:48

## 2023-01-29 RX ADMIN — AMLODIPINE BESYLATE 10 MG: 10 TABLET ORAL at 09:01

## 2023-01-29 RX ADMIN — ALPRAZOLAM 0.5 MG: 0.5 TABLET ORAL at 17:23

## 2023-01-29 RX ADMIN — HYDROCODONE BITARTRATE AND ACETAMINOPHEN 1 TABLET: 5; 325 TABLET ORAL at 22:20

## 2023-01-29 RX ADMIN — LEVOTHYROXINE SODIUM 112 MCG: 112 TABLET ORAL at 06:29

## 2023-01-29 RX ADMIN — ATORVASTATIN CALCIUM 10 MG: 10 TABLET, FILM COATED ORAL at 21:48

## 2023-01-29 RX ADMIN — GABAPENTIN 300 MG: 300 CAPSULE ORAL at 09:04

## 2023-01-29 RX ADMIN — DULOXETINE HYDROCHLORIDE 40 MG: 20 CAPSULE, DELAYED RELEASE ORAL at 09:01

## 2023-01-29 RX ADMIN — ALPRAZOLAM 0.5 MG: 0.5 TABLET ORAL at 21:48

## 2023-01-29 RX ADMIN — TIZANIDINE 4 MG: 4 TABLET ORAL at 21:48

## 2023-01-29 RX ADMIN — ALPRAZOLAM 0.5 MG: 0.5 TABLET ORAL at 13:04

## 2023-01-29 RX ADMIN — HYDROCODONE BITARTRATE AND ACETAMINOPHEN 1 TABLET: 5; 325 TABLET ORAL at 09:01

## 2023-01-29 RX ADMIN — HYDROCHLOROTHIAZIDE 25 MG: 12.5 TABLET ORAL at 09:01

## 2023-01-29 RX ADMIN — ALPRAZOLAM 0.5 MG: 0.5 TABLET ORAL at 09:01

## 2023-01-29 RX ADMIN — PANTOPRAZOLE SODIUM 40 MG: 40 TABLET, DELAYED RELEASE ORAL at 06:29

## 2023-01-29 RX ADMIN — TIZANIDINE 4 MG: 4 TABLET ORAL at 09:01

## 2023-01-29 RX ADMIN — TRAZODONE HYDROCHLORIDE 25 MG: 50 TABLET ORAL at 22:19

## 2023-01-29 RX ADMIN — ACETAMINOPHEN 650 MG: 325 TABLET ORAL at 13:36

## 2023-01-29 ASSESSMENT — PAIN SCALES - GENERAL
PAINLEVEL_OUTOF10: 8
PAINLEVEL_OUTOF10: 10
PAINLEVEL_OUTOF10: 6
PAINLEVEL_OUTOF10: 8

## 2023-01-29 ASSESSMENT — PAIN DESCRIPTION - LOCATION
LOCATION: BACK

## 2023-01-29 ASSESSMENT — PAIN DESCRIPTION - DESCRIPTORS
DESCRIPTORS: ACHING

## 2023-01-29 ASSESSMENT — PAIN DESCRIPTION - ORIENTATION
ORIENTATION: LEFT;RIGHT
ORIENTATION: RIGHT;LEFT

## 2023-01-29 NOTE — PROGRESS NOTES
[facility]onal[facility]. FOLLOW-UP NOTE   THE PATIENT WAS SEEN THROUGH TELEPSYCHIATRY, IN A REAL-TIME, AUDIO-VIDEO ENCOUNTER, WITH THE PATIENT IN [place], [place] AND THE PROVIDER IN [place], [place]      1/29/2023     Patient was seen and examined in person, Chart reviewed   Patient's case discussed with staff/team    Chief Complaint: SI derepression    Interim History:     Patient said she felt \"terrible\". She said she slept , but woke up and then had an incontinence accident in bed. She said she was scared and that this was embarrassing and made her think about her need to schedule the surgery for spinal stenosis. She denied having suicidal or homicidal ideation. She said her appetite was decreased. She is still depressed. She denied any auditory or visual hallucinations. He denied paranoia or other delusions.      Appetite:   [x] Normal/Unchanged  [] Increased  [] Decreased      Sleep:       [x] Normal/Unchanged  [] Fair       [] Poor              Energy:    [x] Normal/Unchanged  [] Increased  [] Decreased        SI [] Present  [x] Absent    HI  []Present  [x] Absent     Aggression:  [] yes  [x] no    Patient is [x] able  [] unable to CONTRACT FOR SAFETY     PAST MEDICAL/PSYCHIATRIC HISTORY:   Past Medical History:   Diagnosis Date    Anemia     C. difficile colitis 01/2013    Chronic fatigue syndrome     Depression     Janet    Diabetes mellitus ([address]     Diverticulitis large intestine 2001    Fibromyalgia     Fibromyalgia 03/24/2015    GERD (gastroesophageal reflux disease)     HTN (hypertension)     Hyperlipidemia     Hypothyroidism     Palpitations     Pulmonary embolus ([address] 10/2012    Following GYN procedure    Vitamin D deficiency        FAMILY/SOCIAL HISTORY:  Family History   Problem Relation Age of Onset    COPD Mother     Lung Cancer Father     COPD Maternal Grandmother     Cancer Maternal Grandfather         COLON    Colon Cancer Paternal Grandfather     Cancer Paternal Aunt LUNG    Cancer Other         BLOOD CANCER- UNSPECIFIED    Coronary Art Dis Maternal Aunt     Other Maternal Aunt         Brain aneurysm     Social History     Socioeconomic History    Marital status:      Spouse name: Not on file    Number of children: Not on file    Years of education: Not on file    Highest education level: Not on file   Occupational History    Not on file   Tobacco Use    Smoking status: Never    Smokeless tobacco: Never   Vaping Use    Vaping Use: Never used   Substance and Sexual Activity    Alcohol use: Not Currently     Comment: OCC. Drug use: No    Sexual activity: Not on file   Other Topics Concern    Not on file   Social History Narrative    Not on file     Social Determinants of Health     Financial Resource Strain: Not on file   Food Insecurity: Not on file   Transportation Needs: Not on file   Physical Activity: Not on file   Stress: Not on file   Social Connections: Not on file   Intimate Partner Violence: Not on file   Housing Stability: Not on file           ROS:  [x] All negative/unchanged except if checked.  Explain positive(checked items) below:  [] Constitutional  [] Eyes  [] Ear/Nose/Mouth/Throat  [] Respiratory  [] CV  [] GI  []   [] Musculoskeletal  [] Skin/Breast  [] Neurological  [] Endocrine  [] Heme/Lymph  [] Allergic/Immunologic    Explanation:     MEDICATIONS:    Current Facility-Administered Medications:     acetaminophen (TYLENOL) tablet 650 mg, 650 mg, Oral, Q6H PRN, Paulina An MD, 650 mg at 01/29/23 1336    hydrOXYzine pamoate (VISTARIL) capsule 25 mg, 25 mg, Oral, TID PRN, Paulina An MD    traZODone (DESYREL) tablet 25 mg, 25 mg, Oral, Nightly PRN, TYRA Dougherty CNP, 25 mg at 01/28/23 2145    DULoxetine (CYMBALTA) extended release capsule 40 mg, 40 mg, Oral, Daily, Kathy García MD, 40 mg at 01/29/23 0901    ALPRAZolam (XANAX) tablet 0.5 mg, 0.5 mg, Oral, 4x daily, Kathy García MD, 0.5 mg at 01/29/23 1304 HYDROcodone-acetaminophen (NORCO) 5-325 MG per tablet 1 tablet, 1 tablet, Oral, BID PRN, Deric Alan MD, 1 tablet at 01/29/23 0901    haloperidol (HALDOL) tablet 5 mg, 5 mg, Oral, Q6H PRN **OR** haloperidol lactate (HALDOL) injection 5 mg, 5 mg, IntraMUSCular, Q6H PRN, Enrique Wasserman MD    benztropine mesylate (COGENTIN) injection 2 mg, 2 mg, IntraMUSCular, BID PRN, Enrique Wasserman MD    magnesium hydroxide (MILK OF MAGNESIA) 400 MG/5ML suspension 30 mL, 30 mL, Oral, Daily PRN, Enrique Wasserman MD    nicotine polacrilex (COMMIT) lozenge 2 mg, 2 mg, Oral, Q1H PRN, Enrique Wasserman MD    amLODIPine (NORVASC) tablet 10 mg, 10 mg, Oral, Daily, TYRA Lynch CNP, 10 mg at 01/29/23 0901    hydroCHLOROthiazide (HYDRODIURIL) tablet 25 mg, 25 mg, Oral, Daily, TYRA Lynch - CNP, 25 mg at 01/29/23 0901    levothyroxine (SYNTHROID) tablet 112 mcg, 112 mcg, Oral, Daily, TYRA Lynch CNP, 112 mcg at 01/29/23 8926    atorvastatin (LIPITOR) tablet 10 mg, 10 mg, Oral, Nightly, TYRA Lynch - CNP, 10 mg at 01/28/23 2142    pantoprazole (PROTONIX) tablet 40 mg, 40 mg, Oral, QAM AC, TYRA Lynch - CNP, 40 mg at 01/29/23 0629    glucose chewable tablet 16 g, 4 tablet, Oral, PRN, TYRA Lynch - CNP    dextrose bolus 10% 125 mL, 125 mL, IntraVENous, PRN **OR** dextrose bolus 10% 250 mL, 250 mL, IntraVENous, PRN, TYRA Lynch - CNP    glucagon (rDNA) injection 1 mg, 1 mg, SubCUTAneous, PRN, TYRA Lynch - CNP    dextrose 10 % infusion, , IntraVENous, Continuous PRN, TYRA Lynch CNP    insulin lispro (HUMALOG) injection vial 0-8 Units, 0-8 Units, SubCUTAneous, TID WC, TYRA Lynch - CNP    insulin lispro (HUMALOG) injection vial 0-4 Units, 0-4 Units, SubCUTAneous, Nightly, TYRA Lynch - CNP    tiZANidine (ZANAFLEX) tablet 4 mg, 4 mg, Oral, BID, Pablito Biggs MD, 4 mg at 01/29/23 0901    gabapentin (NEURONTIN) capsule 300 mg, 300 mg, Oral, BID, Pablito Biggs MD, 300 mg at 01/29/23 0904      Examination:  /86   Pulse 87   Temp 97.9 °F (36.6 °C)   Resp 18   Ht 5' 2\" (1.575 m)   Wt 215 lb (97.5 kg)   LMP 11/19/2013   SpO2 97%   BMI 39.32 kg/m²   Gait - steady  Medication side effects(SE): denies     Mental Status Examination:    Level of consciousness:  within normal limits   Appearance:  fair grooming and fair hygiene  Behavior/Motor:  no abnormalities noted  Attitude toward examiner:  cooperative and withdrawn  Speech: spontaneous, normal rate, and normal volume   Mood: constricted and depressed  Affect: blunted  Thought processes: less circumstantial   Thought content:  Suicidal Ideation: denies suicidal ideation  Delusions:  no evidence of delusions  Perceptual Disturbance:  denies any perceptual disturbance  Cognition:  oriented to person, place, and time   Concentration distractible  Insight fair   Judgement fair     ASSESSMENT:   Patient symptoms are:  [] Well controlled  [x] Improving  [] Worsening  [] No change      Diagnosis:   Principal Problem:    Major depressive disorder, recurrent severe without psychotic features ([facility]  Active Problems:    Intentional drug overdose ([facility]  Resolved Problems:    * No resolved hospital problems. *      LABS:    No results for input(s): WBC, HGB, PLT in the last 72 hours. No results for input(s): NA, K, CL, CO2, BUN, CREATININE, GLUCOSE in the last 72 hours. No results for input(s): BILITOT, ALKPHOS, AST, ALT in the last 72 hours.     Lab Results   Component Value Date/Time    LABAMPH Neg 01/23/2023 05:00 PM    BARBSCNU Neg 01/23/2023 05:00 PM    LABBENZ Neg 01/23/2023 05:00 PM    LABBENZ NotDTCD 09/14/2012 11:35 AM    LABMETH Neg 01/23/2023 05:00 PM    OPIATESCREENURINE POSITIVE 01/23/2023 05:00 PM    PHENCYCLIDINESCREENURINE Neg 01/23/2023 05:00 PM    ETOH 93 01/23/2023 05:00 PM     Lab Results   Component Value Date/Time    TSH 0.99 11/11/2022 02:44 PM     No results found for: LITHIUM  No results found for: VALPROATE, CBMZ    RISK ASSESSMENT: suicide high     Treatment Plan:  Reviewed current Medications with the patient. Reduce trazodone 25mg QHS PRN  Risks, benefits, side effects, drug-to-drug interactions and alternatives to treatment were discussed. Collateral information: see social workers notes   CD evaluation denies  Encourage patient to attend group and other milieu activities.   Discharge planning discussed with the patient and treatment team.    PSYCHOTHERAPY/COUNSELING:  [x] Therapeutic interview  [x] Supportive  [] CBT  [] Ongoing  [] Other    [x] Patient continues to need, on a daily basis, active treatment furnished directly by or requiring the supervision of inpatient psychiatric personnel      Anticipated Length of stay: 4 days            Electronically signed by Paulina An MD on 1/29/2023 at 2:15 PM

## 2023-01-29 NOTE — PROGRESS NOTES
Explained new tylenol orders ,it can be given 4 hours before or after norco ,New Vistaril 25mg order, as vistaril can be given 3 hours before or after xanax ,gave tylenol for #8 pain now.

## 2023-01-29 NOTE — GROUP NOTE
Group Therapy Note    Date: 1/29/2023    Group Start Time: 1600  Group End Time: 4016  Group Topic: Healthy Living/Wellness    [facility]    Shonda Abreu RN        Group Therapy Note    Attendees: 14/24         Patient's Goal:  Pt stated \"to not let others have an effect on me\"    Status After Intervention:  Improved    Participation Level:  Active Listener    Participation Quality: Appropriate      Speech:  normal      Thought Process/Content: Logical      Affective Functioning: Congruent      Mood: euthymic      Level of consciousness:  Alert      Response to Learning: Able to verbalize current knowledge/experience      Endings: None Reported    Modes of Intervention: Education      Discipline Responsible: Registered Nurse      Signature:  Justyn Hooper RN

## 2023-01-29 NOTE — PROGRESS NOTES
Explained and gave all am meds, pt requested norco  5-325 for #10 back pain, gave xanax 0.5 as ordered #10 anxiety, Neurontin 300, zanaflex 4mg.

## 2023-01-29 NOTE — PLAN OF CARE
Pt is calm and cooperative with assessment. Noted to have sad/worried affect. Tearful at times. Rates both depression and anxiety 10/10 with 10 being the worst. Pt reports she was incontinent of urine this morning. States her neurosurgeon at [facility], Dr. Ludmila Chavez, told her if she loses complete control of bowel and bladder she will need to have surgery for her spinal stenosis. Pt is preoccupied with ending up like her mom who has to wear depends all day long. Pt states she is very scared and does not know what to do because she doesn't want to end up like her mom but also is afraid to have surgery. States her boyfriend and daughter will not help her around the house after surgery and she is very stressed out about her recovery. Pt also states she feels like \"a treasure downer. \" Pt is social with select peers and intermittently visible in day room. Reports good sleep and okay appetite. Problem: Self Harm/Suicidality  Goal: Will have no self-injury during hospital stay  Description: INTERVENTIONS:  1. Ensure constant observer at bedside with Q15M safety checks  2. Maintain a safe environment  3. Secure patient belongings  4. Ensure family/visitors adhere to safety recommendations  5. Ensure safety tray has been added to patient's diet order  6.   Every shift and PRN: Re-assess suicidal risk via Frequent Screener    Outcome: Progressing  Flowsheets (Taken 1/29/2023 1538)  Will have no self-injury during hospital stay: Ensure constant observer at bedside with Q15M safety checks     Problem: Chronic Conditions and Co-morbidities  Goal: Patient's chronic conditions and co-morbidity symptoms are monitored and maintained or improved  Outcome: Progressing  Flowsheets (Taken 1/29/2023 1538)  Care Plan - Patient's Chronic Conditions and Co-Morbidity Symptoms are Monitored and Maintained or Improved:   Monitor and assess patient's chronic conditions and comorbid symptoms for stability, deterioration, or improvement Collaborate with multidisciplinary team to address chronic and comorbid conditions and prevent exacerbation or deterioration   Update acute care plan with appropriate goals if chronic or comorbid symptoms are exacerbated and prevent overall improvement and discharge     Problem: ABCDS Injury Assessment  Goal: Absence of physical injury  Outcome: Progressing     Problem: Safety - Adult  Goal: Free from fall injury  Outcome: Progressing     Problem: Risk for Elopement  Goal: Patient will not exit the unit/facility without proper excort  Outcome: Progressing  Flowsheets (Taken 1/29/2023 1538)  Nursing Interventions for Elopement Risk: Reduce environmental triggers     Problem: Coping  Goal: Pt/Family able to verbalize concerns and demonstrate effective coping strategies  Description: INTERVENTIONS:  1. Assist patient/family to identify coping skills, available support systems and cultural and spiritual values  2. Provide emotional support, including active listening and acknowledgement of concerns of patient and caregivers  3. Reduce environmental stimuli, as able  4. Instruct patient/family in relaxation techniques, as appropriate  5.  Assess for spiritual pain/suffering and initiate Spiritual Care, Psychosocial Clinical Specialist consults as needed  Outcome: Progressing  Flowsheets (Taken 1/29/2023 1539)  Patient/family able to verbalize anxieties, fears, and concerns, and demonstrate effective coping:   Assist patient/family to identify coping skills, available support systems and cultural and spiritual values   Provide emotional support, including active listening and acknowledgement of concerns of patient and caregivers   Reduce environmental stimuli, as able   Assess for spiritual pain/suffering and initiate Spiritual Care, Psychosocial Clinical Specialist consults as needed   Instruct patient/family in relaxation techniques, as appropriate     Problem: Depression/Self Harm  Goal: Effect of psychiatric condition will be minimized and patient will be protected from self harm  Description: INTERVENTIONS:  1. Assess impact of patient's symptoms on level of functioning, self care needs and offer support as indicated  2. Assess patient/family knowledge of depression, impact on illness and need for teaching  3. Provide emotional support, presence and reassurance  4. Assess for possible suicidal thoughts or ideation. If patient expresses suicidal thoughts or statements do not leave alone, initiate Suicide Precautions, move to a room close to the nursing station and obtain sitter  5. Initiate consults as appropriate with Mental Health Professional, Spiritual Care, Psychosocial CNS, and consider a recommendation to the LIP for a Psychiatric Consultation  Outcome: Progressing  Flowsheets (Taken 1/29/2023 9424)  Effect of psychiatric condition will be minimized and patient will be protected from self harm:   Assess impact of patients symptoms on level of functioning, self care needs and offer support as indicated   Assess patient/family knowledge of depression, impact on illness and need for teaching   Provide emotional support, presence and reassurance   Initiate consults as appropriate with Mental Health Professional, Spiritual Care, Psychosocial CNS, and consider a recommendation to the LIP for a Psychiatric Consultation   Assess for suicidal thoughts or ideation.  If patient expresses suicidal thoughts or statements do not leave alone, initiate Suicide Precautions, move near nurse station, obtain sitter 14-Dec-2019 04:24

## 2023-01-29 NOTE — PROGRESS NOTES
Pt just had snack , is now standing at the nurses station looking for coloring pages. Pt was reminiscing with male peers mother when visited and  a long time family friend.

## 2023-01-29 NOTE — FLOWSHEET NOTE
Pt was visible going to afternoon group and was interactive. She is alert and oriented x4 , she interacts with staff and peers appropriately. Pt did shower today and stated she felt \"sad\". Pt is worried about having incontinence at night and was encouraged to use a pad at night. Pt denies SI,HI,AVH.

## 2023-01-29 NOTE — PROGRESS NOTES
Morning Community Meeting Topics    Abhilashtoño Gale attended the morning community meeting on 1/29/23. Topics discussed today     [x] Introduction  Day of the week and date  Mask distribution  Current mask requirements  [x]Teams  Explanation of  Green and Blue team criteria  Nurses assigned to each team for today  Explanation about green and blue paper  Date  Patient's Name  Patient's Nurse  Goals  [x] Visitation  Announce the visiting hours for the day  Announce which team is allowed to have visitors for the day  Review any updated Covid 19 requirements for visitors during visitation  Vaccine Card or negative Covid test within 48 hours of visit  State Identification  Patients are reminded to alert the  at least 1 hour before visitation   [x] Unit Orientation  Coffee use  Phone location and etiquette  Shower locations  [place] and dryer location and process  Common area expectations  Staff rounds expectation  [x] Meals   Educate patient to the menu  The patient is encouraged to fill out the menu to get preferences at mealtime  The patient is educated that if they do not fill out the menu, they will get the standard tray  The coffee pot is decaf, patient encouraged to order regular coffee from menu.   Educate patient to the meal process  Patient encouraged to eat snacks provided twice daily  Snacks may stay in patient room     [x] Discharge Process  Discharge expectations  Fill out the survey after discharge   [x] Hygiene  Daily showers encouraged  Showers availability discussed   Daily dressing encouraged  Discussed wearing street clothing  Education provided on where to place linens and clothing  Linens in the hamper  personal clothing does not go into the linen hamper  [x] Group   Patient encouraged to attend group provided  Time of Group Meetings discussed  Gentle reminder that attendance is a Physician order  [x] Movement  Chair exercises completed  Stretching completed  Notes:  GOAL : \" to decrease pain\" Electronically signed by Missy Ya on 1/29/2023 at 11:26 AM

## 2023-01-29 NOTE — GROUP NOTE
Group Therapy Note    Date: 1/29/2023    Group Start Time: 1000  Group End Time: 1100  Group Topic: Psychoeducation    [facility]        Group Therapy Note    Attendees: 14       Patient's Goal:  \"to decrease pain\"    Notes:  Pt. attended the 1000 skill group. Soft spoken and slow moving. Depressed affect. Worked on project with interest.    Status After Intervention:  Unchanged    Participation Level:  Active Listener and Interactive    Participation Quality: Appropriate, Attentive, and Sharing      Speech:  soft spoken      Thought Process/Content: Logical      Affective Functioning: Flat      Mood: depressed      Level of consciousness:  Alert, Oriented x4, Attentive, and Preoccupied      Response to Learning: Able to change behavior and Progressing to goal      Endings: None Reported    Modes of Intervention: Education, Support, Socialization, and Movement      Discipline Responsible: Psychoeducational Specialist      Signature:  Nicholas Snider

## 2023-01-29 NOTE — PROGRESS NOTES
Awaken pt for 1pm xanax, pt asked about lunch, this staff nurse heard lunch announced over the intercom system.

## 2023-01-29 NOTE — GROUP NOTE
Group Therapy Note    Date: 1/28/2023    Group Start Time: 2000  Group End Time: 2030  Group Topic: Wrap-Up    [facility]    Burnadette Sever, RN    To participate in wrap up group and state whether or not goal was met. Group Therapy Note    Attendees: 10/23         Notes:  Pt attended wrap up group and participated. Status After Intervention:  Unchanged    Participation Level:  Active Listener and Interactive    Participation Quality: Appropriate, Attentive, and Sharing      Speech:  normal      Thought Process/Content: Logical      Affective Functioning: Congruent      Mood: euthymic      Level of consciousness:  Alert and Oriented x4      Response to Learning: Capable of insight      Endings: None Reported    Modes of Intervention: Support      Discipline Responsible: Registered Nurse      Signature:  Burnadette Sever, RN

## 2023-01-29 NOTE — CARE COORDINATION
FAMILY COLLATERAL NOTE    Family/Support Name: Alexandra Yun   Contact #: [phone]  Relationship to Pt: BF      Placed call to above for collateral. No answer, Left message requesting a return call.      Response:  no answer, BALJEET Peng, [facility]

## 2023-01-29 NOTE — PROGRESS NOTES
Pt is now on the on the phone, crying hard continuously, talking with someone . Gave xanax 0.5 for anxiety #10.

## 2023-01-30 LAB
GLUCOSE BLD-MCNC: 109 MG/DL (ref 70–99)
GLUCOSE BLD-MCNC: 114 MG/DL (ref 70–99)
GLUCOSE BLD-MCNC: 168 MG/DL (ref 70–99)
GLUCOSE BLD-MCNC: 232 MG/DL (ref 70–99)
PERFORMED ON: ABNORMAL

## 2023-01-30 PROCEDURE — 6370000000 HC RX 637 (ALT 250 FOR IP): Performed by: PSYCHIATRY & NEUROLOGY

## 2023-01-30 PROCEDURE — 6370000000 HC RX 637 (ALT 250 FOR IP): Performed by: REGISTERED NURSE

## 2023-01-30 PROCEDURE — 6370000000 HC RX 637 (ALT 250 FOR IP): Performed by: PAIN MEDICINE

## 2023-01-30 PROCEDURE — 1240000000 HC EMOTIONAL WELLNESS R&B

## 2023-01-30 PROCEDURE — 99232 SBSQ HOSP IP/OBS MODERATE 35: CPT | Performed by: PSYCHIATRY & NEUROLOGY

## 2023-01-30 PROCEDURE — 93005 ELECTROCARDIOGRAM TRACING: CPT | Performed by: REGISTERED NURSE

## 2023-01-30 RX ORDER — TRAZODONE HYDROCHLORIDE 50 MG/1
50 TABLET ORAL NIGHTLY PRN
Status: DISCONTINUED | OUTPATIENT
Start: 2023-01-30 | End: 2023-02-03 | Stop reason: HOSPADM

## 2023-01-30 RX ADMIN — AMLODIPINE BESYLATE 10 MG: 10 TABLET ORAL at 09:26

## 2023-01-30 RX ADMIN — HYDROCHLOROTHIAZIDE 25 MG: 12.5 TABLET ORAL at 09:25

## 2023-01-30 RX ADMIN — HYDROCODONE BITARTRATE AND ACETAMINOPHEN 1 TABLET: 5; 325 TABLET ORAL at 22:35

## 2023-01-30 RX ADMIN — TRAZODONE HYDROCHLORIDE 50 MG: 50 TABLET ORAL at 22:35

## 2023-01-30 RX ADMIN — ALPRAZOLAM 0.5 MG: 0.5 TABLET ORAL at 17:24

## 2023-01-30 RX ADMIN — ALPRAZOLAM 0.5 MG: 0.5 TABLET ORAL at 21:21

## 2023-01-30 RX ADMIN — DULOXETINE HYDROCHLORIDE 40 MG: 20 CAPSULE, DELAYED RELEASE ORAL at 09:26

## 2023-01-30 RX ADMIN — ALPRAZOLAM 0.5 MG: 0.5 TABLET ORAL at 09:26

## 2023-01-30 RX ADMIN — GABAPENTIN 300 MG: 300 CAPSULE ORAL at 21:21

## 2023-01-30 RX ADMIN — LEVOTHYROXINE SODIUM 112 MCG: 112 TABLET ORAL at 06:24

## 2023-01-30 RX ADMIN — GABAPENTIN 300 MG: 300 CAPSULE ORAL at 09:26

## 2023-01-30 RX ADMIN — ATORVASTATIN CALCIUM 10 MG: 10 TABLET, FILM COATED ORAL at 21:21

## 2023-01-30 RX ADMIN — HYDROCODONE BITARTRATE AND ACETAMINOPHEN 1 TABLET: 5; 325 TABLET ORAL at 09:29

## 2023-01-30 RX ADMIN — ALPRAZOLAM 0.5 MG: 0.5 TABLET ORAL at 13:27

## 2023-01-30 RX ADMIN — PANTOPRAZOLE SODIUM 40 MG: 40 TABLET, DELAYED RELEASE ORAL at 06:24

## 2023-01-30 RX ADMIN — ACETAMINOPHEN 650 MG: 325 TABLET ORAL at 14:45

## 2023-01-30 RX ADMIN — TIZANIDINE 4 MG: 4 TABLET ORAL at 09:24

## 2023-01-30 RX ADMIN — INSULIN LISPRO 2 UNITS: 100 INJECTION, SOLUTION INTRAVENOUS; SUBCUTANEOUS at 12:25

## 2023-01-30 ASSESSMENT — PAIN SCALES - GENERAL
PAINLEVEL_OUTOF10: 9
PAINLEVEL_OUTOF10: 9
PAINLEVEL_OUTOF10: 8
PAINLEVEL_OUTOF10: 8

## 2023-01-30 ASSESSMENT — PAIN DESCRIPTION - LOCATION
LOCATION: BACK
LOCATION: BACK
LOCATION: BACK;LEG;HIP
LOCATION: BACK

## 2023-01-30 ASSESSMENT — PAIN DESCRIPTION - DESCRIPTORS
DESCRIPTORS: STABBING;SHOOTING;ACHING
DESCRIPTORS: ACHING;STABBING;SHOOTING
DESCRIPTORS: ACHING;SHOOTING
DESCRIPTORS: STABBING;ACHING;BURNING

## 2023-01-30 ASSESSMENT — PAIN DESCRIPTION - ORIENTATION: ORIENTATION: RIGHT;LEFT;LOWER

## 2023-01-30 ASSESSMENT — PAIN SCALES - WONG BAKER: WONGBAKER_NUMERICALRESPONSE: 2

## 2023-01-30 NOTE — PROGRESS NOTES
Med seeking behaviors noted. Pt attempts to stay awake to have PRN medications d/t meds ordered with parameters to wait between doses (tylenol waiting at least 4 hours before/after norco and vistaril waiting at least 3 hours before/after xanax). Unhappy that meds cannot be given outside parameters. Pt worried she will be discharged too soon but also requesting to go home soon. Pt unable to state if she actually feels ready for d/c. Encouraged pt to take it one day at a time and see how she feels in the morning.

## 2023-01-30 NOTE — PROGRESS NOTES
Donaldo Reina [facility]. FOLLOW-UP NOTE   THE PATIENT WAS SEEN THROUGH TELEPSYCHIATRY, IN A REAL-TIME, AUDIO-VIDEO ENCOUNTER, WITH THE PATIENT IN [place], [place] AND THE PROVIDER IN [place], [place]      1/30/2023     Patient was seen and examined in person, Chart reviewed   Patient's case discussed with staff/team    Chief Complaint: SI derepression    Interim History:     Patient is still feeling depressed, lethargic and tired  Has been having incontinence of urine  Worried about the surgical prospects in her spine  Pt sleep is disturbed- less intense hopeless  Denies any active SI or HI  Anxious ++    Appetite:   [x] Normal/Unchanged  [] Increased  [] Decreased      Sleep:       [x] Normal/Unchanged  [] Fair       [] Poor              Energy:    [x] Normal/Unchanged  [] Increased  [] Decreased        SI [] Present  [x] Absent    HI  []Present  [x] Absent     Aggression:  [] yes  [x] no    Patient is [x] able  [] unable to CONTRACT FOR SAFETY     PAST MEDICAL/PSYCHIATRIC HISTORY:   Past Medical History:   Diagnosis Date    Anemia     C. difficile colitis 01/2013    Chronic fatigue syndrome     Depression     [place]    Diabetes mellitus ([address]     Diverticulitis large intestine 2001    Fibromyalgia     Fibromyalgia 03/24/2015    GERD (gastroesophageal reflux disease)     HTN (hypertension)     Hyperlipidemia     Hypothyroidism     Palpitations     Pulmonary embolus ([address] 10/2012    Following GYN procedure    Vitamin D deficiency        FAMILY/SOCIAL HISTORY:  Family History   Problem Relation Age of Onset    COPD Mother     Lung Cancer Father     COPD Maternal Grandmother     Cancer Maternal Grandfather         COLON    Colon Cancer Paternal Grandfather     Cancer Paternal Aunt         LUNG    Cancer Other         BLOOD CANCER- UNSPECIFIED    Coronary Art Dis Maternal Aunt     Other Maternal Aunt         Brain aneurysm     Social History     Socioeconomic History    Marital status:  Spouse name: Not on file    Number of children: Not on file    Years of education: Not on file    Highest education level: Not on file   Occupational History    Not on file   Tobacco Use    Smoking status: Never    Smokeless tobacco: Never   Vaping Use    Vaping Use: Never used   Substance and Sexual Activity    Alcohol use: Not Currently     Comment: OCC. Drug use: No    Sexual activity: Not on file   Other Topics Concern    Not on file   Social History Narrative    Not on file     Social Determinants of Health     Financial Resource Strain: Not on file   Food Insecurity: Not on file   Transportation Needs: Not on file   Physical Activity: Not on file   Stress: Not on file   Social Connections: Not on file   Intimate Partner Violence: Not on file   Housing Stability: Not on file           ROS:  [x] All negative/unchanged except if checked.  Explain positive(checked items) below:  [] Constitutional  [] Eyes  [] Ear/Nose/Mouth/Throat  [] Respiratory  [] CV  [] GI  []   [] Musculoskeletal  [] Skin/Breast  [] Neurological  [] Endocrine  [] Heme/Lymph  [] Allergic/Immunologic    Explanation:     MEDICATIONS:    Current Facility-Administered Medications:     acetaminophen (TYLENOL) tablet 650 mg, 650 mg, Oral, Q6H PRN, Rahul Pop MD, 650 mg at 01/29/23 1336    hydrOXYzine pamoate (VISTARIL) capsule 25 mg, 25 mg, Oral, TID PRN, Rahul Pop MD    traZODone (DESYREL) tablet 25 mg, 25 mg, Oral, Nightly PRN, TYRA Merritt - CNP, 25 mg at 01/29/23 2219    DULoxetine (CYMBALTA) extended release capsule 40 mg, 40 mg, Oral, Daily, Rahat Jerez MD, 40 mg at 01/30/23 0926    ALPRAZolam (XANAX) tablet 0.5 mg, 0.5 mg, Oral, 4x daily, Rahat Jerez MD, 0.5 mg at 01/30/23 0926    HYDROcodone-acetaminophen (NORCO) 5-325 MG per tablet 1 tablet, 1 tablet, Oral, BID PRN, Raphael Hogan MD, 1 tablet at 01/30/23 0929    haloperidol (HALDOL) tablet 5 mg, 5 mg, Oral, Q6H PRN **OR** haloperidol lactate (HALDOL) injection 5 mg, 5 mg, IntraMUSCular, Q6H PRN, Jonas Francois MD    benztropine mesylate (COGENTIN) injection 2 mg, 2 mg, IntraMUSCular, BID PRN, Jonas Francois MD    magnesium hydroxide (MILK OF MAGNESIA) 400 MG/5ML suspension 30 mL, 30 mL, Oral, Daily PRN, Jonas Francois MD    nicotine polacrilex (COMMIT) lozenge 2 mg, 2 mg, Oral, Q1H PRN, Jonas Francois MD    amLODIPine (NORVASC) tablet 10 mg, 10 mg, Oral, Daily, TYRA Lyn - CNP, 10 mg at 01/30/23 6614    hydroCHLOROthiazide (HYDRODIURIL) tablet 25 mg, 25 mg, Oral, Daily, TYRA Lyn - CNP, 25 mg at 01/30/23 1419    levothyroxine (SYNTHROID) tablet 112 mcg, 112 mcg, Oral, Daily, TYRA Lyn - CNP, 112 mcg at 01/30/23 2676    atorvastatin (LIPITOR) tablet 10 mg, 10 mg, Oral, Nightly, TYRA Lyn - CNP, 10 mg at 01/29/23 2148    pantoprazole (PROTONIX) tablet 40 mg, 40 mg, Oral, QAM AC, TYRA Lyn - CNP, 40 mg at 01/30/23 0624    glucose chewable tablet 16 g, 4 tablet, Oral, PRN, Radha Pugh APRN - CNP    dextrose bolus 10% 125 mL, 125 mL, IntraVENous, PRN **OR** dextrose bolus 10% 250 mL, 250 mL, IntraVENous, PRN, Radha Pugh APRN - CNP    glucagon (rDNA) injection 1 mg, 1 mg, SubCUTAneous, PRN, TYRA Lyn - CNP    dextrose 10 % infusion, , IntraVENous, Continuous PRN, TYRA Lyn CNP    insulin lispro (HUMALOG) injection vial 0-8 Units, 0-8 Units, SubCUTAneous, TID WC, TYRA Lyn - CNP    insulin lispro (HUMALOG) injection vial 0-4 Units, 0-4 Units, SubCUTAneous, Nightly, TYRA Lyn CNP    tiZANidine (ZANAFLEX) tablet 4 mg, 4 mg, Oral, BID, Bebe Box MD, 4 mg at 01/30/23 0065    gabapentin (NEURONTIN) capsule 300 mg, 300 mg, Oral, BID, Bebe Box MD, 300 mg at 01/30/23 1405      Examination:  BP (!) 131/92 Comment: RN notified  Pulse 51   Temp 98.4 °F (36.9 °C) (Oral)   Resp 18   Ht 5' 2\" (1.575 m)   Wt 215 lb (97.5 kg)   LMP 11/19/2013   SpO2 99%   BMI 39.32 kg/m²   Gait - steady  Medication side effects(SE): denies     Mental Status Examination:    Level of consciousness:  within normal limits   Appearance:  fair grooming and fair hygiene  Behavior/Motor:  no abnormalities noted  Attitude toward examiner:  cooperative and withdrawn  Speech: spontaneous, normal rate, and normal volume   Mood: constricted and depressed  Affect: blunted  Thought processes: less circumstantial   Thought content:  Suicidal Ideation: denies suicidal ideation  Delusions:  no evidence of delusions  Perceptual Disturbance:  denies any perceptual disturbance  Cognition:  oriented to person, place, and time   Concentration distractible  Insight fair   Judgement fair     ASSESSMENT:   Patient symptoms are:  [] Well controlled  [x] Improving  [] Worsening  [] No change      Diagnosis:   Principal Problem:    Major depressive disorder, recurrent severe without psychotic features [address]  Active Problems:    Intentional drug overdose ([facility]  Resolved Problems:    * No resolved hospital problems. *      LABS:    No results for input(s): WBC, HGB, PLT in the last 72 hours. No results for input(s): NA, K, CL, CO2, BUN, CREATININE, GLUCOSE in the last 72 hours. No results for input(s): BILITOT, ALKPHOS, AST, ALT in the last 72 hours. Lab Results   Component Value Date/Time    LABAMPH Neg 01/23/2023 05:00 PM    BARBSCNU Neg 01/23/2023 05:00 PM    LABBENZ Neg 01/23/2023 05:00 PM    LABBENZ NotDTCD 09/14/2012 11:35 AM    LABMETH Neg 01/23/2023 05:00 PM    OPIATESCREENURINE POSITIVE 01/23/2023 05:00 PM    PHENCYCLIDINESCREENURINE Neg 01/23/2023 05:00 PM    ETOH 93 01/23/2023 05:00 PM     Lab Results   Component Value Date/Time    TSH 0.99 11/11/2022 02:44 PM     No results found for: LITHIUM  No results found for: VALPROATE, CBMZ      Treatment Plan:  Reviewed current Medications with the patient.    Increase trazodone to 50 mg  Risks, benefits, side effects, drug-to-drug interactions and alternatives to treatment were discussed. Collateral information: see social workers notes   CD evaluation denies  Encourage patient to attend group and other milieu activities.   Discharge planning discussed with the patient and treatment team.    PSYCHOTHERAPY/COUNSELING:  [x] Therapeutic interview  [x] Supportive  [] CBT  [] Ongoing  [] Other    [x] Patient continues to need, on a daily basis, active treatment furnished directly by or requiring the supervision of inpatient psychiatric personnel      Anticipated Length of stay: 2 days           Electronically signed by Tashi Catherine MD on 1/30/2023 at 11:19 AM

## 2023-01-30 NOTE — GROUP NOTE
Group Therapy Note    Date: 1/29/2023    Group Start Time: 2030  Group End Time: 2115  Group Topic: Recreational    [facility]    Marion Abreu RN        Group Therapy Note    Attendees: 22/25       Patient's Goal:  Pt goal is to not let others affect her, pt able to accom this goal somewhat, pt also socializing with peers    Status After Intervention:  Improved    Participation Level:  Active Listener    Participation Quality: Appropriate      Speech:  normal      Thought Process/Content: Logical      Affective Functioning: Congruent      Mood: euthymic      Level of consciousness:  Alert      Response to Learning: Able to verbalize current knowledge/experience      Endings: None Reported    Modes of Intervention: Socialization      Discipline Responsible: Registered Nurse      Signature:  Sandra Marcum RN

## 2023-01-30 NOTE — GROUP NOTE
Group Therapy Note    Date: 1/30/2023    Group Start Time: 1000  Group End Time: 1050  Group Topic: Psychoeducation    [facility]    Brendan Frederick        Group Therapy Note    Attendees: 14/25       Patient's Goal:  \"To attend the groups\"    Notes:  Patient attended the 1000 skills group. Patient was calm, mostly quiet and she worked fairly well on her project.     Status After Intervention:  Unchanged    Participation Level: Fairly well    Participation Quality: Appropriate      Speech:  normal      Thought Process/Content: Linear      Affective Functioning: Flat      Mood:  calm      Level of consciousness:  Alert      Response to Learning: Progressing to goal      Endings: None Reported    Modes of Intervention: Education, Socialization, and Activity      Discipline Responsible: Psychoeducational Specialist      Signature:  Brendan Frederick

## 2023-01-30 NOTE — PLAN OF CARE
Problem: Self Harm/Suicidality  Goal: Will have no self-injury during hospital stay  Description: INTERVENTIONS:  1. Ensure constant observer at bedside with Q15M safety checks  2. Maintain a safe environment  3. Secure patient belongings  4. Ensure family/visitors adhere to safety recommendations  5. Ensure safety tray has been added to patient's diet order  6.   Every shift and PRN: Re-assess suicidal risk via Frequent Screener    1/30/2023 0111 by Andi Martinez RN  Outcome: Progressing  1/29/2023 1543 by Arminda Bernal RN  Outcome: Progressing  Flowsheets (Taken 1/29/2023 1538)  Will have no self-injury during hospital stay: Ensure constant observer at bedside with Q15M safety checks     Problem: Chronic Conditions and Co-morbidities  Goal: Patient's chronic conditions and co-morbidity symptoms are monitored and maintained or improved  1/30/2023 0111 by Andi Martinez RN  Outcome: Progressing  1/29/2023 1543 by Arminda Bernal RN  Outcome: Progressing  Flowsheets (Taken 1/29/2023 1538)  Care Plan - Patient's Chronic Conditions and Co-Morbidity Symptoms are Monitored and Maintained or Improved:   Monitor and assess patient's chronic conditions and comorbid symptoms for stability, deterioration, or improvement   Collaborate with multidisciplinary team to address chronic and comorbid conditions and prevent exacerbation or deterioration   Update acute care plan with appropriate goals if chronic or comorbid symptoms are exacerbated and prevent overall improvement and discharge     Problem: ABCDS Injury Assessment  Goal: Absence of physical injury  1/30/2023 0111 by Andi Martinez RN  Outcome: Progressing  1/29/2023 1543 by Arminda Bernal RN  Outcome: Progressing     Problem: Safety - Adult  Goal: Free from fall injury  1/30/2023 0111 by Andi Martinez RN  Outcome: Progressing  1/29/2023 1543 by Arminda Bernal RN  Outcome: Progressing     Problem: Risk for Elopement  Goal: Patient will not exit the unit/facility without proper excort  1/30/2023 0111 by Marc Mujica RN  Outcome: Progressing  1/29/2023 1543 by George Betancourt RN  Outcome: Progressing  Flowsheets (Taken 1/29/2023 1538)  Nursing Interventions for Elopement Risk: Reduce environmental triggers     Problem: Coping  Goal: Pt/Family able to verbalize concerns and demonstrate effective coping strategies  Description: INTERVENTIONS:  1. Assist patient/family to identify coping skills, available support systems and cultural and spiritual values  2. Provide emotional support, including active listening and acknowledgement of concerns of patient and caregivers  3. Reduce environmental stimuli, as able  4. Instruct patient/family in relaxation techniques, as appropriate  5. Assess for spiritual pain/suffering and initiate Spiritual Care, Psychosocial Clinical Specialist consults as needed  1/30/2023 0111 by Marc Mujica RN  Outcome: Progressing  1/29/2023 1543 by George Betancourt RN  Outcome: Progressing  Flowsheets (Taken 1/29/2023 1538)  Patient/family able to verbalize anxieties, fears, and concerns, and demonstrate effective coping:   Assist patient/family to identify coping skills, available support systems and cultural and spiritual values   Provide emotional support, including active listening and acknowledgement of concerns of patient and caregivers   Reduce environmental stimuli, as able   Assess for spiritual pain/suffering and initiate Spiritual Care, Psychosocial Clinical Specialist consults as needed   Instruct patient/family in relaxation techniques, as appropriate     Problem: Depression/Self Harm  Goal: Effect of psychiatric condition will be minimized and patient will be protected from self harm  Description: INTERVENTIONS:  1. Assess impact of patient's symptoms on level of functioning, self care needs and offer support as indicated  2.  Assess patient/family knowledge of depression, impact on illness and need for teaching  3. Provide emotional support, presence and reassurance  4. Assess for possible suicidal thoughts or ideation. If patient expresses suicidal thoughts or statements do not leave alone, initiate Suicide Precautions, move to a room close to the nursing station and obtain sitter  5. Initiate consults as appropriate with Mental Health Professional, Spiritual Care, Psychosocial CNS, and consider a recommendation to the LIP for a Psychiatric Consultation  1/30/2023 0111 by Nicole Hernandez RN  Outcome: Progressing  1/29/2023 1543 by Ana Null RN  Outcome: Progressing  Flowsheets (Taken 1/29/2023 1543)  Effect of psychiatric condition will be minimized and patient will be protected from self harm:   Assess impact of patients symptoms on level of functioning, self care needs and offer support as indicated   Assess patient/family knowledge of depression, impact on illness and need for teaching   Provide emotional support, presence and reassurance   Initiate consults as appropriate with Mental Health Professional, Spiritual Care, Psychosocial CNS, and consider a recommendation to the LIP for a Psychiatric Consultation   Assess for suicidal thoughts or ideation.  If patient expresses suicidal thoughts or statements do not leave alone, initiate Suicide Precautions, move near nurse station, obtain sitter

## 2023-01-30 NOTE — PLAN OF CARE
Pt is out on the unit and social with peers. Pt is a little slow to respond and slightly lethargic. Appropriate thought process. Pt is preoccupied with her back pain and having to have surgery. Pt reports poor sleep. Attending groups. Appetite is low. Pt denies SI, HI. Pt states her anxiety and depression are 7/10. Problem: Self Harm/Suicidality  Goal: Will have no self-injury during hospital stay  Description: INTERVENTIONS:  1. Ensure constant observer at bedside with Q15M safety checks  2. Maintain a safe environment  3. Secure patient belongings  4. Ensure family/visitors adhere to safety recommendations  5. Ensure safety tray has been added to patient's diet order  6. Every shift and PRN: Re-assess suicidal risk via Frequent Screener    1/30/2023 0921 by Rena Tolliver RN  Outcome: Progressing  1/30/2023 0111 by Melanie Zhang RN  Outcome: Progressing     Problem: Chronic Conditions and Co-morbidities  Goal: Patient's chronic conditions and co-morbidity symptoms are monitored and maintained or improved  1/30/2023 0921 by Rena Tolliver RN  Outcome: Progressing  1/30/2023 0111 by Melanie Zhang RN  Outcome: Progressing     Problem: ABCDS Injury Assessment  Goal: Absence of physical injury  1/30/2023 0921 by Rena Tolliver RN  Outcome: Progressing  1/30/2023 0111 by Melanie Zhang RN  Outcome: Progressing     Problem: Safety - Adult  Goal: Free from fall injury  1/30/2023 0921 by Rena Tolliver RN  Outcome: Progressing  1/30/2023 0111 by Melanie Zhang RN  Outcome: Progressing     Problem: Risk for Elopement  Goal: Patient will not exit the unit/facility without proper excort  1/30/2023 0921 by Rena Tolliver RN  Outcome: Progressing  1/30/2023 0111 by Melanie Zhang RN  Outcome: Progressing     Problem: Coping  Goal: Pt/Family able to verbalize concerns and demonstrate effective coping strategies  Description: INTERVENTIONS:  1.  Assist patient/family to identify coping skills, available support systems and cultural and spiritual values  2. Provide emotional support, including active listening and acknowledgement of concerns of patient and caregivers  3. Reduce environmental stimuli, as able  4. Instruct patient/family in relaxation techniques, as appropriate  5. Assess for spiritual pain/suffering and initiate Spiritual Care, Psychosocial Clinical Specialist consults as needed  1/30/2023 9478 by Rena Tolliver RN  Outcome: Progressing  1/30/2023 0111 by Melanie Zhang RN  Outcome: Progressing     Problem: Depression/Self Harm  Goal: Effect of psychiatric condition will be minimized and patient will be protected from self harm  Description: INTERVENTIONS:  1. Assess impact of patient's symptoms on level of functioning, self care needs and offer support as indicated  2. Assess patient/family knowledge of depression, impact on illness and need for teaching  3. Provide emotional support, presence and reassurance  4. Assess for possible suicidal thoughts or ideation. If patient expresses suicidal thoughts or statements do not leave alone, initiate Suicide Precautions, move to a room close to the nursing station and obtain sitter  5.  Initiate consults as appropriate with Mental Health Professional, Spiritual Care, Psychosocial CNS, and consider a recommendation to the LIP for a Psychiatric Consultation  1/30/2023 0921 by Rena Tolliver RN  Outcome: Progressing  1/30/2023 0111 by Melanie Zhang RN  Outcome: Progressing

## 2023-01-31 LAB
GLUCOSE BLD-MCNC: 118 MG/DL (ref 70–99)
GLUCOSE BLD-MCNC: 129 MG/DL (ref 70–99)
GLUCOSE BLD-MCNC: 141 MG/DL (ref 70–99)
GLUCOSE BLD-MCNC: 166 MG/DL (ref 70–99)
PERFORMED ON: ABNORMAL

## 2023-01-31 PROCEDURE — 1240000000 HC EMOTIONAL WELLNESS R&B

## 2023-01-31 PROCEDURE — 6370000000 HC RX 637 (ALT 250 FOR IP): Performed by: PSYCHIATRY & NEUROLOGY

## 2023-01-31 PROCEDURE — 6370000000 HC RX 637 (ALT 250 FOR IP): Performed by: REGISTERED NURSE

## 2023-01-31 PROCEDURE — 6370000000 HC RX 637 (ALT 250 FOR IP): Performed by: PAIN MEDICINE

## 2023-01-31 RX ORDER — TIZANIDINE 4 MG/1
4 TABLET ORAL 2 TIMES DAILY PRN
Status: DISCONTINUED | OUTPATIENT
Start: 2023-01-31 | End: 2023-02-03 | Stop reason: HOSPADM

## 2023-01-31 RX ADMIN — ALPRAZOLAM 0.5 MG: 0.5 TABLET ORAL at 17:09

## 2023-01-31 RX ADMIN — LEVOTHYROXINE SODIUM 112 MCG: 112 TABLET ORAL at 06:32

## 2023-01-31 RX ADMIN — PANTOPRAZOLE SODIUM 40 MG: 40 TABLET, DELAYED RELEASE ORAL at 06:32

## 2023-01-31 RX ADMIN — ACETAMINOPHEN 650 MG: 325 TABLET ORAL at 14:57

## 2023-01-31 RX ADMIN — TRAZODONE HYDROCHLORIDE 50 MG: 50 TABLET ORAL at 20:50

## 2023-01-31 RX ADMIN — GABAPENTIN 300 MG: 300 CAPSULE ORAL at 20:50

## 2023-01-31 RX ADMIN — HYDROCODONE BITARTRATE AND ACETAMINOPHEN 1 TABLET: 5; 325 TABLET ORAL at 20:50

## 2023-01-31 RX ADMIN — HYDROCHLOROTHIAZIDE 25 MG: 12.5 TABLET ORAL at 08:52

## 2023-01-31 RX ADMIN — HYDROCODONE BITARTRATE AND ACETAMINOPHEN 1 TABLET: 5; 325 TABLET ORAL at 09:01

## 2023-01-31 RX ADMIN — TIZANIDINE 4 MG: 4 TABLET ORAL at 19:33

## 2023-01-31 RX ADMIN — GABAPENTIN 300 MG: 300 CAPSULE ORAL at 08:52

## 2023-01-31 RX ADMIN — AMLODIPINE BESYLATE 10 MG: 10 TABLET ORAL at 08:52

## 2023-01-31 RX ADMIN — ALPRAZOLAM 0.5 MG: 0.5 TABLET ORAL at 13:33

## 2023-01-31 RX ADMIN — ALPRAZOLAM 0.5 MG: 0.5 TABLET ORAL at 08:52

## 2023-01-31 RX ADMIN — ATORVASTATIN CALCIUM 10 MG: 10 TABLET, FILM COATED ORAL at 20:50

## 2023-01-31 RX ADMIN — DULOXETINE HYDROCHLORIDE 40 MG: 20 CAPSULE, DELAYED RELEASE ORAL at 08:52

## 2023-01-31 RX ADMIN — ALPRAZOLAM 0.5 MG: 0.5 TABLET ORAL at 20:50

## 2023-01-31 ASSESSMENT — PAIN SCALES - GENERAL
PAINLEVEL_OUTOF10: 6
PAINLEVEL_OUTOF10: 9

## 2023-01-31 ASSESSMENT — PAIN DESCRIPTION - DESCRIPTORS
DESCRIPTORS: ACHING;THROBBING
DESCRIPTORS: ACHING;STABBING;THROBBING
DESCRIPTORS: ACHING;THROBBING;SHARP
DESCRIPTORS: SHOOTING

## 2023-01-31 ASSESSMENT — PAIN DESCRIPTION - LOCATION
LOCATION: BACK;HIP;LEG
LOCATION: OTHER (COMMENT)
LOCATION: BACK
LOCATION: BACK

## 2023-01-31 ASSESSMENT — PAIN - FUNCTIONAL ASSESSMENT
PAIN_FUNCTIONAL_ASSESSMENT: PREVENTS OR INTERFERES WITH ALL ACTIVE AND SOME PASSIVE ACTIVITIES
PAIN_FUNCTIONAL_ASSESSMENT: ACTIVITIES ARE NOT PREVENTED

## 2023-01-31 ASSESSMENT — PAIN DESCRIPTION - ORIENTATION: ORIENTATION: LEFT;RIGHT

## 2023-01-31 NOTE — PLAN OF CARE
Pt still reports high anxiety and mild depression. Anxiety 8/10 and depression 7/10. Pt appears less lethargic today and her HR is WNL Pt denies SI, HI, AVH. Pt is showering and attending groups. She is out and social with peers. No incontinence last night. Pt is a little brighter but still appears worried and sad. Appetite and sleep improving. Problem: Self Harm/Suicidality  Goal: Will have no self-injury during hospital stay  Description: INTERVENTIONS:  1. Ensure constant observer at bedside with Q15M safety checks  2. Maintain a safe environment  3. Secure patient belongings  4. Ensure family/visitors adhere to safety recommendations  5. Ensure safety tray has been added to patient's diet order  6.   Every shift and PRN: Re-assess suicidal risk via Frequent Screener    1/31/2023 0847 by Purvi Wise RN  Outcome: Progressing  1/30/2023 2305 by Gloria Dunbar RN  Outcome: Progressing  Flowsheets (Taken 1/30/2023 1032 by Purvi Wise RN)  Will have no self-injury during hospital stay: Ensure constant observer at bedside with Q15M safety checks     Problem: Chronic Conditions and Co-morbidities  Goal: Patient's chronic conditions and co-morbidity symptoms are monitored and maintained or improved  1/31/2023 0847 by Purvi Wise RN  Outcome: Progressing  1/30/2023 2305 by Gloria Dunbar RN  Outcome: Progressing  Flowsheets (Taken 1/30/2023 1032 by Purvi Wise RN)  Care Plan - Patient's Chronic Conditions and Co-Morbidity Symptoms are Monitored and Maintained or Improved:   Monitor and assess patient's chronic conditions and comorbid symptoms for stability, deterioration, or improvement   Collaborate with multidisciplinary team to address chronic and comorbid conditions and prevent exacerbation or deterioration   Update acute care plan with appropriate goals if chronic or comorbid symptoms are exacerbated and prevent overall improvement and discharge     Problem: ABCDS Injury Assessment  Goal: Absence of physical injury  1/31/2023 0847 by Fabien Garcia RN  Outcome: Progressing  1/30/2023 2305 by Lazaro Dubose RN  Outcome: Progressing     Problem: Safety - Adult  Goal: Free from fall injury  1/31/2023 0847 by Fabien Garcia RN  Outcome: Progressing  1/30/2023 2305 by Lazaro Dubose RN  Outcome: Progressing     Problem: Risk for Elopement  Goal: Patient will not exit the unit/facility without proper excort  1/31/2023 0847 by Fabien Garcia RN  Outcome: Progressing  1/30/2023 2305 by Lazaro Dubose RN  Outcome: Progressing  Flowsheets (Taken 1/30/2023 1032 by Fabien Garcia RN)  Nursing Interventions for Elopement Risk: Reduce environmental triggers     Problem: Coping  Goal: Pt/Family able to verbalize concerns and demonstrate effective coping strategies  Description: INTERVENTIONS:  1. Assist patient/family to identify coping skills, available support systems and cultural and spiritual values  2. Provide emotional support, including active listening and acknowledgement of concerns of patient and caregivers  3. Reduce environmental stimuli, as able  4. Instruct patient/family in relaxation techniques, as appropriate  5.  Assess for spiritual pain/suffering and initiate Spiritual Care, Psychosocial Clinical Specialist consults as needed  1/31/2023 0847 by Fabien Garcia RN  Outcome: Progressing  1/30/2023 2305 by Lazaro Dubose RN  Outcome: Progressing  Flowsheets (Taken 1/30/2023 1032 by Fabien Garcia RN)  Patient/family able to verbalize anxieties, fears, and concerns, and demonstrate effective coping:   Assist patient/family to identify coping skills, available support systems and cultural and spiritual values   Provide emotional support, including active listening and acknowledgement of concerns of patient and caregivers   Reduce environmental stimuli, as able     Problem: Depression/Self Harm  Goal: Effect of psychiatric condition will be minimized and patient will be protected from self harm  Description: INTERVENTIONS:  1. Assess impact of patient's symptoms on level of functioning, self care needs and offer support as indicated  2. Assess patient/family knowledge of depression, impact on illness and need for teaching  3. Provide emotional support, presence and reassurance  4. Assess for possible suicidal thoughts or ideation. If patient expresses suicidal thoughts or statements do not leave alone, initiate Suicide Precautions, move to a room close to the nursing station and obtain sitter  5.  Initiate consults as appropriate with Mental Health Professional, Spiritual Care, Psychosocial CNS, and consider a recommendation to the LIP for a Psychiatric Consultation  1/31/2023 0847 by Mik Colon RN  Outcome: Progressing  1/30/2023 2305 by Michael Coulter RN  Outcome: Progressing  Flowsheets  Taken 1/30/2023 1039 by Mik Colon RN  Effect of psychiatric condition will be minimized and patient will be protected from self harm:   Assess impact of patients symptoms on level of functioning, self care needs and offer support as indicated   Assess patient/family knowledge of depression, impact on illness and need for teaching   Provide emotional support, presence and reassurance  Taken 1/30/2023 1032 by Mik Colon RN  Effect of psychiatric condition will be minimized and patient will be protected from self harm:   Assess impact of patients symptoms on level of functioning, self care needs and offer support as indicated   Assess patient/family knowledge of depression, impact on illness and need for teaching   Provide emotional support, presence and reassurance

## 2023-01-31 NOTE — PROGRESS NOTES
Morning Community Meeting Topics    Sunil Saundra attended the morning community meeting on 1/31/23. Topics discussed today     [x] Introduction  Day of the week and date  Mask distribution  Current mask requirements  [x]Teams  Explanation of  Green and Blue team criteria  Nurses assigned to each team for today  Explanation about green and blue paper  Date  Patient's Name  Patient's Nurse  Goals  [x] Visitation  Announce the visiting hours for the day  Announce which team is allowed to have visitors for the day  Review any updated Covid 19 requirements for visitors during visitation  Vaccine Card or negative Covid test within 48 hours of visit  State Identification  Patients are reminded to alert the  at least 1 hour before visitation   [x] Unit Orientation  Coffee use  Phone location and etiquette  Shower locations  [place] and dryer location and process  Common area expectations  Staff rounds expectation  [x] Meals   Educate patient to the menu  The patient is encouraged to fill out the menu to get preferences at mealtime  The patient is educated that if they do not fill out the menu, they will get the standard tray  The coffee pot is decaf, patient encouraged to order regular coffee from menu.   Educate patient to the meal process  Patient encouraged to eat snacks provided twice daily  Snacks may stay in patient room     [x] Discharge Process  Discharge expectations  Fill out the survey after discharge   [x] Hygiene  Daily showers encouraged  Showers availability discussed   Daily dressing encouraged  Discussed wearing street clothing  Education provided on where to place linens and clothing  Linens in the hamper  personal clothing does not go into the linen hamper  [x] Group   Patient encouraged to attend group provided  Time of Group Meetings discussed  Gentle reminder that attendance is a Physician order  [x] Movement  Chair exercises completed  Stretching completed  Notes: Goal - \"To go home\" Electronically signed by Tiffanie Baugh, [address] on 1/31/2023 at 9:38 AM

## 2023-01-31 NOTE — GROUP NOTE
Group Therapy Note    Date: 1/31/2023    Group Start Time: 4223  Group End Time: 1450  Group Topic: Healthy Living/Wellness    [facility]    Michelle Crow RN        Group Therapy Note    Attendees: 13/17       Patient's Goal:  Discuss importance of forgiveness    Notes:  Alert and appropriate.     Status After Intervention:  Unchanged    Participation Level: Interactive    Participation Quality: Sharing      Speech:  normal      Thought Process/Content: Logical  Linear      Affective Functioning: Congruent      Mood: euthymic      Level of consciousness:  Alert and Oriented x4      Response to Learning: Able to verbalize current knowledge/experience and Able to verbalize/acknowledge new learning      Endings: None Reported    Modes of Intervention: Education and Support      Discipline Responsible: Registered Nurse      Signature:  Michelle Crow RN

## 2023-01-31 NOTE — PROGRESS NOTES
Pt requested and given prn norco for lower back pain, hip pain, and leg pain, prn trazodone for sleep.

## 2023-01-31 NOTE — PROGRESS NOTES
[facility] FOLLOW-UP NOTE       1/31/2023     Patient was seen and examined in person, Chart reviewed   Patient's case discussed with staff/team    Chief Complaint: SI derepression    Interim History:     Pt reports sleep improving, adequate appetite  States lethargy is improving  Denies episode of incontinence yesterday  Continues to endorse depression and anxiety  Poor care for ADL  Remains preoccupied with surgery  Denies SI HI AVH; pt reports she believes there are several spirits within her home and has witnessed many colored orbs through video through her house       Appetite:   [x] Normal/Unchanged  [] Increased  [] Decreased      Sleep:       [x] Normal/Unchanged  [] Fair       [] Poor              Energy:    [x] Normal/Unchanged  [] Increased  [] Decreased        SI [] Present  [x] Absent    HI  []Present  [x] Absent     Aggression:  [] yes  [x] no    Patient is [x] able  [] unable to CONTRACT FOR SAFETY     PAST MEDICAL/PSYCHIATRIC HISTORY:   Past Medical History:   Diagnosis Date    Anemia     C. difficile colitis 01/2013    Chronic fatigue syndrome     Depression     Janet    Diabetes mellitus ([facility]     Diverticulitis large intestine 2001    Fibromyalgia     Fibromyalgia 03/24/2015    GERD (gastroesophageal reflux disease)     HTN (hypertension)     Hyperlipidemia     Hypothyroidism     Palpitations     Pulmonary embolus ([facility] 10/2012    Following GYN procedure    Vitamin D deficiency        FAMILY/SOCIAL HISTORY:  Family History   Problem Relation Age of Onset    COPD Mother     Lung Cancer Father     COPD Maternal Grandmother     Cancer Maternal Grandfather         COLON    Colon Cancer Paternal Grandfather     Cancer Paternal Aunt         LUNG    Cancer Other         BLOOD CANCER- UNSPECIFIED    Coronary Art Dis Maternal Aunt     Other Maternal Aunt         Brain aneurysm     Social History     Socioeconomic History    Marital status:      Spouse name: Not on file    Number of children: Not on file    Years of education: Not on file    Highest education level: Not on file   Occupational History    Not on file   Tobacco Use    Smoking status: Never    Smokeless tobacco: Never   Vaping Use    Vaping Use: Never used   Substance and Sexual Activity    Alcohol use: Not Currently     Comment: OCC. Drug use: No    Sexual activity: Not on file   Other Topics Concern    Not on file   Social History Narrative    Not on file     Social Determinants of Health     Financial Resource Strain: Not on file   Food Insecurity: Not on file   Transportation Needs: Not on file   Physical Activity: Not on file   Stress: Not on file   Social Connections: Not on file   Intimate Partner Violence: Not on file   Housing Stability: Not on file           ROS:  [x] All negative/unchanged except if checked.  Explain positive(checked items) below:  [] Constitutional  [] Eyes  [] Ear/Nose/Mouth/Throat  [] Respiratory  [] CV  [] GI  []   [] Musculoskeletal  [] Skin/Breast  [] Neurological  [] Endocrine  [] Heme/Lymph  [] Allergic/Immunologic    Explanation:     MEDICATIONS:    Current Facility-Administered Medications:     traZODone (DESYREL) tablet 50 mg, 50 mg, Oral, Nightly PRN, Bg Johnson APRN - CNP, 50 mg at 01/30/23 2235    acetaminophen (TYLENOL) tablet 650 mg, 650 mg, Oral, Q6H PRN, Laura Adam MD, 650 mg at 01/30/23 1445    hydrOXYzine pamoate (VISTARIL) capsule 25 mg, 25 mg, Oral, TID PRN, Laura Adam MD    DULoxetine (CYMBALTA) extended release capsule 40 mg, 40 mg, Oral, Daily, Travis Crum MD, 40 mg at 01/31/23 4995    ALPRAZolam (XANAX) tablet 0.5 mg, 0.5 mg, Oral, 4x daily, Travis Crum MD, 0.5 mg at 01/31/23 0852    HYDROcodone-acetaminophen (NORCO) 5-325 MG per tablet 1 tablet, 1 tablet, Oral, BID PRN, Minerva Yanes MD, 1 tablet at 01/31/23 0901    haloperidol (HALDOL) tablet 5 mg, 5 mg, Oral, Q6H PRN **OR** haloperidol lactate (HALDOL) injection 5 mg, 5 mg, IntraMUSCular, Q6H PRN, Carmita Morrell MD    benztropine mesylate (COGENTIN) injection 2 mg, 2 mg, IntraMUSCular, BID PRN, Carmita Morrell MD    magnesium hydroxide (MILK OF MAGNESIA) 400 MG/5ML suspension 30 mL, 30 mL, Oral, Daily PRN, Carmita Morrell MD    nicotine polacrilex (COMMIT) lozenge 2 mg, 2 mg, Oral, Q1H PRN, Carmita Morrell MD    amLODIPine (NORVASC) tablet 10 mg, 10 mg, Oral, Daily, Naima Cortez, APRN - CNP, 10 mg at 01/31/23 7125    hydroCHLOROthiazide (HYDRODIURIL) tablet 25 mg, 25 mg, Oral, Daily, Walea Cortez, APRN - CNP, 25 mg at 01/31/23 4145    levothyroxine (SYNTHROID) tablet 112 mcg, 112 mcg, Oral, Daily, Walea Diego, APRN - CNP, 112 mcg at 01/31/23 6939    atorvastatin (LIPITOR) tablet 10 mg, 10 mg, Oral, Nightly, Terra Cortez, APRN - CNP, 10 mg at 01/30/23 2121    pantoprazole (PROTONIX) tablet 40 mg, 40 mg, Oral, QAM AC, Terra Cortez, APRN - CNP, 40 mg at 01/31/23 0306    glucose chewable tablet 16 g, 4 tablet, Oral, PRN, Terra Cortez, APRN - CNP    dextrose bolus 10% 125 mL, 125 mL, IntraVENous, PRN **OR** dextrose bolus 10% 250 mL, 250 mL, IntraVENous, PRN, Walea Cortez, APRN - CNP    glucagon (rDNA) injection 1 mg, 1 mg, SubCUTAneous, PRN, Walea Cortez, APRN - CNP    dextrose 10 % infusion, , IntraVENous, Continuous PRN, Walea Cortez, APRN - CNP    insulin lispro (HUMALOG) injection vial 0-8 Units, 0-8 Units, SubCUTAneous, TID WC, Naima Cortez APRN - CNP, 2 Units at 01/30/23 1225    insulin lispro (HUMALOG) injection vial 0-4 Units, 0-4 Units, SubCUTAneous, Nightly, Naima Cortez, APRN - CNP    gabapentin (NEURONTIN) capsule 300 mg, 300 mg, Oral, BID, Brielle Song MD, 300 mg at 01/31/23 1425      Examination:  /80   Pulse 65   Temp 98.2 °F (36.8 °C) (Oral)   Resp 18   Ht 5' 2\" (1.575 m)   Wt 215 lb (97.5 kg)   LMP 11/19/2013   SpO2 98%   BMI 39.32 kg/m²   Gait - steady  Medication side effects(SE): lethargy     Mental Status Examination:    Level of consciousness:  within normal limits   Appearance:  fair grooming and fair hygiene  Behavior/Motor:  no abnormalities noted  Attitude toward examiner:  cooperative and withdrawn  Speech: spontaneous, normal rate, and normal volume   Mood: constricted and depressed  Affect: blunted  Thought processes: less circumstantial   Thought content:  Suicidal Ideation: denies suicidal ideation  Delusions:  no evidence of delusions  Perceptual Disturbance:  denies any perceptual disturbance  Cognition:  oriented to person, place, and time   Concentration distractible  Insight fair   Judgement fair     ASSESSMENT:   Patient symptoms are:  [] Well controlled  [x] Improving  [] Worsening  [] No change      Diagnosis:   Principal Problem:    Major depressive disorder, recurrent severe without psychotic features ([facility]  Active Problems:    Intentional drug overdose ([facility]  Resolved Problems:    * No resolved hospital problems. *      LABS:    No results for input(s): WBC, HGB, PLT in the last 72 hours. No results for input(s): NA, K, CL, CO2, BUN, CREATININE, GLUCOSE in the last 72 hours. No results for input(s): BILITOT, ALKPHOS, AST, ALT in the last 72 hours. Lab Results   Component Value Date/Time    LABAMPH Neg 01/23/2023 05:00 PM    BARBSCNU Neg 01/23/2023 05:00 PM    LABBENZ Neg 01/23/2023 05:00 PM    LABBENZ NotDTCD 09/14/2012 11:35 AM    LABMETH Neg 01/23/2023 05:00 PM    OPIATESCREENURINE POSITIVE 01/23/2023 05:00 PM    PHENCYCLIDINESCREENURINE Neg 01/23/2023 05:00 PM    ETOH 93 01/23/2023 05:00 PM     Lab Results   Component Value Date/Time    TSH 0.99 11/11/2022 02:44 PM     No results found for: LITHIUM  No results found for: VALPROATE, CBMZ      Treatment Plan:  Reviewed current Medications with the patient. Monitor medication efficacy and tolerability   Risks, benefits, side effects, drug-to-drug interactions and alternatives to treatment were discussed.   Collateral information: see social workers notes   CD evaluation denies  Encourage patient to attend group and other milieu activities. Discharge planning discussed with the patient and treatment team; tentative DC Thursday to home with OP services     PSYCHOTHERAPY/COUNSELING:  [x] Therapeutic interview  [x] Supportive  [] CBT  [] Ongoing  [] Other    [x] Patient continues to need, on a daily basis, active treatment furnished directly by or requiring the supervision of inpatient psychiatric personnel      Anticipated Length of stay: 2 days      Electronically signed by TYRA Monterroso CNP on 1/31/2023 at 1:16 PM   Addendum:  Ptient seen with NP after attending the team meeting, discussing the patient with the nursing and social work staff. I participated during the interview process as well as the treatment plan and spent more than 70% of the time with the nurse practitioner helping me in documentation.   I agree with the above documentation of clinical finding and treatment plan    Physician Signature: Electronically signed by Tashi Catherine MD on 1/31/2023 at 4:05 PM

## 2023-01-31 NOTE — PROGRESS NOTES
Pt is noted eating breakfast in the dinning room, explained and gave all am meds, gave norco 5.325,for back pain #9, Neurontin 300 mg, xanax 0.5 for anxiety #10.

## 2023-01-31 NOTE — PLAN OF CARE
Pt out on unit at times. She is tearful at times. She denies suicidal ideation, homicidal ideation, and hallucinations. She rates her depression 8 and anxiety 10. She states her medications help with her anxiety. She is observed eating dinner and snack. She states she is looking for another cardiologist because she is not happy with the one she saw last. She is concerned about her swelling and is more compliant with her johnathan hose.

## 2023-01-31 NOTE — GROUP NOTE
Group Therapy Note    Date: 1/31/2023    Group Start Time: 1000  Group End Time: 1050  Group Topic: Psychoeducation    [facility]    [facility]        Group Therapy Note    Attendees: 12/21       Patient's Goal:  \"To go home\"    Notes:  Patient attended the 1000 skills group. Patient worked well on her project with good concentration. She was more sociable in group.      Status After Intervention:  Unchanged    Participation Level: Good    Participation Quality: Appropriate      Speech:  normal      Thought Process/Content: Linear      Affective Functioning: Flat      Mood:  calm      Level of consciousness:  Alert      Response to Learning: Progressing to goal      Endings: None Reported    Modes of Intervention: Education, Socialization, and Activity      Discipline Responsible: Psychoeducational Specialist      Signature:  Alicia Flores

## 2023-01-31 NOTE — PROGRESS NOTES
Behavioral Services                                              Medicare Re-Certification    I certify that the inpatient psychiatric hospital services furnished since the previous certification/re-certification were, and continue to be, medically necessary for;    [x] (1) Treatment which could reasonably be expected to improve the patient's condition,    [x] (2) Or for diagnostic study. Estimated length of stay/service 2 days    Plan for post-hospital care home with op luan    This patient continues to need, on a daily basis, active treatment furnished directly by or requiring the supervision of inpatient psychiatric personnel.     Electronically signed by Laurelyn Oppenheim, APRN - CNP on 1/31/2023 at 1:12 PM

## 2023-01-31 NOTE — GROUP NOTE
Group Therapy Note    Date: 1/31/2023    Group Start Time: 1600  Group End Time: 36  Group Topic: Healthy Living/Wellness    [facility]    Marilin Murray RN        Group Therapy Note    Attendees: 18/18       Patient's Goal:  Discussed coping skills    Notes:  Good participation    Status After Intervention:  Unchanged    Participation Level:  Active Listener    Participation Quality: Appropriate      Speech:  normal      Thought Process/Content: Logical      Affective Functioning: Congruent      Mood: euthymic      Level of consciousness:  Alert      Response to Learning: Progressing to goal      Endings: None Reported    Modes of Intervention: Support      Discipline Responsible: Registered Nurse      Signature:  Marilin Murray RN

## 2023-02-01 LAB
GLUCOSE BLD-MCNC: 112 MG/DL (ref 70–99)
GLUCOSE BLD-MCNC: 119 MG/DL (ref 70–99)
GLUCOSE BLD-MCNC: 122 MG/DL (ref 70–99)
GLUCOSE BLD-MCNC: 171 MG/DL (ref 70–99)
PERFORMED ON: ABNORMAL

## 2023-02-01 PROCEDURE — 6370000000 HC RX 637 (ALT 250 FOR IP): Performed by: PAIN MEDICINE

## 2023-02-01 PROCEDURE — 6370000000 HC RX 637 (ALT 250 FOR IP): Performed by: REGISTERED NURSE

## 2023-02-01 PROCEDURE — 6370000000 HC RX 637 (ALT 250 FOR IP): Performed by: PSYCHIATRY & NEUROLOGY

## 2023-02-01 PROCEDURE — 1240000000 HC EMOTIONAL WELLNESS R&B

## 2023-02-01 RX ORDER — LISINOPRIL 5 MG/1
5 TABLET ORAL DAILY
Status: DISCONTINUED | OUTPATIENT
Start: 2023-02-01 | End: 2023-02-03 | Stop reason: HOSPADM

## 2023-02-01 RX ADMIN — TIZANIDINE 4 MG: 4 TABLET ORAL at 23:10

## 2023-02-01 RX ADMIN — HYDROCODONE BITARTRATE AND ACETAMINOPHEN 1 TABLET: 5; 325 TABLET ORAL at 09:36

## 2023-02-01 RX ADMIN — PANTOPRAZOLE SODIUM 40 MG: 40 TABLET, DELAYED RELEASE ORAL at 06:15

## 2023-02-01 RX ADMIN — ALPRAZOLAM 0.5 MG: 0.5 TABLET ORAL at 16:39

## 2023-02-01 RX ADMIN — LEVOTHYROXINE SODIUM 112 MCG: 112 TABLET ORAL at 06:15

## 2023-02-01 RX ADMIN — HYDROCODONE BITARTRATE AND ACETAMINOPHEN 1 TABLET: 5; 325 TABLET ORAL at 21:51

## 2023-02-01 RX ADMIN — GABAPENTIN 300 MG: 300 CAPSULE ORAL at 09:30

## 2023-02-01 RX ADMIN — ATORVASTATIN CALCIUM 10 MG: 10 TABLET, FILM COATED ORAL at 21:51

## 2023-02-01 RX ADMIN — AMLODIPINE BESYLATE 10 MG: 10 TABLET ORAL at 09:30

## 2023-02-01 RX ADMIN — ALPRAZOLAM 0.5 MG: 0.5 TABLET ORAL at 09:30

## 2023-02-01 RX ADMIN — ALPRAZOLAM 0.5 MG: 0.5 TABLET ORAL at 13:23

## 2023-02-01 RX ADMIN — TIZANIDINE 4 MG: 4 TABLET ORAL at 13:23

## 2023-02-01 RX ADMIN — TRAZODONE HYDROCHLORIDE 50 MG: 50 TABLET ORAL at 21:51

## 2023-02-01 RX ADMIN — GABAPENTIN 300 MG: 300 CAPSULE ORAL at 21:50

## 2023-02-01 RX ADMIN — HYDROCHLOROTHIAZIDE 25 MG: 12.5 TABLET ORAL at 09:30

## 2023-02-01 RX ADMIN — DULOXETINE HYDROCHLORIDE 40 MG: 20 CAPSULE, DELAYED RELEASE ORAL at 09:30

## 2023-02-01 RX ADMIN — ALPRAZOLAM 0.5 MG: 0.5 TABLET ORAL at 21:50

## 2023-02-01 ASSESSMENT — PAIN DESCRIPTION - ORIENTATION
ORIENTATION: LOWER;RIGHT;LEFT
ORIENTATION: RIGHT;LEFT;LOWER
ORIENTATION: RIGHT

## 2023-02-01 ASSESSMENT — PAIN SCALES - GENERAL
PAINLEVEL_OUTOF10: 10
PAINLEVEL_OUTOF10: 9

## 2023-02-01 ASSESSMENT — PAIN DESCRIPTION - LOCATION
LOCATION: BACK;LEG;HEAD
LOCATION: HIP;LEG;BACK
LOCATION: HIP;BACK;LEG

## 2023-02-01 ASSESSMENT — PAIN - FUNCTIONAL ASSESSMENT
PAIN_FUNCTIONAL_ASSESSMENT: ACTIVITIES ARE NOT PREVENTED
PAIN_FUNCTIONAL_ASSESSMENT: ACTIVITIES ARE NOT PREVENTED

## 2023-02-01 ASSESSMENT — PAIN DESCRIPTION - DESCRIPTORS
DESCRIPTORS: ACHING;THROBBING;STABBING
DESCRIPTORS: ACHING;STABBING;THROBBING
DESCRIPTORS: ACHING;POUNDING

## 2023-02-01 NOTE — GROUP NOTE
Group Therapy Note    Date: 2/1/2023    Group Start Time: 1000  Group End Time: 1100  Group Topic: Psychoeducation    [facility]        Group Therapy Note    Attendees: 16       Patient's Goal:  \"to go home\"    Notes:  Pt. attended the 1000 skill group. Creative. Worked on project with good attention to details. talkative with other pts. Status After Intervention:  Improved    Participation Level:  Active Listener and Interactive    Participation Quality: Appropriate, Attentive, and Sharing      Speech:  normal      Thought Process/Content: Logical      Affective Functioning: Flat      Mood: depressed      Level of consciousness:  Alert, Oriented x4, and Attentive      Response to Learning: Progressing to goal      Endings: None Reported    Modes of Intervention: Education, Support, Socialization, and Activity      Discipline Responsible: Psychoeducational Specialist      Signature:  Deja Rodrigues

## 2023-02-01 NOTE — PROGRESS NOTES
[facility] FOLLOW-UP NOTE         2/1/2023     Patient was seen and examined in person, Chart reviewed   Patient's case discussed with staff/team    Chief Complaint: SI derepression    Interim History:     Preoccupied with decision to have surgery; however continues to verbalize resistance and fear  Endorses depression and anxiety, however improving  Denies SI HI AVH; no current delusions; reports at times pain can be so severe it makes her want to end her life with a gun; denies access to firearms  Brighter affect, increased socialization  Continues to endorse episodes of incontinence in the evening  Improved sleep and appetite           Appetite:   [x] Normal/Unchanged  [] Increased  [] Decreased      Sleep:       [x] Normal/Unchanged  [] Fair       [] Poor              Energy:    [x] Normal/Unchanged  [] Increased  [] Decreased        SI [] Present  [x] Absent    HI  []Present  [x] Absent     Aggression:  [] yes  [x] no    Patient is [x] able  [] unable to CONTRACT FOR SAFETY     PAST MEDICAL/PSYCHIATRIC HISTORY:   Past Medical History:   Diagnosis Date    Anemia     C. difficile colitis 01/2013    Chronic fatigue syndrome     Depression     [place]    Diabetes mellitus (HCC)     Diverticulitis large intestine 2001    Fibromyalgia     Fibromyalgia 03/24/2015    GERD (gastroesophageal reflux disease)     HTN (hypertension)     Hyperlipidemia     Hypothyroidism     Palpitations     Pulmonary embolus (HCC) 10/2012    Following GYN procedure    Vitamin D deficiency        FAMILY/SOCIAL HISTORY:  Family History   Problem Relation Age of Onset    COPD Mother     Lung Cancer Father     COPD Maternal Grandmother     Cancer Maternal Grandfather         COLON    Colon Cancer Paternal Grandfather     Cancer Paternal Aunt         LUNG    Cancer Other         BLOOD CANCER- UNSPECIFIED    Coronary Art Dis Maternal Aunt     Other Maternal Aunt         Brain aneurysm     Social  History     Socioeconomic History    Marital status:      Spouse name: Not on file    Number of children: Not on file    Years of education: Not on file    Highest education level: Not on file   Occupational History    Not on file   Tobacco Use    Smoking status: Never    Smokeless tobacco: Never   Vaping Use    Vaping Use: Never used   Substance and Sexual Activity    Alcohol use: Not Currently     Comment: OCC. Drug use: No    Sexual activity: Not on file   Other Topics Concern    Not on file   Social History Narrative    Not on file     Social Determinants of Health     Financial Resource Strain: Not on file   Food Insecurity: Not on file   Transportation Needs: Not on file   Physical Activity: Not on file   Stress: Not on file   Social Connections: Not on file   Intimate Partner Violence: Not on file   Housing Stability: Not on file           ROS:  [x] All negative/unchanged except if checked.  Explain positive(checked items) below:  [] Constitutional  [] Eyes  [] Ear/Nose/Mouth/Throat  [] Respiratory  [] CV  [] GI  []   [] Musculoskeletal  [] Skin/Breast  [] Neurological  [] Endocrine  [] Heme/Lymph  [] Allergic/Immunologic    Explanation:     MEDICATIONS:    Current Facility-Administered Medications:     tiZANidine (ZANAFLEX) tablet 4 mg, 4 mg, Oral, BID PRN, Veverly Lola, APRN - CNP, 4 mg at 02/01/23 1323    traZODone (DESYREL) tablet 50 mg, 50 mg, Oral, Nightly PRN, Sobeidaord Donning, APRN - CNP, 50 mg at 01/31/23 2050    acetaminophen (TYLENOL) tablet 650 mg, 650 mg, Oral, Q6H PRN, Almer Bloch, MD, 650 mg at 01/31/23 1457    hydrOXYzine pamoate (VISTARIL) capsule 25 mg, 25 mg, Oral, TID PRN, Almer Bloch, MD    DULoxetine (CYMBALTA) extended release capsule 40 mg, 40 mg, Oral, Daily, Becki An MD, 40 mg at 02/01/23 0930    ALPRAZolam (XANAX) tablet 0.5 mg, 0.5 mg, Oral, 4x daily, Becki An MD, 0.5 mg at 02/01/23 1323    HYDROcodone-acetaminophen (NORCO) 5-325 MG per tablet 1 tablet, 1 tablet, Oral, BID PRN, Leonor Saleem MD, 1 tablet at 02/01/23 0936    haloperidol (HALDOL) tablet 5 mg, 5 mg, Oral, Q6H PRN **OR** haloperidol lactate (HALDOL) injection 5 mg, 5 mg, IntraMUSCular, Q6H PRN, Juana Boswell MD    benztropine mesylate (COGENTIN) injection 2 mg, 2 mg, IntraMUSCular, BID PRN, Juana Boswell MD    magnesium hydroxide (MILK OF MAGNESIA) 400 MG/5ML suspension 30 mL, 30 mL, Oral, Daily PRN, Juana Boswell MD    nicotine polacrilex (COMMIT) lozenge 2 mg, 2 mg, Oral, Q1H PRN, Juana Boswell MD    amLODIPine (NORVASC) tablet 10 mg, 10 mg, Oral, Daily, TYRA Ronquillo - CNP, 10 mg at 02/01/23 0930    hydroCHLOROthiazide (HYDRODIURIL) tablet 25 mg, 25 mg, Oral, Daily, TYRA Ronquillo - CNP, 25 mg at 02/01/23 0930    levothyroxine (SYNTHROID) tablet 112 mcg, 112 mcg, Oral, Daily, TYRA Ronquillo - CNP, 112 mcg at 02/01/23 0615    atorvastatin (LIPITOR) tablet 10 mg, 10 mg, Oral, Nightly, TYRA Ronquillo - CNP, 10 mg at 01/31/23 2050    pantoprazole (PROTONIX) tablet 40 mg, 40 mg, Oral, QAM AC, TYRA Ronquillo - CNP, 40 mg at 02/01/23 0615    glucose chewable tablet 16 g, 4 tablet, Oral, PRN, Wes Parra, APRN - CNP    dextrose bolus 10% 125 mL, 125 mL, IntraVENous, PRN **OR** dextrose bolus 10% 250 mL, 250 mL, IntraVENous, PRN, TYRA Ronquillo - CNP    glucagon (rDNA) injection 1 mg, 1 mg, SubCUTAneous, PRN, TYRA Ronquillo - CNP    dextrose 10 % infusion, , IntraVENous, Continuous PRN, TYRA Ronquillo - CNP    insulin lispro (HUMALOG) injection vial 0-8 Units, 0-8 Units, SubCUTAneous, TID WC, TYRA oRnquillo CNP, 2 Units at 01/30/23 1225    insulin lispro (HUMALOG) injection vial 0-4 Units, 0-4 Units, SubCUTAneous, Nightly, TYRA Ronquillo - CNP    gabapentin (NEURONTIN) capsule 300 mg, 300 mg, Oral, BID, Sin Brar MD, 300 mg at 02/01/23 0930      Examination:  /89   Pulse 65   Temp 98.4 °F (36.9 °C) (Oral)   Resp 16   Ht 5' 2\" (1.575 m)   Wt 215 lb (97.5 kg)   LMP 11/19/2013   SpO2 97%   BMI 39.32 kg/m²   Gait - steady  Medication side effects(SE): lethargy     Mental Status Examination:    Level of consciousness:  within normal limits   Appearance:  fair grooming and fair hygiene  Behavior/Motor:  no abnormalities noted  Attitude toward examiner:  cooperative and withdrawn  Speech: spontaneous, normal rate, and normal volume   Mood: constricted and depressed  Affect: blunted  Thought processes: less circumstantial   Thought content:  Suicidal Ideation: denies suicidal ideation  Delusions:  no evidence of delusions  Perceptual Disturbance:  denies any perceptual disturbance  Cognition:  oriented to person, place, and time   Concentration distractible  Insight fair   Judgement fair     ASSESSMENT:   Patient symptoms are:  [x] Well controlled  [x] Improving  [] Worsening  [] No change      Diagnosis:   Principal Problem:    Major depressive disorder, recurrent severe without psychotic features ([facility]  Active Problems:    Intentional drug overdose ([facility]  Resolved Problems:    * No resolved hospital problems. *      LABS:    No results for input(s): WBC, HGB, PLT in the last 72 hours. No results for input(s): NA, K, CL, CO2, BUN, CREATININE, GLUCOSE in the last 72 hours. No results for input(s): BILITOT, ALKPHOS, AST, ALT in the last 72 hours. Lab Results   Component Value Date/Time    LABAMPH Neg 01/23/2023 05:00 PM    BARBSCNU Neg 01/23/2023 05:00 PM    LABBENZ Neg 01/23/2023 05:00 PM    LABBENZ NotDTCD 09/14/2012 11:35 AM    LABMETH Neg 01/23/2023 05:00 PM    OPIATESCREENURINE POSITIVE 01/23/2023 05:00 PM    PHENCYCLIDINESCREENURINE Neg 01/23/2023 05:00 PM    ETOH 93 01/23/2023 05:00 PM     Lab Results   Component Value Date/Time    TSH 0.99 11/11/2022 02:44 PM     No results found for: LITHIUM  No results found for: VALPROATE, CBMZ      Treatment Plan:  Reviewed current Medications with the patient.    Monitor medication efficacy and tolerability Risks, benefits, side effects, drug-to-drug interactions and alternatives to treatment were discussed. Collateral information: see social workers notes   CD evaluation denies  Encourage patient to attend group and other milieu activities. Discharge planning discussed with the patient and treatment team; tentative DC Thursday to home with OP services     PSYCHOTHERAPY/COUNSELING:  [x] Therapeutic interview  [x] Supportive  [] CBT  [] Ongoing  [] Other    [x] Patient continues to need, on a daily basis, active treatment furnished directly by or requiring the supervision of inpatient psychiatric personnel      Anticipated Length of stay: 1 days      Electronically signed by TYRA Loera CNP on 2/1/2023 at 2:34 PM   Addendum:  Wood [place] seen with NP after attending the team meeting, discussing the patient with the nursing and social work staff. I participated during the interview process as well as the treatment plan and spent more than 70% of the time with the nurse practitioner helping me in documentation.   I agree with the above documentation of clinical finding and treatment plan    Physician Signature: Electronically signed by Hernan Padilla MD on 2/1/2023 at 4:33 PM

## 2023-02-01 NOTE — PLAN OF CARE
Pt visible on unit. Seen eating meals and socializing with select peers. Continues with flat/sad affect and monotone speech. States her goal today is to shower. States her depression and anxiety are \"better\" but continues to rate both 10/10. Denies any SI, HI or AVH. Is hopeful to reconnect with [facility] for f/u. Remains preoccupied with thoughts of her children and their relationship. Currently sitting in bed reading a magazine. Friendly and cooperative. Problem: Self Harm/Suicidality  Goal: Will have no self-injury during hospital stay  Description: INTERVENTIONS:  1. Ensure constant observer at bedside with Q15M safety checks  2. Maintain a safe environment  3. Secure patient belongings  4. Ensure family/visitors adhere to safety recommendations  5. Ensure safety tray has been added to patient's diet order  6. Every shift and PRN: Re-assess suicidal risk via Frequent Screener    Outcome: Progressing     Problem: Chronic Conditions and Co-morbidities  Goal: Patient's chronic conditions and co-morbidity symptoms are monitored and maintained or improved  Outcome: Progressing     Problem: ABCDS Injury Assessment  Goal: Absence of physical injury  Outcome: Progressing     Problem: Safety - Adult  Goal: Free from fall injury  Outcome: Progressing     Problem: Risk for Elopement  Goal: Patient will not exit the unit/facility without proper excort  Outcome: Progressing  Flowsheets (Taken 2/1/2023 1153)  Nursing Interventions for Elopement Risk: Reduce environmental triggers     Problem: Coping  Goal: Pt/Family able to verbalize concerns and demonstrate effective coping strategies  Description: INTERVENTIONS:  1. Assist patient/family to identify coping skills, available support systems and cultural and spiritual values  2. Provide emotional support, including active listening and acknowledgement of concerns of patient and caregivers  3. Reduce environmental stimuli, as able  4.  Instruct patient/family in relaxation techniques, as appropriate  5. Assess for spiritual pain/suffering and initiate Spiritual Care, Psychosocial Clinical Specialist consults as needed  Outcome: Progressing  Flowsheets (Taken 2/1/2023 1153)  Patient/family able to verbalize anxieties, fears, and concerns, and demonstrate effective coping:   Assist patient/family to identify coping skills, available support systems and cultural and spiritual values   Provide emotional support, including active listening and acknowledgement of concerns of patient and caregivers   Reduce environmental stimuli, as able     Problem: Depression/Self Harm  Goal: Effect of psychiatric condition will be minimized and patient will be protected from self harm  Description: INTERVENTIONS:  1. Assess impact of patient's symptoms on level of functioning, self care needs and offer support as indicated  2. Assess patient/family knowledge of depression, impact on illness and need for teaching  3. Provide emotional support, presence and reassurance  4. Assess for possible suicidal thoughts or ideation. If patient expresses suicidal thoughts or statements do not leave alone, initiate Suicide Precautions, move to a room close to the nursing station and obtain sitter  5.  Initiate consults as appropriate with Mental Health Professional, Spiritual Care, Psychosocial CNS, and consider a recommendation to the LIP for a Psychiatric Consultation  Outcome: Progressing  Flowsheets (Taken 2/1/2023 1153)  Effect of psychiatric condition will be minimized and patient will be protected from self harm:   Assess impact of patients symptoms on level of functioning, self care needs and offer support as indicated   Assess patient/family knowledge of depression, impact on illness and need for teaching   Provide emotional support, presence and reassurance

## 2023-02-01 NOTE — PROGRESS NOTES
Morning Community Meeting Topics    Clarisa Bahena attended the morning community meeting on 2/1/23. Topics discussed today     [x] Introduction  Day of the week and date  Mask distribution  Current mask requirements  [x]Teams  Explanation of  Green and Blue team criteria  Nurses assigned to each team for today  Explanation about green and blue paper  Date  Patient's Name  Patient's Nurse  Goals  [x] Visitation  Announce the visiting hours for the day  Announce which team is allowed to have visitors for the day  Review any updated Covid 19 requirements for visitors during visitation  Vaccine Card or negative Covid test within 48 hours of visit  State Identification  Patients are reminded to alert the  at least 1 hour before visitation   [x] Unit Orientation  Coffee use  Phone location and etiquette  Shower locations  [facility] and dryer location and process  Common area expectations  Staff rounds expectation  [x] Meals   Educate patient to the menu  The patient is encouraged to fill out the menu to get preferences at mealtime  The patient is educated that if they do not fill out the menu, they will get the standard tray  The coffee pot is decaf, patient encouraged to order regular coffee from menu.   Educate patient to the meal process  Patient encouraged to eat snacks provided twice daily  Snacks may stay in patient room     [x] Discharge Process  Discharge expectations  Fill out the survey after discharge   [x] Hygiene  Daily showers encouraged  Showers availability discussed   Daily dressing encouraged  Discussed wearing street clothing  Education provided on where to place linens and clothing  Linens in the hamper  personal clothing does not go into the linen hamper  [x] Group   Patient encouraged to attend group provided  Time of Group Meetings discussed  Gentle reminder that attendance is a Physician order  [x] Movement  Chair exercises completed  Stretching completed  Notes:  GOAL : \" to go home\" Electronically signed by Daily Venegas on 2/1/2023 at 9:48 AM

## 2023-02-01 NOTE — GROUP NOTE
Group Therapy Note    Date: 1/31/2023    Group Start Time: 2100  Group End Time: 2115  Group Topic: Wrap-Up    [facility]    Parth Gallegos RN        Group Therapy Note    Attendees: 9/19       Patient's Goal:  To stay awake    Notes:  Progressing    Status After Intervention:  Unchanged    Participation Level:  Active Listener    Participation Quality: Appropriate      Speech:  normal      Thought Process/Content: Logical      Affective Functioning: Congruent      Mood: euthymic      Level of consciousness:  Alert      Response to Learning: Progressing to goal      Endings: None Reported    Modes of Intervention: Support      Discipline Responsible: Registered Nurse      Signature:  Parth Gallegos RN

## 2023-02-01 NOTE — GROUP NOTE
Group Therapy Note    Date: 2/1/2023    Group Start Time: 4926  Group End Time: 1168  Group Topic: Relaxation    [facility]    Bhavesh Hatfield        Group Therapy Note    Attendees: 10/18       Patient's Goal:  Participate in guided meditation    Notes:  Patient actively participated    Status After Intervention:  Improved    Participation Level:  Active Listener and Interactive    Participation Quality: Appropriate      Speech:  normal      Thought Process/Content: Logical      Affective Functioning: Congruent      Mood: euthymic      Level of consciousness:  Alert      Response to Learning: Progressing to goal      Endings: None Reported    Modes of Intervention: Activity      Discipline Responsible: [address] [employer] [place] [employer]      Signature:  Bhavesh Hatfield

## 2023-02-01 NOTE — CARE COORDINATION
FAMILY COLLATERAL NOTE    Family/Support Name:  jorge l  Contact #:[phone]  Relationship to Pt[de-identified] significant other        Family/Support contact aware of hospitalization: yes       Top 3 Life Stressors:   Bills  Her children  \"We argue a little bit. \"       Background History Relevant to Current Hospitalization:  Reports pt is in the hospital due to her drinking. Reports when she drinks she gets in trouble. Reports pt is more binging like she will go two weeks between drinking but she will get super angry and start crying. Reports when pt was intoxicated, she started texting her kids and she blames others for her mistakes and wont take ownership of her downfalls. 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 Reports pt has rapid mood swings. Reports when is drinking, she cries so heavily, she can't breathe. Reports pt needs counseling. Reports pt holds everything out and starts drinking and \"its like a kid just trying to get attention/acknowledge. \"     Reports when pt is sober, she has more control of herself. Reports main concern is that she blames other people and wont admit she is wrong. Family Mental Health/Substance Use History: unknown         Support Network's Goal for Hospitalization: \"to grow up and acknowledge when she is wrong and admit when you are wrong. \"     Discharge Plan:  return home and link with outpatient. Support Network Supportive of Discharge Plan:  in agreement       Support can confirm Safety of Location and Security of Weapons:  no firearms at home       Support agreeable to [employer] and Monitor Medications (including Prescription and OTC): does not reside with patient. Identified Barriers to Compliance with Discharge Plan:  drinking, addictive personality     Recommendations for Support Network:   Be available for follow up calls.        MAURICE Salinas

## 2023-02-01 NOTE — PLAN OF CARE
Problem: Self Harm/Suicidality  Goal: Will have no self-injury during hospital stay  Description: INTERVENTIONS:  1. Ensure constant observer at bedside with Q15M safety checks  2. Maintain a safe environment  3. Secure patient belongings  4. Ensure family/visitors adhere to safety recommendations  5. Ensure safety tray has been added to patient's diet order  6. Every shift and PRN: Re-assess suicidal risk via Frequent Screener    1/31/2023 2059 by Chelsie Wilder RN  Outcome: Progressing  1/31/2023 0847 by Graham Caputo RN  Outcome: Progressing     Problem: Chronic Conditions and Co-morbidities  Goal: Patient's chronic conditions and co-morbidity symptoms are monitored and maintained or improved  1/31/2023 2059 by Chelsie Wilder RN  Outcome: Progressing  1/31/2023 0847 by Graham Caputo RN  Outcome: Progressing     Problem: ABCDS Injury Assessment  Goal: Absence of physical injury  1/31/2023 2059 by Chelsie Wilder RN  Outcome: Progressing  1/31/2023 0847 by Graham Caputo RN  Outcome: Progressing     Problem: Safety - Adult  Goal: Free from fall injury  1/31/2023 2059 by Chelsie Wilder RN  Outcome: Progressing  1/31/2023 0847 by Graham Caputo RN  Outcome: Progressing     Problem: Risk for Elopement  Goal: Patient will not exit the unit/facility without proper excort  1/31/2023 2059 by Chelsie Wilder RN  Outcome: Progressing  1/31/2023 0847 by Graham Caputo RN  Outcome: Progressing     Problem: Coping  Goal: Pt/Family able to verbalize concerns and demonstrate effective coping strategies  Description: INTERVENTIONS:  1. Assist patient/family to identify coping skills, available support systems and cultural and spiritual values  2. Provide emotional support, including active listening and acknowledgement of concerns of patient and caregivers  3. Reduce environmental stimuli, as able  4. Instruct patient/family in relaxation techniques, as appropriate  5.  Assess for spiritual pain/suffering and initiate Spiritual Care, Psychosocial Clinical Specialist consults as needed  1/31/2023 2059 by Ochoa Lara RN  Outcome: Progressing  1/31/2023 0847 by Lori Mishra RN  Outcome: Progressing     Problem: Depression/Self Harm  Goal: Effect of psychiatric condition will be minimized and patient will be protected from self harm  Description: INTERVENTIONS:  1. Assess impact of patient's symptoms on level of functioning, self care needs and offer support as indicated  2. Assess patient/family knowledge of depression, impact on illness and need for teaching  3. Provide emotional support, presence and reassurance  4. Assess for possible suicidal thoughts or ideation. If patient expresses suicidal thoughts or statements do not leave alone, initiate Suicide Precautions, move to a room close to the nursing station and obtain sitter  5. Initiate consults as appropriate with Mental Health Professional, Spiritual Care, Psychosocial CNS, and consider a recommendation to the LIP for a Psychiatric Consultation  1/31/2023 2059 by Ochoa Lara RN  Outcome: Progressing  1/31/2023 0847 by Lori Mishra RN  Outcome: Progressing   Pt oou sitting with female peers. Pt denies current SI,HI,A/V hallucinations. Contracts for safety on the unit. Pt remains depressed/anxious over home and physical issues thou reports slightly improved. Preoccupied with youngest son who is 18,out of the state and threatened to cut her out of his life. Pt does report she was not involved with his upbringing,that he was in many foster homes,and was considered unruly. Pt also spoke of seeking out a new cardiologist, pain management when d/c. Reports sleep improved and appetite good. Affect sad,worrisome.

## 2023-02-02 LAB
ANION GAP SERPL CALCULATED.3IONS-SCNC: 9 MEQ/L (ref 9–15)
BASOPHILS ABSOLUTE: 0 K/UL (ref 0–0.2)
BASOPHILS RELATIVE PERCENT: 0.7 %
BUN BLDV-MCNC: 16 MG/DL (ref 6–20)
CALCIUM SERPL-MCNC: 9.1 MG/DL (ref 8.5–9.9)
CHLORIDE BLD-SCNC: 104 MEQ/L (ref 95–107)
CO2: 29 MEQ/L (ref 20–31)
CREAT SERPL-MCNC: 0.91 MG/DL (ref 0.5–0.9)
EKG ATRIAL RATE: 48 BPM
EKG P AXIS: 31 DEGREES
EKG P-R INTERVAL: 190 MS
EKG Q-T INTERVAL: 454 MS
EKG QRS DURATION: 84 MS
EKG QTC CALCULATION (BAZETT): 405 MS
EKG R AXIS: -8 DEGREES
EKG T AXIS: 16 DEGREES
EKG VENTRICULAR RATE: 48 BPM
EOSINOPHILS ABSOLUTE: 0.1 K/UL (ref 0–0.7)
EOSINOPHILS RELATIVE PERCENT: 1.4 %
GFR SERPL CREATININE-BSD FRML MDRD: >60 ML/MIN/{1.73_M2}
GLUCOSE BLD-MCNC: 111 MG/DL (ref 70–99)
GLUCOSE BLD-MCNC: 120 MG/DL (ref 70–99)
GLUCOSE BLD-MCNC: 141 MG/DL (ref 70–99)
GLUCOSE BLD-MCNC: 149 MG/DL (ref 70–99)
GLUCOSE BLD-MCNC: 166 MG/DL (ref 70–99)
HCT VFR BLD CALC: 33.7 % (ref 37–47)
HEMOGLOBIN: 11.5 G/DL (ref 12–16)
LYMPHOCYTES ABSOLUTE: 2.2 K/UL (ref 1–4.8)
LYMPHOCYTES RELATIVE PERCENT: 38.1 %
MCH RBC QN AUTO: 29.2 PG (ref 27–31.3)
MCHC RBC AUTO-ENTMCNC: 34 % (ref 33–37)
MCV RBC AUTO: 86 FL (ref 79.4–94.8)
MONOCYTES ABSOLUTE: 0.4 K/UL (ref 0.2–0.8)
MONOCYTES RELATIVE PERCENT: 7.2 %
NEUTROPHILS ABSOLUTE: 3.1 K/UL (ref 1.4–6.5)
NEUTROPHILS RELATIVE PERCENT: 52.6 %
PDW BLD-RTO: 13.5 % (ref 11.5–14.5)
PERFORMED ON: ABNORMAL
PLATELET # BLD: 244 K/UL (ref 130–400)
POTASSIUM SERPL-SCNC: 3.4 MEQ/L (ref 3.4–4.9)
RBC # BLD: 3.92 M/UL (ref 4.2–5.4)
SODIUM BLD-SCNC: 142 MEQ/L (ref 135–144)
WBC # BLD: 5.8 K/UL (ref 4.8–10.8)

## 2023-02-02 PROCEDURE — 85025 COMPLETE CBC W/AUTO DIFF WBC: CPT

## 2023-02-02 PROCEDURE — 80048 BASIC METABOLIC PNL TOTAL CA: CPT

## 2023-02-02 PROCEDURE — 6370000000 HC RX 637 (ALT 250 FOR IP): Performed by: REGISTERED NURSE

## 2023-02-02 PROCEDURE — 6370000000 HC RX 637 (ALT 250 FOR IP): Performed by: PAIN MEDICINE

## 2023-02-02 PROCEDURE — 36415 COLL VENOUS BLD VENIPUNCTURE: CPT

## 2023-02-02 PROCEDURE — 6370000000 HC RX 637 (ALT 250 FOR IP): Performed by: PSYCHIATRY & NEUROLOGY

## 2023-02-02 PROCEDURE — 1240000000 HC EMOTIONAL WELLNESS R&B

## 2023-02-02 RX ADMIN — GABAPENTIN 300 MG: 300 CAPSULE ORAL at 09:13

## 2023-02-02 RX ADMIN — PANTOPRAZOLE SODIUM 40 MG: 40 TABLET, DELAYED RELEASE ORAL at 06:13

## 2023-02-02 RX ADMIN — ALPRAZOLAM 0.5 MG: 0.5 TABLET ORAL at 17:09

## 2023-02-02 RX ADMIN — ALPRAZOLAM 0.5 MG: 0.5 TABLET ORAL at 13:31

## 2023-02-02 RX ADMIN — HYDROCHLOROTHIAZIDE 25 MG: 12.5 TABLET ORAL at 09:14

## 2023-02-02 RX ADMIN — ALPRAZOLAM 0.5 MG: 0.5 TABLET ORAL at 20:56

## 2023-02-02 RX ADMIN — TIZANIDINE 4 MG: 4 TABLET ORAL at 11:20

## 2023-02-02 RX ADMIN — HYDROXYZINE PAMOATE 25 MG: 25 CAPSULE ORAL at 19:10

## 2023-02-02 RX ADMIN — TRAZODONE HYDROCHLORIDE 50 MG: 50 TABLET ORAL at 20:56

## 2023-02-02 RX ADMIN — ALPRAZOLAM 0.5 MG: 0.5 TABLET ORAL at 09:13

## 2023-02-02 RX ADMIN — HYDROCODONE BITARTRATE AND ACETAMINOPHEN 1 TABLET: 5; 325 TABLET ORAL at 13:57

## 2023-02-02 RX ADMIN — LEVOTHYROXINE SODIUM 112 MCG: 112 TABLET ORAL at 06:13

## 2023-02-02 RX ADMIN — DULOXETINE HYDROCHLORIDE 40 MG: 20 CAPSULE, DELAYED RELEASE ORAL at 09:13

## 2023-02-02 RX ADMIN — HYDROCODONE BITARTRATE AND ACETAMINOPHEN 1 TABLET: 5; 325 TABLET ORAL at 22:51

## 2023-02-02 RX ADMIN — GABAPENTIN 300 MG: 300 CAPSULE ORAL at 20:56

## 2023-02-02 RX ADMIN — TIZANIDINE 4 MG: 4 TABLET ORAL at 21:14

## 2023-02-02 RX ADMIN — ATORVASTATIN CALCIUM 10 MG: 10 TABLET, FILM COATED ORAL at 20:56

## 2023-02-02 RX ADMIN — ACETAMINOPHEN 650 MG: 325 TABLET ORAL at 17:08

## 2023-02-02 RX ADMIN — LISINOPRIL 5 MG: 5 TABLET ORAL at 09:14

## 2023-02-02 ASSESSMENT — PAIN DESCRIPTION - DESCRIPTORS
DESCRIPTORS: ACHING;THROBBING
DESCRIPTORS: STABBING;THROBBING;BURNING
DESCRIPTORS: ACHING
DESCRIPTORS: ACHING

## 2023-02-02 ASSESSMENT — PAIN SCALES - GENERAL
PAINLEVEL_OUTOF10: 8
PAINLEVEL_OUTOF10: 9
PAINLEVEL_OUTOF10: 10

## 2023-02-02 ASSESSMENT — PAIN DESCRIPTION - LOCATION
LOCATION: BACK

## 2023-02-02 NOTE — PROGRESS NOTES
Pt in and out of room today, watching tv and socializing. Pt states that she still feels lonely because the younger girls dont talk with her. Reports depression 6/10. Anxiety 9/10. Reports appetite is decreased d/t her depression. Denies SI/HI or AVH. States that she doesn't understand why she cannot have sweets and that it wouldn't make her sugar go that high. BS stable this shift. No s/s of hypo/hyperglycemia. Reports sleeping good with the help of the Trazodone. Continues to report chronic pain and uses PRN pain medication to help with this. States she is attending some groups. Goal is to not feel so lonely.

## 2023-02-02 NOTE — PLAN OF CARE
Problem: Self Harm/Suicidality  Goal: Will have no self-injury during hospital stay  Description: INTERVENTIONS:  1. Ensure constant observer at bedside with Q15M safety checks  2. Maintain a safe environment  3. Secure patient belongings  4. Ensure family/visitors adhere to safety recommendations  5. Ensure safety tray has been added to patient's diet order  6. Every shift and PRN: Re-assess suicidal risk via Frequent Screener    Outcome: Progressing     Problem: Chronic Conditions and Co-morbidities  Goal: Patient's chronic conditions and co-morbidity symptoms are monitored and maintained or improved  Outcome: Progressing     Problem: ABCDS Injury Assessment  Goal: Absence of physical injury  Outcome: Progressing     Problem: Safety - Adult  Goal: Free from fall injury  Outcome: Progressing     Problem: Risk for Elopement  Goal: Patient will not exit the unit/facility without proper excort  Outcome: Progressing     Problem: Coping  Goal: Pt/Family able to verbalize concerns and demonstrate effective coping strategies  Description: INTERVENTIONS:  1. Assist patient/family to identify coping skills, available support systems and cultural and spiritual values  2. Provide emotional support, including active listening and acknowledgement of concerns of patient and caregivers  3. Reduce environmental stimuli, as able  4. Instruct patient/family in relaxation techniques, as appropriate  5. Assess for spiritual pain/suffering and initiate Spiritual Care, Psychosocial Clinical Specialist consults as needed  Outcome: Progressing     Problem: Depression/Self Harm  Goal: Effect of psychiatric condition will be minimized and patient will be protected from self harm  Description: INTERVENTIONS:  1. Assess impact of patient's symptoms on level of functioning, self care needs and offer support as indicated  2. Assess patient/family knowledge of depression, impact on illness and need for teaching  3.  Provide emotional support, presence and reassurance  4. Assess for possible suicidal thoughts or ideation. If patient expresses suicidal thoughts or statements do not leave alone, initiate Suicide Precautions, move to a room close to the nursing station and obtain sitter  5.  Initiate consults as appropriate with Mental Health Professional, Spiritual Care, Psychosocial CNS, and consider a recommendation to the LIP for a Psychiatric Consultation  Outcome: Progressing

## 2023-02-02 NOTE — GROUP NOTE
Group Therapy Note    Date: 2/2/2023    Group Start Time: 1630  Group End Time: 0036  Group Topic: Wrap-Up    [facility]    Sunni Wooten RN        Group Therapy Note    Attendees: 7/20       Patient's Goal:  To be discharged    Notes:  Progressing    Status After Intervention:  Unchanged    Participation Level:  Active Listener    Participation Quality: Appropriate      Speech:  normal      Thought Process/Content: Logical      Affective Functioning: Congruent      Mood: euthymic      Level of consciousness:  Alert      Response to Learning: Progressing to goal      Endings: None Reported    Modes of Intervention: Support      Discipline Responsible: Registered Nurse      Signature:  Sunni Wooten RN

## 2023-02-02 NOTE — PROGRESS NOTES
Pt is c/o #10 back pain , requested tylenol , pt was informed she has norco 5-25 as pt reports her back pain is severe,#10, reminded pt norco is ordered and that she can have it bid, pt was encouraged to ask for it when due use tylenol in between. Pt reports Zanaflex was helpful for back pain. Manuel Song

## 2023-02-02 NOTE — PROGRESS NOTES
Pt continues to have +1 pitting edema to BLE. Parish hose on at this time. Encouraged to continue to elevate legs. Verbalized understanding and is in agreement. Zelpha Blazing CNP updated.

## 2023-02-02 NOTE — GROUP NOTE
Group Therapy Note    Date: 2023    Group Start Time: 1630  Group End Time:   Group Topic: Healthy Living/Wellness    [facility]    Marilin Murray RN        Group Therapy Note    Attendees: 10/20         Patient's Goal:  ***    Notes:  ***    Status After Intervention:  {Status After Intervention:643604444}    Participation Level: {Participation Level:520378348}    Participation Quality: {[facility] PARTICIPATION QUALITY:508236373}      Speech:  {ED  CD_SPEECH:82111}      Thought Process/Content: {Thought Process/Content:826712896}      Affective Functioning: {Affective Functionin}      Mood: {Mood:822995449}      Level of consciousness:  {Level of consciousness:171635481}      Response to Learnin Dilcia Norberto [facility] Responses to Learnin}      Endings: {[facility] Endings:13779}    Modes of Intervention: {[facility] Modes of Intervention:643062894}      Discipline Responsible: Americo FLOWER Multidisciplinary:[id number]}      Signature:  Marilin Murray RN Patient is requesting referral.     Name of specialist and specialty department :   Inez Pelaez   Reason for visit with the specialist:   Nerve pain   Address of the specialist office:  [facility] 3rd floor  Appointment date:   5/5 at 4pm        CSS informed patient the turnaround time for referral is 5-7 business days. Patient was informed to check their [employer] account for referral status.

## 2023-02-02 NOTE — GROUP NOTE
Group Therapy Note    Date: 2/2/2023    Group Start Time: 1630  Group End Time: 1700  Group Topic: Healthy Living/Wellness    [facility] I    Carlton Lacrosse, RN        Group Therapy Note    Attendees: 6/20       Patient's Goal:  Played corn hole     Notes:  Good participation    Status After Intervention:  Unchanged    Participation Level:  Active Listener    Participation Quality: Appropriate      Speech:  normal      Thought Process/Content: Logical      Affective Functioning: Congruent      Mood: euthymic      Level of consciousness:  Alert      Response to Learning: Progressing to goal      Endings: None Reported    Modes of Intervention: Support      Discipline Responsible: Registered Nurse      Signature:  Carlton Duran RN

## 2023-02-02 NOTE — PROGRESS NOTES
Morning Community Meeting Topics    Belkys Chandler attended the morning community meeting on 2/2/23. Topics discussed today     [x] Introduction  Day of the week and date  Mask distribution  Current mask requirements  [x]Teams  Explanation of  Green and Blue team criteria  Nurses assigned to each team for today  Explanation about green and blue paper  Date  Patient's Name  Patient's Nurse  Goals  [x] Visitation  Announce the visiting hours for the day  Announce which team is allowed to have visitors for the day  Review any updated Covid 19 requirements for visitors during visitation  Vaccine Card or negative Covid test within 48 hours of visit  State Identification  Patients are reminded to alert the  at least 1 hour before visitation   [x] Unit Orientation  Coffee use  Phone location and etiquette  Shower locations  [place] and dryer location and process  Common area expectations  Staff rounds expectation  [x] Meals   Educate patient to the menu  The patient is encouraged to fill out the menu to get preferences at mealtime  The patient is educated that if they do not fill out the menu, they will get the standard tray  The coffee pot is decaf, patient encouraged to order regular coffee from menu.   Educate patient to the meal process  Patient encouraged to eat snacks provided twice daily  Snacks may stay in patient room     [x] Discharge Process  Discharge expectations  Fill out the survey after discharge   [x] Hygiene  Daily showers encouraged  Showers availability discussed   Daily dressing encouraged  Discussed wearing street clothing  Education provided on where to place linens and clothing  Linens in the hamper  personal clothing does not go into the linen hamper  [x] Group   Patient encouraged to attend group provided  Time of Group Meetings discussed  Gentle reminder that attendance is a Physician order  [x] Movement  Chair exercises completed  Stretching completed  Notes:  GOAL : \" to go home\" Electronically signed by Emeterio Fitzgerald on 2/2/2023 at 9:54 AM

## 2023-02-02 NOTE — GROUP NOTE
Group Therapy Note    Date: 2/2/2023    Group Start Time: 1000  Group End Time: 1100  Group Topic: Psychoeducation    [facility]    Emi Pierson, CTRS        Group Therapy Note    Attendees: 14       Patient's Goal:  \"to go home\"    Notes:  Pt. attended the 1000 skill group. Finished project with interest. Feeling better. Talkative with other pts and helpful with clean up. Status After Intervention:  Improved    Participation Level:  Active Listener and Interactive    Participation Quality: Appropriate, Attentive, and Sharing      Speech:  normal and soft spoken      Thought Process/Content: Logical      Affective Functioning: Congruent      Mood:  calm      Level of consciousness:  Alert, Oriented x4, and Attentive      Response to Learning: Progressing to goal      Endings: None Reported    Modes of Intervention: Education, Support, Socialization, and Activity      Discipline Responsible: Psychoeducational Specialist      Signature:  Thad Calle

## 2023-02-02 NOTE — PROGRESS NOTES
[facility]. FOLLOW-UP NOTE       2/2/2023     Patient was seen and examined in person, Chart reviewed   Patient's case discussed with staff/team    Chief Complaint: SI derepression    Interim History:     Pt report feeling slightly better  Less depressed  Family has been visiting her, her BF was mean to her, was upset  Slept better last night  Still focused on the pain issue  Not having any hopeless or worthless feeling       Appetite:   [x] Normal/Unchanged  [] Increased  [] Decreased      Sleep:       [x] Normal/Unchanged  [] Fair       [] Poor              Energy:    [x] Normal/Unchanged  [] Increased  [] Decreased        SI [] Present  [x] Absent    HI  []Present  [x] Absent     Aggression:  [] yes  [x] no    Patient is [x] able  [] unable to CONTRACT FOR SAFETY     PAST MEDICAL/PSYCHIATRIC HISTORY:   Past Medical History:   Diagnosis Date    Anemia     C. difficile colitis 01/2013    Chronic fatigue syndrome     Depression     Lee    Diabetes mellitus ([facility]     Diverticulitis large intestine 2001    Fibromyalgia     Fibromyalgia 03/24/2015    GERD (gastroesophageal reflux disease)     HTN (hypertension)     Hyperlipidemia     Hypothyroidism     Palpitations     Pulmonary embolus ([facility] 10/2012    Following GYN procedure    Vitamin D deficiency        FAMILY/SOCIAL HISTORY:  Family History   Problem Relation Age of Onset    COPD Mother     Lung Cancer Father     COPD Maternal Grandmother     Cancer Maternal Grandfather         COLON    Colon Cancer Paternal Grandfather     Cancer Paternal Aunt         LUNG    Cancer Other         BLOOD CANCER- UNSPECIFIED    Coronary Art Dis Maternal Aunt     Other Maternal Aunt         Brain aneurysm     Social History     Socioeconomic History    Marital status:      Spouse name: Not on file    Number of children: Not on file    Years of education: Not on file    Highest education level: Not on file   Occupational History Not on file   Tobacco Use    Smoking status: Never    Smokeless tobacco: Never   Vaping Use    Vaping Use: Never used   Substance and Sexual Activity    Alcohol use: Not Currently     Comment: OCC. Drug use: No    Sexual activity: Not on file   Other Topics Concern    Not on file   Social History Narrative    Not on file     Social Determinants of Health     Financial Resource Strain: Not on file   Food Insecurity: Not on file   Transportation Needs: Not on file   Physical Activity: Not on file   Stress: Not on file   Social Connections: Not on file   Intimate Partner Violence: Not on file   Housing Stability: Not on file           ROS:  [x] All negative/unchanged except if checked.  Explain positive(checked items) below:  [] Constitutional  [] Eyes  [] Ear/Nose/Mouth/Throat  [] Respiratory  [] CV  [] GI  []   [] Musculoskeletal  [] Skin/Breast  [] Neurological  [] Endocrine  [] Heme/Lymph  [] Allergic/Immunologic    Explanation:     MEDICATIONS:    Current Facility-Administered Medications:     lisinopril (PRINIVIL;ZESTRIL) tablet 5 mg, 5 mg, Oral, Daily, TYRA Easley CNP, 5 mg at 02/02/23 0914    tiZANidine (ZANAFLEX) tablet 4 mg, 4 mg, Oral, BID PRN, TYRA Easley CNP, 4 mg at 02/02/23 1120    traZODone (DESYREL) tablet 50 mg, 50 mg, Oral, Nightly PRN, TYRA Gutiérrez - CNP, 50 mg at 02/01/23 2151    acetaminophen (TYLENOL) tablet 650 mg, 650 mg, Oral, Q6H PRN, Jenae Frazier MD, 650 mg at 01/31/23 1457    hydrOXYzine pamoate (VISTARIL) capsule 25 mg, 25 mg, Oral, TID PRN, Jenae Frazier MD    DULoxetine (CYMBALTA) extended release capsule 40 mg, 40 mg, Oral, Daily, Che Gutierrez MD, 40 mg at 02/02/23 0913    ALPRAZolam (XANAX) tablet 0.5 mg, 0.5 mg, Oral, 4x daily, Che Gutierrez MD, 0.5 mg at 02/02/23 0913    HYDROcodone-acetaminophen (NORCO) 5-325 MG per tablet 1 tablet, 1 tablet, Oral, BID PRN, Josephine Ronquillo MD, 1 tablet at 02/01/23 2151    haloperidol (HALDOL) tablet 5 mg, 5 mg, Oral, Q6H PRN **OR** haloperidol lactate (HALDOL) injection 5 mg, 5 mg, IntraMUSCular, Q6H PRN, Neyda Siu MD    benztropine mesylate (COGENTIN) injection 2 mg, 2 mg, IntraMUSCular, BID PRN, Neyda Siu MD    magnesium hydroxide (MILK OF MAGNESIA) 400 MG/5ML suspension 30 mL, 30 mL, Oral, Daily PRN, Neyda Siu MD    nicotine polacrilex (COMMIT) lozenge 2 mg, 2 mg, Oral, Q1H PRN, Neyda Siu MD    hydroCHLOROthiazide (HYDRODIURIL) tablet 25 mg, 25 mg, Oral, Daily, Hurshel Patch, APRN - CNP, 25 mg at 02/02/23 6655    levothyroxine (SYNTHROID) tablet 112 mcg, 112 mcg, Oral, Daily, Hurshel Patch, APRN - CNP, 112 mcg at 02/02/23 4615    atorvastatin (LIPITOR) tablet 10 mg, 10 mg, Oral, Nightly, Hurshel Patch, APRN - CNP, 10 mg at 02/01/23 2151    pantoprazole (PROTONIX) tablet 40 mg, 40 mg, Oral, QAM AC, Hurshel Patch, APRN - CNP, 40 mg at 02/02/23 8170    glucose chewable tablet 16 g, 4 tablet, Oral, PRN, Hurshel Patch, APRN - CNP    dextrose bolus 10% 125 mL, 125 mL, IntraVENous, PRN **OR** dextrose bolus 10% 250 mL, 250 mL, IntraVENous, PRN, Hurshel Patch, APRN - CNP    glucagon (rDNA) injection 1 mg, 1 mg, SubCUTAneous, PRN, Hurshel Patch, APRN - CNP    dextrose 10 % infusion, , IntraVENous, Continuous PRN, Hurshel Patch, APRN - CNP    insulin lispro (HUMALOG) injection vial 0-8 Units, 0-8 Units, SubCUTAneous, TID WC, Hurshel Patch, APRN - CNP, 2 Units at 01/30/23 1225    insulin lispro (HUMALOG) injection vial 0-4 Units, 0-4 Units, SubCUTAneous, Nightly, Hurshel Patch, APRN - CNP    gabapentin (NEURONTIN) capsule 300 mg, 300 mg, Oral, BID, Wilber Leggett MD, 300 mg at 02/02/23 0913      Examination:  /85   Pulse 85   Temp 98 °F (36.7 °C) (Oral)   Resp 18   Ht 5' 2\" (1.575 m)   Wt 215 lb (97.5 kg)   LMP 11/19/2013   SpO2 97%   BMI 39.32 kg/m²   Gait - steady  Medication side effects(SE): lethargy     Mental Status Examination:    Level of consciousness:  within normal limits   Appearance:  fair grooming and fair hygiene  Behavior/Motor:  no abnormalities noted  Attitude toward examiner:  cooperative and withdrawn  Speech: spontaneous, normal rate, and normal volume   Mood: constricted and depressed  Affect: blunted  Thought processes: less circumstantial   Thought content:  Suicidal Ideation: denies suicidal ideation  Delusions:  no evidence of delusions  Perceptual Disturbance:  denies any perceptual disturbance  Cognition:  oriented to person, place, and time   Concentration distractible  Insight fair   Judgement fair     ASSESSMENT:   Patient symptoms are:  [] Well controlled  [x] Improving  [] Worsening  [] No change      Diagnosis:   Principal Problem:    Major depressive disorder, recurrent severe without psychotic features ([facility]  Active Problems:    Intentional drug overdose ([facility]  Resolved Problems:    * No resolved hospital problems. *      LABS:    Recent Labs     02/02/23  0528   WBC 5.8   HGB 11.5*          Recent Labs     02/02/23  0528      K 3.4      CO2 29   BUN 16   CREATININE 0.91*   GLUCOSE 149*       No results for input(s): BILITOT, ALKPHOS, AST, ALT in the last 72 hours. Lab Results   Component Value Date/Time    LABAMPH Neg 01/23/2023 05:00 PM    BARBSCNU Neg 01/23/2023 05:00 PM    LABBENZ Neg 01/23/2023 05:00 PM    LABBENZ NotDTCD 09/14/2012 11:35 AM    LABMETH Neg 01/23/2023 05:00 PM    OPIATESCREENURINE POSITIVE 01/23/2023 05:00 PM    PHENCYCLIDINESCREENURINE Neg 01/23/2023 05:00 PM    ETOH 93 01/23/2023 05:00 PM     Lab Results   Component Value Date/Time    TSH 0.99 11/11/2022 02:44 PM     No results found for: LITHIUM  No results found for: VALPROATE, CBMZ      Treatment Plan:  Reviewed current Medications with the patient. Monitor medication efficacy and tolerability   Risks, benefits, side effects, drug-to-drug interactions and alternatives to treatment were discussed.   Collateral information: see social workers notes   CD evaluation denies  Encourage patient to attend group and other milieu activities.   Discharge planning discussed with the patient and treatment team; tentative DC Thursday to home with OP services     PSYCHOTHERAPY/COUNSELING:  [x] Therapeutic interview  [x] Supportive  [] CBT  [] Ongoing  [] Other  Patient was seen 1:1 for 20 minutes, other than E&M time spent, focusing on      - coping skills techniques     - Anxiety management techniques discussed including deep breathing exercise and PMR     - discussing patients strength and weakness      - Focusing on negative cognition and maladaptive thoughts, which is feeding and maintaining the depression symptoms       [x] Patient continues to need, on a daily basis, active treatment furnished directly by or requiring the supervision of inpatient psychiatric personnel            Physician Signature: Electronically signed by Krys Benjamin MD on 2/2/2023 at 11:27 AM

## 2023-02-02 NOTE — GROUP NOTE
Group Therapy Note    Date: 2/1/2023    Group Start Time: 1930  Group End Time: 1945  Group Topic: Wrap-Up    [facility]    Jules Moralez RN        Group Therapy Note    Attendees: 13/20       Patient's Goal:  To not be angry    Notes:  Progressing    Status After Intervention:  Unchanged    Participation Level:  Active Listener    Participation Quality: Appropriate      Speech:  normal      Thought Process/Content: Logical      Affective Functioning: Congruent      Mood: euthymic      Level of consciousness:  Alert      Response to Learning: Progressing to goal      Endings: None Reported    Modes of Intervention: Support      Discipline Responsible: Registered Nurse      Signature:  Jules Moralez RN

## 2023-02-02 NOTE — GROUP NOTE
Group Therapy Note    Date: 2/1/2023    Group Start Time: 1600  Group End Time: 36  Group Topic: Healthy Living/Wellness    [facility]    Osei Valles RN        Group Therapy Note    Attendees: 20/20       Patient's Goal:  Attend group    Notes:  good participation    Status After Intervention:  Unchanged    Participation Level:  Active Listener    Participation Quality: Attentive      Speech:  normal      Thought Process/Content: Logical      Affective Functioning: Congruent      Mood: euthymic      Level of consciousness:  Alert      Response to Learning: Progressing to goal      Endings: None Reported    Modes of Intervention: Support      Discipline Responsible: Registered Nurse      Signature:  Osei Valles RN

## 2023-02-02 NOTE — PLAN OF CARE
Pt visible on unit. Seen eating meals and socializing with select peers. Continues with flat/sad affect. Speech remains monotone with poor eye contacts. Explains she was hopeful for D/C today. Upset her boyfriend called and \"painted\" her \"to be an alcoholic\". States, \"I drink like 1-2 beers every few weeks\". Plans to call him to have a conversation about their relationship after lunch. Endorses improved sleep and appetite. Denies any SI, HI or AVH. Rates both depression and anxiety 10/10. Currently in day room eating lunch and socializing with peer. Problem: Self Harm/Suicidality  Goal: Will have no self-injury during hospital stay  Description: INTERVENTIONS:  1. Ensure constant observer at bedside with Q15M safety checks  2. Maintain a safe environment  3. Secure patient belongings  4. Ensure family/visitors adhere to safety recommendations  5. Ensure safety tray has been added to patient's diet order  6.   Every shift and PRN: Re-assess suicidal risk via Frequent Screener    2/2/2023 1239 by Jazmine Huitron RN  Outcome: Progressing  2/2/2023 0204 by Darling Flannery RN  Outcome: Progressing     Problem: Chronic Conditions and Co-morbidities  Goal: Patient's chronic conditions and co-morbidity symptoms are monitored and maintained or improved  2/2/2023 1239 by Jazmine Huitron RN  Outcome: Progressing  2/2/2023 0204 by Darling Flannery RN  Outcome: Progressing     Problem: ABCDS Injury Assessment  Goal: Absence of physical injury  2/2/2023 1239 by Jazmine Huitron RN  Outcome: Progressing  2/2/2023 0204 by Darling Flannery RN  Outcome: Progressing     Problem: Safety - Adult  Goal: Free from fall injury  2/2/2023 1239 by Jazmine Huitron RN  Outcome: Progressing  2/2/2023 0204 by Darling Flannery RN  Outcome: Progressing     Problem: Risk for Elopement  Goal: Patient will not exit the unit/facility without proper excort  2/2/2023 1239 by Jazmine Huitron, RN  Outcome: Progressing  Flowsheets (Taken 2/2/2023 1233)  Nursing Interventions for Elopement Risk: Reduce environmental triggers  2/2/2023 0204 by Sachin Boyd RN  Outcome: Progressing     Problem: Coping  Goal: Pt/Family able to verbalize concerns and demonstrate effective coping strategies  Description: INTERVENTIONS:  1. Assist patient/family to identify coping skills, available support systems and cultural and spiritual values  2. Provide emotional support, including active listening and acknowledgement of concerns of patient and caregivers  3. Reduce environmental stimuli, as able  4. Instruct patient/family in relaxation techniques, as appropriate  5. Assess for spiritual pain/suffering and initiate Spiritual Care, Psychosocial Clinical Specialist consults as needed  2/2/2023 1239 by Janny Wick RN  Outcome: Progressing  Flowsheets (Taken 2/2/2023 1233)  Patient/family able to verbalize anxieties, fears, and concerns, and demonstrate effective coping:   Assist patient/family to identify coping skills, available support systems and cultural and spiritual values   Provide emotional support, including active listening and acknowledgement of concerns of patient and caregivers  2/2/2023 0204 by Sachin Boyd RN  Outcome: Progressing     Problem: Depression/Self Harm  Goal: Effect of psychiatric condition will be minimized and patient will be protected from self harm  Description: INTERVENTIONS:  1. Assess impact of patient's symptoms on level of functioning, self care needs and offer support as indicated  2. Assess patient/family knowledge of depression, impact on illness and need for teaching  3. Provide emotional support, presence and reassurance  4. Assess for possible suicidal thoughts or ideation. If patient expresses suicidal thoughts or statements do not leave alone, initiate Suicide Precautions, move to a room close to the nursing station and obtain sitter  5.  Initiate consults as appropriate with Mental Health Professional, Spiritual Care, Psychosocial CNS, and consider a recommendation to the LIP for a Psychiatric Consultation  2/2/2023 1239 by Donnie Ward, RN  Outcome: Progressing  Flowsheets (Taken 2/2/2023 1233)  Effect of psychiatric condition will be minimized and patient will be protected from self harm:   Assess impact of patients symptoms on level of functioning, self care needs and offer support as indicated   Assess patient/family knowledge of depression, impact on illness and need for teaching  2/2/2023 0204 by Kasandra Lee, RN  Outcome: Progressing

## 2023-02-03 VITALS
OXYGEN SATURATION: 96 % | SYSTOLIC BLOOD PRESSURE: 137 MMHG | HEIGHT: 62 IN | BODY MASS INDEX: 39.56 KG/M2 | HEART RATE: 65 BPM | DIASTOLIC BLOOD PRESSURE: 88 MMHG | TEMPERATURE: 97.2 F | RESPIRATION RATE: 18 BRPM | WEIGHT: 215 LBS

## 2023-02-03 LAB
GLUCOSE BLD-MCNC: 118 MG/DL (ref 70–99)
GLUCOSE BLD-MCNC: 97 MG/DL (ref 70–99)
PERFORMED ON: ABNORMAL
PERFORMED ON: NORMAL

## 2023-02-03 PROCEDURE — 6370000000 HC RX 637 (ALT 250 FOR IP): Performed by: PSYCHIATRY & NEUROLOGY

## 2023-02-03 PROCEDURE — 6370000000 HC RX 637 (ALT 250 FOR IP): Performed by: PAIN MEDICINE

## 2023-02-03 PROCEDURE — 6370000000 HC RX 637 (ALT 250 FOR IP): Performed by: REGISTERED NURSE

## 2023-02-03 RX ORDER — LISINOPRIL 5 MG/1
5 TABLET ORAL DAILY
Qty: 30 TABLET | Refills: 1 | Status: SHIPPED | OUTPATIENT
Start: 2023-02-04

## 2023-02-03 RX ORDER — DULOXETINE 40 MG/1
40 CAPSULE, DELAYED RELEASE ORAL DAILY
Qty: 15 CAPSULE | Refills: 3 | Status: SHIPPED | OUTPATIENT
Start: 2023-02-04

## 2023-02-03 RX ORDER — TRAZODONE HYDROCHLORIDE 50 MG/1
50 TABLET ORAL NIGHTLY PRN
Qty: 15 TABLET | Refills: 2 | Status: SHIPPED | OUTPATIENT
Start: 2023-02-03

## 2023-02-03 RX ADMIN — GABAPENTIN 300 MG: 300 CAPSULE ORAL at 08:58

## 2023-02-03 RX ADMIN — HYDROCHLOROTHIAZIDE 25 MG: 12.5 TABLET ORAL at 08:58

## 2023-02-03 RX ADMIN — LEVOTHYROXINE SODIUM 112 MCG: 112 TABLET ORAL at 06:17

## 2023-02-03 RX ADMIN — LISINOPRIL 5 MG: 5 TABLET ORAL at 08:58

## 2023-02-03 RX ADMIN — PANTOPRAZOLE SODIUM 40 MG: 40 TABLET, DELAYED RELEASE ORAL at 06:17

## 2023-02-03 RX ADMIN — TIZANIDINE 4 MG: 4 TABLET ORAL at 12:05

## 2023-02-03 RX ADMIN — DULOXETINE HYDROCHLORIDE 40 MG: 20 CAPSULE, DELAYED RELEASE ORAL at 08:58

## 2023-02-03 RX ADMIN — ALPRAZOLAM 0.5 MG: 0.5 TABLET ORAL at 08:58

## 2023-02-03 RX ADMIN — HYDROCODONE BITARTRATE AND ACETAMINOPHEN 1 TABLET: 5; 325 TABLET ORAL at 09:24

## 2023-02-03 ASSESSMENT — PAIN - FUNCTIONAL ASSESSMENT: PAIN_FUNCTIONAL_ASSESSMENT: ACTIVITIES ARE NOT PREVENTED

## 2023-02-03 ASSESSMENT — PAIN DESCRIPTION - ORIENTATION: ORIENTATION: RIGHT;LOWER

## 2023-02-03 ASSESSMENT — PAIN SCALES - GENERAL
PAINLEVEL_OUTOF10: 9
PAINLEVEL_OUTOF10: 9

## 2023-02-03 ASSESSMENT — PAIN DESCRIPTION - LOCATION: LOCATION: BACK

## 2023-02-03 NOTE — DISCHARGE INSTRUCTIONS
Keep all follow up appointments, take medications as ordered, utilize positive supports, abstain from use of alcohol and drugs. If symptoms return or you feel at risk to yourself or others, please call 911, return the nearest emergency room, or call your local crisis hotline:  [facility]: [phone]: [phone]) [address]: [phone]     Due to the [address] [facility] is not currently available. For assistance with quitting smoking please go to https://smokefree.gov. A prescription for an FDA-approved tobacco cessation medication was offered at discharge and the patient refused. Someone from [address] [place] [address] will be calling you tomorrow to follow up on your care. If you don't hear from us, give us a call! [phone].      Pt was offered a flu vaccine and declined
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
## 2023-02-03 NOTE — GROUP NOTE
Group Therapy Note    Date: 2/3/2023    Group Start Time: 1000  Group End Time: 6225  Group Topic: Psychoeducation    [facility] 3W CHING Fletcher        Group Therapy Note    Attendees: 15/22       Patient's Goal:  \"To go home\"    Notes:  Patient attended the 1000 skills group. Patient was calm, sociable, she worked well on her task and felt much pride with her finish project. Status After Intervention:  Improved    Participation Level:  Active Listener    Participation Quality: Appropriate      Speech:  normal      Thought Process/Content: Linear      Affective Functioning: Congruent      Mood:  calm      Level of consciousness:  Alert      Response to Learning: Able to retain information      Endings: None Reported    Modes of Intervention: Education, Socialization, and Activity      Discipline Responsible: Psychoeducational Specialist      Signature:  Adeline Fletcher

## 2023-02-03 NOTE — DISCHARGE INSTR - DIET

## 2023-02-03 NOTE — PROGRESS NOTES
Gave xanax 0.5 and Neurontin 300 mg  as ordered, pt is resting in bed now, asked for her pain pill  norco.

## 2023-02-03 NOTE — PROGRESS NOTES
Pt. declined to attend the 0900 community meeting, despite staff encouragement.  Goal - \"To go home\" Electronically signed by ESTEVAN Hernandez on 2/3/2023 at 9:37 AM

## 2023-02-03 NOTE — PLAN OF CARE
Pt isolative to room at start of shift, resting with eyes closed. Pt out to common areas for snacks/ needs. Pt incongruent with reports and complaints. Pt continues to complain of pain despite medications given and despite periods of rest and appearance of comfort noted. Pt also continues to endorse elevated anxiety and depression during one on one interactions but is social and bright when out/ social with peers. Pt with many needs and requests and often has attention seeking behaviors. Pt denies SI, HI, or AVH. Hopeful for discharge tomorrow. Problem: Self Harm/Suicidality  Goal: Will have no self-injury during hospital stay  Description: INTERVENTIONS:  1. Ensure constant observer at bedside with Q15M safety checks  2. Maintain a safe environment  3. Secure patient belongings  4. Ensure family/visitors adhere to safety recommendations  5. Ensure safety tray has been added to patient's diet order  6.   Every shift and PRN: Re-assess suicidal risk via Frequent Screener    2/2/2023 2319 by Melanie Zhang RN  Outcome: Progressing  2/2/2023 1239 by Deven Acosta RN  Outcome: Progressing     Problem: Chronic Conditions and Co-morbidities  Goal: Patient's chronic conditions and co-morbidity symptoms are monitored and maintained or improved  2/2/2023 2319 by Melanie Zhang RN  Outcome: Progressing  2/2/2023 1239 by Deven Acosta RN  Outcome: Progressing     Problem: ABCDS Injury Assessment  Goal: Absence of physical injury  2/2/2023 2319 by Melanie Zhang RN  Outcome: Progressing  2/2/2023 1239 by Deven Acosta RN  Outcome: Progressing     Problem: Safety - Adult  Goal: Free from fall injury  2/2/2023 2319 by Melanie Zhang RN  Outcome: Progressing  2/2/2023 1239 by Deven Acosta RN  Outcome: Progressing     Problem: Risk for Elopement  Goal: Patient will not exit the unit/facility without proper excort  2/2/2023 2319 by Melanie Zhang RN  Outcome: Progressing  2/2/2023 1239 by Nesha Jimenez RN  Outcome: Progressing  Flowsheets (Taken 2/2/2023 1233)  Nursing Interventions for Elopement Risk: Reduce environmental triggers     Problem: Coping  Goal: Pt/Family able to verbalize concerns and demonstrate effective coping strategies  Description: INTERVENTIONS:  1. Assist patient/family to identify coping skills, available support systems and cultural and spiritual values  2. Provide emotional support, including active listening and acknowledgement of concerns of patient and caregivers  3. Reduce environmental stimuli, as able  4. Instruct patient/family in relaxation techniques, as appropriate  5. Assess for spiritual pain/suffering and initiate Spiritual Care, Psychosocial Clinical Specialist consults as needed  2/2/2023 2319 by Marc Mujica RN  Outcome: Progressing  2/2/2023 1239 by Nesha Jimenez RN  Outcome: Progressing  Flowsheets (Taken 2/2/2023 1233)  Patient/family able to verbalize anxieties, fears, and concerns, and demonstrate effective coping:   Assist patient/family to identify coping skills, available support systems and cultural and spiritual values   Provide emotional support, including active listening and acknowledgement of concerns of patient and caregivers     Problem: Depression/Self Harm  Goal: Effect of psychiatric condition will be minimized and patient will be protected from self harm  Description: INTERVENTIONS:  1. Assess impact of patient's symptoms on level of functioning, self care needs and offer support as indicated  2. Assess patient/family knowledge of depression, impact on illness and need for teaching  3. Provide emotional support, presence and reassurance  4. Assess for possible suicidal thoughts or ideation. If patient expresses suicidal thoughts or statements do not leave alone, initiate Suicide Precautions, move to a room close to the nursing station and obtain sitter  5.  Initiate consults as appropriate with Mental Health Professional, Spiritual Care, Psychosocial CNS, and consider a recommendation to the [facility] for a Psychiatric Consultation  2/2/2023 2319 by Lora Gregg, RN  Outcome: Progressing  Flowsheets (Taken 2/2/2023 2301)  Effect of psychiatric condition will be minimized and patient will be protected from self harm:   Provide emotional support, presence and reassurance   Assess impact of patients symptoms on level of functioning, self care needs and offer support as indicated   Assess patient/family knowledge of depression, impact on illness and need for teaching  2/2/2023 1239 by Ferdinand Doshi RN  Outcome: Progressing  Flowsheets (Taken 2/2/2023 1233)  Effect of psychiatric condition will be minimized and patient will be protected from self harm:   Assess impact of patients symptoms on level of functioning, self care needs and offer support as indicated   Assess patient/family knowledge of depression, impact on illness and need for teaching

## 2023-02-27 ENCOUNTER — APPOINTMENT (OUTPATIENT)
Dept: GENERAL RADIOLOGY | Age: 58
End: 2023-02-27
Payer: COMMERCIAL

## 2023-02-27 ENCOUNTER — HOSPITAL ENCOUNTER (EMERGENCY)
Age: 58
Discharge: HOME | End: 2023-02-28
Payer: COMMERCIAL

## 2023-02-27 DIAGNOSIS — J02.9 ACUTE PHARYNGITIS, UNSPECIFIED ETIOLOGY: Primary | ICD-10-CM

## 2023-02-27 DIAGNOSIS — R07.9 CHEST PAIN, UNSPECIFIED TYPE: ICD-10-CM

## 2023-02-27 LAB
BASOPHILS ABSOLUTE: 0.1 K/UL (ref 0–0.2)
BASOPHILS RELATIVE PERCENT: 0.8 %
EOSINOPHILS ABSOLUTE: 0.1 K/UL (ref 0–0.7)
EOSINOPHILS RELATIVE PERCENT: 0.9 %
HCT VFR BLD CALC: 40.5 % (ref 37–47)
HEMOGLOBIN: 13.6 G/DL (ref 12–16)
LYMPHOCYTES ABSOLUTE: 2.8 K/UL (ref 1–4.8)
LYMPHOCYTES RELATIVE PERCENT: 34.4 %
MCH RBC QN AUTO: 28.7 PG (ref 27–31.3)
MCHC RBC AUTO-ENTMCNC: 33.6 % (ref 33–37)
MCV RBC AUTO: 85.2 FL (ref 79.4–94.8)
MONOCYTES ABSOLUTE: 0.6 K/UL (ref 0.2–0.8)
MONOCYTES RELATIVE PERCENT: 7 %
NEUTROPHILS ABSOLUTE: 4.7 K/UL (ref 1.4–6.5)
NEUTROPHILS RELATIVE PERCENT: 56.9 %
PDW BLD-RTO: 13.2 % (ref 11.5–14.5)
PLATELET # BLD: 284 K/UL (ref 130–400)
RBC # BLD: 4.76 M/UL (ref 4.2–5.4)
WBC # BLD: 8.2 K/UL (ref 4.8–10.8)

## 2023-02-27 PROCEDURE — 71045 X-RAY EXAM CHEST 1 VIEW: CPT

## 2023-02-27 PROCEDURE — 93005 ELECTROCARDIOGRAM TRACING: CPT | Performed by: STUDENT IN AN ORGANIZED HEALTH CARE EDUCATION/TRAINING PROGRAM

## 2023-02-27 PROCEDURE — 83735 ASSAY OF MAGNESIUM: CPT

## 2023-02-27 PROCEDURE — 80053 COMPREHEN METABOLIC PANEL: CPT

## 2023-02-27 PROCEDURE — 84484 ASSAY OF TROPONIN QUANT: CPT

## 2023-02-27 PROCEDURE — 96375 TX/PRO/DX INJ NEW DRUG ADDON: CPT

## 2023-02-27 PROCEDURE — 96374 THER/PROPH/DIAG INJ IV PUSH: CPT

## 2023-02-27 PROCEDURE — 83880 ASSAY OF NATRIURETIC PEPTIDE: CPT

## 2023-02-27 PROCEDURE — 84145 PROCALCITONIN (PCT): CPT

## 2023-02-27 PROCEDURE — 6360000002 HC RX W HCPCS: Performed by: STUDENT IN AN ORGANIZED HEALTH CARE EDUCATION/TRAINING PROGRAM

## 2023-02-27 PROCEDURE — 36415 COLL VENOUS BLD VENIPUNCTURE: CPT

## 2023-02-27 PROCEDURE — 85379 FIBRIN DEGRADATION QUANT: CPT

## 2023-02-27 PROCEDURE — 85025 COMPLETE CBC W/AUTO DIFF WBC: CPT

## 2023-02-27 PROCEDURE — 99285 EMERGENCY DEPT VISIT HI MDM: CPT

## 2023-02-27 RX ORDER — DEXAMETHASONE SODIUM PHOSPHATE 10 MG/ML
10 INJECTION INTRAMUSCULAR; INTRAVENOUS ONCE
Status: COMPLETED | OUTPATIENT
Start: 2023-02-27 | End: 2023-02-27

## 2023-02-27 RX ORDER — FUROSEMIDE 10 MG/ML
40 INJECTION INTRAMUSCULAR; INTRAVENOUS ONCE
Status: COMPLETED | OUTPATIENT
Start: 2023-02-27 | End: 2023-02-27

## 2023-02-27 RX ADMIN — FUROSEMIDE 40 MG: 10 INJECTION, SOLUTION INTRAMUSCULAR; INTRAVENOUS at 23:55

## 2023-02-27 RX ADMIN — DEXAMETHASONE SODIUM PHOSPHATE 10 MG: 10 INJECTION INTRAMUSCULAR; INTRAVENOUS at 23:52

## 2023-02-27 ASSESSMENT — PAIN SCALES - GENERAL: PAINLEVEL_OUTOF10: 8

## 2023-02-27 ASSESSMENT — PAIN - FUNCTIONAL ASSESSMENT
PAIN_FUNCTIONAL_ASSESSMENT: 0-10
PAIN_FUNCTIONAL_ASSESSMENT: NONE - DENIES PAIN

## 2023-02-27 ASSESSMENT — PAIN DESCRIPTION - PAIN TYPE: TYPE: ACUTE PAIN

## 2023-02-27 ASSESSMENT — PAIN DESCRIPTION - LOCATION: LOCATION: THROAT;CHEST

## 2023-02-28 ENCOUNTER — APPOINTMENT (OUTPATIENT)
Dept: CT IMAGING | Age: 58
End: 2023-02-28
Payer: COMMERCIAL

## 2023-02-28 VITALS
DIASTOLIC BLOOD PRESSURE: 118 MMHG | RESPIRATION RATE: 18 BRPM | TEMPERATURE: 98.9 F | OXYGEN SATURATION: 100 % | SYSTOLIC BLOOD PRESSURE: 167 MMHG | HEART RATE: 79 BPM

## 2023-02-28 LAB
ALBUMIN SERPL-MCNC: 4.6 G/DL (ref 3.5–4.6)
ALP BLD-CCNC: 96 U/L (ref 40–130)
ALT SERPL-CCNC: 36 U/L (ref 0–33)
ANION GAP SERPL CALCULATED.3IONS-SCNC: 14 MEQ/L (ref 9–15)
AST SERPL-CCNC: 29 U/L (ref 0–35)
BILIRUB SERPL-MCNC: 0.3 MG/DL (ref 0.2–0.7)
BILIRUBIN URINE: NEGATIVE
BLOOD, URINE: NEGATIVE
BUN BLDV-MCNC: 18 MG/DL (ref 6–20)
CALCIUM SERPL-MCNC: 9.9 MG/DL (ref 8.5–9.9)
CHLORIDE BLD-SCNC: 102 MEQ/L (ref 95–107)
CLARITY: CLEAR
CO2: 26 MEQ/L (ref 20–31)
COLOR: YELLOW
CREAT SERPL-MCNC: 0.96 MG/DL (ref 0.5–0.9)
D DIMER: 0.61 MG/L FEU (ref 0–0.5)
GFR SERPL CREATININE-BSD FRML MDRD: >60 ML/MIN/{1.73_M2}
GLOBULIN: 2.9 G/DL (ref 2.3–3.5)
GLUCOSE BLD-MCNC: 128 MG/DL (ref 70–99)
GLUCOSE URINE: NEGATIVE MG/DL
INFLUENZA A BY PCR: NEGATIVE
INFLUENZA B BY PCR: NEGATIVE
KETONES, URINE: NEGATIVE MG/DL
LEUKOCYTE ESTERASE, URINE: NEGATIVE
MAGNESIUM: 1.9 MG/DL (ref 1.7–2.4)
NITRITE, URINE: NEGATIVE
PH UA: 6 (ref 5–9)
POTASSIUM SERPL-SCNC: 3.1 MEQ/L (ref 3.4–4.9)
PRO-BNP: 217 PG/ML
PROCALCITONIN: 0.03 NG/ML (ref 0–0.15)
PROTEIN UA: NEGATIVE MG/DL
SARS-COV-2, NAAT: NOT DETECTED
SODIUM BLD-SCNC: 142 MEQ/L (ref 135–144)
SPECIFIC GRAVITY UA: 1.01 (ref 1–1.03)
STREP GRP A PCR: NEGATIVE
TOTAL PROTEIN: 7.5 G/DL (ref 6.3–8)
TROPONIN: <0.01 NG/ML (ref 0–0.01)
URINE REFLEX TO CULTURE: NORMAL
UROBILINOGEN, URINE: 0.2 E.U./DL

## 2023-02-28 PROCEDURE — 87635 SARS-COV-2 COVID-19 AMP PRB: CPT

## 2023-02-28 PROCEDURE — 87502 INFLUENZA DNA AMP PROBE: CPT

## 2023-02-28 PROCEDURE — 6360000002 HC RX W HCPCS: Performed by: STUDENT IN AN ORGANIZED HEALTH CARE EDUCATION/TRAINING PROGRAM

## 2023-02-28 PROCEDURE — 71275 CT ANGIOGRAPHY CHEST: CPT

## 2023-02-28 PROCEDURE — 87651 STREP A DNA AMP PROBE: CPT

## 2023-02-28 PROCEDURE — 81003 URINALYSIS AUTO W/O SCOPE: CPT

## 2023-02-28 PROCEDURE — 6360000004 HC RX CONTRAST MEDICATION: Performed by: STUDENT IN AN ORGANIZED HEALTH CARE EDUCATION/TRAINING PROGRAM

## 2023-02-28 PROCEDURE — 6370000000 HC RX 637 (ALT 250 FOR IP): Performed by: STUDENT IN AN ORGANIZED HEALTH CARE EDUCATION/TRAINING PROGRAM

## 2023-02-28 PROCEDURE — 96375 TX/PRO/DX INJ NEW DRUG ADDON: CPT

## 2023-02-28 RX ORDER — MORPHINE SULFATE 4 MG/ML
4 INJECTION, SOLUTION INTRAMUSCULAR; INTRAVENOUS ONCE
Status: COMPLETED | OUTPATIENT
Start: 2023-02-28 | End: 2023-02-28

## 2023-02-28 RX ORDER — DEXAMETHASONE 6 MG/1
6 TABLET ORAL
Qty: 10 TABLET | Refills: 0 | Status: SHIPPED | OUTPATIENT
Start: 2023-02-28 | End: 2023-03-10

## 2023-02-28 RX ORDER — POTASSIUM CHLORIDE 20 MEQ/1
40 TABLET, EXTENDED RELEASE ORAL ONCE
Status: COMPLETED | OUTPATIENT
Start: 2023-02-28 | End: 2023-02-28

## 2023-02-28 RX ORDER — ONDANSETRON 2 MG/ML
4 INJECTION INTRAMUSCULAR; INTRAVENOUS ONCE
Status: COMPLETED | OUTPATIENT
Start: 2023-02-28 | End: 2023-02-28

## 2023-02-28 RX ADMIN — IOPAMIDOL 75 ML: 612 INJECTION, SOLUTION INTRAVENOUS at 01:50

## 2023-02-28 RX ADMIN — ONDANSETRON 4 MG: 2 INJECTION INTRAMUSCULAR; INTRAVENOUS at 02:03

## 2023-02-28 RX ADMIN — MORPHINE SULFATE 4 MG: 4 INJECTION, SOLUTION INTRAMUSCULAR; INTRAVENOUS at 02:03

## 2023-02-28 RX ADMIN — POTASSIUM CHLORIDE 40 MEQ: 1500 TABLET, EXTENDED RELEASE ORAL at 01:51

## 2023-02-28 ASSESSMENT — ENCOUNTER SYMPTOMS
TROUBLE SWALLOWING: 1
EYE PAIN: 0
SHORTNESS OF BREATH: 0
BACK PAIN: 1
CHEST TIGHTNESS: 0
DIARRHEA: 0
SORE THROAT: 1
NAUSEA: 0

## 2023-02-28 ASSESSMENT — PAIN - FUNCTIONAL ASSESSMENT: PAIN_FUNCTIONAL_ASSESSMENT: NONE - DENIES PAIN

## 2023-02-28 NOTE — ED NOTES
Attempted to call pt back to triage. Pt not in waiting room at this time.       Christy Chew  02/27/23 8542

## 2023-02-28 NOTE — ED PROVIDER NOTES
[address] [facility] ED  eMERGENCYdEPARTMENT eNCOUnter      Pt Name: German Wills  MRN: [id number]  [place] 1965  Date of evaluation: 2/27/2023  Provider:Smith Evans PA-C    CHIEF COMPLAINT           HPI  German Wills is a 65 y.o. female per chart review has a h/o depression, PE, fibromyalgia, hypothyroidism presents with chest pain. Patient reports gradual onset, moderate, sharp stabbing sternal chest pain that radiates to her back that is been ongoing for the past couple days. She also states for the last couple days she has been having throat pain. She states she went to an urgent care and was put on a Z-Frank and is currently taking them but they do not seem to be helping. She states she looked in the mirror and thought her uvula was touching her tongue and seems to be irritating her. Patient also states that sometimes she wakes up in the middle the night and throws up from anxiety. Patient also complaining of worsening bilateral lower extremity pitting edema. She denies shortness of breath, fever, chills, trouble swallowing, voice change. ROS  Review of Systems   Constitutional:  Negative for chills, fatigue and fever. HENT:  Positive for sore throat and trouble swallowing. Negative for ear pain and hearing loss. Eyes:  Negative for pain and visual disturbance. Respiratory:  Negative for chest tightness and shortness of breath. Cardiovascular:  Positive for chest pain and leg swelling. Gastrointestinal:  Negative for diarrhea and nausea. Endocrine: Negative for cold intolerance. Genitourinary:  Negative for hematuria. Musculoskeletal:  Positive for back pain. Skin:  Negative for rash and wound. Neurological:  Negative for dizziness and headaches. Psychiatric/Behavioral:  Negative for behavioral problems and confusion. Except as noted above the remainder of the review of systems was reviewed and negative.        PAST MEDICAL HISTORY     Past Medical History: Diagnosis Date    Anemia     C. difficile colitis 01/2013    Chronic fatigue syndrome     Depression     [place]    Diabetes mellitus ([facility] [address]     Diverticulitis large intestine 2001    Fibromyalgia     Fibromyalgia 03/24/2015    GERD (gastroesophageal reflux disease)     HTN (hypertension)     Hyperlipidemia     Hypothyroidism     Palpitations     Pulmonary embolus ([facility] [address] 10/2012    Following GYN procedure    Vitamin D deficiency          SURGICAL HISTORY       Past Surgical History:   Procedure Laterality Date    ANUS SURGERY  12/14/2011    anal fissure    COLON SURGERY      BIPOSY    COLONOSCOPY  8/2011    DILATION AND CURETTAGE OF UTERUS      MISCARRIAGE X2    HEMORRHOID SURGERY   8/24/10    EXTERIOR    LAPAROSCOPY      SIGMOIDOSCOPY  12/14/11    RESULTING IN FISSURECTOMY         CURRENTMEDICATIONS       Discharge Medication List as of 2/28/2023  2:31 AM        CONTINUE these medications which have NOT CHANGED    Details   DULoxetine 40 MG CPEP Take 40 mg by mouth daily, Disp-15 capsule, R-3Normal      traZODone (DESYREL) 50 MG tablet Take 1 tablet by mouth nightly as needed for Sleep, Disp-15 tablet, R-2Normal      lisinopril (PRINIVIL;ZESTRIL) 5 MG tablet Take 1 tablet by mouth daily, Disp-30 tablet, R-1Normal      gabapentin (NEURONTIN) 300 MG capsule Take 300 mg by mouth 2 times daily. Historical Med      albuterol sulfate HFA (PROVENTIL HFA) 108 (90 Base) MCG/ACT inhaler Inhale 2 puffs into the lungs every 6 hours as needed for Wheezing or Shortness of BreathHistorical Med      acetaminophen (TYLENOL) 500 MG tablet Take 1 tablet by mouth 4 times daily as needed for Pain, Disp-20 tablet, R-0Print      simvastatin (ZOCOR) 20 MG tablet Take 20 mg by mouth nightlyHistorical Med      levothyroxine (SYNTHROID) 112 MCG tablet Take 112 mcg by mouth DailyHistorical Med      Biotin 1 MG CAPS Take by mouthHistorical Med      hydroCHLOROthiazide (HYDRODIURIL) 25 MG tablet Take 1 tablet by mouth daily, Disp-30 tablet, R-1Normal      tiZANidine (ZANAFLEX) 4 MG tablet TAKE 1 TABLET BY MOUTH TWICE A DAYHistorical Med      Cholecalciferol (VITAMIN D3) 2000 units CAPS Take 2,000 Units by mouth daily Historical Med      ALPRAZolam (XANAX) 0.5 MG tablet Take 0.5 mg by mouth 4 times dailyHistorical Med      omeprazole (PRILOSEC OTC) 20 MG tablet Take 1 tablet by mouth daily, Disp-30 tablet, R-5             ALLERGIES     Ciprofloxacin, Erythromycin, Ketorolac tromethamine, Mobic [meloxicam], Other, Pcn [penicillins], Pseudoephedrine hcl, Sudafed [pseudoephedrine hcl], Sympathomimetics, and Thorazine [chlorpromazine]    FAMILY HISTORY       Family History   Problem Relation Age of Onset    COPD Mother     Lung Cancer Father     COPD Maternal Grandmother     Cancer Maternal Grandfather         COLON    Colon Cancer Paternal Grandfather     Cancer Paternal Aunt         LUNG    Cancer Other         BLOOD CANCER- UNSPECIFIED    Coronary Art Dis Maternal Aunt     Other Maternal Aunt         Brain aneurysm          SOCIAL HISTORY       Social History     Socioeconomic History    Marital status:      Spouse name: None    Number of children: None    Years of education: None    Highest education level: None   Tobacco Use    Smoking status: Never    Smokeless tobacco: Never   Vaping Use    Vaping Use: Never used   Substance and Sexual Activity    Alcohol use: Not Currently     Comment: OCC. Drug use: No         PHYSICAL EXAM       ED Triage Vitals   BP Temp Temp Source Heart Rate Resp SpO2 Height Weight   02/27/23 2305 02/27/23 2305 02/27/23 2305 02/27/23 2305 02/27/23 2310 02/27/23 2310 -- --   (!) 189/101 98.9 °F (37.2 °C) Oral 71 20 96 %         Physical Exam  Constitutional:       Appearance: Normal appearance. HENT:      Head: Normocephalic and atraumatic. Jaw: No trismus or tenderness.       Right Ear: External ear normal.      Left Ear: External ear normal.      Nose: Nose normal.      Mouth/Throat:      Mouth: Mucous membranes are moist.      Pharynx: Uvula midline. No pharyngeal swelling, oropharyngeal exudate or uvula swelling. Comments: No cervical lymphadenopathy, trouble swallowing, signs of Ludewig's angina, uvula deviation or abscess noted on exam.  Eyes:      Extraocular Movements: Extraocular movements intact. Conjunctiva/sclera: Conjunctivae normal.   Cardiovascular:      Rate and Rhythm: Normal rate and regular rhythm. Heart sounds: Normal heart sounds. Pulmonary:      Effort: Pulmonary effort is normal.      Breath sounds: Normal breath sounds. No stridor. No wheezing or rhonchi. Abdominal:      Palpations: Abdomen is soft. Tenderness: There is no abdominal tenderness. Musculoskeletal:         General: Normal range of motion. Cervical back: Normal range of motion and neck supple. No tenderness. Skin:     General: Skin is warm and dry. Neurological:      General: No focal deficit present. Mental Status: She is alert and oriented to person, place, and time. Psychiatric:         Mood and Affect: Mood normal.         Behavior: Behavior normal.         MDM  This is a 52-year-old female presenting with chest pain. Patient is afebrile and hemodynamic stable. Patient given IV Decadron, IV Lasix. EKG shows NSR with HR 72, normal axis, normal intervals, no ST changes. Labs notable for elevated D-dimer. CT chest negative for PE. Strep COVID and flu negative. Suspect viral illness versus uvulitis as cause of throat pain. Chest pain was reproducible on exam, low concern for cardiac pain at this time. Patient reevaluated and states she is pain-free. She feels much better and she is ready to go. I am agreeable to discharge with primary care follow-up. Patient agreeable with this plan. She will return with any change or worsening symptoms. FINAL IMPRESSION      1. Acute pharyngitis, unspecified etiology    2.  Chest pain, unspecified type          DISPOSITION/PLAN DISPOSITION Decision To Discharge 02/28/2023 02:21:05 AM        DISCHARGE MEDICATIONS:  [unfilled]         Ruthann Peterson PA-C(electronically signed)  Attending Emergency Physician           Ruthann Peterson PA-C  02/28/23 [id number]

## 2023-02-28 NOTE — ED TRIAGE NOTES
Pt to ED due to sore throat x3 days, chest pain that started today. Pt denies SI but states she is under a lot of stress and has had thoughts about dying. Pt is alert and oriented x4. Skin is warm, dry, intact.  Resp are regular and equal.

## 2023-03-01 LAB
EKG ATRIAL RATE: 72 BPM
EKG P AXIS: 43 DEGREES
EKG P-R INTERVAL: 178 MS
EKG Q-T INTERVAL: 406 MS
EKG QRS DURATION: 84 MS
EKG QTC CALCULATION (BAZETT): 444 MS
EKG R AXIS: -17 DEGREES
EKG T AXIS: 41 DEGREES
EKG VENTRICULAR RATE: 72 BPM

## 2023-03-06 ENCOUNTER — TELEPHONE (OUTPATIENT)
Dept: PRIMARY CARE | Facility: CLINIC | Age: 58
End: 2023-03-06

## 2023-03-06 NOTE — TELEPHONE ENCOUNTER
Rx Refill Request Telephone Encounter      Name:  Karina Pagan  :  [id number]  Medication Name:  Tylenol # 3  Dose : 300-30 mg  Frequency : Take 1 tablet 4 times daily  Specific Pharmacy location:  [facility]

## 2023-03-07 DIAGNOSIS — M54.16 RADICULOPATHY, LUMBAR REGION: ICD-10-CM

## 2023-03-08 DIAGNOSIS — J30.9 ALLERGIC RHINITIS, UNSPECIFIED SEASONALITY, UNSPECIFIED TRIGGER: Primary | ICD-10-CM

## 2023-03-08 DIAGNOSIS — M51.36 LUMBAR DEGENERATIVE DISC DISEASE: Primary | ICD-10-CM

## 2023-03-08 RX ORDER — FLUTICASONE PROPIONATE 50 MCG
SPRAY, SUSPENSION (ML) NASAL
Qty: 16 ML | Refills: 1 | Status: SHIPPED | OUTPATIENT
Start: 2023-03-08 | End: 2023-05-21

## 2023-03-09 DIAGNOSIS — M54.50 LOW BACK PAIN, UNSPECIFIED: Primary | ICD-10-CM

## 2023-03-09 RX ORDER — GABAPENTIN 300 MG/1
CAPSULE ORAL
Qty: 60 CAPSULE | Refills: 0 | Status: SHIPPED | OUTPATIENT
Start: 2023-03-09 | End: 2023-04-21

## 2023-03-11 RX ORDER — ACETAMINOPHEN AND CODEINE PHOSPHATE 300; 30 MG/1; MG/1
TABLET ORAL
Qty: 28 TABLET | Refills: 0 | Status: SHIPPED | OUTPATIENT
Start: 2023-03-11 | End: 2023-04-27 | Stop reason: SDUPTHER

## 2023-03-11 RX ORDER — ACETAMINOPHEN AND CODEINE PHOSPHATE 300; 30 MG/1; MG/1
TABLET ORAL
Qty: 28 TABLET | Refills: 0 | Status: SHIPPED | OUTPATIENT
Start: 2023-03-11 | End: 2023-03-26

## 2023-03-23 DIAGNOSIS — M54.16 RADICULOPATHY, LUMBAR REGION: ICD-10-CM

## 2023-03-26 RX ORDER — ACETAMINOPHEN AND CODEINE PHOSPHATE 300; 30 MG/1; MG/1
TABLET ORAL
Qty: 28 TABLET | Refills: 0 | Status: SHIPPED | OUTPATIENT
Start: 2023-03-26 | End: 2023-04-21

## 2023-04-10 ENCOUNTER — APPOINTMENT (OUTPATIENT)
Dept: PRIMARY CARE | Facility: CLINIC | Age: 58
End: 2023-04-10
Payer: COMMERCIAL

## 2023-04-10 PROBLEM — E03.9 HYPOTHYROID: Status: ACTIVE | Noted: 2023-04-10

## 2023-04-10 PROBLEM — K21.9 GERD (GASTROESOPHAGEAL REFLUX DISEASE): Status: ACTIVE | Noted: 2023-04-10

## 2023-04-10 PROBLEM — L50.9 LOCALIZED HIVES: Status: ACTIVE | Noted: 2023-04-10

## 2023-04-10 PROBLEM — M54.16 CHRONIC LUMBAR RADICULOPATHY: Status: ACTIVE | Noted: 2023-04-10

## 2023-04-10 PROBLEM — G51.0 BELL'S PALSY: Status: ACTIVE | Noted: 2023-04-10

## 2023-04-10 PROBLEM — M62.89 MUSCLE TIGHTNESS: Status: ACTIVE | Noted: 2023-04-10

## 2023-04-10 PROBLEM — J30.9 ALLERGIC RHINITIS: Status: ACTIVE | Noted: 2023-04-10

## 2023-04-10 PROBLEM — E78.5 HYPERLIPIDEMIA: Status: ACTIVE | Noted: 2023-04-10

## 2023-04-10 PROBLEM — N95.2 VAGINAL ATROPHY: Status: ACTIVE | Noted: 2023-04-10

## 2023-04-10 PROBLEM — E66.01 MORBID OBESITY (MULTI): Status: ACTIVE | Noted: 2023-04-10

## 2023-04-10 PROBLEM — I10 HTN (HYPERTENSION), BENIGN: Status: ACTIVE | Noted: 2023-04-10

## 2023-04-10 PROBLEM — M54.50 LUMBAR PAIN: Status: ACTIVE | Noted: 2023-04-10

## 2023-04-10 PROBLEM — M54.59 OTHER LOW BACK PAIN: Status: ACTIVE | Noted: 2023-04-10

## 2023-04-10 PROBLEM — R32 URINARY INCONTINENCE: Status: ACTIVE | Noted: 2023-04-10

## 2023-04-10 PROBLEM — R51.9 CHRONIC HEADACHES: Status: ACTIVE | Noted: 2023-04-10

## 2023-04-10 PROBLEM — G89.29 CHRONIC BACK PAIN: Status: ACTIVE | Noted: 2023-04-10

## 2023-04-10 PROBLEM — G89.29 CHRONIC HEADACHES: Status: ACTIVE | Noted: 2023-04-10

## 2023-04-10 PROBLEM — M48.062 LUMBAR STENOSIS WITH NEUROGENIC CLAUDICATION: Status: ACTIVE | Noted: 2023-04-10

## 2023-04-10 PROBLEM — E55.9 VITAMIN D DEFICIENCY: Status: ACTIVE | Noted: 2023-04-10

## 2023-04-10 PROBLEM — F41.9 ANXIETY DISORDER: Status: ACTIVE | Noted: 2023-04-10

## 2023-04-10 PROBLEM — F32.A DEPRESSION: Status: ACTIVE | Noted: 2023-04-10

## 2023-04-10 PROBLEM — U07.1 COVID-19: Status: ACTIVE | Noted: 2023-04-10

## 2023-04-10 PROBLEM — N20.0 NEPHROLITHIASIS: Status: ACTIVE | Noted: 2023-04-10

## 2023-04-10 PROBLEM — R00.2 PALPITATIONS: Status: ACTIVE | Noted: 2023-04-10

## 2023-04-10 PROBLEM — R13.19 ESOPHAGEAL DYSPHAGIA: Status: ACTIVE | Noted: 2023-04-10

## 2023-04-10 PROBLEM — E53.8 VITAMIN B12 DEFICIENCY: Status: ACTIVE | Noted: 2023-04-10

## 2023-04-10 PROBLEM — F33.41 RECURRENT MAJOR DEPRESSIVE DISORDER, IN PARTIAL REMISSION (CMS-HCC): Status: ACTIVE | Noted: 2023-04-10

## 2023-04-10 PROBLEM — M54.16 LUMBAR NEURITIS: Status: ACTIVE | Noted: 2023-04-10

## 2023-04-10 PROBLEM — G11.19: Status: ACTIVE | Noted: 2023-04-10

## 2023-04-10 PROBLEM — M79.7 FIBROMYALGIA: Status: ACTIVE | Noted: 2023-04-10

## 2023-04-10 PROBLEM — R73.9 HYPERGLYCEMIA: Status: ACTIVE | Noted: 2023-04-10

## 2023-04-10 PROBLEM — M62.81 MUSCLE WEAKNESS: Status: ACTIVE | Noted: 2023-04-10

## 2023-04-10 PROBLEM — E87.6 HYPOKALEMIA: Status: ACTIVE | Noted: 2023-04-10

## 2023-04-10 PROBLEM — M54.9 CHRONIC BACK PAIN: Status: ACTIVE | Noted: 2023-04-10

## 2023-04-10 PROBLEM — I20.89 ATYPICAL ANGINA (CMS-HCC): Status: ACTIVE | Noted: 2023-04-10

## 2023-04-10 PROBLEM — G47.00 INSOMNIA: Status: ACTIVE | Noted: 2023-04-10

## 2023-04-10 PROBLEM — M54.12 CERVICAL RADICULOPATHY: Status: ACTIVE | Noted: 2023-04-10

## 2023-04-10 RX ORDER — ACETAMINOPHEN 500 MG
TABLET ORAL
COMMUNITY
Start: 2022-10-04 | End: 2023-05-26 | Stop reason: ALTCHOICE

## 2023-04-10 RX ORDER — LISINOPRIL 30 MG/1
1 TABLET ORAL DAILY
COMMUNITY
Start: 2023-03-03 | End: 2023-04-27 | Stop reason: DRUGHIGH

## 2023-04-10 RX ORDER — POTASSIUM CHLORIDE 1500 MG/1
1 TABLET, EXTENDED RELEASE ORAL DAILY
COMMUNITY
Start: 2023-02-06 | End: 2024-02-20 | Stop reason: WASHOUT

## 2023-04-10 RX ORDER — CONJUGATED ESTROGENS 0.62 MG/G
CREAM VAGINAL
COMMUNITY
Start: 2021-05-19 | End: 2023-12-11

## 2023-04-10 RX ORDER — LIDOCAINE 50 MG/G
1 PATCH TOPICAL EVERY 12 HOURS
COMMUNITY
Start: 2021-01-26 | End: 2023-10-19 | Stop reason: ALTCHOICE

## 2023-04-10 RX ORDER — ALBUTEROL SULFATE 90 UG/1
2 AEROSOL, METERED RESPIRATORY (INHALATION) EVERY 6 HOURS PRN
COMMUNITY
Start: 2020-08-28 | End: 2023-07-21 | Stop reason: SDUPTHER

## 2023-04-10 RX ORDER — OXYBUTYNIN CHLORIDE 10 MG/1
1 TABLET, EXTENDED RELEASE ORAL DAILY
COMMUNITY
Start: 2022-08-11 | End: 2023-06-17

## 2023-04-10 RX ORDER — CETIRIZINE HYDROCHLORIDE 10 MG/1
10 TABLET ORAL DAILY
COMMUNITY
End: 2023-08-14

## 2023-04-10 RX ORDER — METOPROLOL SUCCINATE 50 MG/1
1 TABLET, EXTENDED RELEASE ORAL DAILY
COMMUNITY
Start: 2023-02-06 | End: 2023-04-27 | Stop reason: ALTCHOICE

## 2023-04-10 RX ORDER — OMEPRAZOLE 20 MG/1
1 CAPSULE, DELAYED RELEASE ORAL DAILY
COMMUNITY
Start: 2019-12-05 | End: 2024-03-18 | Stop reason: SDUPTHER

## 2023-04-10 RX ORDER — TRIAMCINOLONE ACETONIDE 1 MG/G
CREAM TOPICAL 2 TIMES DAILY
COMMUNITY
Start: 2023-03-03 | End: 2023-07-10

## 2023-04-10 RX ORDER — HYDROCHLOROTHIAZIDE 25 MG/1
50 TABLET ORAL DAILY
COMMUNITY
End: 2023-08-28 | Stop reason: SDUPTHER

## 2023-04-10 RX ORDER — NITROGLYCERIN 0.4 MG/1
0.4 TABLET SUBLINGUAL EVERY 5 MIN PRN
COMMUNITY
Start: 2022-09-01

## 2023-04-10 RX ORDER — SIMVASTATIN 20 MG/1
1 TABLET, FILM COATED ORAL DAILY
COMMUNITY
Start: 2020-06-02 | End: 2023-04-27 | Stop reason: SDUPTHER

## 2023-04-10 RX ORDER — LEVOTHYROXINE SODIUM 112 UG/1
1 TABLET ORAL DAILY
COMMUNITY
Start: 2020-07-23 | End: 2023-05-21

## 2023-04-20 DIAGNOSIS — M54.16 RADICULOPATHY, LUMBAR REGION: ICD-10-CM

## 2023-04-20 DIAGNOSIS — M54.50 LOW BACK PAIN, UNSPECIFIED: ICD-10-CM

## 2023-04-21 RX ORDER — ACETAMINOPHEN AND CODEINE PHOSPHATE 300; 30 MG/1; MG/1
TABLET ORAL
Qty: 28 TABLET | Refills: 0 | Status: SHIPPED | OUTPATIENT
Start: 2023-04-21 | End: 2023-04-27 | Stop reason: SDUPTHER

## 2023-04-21 RX ORDER — GABAPENTIN 300 MG/1
CAPSULE ORAL
Qty: 60 CAPSULE | Refills: 1 | Status: SHIPPED | OUTPATIENT
Start: 2023-04-21 | End: 2023-04-27 | Stop reason: SDUPTHER

## 2023-04-27 ENCOUNTER — OFFICE VISIT (OUTPATIENT)
Dept: PRIMARY CARE | Facility: CLINIC | Age: 58
End: 2023-04-27
Payer: COMMERCIAL

## 2023-04-27 VITALS
WEIGHT: 213 LBS | SYSTOLIC BLOOD PRESSURE: 122 MMHG | DIASTOLIC BLOOD PRESSURE: 94 MMHG | HEIGHT: 62 IN | HEART RATE: 95 BPM | BODY MASS INDEX: 39.2 KG/M2 | OXYGEN SATURATION: 95 %

## 2023-04-27 DIAGNOSIS — M54.16 RADICULOPATHY, LUMBAR REGION: ICD-10-CM

## 2023-04-27 DIAGNOSIS — B37.2 YEAST DERMATITIS: ICD-10-CM

## 2023-04-27 DIAGNOSIS — R07.9 CHEST PAIN, UNSPECIFIED TYPE: ICD-10-CM

## 2023-04-27 DIAGNOSIS — M54.50 LOW BACK PAIN, UNSPECIFIED: ICD-10-CM

## 2023-04-27 DIAGNOSIS — G89.29 CHRONIC NONINTRACTABLE HEADACHE, UNSPECIFIED HEADACHE TYPE: ICD-10-CM

## 2023-04-27 DIAGNOSIS — M54.42 CHRONIC BILATERAL LOW BACK PAIN WITH BILATERAL SCIATICA: ICD-10-CM

## 2023-04-27 DIAGNOSIS — E78.2 MIXED HYPERLIPIDEMIA: ICD-10-CM

## 2023-04-27 DIAGNOSIS — I10 HTN (HYPERTENSION), BENIGN: Primary | ICD-10-CM

## 2023-04-27 DIAGNOSIS — G89.29 CHRONIC BILATERAL LOW BACK PAIN WITH BILATERAL SCIATICA: ICD-10-CM

## 2023-04-27 DIAGNOSIS — F33.41 RECURRENT MAJOR DEPRESSIVE DISORDER, IN PARTIAL REMISSION (CMS-HCC): ICD-10-CM

## 2023-04-27 DIAGNOSIS — F41.9 ANXIETY DISORDER, UNSPECIFIED: ICD-10-CM

## 2023-04-27 DIAGNOSIS — R51.9 CHRONIC NONINTRACTABLE HEADACHE, UNSPECIFIED HEADACHE TYPE: ICD-10-CM

## 2023-04-27 DIAGNOSIS — H53.8 BLURRY VISION: ICD-10-CM

## 2023-04-27 DIAGNOSIS — E66.01 MORBID OBESITY (MULTI): ICD-10-CM

## 2023-04-27 DIAGNOSIS — M54.41 CHRONIC BILATERAL LOW BACK PAIN WITH BILATERAL SCIATICA: ICD-10-CM

## 2023-04-27 DIAGNOSIS — F41.1 GENERALIZED ANXIETY DISORDER: ICD-10-CM

## 2023-04-27 PROCEDURE — 3074F SYST BP LT 130 MM HG: CPT | Performed by: FAMILY MEDICINE

## 2023-04-27 PROCEDURE — 93000 ELECTROCARDIOGRAM COMPLETE: CPT | Performed by: FAMILY MEDICINE

## 2023-04-27 PROCEDURE — 1036F TOBACCO NON-USER: CPT | Performed by: FAMILY MEDICINE

## 2023-04-27 PROCEDURE — 99214 OFFICE O/P EST MOD 30 MIN: CPT | Performed by: FAMILY MEDICINE

## 2023-04-27 PROCEDURE — 3080F DIAST BP >= 90 MM HG: CPT | Performed by: FAMILY MEDICINE

## 2023-04-27 RX ORDER — TIZANIDINE 4 MG/1
4 TABLET ORAL 3 TIMES DAILY
COMMUNITY
End: 2023-04-27 | Stop reason: SDUPTHER

## 2023-04-27 RX ORDER — LISINOPRIL 40 MG/1
40 TABLET ORAL DAILY
Qty: 30 TABLET | Refills: 2 | Status: CANCELLED | OUTPATIENT
Start: 2023-04-27 | End: 2023-07-26

## 2023-04-27 RX ORDER — AMLODIPINE BESYLATE 10 MG/1
5 TABLET ORAL DAILY
COMMUNITY
End: 2023-11-14 | Stop reason: SDUPTHER

## 2023-04-27 ASSESSMENT — ENCOUNTER SYMPTOMS
CONSTIPATION: 0
POLYDIPSIA: 0
CONSTITUTIONAL NEGATIVE: 1
STRIDOR: 0
DIARRHEA: 0
MYALGIAS: 1
TROUBLE SWALLOWING: 0
BACK PAIN: 1
FATIGUE: 0
DIZZINESS: 0
SINUS PRESSURE: 0
ABDOMINAL PAIN: 0
ARTHRALGIAS: 1
SPEECH DIFFICULTY: 0
HYPERTENSION: 1
SLEEP DISTURBANCE: 0
FLANK PAIN: 0
HEMATURIA: 0
ABDOMINAL DISTENTION: 0
SEIZURES: 0
SORE THROAT: 0
ACTIVITY CHANGE: 0
RHINORRHEA: 0
APPETITE CHANGE: 0
ADENOPATHY: 0
POLYPHAGIA: 0
COUGH: 0
NERVOUS/ANXIOUS: 1
AGITATION: 0
COLOR CHANGE: 0
CONFUSION: 0
FEVER: 0
PHOTOPHOBIA: 0
SINUS PAIN: 0
PALPITATIONS: 0
EYE PAIN: 0
BLOOD IN STOOL: 0
DYSPHORIC MOOD: 1
NECK STIFFNESS: 0
DECREASED CONCENTRATION: 0
SHORTNESS OF BREATH: 0
CHEST TIGHTNESS: 0
RECTAL PAIN: 0
DYSURIA: 0
HEADACHES: 1

## 2023-04-27 NOTE — PROGRESS NOTES
Subjective   Patient ID: Karina Pagan is a 57 y.o. female who presents for Hypertension.    Pt states she has been stressed lately. Would like to discuss medication dose for her anxiety.  She c.o thigh and lower back pain. Would like discuss some head ache     She states she her left eye has been bothering her, she states her vision has been blurry, sees black spots.     Hypertension  This is a recurrent problem. The problem has been resolved since onset. The problem is controlled. Associated symptoms include anxiety, chest pain and headaches. Pertinent negatives include no palpitations or shortness of breath.        Review of Systems   Constitutional: Negative.  Negative for activity change, appetite change, fatigue and fever.   HENT:  Negative for congestion, dental problem, ear discharge, ear pain, mouth sores, rhinorrhea, sinus pressure, sinus pain, sore throat, tinnitus and trouble swallowing.    Eyes:  Positive for visual disturbance. Negative for photophobia and pain.   Respiratory:  Negative for cough, chest tightness, shortness of breath and stridor.    Cardiovascular:  Positive for chest pain. Negative for palpitations.   Gastrointestinal:  Negative for abdominal distention, abdominal pain, blood in stool, constipation, diarrhea and rectal pain.   Endocrine: Negative for cold intolerance, heat intolerance, polydipsia, polyphagia and polyuria.   Genitourinary:  Negative for dysuria, flank pain, hematuria and urgency.   Musculoskeletal:  Positive for arthralgias, back pain and myalgias. Negative for gait problem and neck stiffness.   Skin:  Negative for color change and rash.   Allergic/Immunologic: Negative for environmental allergies and food allergies.   Neurological:  Positive for headaches. Negative for dizziness, seizures, syncope and speech difficulty.   Hematological:  Negative for adenopathy.   Psychiatric/Behavioral:  Positive for dysphoric mood and suicidal ideas. Negative for agitation,  "confusion, decreased concentration and sleep disturbance. The patient is nervous/anxious.        Objective   BP (!) 122/94   Pulse 95   Ht 1.575 m (5' 2\")   Wt 96.6 kg (213 lb)   SpO2 95%   BMI 38.96 kg/m²     Physical Exam  Vitals reviewed.   Constitutional:       General: She is not in acute distress.     Appearance: Normal appearance. She is normal weight. She is not ill-appearing or diaphoretic.   HENT:      Head: Normocephalic.      Right Ear: Tympanic membrane and external ear normal.      Left Ear: Tympanic membrane and external ear normal.      Nose: Nose normal. No congestion.      Mouth/Throat:      Mouth: Mucous membranes are dry.      Pharynx: No posterior oropharyngeal erythema.   Eyes:      General:         Right eye: No discharge.         Left eye: No discharge.      Extraocular Movements: Extraocular movements intact.      Conjunctiva/sclera: Conjunctivae normal.      Pupils: Pupils are equal, round, and reactive to light.   Cardiovascular:      Rate and Rhythm: Normal rate and regular rhythm.      Pulses: Normal pulses.      Heart sounds: Normal heart sounds. No murmur heard.  Pulmonary:      Effort: Pulmonary effort is normal. No respiratory distress.      Breath sounds: Normal breath sounds. No wheezing or rales.   Chest:      Chest wall: No tenderness.   Abdominal:      General: Abdomen is flat. Bowel sounds are normal. There is no distension.      Palpations: There is no mass.      Tenderness: There is no abdominal tenderness. There is no guarding.   Genitourinary:     Rectum: Normal.   Musculoskeletal:         General: No tenderness. Normal range of motion.      Cervical back: Normal range of motion and neck supple. No tenderness.      Right lower leg: No edema.      Left lower leg: No edema.   Skin:     General: Skin is warm and dry.      Coloration: Skin is not jaundiced.      Findings: No bruising or erythema.   Neurological:      General: No focal deficit present.      Mental Status: " She is alert and oriented to person, place, and time. Mental status is at baseline.      Cranial Nerves: No cranial nerve deficit.      Sensory: No sensory deficit.      Coordination: Coordination normal.      Gait: Gait normal.   Psychiatric:         Mood and Affect: Mood normal.         Thought Content: Thought content normal.         Judgment: Judgment normal.         Assessment/Plan   Problem List Items Addressed This Visit          Nervous    Chronic headaches       Circulatory    HTN (hypertension), benign - Primary       Endocrine/Metabolic    Morbid obesity (CMS/HCC)       Other    Anxiety disorder    Chronic back pain    Recurrent major depressive disorder, in partial remission (CMS/HCC)         Scribe Attestation  By signing my name below, IChayo RMA, Scribe   attest that this documentation has been prepared under the direction and in the presence of Sergo Alexander DO.   Provider Attestation - Scribe documentation    All medical record entries made by the Scribe were at my direction and personally dictated by me. I have reviewed the chart and agree that the record accurately reflects my personal performance of the history, physical exam, discussion and plan.

## 2023-04-27 NOTE — PATIENT INSTRUCTIONS
Follow up in 4 WEEKS     Continue current medications and therapy for chronic medical conditions.    Patient was advised importance of proper diet/nutrition in addition adequate hydration. Patient was encouraged moderate exercise program to include 30 minutes daily for 5 days of the week or 150 minutes weekly. Patient will follow-up with us as scheduled.    I personally reviewed the OARRS report for this patient. I have considered the risks of abuse, dependence, addiction, and diversion.    UDS/CSA:    START LISINOPRIL 10MG ONCE DAILY     REFERRED TO OPHTHALMOLOGY

## 2023-04-28 RX ORDER — NYSTATIN 100000 [USP'U]/G
POWDER TOPICAL 2 TIMES DAILY
Qty: 60 G | Refills: 1 | Status: SHIPPED | OUTPATIENT
Start: 2023-04-28 | End: 2023-07-21 | Stop reason: SDUPTHER

## 2023-04-28 RX ORDER — LISINOPRIL 10 MG/1
10 TABLET ORAL DAILY
Qty: 30 TABLET | Refills: 1 | Status: SHIPPED | OUTPATIENT
Start: 2023-04-28 | End: 2023-06-05

## 2023-04-28 RX ORDER — ALPRAZOLAM 0.5 MG/1
0.5 TABLET ORAL 4 TIMES DAILY
Qty: 120 TABLET | Refills: 0 | Status: SHIPPED | OUTPATIENT
Start: 2023-04-28 | End: 2023-05-26 | Stop reason: SDUPTHER

## 2023-04-28 RX ORDER — NYSTATIN 100000 U/G
CREAM TOPICAL 2 TIMES DAILY
Qty: 60 G | Refills: 1 | Status: SHIPPED | OUTPATIENT
Start: 2023-04-28 | End: 2023-07-24

## 2023-04-28 RX ORDER — ACETAMINOPHEN AND CODEINE PHOSPHATE 300; 30 MG/1; MG/1
1 TABLET ORAL EVERY 4 HOURS PRN
Qty: 28 TABLET | Refills: 0 | Status: SHIPPED | OUTPATIENT
Start: 2023-04-28 | End: 2023-05-21

## 2023-04-28 RX ORDER — TIZANIDINE 4 MG/1
4 TABLET ORAL 3 TIMES DAILY
Qty: 90 TABLET | Refills: 0 | Status: SHIPPED | OUTPATIENT
Start: 2023-04-28 | End: 2023-05-22 | Stop reason: ALTCHOICE

## 2023-04-28 RX ORDER — SIMVASTATIN 20 MG/1
20 TABLET, FILM COATED ORAL DAILY
Qty: 30 TABLET | Refills: 5 | Status: SHIPPED | OUTPATIENT
Start: 2023-04-28 | End: 2023-08-28 | Stop reason: SDUPTHER

## 2023-04-28 RX ORDER — GABAPENTIN 300 MG/1
300 CAPSULE ORAL 2 TIMES DAILY
Qty: 60 CAPSULE | Refills: 0 | Status: SHIPPED | OUTPATIENT
Start: 2023-04-28 | End: 2023-06-26 | Stop reason: SDUPTHER

## 2023-05-11 DIAGNOSIS — J30.9 ALLERGIC RHINITIS, UNSPECIFIED SEASONALITY, UNSPECIFIED TRIGGER: ICD-10-CM

## 2023-05-11 DIAGNOSIS — M54.16 RADICULOPATHY, LUMBAR REGION: ICD-10-CM

## 2023-05-11 DIAGNOSIS — E55.9 VITAMIN D DEFICIENCY, UNSPECIFIED: ICD-10-CM

## 2023-05-11 DIAGNOSIS — E03.9 HYPOTHYROIDISM, UNSPECIFIED: ICD-10-CM

## 2023-05-11 RX ORDER — VIT C/E/ZN/COPPR/LUTEIN/ZEAXAN 250MG-90MG
CAPSULE ORAL
Qty: 60 CAPSULE | Refills: 5 | Status: SHIPPED | OUTPATIENT
Start: 2023-05-11 | End: 2023-10-31

## 2023-05-21 RX ORDER — LEVOTHYROXINE SODIUM 112 UG/1
TABLET ORAL
Qty: 30 TABLET | Refills: 5 | Status: SHIPPED | OUTPATIENT
Start: 2023-05-21 | End: 2023-11-14 | Stop reason: SDUPTHER

## 2023-05-21 RX ORDER — FLUTICASONE PROPIONATE 50 MCG
SPRAY, SUSPENSION (ML) NASAL
Qty: 16 ML | Refills: 1 | Status: SHIPPED | OUTPATIENT
Start: 2023-05-21 | End: 2023-07-17

## 2023-05-21 RX ORDER — ACETAMINOPHEN AND CODEINE PHOSPHATE 300; 30 MG/1; MG/1
1 TABLET ORAL EVERY 4 HOURS PRN
Qty: 28 TABLET | Refills: 0 | Status: SHIPPED | OUTPATIENT
Start: 2023-05-21 | End: 2023-06-17

## 2023-05-22 ENCOUNTER — OFFICE VISIT (OUTPATIENT)
Dept: PRIMARY CARE | Facility: CLINIC | Age: 58
End: 2023-05-22
Payer: COMMERCIAL

## 2023-05-22 VITALS
WEIGHT: 212.8 LBS | HEIGHT: 62 IN | DIASTOLIC BLOOD PRESSURE: 79 MMHG | RESPIRATION RATE: 16 BRPM | OXYGEN SATURATION: 93 % | HEART RATE: 68 BPM | BODY MASS INDEX: 39.16 KG/M2 | TEMPERATURE: 97 F | SYSTOLIC BLOOD PRESSURE: 130 MMHG

## 2023-05-22 DIAGNOSIS — B02.9 HERPES ZOSTER WITHOUT COMPLICATION: Primary | ICD-10-CM

## 2023-05-22 DIAGNOSIS — R20.8 PAIN OF SKIN: ICD-10-CM

## 2023-05-22 DIAGNOSIS — R21 BLISTERING RASH: ICD-10-CM

## 2023-05-22 PROCEDURE — 99213 OFFICE O/P EST LOW 20 MIN: CPT | Performed by: NURSE PRACTITIONER

## 2023-05-22 RX ORDER — VALACYCLOVIR HYDROCHLORIDE 1 G/1
1000 TABLET, FILM COATED ORAL 3 TIMES DAILY
Qty: 21 TABLET | Refills: 0 | Status: SHIPPED | OUTPATIENT
Start: 2023-05-22 | End: 2023-05-29

## 2023-05-22 NOTE — PATIENT INSTRUCTIONS
DISCHARGE SUMMARY:   Patient was seen and examined. Diagnosis, treatment, treatment options, and possible complications of today's illness discussed and explained to patient. Patient to take medication/s associated with this visit. Advised and educated on communicability, treatment, and complications of the illness. Advised wound care. Advised to come back if with worsening or persistent symptoms. Advised to come back if there is eye pain, discharge, redness, blurring of vision,, ear pain, discharge, hearing loss,,, headache, dizziness, seizure, paralysis, paresthesia. Patient verbalized understanding of plan of care.    Patient to come back in 7 - 10 days if needed for worsening symptoms.

## 2023-05-22 NOTE — PROGRESS NOTES
"Subjective   Patient ID: Leanna Pagan is a 57 y.o. female who presents for Rash (Patient is in office with a  rash on left breast,  patient states its painful and itchy. Patient states it started 2 days ago. Patient states she did not take otc meds for her symptoms.).    HPI     Review of Systems    Objective   BP (!) 130/100 (BP Location: Left arm, Patient Position: Sitting, BP Cuff Size: Adult)   Pulse 68   Temp 36.1 °C (97 °F)   Resp 16   Ht 1.575 m (5' 2\")   Wt 96.5 kg (212 lb 12.8 oz)   SpO2 93%   BMI 38.92 kg/m²     Physical Exam    Assessment/Plan          "

## 2023-05-26 ENCOUNTER — OFFICE VISIT (OUTPATIENT)
Dept: PRIMARY CARE | Facility: CLINIC | Age: 58
End: 2023-05-26
Payer: COMMERCIAL

## 2023-05-26 VITALS
OXYGEN SATURATION: 96 % | WEIGHT: 215 LBS | HEIGHT: 62 IN | BODY MASS INDEX: 39.56 KG/M2 | SYSTOLIC BLOOD PRESSURE: 124 MMHG | DIASTOLIC BLOOD PRESSURE: 82 MMHG | RESPIRATION RATE: 16 BRPM | HEART RATE: 62 BPM

## 2023-05-26 DIAGNOSIS — G11.19 RAMSAY HUNT CEREBELLAR SYNDROME (MULTI): ICD-10-CM

## 2023-05-26 DIAGNOSIS — M79.7 FIBROMYALGIA: ICD-10-CM

## 2023-05-26 DIAGNOSIS — F41.9 ANXIETY DISORDER, UNSPECIFIED: ICD-10-CM

## 2023-05-26 DIAGNOSIS — G51.0 BELL'S PALSY: ICD-10-CM

## 2023-05-26 DIAGNOSIS — K21.00 GASTROESOPHAGEAL REFLUX DISEASE WITH ESOPHAGITIS WITHOUT HEMORRHAGE: ICD-10-CM

## 2023-05-26 DIAGNOSIS — F11.90 OPIOID USE: ICD-10-CM

## 2023-05-26 DIAGNOSIS — R73.9 HYPERGLYCEMIA: ICD-10-CM

## 2023-05-26 DIAGNOSIS — I10 HTN (HYPERTENSION), BENIGN: Primary | ICD-10-CM

## 2023-05-26 DIAGNOSIS — G47.00 INSOMNIA, UNSPECIFIED TYPE: ICD-10-CM

## 2023-05-26 PROCEDURE — 99214 OFFICE O/P EST MOD 30 MIN: CPT | Performed by: FAMILY MEDICINE

## 2023-05-26 RX ORDER — TRAZODONE HYDROCHLORIDE 50 MG/1
50 TABLET ORAL NIGHTLY
COMMUNITY
End: 2023-05-26 | Stop reason: SDUPTHER

## 2023-05-26 RX ORDER — NALOXONE HYDROCHLORIDE 4 MG/.1ML
4 SPRAY NASAL AS NEEDED
Qty: 2 EACH | Refills: 0 | Status: SHIPPED | OUTPATIENT
Start: 2023-05-26 | End: 2024-05-25

## 2023-05-26 RX ORDER — ALPRAZOLAM 0.5 MG/1
0.5 TABLET ORAL 4 TIMES DAILY
Qty: 120 TABLET | Refills: 0 | Status: SHIPPED | OUTPATIENT
Start: 2023-05-26 | End: 2023-06-24

## 2023-05-26 RX ORDER — OMEPRAZOLE 20 MG/1
1 TABLET, DELAYED RELEASE ORAL DAILY
COMMUNITY
Start: 2015-07-01 | End: 2023-08-28 | Stop reason: ALTCHOICE

## 2023-05-26 RX ORDER — TIZANIDINE 4 MG/1
1 TABLET ORAL EVERY 6 HOURS PRN
COMMUNITY
Start: 2020-12-01 | End: 2023-07-21 | Stop reason: SDUPTHER

## 2023-05-26 RX ORDER — TRAZODONE HYDROCHLORIDE 50 MG/1
50 TABLET ORAL NIGHTLY
Qty: 30 TABLET | Refills: 1 | Status: SHIPPED | OUTPATIENT
Start: 2023-05-26 | End: 2023-08-02

## 2023-05-26 RX ORDER — METOPROLOL SUCCINATE 50 MG/1
50 TABLET, EXTENDED RELEASE ORAL DAILY
COMMUNITY
Start: 2023-05-12 | End: 2023-08-28 | Stop reason: SDUPTHER

## 2023-05-26 RX ORDER — NICOTINE POLACRILEX 2 MG
GUM BUCCAL
COMMUNITY

## 2023-05-26 ASSESSMENT — ENCOUNTER SYMPTOMS
SHORTNESS OF BREATH: 0
TROUBLE SWALLOWING: 0
NERVOUS/ANXIOUS: 0
HEADACHES: 0
APPETITE CHANGE: 0
ACTIVITY CHANGE: 0
STRIDOR: 0
EYE PAIN: 0
BLOOD IN STOOL: 0
FEVER: 0
POLYPHAGIA: 0
SLEEP DISTURBANCE: 0
NECK STIFFNESS: 0
RHINORRHEA: 0
SINUS PAIN: 0
DIARRHEA: 0
DYSURIA: 0
ABDOMINAL PAIN: 0
FLANK PAIN: 0
PALPITATIONS: 0
DYSPHORIC MOOD: 0
CONSTIPATION: 0
AGITATION: 0
ABDOMINAL DISTENTION: 0
CONSTITUTIONAL NEGATIVE: 1
COUGH: 0
HYPERTENSION: 1
PHOTOPHOBIA: 0
SEIZURES: 0
SINUS PRESSURE: 0
RECTAL PAIN: 0
SPEECH DIFFICULTY: 0
FATIGUE: 0
CONFUSION: 0
DIZZINESS: 0
STRESS: 1
POLYDIPSIA: 0
DECREASED CONCENTRATION: 0
ARTHRALGIAS: 0
CHEST TIGHTNESS: 0
HEMATURIA: 0
MYALGIAS: 0
SORE THROAT: 0
COLOR CHANGE: 0
ADENOPATHY: 0

## 2023-05-26 NOTE — PROGRESS NOTES
Subjective   Patient ID: Leanna Pagan is a 57 y.o. female who presents for Hypertension, Herpes Zoster, Stress, and Earache.    Hypertension  Pertinent negatives include no chest pain, headaches, palpitations or shortness of breath.   Herpes Zoster  Pertinent negatives include no abdominal pain, arthralgias, chest pain, congestion, coughing, fatigue, fever, headaches, myalgias, rash or sore throat.   Stress  Pertinent negatives include no abdominal pain, arthralgias, chest pain, congestion, coughing, fatigue, fever, headaches, myalgias, rash or sore throat.        Patient is here for follow up on Shingles from 5/22/23 and was seen at [facility] and given Valtrex.     She went to counseling through [facility] but would not give her counseling and did not have a Psychology or Psychologist available. The group wanted to pink slip and sent police to her house. She never answered the door.   She tried other groups to seek counseling with no answers back or unavailable counseling.     She is following up on Hypertension and taking lisinopril 10 mg. She is swelling bilateral lower extremities and will take the norvasc at time.s    She is having right ear pain and fullness.      Review of Systems   Constitutional: Negative.  Negative for activity change, appetite change, fatigue and fever.   HENT:  Negative for congestion, dental problem, ear discharge, ear pain, mouth sores, rhinorrhea, sinus pressure, sinus pain, sore throat, tinnitus and trouble swallowing.    Eyes:  Negative for photophobia, pain and visual disturbance.   Respiratory:  Negative for cough, chest tightness, shortness of breath and stridor.    Cardiovascular:  Negative for chest pain and palpitations.   Gastrointestinal:  Negative for abdominal distention, abdominal pain, blood in stool, constipation, diarrhea and rectal pain.   Endocrine: Negative for cold intolerance, heat intolerance, polydipsia, polyphagia and polyuria.   Genitourinary:  Negative for  "dysuria, flank pain, hematuria and urgency.   Musculoskeletal:  Negative for arthralgias, gait problem, myalgias and neck stiffness.   Skin:  Negative for color change and rash.   Allergic/Immunologic: Negative for environmental allergies and food allergies.   Neurological:  Negative for dizziness, seizures, syncope, speech difficulty and headaches.   Hematological:  Negative for adenopathy.   Psychiatric/Behavioral:  Negative for agitation, confusion, decreased concentration, dysphoric mood and sleep disturbance. The patient is not nervous/anxious.        Objective   /82 (BP Location: Right arm, Patient Position: Sitting, BP Cuff Size: Adult)   Pulse 62   Resp 16   Ht 1.575 m (5' 2\")   Wt 97.5 kg (215 lb)   SpO2 96%   BMI 39.32 kg/m²     Physical Exam  Vitals reviewed.   Constitutional:       General: She is not in acute distress.     Appearance: Normal appearance. She is obese. She is not ill-appearing or diaphoretic.   HENT:      Head: Normocephalic.      Right Ear: Tympanic membrane and external ear normal.      Left Ear: Tympanic membrane and external ear normal.      Nose: Nose normal. No congestion.      Mouth/Throat:      Mouth: Mucous membranes are dry.      Pharynx: No posterior oropharyngeal erythema.   Eyes:      General:         Right eye: No discharge.         Left eye: No discharge.      Extraocular Movements: Extraocular movements intact.      Conjunctiva/sclera: Conjunctivae normal.      Pupils: Pupils are equal, round, and reactive to light.   Cardiovascular:      Rate and Rhythm: Normal rate and regular rhythm.      Pulses: Normal pulses.      Heart sounds: Normal heart sounds. No murmur heard.  Pulmonary:      Effort: Pulmonary effort is normal. No respiratory distress.      Breath sounds: Normal breath sounds. No wheezing or rales.   Chest:      Chest wall: No tenderness.   Abdominal:      General: Abdomen is flat. Bowel sounds are normal. There is distension.      Palpations: There " is no mass.      Tenderness: There is no abdominal tenderness. There is no guarding.   Genitourinary:     Rectum: Normal.   Musculoskeletal:         General: No tenderness. Normal range of motion.      Cervical back: Normal range of motion and neck supple. No tenderness.      Right lower leg: No edema.      Left lower leg: No edema.   Skin:     General: Skin is warm and dry.      Coloration: Skin is not jaundiced.      Findings: Rash present. No bruising or erythema.   Neurological:      General: No focal deficit present.      Mental Status: She is alert and oriented to person, place, and time. Mental status is at baseline.      Cranial Nerves: No cranial nerve deficit.      Sensory: No sensory deficit.      Coordination: Coordination normal.      Gait: Gait normal.   Psychiatric:         Mood and Affect: Mood normal.         Thought Content: Thought content normal.         Judgment: Judgment normal.         Assessment/Plan

## 2023-06-03 DIAGNOSIS — I10 HTN (HYPERTENSION), BENIGN: ICD-10-CM

## 2023-06-05 RX ORDER — LISINOPRIL 10 MG/1
TABLET ORAL
Qty: 90 TABLET | Refills: 1 | Status: SHIPPED | OUTPATIENT
Start: 2023-06-05 | End: 2023-07-27 | Stop reason: ALTCHOICE

## 2023-06-16 DIAGNOSIS — M54.16 RADICULOPATHY, LUMBAR REGION: ICD-10-CM

## 2023-06-16 DIAGNOSIS — R32 UNSPECIFIED URINARY INCONTINENCE: ICD-10-CM

## 2023-06-16 DIAGNOSIS — E78.5 HYPERLIPIDEMIA, UNSPECIFIED: ICD-10-CM

## 2023-06-17 RX ORDER — LISINOPRIL 20 MG/1
TABLET ORAL
Qty: 30 TABLET | Refills: 2 | Status: SHIPPED | OUTPATIENT
Start: 2023-06-17 | End: 2023-07-17

## 2023-06-17 RX ORDER — ACETAMINOPHEN AND CODEINE PHOSPHATE 300; 30 MG/1; MG/1
1 TABLET ORAL EVERY 4 HOURS PRN
Qty: 28 TABLET | Refills: 0 | Status: SHIPPED | OUTPATIENT
Start: 2023-06-17 | End: 2023-07-03

## 2023-06-17 RX ORDER — OXYBUTYNIN CHLORIDE 10 MG/1
TABLET, EXTENDED RELEASE ORAL
Qty: 30 TABLET | Refills: 1 | Status: SHIPPED | OUTPATIENT
Start: 2023-06-17 | End: 2023-07-17

## 2023-06-22 DIAGNOSIS — F41.9 ANXIETY DISORDER, UNSPECIFIED: ICD-10-CM

## 2023-06-24 RX ORDER — ALPRAZOLAM 0.5 MG/1
0.5 TABLET ORAL 4 TIMES DAILY
Qty: 120 TABLET | Refills: 0 | Status: SHIPPED | OUTPATIENT
Start: 2023-06-24 | End: 2023-07-27 | Stop reason: ALTCHOICE

## 2023-06-26 ENCOUNTER — OFFICE VISIT (OUTPATIENT)
Dept: PRIMARY CARE | Facility: CLINIC | Age: 58
End: 2023-06-26
Payer: COMMERCIAL

## 2023-06-26 VITALS
BODY MASS INDEX: 39.23 KG/M2 | SYSTOLIC BLOOD PRESSURE: 144 MMHG | DIASTOLIC BLOOD PRESSURE: 102 MMHG | HEIGHT: 62 IN | WEIGHT: 213.2 LBS | OXYGEN SATURATION: 97 % | HEART RATE: 67 BPM

## 2023-06-26 DIAGNOSIS — M54.50 LOW BACK PAIN, UNSPECIFIED: ICD-10-CM

## 2023-06-26 DIAGNOSIS — E66.01 MORBID OBESITY (MULTI): ICD-10-CM

## 2023-06-26 DIAGNOSIS — N81.10 BLADDER PROLAPSE, FEMALE, ACQUIRED: ICD-10-CM

## 2023-06-26 DIAGNOSIS — K76.0 FATTY LIVER: ICD-10-CM

## 2023-06-26 DIAGNOSIS — M54.16 CHRONIC LUMBAR RADICULOPATHY: ICD-10-CM

## 2023-06-26 DIAGNOSIS — F41.1 GENERALIZED ANXIETY DISORDER: Primary | ICD-10-CM

## 2023-06-26 PROCEDURE — 99214 OFFICE O/P EST MOD 30 MIN: CPT | Performed by: FAMILY MEDICINE

## 2023-06-26 RX ORDER — GABAPENTIN 300 MG/1
300 CAPSULE ORAL 3 TIMES DAILY
Qty: 90 CAPSULE | Refills: 0 | Status: SHIPPED | OUTPATIENT
Start: 2023-06-26 | End: 2023-07-29

## 2023-06-26 ASSESSMENT — ENCOUNTER SYMPTOMS
DIZZINESS: 0
ACTIVITY CHANGE: 0
SEIZURES: 0
NECK STIFFNESS: 0
FATIGUE: 0
HEMATURIA: 0
COLOR CHANGE: 0
SINUS PRESSURE: 0
CONSTITUTIONAL NEGATIVE: 1
HEADACHES: 0
ADENOPATHY: 0
SORE THROAT: 0
SINUS PAIN: 0
SPEECH DIFFICULTY: 0
DYSURIA: 0
FEVER: 0
TROUBLE SWALLOWING: 0
ARTHRALGIAS: 0
SLEEP DISTURBANCE: 0
CONSTIPATION: 0
COUGH: 0
MYALGIAS: 0
POLYDIPSIA: 0
CHEST TIGHTNESS: 0
AGITATION: 0
STRIDOR: 0
SHORTNESS OF BREATH: 0
POLYPHAGIA: 0
PALPITATIONS: 0
FLANK PAIN: 0
CONFUSION: 0
APPETITE CHANGE: 0
EYE PAIN: 0
ABDOMINAL PAIN: 1
BLOOD IN STOOL: 0
DIARRHEA: 0
ABDOMINAL DISTENTION: 0
RECTAL PAIN: 0
NERVOUS/ANXIOUS: 0
DYSPHORIC MOOD: 0
DECREASED CONCENTRATION: 0
RHINORRHEA: 0
PHOTOPHOBIA: 0

## 2023-06-26 NOTE — PATIENT INSTRUCTIONS
Follow up in 5 weeks    Continue current medications and therapy for chronic medical conditions.    Patient was advised importance of proper diet/nutrition in addition adequate hydration. Patient was encouraged moderate exercise program to include 30 minutes daily for 5 days of the week or 150 minutes weekly. Patient will follow-up with us as scheduled.    I personally reviewed the OARRS report for this patient. I have considered the risks of abuse, dependence, addiction, and diversion.    UDS/CSA: 01/03/2023    REFERRED TO NUTRITIONIST     REFERRED TO GI FOR FIBROSCAN     INCREASE GABAPENTIN TO 300MG 3 TIMES DAILY

## 2023-06-26 NOTE — PROGRESS NOTES
Subjective   Patient ID: Leanna Pagan is a 57 y.o. female who presents for RLQ pain.    HPI   Patient c/o RLQ pain. Patient was seen ER on 6/6/2023 and had blood work done. Patient states she was told she had some abnormal BW results pertaining to her liver. Patient would like to review these results today. Patient described the pain she is experiencing as constant aching and cramping.     Patient would like to discuss cholesterol medication. Patient would like to know that if the cholesterol medication is not working could that be causing her to have a fatty liver.     OBGYN is referring patient to urologist for prolapsed bladder.     Patient requesting referral to see nutritionist.     Patient has zoom meeting today with counselor.     Review of Systems   Constitutional: Negative.  Negative for activity change, appetite change, fatigue and fever.   HENT:  Negative for congestion, dental problem, ear discharge, ear pain, mouth sores, rhinorrhea, sinus pressure, sinus pain, sore throat, tinnitus and trouble swallowing.    Eyes:  Negative for photophobia, pain and visual disturbance.   Respiratory:  Negative for cough, chest tightness, shortness of breath and stridor.    Cardiovascular:  Negative for chest pain and palpitations.   Gastrointestinal:  Positive for abdominal pain. Negative for abdominal distention, blood in stool, constipation, diarrhea and rectal pain.   Endocrine: Negative for cold intolerance, heat intolerance, polydipsia, polyphagia and polyuria.   Genitourinary:  Negative for dysuria, flank pain, hematuria and urgency.   Musculoskeletal:  Negative for arthralgias, gait problem, myalgias and neck stiffness.   Skin:  Negative for color change and rash.   Allergic/Immunologic: Negative for environmental allergies and food allergies.   Neurological:  Negative for dizziness, seizures, syncope, speech difficulty and headaches.   Hematological:  Negative for adenopathy.   Psychiatric/Behavioral:   "Negative for agitation, confusion, decreased concentration, dysphoric mood and sleep disturbance. The patient is not nervous/anxious.        Objective   BP (!) 144/102   Pulse 67   Ht 1.575 m (5' 2\")   Wt 96.7 kg (213 lb 3.2 oz)   SpO2 97%   BMI 38.99 kg/m²     Physical Exam  Vitals reviewed.   Constitutional:       General: She is not in acute distress.     Appearance: Normal appearance. She is normal weight. She is not ill-appearing or diaphoretic.   HENT:      Head: Normocephalic.      Right Ear: Tympanic membrane and external ear normal.      Left Ear: Tympanic membrane and external ear normal.      Nose: Nose normal. No congestion.      Mouth/Throat:      Mouth: Mucous membranes are dry.      Pharynx: No posterior oropharyngeal erythema.   Eyes:      General:         Right eye: No discharge.         Left eye: No discharge.      Extraocular Movements: Extraocular movements intact.      Conjunctiva/sclera: Conjunctivae normal.      Pupils: Pupils are equal, round, and reactive to light.   Cardiovascular:      Rate and Rhythm: Normal rate and regular rhythm.      Pulses: Normal pulses.      Heart sounds: Normal heart sounds. No murmur heard.  Pulmonary:      Effort: Pulmonary effort is normal. No respiratory distress.      Breath sounds: Normal breath sounds. No wheezing or rales.   Chest:      Chest wall: No tenderness.   Abdominal:      General: Abdomen is flat. Bowel sounds are normal. There is no distension.      Palpations: There is no mass.      Tenderness: There is no abdominal tenderness. There is no guarding.   Genitourinary:     Rectum: Normal.   Musculoskeletal:         General: No tenderness. Normal range of motion.      Cervical back: Normal range of motion and neck supple. No tenderness.      Right lower leg: No edema.      Left lower leg: No edema.   Skin:     General: Skin is warm and dry.      Coloration: Skin is not jaundiced.      Findings: No bruising or erythema.   Neurological:      " General: No focal deficit present.      Mental Status: She is alert and oriented to person, place, and time. Mental status is at baseline.      Cranial Nerves: No cranial nerve deficit.      Sensory: No sensory deficit.      Coordination: Coordination normal.      Gait: Gait normal.   Psychiatric:         Mood and Affect: Mood normal.         Thought Content: Thought content normal.         Judgment: Judgment normal.         Assessment/Plan   Problem List Items Addressed This Visit       Anxiety disorder - Primary    Chronic lumbar radiculopathy    Morbid obesity (CMS/HCC)    Relevant Orders    Referral to Nutrition Services    BMI 38.0-38.9,adult    Relevant Orders    Referral to Nutrition Services    Bladder prolapse, female, acquired    Fatty liver    Relevant Orders    Referral to Gastroenterology         Scribe Attestation  By signing my name below, IChayo RMA , Larry   attest that this documentation has been prepared under the direction and in the presence of Sergo Alexander DO.   Provider Attestation - Scribe documentation    All medical record entries made by the Scribe were at my direction and personally dictated by me. I have reviewed the chart and agree that the record accurately reflects my personal performance of the history, physical exam, discussion and plan.'

## 2023-06-30 DIAGNOSIS — M54.16 RADICULOPATHY, LUMBAR REGION: ICD-10-CM

## 2023-07-03 RX ORDER — ACETAMINOPHEN AND CODEINE PHOSPHATE 300; 30 MG/1; MG/1
1 TABLET ORAL EVERY 4 HOURS PRN
Qty: 28 TABLET | Refills: 0 | Status: SHIPPED | OUTPATIENT
Start: 2023-07-03 | End: 2023-07-27 | Stop reason: SDUPTHER

## 2023-07-08 DIAGNOSIS — L50.9 URTICARIA, UNSPECIFIED: ICD-10-CM

## 2023-07-10 RX ORDER — TRIAMCINOLONE ACETONIDE 1 MG/G
CREAM TOPICAL
Qty: 60 G | Refills: 1 | Status: SHIPPED | OUTPATIENT
Start: 2023-07-10 | End: 2024-05-29 | Stop reason: WASHOUT

## 2023-07-14 ENCOUNTER — OFFICE VISIT (OUTPATIENT)
Dept: ENDOCRINOLOGY | Age: 58
End: 2023-07-14
Payer: COMMERCIAL

## 2023-07-14 VITALS
TEMPERATURE: 98.5 F | WEIGHT: 211 LBS | HEART RATE: 67 BPM | SYSTOLIC BLOOD PRESSURE: 132 MMHG | OXYGEN SATURATION: 94 % | HEIGHT: 62 IN | RESPIRATION RATE: 16 BRPM | DIASTOLIC BLOOD PRESSURE: 88 MMHG | BODY MASS INDEX: 38.83 KG/M2

## 2023-07-14 DIAGNOSIS — R07.9 CHEST PAIN, UNSPECIFIED TYPE: ICD-10-CM

## 2023-07-14 DIAGNOSIS — E03.9 ACQUIRED HYPOTHYROIDISM: ICD-10-CM

## 2023-07-14 DIAGNOSIS — E66.9 OBESITY (BMI 30-39.9): ICD-10-CM

## 2023-07-14 DIAGNOSIS — E11.65 INADEQUATELY CONTROLLED DIABETES MELLITUS (HCC): Primary | ICD-10-CM

## 2023-07-14 LAB
CHP ED QC CHECK: ABNORMAL
GLUCOSE BLD-MCNC: 138 MG/DL
HBA1C MFR BLD: 6.6 %

## 2023-07-14 PROCEDURE — 1036F TOBACCO NON-USER: CPT | Performed by: INTERNAL MEDICINE

## 2023-07-14 PROCEDURE — 82962 GLUCOSE BLOOD TEST: CPT | Performed by: INTERNAL MEDICINE

## 2023-07-14 PROCEDURE — G8427 DOCREV CUR MEDS BY ELIG CLIN: HCPCS | Performed by: INTERNAL MEDICINE

## 2023-07-14 PROCEDURE — G8417 CALC BMI ABV UP PARAM F/U: HCPCS | Performed by: INTERNAL MEDICINE

## 2023-07-14 PROCEDURE — 3044F HG A1C LEVEL LT 7.0%: CPT | Performed by: INTERNAL MEDICINE

## 2023-07-14 PROCEDURE — 3017F COLORECTAL CA SCREEN DOC REV: CPT | Performed by: INTERNAL MEDICINE

## 2023-07-14 PROCEDURE — 83037 HB GLYCOSYLATED A1C HOME DEV: CPT | Performed by: INTERNAL MEDICINE

## 2023-07-14 PROCEDURE — 3078F DIAST BP <80 MM HG: CPT | Performed by: INTERNAL MEDICINE

## 2023-07-14 PROCEDURE — 2022F DILAT RTA XM EVC RTNOPTHY: CPT | Performed by: INTERNAL MEDICINE

## 2023-07-14 PROCEDURE — 3074F SYST BP LT 130 MM HG: CPT | Performed by: INTERNAL MEDICINE

## 2023-07-14 PROCEDURE — 99214 OFFICE O/P EST MOD 30 MIN: CPT | Performed by: INTERNAL MEDICINE

## 2023-07-14 RX ORDER — ACETAMINOPHEN AND CODEINE PHOSPHATE 300; 30 MG/1; MG/1
1 TABLET ORAL EVERY 4 HOURS PRN
COMMUNITY
Start: 2022-08-11

## 2023-07-14 RX ORDER — DULAGLUTIDE 0.75 MG/.5ML
0.75 INJECTION, SOLUTION SUBCUTANEOUS WEEKLY
Qty: 4 ADJUSTABLE DOSE PRE-FILLED PEN SYRINGE | Refills: 3 | Status: SHIPPED | OUTPATIENT
Start: 2023-07-14

## 2023-07-14 RX ORDER — AMLODIPINE BESYLATE 10 MG/1
10 TABLET ORAL DAILY
COMMUNITY
Start: 2023-05-20

## 2023-07-15 DIAGNOSIS — J30.9 ALLERGIC RHINITIS, UNSPECIFIED SEASONALITY, UNSPECIFIED TRIGGER: ICD-10-CM

## 2023-07-15 DIAGNOSIS — E78.5 HYPERLIPIDEMIA, UNSPECIFIED: ICD-10-CM

## 2023-07-15 DIAGNOSIS — F41.9 ANXIETY DISORDER, UNSPECIFIED: ICD-10-CM

## 2023-07-15 DIAGNOSIS — R32 UNSPECIFIED URINARY INCONTINENCE: ICD-10-CM

## 2023-07-17 RX ORDER — OXYBUTYNIN CHLORIDE 10 MG/1
TABLET, EXTENDED RELEASE ORAL
Qty: 90 TABLET | Refills: 0 | Status: SHIPPED | OUTPATIENT
Start: 2023-07-17 | End: 2023-10-15

## 2023-07-17 RX ORDER — FLUTICASONE PROPIONATE 50 MCG
SPRAY, SUSPENSION (ML) NASAL
Qty: 16 ML | Refills: 1 | Status: SHIPPED | OUTPATIENT
Start: 2023-07-17 | End: 2024-01-14 | Stop reason: WASHOUT

## 2023-07-17 RX ORDER — DULOXETIN HYDROCHLORIDE 30 MG/1
CAPSULE, DELAYED RELEASE ORAL
Qty: 90 CAPSULE | Refills: 0 | Status: SHIPPED | OUTPATIENT
Start: 2023-07-17 | End: 2023-08-14

## 2023-07-17 RX ORDER — LISINOPRIL 20 MG/1
TABLET ORAL
Qty: 90 TABLET | Refills: 0 | Status: SHIPPED | OUTPATIENT
Start: 2023-07-17 | End: 2023-10-15

## 2023-07-21 DIAGNOSIS — M54.16 RADICULOPATHY, LUMBAR REGION: ICD-10-CM

## 2023-07-21 DIAGNOSIS — M54.12 RADICULOPATHY, CERVICAL REGION: ICD-10-CM

## 2023-07-21 DIAGNOSIS — J30.9 ALLERGIC RHINITIS, UNSPECIFIED SEASONALITY, UNSPECIFIED TRIGGER: ICD-10-CM

## 2023-07-21 DIAGNOSIS — B37.2 YEAST DERMATITIS: ICD-10-CM

## 2023-07-21 RX ORDER — ACETAMINOPHEN AND CODEINE PHOSPHATE 300; 30 MG/1; MG/1
1 TABLET ORAL EVERY 4 HOURS PRN
Qty: 28 TABLET | Refills: 0 | OUTPATIENT
Start: 2023-07-21

## 2023-07-21 RX ORDER — ALBUTEROL SULFATE 90 UG/1
2 AEROSOL, METERED RESPIRATORY (INHALATION) EVERY 6 HOURS PRN
Qty: 18 G | Refills: 1 | Status: SHIPPED | OUTPATIENT
Start: 2023-07-21 | End: 2024-05-28 | Stop reason: SDUPTHER

## 2023-07-21 RX ORDER — NYSTATIN 100000 [USP'U]/G
POWDER TOPICAL 2 TIMES DAILY
Qty: 60 G | Refills: 1 | OUTPATIENT
Start: 2023-07-21

## 2023-07-21 RX ORDER — NYSTATIN 100000 [USP'U]/G
POWDER TOPICAL 2 TIMES DAILY
Qty: 60 G | Refills: 1 | Status: SHIPPED | OUTPATIENT
Start: 2023-07-21 | End: 2023-10-15

## 2023-07-21 RX ORDER — TIZANIDINE 4 MG/1
4 TABLET ORAL 2 TIMES DAILY
Qty: 60 TABLET | Refills: 5 | OUTPATIENT
Start: 2023-07-21

## 2023-07-21 RX ORDER — TIZANIDINE 4 MG/1
4 TABLET ORAL EVERY 6 HOURS PRN
Qty: 30 TABLET | Refills: 0 | Status: SHIPPED | OUTPATIENT
Start: 2023-07-21 | End: 2023-07-28

## 2023-07-21 RX ORDER — ALBUTEROL SULFATE 90 UG/1
AEROSOL, METERED RESPIRATORY (INHALATION)
Refills: 1 | OUTPATIENT
Start: 2023-07-21

## 2023-07-24 DIAGNOSIS — B37.2 YEAST DERMATITIS: ICD-10-CM

## 2023-07-24 RX ORDER — NYSTATIN 100000 U/G
CREAM TOPICAL 2 TIMES DAILY
Qty: 60 G | Refills: 1 | Status: SHIPPED | OUTPATIENT
Start: 2023-07-24 | End: 2023-11-09

## 2023-07-26 DIAGNOSIS — F41.9 ANXIETY DISORDER, UNSPECIFIED: ICD-10-CM

## 2023-07-26 DIAGNOSIS — M54.50 LOW BACK PAIN, UNSPECIFIED: ICD-10-CM

## 2023-07-27 ENCOUNTER — OFFICE VISIT (OUTPATIENT)
Dept: PRIMARY CARE | Facility: CLINIC | Age: 58
End: 2023-07-27
Payer: COMMERCIAL

## 2023-07-27 ENCOUNTER — HOSPITAL ENCOUNTER (EMERGENCY)
Age: 58
Discharge: HOME | End: 2023-07-28
Payer: COMMERCIAL

## 2023-07-27 VITALS
OXYGEN SATURATION: 99 % | HEIGHT: 62 IN | DIASTOLIC BLOOD PRESSURE: 98 MMHG | SYSTOLIC BLOOD PRESSURE: 126 MMHG | WEIGHT: 213 LBS | HEART RATE: 81 BPM | BODY MASS INDEX: 39.2 KG/M2 | RESPIRATION RATE: 16 BRPM

## 2023-07-27 DIAGNOSIS — F41.1 GENERALIZED ANXIETY DISORDER: Primary | ICD-10-CM

## 2023-07-27 DIAGNOSIS — R51.9 ACUTE NONINTRACTABLE HEADACHE, UNSPECIFIED HEADACHE TYPE: Primary | ICD-10-CM

## 2023-07-27 DIAGNOSIS — F33.41 RECURRENT MAJOR DEPRESSIVE DISORDER, IN PARTIAL REMISSION (CMS-HCC): ICD-10-CM

## 2023-07-27 DIAGNOSIS — Z12.31 ENCOUNTER FOR SCREENING MAMMOGRAM FOR MALIGNANT NEOPLASM OF BREAST: ICD-10-CM

## 2023-07-27 DIAGNOSIS — M54.16 RADICULOPATHY, LUMBAR REGION: ICD-10-CM

## 2023-07-27 DIAGNOSIS — M48.062 LUMBAR STENOSIS WITH NEUROGENIC CLAUDICATION: ICD-10-CM

## 2023-07-27 DIAGNOSIS — R10.31 ABDOMINAL PAIN, RIGHT LOWER QUADRANT: ICD-10-CM

## 2023-07-27 DIAGNOSIS — E11.65 TYPE 2 DIABETES MELLITUS WITH HYPERGLYCEMIA, WITHOUT LONG-TERM CURRENT USE OF INSULIN (MULTI): ICD-10-CM

## 2023-07-27 LAB
POC FINGERSTICK BLOOD GLUCOSE: 112 MG/DL (ref 70–100)
POC HEMOGLOBIN A1C: 6.3 % (ref 4.2–6.5)

## 2023-07-27 PROCEDURE — 81003 URINALYSIS AUTO W/O SCOPE: CPT

## 2023-07-27 PROCEDURE — 4010F ACE/ARB THERAPY RXD/TAKEN: CPT | Performed by: FAMILY MEDICINE

## 2023-07-27 PROCEDURE — 83036 HEMOGLOBIN GLYCOSYLATED A1C: CPT | Performed by: FAMILY MEDICINE

## 2023-07-27 PROCEDURE — 99214 OFFICE O/P EST MOD 30 MIN: CPT | Performed by: FAMILY MEDICINE

## 2023-07-27 PROCEDURE — 99285 EMERGENCY DEPT VISIT HI MDM: CPT

## 2023-07-27 PROCEDURE — 96365 THER/PROPH/DIAG IV INF INIT: CPT

## 2023-07-27 PROCEDURE — 96375 TX/PRO/DX INJ NEW DRUG ADDON: CPT

## 2023-07-27 PROCEDURE — 36415 COLL VENOUS BLD VENIPUNCTURE: CPT

## 2023-07-27 PROCEDURE — 1036F TOBACCO NON-USER: CPT | Performed by: FAMILY MEDICINE

## 2023-07-27 PROCEDURE — 3080F DIAST BP >= 90 MM HG: CPT | Performed by: FAMILY MEDICINE

## 2023-07-27 PROCEDURE — 83690 ASSAY OF LIPASE: CPT

## 2023-07-27 PROCEDURE — 80053 COMPREHEN METABOLIC PANEL: CPT

## 2023-07-27 PROCEDURE — 3074F SYST BP LT 130 MM HG: CPT | Performed by: FAMILY MEDICINE

## 2023-07-27 PROCEDURE — 3008F BODY MASS INDEX DOCD: CPT | Performed by: FAMILY MEDICINE

## 2023-07-27 PROCEDURE — 85025 COMPLETE CBC W/AUTO DIFF WBC: CPT

## 2023-07-27 PROCEDURE — 0202U NFCT DS 22 TRGT SARS-COV-2: CPT

## 2023-07-27 PROCEDURE — 82962 GLUCOSE BLOOD TEST: CPT | Performed by: FAMILY MEDICINE

## 2023-07-27 PROCEDURE — 83605 ASSAY OF LACTIC ACID: CPT

## 2023-07-27 PROCEDURE — 83735 ASSAY OF MAGNESIUM: CPT

## 2023-07-27 RX ORDER — DIPHENHYDRAMINE HYDROCHLORIDE 50 MG/ML
25 INJECTION INTRAMUSCULAR; INTRAVENOUS ONCE
Status: COMPLETED | OUTPATIENT
Start: 2023-07-27 | End: 2023-07-28

## 2023-07-27 RX ORDER — METOCLOPRAMIDE HYDROCHLORIDE 5 MG/ML
10 INJECTION INTRAMUSCULAR; INTRAVENOUS ONCE
Status: COMPLETED | OUTPATIENT
Start: 2023-07-27 | End: 2023-07-28

## 2023-07-27 RX ORDER — 0.9 % SODIUM CHLORIDE 0.9 %
1000 INTRAVENOUS SOLUTION INTRAVENOUS ONCE
Status: COMPLETED | OUTPATIENT
Start: 2023-07-27 | End: 2023-07-28

## 2023-07-27 RX ORDER — DULAGLUTIDE 0.75 MG/.5ML
0.75 INJECTION, SOLUTION SUBCUTANEOUS WEEKLY
COMMUNITY
Start: 2023-07-14 | End: 2024-02-20 | Stop reason: WASHOUT

## 2023-07-27 RX ORDER — ACETAMINOPHEN 500 MG
1000 TABLET ORAL ONCE
Status: DISCONTINUED | OUTPATIENT
Start: 2023-07-27 | End: 2023-07-28 | Stop reason: HOSPADM

## 2023-07-27 ASSESSMENT — ENCOUNTER SYMPTOMS
ABDOMINAL DISTENTION: 0
CONFUSION: 0
DECREASED CONCENTRATION: 0
NECK STIFFNESS: 0
ARTHRALGIAS: 0
DYSPHORIC MOOD: 0
DIARRHEA: 0
NERVOUS/ANXIOUS: 0
SPEECH DIFFICULTY: 0
SORE THROAT: 0
PALPITATIONS: 0
MYALGIAS: 0
AGITATION: 0
DIZZINESS: 0
PHOTOPHOBIA: 0
CHEST TIGHTNESS: 0
FEVER: 0
SHORTNESS OF BREATH: 0
EYE PAIN: 0
FLANK PAIN: 0
SINUS PRESSURE: 0
RECTAL PAIN: 0
COUGH: 0
COLOR CHANGE: 0
HEADACHES: 0
STRIDOR: 0
POLYDIPSIA: 0
SLEEP DISTURBANCE: 0
TROUBLE SWALLOWING: 0
ABDOMINAL PAIN: 0
RHINORRHEA: 0
VOMITING: 1
DIARRHEA: 1
FATIGUE: 0
NAUSEA: 1
ABDOMINAL PAIN: 1
POLYPHAGIA: 0
HEMATURIA: 0
BLOOD IN STOOL: 0
CONSTITUTIONAL NEGATIVE: 1
ACTIVITY CHANGE: 0
ADENOPATHY: 0
SINUS PAIN: 0
APPETITE CHANGE: 0
SEIZURES: 0
CONSTIPATION: 0
DYSURIA: 0

## 2023-07-27 ASSESSMENT — PAIN SCALES - GENERAL: PAINLEVEL_OUTOF10: 9

## 2023-07-27 ASSESSMENT — PAIN DESCRIPTION - DESCRIPTORS: DESCRIPTORS: DISCOMFORT

## 2023-07-27 ASSESSMENT — PAIN DESCRIPTION - FREQUENCY: FREQUENCY: CONTINUOUS

## 2023-07-27 ASSESSMENT — PAIN - FUNCTIONAL ASSESSMENT: PAIN_FUNCTIONAL_ASSESSMENT: 0-10

## 2023-07-27 ASSESSMENT — PAIN DESCRIPTION - ORIENTATION: ORIENTATION: RIGHT

## 2023-07-27 ASSESSMENT — PAIN DESCRIPTION - LOCATION: LOCATION: HEAD;BACK

## 2023-07-27 ASSESSMENT — PAIN DESCRIPTION - ONSET: ONSET: ON-GOING

## 2023-07-27 NOTE — PROGRESS NOTES
"Subjective   Patient ID: Leanna Pagan is a 57 y.o. female who presents for Follow-up.    Pt is seeing Dr. Worley for  as advised.   Has seen Dr. Lan who has started her on trulicity.   Discuss does of xanax. Due to stress states the current dose isn't working well currently     Pt c/o after affect from shingles. She states she is still in some pain.   Also c/o noticing lumps both breast.        Review of Systems   Constitutional: Negative.  Negative for activity change, appetite change, fatigue and fever.   HENT:  Negative for congestion, dental problem, ear discharge, ear pain, mouth sores, rhinorrhea, sinus pressure, sinus pain, sore throat, tinnitus and trouble swallowing.    Eyes:  Negative for photophobia, pain and visual disturbance.   Respiratory:  Negative for cough, chest tightness, shortness of breath and stridor.    Cardiovascular:  Negative for chest pain and palpitations.   Gastrointestinal:  Negative for abdominal distention, abdominal pain, blood in stool, constipation, diarrhea and rectal pain.   Endocrine: Negative for cold intolerance, heat intolerance, polydipsia, polyphagia and polyuria.   Genitourinary:  Negative for dysuria, flank pain, hematuria and urgency.   Musculoskeletal:  Negative for arthralgias, gait problem, myalgias and neck stiffness.   Skin:  Negative for color change and rash.   Allergic/Immunologic: Negative for environmental allergies and food allergies.   Neurological:  Negative for dizziness, seizures, syncope, speech difficulty and headaches.   Hematological:  Negative for adenopathy.   Psychiatric/Behavioral:  Negative for agitation, confusion, decreased concentration, dysphoric mood and sleep disturbance. The patient is not nervous/anxious.        Objective   BP (!) 126/98   Pulse 81   Resp 16   Ht 1.575 m (5' 2\")   Wt 96.6 kg (213 lb)   SpO2 99%   BMI 38.96 kg/m²     Physical Exam  Vitals reviewed.   Constitutional:       General: She is not in acute " distress.     Appearance: She is obese. She is not ill-appearing or diaphoretic.   HENT:      Head: Normocephalic.      Right Ear: Tympanic membrane and external ear normal.      Left Ear: Tympanic membrane and external ear normal.      Nose: Nose normal. No congestion.      Mouth/Throat:      Pharynx: No posterior oropharyngeal erythema.   Eyes:      General:         Right eye: No discharge.         Left eye: No discharge.      Extraocular Movements: Extraocular movements intact.      Conjunctiva/sclera: Conjunctivae normal.      Pupils: Pupils are equal, round, and reactive to light.   Cardiovascular:      Rate and Rhythm: Normal rate and regular rhythm.      Pulses: Normal pulses.      Heart sounds: Normal heart sounds. No murmur heard.  Pulmonary:      Effort: Pulmonary effort is normal. No respiratory distress.      Breath sounds: Normal breath sounds. No wheezing or rales.   Chest:      Chest wall: No tenderness.   Abdominal:      General: Abdomen is flat. Bowel sounds are normal. There is distension.      Palpations: There is no mass.      Tenderness: There is no abdominal tenderness. There is no guarding.   Musculoskeletal:         General: Tenderness present. Normal range of motion.      Cervical back: Normal range of motion and neck supple. No tenderness.      Right lower leg: No edema.      Left lower leg: No edema.      Comments: Tenderness to mid back from levels L3-S1.   Skin:     General: Skin is warm and dry.      Coloration: Skin is not jaundiced.      Findings: No bruising or erythema.   Neurological:      General: No focal deficit present.      Mental Status: She is alert and oriented to person, place, and time. Mental status is at baseline.      Cranial Nerves: No cranial nerve deficit.      Sensory: No sensory deficit.      Coordination: Coordination normal.      Gait: Gait normal.   Psychiatric:         Mood and Affect: Mood normal.         Thought Content: Thought content normal.          Judgment: Judgment normal.         Assessment/Plan   Problem List Items Addressed This Visit       Anxiety disorder - Primary    Recurrent major depressive disorder, in partial remission (CMS/HCC)    Lumbar stenosis with neurogenic claudication     Other Visit Diagnoses       Encounter for screening mammogram for malignant neoplasm of breast        Relevant Orders    BI mammo bilateral screening tomosynthesis    Radiculopathy, lumbar region        Type 2 diabetes mellitus with hyperglycemia, without long-term current use of insulin (CMS/HCC)        Relevant Orders    POCT glycosylated hemoglobin (Hb A1C) manually resulted (Completed)    POCT fingerstick glucose manually resulted (Completed)             Scribe Attestation  By signing my name below, I, LESLIE Hall , Larry   attest that this documentation has been prepared under the direction and in the presence of Sergo Alexander DO.   Provider Attestation - Scribe documentation    All medical record entries made by the Scribe were at my direction and personally dictated by me. I have reviewed the chart and agree that the record accurately reflects my personal performance of the history, physical exam, discussion and plan.

## 2023-07-27 NOTE — PATIENT INSTRUCTIONS
Follow up in 3 months    Continue current medications and therapy for chronic medical conditions.    Patient was advised importance of proper diet/nutrition in addition adequate hydration. Patient was encouraged moderate exercise program to include 30 minutes daily for 5 days of the week or 150 minutes weekly. Patient will follow-up with us as scheduled.    I personally reviewed the OARRS report for this patient. I have considered the risks of abuse, dependence, addiction, and diversion.    UDS/CSA: 01/03/2023    IO A1C AND GLUCOSE TODAY    INCREASE ALPRAZOLAM TO 1MG 3 TIMES DAILY as NEEDED    RESTART METOPROLOL    TAKE LISINOPRIL 20MG ONCE DAILY

## 2023-07-27 NOTE — LETTER
August 7, 2023     Leanna Casanova  [address] [address]      Dear Ms. Casanova:    Below are the results from your recent visit:    MAMMOGRAM IS NORMAL     Resulted Orders   BI mammo bilateral screening tomosynthesis    Narrative    Interpreted By:  ALLIE LARA MD  MRN: [id number]  Patient Name: VALERIA CASANOVA     STUDY:  DIGITAL SCREENING BILATERAL MAMMOGRAM WITH BREAST TOMOSYNTHESIS AND  WITH CAD;  8/1/2023 1:30 pm     INDICATION:  Routine screening.     COMPARISON:  04/28/2021     ACCESSION NUMBER(S):  [id number]     ORDERING CLINICIAN:  SUDHAKAR HU     FINDINGS:  2D and tomosynthesis images were reviewed at 1 mm slice thickness.  Images were obtained in MLO and CC projections.     The breast tissue is heterogeneously dense, which may obscure small  masses. No suspicious masses or calcifications are identified. There  are stable areas of asymmetry bilaterally.     This study was interpreted with CAD.       Impression    No mammographic evidence of malignancy.     BI-RADS CATEGORY:     Category: 1 - Negative.  Recommendation: 1 Year Screening.     The test results show that your current treatment is working. Please continue your current medication and plan. We recommend that you repeat the above test(s) in 1 year.    If you have any questions or concerns, please don't hesitate to call.         Sincerely,        Sudhakar Hu, DO

## 2023-07-28 ENCOUNTER — APPOINTMENT (OUTPATIENT)
Dept: CT IMAGING | Age: 58
End: 2023-07-28
Payer: COMMERCIAL

## 2023-07-28 VITALS
RESPIRATION RATE: 20 BRPM | BODY MASS INDEX: 38.64 KG/M2 | DIASTOLIC BLOOD PRESSURE: 95 MMHG | WEIGHT: 210 LBS | OXYGEN SATURATION: 95 % | HEIGHT: 62 IN | SYSTOLIC BLOOD PRESSURE: 153 MMHG | TEMPERATURE: 98.4 F | HEART RATE: 63 BPM

## 2023-07-28 DIAGNOSIS — M54.16 RADICULOPATHY, LUMBAR REGION: ICD-10-CM

## 2023-07-28 DIAGNOSIS — M54.12 RADICULOPATHY, CERVICAL REGION: ICD-10-CM

## 2023-07-28 DIAGNOSIS — R73.9 HYPERGLYCEMIA, UNSPECIFIED: ICD-10-CM

## 2023-07-28 LAB
ALBUMIN SERPL-MCNC: 4.9 G/DL (ref 3.5–4.6)
ALP SERPL-CCNC: 94 U/L (ref 40–130)
ALT SERPL-CCNC: 39 U/L (ref 0–33)
ANION GAP SERPL CALCULATED.3IONS-SCNC: 12 MEQ/L (ref 9–15)
AST SERPL-CCNC: 30 U/L (ref 0–35)
B PARAP IS1001 DNA NPH QL NAA+NON-PROBE: NOT DETECTED
B PERT.PT PRMT NPH QL NAA+NON-PROBE: NOT DETECTED
BASOPHILS # BLD: 0.1 K/UL (ref 0–0.2)
BASOPHILS NFR BLD: 0.8 %
BILIRUB SERPL-MCNC: 0.3 MG/DL (ref 0.2–0.7)
BILIRUB UR QL STRIP: NEGATIVE
BUN SERPL-MCNC: 11 MG/DL (ref 6–20)
C PNEUM DNA NPH QL NAA+NON-PROBE: NOT DETECTED
CALCIUM SERPL-MCNC: 9.9 MG/DL (ref 8.5–9.9)
CHLORIDE SERPL-SCNC: 102 MEQ/L (ref 95–107)
CLARITY UR: CLEAR
CO2 SERPL-SCNC: 28 MEQ/L (ref 20–31)
COLOR UR: YELLOW
CREAT SERPL-MCNC: 0.87 MG/DL (ref 0.5–0.9)
EOSINOPHIL # BLD: 0.1 K/UL (ref 0–0.7)
EOSINOPHIL NFR BLD: 0.8 %
ERYTHROCYTE [DISTWIDTH] IN BLOOD BY AUTOMATED COUNT: 13.8 % (ref 11.5–14.5)
FLUAV RNA NPH QL NAA+NON-PROBE: NOT DETECTED
FLUBV RNA NPH QL NAA+NON-PROBE: NOT DETECTED
GLOBULIN SER CALC-MCNC: 2.4 G/DL (ref 2.3–3.5)
GLUCOSE SERPL-MCNC: 104 MG/DL (ref 70–99)
GLUCOSE UR STRIP-MCNC: NEGATIVE MG/DL
HADV DNA NPH QL NAA+NON-PROBE: NOT DETECTED
HCOV 229E RNA NPH QL NAA+NON-PROBE: NOT DETECTED
HCOV HKU1 RNA NPH QL NAA+NON-PROBE: NOT DETECTED
HCOV NL63 RNA NPH QL NAA+NON-PROBE: NOT DETECTED
HCOV OC43 RNA NPH QL NAA+NON-PROBE: NOT DETECTED
HCT VFR BLD AUTO: 40.2 % (ref 37–47)
HGB BLD-MCNC: 13.5 G/DL (ref 12–16)
HGB UR QL STRIP: NEGATIVE
HMPV RNA NPH QL NAA+NON-PROBE: NOT DETECTED
HPIV1 RNA NPH QL NAA+NON-PROBE: NOT DETECTED
HPIV2 RNA NPH QL NAA+NON-PROBE: NOT DETECTED
HPIV3 RNA NPH QL NAA+NON-PROBE: NOT DETECTED
HPIV4 RNA NPH QL NAA+NON-PROBE: NOT DETECTED
KETONES UR STRIP-MCNC: NEGATIVE MG/DL
LACTATE BLDV-SCNC: 1.1 MMOL/L (ref 0.5–2.2)
LEUKOCYTE ESTERASE UR QL STRIP: NEGATIVE
LIPASE SERPL-CCNC: 81 U/L (ref 12–95)
LYMPHOCYTES # BLD: 2.6 K/UL (ref 1–4.8)
LYMPHOCYTES NFR BLD: 31.6 %
M PNEUMO DNA NPH QL NAA+NON-PROBE: NOT DETECTED
MAGNESIUM SERPL-MCNC: 2.1 MG/DL (ref 1.7–2.4)
MCH RBC QN AUTO: 28.9 PG (ref 27–31.3)
MCHC RBC AUTO-ENTMCNC: 33.7 % (ref 33–37)
MCV RBC AUTO: 85.8 FL (ref 79.4–94.8)
MONOCYTES # BLD: 0.5 K/UL (ref 0.2–0.8)
MONOCYTES NFR BLD: 6.3 %
NEUTROPHILS # BLD: 4.9 K/UL (ref 1.4–6.5)
NEUTS SEG NFR BLD: 60.5 %
NITRITE UR QL STRIP: NEGATIVE
PERFORMED ON: NORMAL
PH UR STRIP: 6.5 [PH] (ref 5–9)
PLATELET # BLD AUTO: 287 K/UL (ref 130–400)
POC CREATININE: 1 MG/DL (ref 0.6–1.2)
POC SAMPLE TYPE: NORMAL
POTASSIUM SERPL-SCNC: 2.9 MEQ/L (ref 3.4–4.9)
PROT SERPL-MCNC: 7.3 G/DL (ref 6.3–8)
PROT UR STRIP-MCNC: NEGATIVE MG/DL
RBC # BLD AUTO: 4.68 M/UL (ref 4.2–5.4)
RSV RNA NPH QL NAA+NON-PROBE: NOT DETECTED
RV+EV RNA NPH QL NAA+NON-PROBE: NOT DETECTED
SARS-COV-2 RNA NPH QL NAA+NON-PROBE: NOT DETECTED
SODIUM SERPL-SCNC: 142 MEQ/L (ref 135–144)
SP GR UR STRIP: 1.01 (ref 1–1.03)
URINE REFLEX TO CULTURE: NORMAL
UROBILINOGEN UR STRIP-ACNC: 0.2 E.U./DL
WBC # BLD AUTO: 8.1 K/UL (ref 4.8–10.8)

## 2023-07-28 PROCEDURE — 70450 CT HEAD/BRAIN W/O DYE: CPT

## 2023-07-28 PROCEDURE — 6370000000 HC RX 637 (ALT 250 FOR IP)

## 2023-07-28 PROCEDURE — 6360000002 HC RX W HCPCS

## 2023-07-28 PROCEDURE — 6360000004 HC RX CONTRAST MEDICATION

## 2023-07-28 PROCEDURE — 74177 CT ABD & PELVIS W/CONTRAST: CPT

## 2023-07-28 PROCEDURE — 2580000003 HC RX 258

## 2023-07-28 RX ORDER — METOCLOPRAMIDE 10 MG/1
10 TABLET ORAL 4 TIMES DAILY
Qty: 12 TABLET | Refills: 0 | Status: SHIPPED | OUTPATIENT
Start: 2023-07-28 | End: 2023-07-31

## 2023-07-28 RX ORDER — ACETAMINOPHEN AND CODEINE PHOSPHATE 300; 30 MG/1; MG/1
1 TABLET ORAL EVERY 4 HOURS PRN
Qty: 28 TABLET | Refills: 0 | Status: SHIPPED | OUTPATIENT
Start: 2023-07-28 | End: 2023-08-08 | Stop reason: SDUPTHER

## 2023-07-28 RX ORDER — MAGNESIUM SULFATE IN WATER 40 MG/ML
2000 INJECTION, SOLUTION INTRAVENOUS ONCE
Status: COMPLETED | OUTPATIENT
Start: 2023-07-28 | End: 2023-07-28

## 2023-07-28 RX ORDER — DEXAMETHASONE SODIUM PHOSPHATE 10 MG/ML
10 INJECTION INTRAMUSCULAR; INTRAVENOUS ONCE
Status: COMPLETED | OUTPATIENT
Start: 2023-07-28 | End: 2023-07-28

## 2023-07-28 RX ORDER — LANCETS 33 GAUGE
EACH MISCELLANEOUS
Qty: 100 EACH | Refills: 3 | Status: SHIPPED | OUTPATIENT
Start: 2023-07-28 | End: 2024-05-29 | Stop reason: WASHOUT

## 2023-07-28 RX ORDER — TIZANIDINE 4 MG/1
4 TABLET ORAL EVERY 6 HOURS PRN
Qty: 30 TABLET | Refills: 0 | Status: SHIPPED | OUTPATIENT
Start: 2023-07-28 | End: 2023-09-14

## 2023-07-28 RX ORDER — ALPRAZOLAM 1 MG/1
1 TABLET ORAL 3 TIMES DAILY PRN
Qty: 30 TABLET | Refills: 0 | Status: SHIPPED | OUTPATIENT
Start: 2023-07-28 | End: 2023-08-08 | Stop reason: SDUPTHER

## 2023-07-28 RX ORDER — METOCLOPRAMIDE 10 MG/1
10 TABLET ORAL 4 TIMES DAILY
Qty: 12 TABLET | Refills: 0 | Status: SHIPPED | OUTPATIENT
Start: 2023-07-28 | End: 2023-07-28 | Stop reason: SDUPTHER

## 2023-07-28 RX ADMIN — SODIUM CHLORIDE 1000 ML: 9 INJECTION, SOLUTION INTRAVENOUS at 00:32

## 2023-07-28 RX ADMIN — MAGNESIUM SULFATE HEPTAHYDRATE 2000 MG: 40 INJECTION, SOLUTION INTRAVENOUS at 04:27

## 2023-07-28 RX ADMIN — IOPAMIDOL 50 ML: 612 INJECTION, SOLUTION INTRAVENOUS at 01:48

## 2023-07-28 RX ADMIN — DEXAMETHASONE SODIUM PHOSPHATE 10 MG: 10 INJECTION INTRAMUSCULAR; INTRAVENOUS at 04:22

## 2023-07-28 RX ADMIN — DIPHENHYDRAMINE HYDROCHLORIDE 25 MG: 50 INJECTION, SOLUTION INTRAMUSCULAR; INTRAVENOUS at 00:33

## 2023-07-28 RX ADMIN — POTASSIUM BICARBONATE 50 MEQ: 978 TABLET, EFFERVESCENT ORAL at 04:30

## 2023-07-28 RX ADMIN — METOCLOPRAMIDE HYDROCHLORIDE 10 MG: 5 INJECTION INTRAMUSCULAR; INTRAVENOUS at 00:33

## 2023-07-28 ASSESSMENT — PAIN - FUNCTIONAL ASSESSMENT
PAIN_FUNCTIONAL_ASSESSMENT: NONE - DENIES PAIN
PAIN_FUNCTIONAL_ASSESSMENT: NONE - DENIES PAIN

## 2023-07-28 ASSESSMENT — PAIN SCALES - GENERAL: PAINLEVEL_OUTOF10: 10

## 2023-07-28 ASSESSMENT — PAIN DESCRIPTION - LOCATION: LOCATION: HEAD

## 2023-07-28 NOTE — ED PROVIDER NOTES
[facility] ED  EMERGENCY DEPARTMENT ENCOUNTER      Pt Name: Luis Enrique Ardon  MRN: [id number]  [address] [facility] 1965  Date of evaluation: 7/27/2023  Provider: MELVI Thompson    CHIEF COMPLAINT       Chief Complaint   Patient presents with    Headache         HISTORY OF PRESENT ILLNESS   (Location/Symptom, Timing/Onset, Context/Setting, Quality, Duration, Modifying Factors, Severity)  Note limiting factors. Luis Enrique Ardon is a 65 y.o. female whom per chart review has a PMHx of hypothyroidism, hypertension, chronic fatigue syndrome, fibromyalgia, C. difficile colitis, hyperlipidemia, GERD, vitamin D deficiency, depression, anemia, pulmonary embolus, type II DM, LASHONDA presents ED for evaluation of headache, abdominal pain. Patient reports that she has had a headache since yesterday. Patient does report associated photophobia, nausea, vomiting. Patient denies take any OTC medications for symptom relief. Additionally, patient reports R flank pain that extends into the right lower quadrant of her abdomen. Reports that symptoms began today. Patient does also report dysuria, urinary frequency/urgency, diarrhea. Patient denies chest pain, shortness of breath, fever, chills. HPI    Nursing Notes were reviewed. REVIEW OF SYSTEMS    (2-9 systems for level 4, 10 or more for level 5)     Review of Systems   Gastrointestinal:  Positive for abdominal pain, diarrhea, nausea and vomiting. Genitourinary:  Positive for dysuria, flank pain (Right), frequency and urgency. Neurological:  Positive for headaches. All other systems reviewed and are negative. Except as noted above the remainder of the review of systems was reviewed and negative.        PAST MEDICAL HISTORY     Past Medical History:   Diagnosis Date    Anemia     C. difficile colitis 01/2013    Chronic fatigue syndrome     Depression     [place]    Diabetes mellitus ([address])     Diverticulitis large intestine 2001    Fibromyalgia     Fibromyalgia occasionally words are mis-transcribed. )    MELVI Neumann (electronically signed)  Attending Emergency Physician     MELVI Neumann  07/28/23 9738

## 2023-07-28 NOTE — ED NOTES
Pt c/o headache and lower back pain favoring lower right side. Pt states was seen by her PCP today and was prescribed hypertension meds but pharmacy did not have them.      Vincent Cisneros  07/27/23 2116

## 2023-07-29 RX ORDER — GABAPENTIN 300 MG/1
300 CAPSULE ORAL 3 TIMES DAILY
Qty: 90 CAPSULE | Refills: 0 | Status: SHIPPED | OUTPATIENT
Start: 2023-07-29 | End: 2023-09-07

## 2023-07-29 RX ORDER — ALPRAZOLAM 0.5 MG/1
0.5 TABLET ORAL 4 TIMES DAILY
Qty: 120 TABLET | Refills: 0 | OUTPATIENT
Start: 2023-07-29

## 2023-08-01 DIAGNOSIS — G47.00 INSOMNIA, UNSPECIFIED TYPE: ICD-10-CM

## 2023-08-02 RX ORDER — TRAZODONE HYDROCHLORIDE 50 MG/1
50 TABLET ORAL NIGHTLY
Qty: 30 TABLET | Refills: 1 | Status: SHIPPED | OUTPATIENT
Start: 2023-08-02 | End: 2023-08-28 | Stop reason: SDUPTHER

## 2023-08-05 NOTE — ED NOTES
Lab here and put order in for pt's blood work for an alcohol level. Awakened pt for lab and her blood sugar before lunch arrives. Pt is being cooperative with her lab work and blood sugar being obtained. -ER staff with lab staff while her blood is being taken. Her blood sugar was 114.  Pt remains in bed area quiet and cooperative with no problems noted     Treasure Covarrubias RN  07/11/18 6614 No

## 2023-08-07 ENCOUNTER — TELEPHONE (OUTPATIENT)
Dept: CARDIOLOGY CLINIC | Age: 58
End: 2023-08-07

## 2023-08-07 NOTE — TELEPHONE ENCOUNTER
Appointment For: Milady Chavadagoberto ([id number])   Visit Type: NEW PATIENT (1003)      8/14/2023    3:40 PM  20 mins. Dr. Lexine Cooks,      [address] [place] [address]      Patient Comments:   I have another appointment the same day as this appointment   ----------------------------------   This appointment rescheduled from:   8/9/2023     2:20 PM  20 mins. Dr. Lexine Cooks,      [address] [place] [address]     Appointment rescheduled from MyChart  (Newest Message First)  Batch Job User Sera  Therese, [address] [place] [address] 8 hours ago (11:55 PM)     Moshemel Terry  Patient Appointment Schedule Request Pool 14 hours ago (6:43 PM)       Appointment For: Milady Stewart ([id number])  Visit Type: NEW PATIENT (1003)     8/14/2023    3:40 PM  20 mins. Dr. Lexine Cooks,      [address] [place] [address]     Patient Comments:  I have another appointment the same day as this appointment  ----------------------------------  This appointment rescheduled from:  8/9/2023     2:20 PM  20 mins.   Dr. Lexine Cooks,      [address] [place] [address]

## 2023-08-08 DIAGNOSIS — M54.16 RADICULOPATHY, LUMBAR REGION: ICD-10-CM

## 2023-08-08 DIAGNOSIS — F41.1 GENERALIZED ANXIETY DISORDER: ICD-10-CM

## 2023-08-08 RX ORDER — ACETAMINOPHEN AND CODEINE PHOSPHATE 300; 30 MG/1; MG/1
1 TABLET ORAL EVERY 4 HOURS PRN
Qty: 28 TABLET | Refills: 0 | Status: SHIPPED | OUTPATIENT
Start: 2023-08-08 | End: 2023-08-28 | Stop reason: SDUPTHER

## 2023-08-08 RX ORDER — ALPRAZOLAM 1 MG/1
1 TABLET ORAL 3 TIMES DAILY PRN
Qty: 90 TABLET | Refills: 0 | Status: SHIPPED | OUTPATIENT
Start: 2023-08-08 | End: 2023-09-14

## 2023-08-08 NOTE — TELEPHONE ENCOUNTER
Pt calling to request refills on:    acetaminophen-codeine (Tylenol w/ Codeine #3) 300-30 mg tablet     ALPRAZolam (Xanax) 1 mg tablet       Pharmacy- [facility]/pharmacy #[id number] - [place], [place] - [facility] [address] [address]  Phone: [phone]  Fax: [phone]

## 2023-08-09 LAB
ACETAMINOPHEN (UG/ML) IN SER/PLAS: <10 UG/ML (ref 10–30)
ALANINE AMINOTRANSFERASE (SGPT) (U/L) IN SER/PLAS: 34 U/L (ref 7–45)
ALBUMIN (G/DL) IN SER/PLAS: 4.4 G/DL (ref 3.4–5)
ALKALINE PHOSPHATASE (U/L) IN SER/PLAS: 71 U/L (ref 33–110)
ASPARTATE AMINOTRANSFERASE (SGOT) (U/L) IN SER/PLAS: 24 U/L (ref 9–39)
BILIRUBIN DIRECT (MG/DL) IN SER/PLAS: 0.1 MG/DL (ref 0–0.3)
BILIRUBIN TOTAL (MG/DL) IN SER/PLAS: 0.6 MG/DL (ref 0–1.2)
ETHANOL (MG/DL) IN SER/PLAS: <10 MG/DL
PROTEIN TOTAL: 7.2 G/DL (ref 6.4–8.2)
SALICYLATES (MG/DL) IN SER/PLAS: <3 MG/DL (ref 4–20)

## 2023-08-10 LAB
HEPATITIS A VIRUS IGM AB PRESENCE IN SER/PLAS BY IMMUNOASSAY: NONREACTIVE
HEPATITIS B VIRUS CORE IGM AB PRESENCE IN SER/PLAS BY IMMUNOASSY: NONREACTIVE
HEPATITIS B VIRUS SURFACE AG PRESENCE IN SERUM: NONREACTIVE
HEPATITIS C VIRUS AB PRESENCE IN SERUM: NONREACTIVE

## 2023-08-14 DIAGNOSIS — F41.9 ANXIETY DISORDER, UNSPECIFIED: ICD-10-CM

## 2023-08-14 DIAGNOSIS — J30.9 ALLERGIC RHINITIS, UNSPECIFIED: ICD-10-CM

## 2023-08-14 RX ORDER — CETIRIZINE HYDROCHLORIDE 10 MG/1
10 TABLET ORAL DAILY
Qty: 30 TABLET | Refills: 5 | Status: SHIPPED | OUTPATIENT
Start: 2023-08-14 | End: 2024-06-10

## 2023-08-14 RX ORDER — DULOXETIN HYDROCHLORIDE 30 MG/1
CAPSULE, DELAYED RELEASE ORAL
Qty: 30 CAPSULE | Refills: 2 | Status: SHIPPED | OUTPATIENT
Start: 2023-08-14 | End: 2023-11-09

## 2023-08-28 ENCOUNTER — OFFICE VISIT (OUTPATIENT)
Dept: PRIMARY CARE | Facility: CLINIC | Age: 58
End: 2023-08-28
Payer: COMMERCIAL

## 2023-08-28 VITALS
BODY MASS INDEX: 37.8 KG/M2 | OXYGEN SATURATION: 97 % | WEIGHT: 205.4 LBS | RESPIRATION RATE: 16 BRPM | DIASTOLIC BLOOD PRESSURE: 64 MMHG | HEART RATE: 58 BPM | HEIGHT: 62 IN | SYSTOLIC BLOOD PRESSURE: 110 MMHG

## 2023-08-28 DIAGNOSIS — G47.00 INSOMNIA, UNSPECIFIED TYPE: ICD-10-CM

## 2023-08-28 DIAGNOSIS — M54.16 RADICULOPATHY, LUMBAR REGION: Primary | ICD-10-CM

## 2023-08-28 DIAGNOSIS — R73.9 HYPERGLYCEMIA: ICD-10-CM

## 2023-08-28 DIAGNOSIS — I10 HTN (HYPERTENSION), BENIGN: ICD-10-CM

## 2023-08-28 DIAGNOSIS — R39.9 UTI SYMPTOMS: ICD-10-CM

## 2023-08-28 DIAGNOSIS — E78.2 MIXED HYPERLIPIDEMIA: ICD-10-CM

## 2023-08-28 LAB
POC APPEARANCE, URINE: ABNORMAL
POC BILIRUBIN, URINE: NEGATIVE
POC BLOOD, URINE: NEGATIVE
POC COLOR, URINE: YELLOW
POC GLUCOSE, URINE: NEGATIVE MG/DL
POC KETONES, URINE: NEGATIVE MG/DL
POC LEUKOCYTES, URINE: NEGATIVE
POC NITRITE,URINE: NEGATIVE
POC PH, URINE: 6 PH
POC PROTEIN, URINE: NEGATIVE MG/DL
POC SPECIFIC GRAVITY, URINE: 1.02
POC UROBILINOGEN, URINE: 0.2 EU/DL

## 2023-08-28 PROCEDURE — 81003 URINALYSIS AUTO W/O SCOPE: CPT | Performed by: FAMILY MEDICINE

## 2023-08-28 PROCEDURE — 99214 OFFICE O/P EST MOD 30 MIN: CPT | Performed by: FAMILY MEDICINE

## 2023-08-28 RX ORDER — HYDROCHLOROTHIAZIDE 25 MG/1
50 TABLET ORAL DAILY
Qty: 90 TABLET | Refills: 0 | Status: SHIPPED | OUTPATIENT
Start: 2023-08-28 | End: 2023-10-07

## 2023-08-28 RX ORDER — TRAZODONE HYDROCHLORIDE 50 MG/1
50 TABLET ORAL NIGHTLY
Qty: 30 TABLET | Refills: 1 | Status: SHIPPED | OUTPATIENT
Start: 2023-08-28 | End: 2023-09-21

## 2023-08-28 RX ORDER — INSULIN PUMP SYRINGE, 3 ML
EACH MISCELLANEOUS
Qty: 1 EACH | Refills: 0 | Status: SHIPPED | OUTPATIENT
Start: 2023-08-28 | End: 2024-08-27

## 2023-08-28 RX ORDER — ACETAMINOPHEN 500 MG
500 TABLET ORAL
COMMUNITY
Start: 2022-10-04 | End: 2024-05-29 | Stop reason: WASHOUT

## 2023-08-28 RX ORDER — SIMVASTATIN 20 MG/1
20 TABLET, FILM COATED ORAL DAILY
Qty: 30 TABLET | Refills: 5 | Status: SHIPPED | OUTPATIENT
Start: 2023-08-28

## 2023-08-28 RX ORDER — ACETAMINOPHEN AND CODEINE PHOSPHATE 300; 30 MG/1; MG/1
1 TABLET ORAL EVERY 4 HOURS PRN
Qty: 28 TABLET | Refills: 0 | Status: SHIPPED | OUTPATIENT
Start: 2023-08-28 | End: 2023-09-14

## 2023-08-28 RX ORDER — METOPROLOL SUCCINATE 50 MG/1
50 TABLET, EXTENDED RELEASE ORAL DAILY
Qty: 90 TABLET | Refills: 1 | Status: SHIPPED | OUTPATIENT
Start: 2023-08-28 | End: 2024-02-20 | Stop reason: WASHOUT

## 2023-08-28 ASSESSMENT — ENCOUNTER SYMPTOMS
ADENOPATHY: 0
DYSPHORIC MOOD: 0
FATIGUE: 0
POLYDIPSIA: 0
PHOTOPHOBIA: 0
RECTAL PAIN: 0
COUGH: 0
SLEEP DISTURBANCE: 0
CONSTITUTIONAL NEGATIVE: 1
RHINORRHEA: 0
HEADACHES: 0
SHORTNESS OF BREATH: 0
ACTIVITY CHANGE: 0
COLOR CHANGE: 0
SINUS PRESSURE: 0
NECK STIFFNESS: 0
DYSURIA: 0
CHEST TIGHTNESS: 0
SEIZURES: 0
CONSTIPATION: 0
DIZZINESS: 0
DECREASED CONCENTRATION: 0
ARTHRALGIAS: 0
POLYPHAGIA: 0
AGITATION: 0
SPEECH DIFFICULTY: 0
BLOOD IN STOOL: 0
FEVER: 0
MYALGIAS: 0
APPETITE CHANGE: 0
CONFUSION: 0
EYE PAIN: 0
NERVOUS/ANXIOUS: 0
SINUS PAIN: 0
STRIDOR: 0
ABDOMINAL DISTENTION: 0
ABDOMINAL PAIN: 0
TROUBLE SWALLOWING: 0
PALPITATIONS: 0
DIARRHEA: 0
HEMATURIA: 0
FLANK PAIN: 0
SORE THROAT: 0

## 2023-08-28 NOTE — PATIENT INSTRUCTIONS
Follow up in 4 weeks    Continue current medications and therapy for chronic medical conditions.    Patient was advised importance of proper diet/nutrition in addition adequate hydration. Patient was encouraged moderate exercise program to include 30 minutes daily for 5 days of the week or 150 minutes weekly. Patient will follow-up with us as scheduled.    I personally reviewed the OARRS report for this patient. I have considered the risks of abuse, dependence, addiction, and diversion.    UDS 01/03/2023  CSA 8/28/23    CT Abdomen and CT head reviewed with patient     IO UA completed today    MMSE completed today     Ortho referral ordered Dr. Servando Slaughter

## 2023-08-28 NOTE — PROGRESS NOTES
"Subjective   Patient ID: Leanna Pagan is a 58 y.o. female who presents for Anxiety and UTI.    HPI   Patient is having increased right back pain , blood in urine , burning sensation and urgency x 2 days. She does have Urology referral date coming up.     She is following up on Anxiety medication. She is taking the Cymbalta 30 mg.    Patient lost her glucometer and would like a replacement.     She will need trazodone for insomnia and will need refills.     Review of Systems   Constitutional: Negative.  Negative for activity change, appetite change, fatigue and fever.   HENT:  Negative for congestion, dental problem, ear discharge, ear pain, mouth sores, rhinorrhea, sinus pressure, sinus pain, sore throat, tinnitus and trouble swallowing.    Eyes:  Negative for photophobia, pain and visual disturbance.   Respiratory:  Negative for cough, chest tightness, shortness of breath and stridor.    Cardiovascular:  Negative for chest pain and palpitations.   Gastrointestinal:  Negative for abdominal distention, abdominal pain, blood in stool, constipation, diarrhea and rectal pain.   Endocrine: Negative for cold intolerance, heat intolerance, polydipsia, polyphagia and polyuria.   Genitourinary:  Negative for dysuria, flank pain, hematuria and urgency.   Musculoskeletal:  Negative for arthralgias, gait problem, myalgias and neck stiffness.   Skin:  Negative for color change and rash.   Allergic/Immunologic: Negative for environmental allergies and food allergies.   Neurological:  Negative for dizziness, seizures, syncope, speech difficulty and headaches.   Hematological:  Negative for adenopathy.   Psychiatric/Behavioral:  Negative for agitation, confusion, decreased concentration, dysphoric mood and sleep disturbance. The patient is not nervous/anxious.        Objective   /64 (BP Location: Right arm, Patient Position: Sitting, BP Cuff Size: Adult)   Pulse 58   Resp 16   Ht 1.575 m (5' 2\")   Wt 93.2 kg (205 lb 6.4 " oz)   SpO2 97%   BMI 37.57 kg/m²     Physical Exam  Vitals reviewed.   Constitutional:       General: She is not in acute distress.     Appearance: She is obese. She is not ill-appearing or diaphoretic.   HENT:      Head: Normocephalic.      Right Ear: Tympanic membrane and external ear normal.      Left Ear: Tympanic membrane and external ear normal.      Nose: Nose normal. No congestion.      Mouth/Throat:      Pharynx: No posterior oropharyngeal erythema.   Eyes:      General:         Right eye: No discharge.         Left eye: No discharge.      Extraocular Movements: Extraocular movements intact.      Conjunctiva/sclera: Conjunctivae normal.      Pupils: Pupils are equal, round, and reactive to light.   Cardiovascular:      Rate and Rhythm: Normal rate and regular rhythm.      Pulses: Normal pulses.      Heart sounds: Normal heart sounds. No murmur heard.  Pulmonary:      Effort: Pulmonary effort is normal. No respiratory distress.      Breath sounds: Normal breath sounds. No wheezing or rales.   Chest:      Chest wall: No tenderness.   Abdominal:      General: Bowel sounds are normal. There is distension.      Palpations: There is no mass.      Tenderness: There is no abdominal tenderness. There is no guarding.   Musculoskeletal:         General: Tenderness present. Normal range of motion.      Cervical back: Normal range of motion and neck supple. No tenderness.      Right lower leg: No edema.      Left lower leg: No edema.      Comments: Marked tenderness over the right flank particular levels L3-4 and 5   Skin:     General: Skin is warm and dry.      Coloration: Skin is not jaundiced.      Findings: No bruising or erythema.   Neurological:      General: No focal deficit present.      Mental Status: She is alert and oriented to person, place, and time. Mental status is at baseline.      Cranial Nerves: No cranial nerve deficit.      Sensory: No sensory deficit.      Coordination: Coordination normal.       Gait: Gait normal.   Psychiatric:         Mood and Affect: Mood normal.         Thought Content: Thought content normal.         Judgment: Judgment normal.         Assessment/Plan   Problem List Items Addressed This Visit       HTN (hypertension), benign    Relevant Medications    hydroCHLOROthiazide (HYDRODiuril) 25 mg tablet    metoprolol succinate XL (Toprol-XL) 50 mg 24 hr tablet    Hyperlipidemia    Relevant Medications    simvastatin (Zocor) 20 mg tablet    Hyperglycemia    Relevant Medications    FreeStyle glucose monitoring kit    Insomnia    Relevant Medications    traZODone (Desyrel) 50 mg tablet     Other Visit Diagnoses       Radiculopathy, lumbar region    -  Primary    Relevant Medications    acetaminophen-codeine (Tylenol w/ Codeine #3) 300-30 mg tablet    Other Relevant Orders    Referral to Orthopaedic Surgery    UTI symptoms        Relevant Orders    POCT UA Automated manually resulted (Completed)           Scribe Attestation  By signing my name below, I, Olga NELSON , Omidibrachid   attest that this documentation has been prepared under the direction and in the presence of Sergo Alexander DO..  Provider Attestation - Scribe documentation  All medical record entries made by the Scribe were at my direction and personally dictated by me. I have reviewed the chart and agree that the record accurately reflects my personal performance of the history, physical exam, discussion and plan.

## 2023-09-12 DIAGNOSIS — F41.1 GENERALIZED ANXIETY DISORDER: ICD-10-CM

## 2023-09-12 DIAGNOSIS — M54.16 RADICULOPATHY, LUMBAR REGION: ICD-10-CM

## 2023-09-12 NOTE — TELEPHONE ENCOUNTER
Dr. Alexander Pt    Refill for  acetaminophen-codeine (Tylenol w/ Codeine #3) 300-30 mg tablet   ALPRAZolam (Xanax) 1 mg tablet     Pharmacy--[id number] [place] [facility] [address]

## 2023-09-14 RX ORDER — ALPRAZOLAM 1 MG/1
1 TABLET ORAL 3 TIMES DAILY PRN
Qty: 90 TABLET | Refills: 0 | OUTPATIENT
Start: 2023-09-14 | End: 2023-10-14

## 2023-09-14 RX ORDER — ACETAMINOPHEN AND CODEINE PHOSPHATE 300; 30 MG/1; MG/1
1 TABLET ORAL EVERY 4 HOURS PRN
Qty: 28 TABLET | Refills: 0 | OUTPATIENT
Start: 2023-09-14

## 2023-09-21 DIAGNOSIS — G47.00 INSOMNIA, UNSPECIFIED TYPE: ICD-10-CM

## 2023-09-21 RX ORDER — TRAZODONE HYDROCHLORIDE 50 MG/1
50 TABLET ORAL NIGHTLY
Qty: 90 TABLET | Refills: 1 | Status: SHIPPED | OUTPATIENT
Start: 2023-09-21 | End: 2024-03-16

## 2023-09-24 DIAGNOSIS — E78.5 HYPERLIPIDEMIA, UNSPECIFIED: ICD-10-CM

## 2023-09-25 RX ORDER — LISINOPRIL 30 MG/1
30 TABLET ORAL DAILY
Qty: 30 TABLET | Refills: 2 | Status: SHIPPED | OUTPATIENT
Start: 2023-09-25 | End: 2023-12-30

## 2023-10-06 DIAGNOSIS — I10 HTN (HYPERTENSION), BENIGN: ICD-10-CM

## 2023-10-07 RX ORDER — HYDROCHLOROTHIAZIDE 25 MG/1
50 TABLET ORAL DAILY
Qty: 180 TABLET | Refills: 1 | Status: SHIPPED | OUTPATIENT
Start: 2023-10-07

## 2023-10-13 DIAGNOSIS — M54.12 RADICULOPATHY, CERVICAL REGION: ICD-10-CM

## 2023-10-13 DIAGNOSIS — R32 UNSPECIFIED URINARY INCONTINENCE: ICD-10-CM

## 2023-10-13 DIAGNOSIS — M54.16 RADICULOPATHY, LUMBAR REGION: ICD-10-CM

## 2023-10-13 DIAGNOSIS — M54.50 LOW BACK PAIN, UNSPECIFIED: ICD-10-CM

## 2023-10-13 DIAGNOSIS — E78.5 HYPERLIPIDEMIA, UNSPECIFIED: ICD-10-CM

## 2023-10-13 DIAGNOSIS — B37.2 YEAST DERMATITIS: ICD-10-CM

## 2023-10-15 RX ORDER — TIZANIDINE 4 MG/1
4 TABLET ORAL EVERY 6 HOURS PRN
Qty: 30 TABLET | Refills: 0 | Status: SHIPPED | OUTPATIENT
Start: 2023-10-15 | End: 2023-10-25

## 2023-10-15 RX ORDER — NYSTATIN 100000 [USP'U]/G
POWDER TOPICAL 2 TIMES DAILY
Qty: 60 G | Refills: 1 | Status: SHIPPED | OUTPATIENT
Start: 2023-10-15 | End: 2024-03-16

## 2023-10-15 RX ORDER — OXYBUTYNIN CHLORIDE 10 MG/1
TABLET, EXTENDED RELEASE ORAL
Qty: 90 TABLET | Refills: 0 | Status: SHIPPED | OUTPATIENT
Start: 2023-10-15 | End: 2024-01-10

## 2023-10-15 RX ORDER — ACETAMINOPHEN AND CODEINE PHOSPHATE 300; 30 MG/1; MG/1
1 TABLET ORAL EVERY 4 HOURS PRN
Qty: 28 TABLET | Refills: 0 | Status: SHIPPED | OUTPATIENT
Start: 2023-10-15 | End: 2023-11-09

## 2023-10-15 RX ORDER — LISINOPRIL 20 MG/1
TABLET ORAL
Qty: 90 TABLET | Refills: 0 | Status: SHIPPED | OUTPATIENT
Start: 2023-10-15 | End: 2023-10-19 | Stop reason: ALTCHOICE

## 2023-10-15 RX ORDER — GABAPENTIN 300 MG/1
300 CAPSULE ORAL 3 TIMES DAILY
Qty: 90 CAPSULE | Refills: 0 | Status: SHIPPED | OUTPATIENT
Start: 2023-10-15 | End: 2023-11-14 | Stop reason: SDUPTHER

## 2023-10-16 ENCOUNTER — OFFICE VISIT (OUTPATIENT)
Dept: PRIMARY CARE | Facility: CLINIC | Age: 58
End: 2023-10-16
Payer: COMMERCIAL

## 2023-10-16 VITALS
OXYGEN SATURATION: 96 % | HEIGHT: 62 IN | WEIGHT: 205.2 LBS | RESPIRATION RATE: 15 BRPM | DIASTOLIC BLOOD PRESSURE: 82 MMHG | BODY MASS INDEX: 37.76 KG/M2 | SYSTOLIC BLOOD PRESSURE: 118 MMHG | HEART RATE: 65 BPM

## 2023-10-16 DIAGNOSIS — Z79.899 MEDICATION MANAGEMENT: ICD-10-CM

## 2023-10-16 DIAGNOSIS — I10 HTN (HYPERTENSION), BENIGN: Primary | ICD-10-CM

## 2023-10-16 DIAGNOSIS — E78.2 MIXED HYPERLIPIDEMIA: ICD-10-CM

## 2023-10-16 DIAGNOSIS — J34.2 DEVIATED SEPTUM: ICD-10-CM

## 2023-10-16 DIAGNOSIS — E53.8 VITAMIN B12 DEFICIENCY: ICD-10-CM

## 2023-10-16 DIAGNOSIS — F33.41 RECURRENT MAJOR DEPRESSIVE DISORDER, IN PARTIAL REMISSION (CMS-HCC): ICD-10-CM

## 2023-10-16 DIAGNOSIS — I88.9 AXILLARY ADENITIS: ICD-10-CM

## 2023-10-16 DIAGNOSIS — E55.9 VITAMIN D DEFICIENCY: ICD-10-CM

## 2023-10-16 DIAGNOSIS — R10.9 RIGHT FLANK PAIN: ICD-10-CM

## 2023-10-16 DIAGNOSIS — Z23 NEED FOR INFLUENZA VACCINATION: ICD-10-CM

## 2023-10-16 DIAGNOSIS — N39.41 URGE INCONTINENCE OF URINE: ICD-10-CM

## 2023-10-16 DIAGNOSIS — E11.65 TYPE 2 DIABETES MELLITUS WITH HYPERGLYCEMIA, WITHOUT LONG-TERM CURRENT USE OF INSULIN (MULTI): ICD-10-CM

## 2023-10-16 DIAGNOSIS — E66.01 MORBID OBESITY (MULTI): ICD-10-CM

## 2023-10-16 DIAGNOSIS — F41.1 GENERALIZED ANXIETY DISORDER: ICD-10-CM

## 2023-10-16 LAB
POC APPEARANCE, URINE: CLEAR
POC BILIRUBIN, URINE: NEGATIVE
POC BLOOD, URINE: NEGATIVE
POC COLOR, URINE: YELLOW
POC FINGERSTICK BLOOD GLUCOSE: 108 MG/DL (ref 70–100)
POC GLUCOSE, URINE: NEGATIVE MG/DL
POC HEMOGLOBIN A1C: 6.2 % (ref 4.2–6.5)
POC KETONES, URINE: NEGATIVE MG/DL
POC LEUKOCYTES, URINE: NEGATIVE
POC NITRITE,URINE: NEGATIVE
POC PH, URINE: 5 PH
POC PROTEIN, URINE: ABNORMAL MG/DL
POC SPECIFIC GRAVITY, URINE: >=1.03
POC UROBILINOGEN, URINE: 0.2 EU/DL

## 2023-10-16 PROCEDURE — 80368 SEDATIVE HYPNOTICS: CPT

## 2023-10-16 PROCEDURE — 81002 URINALYSIS NONAUTO W/O SCOPE: CPT | Performed by: FAMILY MEDICINE

## 2023-10-16 PROCEDURE — 80346 BENZODIAZEPINES1-12: CPT

## 2023-10-16 PROCEDURE — 90686 IIV4 VACC NO PRSV 0.5 ML IM: CPT | Performed by: FAMILY MEDICINE

## 2023-10-16 PROCEDURE — 80365 DRUG SCREENING OXYCODONE: CPT

## 2023-10-16 PROCEDURE — 80373 DRUG SCREENING TRAMADOL: CPT

## 2023-10-16 PROCEDURE — 80307 DRUG TEST PRSMV CHEM ANLYZR: CPT

## 2023-10-16 PROCEDURE — 99214 OFFICE O/P EST MOD 30 MIN: CPT | Performed by: FAMILY MEDICINE

## 2023-10-16 PROCEDURE — 83036 HEMOGLOBIN GLYCOSYLATED A1C: CPT | Performed by: FAMILY MEDICINE

## 2023-10-16 PROCEDURE — 82962 GLUCOSE BLOOD TEST: CPT | Performed by: FAMILY MEDICINE

## 2023-10-16 PROCEDURE — 80361 OPIATES 1 OR MORE: CPT

## 2023-10-16 PROCEDURE — 80358 DRUG SCREENING METHADONE: CPT

## 2023-10-16 PROCEDURE — 80354 DRUG SCREENING FENTANYL: CPT

## 2023-10-16 PROCEDURE — 90471 IMMUNIZATION ADMIN: CPT | Performed by: FAMILY MEDICINE

## 2023-10-16 PROCEDURE — 82570 ASSAY OF URINE CREATININE: CPT

## 2023-10-16 RX ORDER — SULFAMETHOXAZOLE AND TRIMETHOPRIM 800; 160 MG/1; MG/1
1 TABLET ORAL 2 TIMES DAILY
Qty: 20 TABLET | Refills: 0 | Status: SHIPPED | OUTPATIENT
Start: 2023-10-16 | End: 2024-01-14 | Stop reason: WASHOUT

## 2023-10-16 RX ORDER — DOXYCYCLINE 100 MG/1
100 TABLET ORAL 2 TIMES DAILY
COMMUNITY
Start: 2023-09-02 | End: 2023-09-07

## 2023-10-16 RX ORDER — DICYCLOMINE HYDROCHLORIDE 10 MG/1
CAPSULE ORAL
COMMUNITY
Start: 2023-09-27 | End: 2024-02-20 | Stop reason: WASHOUT

## 2023-10-16 RX ORDER — ALPRAZOLAM 1 MG/1
1 TABLET ORAL 3 TIMES DAILY PRN
Qty: 90 TABLET | Refills: 0 | Status: SHIPPED | OUTPATIENT
Start: 2023-10-16 | End: 2023-11-15

## 2023-10-16 ASSESSMENT — ENCOUNTER SYMPTOMS
BLOOD IN STOOL: 0
HEMATURIA: 0
MYALGIAS: 0
ACTIVITY CHANGE: 0
SINUS PRESSURE: 0
EYE PAIN: 0
DYSURIA: 0
COLOR CHANGE: 0
CONFUSION: 0
NERVOUS/ANXIOUS: 0
POLYDIPSIA: 0
RHINORRHEA: 0
ABDOMINAL PAIN: 0
SHORTNESS OF BREATH: 0
FLANK PAIN: 0
RECTAL PAIN: 0
CHEST TIGHTNESS: 0
ADENOPATHY: 0
FEVER: 0
SINUS PAIN: 0
NECK STIFFNESS: 0
FATIGUE: 0
DIZZINESS: 0
APPETITE CHANGE: 0
COUGH: 0
DYSPHORIC MOOD: 0
AGITATION: 0
SORE THROAT: 0
SLEEP DISTURBANCE: 0
PHOTOPHOBIA: 0
DECREASED CONCENTRATION: 0
HEADACHES: 0
CONSTITUTIONAL NEGATIVE: 1
POLYPHAGIA: 0
ABDOMINAL DISTENTION: 0
TROUBLE SWALLOWING: 0
DIARRHEA: 0
PALPITATIONS: 0
STRIDOR: 0
ARTHRALGIAS: 0
SEIZURES: 0
SPEECH DIFFICULTY: 0
CONSTIPATION: 0

## 2023-10-16 NOTE — PATIENT INSTRUCTIONS
Follow up in 2 months.    Continue current medications and therapy for chronic medical conditions.    Patient was advised importance of proper diet/nutrition in addition adequate hydration. Patient was encouraged moderate exercise program to include 30 minutes daily for 5 days of the week or 150 minutes weekly. Patient will follow-up with us as scheduled.    I personally reviewed the OARRS report for this patient. I have considered the risks of abuse, dependence, addiction, and diversion.    UDS: 10/16/2023    IO A1C AND GLUCOSE TODAY     REFERRED TO DERMATOLOGY     REFERRED TO ENT

## 2023-10-16 NOTE — PROGRESS NOTES
Subjective   Patient ID: Leanna Pagan is a 58 y.o. female who presents for Mass and Rash.    Patient is here for her 6 weeks follow up on anxiety disorder.    Patient states having neck pain and rash x 1 week. Patient states her rash and pain from the neck and right shoulder. Patient states be afraid that a tick or spider bit her.    Patient have a lumps in her right armpit that is painful and is growing up. Patient have it x 3 months now, she states that it come smaller and now more bigger. Patient described the pain burning and stabling.     Patient is having a pain in her left side of the hip and abdomen.    Patient states have problem with her liver, and sometimes feels pain between her chest and stomach.    Rash  This is a new problem. The current episode started in the past 7 days. The problem has been gradually worsening since onset. The affected locations include the right shoulder. The rash is characterized by itchiness and pain. She was exposed to nothing. Pertinent negatives include no congestion, cough, diarrhea, eye pain, fatigue, fever, rhinorrhea, shortness of breath or sore throat. Past treatments include anti-itch cream. The treatment provided no relief.        Review of Systems   Constitutional: Negative.  Negative for activity change, appetite change, fatigue and fever.   HENT:  Negative for congestion, dental problem, ear discharge, ear pain, mouth sores, rhinorrhea, sinus pressure, sinus pain, sore throat, tinnitus and trouble swallowing.    Eyes:  Negative for photophobia, pain and visual disturbance.   Respiratory:  Negative for cough, chest tightness, shortness of breath and stridor.    Cardiovascular:  Negative for chest pain and palpitations.   Gastrointestinal:  Negative for abdominal distention, abdominal pain, blood in stool, constipation, diarrhea and rectal pain.   Endocrine: Negative for cold intolerance, heat intolerance, polydipsia, polyphagia and polyuria.   Genitourinary:   "Negative for dysuria, flank pain, hematuria and urgency.   Musculoskeletal:  Negative for arthralgias, gait problem, myalgias and neck stiffness.   Skin:  Positive for rash. Negative for color change.   Allergic/Immunologic: Negative for environmental allergies and food allergies.   Neurological:  Negative for dizziness, seizures, syncope, speech difficulty and headaches.   Hematological:  Negative for adenopathy.   Psychiatric/Behavioral:  Negative for agitation, confusion, decreased concentration, dysphoric mood and sleep disturbance. The patient is not nervous/anxious.        Objective   /82 (BP Location: Right arm, Patient Position: Sitting, BP Cuff Size: Adult)   Pulse 65   Resp 15   Ht 1.575 m (5' 2\")   Wt 93.1 kg (205 lb 3.2 oz)   SpO2 96%   BMI 37.53 kg/m²     Physical Exam  Vitals reviewed.   Constitutional:       General: She is not in acute distress.     Appearance: She is obese. She is not ill-appearing or diaphoretic.   HENT:      Head: Normocephalic.      Right Ear: Tympanic membrane and external ear normal.      Left Ear: Tympanic membrane and external ear normal.      Nose: Nose normal. No congestion.      Mouth/Throat:      Pharynx: No posterior oropharyngeal erythema.   Eyes:      General:         Right eye: No discharge.         Left eye: No discharge.      Extraocular Movements: Extraocular movements intact.      Conjunctiva/sclera: Conjunctivae normal.      Pupils: Pupils are equal, round, and reactive to light.   Cardiovascular:      Rate and Rhythm: Normal rate and regular rhythm.      Pulses: Normal pulses.      Heart sounds: Normal heart sounds. No murmur heard.  Pulmonary:      Effort: Pulmonary effort is normal. No respiratory distress.      Breath sounds: Normal breath sounds. No wheezing or rales.   Chest:      Chest wall: No tenderness.   Abdominal:      General: Bowel sounds are normal. There is distension.      Palpations: There is no mass.      Tenderness: There is no " abdominal tenderness. There is no guarding.   Musculoskeletal:         General: No tenderness. Normal range of motion.      Cervical back: Normal range of motion and neck supple. No tenderness.      Right lower leg: No edema.      Left lower leg: No edema.   Skin:     General: Skin is dry.      Coloration: Skin is not jaundiced.      Findings: No bruising, erythema or rash.   Neurological:      General: No focal deficit present.      Mental Status: She is alert and oriented to person, place, and time. Mental status is at baseline.      Cranial Nerves: No cranial nerve deficit.      Sensory: No sensory deficit.      Coordination: Coordination normal.      Gait: Gait normal.   Psychiatric:         Thought Content: Thought content normal.         Judgment: Judgment normal.      Comments: Anxious         Assessment/Plan   Problem List Items Addressed This Visit             ICD-10-CM    Anxiety disorder F41.9    Relevant Medications    ALPRAZolam (Xanax) 1 mg tablet    HTN (hypertension), benign - Primary I10    Hyperlipidemia E78.5    Morbid obesity (CMS/[facility]) E66.01    Recurrent major depressive disorder, in partial remission (CMS/[facility]) F33.41    Urinary incontinence R32    Relevant Orders    POCT UA (nonautomated) manually resulted (Completed)    Vitamin B12 deficiency E53.8    Vitamin D deficiency E55.9     Other Visit Diagnoses         Codes    Type 2 diabetes mellitus with hyperglycemia, without long-term current use of insulin (CMS/[facility])     E11.65    Relevant Orders    POCT Fingerstick Glucose manually resulted (Completed)    POCT glycosylated hemoglobin (Hb A1C) manually resulted (Completed)    Need for influenza vaccination     Z23    Relevant Orders    Flu vaccine (IIV4) age 6 months and greater, preservative free (Completed)    Medication management     Z79.899    Relevant Orders    Opiate/Opioid/Benzo Extended Prescription Compliance (Completed)    OOB Internal Tracking (Completed)    Axillary adenitis      I88.9    Relevant Medications    sulfamethoxazole-trimethoprim (Bactrim DS) 800-160 mg tablet    Other Relevant Orders    Referral to Dermatology    Deviated septum     J34.2    Relevant Orders    Referral to ENT    Right flank pain     R10.9    Relevant Orders    US renal complete              Scribe Attestation  By signing my name below, Chayo HARRISON RMA, Scribe   attest that this documentation has been prepared under the direction and in the presence of Sergo Alexander DO.   Provider Attestation - Scribe documentation    All medical record entries made by the Scribe were at my direction and personally dictated by me. I have reviewed the chart and agree that the record accurately reflects my personal performance of the history, physical exam, discussion and plan.

## 2023-10-17 ENCOUNTER — OFFICE VISIT (OUTPATIENT)
Dept: ENDOCRINOLOGY | Age: 58
End: 2023-10-17

## 2023-10-17 VITALS
OXYGEN SATURATION: 97 % | HEIGHT: 62 IN | SYSTOLIC BLOOD PRESSURE: 138 MMHG | DIASTOLIC BLOOD PRESSURE: 88 MMHG | WEIGHT: 208 LBS | HEART RATE: 73 BPM | BODY MASS INDEX: 38.28 KG/M2

## 2023-10-17 DIAGNOSIS — E66.9 OBESITY (BMI 30-39.9): ICD-10-CM

## 2023-10-17 DIAGNOSIS — R07.9 CHEST PAIN, UNSPECIFIED TYPE: ICD-10-CM

## 2023-10-17 DIAGNOSIS — E03.9 ACQUIRED HYPOTHYROIDISM: ICD-10-CM

## 2023-10-17 DIAGNOSIS — E11.65 INADEQUATELY CONTROLLED DIABETES MELLITUS (HCC): Primary | ICD-10-CM

## 2023-10-17 LAB
AMPHETAMINES UR QL SCN: NORMAL
BARBITURATES UR QL SCN: NORMAL
BZE UR QL SCN: NORMAL
CANNABINOIDS UR QL SCN: NORMAL
CHP ED QC CHECK: NORMAL
CREAT UR-MCNC: 233 MG/DL (ref 20–320)
GLUCOSE BLD-MCNC: 100 MG/DL
HBA1C MFR BLD: 6.4 %
PCP UR QL SCN: NORMAL

## 2023-10-17 RX ORDER — DULAGLUTIDE 1.5 MG/.5ML
1.5 INJECTION, SOLUTION SUBCUTANEOUS WEEKLY
Qty: 4 ADJUSTABLE DOSE PRE-FILLED PEN SYRINGE | Refills: 3 | Status: SHIPPED | OUTPATIENT
Start: 2023-10-17

## 2023-10-17 NOTE — PROGRESS NOTES
Disp: 12 tablet, Rfl: 0  Lab Results   Component Value Date     07/27/2023    K 2.9 (LL) 07/27/2023     07/27/2023    CO2 28 07/27/2023    BUN 11 07/27/2023    CREATININE 1.0 07/28/2023    GLUCOSE 100 10/17/2023    CALCIUM 9.9 07/27/2023    PROT 7.3 07/27/2023    LABALBU 4.9 (H) 07/27/2023    BILITOT 0.3 07/27/2023    ALKPHOS 94 07/27/2023    AST 30 07/27/2023    ALT 39 (H) 07/27/2023    LABGLOM >60 07/28/2023    GFRAA >60 04/14/2022    GLOB 2.4 07/27/2023     Lab Results   Component Value Date    WBC 8.1 07/27/2023    HGB 13.5 07/27/2023    HCT 40.2 07/27/2023    MCV 85.8 07/27/2023     07/27/2023     Lab Results   Component Value Date    LABA1C 6.4 10/17/2023    LABA1C 6.6 07/14/2023    LABA1C 6.4 (H) 01/24/2023     Lab Results   Component Value Date    CHOLFAST 191 10/22/2019    TRIGLYCFAST 213 (H) 10/22/2019    HDL 23 (L) 01/23/2023    HDL 41 01/27/2021    HDL 39 (L) 10/22/2019    LDLCALC 64 01/23/2023    LDLCALC 117 01/27/2021    LDLCALC 109 10/22/2019    CHOL 129 01/23/2023    CHOL 202 (H) 01/27/2021    CHOL 213 (H) 07/13/2018    TRIG 211 (H) 01/23/2023    TRIG 222 (H) 01/27/2021    TRIG 264 (H) 07/13/2018     No results found for: \"TESTM\"  Lab Results   Component Value Date    TSH 0.99 11/11/2022    TSH 2.010 10/14/2021    TSH 0.308 (L) 01/27/2021    FT3 2.7 07/29/2012    FT3 2.5 10/21/2011    T4FREE 1.30 10/22/2019    T4FREE 1.28 07/14/2018    T4FREE 1.97 (H) 04/06/2018     No results found for: \"TPOABS\"    Review of Systems   Constitutional:  Positive for fatigue, unexpected weight change and weight gain. Cardiovascular:  Positive for chest pain. Endocrine: Negative for polyuria. Psychiatric/Behavioral:  Positive for dysphoric mood. All other systems reviewed and are negative. Objective:   Physical Exam  Vitals reviewed. Constitutional:       General: She is not in acute distress. Appearance: Normal appearance. She is obese.    HENT:      Head: Normocephalic and

## 2023-10-19 ENCOUNTER — OFFICE VISIT (OUTPATIENT)
Dept: ORTHOPEDIC SURGERY | Facility: CLINIC | Age: 58
End: 2023-10-19
Payer: COMMERCIAL

## 2023-10-19 DIAGNOSIS — G89.29 CHRONIC LOW BACK PAIN, UNSPECIFIED BACK PAIN LATERALITY, UNSPECIFIED WHETHER SCIATICA PRESENT: ICD-10-CM

## 2023-10-19 DIAGNOSIS — M54.50 CHRONIC LOW BACK PAIN, UNSPECIFIED BACK PAIN LATERALITY, UNSPECIFIED WHETHER SCIATICA PRESENT: ICD-10-CM

## 2023-10-19 DIAGNOSIS — M48.062 LUMBAR STENOSIS WITH NEUROGENIC CLAUDICATION: Primary | ICD-10-CM

## 2023-10-19 LAB
1OH-MIDAZOLAM UR CFM-MCNC: <25 NG/ML
6MAM UR CFM-MCNC: <25 NG/ML
7AMINOCLONAZEPAM UR CFM-MCNC: <25 NG/ML
A-OH ALPRAZ UR CFM-MCNC: 322 NG/ML
ALPRAZ UR CFM-MCNC: 310 NG/ML
CHLORDIAZEP UR CFM-MCNC: <25 NG/ML
CLONAZEPAM UR CFM-MCNC: <25 NG/ML
CODEINE UR CFM-MCNC: <50 NG/ML
DIAZEPAM UR CFM-MCNC: <25 NG/ML
EDDP UR CFM-MCNC: <25 NG/ML
FENTANYL UR CFM-MCNC: <2.5 NG/ML
HYDROCODONE CTO UR CFM-MCNC: <25 NG/ML
HYDROMORPHONE UR CFM-MCNC: <25 NG/ML
LORAZEPAM UR CFM-MCNC: <25 NG/ML
METHADONE UR CFM-MCNC: <25 NG/ML
MIDAZOLAM UR CFM-MCNC: <25 NG/ML
MORPHINE UR CFM-MCNC: >2500 NG/ML
NORDIAZEPAM UR CFM-MCNC: <25 NG/ML
NORFENTANYL UR CFM-MCNC: <2.5 NG/ML
NORHYDROCODONE UR CFM-MCNC: 41 NG/ML
NOROXYCODONE UR CFM-MCNC: <25 NG/ML
NORTRAMADOL UR-MCNC: <50 NG/ML
OXAZEPAM UR CFM-MCNC: <25 NG/ML
OXYCODONE UR CFM-MCNC: <25 NG/ML
OXYMORPHONE UR CFM-MCNC: <25 NG/ML
TEMAZEPAM UR CFM-MCNC: <25 NG/ML
TRAMADOL UR CFM-MCNC: <50 NG/ML
ZOLPIDEM UR CFM-MCNC: <25 NG/ML
ZOLPIDEM UR-MCNC: <25 NG/ML

## 2023-10-19 PROCEDURE — 3008F BODY MASS INDEX DOCD: CPT | Performed by: PHYSICAL MEDICINE & REHABILITATION

## 2023-10-19 PROCEDURE — 99204 OFFICE O/P NEW MOD 45 MIN: CPT | Performed by: PHYSICAL MEDICINE & REHABILITATION

## 2023-10-19 PROCEDURE — 1036F TOBACCO NON-USER: CPT | Performed by: PHYSICAL MEDICINE & REHABILITATION

## 2023-10-19 SDOH — SOCIAL STABILITY: SOCIAL NETWORK: SOCIAL ACTIVITY:: 7

## 2023-10-19 NOTE — PROGRESS NOTES
PM&R  / Ortho clinic eval:    IMPRESSION:   Severe L4-5 Stenosis with neurogenic claudication - somewhat improved but still quite limited    RECOMMENDATIONS:    Continue Gabapentin and T#3, tizandine  - could increase moreno to 300-300-600.  If moreno not helping up to 600 TID then consider change to topiramate  - Schedule L5 interlaminar epidural injection with lower volume and sedation- L4 by dr Salguero and ?TF by Dr Saenz were both painful  - return to PT before or after epidural - aquatic therapy to start  -finish kidney stone workup with Dr Alexander  f/u ~2mo    Diagnoses and plan discussed with the patient, patient educated on above diagnoses and treatments, including alternatives     Servando Garcia MD, FAAPMR, R-MSK  Chief, Division of PM&R  Board Certified in PM&R and Sports Medicine    Carbon copy : Benjamin BELL  ____________________________________________________________________    10/19/2023    CC: Patient complains of LP and B/L LE pain    HPI:   disabled woman  seen at request of Dr Alexander PCP  for  another opinion re pain mgmt for stenosis - L4-5 severe.  Has had for many years, saw Dr Saenz in [place], and Dr Salguero at [facility].  Many years LBP but much more severe Aug '22 thru Feb '23. Mostly LBP now but last year was both rad to LE with numb/tingle.    The pain is mostly achy,  increases with walking > 10-15min and is relieved by sitting or lying.    Pain scale  7/10 on average and 8/10 worst pain in past week.      Patient reports no fevers, chills, sweats, night pain, weight loss or cancer history , no b/ bowel    I reviewed the Orthopedic Surgery Adult Patient History Sheet from 10/19/2023  as scanned into the patient's chart/EMR including pertinent history and ROS.      Pertinent Physical Exam:  MSK: Lumbar Spine: Severely reduced  ROM.  pain in ext very guarded, diffusely tender to palpation, no pelvic tilt,  SI joint maneuvers negative /nonspecific gen.  Str Leg Raise negative mildly posb/l,  hip provocative maneuvers negative     Neuro: Normal strength, bulk and tone of lower limbs bilaterally except 4/5 weakness b/l EHL somewhat pain -related.  Reflexes 1+ b/l Sensation nl    SUPPORTING DOCUMENTATION (remaining history, exam, other findings):  Work-up reviewed - this has included MRI lumbar Nov '22 - severe L4 Central stenosis     Treatment has included above- PT too painful last year. L4 ILESI dr Salguero very painful, Meds per above    See above for Assessment and Plan

## 2023-10-19 NOTE — LETTER
October 19, 2023     Sergo Alexander DO  [address]  [place] [address]    Patient: Leanna Pagan   YOB: 1965   Date of Visit: 10/19/2023       Dear Dr. Sergo Alexander DO:    Thank you for referring Leanna Pagan to me for evaluation. Below are my notes for this consultation.  If you have questions, please do not hesitate to call me. I look forward to following your patient along with you.       Sincerely,     Servando Garcia MD      CC: No Recipients  ______________________________________________________________________________________    PM&R  / Ortho clinic eval:    IMPRESSION:   Severe L4-5 Stenosis with neurogenic claudication - somewhat improved but still quite limited    RECOMMENDATIONS:    Continue Gabapentin and T#3, tizandine  - could increase moreno to 300-300-600.  If moreno not helping up to 600 TID then consider change to topiramate  - Schedule L5 interlaminar epidural injection with lower volume and sedation- L4 by dr Salguero and ?TF by Dr Saenz were both painful  - return to PT before or after epidural - aquatic therapy to start  -finish kidney stone workup with Dr Alexander  f/u ~2mo    Diagnoses and plan discussed with the patient, patient educated on above diagnoses and treatments, including alternatives     Servando Garcia MD, FAAPMR, R-MSK  Chief, Division of PM&R  Board Certified in PM&R and Sports Medicine    Carbon copy : Benjamin FYI  ____________________________________________________________________    10/19/2023    CC: Patient complains of LP and B/L LE pain    HPI:   disabled woman  seen at request of Dr Alexander PCP  for  another opinion re pain mgmt for stenosis - L4-5 severe.  Has had for many years, saw Dr Saenz in [place], and Dr Salguero at [facility].  Many years LBP but much more severe Aug '22 thru Feb '23. Mostly LBP now but last year was both rad to LE with numb/tingle.    The pain is mostly achy,  increases with walking > 10-15min and is relieved by  sitting or lying.    Pain scale  7/10 on average and 8/10 worst pain in past week.      Patient reports no fevers, chills, sweats, night pain, weight loss or cancer history , no b/ bowel    I reviewed the Orthopedic Surgery Adult Patient History Sheet from 10/19/2023  as scanned into the patient's chart/EMR including pertinent history and ROS.      Pertinent Physical Exam:  MSK: Lumbar Spine: Severely reduced  ROM.  pain in ext very guarded, diffusely tender to palpation, no pelvic tilt,  SI joint maneuvers negative /nonspecific gen.  Str Leg Raise negative mildly posb/l, hip provocative maneuvers negative     Neuro: Normal strength, bulk and tone of lower limbs bilaterally except 4/5 weakness b/l EHL somewhat pain -related.  Reflexes 1+ b/l Sensation nl    SUPPORTING DOCUMENTATION (remaining history, exam, other findings):  Work-up reviewed - this has included MRI lumbar Nov '22 - severe L4 Central stenosis     Treatment has included above- PT too painful last year. L4 ILESI dr Salguero very painful, Meds per above    See above for Assessment and Plan

## 2023-10-22 PROBLEM — G11.19: Status: RESOLVED | Noted: 2023-04-10 | Resolved: 2023-10-22

## 2023-10-24 DIAGNOSIS — M54.16 RADICULOPATHY, LUMBAR REGION: ICD-10-CM

## 2023-10-24 DIAGNOSIS — M54.12 RADICULOPATHY, CERVICAL REGION: ICD-10-CM

## 2023-10-25 RX ORDER — TIZANIDINE 4 MG/1
4 TABLET ORAL EVERY 6 HOURS PRN
Qty: 30 TABLET | Refills: 0 | Status: SHIPPED | OUTPATIENT
Start: 2023-10-25 | End: 2023-12-30

## 2023-10-31 DIAGNOSIS — E55.9 VITAMIN D DEFICIENCY, UNSPECIFIED: ICD-10-CM

## 2023-10-31 RX ORDER — VIT C/E/ZN/COPPR/LUTEIN/ZEAXAN 250MG-90MG
CAPSULE ORAL
Qty: 180 CAPSULE | Refills: 1 | Status: SHIPPED | OUTPATIENT
Start: 2023-10-31 | End: 2024-05-28 | Stop reason: SDUPTHER

## 2023-11-01 ENCOUNTER — ANCILLARY PROCEDURE (OUTPATIENT)
Dept: RADIOLOGY | Facility: CLINIC | Age: 58
End: 2023-11-01
Payer: COMMERCIAL

## 2023-11-01 DIAGNOSIS — R10.9 RIGHT FLANK PAIN: ICD-10-CM

## 2023-11-01 PROCEDURE — 76770 US EXAM ABDO BACK WALL COMP: CPT | Performed by: STUDENT IN AN ORGANIZED HEALTH CARE EDUCATION/TRAINING PROGRAM

## 2023-11-01 PROCEDURE — 76770 US EXAM ABDO BACK WALL COMP: CPT

## 2023-11-04 DIAGNOSIS — B37.2 YEAST DERMATITIS: ICD-10-CM

## 2023-11-04 DIAGNOSIS — F41.9 ANXIETY DISORDER, UNSPECIFIED: ICD-10-CM

## 2023-11-04 DIAGNOSIS — M54.16 RADICULOPATHY, LUMBAR REGION: ICD-10-CM

## 2023-11-09 RX ORDER — ACETAMINOPHEN AND CODEINE PHOSPHATE 300; 30 MG/1; MG/1
1 TABLET ORAL EVERY 4 HOURS PRN
Qty: 28 TABLET | Refills: 0 | Status: SHIPPED | OUTPATIENT
Start: 2023-11-09 | End: 2023-12-14 | Stop reason: SDUPTHER

## 2023-11-09 RX ORDER — NYSTATIN 100000 U/G
CREAM TOPICAL 2 TIMES DAILY
Qty: 60 G | Refills: 1 | Status: SHIPPED | OUTPATIENT
Start: 2023-11-09

## 2023-11-09 RX ORDER — DULOXETIN HYDROCHLORIDE 30 MG/1
CAPSULE, DELAYED RELEASE ORAL
Qty: 90 CAPSULE | Refills: 1 | Status: SHIPPED | OUTPATIENT
Start: 2023-11-09 | End: 2024-05-28 | Stop reason: SDUPTHER

## 2023-11-09 NOTE — TELEPHONE ENCOUNTER
PT NEEDS REFILLS ON  GABAPENTIN 300MG  LEVOTHYROXINE 112MCG  XANAX 1MG  TIZANIDINE 4MG  AMLODIPINE 10MG  PREDNISONE  [facility] [place] ON [address]

## 2023-11-14 DIAGNOSIS — E11.65 TYPE 2 DIABETES MELLITUS WITH HYPERGLYCEMIA, WITHOUT LONG-TERM CURRENT USE OF INSULIN (MULTI): ICD-10-CM

## 2023-11-14 DIAGNOSIS — E03.9 HYPOTHYROIDISM, UNSPECIFIED: ICD-10-CM

## 2023-11-14 DIAGNOSIS — M54.50 LOW BACK PAIN, UNSPECIFIED: ICD-10-CM

## 2023-11-14 DIAGNOSIS — E87.6 HYPOKALEMIA: ICD-10-CM

## 2023-11-14 RX ORDER — PREDNISONE 20 MG/1
TABLET ORAL
COMMUNITY
Start: 2023-10-24 | End: 2024-01-14 | Stop reason: WASHOUT

## 2023-11-14 RX ORDER — PREDNISONE 20 MG/1
TABLET ORAL
Status: CANCELLED | OUTPATIENT
Start: 2023-11-14

## 2023-11-14 NOTE — TELEPHONE ENCOUNTER
Dr. Alexander patient  Patient is calling for renewal on Gabapentin, Levothyroxine, Amlodipine and Prednisone  [facility] [place] on [address]

## 2023-11-15 DIAGNOSIS — F41.1 GENERALIZED ANXIETY DISORDER: ICD-10-CM

## 2023-11-15 DIAGNOSIS — M54.50 LOW BACK PAIN, UNSPECIFIED: ICD-10-CM

## 2023-11-15 DIAGNOSIS — E03.9 HYPOTHYROIDISM, UNSPECIFIED: ICD-10-CM

## 2023-11-15 RX ORDER — LEVOTHYROXINE SODIUM 112 UG/1
TABLET ORAL
Qty: 90 TABLET | Refills: 1 | Status: SHIPPED | OUTPATIENT
Start: 2023-11-15

## 2023-11-15 RX ORDER — AMLODIPINE BESYLATE 10 MG/1
5 TABLET ORAL DAILY
Qty: 90 TABLET | Refills: 0 | Status: SHIPPED | OUTPATIENT
Start: 2023-11-15 | End: 2024-05-14

## 2023-11-15 RX ORDER — ALPRAZOLAM 1 MG/1
1 TABLET ORAL 3 TIMES DAILY PRN
Qty: 90 TABLET | Refills: 0 | Status: SHIPPED | OUTPATIENT
Start: 2023-11-15 | End: 2023-12-14 | Stop reason: SDUPTHER

## 2023-11-15 RX ORDER — LEVOTHYROXINE SODIUM 112 UG/1
112 TABLET ORAL DAILY
Qty: 30 TABLET | Refills: 5 | Status: SHIPPED | OUTPATIENT
Start: 2023-11-15 | End: 2024-05-29 | Stop reason: WASHOUT

## 2023-11-15 RX ORDER — GABAPENTIN 300 MG/1
300 CAPSULE ORAL 3 TIMES DAILY
Qty: 90 CAPSULE | Refills: 0 | Status: SHIPPED | OUTPATIENT
Start: 2023-11-15 | End: 2023-12-30

## 2023-11-15 RX ORDER — GABAPENTIN 300 MG/1
300 CAPSULE ORAL 3 TIMES DAILY
Qty: 90 CAPSULE | Refills: 0 | Status: SHIPPED | OUTPATIENT
Start: 2023-11-15 | End: 2024-02-20 | Stop reason: WASHOUT

## 2023-11-17 ENCOUNTER — TELEMEDICINE (OUTPATIENT)
Dept: PRIMARY CARE | Facility: CLINIC | Age: 58
End: 2023-11-17
Payer: COMMERCIAL

## 2023-11-17 DIAGNOSIS — J00 NASOPHARYNGITIS: Primary | ICD-10-CM

## 2023-11-17 DIAGNOSIS — J34.89 NASAL CONGESTION WITH RHINORRHEA: ICD-10-CM

## 2023-11-17 DIAGNOSIS — R05.9 COUGH IN ADULT PATIENT: ICD-10-CM

## 2023-11-17 DIAGNOSIS — R09.81 NASAL CONGESTION WITH RHINORRHEA: ICD-10-CM

## 2023-11-17 DIAGNOSIS — U07.1 COVID: ICD-10-CM

## 2023-11-17 PROCEDURE — 99214 OFFICE O/P EST MOD 30 MIN: CPT | Performed by: NURSE PRACTITIONER

## 2023-11-17 RX ORDER — BENZONATATE 200 MG/1
200 CAPSULE ORAL 3 TIMES DAILY PRN
Qty: 20 CAPSULE | Refills: 0 | Status: SHIPPED | OUTPATIENT
Start: 2023-11-17 | End: 2023-11-24

## 2023-11-17 RX ORDER — AZITHROMYCIN 500 MG/1
500 TABLET, FILM COATED ORAL DAILY
Qty: 7 TABLET | Refills: 0 | Status: SHIPPED | OUTPATIENT
Start: 2023-11-17 | End: 2023-11-24

## 2023-11-17 NOTE — PROGRESS NOTES
"Subjective   Patient ID: Karina Pagan \"Henry" is a 58 y.o. female who is with a chief complaint of symptoms of respiratory tract infection.     HPI  Patient is a 58 y.o. female who CONSULTED AT [facility] - PHONE ONLY today. Patient is with complaints of nasal congestion, nasal discharge, headache / sinus pain, sore throat, cough, fatigue, muscle ache, loss of sense of smell, chills and fever. She denies having loss of sense of taste nor diarrhea. Patient states that present condition started yesterday after being exposed to her boyfriend who recently tested positive for COVID. she denies shortness of breath, chest pain, palpitations, nor edema. she stated that she  tried OTC medications which afforded only slight relief of symptoms. she denies nausea, vomiting, abdominal pain, nor any other symptoms.    Patient states she had not received any COVID vaccine yet.  Patient states she had the flu shot for this season.  Patient states she underwent testing for COVID yesterday and the result was POSITIVE.    Review of Systems  General: no weight loss, generally healthy, (+) fatigue  Head: (+) headaches / sinus pain, no vertigo, no injury  Eyes: no diplopia, no tearing, no pain,   Ears: no change in hearing, no tinnitus, no bleeding, no vertigo  Mouth:  no dental difficulties, no gingival bleeding, (+) sore throat, no loss of sense of taste  Nose: (+) congestion, (+) discharge, no bleeding, no obstruction, (+) loss of sense of smell  Neck: no stiffness, no pain, no tenderness, no masses, no bruit  Pulmonary: no dyspnea, no wheezing, no hemoptysis, (+) cough  Cardiovascular: no chest pain, no palpitations, no syncope, no orthopnea  Gastrointestinal: no change in appetite, no dysphagia, no abdominal pains, no diarrhea, no emesis, no melena  Genito Urinary: no dysuria, no urinary urgency, no nocturia, no incontinence, no change in nature of urine  Musculoskeletal: (+) muscle ache, no joint pain, no " limitation of range of motion, no paresthesia, no numbness  Constitutional: (+) fever, (+) chills, no night sweats    Objective   NO VITAL SIGNS TAKEN FOR THIS PATIENT (VIRTUAL VISIT CONSULT - PHONE ONLY)    Physical Exam  PHYSICAL EXAMINATION WAS NOT DONE FOR THIS PATIENT (VIRTUAL VISIT CONSULT - PHONE ONLY)    Assessment/Plan   Problem List Items Addressed This Visit    None  Visit Diagnoses         Codes    Nasopharyngitis    -  Primary J00    Relevant Medications    azithromycin (Zithromax) 500 mg tablet    Cough in adult patient     R05.9    Relevant Medications    azithromycin (Zithromax) 500 mg tablet    benzonatate (Tessalon) 200 mg capsule    Nasal congestion with rhinorrhea     R09.81, J34.89    Relevant Medications    azithromycin (Zithromax) 500 mg tablet    COVID     U07.1    Relevant Medications    azithromycin (Zithromax) 500 mg tablet        DISCHARGE SUMMARY:   Diagnosis, treatment, treatment options, and possible complications of today's illness discussed and explained to patient. Patient to take medication/s associated with this visit. Patient may also take OTC analgesic/antipyretic if needed for pain/fever. Advised to increase oral fluid intake. Advised steam inhalation if needed to relieve congestion. Advised warm saline gargle if needed to relieve throat discomfort. Advised to follow instructions with regards to COVID testing. Advised on social distancing and communicability prevention. Patient to call back if with worsening or persistent symptoms. Patient verbalized understanding of plan of care.    Patient educated on the antiviral medication paxlovid. Patient given explanation on the possible side effects, indications, interactions, and contraindications of using this medication. Patient decided to decline medication at this time but agreed to call if decision changes.    Patient to come back in 7 - 10 days if needed for worsening symptoms.

## 2023-11-18 NOTE — PATIENT INSTRUCTIONS
DISCHARGE SUMMARY:   Diagnosis, treatment, treatment options, and possible complications of today's illness discussed and explained to patient. Patient to take medication/s associated with this visit. Patient may also take OTC analgesic/antipyretic if needed for pain/fever. Advised to increase oral fluid intake. Advised steam inhalation if needed to relieve congestion. Advised warm saline gargle if needed to relieve throat discomfort. Advised to follow instructions with regards to COVID testing. Advised on social distancing and communicability prevention. Patient to call back if with worsening or persistent symptoms. Patient verbalized understanding of plan of care.    Patient educated on the antiviral medication paxlovid. Patient given explanation on the possible side effects, indications, interactions, and contraindications of using this medication. Patient decided to decline medication at this time but agreed to call if decision changes.    Patient to come back in 7 - 10 days if needed for worsening symptoms.     [Alert] : alert [No Acute Distress] : no acute distress [Normocephalic] : normocephalic [Anterior [place] Closed] : anterior fontanelle closed [Red Reflex Bilateral] : red reflex bilateral [PERRL] : PERRL [Normally Placed Ears] : normally placed ears [Auricles Well Formed] : auricles well formed [Clear Tympanic membranes with present light reflex and bony landmarks] : clear tympanic membranes with present light reflex and bony landmarks [No Discharge] : no discharge [Nares Patent] : nares patent [Palate Intact] : palate intact [Uvula Midline] : uvula midline [Tooth Eruption] : tooth eruption  [Supple, full passive range of motion] : supple, full passive range of motion [No Palpable Masses] : no palpable masses [Symmetric Chest Rise] : symmetric chest rise [Clear to Auscultation Bilaterally] : clear to auscultation bilaterally [Regular Rate and Rhythm] : regular rate and rhythm [S1, S2 present] : S1, S2 present [No Murmurs] : no murmurs [+2 Femoral Pulses] : +2 femoral pulses [Soft] : soft [NonTender] : non tender [Non Distended] : non distended [Normoactive Bowel Sounds] : normoactive bowel sounds [No Hepatomegaly] : no hepatomegaly [No Splenomegaly] : no splenomegaly [Central Urethral Opening] : central urethral opening [Testicles Descended Bilaterally] : testicles descended bilaterally [Patent] : patent [Normally Placed] : normally placed [No Abnormal Lymph Nodes Palpated] : no abnormal lymph nodes palpated [No Clavicular Crepitus] : no clavicular crepitus [Negative Fong-Ortalani] : negative Fong-Ortalani [Symmetric Buttocks Creases] : symmetric buttocks creases [No Spinal Dimple] : no spinal dimple [NoTuft of Hair] : no tuft of hair [Cranial Nerves Grossly Intact] : cranial nerves grossly intact [No Rash or Lesions] : no rash or lesions

## 2023-11-19 ENCOUNTER — HOSPITAL ENCOUNTER (EMERGENCY)
Age: 58
Discharge: HOME | End: 2023-11-19
Attending: EMERGENCY MEDICINE
Payer: COMMERCIAL

## 2023-11-19 ENCOUNTER — APPOINTMENT (OUTPATIENT)
Dept: CT IMAGING | Age: 58
End: 2023-11-19
Payer: COMMERCIAL

## 2023-11-19 VITALS
HEART RATE: 64 BPM | BODY MASS INDEX: 36.8 KG/M2 | HEIGHT: 62 IN | DIASTOLIC BLOOD PRESSURE: 83 MMHG | WEIGHT: 200 LBS | TEMPERATURE: 98.4 F | RESPIRATION RATE: 17 BRPM | OXYGEN SATURATION: 94 % | SYSTOLIC BLOOD PRESSURE: 117 MMHG

## 2023-11-19 DIAGNOSIS — U07.1 COVID-19: ICD-10-CM

## 2023-11-19 DIAGNOSIS — R07.9 CHEST PAIN, UNSPECIFIED TYPE: Primary | ICD-10-CM

## 2023-11-19 LAB
ALBUMIN SERPL-MCNC: 4 G/DL (ref 3.5–4.6)
ALP SERPL-CCNC: 78 U/L (ref 40–130)
ALT SERPL-CCNC: 45 U/L (ref 0–33)
ANION GAP SERPL CALCULATED.3IONS-SCNC: 8 MEQ/L (ref 9–15)
AST SERPL-CCNC: 42 U/L (ref 0–35)
BASOPHILS # BLD: 0 K/UL (ref 0–0.2)
BASOPHILS NFR BLD: 0.4 %
BILIRUB SERPL-MCNC: 0.4 MG/DL (ref 0.2–0.7)
BUN SERPL-MCNC: 11 MG/DL (ref 6–20)
CALCIUM SERPL-MCNC: 9.4 MG/DL (ref 8.5–9.9)
CHLORIDE SERPL-SCNC: 103 MEQ/L (ref 95–107)
CO2 SERPL-SCNC: 29 MEQ/L (ref 20–31)
CREAT SERPL-MCNC: 1.01 MG/DL (ref 0.5–0.9)
EOSINOPHIL # BLD: 0.1 K/UL (ref 0–0.7)
EOSINOPHIL NFR BLD: 1.3 %
ERYTHROCYTE [DISTWIDTH] IN BLOOD BY AUTOMATED COUNT: 13.1 % (ref 11.5–14.5)
GLOBULIN SER CALC-MCNC: 2.7 G/DL (ref 2.3–3.5)
GLUCOSE SERPL-MCNC: 129 MG/DL (ref 70–99)
HCT VFR BLD AUTO: 38.7 % (ref 37–47)
HGB BLD-MCNC: 12.4 G/DL (ref 12–16)
LYMPHOCYTES # BLD: 2 K/UL (ref 1–4.8)
LYMPHOCYTES NFR BLD: 37.4 %
MCH RBC QN AUTO: 28.8 PG (ref 27–31.3)
MCHC RBC AUTO-ENTMCNC: 32 % (ref 33–37)
MCV RBC AUTO: 89.8 FL (ref 79.4–94.8)
MONOCYTES # BLD: 0.6 K/UL (ref 0.2–0.8)
MONOCYTES NFR BLD: 11.2 %
NEUTROPHILS # BLD: 2.7 K/UL (ref 1.4–6.5)
NEUTS SEG NFR BLD: 49.5 %
PLATELET # BLD AUTO: 245 K/UL (ref 130–400)
POC CREATININE WHOLE BLOOD: 1.1
POTASSIUM SERPL-SCNC: 4 MEQ/L (ref 3.4–4.9)
PROCALCITONIN SERPL IA-MCNC: 0.05 NG/ML (ref 0–0.15)
PROT SERPL-MCNC: 6.7 G/DL (ref 6.3–8)
RBC # BLD AUTO: 4.31 M/UL (ref 4.2–5.4)
SODIUM SERPL-SCNC: 140 MEQ/L (ref 135–144)
TROPONIN, HIGH SENSITIVITY: <6 NG/L (ref 0–19)
TROPONIN, HIGH SENSITIVITY: <6 NG/L (ref 0–19)
WBC # BLD AUTO: 5.4 K/UL (ref 4.8–10.8)

## 2023-11-19 PROCEDURE — 80053 COMPREHEN METABOLIC PANEL: CPT

## 2023-11-19 PROCEDURE — 71275 CT ANGIOGRAPHY CHEST: CPT

## 2023-11-19 PROCEDURE — 84484 ASSAY OF TROPONIN QUANT: CPT

## 2023-11-19 PROCEDURE — 96374 THER/PROPH/DIAG INJ IV PUSH: CPT

## 2023-11-19 PROCEDURE — 6360000004 HC RX CONTRAST MEDICATION

## 2023-11-19 PROCEDURE — 84145 PROCALCITONIN (PCT): CPT

## 2023-11-19 PROCEDURE — 36415 COLL VENOUS BLD VENIPUNCTURE: CPT

## 2023-11-19 PROCEDURE — 99285 EMERGENCY DEPT VISIT HI MDM: CPT

## 2023-11-19 PROCEDURE — 6360000002 HC RX W HCPCS: Performed by: EMERGENCY MEDICINE

## 2023-11-19 PROCEDURE — 6360000002 HC RX W HCPCS

## 2023-11-19 PROCEDURE — 93005 ELECTROCARDIOGRAM TRACING: CPT

## 2023-11-19 PROCEDURE — 96375 TX/PRO/DX INJ NEW DRUG ADDON: CPT

## 2023-11-19 PROCEDURE — 85025 COMPLETE CBC W/AUTO DIFF WBC: CPT

## 2023-11-19 RX ORDER — PREDNISONE 20 MG/1
40 TABLET ORAL DAILY
Qty: 10 TABLET | Refills: 0 | Status: SHIPPED | OUTPATIENT
Start: 2023-11-19 | End: 2023-11-24

## 2023-11-19 RX ORDER — MORPHINE SULFATE 4 MG/ML
4 INJECTION, SOLUTION INTRAMUSCULAR; INTRAVENOUS ONCE
Status: COMPLETED | OUTPATIENT
Start: 2023-11-19 | End: 2023-11-19

## 2023-11-19 RX ORDER — ONDANSETRON 2 MG/ML
4 INJECTION INTRAMUSCULAR; INTRAVENOUS ONCE
Status: COMPLETED | OUTPATIENT
Start: 2023-11-19 | End: 2023-11-19

## 2023-11-19 RX ADMIN — MORPHINE SULFATE 4 MG: 4 INJECTION, SOLUTION INTRAMUSCULAR; INTRAVENOUS at 03:52

## 2023-11-19 RX ADMIN — IOPAMIDOL 75 ML: 755 INJECTION, SOLUTION INTRAVENOUS at 04:12

## 2023-11-19 RX ADMIN — ONDANSETRON 4 MG: 2 INJECTION INTRAMUSCULAR; INTRAVENOUS at 05:04

## 2023-11-19 ASSESSMENT — ENCOUNTER SYMPTOMS
PHOTOPHOBIA: 0
SHORTNESS OF BREATH: 0
COUGH: 1
NAUSEA: 0
CHEST TIGHTNESS: 1
DIARRHEA: 0
VOMITING: 0
ABDOMINAL PAIN: 0

## 2023-11-19 ASSESSMENT — PAIN - FUNCTIONAL ASSESSMENT: PAIN_FUNCTIONAL_ASSESSMENT: 0-10

## 2023-11-19 ASSESSMENT — PAIN SCALES - GENERAL: PAINLEVEL_OUTOF10: 8

## 2023-11-19 ASSESSMENT — LIFESTYLE VARIABLES: HOW OFTEN DO YOU HAVE A DRINK CONTAINING ALCOHOL: NEVER

## 2023-11-19 NOTE — ED PROVIDER NOTES
Medical Decision Making  Amount and/or Complexity of Data Reviewed  Labs: ordered. Radiology: ordered. ECG/medicine tests: ordered. Risk  Prescription drug management. REASSESSMENT      Feeling improved. She does have COVID which I believe is driving her symptoms today. She was given Solu-Medrol and we will follow with prednisone at home to help with her symptomatology. No acute process on CT. Again she is going to follow-up with this 1 cm opacity on the right lower lung      CONSULTS:  None    PROCEDURES:  Unless otherwise noted below, none     Procedures        FINAL IMPRESSION      1. Chest pain, unspecified type    2. COVID-19          DISPOSITION/PLAN   DISPOSITION Decision To Discharge 11/19/2023 06:12:48 AM      PATIENT REFERRED TO:  Krystina Galo, [address] [id number] [phone]            DISCHARGE MEDICATIONS:  New Prescriptions    PREDNISONE (DELTASONE) 20 MG TABLET    Take 2 tablets by mouth daily for 5 doses     Controlled Substances Monitoring:     RX Monitoring Attestation   4/4/2017  12:33 AM The Prescription Monitoring Report for this patient was reviewed today.        (Please note that portions of this note were completed with a voice recognition program.  Efforts were made to edit the dictations but occasionally words are mis-transcribed.)    Jorge Macario MD (electronically signed)  Attending Emergency Physician  Supervising Physician          Jorge Macario MD  11/19/23 6822

## 2023-11-19 NOTE — ED NOTES
Discharge instructions reviewed with pt. Pt verbalized understanding with no questions or concerns. Resps even, non labored. Skin p/w/d. IV removed. No acute distress noted. Pt is ambulatory - gait is steady. VSS  GCS 15  ABCs intact. Pt verbalized understandings to  prescription from pharmacy located on discharge papers and to f/u with PCP.      Krissy Kline RN  11/19/23 0867

## 2023-11-20 LAB
PERFORMED ON: ABNORMAL
POC CREATININE: 1.1 MG/DL (ref 0.6–1.2)
POC SAMPLE TYPE: ABNORMAL

## 2023-11-21 LAB
EKG ATRIAL RATE: 68 BPM
EKG P AXIS: 5 DEGREES
EKG P-R INTERVAL: 152 MS
EKG Q-T INTERVAL: 376 MS
EKG QRS DURATION: 76 MS
EKG QTC CALCULATION (BAZETT): 399 MS
EKG R AXIS: -14 DEGREES
EKG T AXIS: -6 DEGREES
EKG VENTRICULAR RATE: 68 BPM

## 2023-12-01 ENCOUNTER — OFFICE VISIT (OUTPATIENT)
Dept: CARDIOLOGY CLINIC | Age: 58
End: 2023-12-01
Payer: COMMERCIAL

## 2023-12-01 VITALS
RESPIRATION RATE: 15 BRPM | OXYGEN SATURATION: 94 % | HEART RATE: 74 BPM | SYSTOLIC BLOOD PRESSURE: 124 MMHG | WEIGHT: 205.2 LBS | DIASTOLIC BLOOD PRESSURE: 86 MMHG | BODY MASS INDEX: 37.76 KG/M2 | HEIGHT: 62 IN

## 2023-12-01 DIAGNOSIS — I10 ESSENTIAL HYPERTENSION, BENIGN: Primary | ICD-10-CM

## 2023-12-01 PROCEDURE — G8484 FLU IMMUNIZE NO ADMIN: HCPCS | Performed by: INTERNAL MEDICINE

## 2023-12-01 PROCEDURE — 99204 OFFICE O/P NEW MOD 45 MIN: CPT | Performed by: INTERNAL MEDICINE

## 2023-12-01 PROCEDURE — 3017F COLORECTAL CA SCREEN DOC REV: CPT | Performed by: INTERNAL MEDICINE

## 2023-12-01 PROCEDURE — 1036F TOBACCO NON-USER: CPT | Performed by: INTERNAL MEDICINE

## 2023-12-01 PROCEDURE — G8417 CALC BMI ABV UP PARAM F/U: HCPCS | Performed by: INTERNAL MEDICINE

## 2023-12-01 PROCEDURE — 3079F DIAST BP 80-89 MM HG: CPT | Performed by: INTERNAL MEDICINE

## 2023-12-01 PROCEDURE — 3074F SYST BP LT 130 MM HG: CPT | Performed by: INTERNAL MEDICINE

## 2023-12-01 PROCEDURE — G8427 DOCREV CUR MEDS BY ELIG CLIN: HCPCS | Performed by: INTERNAL MEDICINE

## 2023-12-01 RX ORDER — NITROGLYCERIN 0.3 MG/1
0.3 TABLET SUBLINGUAL EVERY 5 MIN PRN
Qty: 30 TABLET | Refills: 3 | Status: SHIPPED | OUTPATIENT
Start: 2023-12-01

## 2023-12-01 RX ORDER — NITROGLYCERIN 0.3 MG/1
0.3 TABLET SUBLINGUAL EVERY 5 MIN PRN
COMMUNITY
End: 2023-12-01 | Stop reason: SDUPTHER

## 2023-12-01 ASSESSMENT — ENCOUNTER SYMPTOMS
COUGH: 0
COLOR CHANGE: 0
DIARRHEA: 0
CONSTIPATION: 0
VOMITING: 0
APNEA: 0
RHINORRHEA: 0
CHEST TIGHTNESS: 0
NAUSEA: 0
WHEEZING: 0
SHORTNESS OF BREATH: 0
EYE REDNESS: 0
ABDOMINAL PAIN: 0

## 2023-12-01 NOTE — PROGRESS NOTES
Chief Complaint   Patient presents with    Follow-Up from [facility]     11/19/2023 for Chest Pain. Patient presents for initial medical evaluation. Patient is followed on a regular basis by Dr. Maren Adkins DO. History of fibromyalgia  Anxiety  Hypothyroidism  Psych disorder NOS  Hyperlipidemia  Diabetes  Hypertension  History of PE 2012 status post warfarin for a year  Secondhand smoking from boyfriend  LASHONDA  TTE 2018 EF 60%  CT angio 10/2022 mild CAD calcium score of 0  TTE 9/23/2022 EF 60% mild MR, mild TR  ===================  12/1/2023  First clinic visit follow-up on recent ER visit  EKG abnormal showing LVH and T wave inversions in inferior leads  Patient endorses episodes of chest pain at least twice a day.   Chest pain exacerbated by emotional stress  Chest pain is described as sharp or twisting from the back to the chest        Patient Active Problem List   Diagnosis    Hypothyroidism    Depression    Anemia    Pulmonary embolism (HCC)    Clostridium difficile colitis    Insomnia    Uncontrolled diabetes mellitus type 2 without complications    Recurrent colitis due to Clostridium difficile    Other acne    Depressive disorder, not elsewhere classified    Diverticulosis of small intestine    Endometriosis    Essential hypertension, benign    Secondary hyperparathyroidism, non-renal ([address])    Neoplasm of uncertain behavior of skin    Obstructive sleep apnea    Other specified disease of hair and hair follicles    Vitamin D deficiency    Palpitations    [place] disease    Menopausal symptom    Obstructive sleep apnea syndrome    Hyperlipidemia    Atrophic vaginitis    Fibromyalgia    Severe episode of recurrent major depressive disorder, without psychotic features ([address])    Major depression, recurrent (HCC)    Major depressive disorder, severe (HCC)    Severe major depression without psychotic features ([address])    Major depressive disorder, recurrent, severe w/o psychotic behavior ([address])    Major

## 2023-12-10 DIAGNOSIS — N95.2 POSTMENOPAUSAL ATROPHIC VAGINITIS: ICD-10-CM

## 2023-12-11 RX ORDER — CONJUGATED ESTROGENS 0.62 MG/G
CREAM VAGINAL
Qty: 30 G | Refills: 1 | Status: SHIPPED | OUTPATIENT
Start: 2023-12-11 | End: 2024-03-16

## 2023-12-13 DIAGNOSIS — M54.16 RADICULOPATHY, LUMBAR REGION: ICD-10-CM

## 2023-12-13 DIAGNOSIS — F41.1 GENERALIZED ANXIETY DISORDER: ICD-10-CM

## 2023-12-16 RX ORDER — ACETAMINOPHEN AND CODEINE PHOSPHATE 300; 30 MG/1; MG/1
1 TABLET ORAL EVERY 4 HOURS PRN
Qty: 28 TABLET | Refills: 0 | Status: SHIPPED | OUTPATIENT
Start: 2023-12-16 | End: 2023-12-30

## 2023-12-16 RX ORDER — ALPRAZOLAM 1 MG/1
1 TABLET ORAL 3 TIMES DAILY PRN
Qty: 90 TABLET | Refills: 0 | Status: SHIPPED | OUTPATIENT
Start: 2023-12-16 | End: 2024-01-16 | Stop reason: SDUPTHER

## 2023-12-23 DIAGNOSIS — M54.50 LOW BACK PAIN, UNSPECIFIED: ICD-10-CM

## 2023-12-23 DIAGNOSIS — M54.16 RADICULOPATHY, LUMBAR REGION: ICD-10-CM

## 2023-12-23 DIAGNOSIS — M54.12 RADICULOPATHY, CERVICAL REGION: ICD-10-CM

## 2023-12-23 DIAGNOSIS — E78.5 HYPERLIPIDEMIA, UNSPECIFIED: ICD-10-CM

## 2023-12-30 RX ORDER — ACETAMINOPHEN AND CODEINE PHOSPHATE 300; 30 MG/1; MG/1
1 TABLET ORAL EVERY 4 HOURS PRN
Qty: 28 TABLET | Refills: 0 | Status: SHIPPED | OUTPATIENT
Start: 2023-12-30 | End: 2024-01-02 | Stop reason: SDUPTHER

## 2023-12-30 RX ORDER — GABAPENTIN 300 MG/1
300 CAPSULE ORAL 3 TIMES DAILY
Qty: 90 CAPSULE | Refills: 0 | Status: SHIPPED | OUTPATIENT
Start: 2023-12-30 | End: 2024-02-09

## 2023-12-30 RX ORDER — LISINOPRIL 30 MG/1
30 TABLET ORAL DAILY
Qty: 90 TABLET | Refills: 0 | Status: SHIPPED | OUTPATIENT
Start: 2023-12-30 | End: 2024-03-16

## 2023-12-30 RX ORDER — TIZANIDINE 4 MG/1
4 TABLET ORAL EVERY 6 HOURS PRN
Qty: 30 TABLET | Refills: 0 | Status: SHIPPED | OUTPATIENT
Start: 2023-12-30 | End: 2024-02-17

## 2024-01-02 ENCOUNTER — OFFICE VISIT (OUTPATIENT)
Dept: PRIMARY CARE | Facility: CLINIC | Age: 59
End: 2024-01-02
Payer: COMMERCIAL

## 2024-01-02 VITALS
DIASTOLIC BLOOD PRESSURE: 98 MMHG | WEIGHT: 209 LBS | BODY MASS INDEX: 38.46 KG/M2 | TEMPERATURE: 98.2 F | OXYGEN SATURATION: 97 % | HEART RATE: 74 BPM | HEIGHT: 62 IN | SYSTOLIC BLOOD PRESSURE: 132 MMHG

## 2024-01-02 DIAGNOSIS — R73.9 HYPERGLYCEMIA: ICD-10-CM

## 2024-01-02 DIAGNOSIS — I10 HTN (HYPERTENSION), BENIGN: Primary | ICD-10-CM

## 2024-01-02 DIAGNOSIS — M54.12 CERVICAL RADICULOPATHY: ICD-10-CM

## 2024-01-02 DIAGNOSIS — M54.16 RADICULOPATHY, LUMBAR REGION: ICD-10-CM

## 2024-01-02 DIAGNOSIS — E66.01 MORBID OBESITY (MULTI): ICD-10-CM

## 2024-01-02 LAB
POC FINGERSTICK BLOOD GLUCOSE: 127 MG/DL (ref 70–100)
POC HEMOGLOBIN A1C: 6.2 % (ref 4.2–6.5)

## 2024-01-02 PROCEDURE — 82962 GLUCOSE BLOOD TEST: CPT | Performed by: FAMILY MEDICINE

## 2024-01-02 PROCEDURE — 83036 HEMOGLOBIN GLYCOSYLATED A1C: CPT | Performed by: FAMILY MEDICINE

## 2024-01-02 PROCEDURE — 99214 OFFICE O/P EST MOD 30 MIN: CPT | Performed by: FAMILY MEDICINE

## 2024-01-02 RX ORDER — ACETAMINOPHEN AND CODEINE PHOSPHATE 300; 30 MG/1; MG/1
1 TABLET ORAL EVERY 4 HOURS PRN
Qty: 28 TABLET | Refills: 0 | Status: SHIPPED | OUTPATIENT
Start: 2024-01-02 | End: 2024-01-16 | Stop reason: SDUPTHER

## 2024-01-02 RX ORDER — HYDROCHLOROTHIAZIDE 25 MG/1
25 TABLET ORAL DAILY
Qty: 90 TABLET | Refills: 1 | Status: SHIPPED | OUTPATIENT
Start: 2024-01-02 | End: 2025-01-01

## 2024-01-02 NOTE — PROGRESS NOTES
Subjective   Patient ID: Leanna Pagan is a 58 y.o. female who presents for Follow-up.    Patient following up on ED visit 11/19/2023 for chest pain. Patient had CT chest and lab work done that she would like to discuss.    Patient states she tested positive for covid before Thanksgiving. Still experiencing chills and fatigue.    HPI comes in for review of recent laboratory to include CT angio of the chest.  This was negative for pulmonary embolism.  Patient admits that she has occasional shortness of breath.  Patient denies any leg pain    Review of Systems   Constitutional: Negative.  Negative for activity change, appetite change, fatigue and fever.   HENT:  Negative for congestion, dental problem, ear discharge, ear pain, mouth sores, rhinorrhea, sinus pressure, sinus pain, sore throat, tinnitus and trouble swallowing.    Eyes:  Negative for photophobia, pain and visual disturbance.   Respiratory:  Negative for cough, chest tightness, shortness of breath and stridor.    Cardiovascular:  Negative for chest pain and palpitations.   Gastrointestinal:  Negative for abdominal distention, abdominal pain, blood in stool, constipation, diarrhea and rectal pain.   Endocrine: Negative for cold intolerance, heat intolerance, polydipsia, polyphagia and polyuria.   Genitourinary:  Negative for dysuria, flank pain, hematuria and urgency.   Musculoskeletal:  Negative for arthralgias, gait problem, myalgias and neck stiffness.   Skin:  Negative for color change and rash.   Allergic/Immunologic: Negative for environmental allergies and food allergies.   Neurological:  Negative for dizziness, seizures, syncope, speech difficulty and headaches.   Hematological:  Negative for adenopathy.   Psychiatric/Behavioral:  Negative for agitation, confusion, decreased concentration, dysphoric mood and sleep disturbance. The patient is not nervous/anxious.        Objective   BP (!) 132/98   Pulse 74   Temp 36.8 °C (98.2 °F)   Ht 1.575 m (5'  "2\")   Wt 94.8 kg (209 lb)   SpO2 97%   BMI 38.23 kg/m²     Physical Exam  Vitals reviewed.   Constitutional:       General: She is not in acute distress.     Appearance: She is obese. She is not ill-appearing or diaphoretic.   HENT:      Head: Normocephalic.      Right Ear: Tympanic membrane and external ear normal.      Left Ear: Tympanic membrane and external ear normal.      Nose: Nose normal. No congestion.      Mouth/Throat:      Pharynx: No posterior oropharyngeal erythema.   Eyes:      General:         Right eye: No discharge.         Left eye: No discharge.      Extraocular Movements: Extraocular movements intact.      Conjunctiva/sclera: Conjunctivae normal.      Pupils: Pupils are equal, round, and reactive to light.   Cardiovascular:      Rate and Rhythm: Normal rate and regular rhythm.      Pulses: Normal pulses.      Heart sounds: Normal heart sounds. No murmur heard.  Pulmonary:      Effort: Pulmonary effort is normal. No respiratory distress.      Breath sounds: Normal breath sounds. No wheezing or rales.   Chest:      Chest wall: No tenderness.   Abdominal:      General: Bowel sounds are normal. There is distension.      Palpations: There is no mass.      Tenderness: There is no abdominal tenderness. There is no guarding.   Musculoskeletal:         General: No tenderness. Normal range of motion.      Cervical back: Normal range of motion and neck supple. No tenderness.      Right lower leg: No edema.      Left lower leg: No edema.   Skin:     General: Skin is dry.      Coloration: Skin is not jaundiced.      Findings: No bruising, erythema or rash.   Neurological:      General: No focal deficit present.      Mental Status: She is alert and oriented to person, place, and time. Mental status is at baseline.      Cranial Nerves: No cranial nerve deficit.      Sensory: No sensory deficit.      Coordination: Coordination normal.      Gait: Gait normal.   Psychiatric:         Mood and Affect: Mood " normal.         Thought Content: Thought content normal.         Judgment: Judgment normal.         Assessment/Plan   Problem List Items Addressed This Visit             ICD-10-CM    Cervical radiculopathy M54.12    HTN (hypertension), benign - Primary I10    Relevant Medications    hydroCHLOROthiazide (HYDRODiuril) 25 mg tablet    Morbid obesity (CMS/[facility]) E66.01    Hyperglycemia R73.9    Relevant Orders    POCT glycosylated hemoglobin (Hb A1C) manually resulted (Completed)    POCT fingerstick glucose manually resulted (Completed)    BMI 38.0-38.9,adult Z68.38     Other Visit Diagnoses         Codes    Radiculopathy, lumbar region     M54.16    Relevant Medications    acetaminophen-codeine (Tylenol w/ Codeine #3) 300-30 mg tablet

## 2024-01-02 NOTE — PATIENT INSTRUCTIONS
Follow up in 3 months    Continue current medications and therapy for chronic medical conditions.    Patient was advised importance of proper diet/nutrition in addition adequate hydration. Patient was encouraged moderate exercise program to include 30 minutes daily for 5 days of the week or 150 minutes weekly. Patient will follow-up with us as scheduled.    I personally reviewed the OARRS report for this patient. I have considered the risks of abuse, dependence, addiction, and diversion.    UDS/CSA:    Obtain Accu-Chek and hemoglobin A1c in office    RESUME hydrochlorothiazide 25 MGS DAILY    Review lab results from November 2023    Review CT angio of the chest from November 2023

## 2024-01-10 DIAGNOSIS — R32 UNSPECIFIED URINARY INCONTINENCE: ICD-10-CM

## 2024-01-10 RX ORDER — OXYBUTYNIN CHLORIDE 10 MG/1
TABLET, EXTENDED RELEASE ORAL
Qty: 90 TABLET | Refills: 0 | Status: SHIPPED | OUTPATIENT
Start: 2024-01-10 | End: 2024-04-12

## 2024-01-14 ASSESSMENT — ENCOUNTER SYMPTOMS
STRIDOR: 0
FLANK PAIN: 0
NECK STIFFNESS: 0
PALPITATIONS: 0
POLYDIPSIA: 0
ARTHRALGIAS: 0
DECREASED CONCENTRATION: 0
COUGH: 0
FEVER: 0
ABDOMINAL DISTENTION: 0
COLOR CHANGE: 0
SEIZURES: 0
BLOOD IN STOOL: 0
HEADACHES: 0
DYSURIA: 0
SINUS PAIN: 0
ADENOPATHY: 0
ACTIVITY CHANGE: 0
PHOTOPHOBIA: 0
HEMATURIA: 0
SLEEP DISTURBANCE: 0
SPEECH DIFFICULTY: 0
AGITATION: 0
ABDOMINAL PAIN: 0
EYE PAIN: 0
SORE THROAT: 0
POLYPHAGIA: 0
RHINORRHEA: 0
FATIGUE: 0
APPETITE CHANGE: 0
DYSPHORIC MOOD: 0
CHEST TIGHTNESS: 0
MYALGIAS: 0
CONSTITUTIONAL NEGATIVE: 1
TROUBLE SWALLOWING: 0
NERVOUS/ANXIOUS: 0
SINUS PRESSURE: 0
CONSTIPATION: 0
RECTAL PAIN: 0
DIZZINESS: 0
DIARRHEA: 0
CONFUSION: 0
SHORTNESS OF BREATH: 0

## 2024-01-16 DIAGNOSIS — M54.16 RADICULOPATHY, LUMBAR REGION: ICD-10-CM

## 2024-01-16 DIAGNOSIS — F41.1 GENERALIZED ANXIETY DISORDER: ICD-10-CM

## 2024-01-16 RX ORDER — ALPRAZOLAM 1 MG/1
1 TABLET ORAL 3 TIMES DAILY PRN
Qty: 90 TABLET | Refills: 0 | Status: SHIPPED | OUTPATIENT
Start: 2024-01-16 | End: 2024-02-17

## 2024-01-16 RX ORDER — ACETAMINOPHEN AND CODEINE PHOSPHATE 300; 30 MG/1; MG/1
1 TABLET ORAL EVERY 4 HOURS PRN
Qty: 28 TABLET | Refills: 0 | Status: SHIPPED | OUTPATIENT
Start: 2024-01-16 | End: 2024-02-09

## 2024-01-16 NOTE — TELEPHONE ENCOUNTER
Dr Alexander pt    Pt phoned office and stated that she [facility] [place] [facility]

## 2024-01-16 NOTE — TELEPHONE ENCOUNTER
Pt needs refill:     ALPRAZolam (Xanax) 1 mg tablet      Pharmacy    [facility]/pharmacy #[id number] - [place], [place] - [address] [address]  Phone: [phone]  Fax: [phone]

## 2024-01-25 ENCOUNTER — OFFICE VISIT (OUTPATIENT)
Dept: ENDOCRINOLOGY | Age: 59
End: 2024-01-25
Payer: COMMERCIAL

## 2024-01-25 VITALS
DIASTOLIC BLOOD PRESSURE: 69 MMHG | BODY MASS INDEX: 38.09 KG/M2 | WEIGHT: 207 LBS | HEIGHT: 62 IN | OXYGEN SATURATION: 93 % | HEART RATE: 62 BPM | SYSTOLIC BLOOD PRESSURE: 101 MMHG

## 2024-01-25 DIAGNOSIS — E03.9 ACQUIRED HYPOTHYROIDISM: ICD-10-CM

## 2024-01-25 DIAGNOSIS — E11.65 INADEQUATELY CONTROLLED DIABETES MELLITUS (HCC): Primary | ICD-10-CM

## 2024-01-25 LAB
CHP ED QC CHECK: NORMAL
GLUCOSE BLD-MCNC: 137 MG/DL
HBA1C MFR BLD: 7 %

## 2024-01-25 PROCEDURE — G8417 CALC BMI ABV UP PARAM F/U: HCPCS | Performed by: INTERNAL MEDICINE

## 2024-01-25 PROCEDURE — 83036 HEMOGLOBIN GLYCOSYLATED A1C: CPT | Performed by: INTERNAL MEDICINE

## 2024-01-25 PROCEDURE — 1036F TOBACCO NON-USER: CPT | Performed by: INTERNAL MEDICINE

## 2024-01-25 PROCEDURE — 2022F DILAT RTA XM EVC RTNOPTHY: CPT | Performed by: INTERNAL MEDICINE

## 2024-01-25 PROCEDURE — 99213 OFFICE O/P EST LOW 20 MIN: CPT | Performed by: INTERNAL MEDICINE

## 2024-01-25 PROCEDURE — G8484 FLU IMMUNIZE NO ADMIN: HCPCS | Performed by: INTERNAL MEDICINE

## 2024-01-25 PROCEDURE — 3078F DIAST BP <80 MM HG: CPT | Performed by: INTERNAL MEDICINE

## 2024-01-25 PROCEDURE — G8427 DOCREV CUR MEDS BY ELIG CLIN: HCPCS | Performed by: INTERNAL MEDICINE

## 2024-01-25 PROCEDURE — 3051F HG A1C>EQUAL 7.0%<8.0%: CPT | Performed by: INTERNAL MEDICINE

## 2024-01-25 PROCEDURE — 82962 GLUCOSE BLOOD TEST: CPT | Performed by: INTERNAL MEDICINE

## 2024-01-25 PROCEDURE — 3074F SYST BP LT 130 MM HG: CPT | Performed by: INTERNAL MEDICINE

## 2024-01-25 PROCEDURE — 3017F COLORECTAL CA SCREEN DOC REV: CPT | Performed by: INTERNAL MEDICINE

## 2024-01-25 RX ORDER — OXYBUTYNIN CHLORIDE 10 MG/1
10 TABLET, EXTENDED RELEASE ORAL DAILY
COMMUNITY
Start: 2024-01-10

## 2024-01-25 RX ORDER — CONJUGATED ESTROGENS 0.62 MG/G
1.5 CREAM VAGINAL DAILY
COMMUNITY
Start: 2024-01-13

## 2024-01-25 RX ORDER — LISINOPRIL 30 MG/1
30 TABLET ORAL DAILY
COMMUNITY
Start: 2023-12-30

## 2024-01-25 RX ORDER — ALPRAZOLAM 1 MG/1
1 TABLET ORAL NIGHTLY PRN
COMMUNITY
Start: 2024-01-17

## 2024-01-25 RX ORDER — OMEPRAZOLE 20 MG/1
CAPSULE, DELAYED RELEASE ORAL DAILY
COMMUNITY
Start: 2023-12-29

## 2024-01-25 RX ORDER — DULAGLUTIDE 3 MG/.5ML
3 INJECTION, SOLUTION SUBCUTANEOUS WEEKLY
Qty: 4 ADJUSTABLE DOSE PRE-FILLED PEN SYRINGE | Refills: 3 | Status: SHIPPED | OUTPATIENT
Start: 2024-01-25

## 2024-02-05 DIAGNOSIS — M54.16 RADICULOPATHY, LUMBAR REGION: ICD-10-CM

## 2024-02-05 DIAGNOSIS — M54.50 LOW BACK PAIN, UNSPECIFIED: ICD-10-CM

## 2024-02-09 RX ORDER — ACETAMINOPHEN AND CODEINE PHOSPHATE 300; 30 MG/1; MG/1
1 TABLET ORAL EVERY 4 HOURS PRN
Qty: 28 TABLET | Refills: 0 | Status: SHIPPED | OUTPATIENT
Start: 2024-02-09 | End: 2024-02-20 | Stop reason: SDUPTHER

## 2024-02-09 RX ORDER — GABAPENTIN 300 MG/1
300 CAPSULE ORAL 3 TIMES DAILY
Qty: 90 CAPSULE | Refills: 0 | Status: SHIPPED | OUTPATIENT
Start: 2024-02-09 | End: 2024-03-16

## 2024-02-13 DIAGNOSIS — M54.12 RADICULOPATHY, CERVICAL REGION: ICD-10-CM

## 2024-02-13 DIAGNOSIS — F41.1 GENERALIZED ANXIETY DISORDER: ICD-10-CM

## 2024-02-13 DIAGNOSIS — M54.16 RADICULOPATHY, LUMBAR REGION: ICD-10-CM

## 2024-02-13 DIAGNOSIS — N95.2 POSTMENOPAUSAL ATROPHIC VAGINITIS: ICD-10-CM

## 2024-02-17 RX ORDER — CONJUGATED ESTROGENS 0.62 MG/G
CREAM VAGINAL
Qty: 30 G | Refills: 0 | OUTPATIENT
Start: 2024-02-17

## 2024-02-17 RX ORDER — ALPRAZOLAM 1 MG/1
1 TABLET ORAL 3 TIMES DAILY PRN
Qty: 90 TABLET | Refills: 0 | Status: SHIPPED | OUTPATIENT
Start: 2024-02-17 | End: 2024-03-15 | Stop reason: SDUPTHER

## 2024-02-17 RX ORDER — TIZANIDINE 4 MG/1
4 TABLET ORAL EVERY 6 HOURS PRN
Qty: 30 TABLET | Refills: 0 | Status: SHIPPED | OUTPATIENT
Start: 2024-02-17 | End: 2024-03-16

## 2024-02-20 ENCOUNTER — LAB (OUTPATIENT)
Dept: LAB | Facility: LAB | Age: 59
End: 2024-02-20
Payer: COMMERCIAL

## 2024-02-20 ENCOUNTER — OFFICE VISIT (OUTPATIENT)
Dept: PRIMARY CARE | Facility: CLINIC | Age: 59
End: 2024-02-20
Payer: COMMERCIAL

## 2024-02-20 VITALS
OXYGEN SATURATION: 96 % | WEIGHT: 206.6 LBS | HEIGHT: 62 IN | BODY MASS INDEX: 38.02 KG/M2 | DIASTOLIC BLOOD PRESSURE: 64 MMHG | HEART RATE: 73 BPM | SYSTOLIC BLOOD PRESSURE: 126 MMHG

## 2024-02-20 DIAGNOSIS — I10 HTN (HYPERTENSION), BENIGN: ICD-10-CM

## 2024-02-20 DIAGNOSIS — R35.0 URINARY FREQUENCY: ICD-10-CM

## 2024-02-20 DIAGNOSIS — E78.2 MIXED HYPERLIPIDEMIA: ICD-10-CM

## 2024-02-20 DIAGNOSIS — I10 HTN (HYPERTENSION), BENIGN: Primary | ICD-10-CM

## 2024-02-20 DIAGNOSIS — R68.89 FLU-LIKE SYMPTOMS: ICD-10-CM

## 2024-02-20 DIAGNOSIS — F34.1 PERSISTENT DEPRESSIVE DISORDER: ICD-10-CM

## 2024-02-20 DIAGNOSIS — M54.16 RADICULOPATHY, LUMBAR REGION: ICD-10-CM

## 2024-02-20 DIAGNOSIS — E03.9 ACQUIRED HYPOTHYROIDISM: ICD-10-CM

## 2024-02-20 DIAGNOSIS — R73.9 HYPERGLYCEMIA: ICD-10-CM

## 2024-02-20 DIAGNOSIS — E66.01 MORBID OBESITY (MULTI): ICD-10-CM

## 2024-02-20 LAB
ALBUMIN SERPL BCP-MCNC: 4.4 G/DL (ref 3.4–5)
ALP SERPL-CCNC: 83 U/L (ref 33–110)
ALT SERPL W P-5'-P-CCNC: 29 U/L (ref 7–45)
ANION GAP SERPL CALC-SCNC: 11 MMOL/L (ref 10–20)
AST SERPL W P-5'-P-CCNC: 23 U/L (ref 9–39)
BASOPHILS # BLD AUTO: 0.04 X10*3/UL (ref 0–0.1)
BASOPHILS NFR BLD AUTO: 0.7 %
BILIRUB SERPL-MCNC: 0.6 MG/DL (ref 0–1.2)
BUN SERPL-MCNC: 17 MG/DL (ref 6–23)
CALCIUM SERPL-MCNC: 9.8 MG/DL (ref 8.6–10.3)
CHLORIDE SERPL-SCNC: 105 MMOL/L (ref 98–107)
CO2 SERPL-SCNC: 31 MMOL/L (ref 21–32)
CREAT SERPL-MCNC: 0.93 MG/DL (ref 0.5–1.05)
EGFRCR SERPLBLD CKD-EPI 2021: 72 ML/MIN/1.73M*2
EOSINOPHIL # BLD AUTO: 0.04 X10*3/UL (ref 0–0.7)
EOSINOPHIL NFR BLD AUTO: 0.7 %
ERYTHROCYTE [DISTWIDTH] IN BLOOD BY AUTOMATED COUNT: 12.3 % (ref 11.5–14.5)
GLUCOSE SERPL-MCNC: 97 MG/DL (ref 74–99)
HCT VFR BLD AUTO: 42.3 % (ref 36–46)
HGB BLD-MCNC: 13.9 G/DL (ref 12–16)
IMM GRANULOCYTES # BLD AUTO: 0.01 X10*3/UL (ref 0–0.7)
IMM GRANULOCYTES NFR BLD AUTO: 0.2 % (ref 0–0.9)
LYMPHOCYTES # BLD AUTO: 1.88 X10*3/UL (ref 1.2–4.8)
LYMPHOCYTES NFR BLD AUTO: 33 %
MCH RBC QN AUTO: 28.9 PG (ref 26–34)
MCHC RBC AUTO-ENTMCNC: 32.9 G/DL (ref 32–36)
MCV RBC AUTO: 88 FL (ref 80–100)
MONOCYTES # BLD AUTO: 0.39 X10*3/UL (ref 0.1–1)
MONOCYTES NFR BLD AUTO: 6.9 %
NEUTROPHILS # BLD AUTO: 3.33 X10*3/UL (ref 1.2–7.7)
NEUTROPHILS NFR BLD AUTO: 58.5 %
NRBC BLD-RTO: 0 /100 WBCS (ref 0–0)
PLATELET # BLD AUTO: 302 X10*3/UL (ref 150–450)
POC APPEARANCE, URINE: CLEAR
POC BILIRUBIN, URINE: NEGATIVE
POC BLOOD, URINE: NEGATIVE
POC COLOR, URINE: YELLOW
POC GLUCOSE, URINE: NEGATIVE MG/DL
POC KETONES, URINE: NEGATIVE MG/DL
POC LEUKOCYTES, URINE: ABNORMAL
POC NITRITE,URINE: NEGATIVE
POC PH, URINE: 6 PH
POC PROTEIN, URINE: NEGATIVE MG/DL
POC SPECIFIC GRAVITY, URINE: 1.02
POC UROBILINOGEN, URINE: 0.2 EU/DL
POTASSIUM SERPL-SCNC: 3.8 MMOL/L (ref 3.5–5.3)
PROT SERPL-MCNC: 7 G/DL (ref 6.4–8.2)
RBC # BLD AUTO: 4.81 X10*6/UL (ref 4–5.2)
SODIUM SERPL-SCNC: 143 MMOL/L (ref 136–145)
WBC # BLD AUTO: 5.7 X10*3/UL (ref 4.4–11.3)

## 2024-02-20 PROCEDURE — 1036F TOBACCO NON-USER: CPT | Performed by: FAMILY MEDICINE

## 2024-02-20 PROCEDURE — 80053 COMPREHEN METABOLIC PANEL: CPT

## 2024-02-20 PROCEDURE — 3074F SYST BP LT 130 MM HG: CPT | Performed by: FAMILY MEDICINE

## 2024-02-20 PROCEDURE — 85025 COMPLETE CBC W/AUTO DIFF WBC: CPT

## 2024-02-20 PROCEDURE — 87637 SARSCOV2&INF A&B&RSV AMP PRB: CPT

## 2024-02-20 PROCEDURE — 3008F BODY MASS INDEX DOCD: CPT | Performed by: FAMILY MEDICINE

## 2024-02-20 PROCEDURE — 99214 OFFICE O/P EST MOD 30 MIN: CPT | Performed by: FAMILY MEDICINE

## 2024-02-20 PROCEDURE — 81003 URINALYSIS AUTO W/O SCOPE: CPT | Performed by: FAMILY MEDICINE

## 2024-02-20 PROCEDURE — 3078F DIAST BP <80 MM HG: CPT | Performed by: FAMILY MEDICINE

## 2024-02-20 PROCEDURE — 36415 COLL VENOUS BLD VENIPUNCTURE: CPT

## 2024-02-20 RX ORDER — ACETAMINOPHEN AND CODEINE PHOSPHATE 300; 30 MG/1; MG/1
1 TABLET ORAL EVERY 4 HOURS PRN
Qty: 28 TABLET | Refills: 0 | Status: SHIPPED | OUTPATIENT
Start: 2024-02-20 | End: 2024-02-29 | Stop reason: SDUPTHER

## 2024-02-20 ASSESSMENT — ENCOUNTER SYMPTOMS
FATIGUE: 1
EYE PAIN: 0
DYSURIA: 0
PHOTOPHOBIA: 0
ABDOMINAL PAIN: 0
SPEECH DIFFICULTY: 0
SINUS PRESSURE: 0
DIARRHEA: 0
PALPITATIONS: 0
RECTAL PAIN: 0
BLOOD IN STOOL: 0
POLYDIPSIA: 0
DIZZINESS: 1
APPETITE CHANGE: 0
CHILLS: 1
SEIZURES: 0
FLANK PAIN: 0
HEADACHES: 1
DYSPHORIC MOOD: 0
CHEST TIGHTNESS: 0
RHINORRHEA: 0
FREQUENCY: 1
COLOR CHANGE: 0
SHORTNESS OF BREATH: 0
CONSTIPATION: 0
SORE THROAT: 0
COUGH: 0
MYALGIAS: 0
WEAKNESS: 1
CONFUSION: 0
SLEEP DISTURBANCE: 0
TROUBLE SWALLOWING: 0
NERVOUS/ANXIOUS: 0
STRIDOR: 0
SINUS PAIN: 0
ACTIVITY CHANGE: 0
POLYPHAGIA: 0
HEMATURIA: 0
ADENOPATHY: 0
ARTHRALGIAS: 0
DECREASED CONCENTRATION: 0
AGITATION: 0
FEVER: 0
ABDOMINAL DISTENTION: 0
NECK STIFFNESS: 0

## 2024-02-20 NOTE — PATIENT INSTRUCTIONS
Follow up in 6 weeks    Continue current medications and therapy for chronic medical conditions.    Patient was advised importance of proper diet/nutrition in addition adequate hydration. Patient was encouraged moderate exercise program to include 30 minutes daily for 5 days of the week or 150 minutes weekly. Patient will follow-up with us as scheduled.    I personally reviewed the OARRS report for this patient. I have considered the risks of abuse, dependence, addiction, and diversion.    UDS 10/16/2023  CSA 08/28/2023    RSV/COVID/FLU swab today    Obtain CBC and CMP labs today

## 2024-02-20 NOTE — PROGRESS NOTES
"Subjective   Patient ID: Leanna Pagan is a 58 y.o. female who presents for Fatigue.    Patient is here due to she is not feeling well and states she has fatigue but is unsure how to explain on how she is not feeling well. States she is seeing the counselor she was told to see and feels from talking to him that's why she doesn't feel well.   Onset 1 week ago, experiencing chills, fatigue, headaches, dizziness, weakness, and tinnitus.    Denies any other concerns at this time.          Review of Systems   Constitutional:  Positive for chills and fatigue. Negative for activity change, appetite change and fever.   HENT:  Positive for tinnitus. Negative for congestion, dental problem, ear discharge, ear pain, mouth sores, rhinorrhea, sinus pressure, sinus pain, sore throat and trouble swallowing.    Eyes:  Negative for photophobia, pain and visual disturbance.   Respiratory:  Negative for cough, chest tightness, shortness of breath and stridor.    Cardiovascular:  Negative for chest pain and palpitations.   Gastrointestinal:  Negative for abdominal distention, abdominal pain, blood in stool, constipation, diarrhea and rectal pain.   Endocrine: Negative for cold intolerance, heat intolerance, polydipsia, polyphagia and polyuria.   Genitourinary:  Positive for frequency. Negative for dysuria, flank pain, hematuria and urgency.   Musculoskeletal:  Negative for arthralgias, gait problem, myalgias and neck stiffness.   Skin:  Negative for color change and rash.   Allergic/Immunologic: Negative for environmental allergies and food allergies.   Neurological:  Positive for dizziness, weakness and headaches. Negative for seizures, syncope and speech difficulty.   Hematological:  Negative for adenopathy.   Psychiatric/Behavioral:  Negative for agitation, confusion, decreased concentration, dysphoric mood and sleep disturbance. The patient is not nervous/anxious.        Objective   /64   Pulse 73   Ht 1.575 m (5' 2\")   Wt " 93.7 kg (206 lb 9.6 oz)   SpO2 96%   BMI 37.79 kg/m²     Physical Exam  Vitals reviewed.   Constitutional:       General: She is in acute distress.      Appearance: She is obese. She is not ill-appearing or diaphoretic.   HENT:      Head: Normocephalic.      Right Ear: Tympanic membrane and external ear normal.      Left Ear: Tympanic membrane and external ear normal.      Nose: Nose normal. No congestion.      Mouth/Throat:      Pharynx: No posterior oropharyngeal erythema.   Eyes:      General:         Right eye: No discharge.         Left eye: No discharge.      Extraocular Movements: Extraocular movements intact.      Conjunctiva/sclera: Conjunctivae normal.      Pupils: Pupils are equal, round, and reactive to light.   Cardiovascular:      Rate and Rhythm: Normal rate and regular rhythm.      Pulses: Normal pulses.      Heart sounds: Normal heart sounds. No murmur heard.  Pulmonary:      Effort: Pulmonary effort is normal. No respiratory distress.      Breath sounds: Normal breath sounds. No wheezing or rales.   Chest:      Chest wall: No tenderness.   Abdominal:      General: Bowel sounds are normal. There is distension.      Palpations: There is no mass.      Tenderness: There is no abdominal tenderness. There is no guarding.   Musculoskeletal:         General: No tenderness. Normal range of motion.      Cervical back: Normal range of motion and neck supple. No tenderness.      Right lower leg: No edema.      Left lower leg: No edema.   Skin:     General: Skin is dry.      Coloration: Skin is not jaundiced.      Findings: No bruising, erythema or rash.   Neurological:      General: No focal deficit present.      Mental Status: She is alert and oriented to person, place, and time. Mental status is at baseline.      Cranial Nerves: No cranial nerve deficit.      Sensory: No sensory deficit.      Coordination: Coordination normal.      Gait: Gait normal.   Psychiatric:         Mood and Affect: Mood normal.          Thought Content: Thought content normal.         Judgment: Judgment normal.         Assessment/Plan   Problem List Items Addressed This Visit             ICD-10-CM    HTN (hypertension), benign - Primary I10    Relevant Orders    CBC and Auto Differential    Comprehensive Metabolic Panel    Hyperlipidemia E78.5    Hypothyroid E03.9    Morbid obesity (CMS/[facility]) E66.01    Hyperglycemia R73.9    Relevant Medications    dulaglutide 3 mg/0.5 mL pen injector    Depression F32.A     Other Visit Diagnoses         Codes    Radiculopathy, lumbar region     M54.16    Relevant Medications    acetaminophen-codeine (Tylenol w/ Codeine #3) 300-30 mg tablet    Flu-like symptoms     R68.89    Relevant Orders    RSV PCR    Sars-CoV-2 and Influenza A/B PCR          Scribe Attestation  By signing my name below, I, Sergo Alexander DO , Scribe   attest that this documentation has been prepared under the direction and in the presence of Sergo Alexander DO.    Provider Attestation - Scribe documentation    All medical record entries made by the Scribe were at my direction and personally dictated by me. I have reviewed the chart and agree that the record accurately reflects my personal performance of the history, physical exam, discussion and plan.

## 2024-02-21 DIAGNOSIS — M54.16 RADICULOPATHY, LUMBAR REGION: ICD-10-CM

## 2024-02-21 LAB
FLUAV RNA RESP QL NAA+PROBE: NOT DETECTED
FLUBV RNA RESP QL NAA+PROBE: NOT DETECTED
RSV RNA RESP QL NAA+PROBE: NOT DETECTED
SARS-COV-2 RNA RESP QL NAA+PROBE: NOT DETECTED

## 2024-02-21 NOTE — TELEPHONE ENCOUNTER
Dr. Alexander patient    Patient asking if acetaminophen-codeine (Tylenol w/ Codeine #3) can be sent to [facility] in [place] on [address]

## 2024-02-22 NOTE — TELEPHONE ENCOUNTER
[facility] [place] [facility] [address] [facility] AND [facility] NO acetaminophen-codeine (Tylenol w/ Codeine #3) 300-30 mg tablet   [facility] [place] ON [address]

## 2024-02-23 RX ORDER — ACETAMINOPHEN AND CODEINE PHOSPHATE 300; 30 MG/1; MG/1
1 TABLET ORAL EVERY 4 HOURS PRN
Qty: 28 TABLET | Refills: 0 | OUTPATIENT
Start: 2024-02-23

## 2024-02-23 NOTE — TELEPHONE ENCOUNTER
----- Message from Sergo Alexander DO sent at 2/21/2024  8:41 PM EST -----  Please notify patient of normal results.  Thank you

## 2024-02-29 DIAGNOSIS — M54.16 RADICULOPATHY, LUMBAR REGION: ICD-10-CM

## 2024-02-29 NOTE — TELEPHONE ENCOUNTER
Dr. Alexander Pt    Pt calling for Refill    acetaminophen-codeine (Tylenol w/ Codeine #3) 300-30 mg tablet     **pt has been waiting since 2/23 to have it resent    [facility]/pharmacy #[id number] - [place], [place] - [address] AT [address] [address]

## 2024-03-04 NOTE — TELEPHONE ENCOUNTER
Pt has been calling about acetaminophen-codeine (Tylenol w/ Codeine #3) 300-30 mg tablet for about two weeks, this originally was sent to the wrong pharmacy. This needs to go to   [facility]/pharmacy #[id number] - [place], [place] - [address]    Please advise and send today. Pt is in a lot of pain and does not want to have to go to the hospital for this.  She would like a phone call to let her know if/when this will be sent over today.

## 2024-03-05 RX ORDER — ACETAMINOPHEN AND CODEINE PHOSPHATE 300; 30 MG/1; MG/1
1 TABLET ORAL EVERY 4 HOURS PRN
Qty: 28 TABLET | Refills: 0 | Status: SHIPPED | OUTPATIENT
Start: 2024-03-05 | End: 2024-03-08 | Stop reason: SDUPTHER

## 2024-03-08 ENCOUNTER — TELEMEDICINE (OUTPATIENT)
Dept: PRIMARY CARE | Facility: CLINIC | Age: 59
End: 2024-03-08
Payer: COMMERCIAL

## 2024-03-08 DIAGNOSIS — M48.062 LUMBAR STENOSIS WITH NEUROGENIC CLAUDICATION: Primary | ICD-10-CM

## 2024-03-08 DIAGNOSIS — E66.01 MORBID OBESITY (MULTI): ICD-10-CM

## 2024-03-08 DIAGNOSIS — R91.1 NODULE OF LOWER LOBE OF RIGHT LUNG: ICD-10-CM

## 2024-03-08 DIAGNOSIS — I10 HTN (HYPERTENSION), BENIGN: ICD-10-CM

## 2024-03-08 DIAGNOSIS — M54.16 RADICULOPATHY, LUMBAR REGION: ICD-10-CM

## 2024-03-08 DIAGNOSIS — M79.7 FIBROMYALGIA: ICD-10-CM

## 2024-03-08 PROCEDURE — 99214 OFFICE O/P EST MOD 30 MIN: CPT | Performed by: FAMILY MEDICINE

## 2024-03-08 ASSESSMENT — ENCOUNTER SYMPTOMS
ABDOMINAL DISTENTION: 0
CONFUSION: 0
NERVOUS/ANXIOUS: 1
SHORTNESS OF BREATH: 0
SEIZURES: 0
CONSTITUTIONAL NEGATIVE: 1
SINUS PRESSURE: 0
MYALGIAS: 0
RHINORRHEA: 0
SPEECH DIFFICULTY: 0
ABDOMINAL PAIN: 0
HEADACHES: 0
BACK PAIN: 1
BLOOD IN STOOL: 0
DIARRHEA: 0
AGITATION: 0
CHEST TIGHTNESS: 0
DYSURIA: 0
ACTIVITY CHANGE: 0
DIZZINESS: 0
COUGH: 0
PHOTOPHOBIA: 0
ADENOPATHY: 0
HEMATURIA: 0
POLYDIPSIA: 0
FLANK PAIN: 0
DECREASED CONCENTRATION: 0
STRIDOR: 0
DYSPHORIC MOOD: 0
EYE PAIN: 0
PALPITATIONS: 0
RECTAL PAIN: 0
APPETITE CHANGE: 0
POLYPHAGIA: 0
SLEEP DISTURBANCE: 0
SINUS PAIN: 0
CONSTIPATION: 0
SORE THROAT: 0
FATIGUE: 0
ARTHRALGIAS: 1
COLOR CHANGE: 0
TROUBLE SWALLOWING: 0
FEVER: 0
NECK STIFFNESS: 0

## 2024-03-08 NOTE — PROGRESS NOTES
Subjective   Patient ID: Leanna Pagan is a 58 y.o. female who presents for Anxiety and Pain.    Patient presents today to follow up on anxiety and chronic pain.          Review of Systems   Constitutional: Negative.  Negative for activity change, appetite change, fatigue and fever.   HENT:  Negative for congestion, dental problem, ear discharge, ear pain, mouth sores, rhinorrhea, sinus pressure, sinus pain, sore throat, tinnitus and trouble swallowing.    Eyes:  Negative for photophobia, pain and visual disturbance.   Respiratory:  Negative for cough, chest tightness, shortness of breath and stridor.    Cardiovascular:  Negative for chest pain and palpitations.   Gastrointestinal:  Negative for abdominal distention, abdominal pain, blood in stool, constipation, diarrhea and rectal pain.   Endocrine: Negative for cold intolerance, heat intolerance, polydipsia, polyphagia and polyuria.   Genitourinary:  Negative for dysuria, flank pain, hematuria and urgency.   Musculoskeletal:  Positive for arthralgias, back pain and gait problem. Negative for myalgias and neck stiffness.   Skin:  Negative for color change and rash.   Allergic/Immunologic: Negative for environmental allergies and food allergies.   Neurological:  Negative for dizziness, seizures, syncope, speech difficulty and headaches.   Hematological:  Negative for adenopathy.   Psychiatric/Behavioral:  Negative for agitation, confusion, decreased concentration, dysphoric mood and sleep disturbance. The patient is nervous/anxious.        Objective   There were no vitals taken for this visit.    Physical Exam  Constitutional:       Appearance: Normal appearance. She is obese.   HENT:      Head: Normocephalic.      Nose: Nose normal.   Eyes:      Extraocular Movements: Extraocular movements intact.      Conjunctiva/sclera: Conjunctivae normal.      Pupils: Pupils are equal, round, and reactive to light.   Musculoskeletal:      Cervical back: Normal range of motion.    Neurological:      Mental Status: She is alert and oriented to person, place, and time.   Psychiatric:      Comments: Anxious, tearful       Constitutional: Well developed, well nourished, alert and in no acute distress   Psychiatric: Mood calm and affect normal    Telephone Visit - Audio Communication Only      Assessment/Plan   Problem List Items Addressed This Visit             ICD-10-CM    Fibromyalgia M79.7    HTN (hypertension), benign I10    Morbid obesity (CMS/[facility]) E66.01    Lumbar stenosis with neurogenic claudication - Primary M48.062     Other Visit Diagnoses         Codes    Radiculopathy, lumbar region     M54.16    Nodule of lower lobe of right lung     R91.1    Relevant Orders    CT angio chest w and wo IV contrast           Scribe Attestation  By signing my name below, I, LESLIE Hall , Larry   attest that this documentation has been prepared under the direction and in the presence of Sergo Alexander DO.   Provider Attestation - Scribe documentation    All medical record entries made by the Scribe were at my direction and personally dictated by me. I have reviewed the chart and agree that the record accurately reflects my personal performance of the history, physical exam, discussion and plan.

## 2024-03-08 NOTE — PATIENT INSTRUCTIONS
Follow up in 3 months    Continue current medications and therapy for chronic medical conditions.    Patient was advised importance of proper diet/nutrition in addition adequate hydration. Patient was encouraged moderate exercise program to include 30 minutes daily for 5 days of the week or 150 minutes weekly. Patient will follow-up with us as scheduled.    I personally reviewed the OARRS report for this patient. I have considered the risks of abuse, dependence, addiction, and diversion.    UDS: 10/16/2023    Review laboratory results from February 2024    Reviewed CT angio of the chest from November 2023    Obtain CT Angio of chest     Refill tylenol 3 today     
intact

## 2024-03-09 RX ORDER — ACETAMINOPHEN AND CODEINE PHOSPHATE 300; 30 MG/1; MG/1
1 TABLET ORAL EVERY 12 HOURS PRN
Qty: 60 TABLET | Refills: 0 | Status: SHIPPED | OUTPATIENT
Start: 2024-03-09 | End: 2024-03-25 | Stop reason: SDUPTHER

## 2024-03-15 DIAGNOSIS — E78.5 HYPERLIPIDEMIA, UNSPECIFIED: ICD-10-CM

## 2024-03-15 DIAGNOSIS — N95.2 POSTMENOPAUSAL ATROPHIC VAGINITIS: ICD-10-CM

## 2024-03-15 DIAGNOSIS — K21.9 GASTRO-ESOPHAGEAL REFLUX DISEASE WITHOUT ESOPHAGITIS: ICD-10-CM

## 2024-03-15 DIAGNOSIS — M54.16 RADICULOPATHY, LUMBAR REGION: ICD-10-CM

## 2024-03-15 DIAGNOSIS — M54.12 RADICULOPATHY, CERVICAL REGION: ICD-10-CM

## 2024-03-15 DIAGNOSIS — F41.1 GENERALIZED ANXIETY DISORDER: ICD-10-CM

## 2024-03-15 DIAGNOSIS — B37.2 YEAST DERMATITIS: ICD-10-CM

## 2024-03-15 DIAGNOSIS — M54.50 LOW BACK PAIN, UNSPECIFIED: ICD-10-CM

## 2024-03-15 DIAGNOSIS — G47.00 INSOMNIA, UNSPECIFIED TYPE: ICD-10-CM

## 2024-03-15 NOTE — TELEPHONE ENCOUNTER
Dr. Alexander patient  Requesting refill for ALPRAZolam (Xanax) 1 mg tablet  [facility] on [address] in [place]     Also, patients says that [facility] was out of stock of Acetaminophen-codeine (Tylenol w/ Codeine #3) 300-3mg tablet. She would like to know if new script can be sent to [facility] in [place].     Please advise, thank you.

## 2024-03-16 DIAGNOSIS — F41.1 GENERALIZED ANXIETY DISORDER: ICD-10-CM

## 2024-03-16 DIAGNOSIS — M54.16 RADICULOPATHY, LUMBAR REGION: ICD-10-CM

## 2024-03-16 RX ORDER — LISINOPRIL 30 MG/1
30 TABLET ORAL DAILY
Qty: 90 TABLET | Refills: 0 | Status: SHIPPED | OUTPATIENT
Start: 2024-03-16 | End: 2024-06-10

## 2024-03-16 RX ORDER — GABAPENTIN 300 MG/1
300 CAPSULE ORAL 3 TIMES DAILY
Qty: 90 CAPSULE | Refills: 0 | Status: SHIPPED | OUTPATIENT
Start: 2024-03-16 | End: 2024-04-18

## 2024-03-16 RX ORDER — ACETAMINOPHEN AND CODEINE PHOSPHATE 300; 30 MG/1; MG/1
1 TABLET ORAL EVERY 12 HOURS PRN
Qty: 60 TABLET | Refills: 0 | OUTPATIENT
Start: 2024-03-16 | End: 2024-04-15

## 2024-03-16 RX ORDER — ALPRAZOLAM 1 MG/1
1 TABLET ORAL 3 TIMES DAILY PRN
Qty: 90 TABLET | Refills: 0 | Status: SHIPPED | OUTPATIENT
Start: 2024-03-16 | End: 2024-04-18

## 2024-03-16 RX ORDER — TIZANIDINE 4 MG/1
4 TABLET ORAL EVERY 6 HOURS PRN
Qty: 30 TABLET | Refills: 0 | Status: SHIPPED | OUTPATIENT
Start: 2024-03-16 | End: 2024-04-18

## 2024-03-16 RX ORDER — TRAZODONE HYDROCHLORIDE 50 MG/1
50 TABLET ORAL NIGHTLY
Qty: 90 TABLET | Refills: 1 | Status: SHIPPED | OUTPATIENT
Start: 2024-03-16 | End: 2024-05-28 | Stop reason: SDUPTHER

## 2024-03-16 RX ORDER — CONJUGATED ESTROGENS 0.62 MG/G
CREAM VAGINAL
Qty: 30 G | Refills: 1 | Status: SHIPPED | OUTPATIENT
Start: 2024-03-16

## 2024-03-16 RX ORDER — NYSTATIN 100000 [USP'U]/G
POWDER TOPICAL 2 TIMES DAILY
Qty: 60 G | Refills: 1 | Status: SHIPPED | OUTPATIENT
Start: 2024-03-16

## 2024-03-18 RX ORDER — OMEPRAZOLE 20 MG/1
20 CAPSULE, DELAYED RELEASE ORAL DAILY
Qty: 90 CAPSULE | Refills: 1 | Status: SHIPPED | OUTPATIENT
Start: 2024-03-18

## 2024-03-21 RX ORDER — ALPRAZOLAM 1 MG/1
1 TABLET ORAL 3 TIMES DAILY PRN
Qty: 90 TABLET | Refills: 0 | OUTPATIENT
Start: 2024-03-21 | End: 2024-04-20

## 2024-03-25 ENCOUNTER — HOSPITAL ENCOUNTER (EMERGENCY)
Age: 59
Discharge: HOME | End: 2024-03-26
Payer: COMMERCIAL

## 2024-03-25 DIAGNOSIS — K08.89 DENTALGIA: Primary | ICD-10-CM

## 2024-03-25 DIAGNOSIS — R51.9 ACUTE NONINTRACTABLE HEADACHE, UNSPECIFIED HEADACHE TYPE: ICD-10-CM

## 2024-03-25 PROCEDURE — 80048 BASIC METABOLIC PNL TOTAL CA: CPT

## 2024-03-25 PROCEDURE — 96375 TX/PRO/DX INJ NEW DRUG ADDON: CPT

## 2024-03-25 PROCEDURE — 85025 COMPLETE CBC W/AUTO DIFF WBC: CPT

## 2024-03-25 PROCEDURE — 6370000000 HC RX 637 (ALT 250 FOR IP)

## 2024-03-25 PROCEDURE — 2580000003 HC RX 258

## 2024-03-25 PROCEDURE — 36415 COLL VENOUS BLD VENIPUNCTURE: CPT

## 2024-03-25 PROCEDURE — 6360000002 HC RX W HCPCS

## 2024-03-25 PROCEDURE — 96374 THER/PROPH/DIAG INJ IV PUSH: CPT

## 2024-03-25 PROCEDURE — 99284 EMERGENCY DEPT VISIT MOD MDM: CPT

## 2024-03-25 RX ORDER — DIPHENHYDRAMINE HYDROCHLORIDE 50 MG/ML
25 INJECTION INTRAMUSCULAR; INTRAVENOUS ONCE
Status: COMPLETED | OUTPATIENT
Start: 2024-03-25 | End: 2024-03-25

## 2024-03-25 RX ORDER — KETOROLAC TROMETHAMINE 15 MG/ML
15 INJECTION, SOLUTION INTRAMUSCULAR; INTRAVENOUS ONCE
Status: COMPLETED | OUTPATIENT
Start: 2024-03-25 | End: 2024-03-25

## 2024-03-25 RX ORDER — ONDANSETRON 2 MG/ML
4 INJECTION INTRAMUSCULAR; INTRAVENOUS ONCE
Status: COMPLETED | OUTPATIENT
Start: 2024-03-25 | End: 2024-03-25

## 2024-03-25 RX ORDER — 0.9 % SODIUM CHLORIDE 0.9 %
1000 INTRAVENOUS SOLUTION INTRAVENOUS ONCE
Status: COMPLETED | OUTPATIENT
Start: 2024-03-25 | End: 2024-03-26

## 2024-03-25 RX ORDER — CLINDAMYCIN HYDROCHLORIDE 150 MG/1
300 CAPSULE ORAL ONCE
Status: COMPLETED | OUTPATIENT
Start: 2024-03-25 | End: 2024-03-25

## 2024-03-25 RX ADMIN — CLINDAMYCIN HYDROCHLORIDE 300 MG: 150 CAPSULE ORAL at 23:19

## 2024-03-25 RX ADMIN — ONDANSETRON 4 MG: 2 INJECTION INTRAMUSCULAR; INTRAVENOUS at 23:18

## 2024-03-25 RX ADMIN — SODIUM CHLORIDE 1000 ML: 9 INJECTION, SOLUTION INTRAVENOUS at 23:17

## 2024-03-25 RX ADMIN — KETOROLAC TROMETHAMINE 15 MG: 15 INJECTION, SOLUTION INTRAMUSCULAR; INTRAVENOUS at 23:18

## 2024-03-25 RX ADMIN — DIPHENHYDRAMINE HYDROCHLORIDE 25 MG: 50 INJECTION INTRAMUSCULAR; INTRAVENOUS at 23:18

## 2024-03-25 ASSESSMENT — PAIN DESCRIPTION - DESCRIPTORS
DESCRIPTORS: ACHING
DESCRIPTORS: POUNDING

## 2024-03-25 ASSESSMENT — PAIN DESCRIPTION - LOCATION
LOCATION: HEAD
LOCATION: HEAD

## 2024-03-25 ASSESSMENT — PAIN - FUNCTIONAL ASSESSMENT: PAIN_FUNCTIONAL_ASSESSMENT: 0-10

## 2024-03-25 ASSESSMENT — PAIN SCALES - GENERAL: PAINLEVEL_OUTOF10: 9

## 2024-03-25 NOTE — TELEPHONE ENCOUNTER
Patients says that [facility] was out of stock of Acetaminophen-codeine (Tylenol w/ Codeine #3) 300-3mg tablet. She would like to know if new script can be sent to [facility] in [place].      Please advise, thank you.   This message was sent back a couple days ago- please send meds over. Pt was not able to  script that was sent over to [facility] on 3/09 they did not have any in stock.

## 2024-03-26 ENCOUNTER — HOSPITAL ENCOUNTER (OUTPATIENT)
Dept: RADIOLOGY | Facility: HOSPITAL | Age: 59
Discharge: HOME | End: 2024-03-26
Payer: COMMERCIAL

## 2024-03-26 VITALS
OXYGEN SATURATION: 96 % | HEART RATE: 74 BPM | WEIGHT: 210 LBS | DIASTOLIC BLOOD PRESSURE: 91 MMHG | BODY MASS INDEX: 38.64 KG/M2 | RESPIRATION RATE: 16 BRPM | HEIGHT: 62 IN | SYSTOLIC BLOOD PRESSURE: 135 MMHG | TEMPERATURE: 97.5 F

## 2024-03-26 DIAGNOSIS — R91.1 NODULE OF LOWER LOBE OF RIGHT LUNG: ICD-10-CM

## 2024-03-26 LAB
ANION GAP SERPL CALCULATED.3IONS-SCNC: 12 MEQ/L (ref 9–15)
BASOPHILS # BLD: 0 K/UL (ref 0–0.2)
BASOPHILS NFR BLD: 0.5 %
BUN SERPL-MCNC: 17 MG/DL (ref 6–20)
CALCIUM SERPL-MCNC: 9.5 MG/DL (ref 8.5–9.9)
CHLORIDE SERPL-SCNC: 99 MEQ/L (ref 95–107)
CO2 SERPL-SCNC: 26 MEQ/L (ref 20–31)
CREAT SERPL-MCNC: 0.93 MG/DL (ref 0.5–0.9)
EOSINOPHIL # BLD: 0.1 K/UL (ref 0–0.7)
EOSINOPHIL NFR BLD: 1 %
ERYTHROCYTE [DISTWIDTH] IN BLOOD BY AUTOMATED COUNT: 12.6 % (ref 11.5–14.5)
GLUCOSE SERPL-MCNC: 115 MG/DL (ref 70–99)
HCT VFR BLD AUTO: 42.9 % (ref 37–47)
HGB BLD-MCNC: 14 G/DL (ref 12–16)
LYMPHOCYTES # BLD: 2.8 K/UL (ref 1–4.8)
LYMPHOCYTES NFR BLD: 34.5 %
MCH RBC QN AUTO: 28.3 PG (ref 27–31.3)
MCHC RBC AUTO-ENTMCNC: 32.6 % (ref 33–37)
MCV RBC AUTO: 86.7 FL (ref 79.4–94.8)
MONOCYTES # BLD: 0.6 K/UL (ref 0.2–0.8)
MONOCYTES NFR BLD: 6.7 %
NEUTROPHILS # BLD: 4.7 K/UL (ref 1.4–6.5)
NEUTS SEG NFR BLD: 57.1 %
PLATELET # BLD AUTO: 301 K/UL (ref 130–400)
POTASSIUM SERPL-SCNC: 3.8 MEQ/L (ref 3.4–4.9)
RBC # BLD AUTO: 4.95 M/UL (ref 4.2–5.4)
SODIUM SERPL-SCNC: 137 MEQ/L (ref 135–144)
WBC # BLD AUTO: 8.2 K/UL (ref 4.8–10.8)

## 2024-03-26 PROCEDURE — 71250 CT THORAX DX C-: CPT

## 2024-03-26 PROCEDURE — 96375 TX/PRO/DX INJ NEW DRUG ADDON: CPT

## 2024-03-26 PROCEDURE — 71250 CT THORAX DX C-: CPT | Performed by: RADIOLOGY

## 2024-03-26 PROCEDURE — 6360000002 HC RX W HCPCS

## 2024-03-26 RX ORDER — CLINDAMYCIN HYDROCHLORIDE 300 MG/1
300 CAPSULE ORAL 3 TIMES DAILY
Qty: 21 CAPSULE | Refills: 0 | Status: SHIPPED | OUTPATIENT
Start: 2024-03-26 | End: 2024-04-02

## 2024-03-26 RX ORDER — TRAMADOL HYDROCHLORIDE 50 MG/1
50 TABLET ORAL EVERY 4 HOURS PRN
Qty: 18 TABLET | Refills: 0 | Status: SHIPPED | OUTPATIENT
Start: 2024-03-26 | End: 2024-03-29

## 2024-03-26 RX ORDER — MORPHINE SULFATE 4 MG/ML
4 INJECTION, SOLUTION INTRAMUSCULAR; INTRAVENOUS ONCE
Status: COMPLETED | OUTPATIENT
Start: 2024-03-26 | End: 2024-03-26

## 2024-03-26 RX ADMIN — MORPHINE SULFATE 4 MG: 4 INJECTION, SOLUTION INTRAMUSCULAR; INTRAVENOUS at 00:40

## 2024-03-26 ASSESSMENT — ENCOUNTER SYMPTOMS
SORE THROAT: 0
TROUBLE SWALLOWING: 0
COUGH: 0
DIARRHEA: 0
ABDOMINAL PAIN: 0
RHINORRHEA: 0
PHOTOPHOBIA: 0
SHORTNESS OF BREATH: 0
FACIAL SWELLING: 0
NAUSEA: 1
VOMITING: 0
VOICE CHANGE: 0

## 2024-03-26 ASSESSMENT — PAIN SCALES - GENERAL: PAINLEVEL_OUTOF10: 9

## 2024-03-26 ASSESSMENT — PAIN DESCRIPTION - LOCATION: LOCATION: HEAD

## 2024-03-26 ASSESSMENT — PAIN DESCRIPTION - DESCRIPTORS: DESCRIPTORS: ACHING

## 2024-03-26 NOTE — ED PROVIDER NOTES
[facility] ED  EMERGENCY DEPARTMENT ENCOUNTER      Pt Name: Lizbet Dial  MRN: [id number]  Birthdate 1965  Date of evaluation: 3/25/2024  Provider: DUONG Cedillo  10:34 PM EDT      CHIEF COMPLAINT       Chief Complaint   Patient presents with    Headache    Dental Pain         HISTORY OF PRESENT ILLNESS   (Location/Symptom, Timing/Onset, Context/Setting, Quality, Duration, Modifying Factors, Severity)  Note limiting factors.   Lizbet Dial is a 58 y.o. female who presents to the emergency department with PMHx borderline personality disorder, constipation, diabetes, thyroiditis, fibromyalgia, coagulopathy, morbid obesity, opioid abuse, PE, allergic rhinitis, Bell's palsy, atypical angina, bladder prolapse, anxiety, IBS, major depressive disorder, atrophic vaginitis, cervical radiculopathy, hypertension, hyperlipidemia.    Patient presents to the emergency department for evaluation of right-sided dental pain.  States right upper posterior dentition.  Started earlier this morning.  Feels right side of her face is also now swollen and pain.  States right-sided headache.  Associated nausea with the headache.  History of similar headaches.  Patient denies fevers.  No neck pain or stiffness.  No drooling trismus difficulty swallowing or vomiting.  States history of poor dentition.  Has had nearly all of her teeth removed.  No longer has a dentist with whom to follow due to insurance changes.    HPI    Nursing Notes were reviewed.    REVIEW OF SYSTEMS    (2-9 systems for level 4, 10 or more for level 5)     Review of Systems   Constitutional:  Negative for chills and fever.   HENT:  Positive for dental problem. Negative for congestion, facial swelling, mouth sores, nosebleeds, postnasal drip, rhinorrhea, sore throat, trouble swallowing and voice change.    Eyes:  Negative for photophobia.   Respiratory:  Negative for cough and shortness of breath.    Cardiovascular:  Negative for chest pain.

## 2024-03-27 RX ORDER — ACETAMINOPHEN AND CODEINE PHOSPHATE 300; 30 MG/1; MG/1
1 TABLET ORAL EVERY 12 HOURS PRN
Qty: 30 TABLET | Refills: 0 | Status: SHIPPED | OUTPATIENT
Start: 2024-03-27 | End: 2024-04-09 | Stop reason: SDUPTHER

## 2024-04-09 ENCOUNTER — HOSPITAL ENCOUNTER (OUTPATIENT)
Dept: RADIOLOGY | Facility: CLINIC | Age: 59
Discharge: HOME | End: 2024-04-09
Payer: COMMERCIAL

## 2024-04-09 ENCOUNTER — OFFICE VISIT (OUTPATIENT)
Dept: PRIMARY CARE | Facility: CLINIC | Age: 59
End: 2024-04-09
Payer: COMMERCIAL

## 2024-04-09 VITALS
HEIGHT: 62 IN | HEART RATE: 84 BPM | BODY MASS INDEX: 38.09 KG/M2 | TEMPERATURE: 98.1 F | SYSTOLIC BLOOD PRESSURE: 120 MMHG | WEIGHT: 207 LBS | DIASTOLIC BLOOD PRESSURE: 78 MMHG | OXYGEN SATURATION: 93 %

## 2024-04-09 DIAGNOSIS — K04.7 DENTAL INFECTION: ICD-10-CM

## 2024-04-09 DIAGNOSIS — B00.9 HSV (HERPES SIMPLEX VIRUS) INFECTION: Primary | ICD-10-CM

## 2024-04-09 DIAGNOSIS — R91.1 NODULE OF LOWER LOBE OF RIGHT LUNG: ICD-10-CM

## 2024-04-09 DIAGNOSIS — M54.16 RADICULOPATHY, LUMBAR REGION: ICD-10-CM

## 2024-04-09 DIAGNOSIS — R39.198 DIFFICULTY URINATING: ICD-10-CM

## 2024-04-09 LAB
POC APPEARANCE, URINE: CLEAR
POC BILIRUBIN, URINE: NEGATIVE
POC BLOOD, URINE: NEGATIVE
POC COLOR, URINE: YELLOW
POC GLUCOSE, URINE: NEGATIVE MG/DL
POC KETONES, URINE: NEGATIVE MG/DL
POC LEUKOCYTES, URINE: NEGATIVE
POC NITRITE,URINE: NEGATIVE
POC PH, URINE: 5.5 PH
POC PROTEIN, URINE: NEGATIVE MG/DL
POC SPECIFIC GRAVITY, URINE: >=1.03
POC UROBILINOGEN, URINE: 0.2 EU/DL

## 2024-04-09 PROCEDURE — 70210 X-RAY EXAM OF SINUSES: CPT | Performed by: RADIOLOGY

## 2024-04-09 PROCEDURE — 70210 X-RAY EXAM OF SINUSES: CPT

## 2024-04-09 PROCEDURE — 99214 OFFICE O/P EST MOD 30 MIN: CPT | Performed by: FAMILY MEDICINE

## 2024-04-09 PROCEDURE — 81002 URINALYSIS NONAUTO W/O SCOPE: CPT | Performed by: FAMILY MEDICINE

## 2024-04-09 RX ORDER — ACETAMINOPHEN AND CODEINE PHOSPHATE 300; 30 MG/1; MG/1
1 TABLET ORAL EVERY 12 HOURS PRN
Qty: 30 TABLET | Refills: 0 | Status: SHIPPED | OUTPATIENT
Start: 2024-04-09 | End: 2024-05-09

## 2024-04-09 RX ORDER — CLINDAMYCIN HYDROCHLORIDE 300 MG/1
300 CAPSULE ORAL 3 TIMES DAILY
Qty: 30 CAPSULE | Refills: 0 | Status: SHIPPED | OUTPATIENT
Start: 2024-04-09 | End: 2024-04-19

## 2024-04-09 RX ORDER — VALACYCLOVIR HYDROCHLORIDE 500 MG/1
500 TABLET, FILM COATED ORAL 3 TIMES DAILY
Qty: 30 TABLET | Refills: 0 | Status: SHIPPED | OUTPATIENT
Start: 2024-04-09

## 2024-04-09 ASSESSMENT — ENCOUNTER SYMPTOMS
NECK STIFFNESS: 0
POLYPHAGIA: 0
RECTAL PAIN: 0
FEVER: 0
RHINORRHEA: 0
DYSURIA: 0
SINUS PAIN: 0
CONSTIPATION: 0
ABDOMINAL PAIN: 0
NERVOUS/ANXIOUS: 0
ARTHRALGIAS: 1
DYSPHORIC MOOD: 0
DECREASED CONCENTRATION: 0
DIARRHEA: 0
BLOOD IN STOOL: 0
CONSTITUTIONAL NEGATIVE: 1
SEIZURES: 0
PHOTOPHOBIA: 0
SHORTNESS OF BREATH: 0
FATIGUE: 0
SINUS PRESSURE: 0
POLYDIPSIA: 0
COUGH: 0
STRIDOR: 0
ABDOMINAL DISTENTION: 0
HEADACHES: 0
TROUBLE SWALLOWING: 0
MYALGIAS: 1
HEMATURIA: 0
COLOR CHANGE: 0
FLANK PAIN: 0
SORE THROAT: 0
PALPITATIONS: 0
EYE PAIN: 0
ADENOPATHY: 0
DIZZINESS: 0
CHEST TIGHTNESS: 1
CONFUSION: 0
AGITATION: 0
SLEEP DISTURBANCE: 0
ACTIVITY CHANGE: 0
APPETITE CHANGE: 0
SPEECH DIFFICULTY: 0

## 2024-04-09 NOTE — PROGRESS NOTES
Subjective   Patient ID: Leanna Pagan is a 58 y.o. female who presents for Results.    HPI   Experiencing posterior chest pain  Had CT chest wo IV contrast 03/26/2024    Patient has dermatitis on left breast and nodules under left axilla  Admits to left breast/axilla pain, itching, and burning sensation  Mammogram last done 08/01/2023  Saw dermatologist  Saw OB/gyn at [facility]   Pertinent history includes shingles 1 year ago on left side  Treated with valtrex in the past    Abscessed tooth  Seen at ER 03/25/2024  Completed clindamycin therapy  Prescribed tramadol but unable to take it  Still experiencing pain. Pain scale 9/10  Has been taking tylenol #3 every 4 hours with significant relief      Review of Systems   Constitutional: Negative.  Negative for activity change, appetite change, fatigue and fever.   HENT:  Negative for congestion, dental problem, ear discharge, ear pain, mouth sores, rhinorrhea, sinus pressure, sinus pain, sore throat, tinnitus and trouble swallowing.    Eyes:  Negative for photophobia, pain and visual disturbance.   Respiratory:  Positive for chest tightness. Negative for cough, shortness of breath and stridor.    Cardiovascular:  Positive for chest pain. Negative for palpitations.   Gastrointestinal:  Negative for abdominal distention, abdominal pain, blood in stool, constipation, diarrhea and rectal pain.   Endocrine: Negative for cold intolerance, heat intolerance, polydipsia, polyphagia and polyuria.   Genitourinary:  Negative for dysuria, flank pain, hematuria and urgency.   Musculoskeletal:  Positive for arthralgias and myalgias. Negative for gait problem and neck stiffness.   Skin:  Positive for rash. Negative for color change.   Allergic/Immunologic: Negative for environmental allergies and food allergies.   Neurological:  Negative for dizziness, seizures, syncope, speech difficulty and headaches.   Hematological:  Negative for adenopathy.   Psychiatric/Behavioral:   "Negative for agitation, confusion, decreased concentration, dysphoric mood and sleep disturbance. The patient is not nervous/anxious.        Objective   /78 (BP Location: Left arm, Patient Position: Sitting, BP Cuff Size: Adult)   Pulse 84   Temp 36.7 °C (98.1 °F)   Ht 1.575 m (5' 2\")   Wt 93.9 kg (207 lb)   SpO2 93%   BMI 37.86 kg/m²     Physical Exam  Vitals reviewed.   Constitutional:       General: She is not in acute distress.     Appearance: She is obese. She is not ill-appearing or diaphoretic.   HENT:      Head: Normocephalic.      Right Ear: Tympanic membrane and external ear normal.      Left Ear: Tympanic membrane and external ear normal.      Nose: Nose normal. No congestion.      Mouth/Throat:      Pharynx: No posterior oropharyngeal erythema.   Eyes:      General:         Right eye: No discharge.         Left eye: No discharge.      Extraocular Movements: Extraocular movements intact.      Conjunctiva/sclera: Conjunctivae normal.      Pupils: Pupils are equal, round, and reactive to light.   Cardiovascular:      Rate and Rhythm: Normal rate and regular rhythm.      Pulses: Normal pulses.      Heart sounds: Normal heart sounds. No murmur heard.  Pulmonary:      Effort: Pulmonary effort is normal. No respiratory distress.      Breath sounds: Normal breath sounds. No wheezing or rales.   Chest:      Chest wall: No tenderness.   Abdominal:      General: Bowel sounds are normal. There is distension.      Palpations: There is no mass.      Tenderness: There is no abdominal tenderness. There is no guarding.   Musculoskeletal:         General: No tenderness. Normal range of motion.      Cervical back: Normal range of motion and neck supple. No tenderness.      Right lower leg: No edema.      Left lower leg: No edema.   Skin:     General: Skin is dry.      Coloration: Skin is not jaundiced.      Findings: No bruising, erythema or rash.   Neurological:      General: No focal deficit present.      " Mental Status: She is alert and oriented to person, place, and time. Mental status is at baseline.      Cranial Nerves: No cranial nerve deficit.      Sensory: No sensory deficit.      Coordination: Coordination normal.      Gait: Gait normal.   Psychiatric:         Thought Content: Thought content normal.         Judgment: Judgment normal.      Comments: Anxious/depressed         Assessment/Plan   Problem List Items Addressed This Visit    None  Visit Diagnoses         Codes    HSV (herpes simplex virus) infection    -  Primary B00.9    Relevant Medications    valACYclovir (Valtrex) 500 mg tablet    Dental infection     K04.7    Relevant Medications    clindamycin (Cleocin HCL) 300 mg capsule    Other Relevant Orders    XR sinuses shore view only (Completed)    Difficulty urinating     R39.198    Relevant Orders    POCT UA (nonautomated) manually resulted (Completed)    Radiculopathy, lumbar region     M54.16    Relevant Medications    acetaminophen-codeine (Tylenol w/ Codeine #3) 300-30 mg tablet    Nodule of lower lobe of right lung     R91.1    Relevant Orders    CT chest w IV contrast    Creatinine, Serum              Scribe Attestation  By signing my name below, IChayo RMA , Omidibe   attest that this documentation has been prepared under the direction and in the presence of Sergo Alexander DO.   Provider Attestation - Scribe documentation    All medical record entries made by the Scribe were at my direction and personally dictated by me. I have reviewed the chart and agree that the record accurately reflects my personal performance of the history, physical exam, discussion and plan.

## 2024-04-11 ENCOUNTER — APPOINTMENT (OUTPATIENT)
Dept: PRIMARY CARE | Facility: CLINIC | Age: 59
End: 2024-04-11
Payer: COMMERCIAL

## 2024-04-12 DIAGNOSIS — R32 UNSPECIFIED URINARY INCONTINENCE: ICD-10-CM

## 2024-04-12 DIAGNOSIS — J32.0 CHRONIC MAXILLARY SINUSITIS: ICD-10-CM

## 2024-04-12 RX ORDER — OXYBUTYNIN CHLORIDE 10 MG/1
TABLET, EXTENDED RELEASE ORAL
Qty: 90 TABLET | Refills: 0 | Status: SHIPPED | OUTPATIENT
Start: 2024-04-12 | End: 2024-05-29 | Stop reason: WASHOUT

## 2024-04-12 NOTE — TELEPHONE ENCOUNTER
----- Message from Sergo Alexander DO sent at 4/11/2024 12:16 AM EDT -----  Please schedule for CT scan of the sinuses.  Thank you

## 2024-04-15 NOTE — PATIENT INSTRUCTIONS
Follow up in 3 months. Repeat CT scan in 6 months    Continue current medications and therapy for chronic medical conditions.    Patient was advised importance of proper diet/nutrition in addition adequate hydration. Patient was encouraged moderate exercise program to include 30 minutes daily for 5 days of the week or 150 minutes weekly. Patient will follow-up with us as scheduled.    I personally reviewed the OARRS report for this patient. I have considered the risks of abuse, dependence, addiction, and diversion.    UDS/CSA:    Accucheck /Hgba1c today    Sinus radiographs in office    Start Clindamycin 300 mgs tid    Schedule CT scan of chest with IV contrast

## 2024-04-17 DIAGNOSIS — F41.1 GENERALIZED ANXIETY DISORDER: ICD-10-CM

## 2024-04-17 DIAGNOSIS — M54.12 RADICULOPATHY, CERVICAL REGION: ICD-10-CM

## 2024-04-17 DIAGNOSIS — M54.50 LOW BACK PAIN, UNSPECIFIED: ICD-10-CM

## 2024-04-17 DIAGNOSIS — M54.16 RADICULOPATHY, LUMBAR REGION: ICD-10-CM

## 2024-04-18 RX ORDER — GABAPENTIN 300 MG/1
300 CAPSULE ORAL 3 TIMES DAILY
Qty: 90 CAPSULE | Refills: 0 | Status: SHIPPED | OUTPATIENT
Start: 2024-04-18

## 2024-04-18 RX ORDER — ALPRAZOLAM 1 MG/1
1 TABLET ORAL 3 TIMES DAILY PRN
Qty: 90 TABLET | Refills: 0 | OUTPATIENT
Start: 2024-04-18 | End: 2024-05-23

## 2024-04-18 RX ORDER — TIZANIDINE 4 MG/1
4 TABLET ORAL EVERY 6 HOURS PRN
Qty: 30 TABLET | Refills: 0 | Status: SHIPPED | OUTPATIENT
Start: 2024-04-18 | End: 2024-05-28 | Stop reason: SDUPTHER

## 2024-04-23 DIAGNOSIS — E11.65 INADEQUATELY CONTROLLED DIABETES MELLITUS (HCC): ICD-10-CM

## 2024-04-23 DIAGNOSIS — E03.9 ACQUIRED HYPOTHYROIDISM: ICD-10-CM

## 2024-04-23 LAB
ANION GAP SERPL CALCULATED.3IONS-SCNC: 11 MEQ/L (ref 9–15)
BUN SERPL-MCNC: 12 MG/DL (ref 6–20)
CALCIUM SERPL-MCNC: 9.8 MG/DL (ref 8.5–9.9)
CHLORIDE SERPL-SCNC: 106 MEQ/L (ref 95–107)
CO2 SERPL-SCNC: 26 MEQ/L (ref 20–31)
CREAT SERPL-MCNC: 0.91 MG/DL (ref 0.5–0.9)
GLUCOSE SERPL-MCNC: 98 MG/DL (ref 70–99)
POTASSIUM SERPL-SCNC: 4.2 MEQ/L (ref 3.4–4.9)
SODIUM SERPL-SCNC: 143 MEQ/L (ref 135–144)
T4 FREE SERPL-MCNC: 1.6 NG/DL (ref 0.84–1.68)
TSH SERPL-MCNC: 0.76 UIU/ML (ref 0.44–3.86)

## 2024-04-24 LAB
ESTIMATED AVERAGE GLUCOSE: 126 MG/DL
HBA1C MFR BLD: 6 % (ref 4–6)

## 2024-04-25 ENCOUNTER — OFFICE VISIT (OUTPATIENT)
Dept: ENDOCRINOLOGY | Age: 59
End: 2024-04-25
Payer: COMMERCIAL

## 2024-04-25 VITALS
DIASTOLIC BLOOD PRESSURE: 84 MMHG | WEIGHT: 210 LBS | HEIGHT: 62 IN | HEART RATE: 73 BPM | OXYGEN SATURATION: 98 % | BODY MASS INDEX: 38.64 KG/M2 | SYSTOLIC BLOOD PRESSURE: 117 MMHG

## 2024-04-25 DIAGNOSIS — E11.65 INADEQUATELY CONTROLLED DIABETES MELLITUS (HCC): Primary | ICD-10-CM

## 2024-04-25 DIAGNOSIS — E66.9 OBESITY (BMI 30-39.9): ICD-10-CM

## 2024-04-25 DIAGNOSIS — E03.9 ACQUIRED HYPOTHYROIDISM: ICD-10-CM

## 2024-04-25 LAB
CHP ED QC CHECK: NORMAL
GLUCOSE BLD-MCNC: 112 MG/DL

## 2024-04-25 PROCEDURE — 3044F HG A1C LEVEL LT 7.0%: CPT | Performed by: INTERNAL MEDICINE

## 2024-04-25 PROCEDURE — 82962 GLUCOSE BLOOD TEST: CPT | Performed by: INTERNAL MEDICINE

## 2024-04-25 PROCEDURE — 3017F COLORECTAL CA SCREEN DOC REV: CPT | Performed by: INTERNAL MEDICINE

## 2024-04-25 PROCEDURE — 3074F SYST BP LT 130 MM HG: CPT | Performed by: INTERNAL MEDICINE

## 2024-04-25 PROCEDURE — 99213 OFFICE O/P EST LOW 20 MIN: CPT | Performed by: INTERNAL MEDICINE

## 2024-04-25 PROCEDURE — 1036F TOBACCO NON-USER: CPT | Performed by: INTERNAL MEDICINE

## 2024-04-25 PROCEDURE — G8427 DOCREV CUR MEDS BY ELIG CLIN: HCPCS | Performed by: INTERNAL MEDICINE

## 2024-04-25 PROCEDURE — 2022F DILAT RTA XM EVC RTNOPTHY: CPT | Performed by: INTERNAL MEDICINE

## 2024-04-25 PROCEDURE — 3079F DIAST BP 80-89 MM HG: CPT | Performed by: INTERNAL MEDICINE

## 2024-04-25 PROCEDURE — G8417 CALC BMI ABV UP PARAM F/U: HCPCS | Performed by: INTERNAL MEDICINE

## 2024-04-25 RX ORDER — SEMAGLUTIDE 0.68 MG/ML
INJECTION, SOLUTION SUBCUTANEOUS
Qty: 3 ML | Refills: 3 | Status: SHIPPED | OUTPATIENT
Start: 2024-04-25

## 2024-04-25 RX ORDER — PHENTERMINE HYDROCHLORIDE 37.5 MG/1
37.5 TABLET ORAL
Qty: 30 TABLET | Refills: 2 | Status: SHIPPED | OUTPATIENT
Start: 2024-04-25 | End: 2024-05-25

## 2024-04-25 RX ORDER — LEVOTHYROXINE SODIUM 112 UG/1
112 TABLET ORAL DAILY
Qty: 30 TABLET | Refills: 3 | Status: SHIPPED | OUTPATIENT
Start: 2024-04-25

## 2024-04-25 NOTE — PROGRESS NOTES
change and weight gain.   Endocrine: Negative for cold intolerance and heat intolerance.   Psychiatric/Behavioral:  Positive for dysphoric mood.        Objective:   Physical Exam  Vitals reviewed.   Constitutional:       General: She is not in acute distress.     Appearance: Normal appearance. She is obese.   HENT:      Head: Normocephalic and atraumatic.      Right Ear: External ear normal.      Left Ear: External ear normal.      Nose: Nose normal.   Eyes:      General: No scleral icterus.        Right eye: No discharge.         Left eye: No discharge.      Extraocular Movements: Extraocular movements intact.      Conjunctiva/sclera: Conjunctivae normal.   Cardiovascular:      Rate and Rhythm: Normal rate.   Pulmonary:      Effort: Pulmonary effort is normal.   Musculoskeletal:         General: Normal range of motion.      Cervical back: Normal range of motion and neck supple.   Neurological:      General: No focal deficit present.      Mental Status: She is alert and oriented to person, place, and time.   Psychiatric:         Mood and Affect: Mood normal.         Behavior: Behavior normal.

## 2024-04-28 RX ORDER — FLASH GLUCOSE SENSOR
KIT MISCELLANEOUS
Qty: 2 EACH | Refills: 3 | Status: SHIPPED | OUTPATIENT
Start: 2024-04-28

## 2024-05-01 ENCOUNTER — TELEPHONE (OUTPATIENT)
Dept: ENDOCRINOLOGY | Age: 59
End: 2024-05-01

## 2024-05-02 ENCOUNTER — APPOINTMENT (OUTPATIENT)
Dept: RADIOLOGY | Facility: CLINIC | Age: 59
End: 2024-05-02
Payer: COMMERCIAL

## 2024-05-03 ENCOUNTER — HOSPITAL ENCOUNTER (OUTPATIENT)
Dept: RADIOLOGY | Facility: CLINIC | Age: 59
Discharge: HOME | End: 2024-05-03
Payer: COMMERCIAL

## 2024-05-03 DIAGNOSIS — J32.0 CHRONIC MAXILLARY SINUSITIS: ICD-10-CM

## 2024-05-03 PROCEDURE — 70486 CT MAXILLOFACIAL W/O DYE: CPT | Performed by: RADIOLOGY

## 2024-05-03 PROCEDURE — 70486 CT MAXILLOFACIAL W/O DYE: CPT

## 2024-05-14 ENCOUNTER — TELEPHONE (OUTPATIENT)
Dept: PRIMARY CARE | Facility: CLINIC | Age: 59
End: 2024-05-14
Payer: COMMERCIAL

## 2024-05-14 DIAGNOSIS — E11.65 TYPE 2 DIABETES MELLITUS WITH HYPERGLYCEMIA, WITHOUT LONG-TERM CURRENT USE OF INSULIN (MULTI): ICD-10-CM

## 2024-05-14 RX ORDER — AMLODIPINE BESYLATE 10 MG/1
5 TABLET ORAL DAILY
Qty: 45 TABLET | Refills: 1 | Status: SHIPPED | OUTPATIENT
Start: 2024-05-14

## 2024-05-14 NOTE — TELEPHONE ENCOUNTER
Home Health NP called to discuss with Dr. Alexander that patient is having suicidal thoughts and states patient does have a plan to take medications she has at home.     NP spoke with Dr. Alexander and decided on Crisis intervention. NP is making contact with Crisis Intervention.

## 2024-05-16 ENCOUNTER — TELEPHONE (OUTPATIENT)
Dept: PRIMARY CARE | Facility: CLINIC | Age: 59
End: 2024-05-16
Payer: COMMERCIAL

## 2024-05-16 DIAGNOSIS — F41.1 GENERALIZED ANXIETY DISORDER: ICD-10-CM

## 2024-05-16 DIAGNOSIS — M48.062 LUMBAR STENOSIS WITH NEUROGENIC CLAUDICATION: ICD-10-CM

## 2024-05-16 DIAGNOSIS — F33.41 RECURRENT MAJOR DEPRESSIVE DISORDER, IN PARTIAL REMISSION (CMS-HCC): ICD-10-CM

## 2024-05-16 NOTE — TELEPHONE ENCOUNTER
Dr. Alexander Pt    Refill for  acetaminophen-codeine (Tylenol w/ Codeine #3) 300-30 mg tablet  **Pt is out    ALPRAZolam (Xanax) 1 mg tablet      [address]

## 2024-05-20 NOTE — TELEPHONE ENCOUNTER
Left patient a message PA for ozempic was denied , insurance wants patien to try 120 days of 2 preferred medication.

## 2024-05-21 NOTE — TELEPHONE ENCOUNTER
Pt has appt w/ JAY JAY on 6/10     Pt would like short script sent over for    acetaminophen-codeine (Tylenol w/ Codeine #3) 300-30 mg tablet  **Pt is out     [facility] - [place], [place] - [address]      ALPRAZolam (Xanax) 1 mg tablet    [id number] - [place], [place] - [address]

## 2024-05-22 PROCEDURE — 96361 HYDRATE IV INFUSION ADD-ON: CPT

## 2024-05-22 PROCEDURE — 99284 EMERGENCY DEPT VISIT MOD MDM: CPT | Mod: 25

## 2024-05-22 PROCEDURE — 96374 THER/PROPH/DIAG INJ IV PUSH: CPT

## 2024-05-23 ENCOUNTER — APPOINTMENT (OUTPATIENT)
Dept: CARDIOLOGY | Facility: HOSPITAL | Age: 59
End: 2024-05-23
Payer: COMMERCIAL

## 2024-05-23 ENCOUNTER — HOSPITAL ENCOUNTER (EMERGENCY)
Facility: HOSPITAL | Age: 59
Discharge: HOME | End: 2024-05-23
Attending: STUDENT IN AN ORGANIZED HEALTH CARE EDUCATION/TRAINING PROGRAM
Payer: COMMERCIAL

## 2024-05-23 VITALS
BODY MASS INDEX: 36.8 KG/M2 | WEIGHT: 200 LBS | HEIGHT: 62 IN | DIASTOLIC BLOOD PRESSURE: 71 MMHG | SYSTOLIC BLOOD PRESSURE: 133 MMHG | RESPIRATION RATE: 16 BRPM | TEMPERATURE: 96.8 F | OXYGEN SATURATION: 93 % | HEART RATE: 60 BPM

## 2024-05-23 DIAGNOSIS — Z76.0 MEDICATION REFILL: Primary | ICD-10-CM

## 2024-05-23 LAB
ALBUMIN SERPL BCP-MCNC: 4.5 G/DL (ref 3.4–5)
ALP SERPL-CCNC: 69 U/L (ref 33–110)
ALT SERPL W P-5'-P-CCNC: 17 U/L (ref 7–45)
ANION GAP SERPL CALC-SCNC: 13 MMOL/L (ref 10–20)
APPEARANCE UR: ABNORMAL
AST SERPL W P-5'-P-CCNC: 18 U/L (ref 9–39)
BACTERIA #/AREA URNS AUTO: ABNORMAL /HPF
BASOPHILS # BLD AUTO: 0.05 X10*3/UL (ref 0–0.1)
BASOPHILS NFR BLD AUTO: 0.8 %
BILIRUB SERPL-MCNC: 0.6 MG/DL (ref 0–1.2)
BILIRUB UR STRIP.AUTO-MCNC: NEGATIVE MG/DL
BUN SERPL-MCNC: 25 MG/DL (ref 6–23)
CALCIUM SERPL-MCNC: 10.2 MG/DL (ref 8.6–10.3)
CHLORIDE SERPL-SCNC: 104 MMOL/L (ref 98–107)
CO2 SERPL-SCNC: 26 MMOL/L (ref 21–32)
COLOR UR: YELLOW
CREAT SERPL-MCNC: 1.39 MG/DL (ref 0.5–1.05)
EGFRCR SERPLBLD CKD-EPI 2021: 44 ML/MIN/1.73M*2
EOSINOPHIL # BLD AUTO: 0.1 X10*3/UL (ref 0–0.7)
EOSINOPHIL NFR BLD AUTO: 1.6 %
ERYTHROCYTE [DISTWIDTH] IN BLOOD BY AUTOMATED COUNT: 12.9 % (ref 11.5–14.5)
GLUCOSE SERPL-MCNC: 96 MG/DL (ref 74–99)
GLUCOSE UR STRIP.AUTO-MCNC: NEGATIVE MG/DL
HCT VFR BLD AUTO: 38.8 % (ref 36–46)
HGB BLD-MCNC: 13.3 G/DL (ref 12–16)
HOLD SPECIMEN: NORMAL
IMM GRANULOCYTES # BLD AUTO: 0.01 X10*3/UL (ref 0–0.7)
IMM GRANULOCYTES NFR BLD AUTO: 0.2 % (ref 0–0.9)
KETONES UR STRIP.AUTO-MCNC: NEGATIVE MG/DL
LACTATE SERPL-SCNC: 0.5 MMOL/L (ref 0.4–2)
LEUKOCYTE ESTERASE UR QL STRIP.AUTO: ABNORMAL
LIPASE SERPL-CCNC: 84 U/L (ref 9–82)
LYMPHOCYTES # BLD AUTO: 2.56 X10*3/UL (ref 1.2–4.8)
LYMPHOCYTES NFR BLD AUTO: 42.1 %
MAGNESIUM SERPL-MCNC: 1.97 MG/DL (ref 1.6–2.4)
MCH RBC QN AUTO: 29.6 PG (ref 26–34)
MCHC RBC AUTO-ENTMCNC: 34.3 G/DL (ref 32–36)
MCV RBC AUTO: 86 FL (ref 80–100)
MONOCYTES # BLD AUTO: 0.43 X10*3/UL (ref 0.1–1)
MONOCYTES NFR BLD AUTO: 7.1 %
NEUTROPHILS # BLD AUTO: 2.93 X10*3/UL (ref 1.2–7.7)
NEUTROPHILS NFR BLD AUTO: 48.2 %
NITRITE UR QL STRIP.AUTO: NEGATIVE
NRBC BLD-RTO: 0 /100 WBCS (ref 0–0)
PH UR STRIP.AUTO: 5 [PH]
PLATELET # BLD AUTO: 268 X10*3/UL (ref 150–450)
POTASSIUM SERPL-SCNC: 3.5 MMOL/L (ref 3.5–5.3)
PROT SERPL-MCNC: 7 G/DL (ref 6.4–8.2)
PROT UR STRIP.AUTO-MCNC: NEGATIVE MG/DL
RBC # BLD AUTO: 4.5 X10*6/UL (ref 4–5.2)
RBC # UR STRIP.AUTO: NEGATIVE /UL
RBC #/AREA URNS AUTO: ABNORMAL /HPF
SODIUM SERPL-SCNC: 139 MMOL/L (ref 136–145)
SP GR UR STRIP.AUTO: 1.01
SQUAMOUS #/AREA URNS AUTO: ABNORMAL /HPF
UROBILINOGEN UR STRIP.AUTO-MCNC: <2 MG/DL
WBC # BLD AUTO: 6.1 X10*3/UL (ref 4.4–11.3)
WBC #/AREA URNS AUTO: ABNORMAL /HPF

## 2024-05-23 PROCEDURE — 93005 ELECTROCARDIOGRAM TRACING: CPT

## 2024-05-23 PROCEDURE — 36415 COLL VENOUS BLD VENIPUNCTURE: CPT | Performed by: STUDENT IN AN ORGANIZED HEALTH CARE EDUCATION/TRAINING PROGRAM

## 2024-05-23 PROCEDURE — 83690 ASSAY OF LIPASE: CPT | Performed by: STUDENT IN AN ORGANIZED HEALTH CARE EDUCATION/TRAINING PROGRAM

## 2024-05-23 PROCEDURE — 80053 COMPREHEN METABOLIC PANEL: CPT | Performed by: STUDENT IN AN ORGANIZED HEALTH CARE EDUCATION/TRAINING PROGRAM

## 2024-05-23 PROCEDURE — 81003 URINALYSIS AUTO W/O SCOPE: CPT | Performed by: STUDENT IN AN ORGANIZED HEALTH CARE EDUCATION/TRAINING PROGRAM

## 2024-05-23 PROCEDURE — 83735 ASSAY OF MAGNESIUM: CPT | Performed by: STUDENT IN AN ORGANIZED HEALTH CARE EDUCATION/TRAINING PROGRAM

## 2024-05-23 PROCEDURE — 87086 URINE CULTURE/COLONY COUNT: CPT | Mod: ELYLAB | Performed by: STUDENT IN AN ORGANIZED HEALTH CARE EDUCATION/TRAINING PROGRAM

## 2024-05-23 PROCEDURE — 96361 HYDRATE IV INFUSION ADD-ON: CPT

## 2024-05-23 PROCEDURE — 96374 THER/PROPH/DIAG INJ IV PUSH: CPT

## 2024-05-23 PROCEDURE — 2500000004 HC RX 250 GENERAL PHARMACY W/ HCPCS (ALT 636 FOR OP/ED): Performed by: STUDENT IN AN ORGANIZED HEALTH CARE EDUCATION/TRAINING PROGRAM

## 2024-05-23 PROCEDURE — 85025 COMPLETE CBC W/AUTO DIFF WBC: CPT | Performed by: STUDENT IN AN ORGANIZED HEALTH CARE EDUCATION/TRAINING PROGRAM

## 2024-05-23 PROCEDURE — 83605 ASSAY OF LACTIC ACID: CPT | Performed by: STUDENT IN AN ORGANIZED HEALTH CARE EDUCATION/TRAINING PROGRAM

## 2024-05-23 PROCEDURE — 2500000001 HC RX 250 WO HCPCS SELF ADMINISTERED DRUGS (ALT 637 FOR MEDICARE OP): Performed by: STUDENT IN AN ORGANIZED HEALTH CARE EDUCATION/TRAINING PROGRAM

## 2024-05-23 RX ORDER — ALPRAZOLAM 0.5 MG/1
TABLET ORAL EVERY 12 HOURS
Qty: 15 TABLET | Refills: 0 | Status: SHIPPED | OUTPATIENT
Start: 2024-05-23 | End: 2024-05-31

## 2024-05-23 RX ORDER — ONDANSETRON HYDROCHLORIDE 2 MG/ML
4 INJECTION, SOLUTION INTRAVENOUS ONCE
Status: COMPLETED | OUTPATIENT
Start: 2024-05-23 | End: 2024-05-23

## 2024-05-23 RX ORDER — ALPRAZOLAM 0.5 MG/1
1 TABLET ORAL ONCE
Status: COMPLETED | OUTPATIENT
Start: 2024-05-23 | End: 2024-05-23

## 2024-05-23 RX ADMIN — ALPRAZOLAM 1 MG: 0.5 TABLET ORAL at 02:40

## 2024-05-23 RX ADMIN — SODIUM CHLORIDE 1000 ML: 9 INJECTION, SOLUTION INTRAVENOUS at 02:40

## 2024-05-23 RX ADMIN — ONDANSETRON 4 MG: 2 INJECTION, SOLUTION INTRAMUSCULAR; INTRAVENOUS at 02:20

## 2024-05-23 ASSESSMENT — LIFESTYLE VARIABLES
BLOOD PRESSURE: 133/71
AGITATION: NORMAL ACTIVITY
AUDITORY DISTURBANCES: NOT PRESENT
HEADACHE, FULLNESS IN HEAD: MODERATE
TREMOR: MODERATE, WITH PATIENT'S ARMS EXTENDED
NAUSEA AND VOMITING: MILD NAUSEA WITH NO VOMITING
BLOOD PRESSURE: 130/68
PAROXYSMAL SWEATS: NO SWEAT VISIBLE
ORIENTATION AND CLOUDING OF SENSORIUM: ORIENTED AND CAN DO SERIAL ADDITIONS
PULSE: 60
VISUAL DISTURBANCES: NOT PRESENT
TOTAL SCORE: 10
ANXIETY: 2

## 2024-05-23 ASSESSMENT — PAIN DESCRIPTION - DESCRIPTORS: DESCRIPTORS: ACHING

## 2024-05-23 ASSESSMENT — PAIN - FUNCTIONAL ASSESSMENT
PAIN_FUNCTIONAL_ASSESSMENT: 0-10
PAIN_FUNCTIONAL_ASSESSMENT: 0-10

## 2024-05-23 ASSESSMENT — PAIN SCALES - GENERAL
PAINLEVEL_OUTOF10: 8
PAINLEVEL_OUTOF10: 8

## 2024-05-23 ASSESSMENT — PAIN DESCRIPTION - PAIN TYPE
TYPE: ACUTE PAIN
TYPE: ACUTE PAIN

## 2024-05-23 ASSESSMENT — PAIN DESCRIPTION - LOCATION
LOCATION: HEAD
LOCATION: HEAD
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
## 2024-05-23 NOTE — ED TRIAGE NOTES
Pt presents to ED requesting xanax prescriptions. Pt sts her doctor did not refill her prescription and the last time she took a full dose was 5/16. Pt sts she can't sleep, headache, increased anxiety, heart pounding, and irritable.

## 2024-05-23 NOTE — DISCHARGE INSTRUCTIONS
Please follow up with your Primary Care Provider (PCP) within the next 2-3 days regarding your ED visit.  You have been given a referral to a psychiatrist.  Please call her off schedule follow-up appointment.  Please call your doctor or return to the nearest emergency department with any new or worsening symptoms that is concerning to you.  These documents have a lot of useful information! Please read them, so you know what to expect, what you can do for yourself at home, and also to know concerning signs warrant a return to the Emergency Department for additional evaluation.  You are welcome back any time. Thank you for entrusting your care to us, I hope we made your visit as pleasant as possible. Wishing you well!    Dr. Worley

## 2024-05-24 LAB — BACTERIA UR CULT: NORMAL

## 2024-05-25 LAB
ATRIAL RATE: 468 BPM
Q ONSET: 222 MS
QRS COUNT: 13 BEATS
QRS DURATION: 86 MS
QT INTERVAL: 392 MS
QTC CALCULATION(BAZETT): 437 MS
QTC FREDERICIA: 422 MS
R AXIS: -23 DEGREES
T AXIS: 16 DEGREES
T OFFSET: 418 MS
VENTRICULAR RATE: 75 BPM

## 2024-05-28 ENCOUNTER — TELEMEDICINE (OUTPATIENT)
Dept: PRIMARY CARE | Facility: CLINIC | Age: 59
End: 2024-05-28
Payer: COMMERCIAL

## 2024-05-28 DIAGNOSIS — R79.89 RENAL AZOTEMIA: Primary | ICD-10-CM

## 2024-05-28 DIAGNOSIS — E55.9 VITAMIN D DEFICIENCY, UNSPECIFIED: ICD-10-CM

## 2024-05-28 DIAGNOSIS — G47.00 INSOMNIA, UNSPECIFIED TYPE: ICD-10-CM

## 2024-05-28 DIAGNOSIS — M54.16 RADICULOPATHY, LUMBAR REGION: ICD-10-CM

## 2024-05-28 DIAGNOSIS — F41.9 ANXIETY DISORDER, UNSPECIFIED: ICD-10-CM

## 2024-05-28 DIAGNOSIS — K85.30 DRUG-INDUCED ACUTE PANCREATITIS, UNSPECIFIED COMPLICATION STATUS (HHS-HCC): ICD-10-CM

## 2024-05-28 DIAGNOSIS — F33.41 RECURRENT MAJOR DEPRESSIVE DISORDER, IN PARTIAL REMISSION (CMS-HCC): ICD-10-CM

## 2024-05-28 DIAGNOSIS — M54.50 LOW BACK PAIN, UNSPECIFIED: ICD-10-CM

## 2024-05-28 DIAGNOSIS — R73.9 HYPERGLYCEMIA: ICD-10-CM

## 2024-05-28 DIAGNOSIS — J30.9 ALLERGIC RHINITIS, UNSPECIFIED SEASONALITY, UNSPECIFIED TRIGGER: ICD-10-CM

## 2024-05-28 DIAGNOSIS — M54.12 RADICULOPATHY, CERVICAL REGION: ICD-10-CM

## 2024-05-28 PROCEDURE — 99214 OFFICE O/P EST MOD 30 MIN: CPT | Performed by: FAMILY MEDICINE

## 2024-05-28 RX ORDER — SEMAGLUTIDE 0.68 MG/ML
INJECTION, SOLUTION SUBCUTANEOUS
COMMUNITY
Start: 2024-04-25

## 2024-05-28 RX ORDER — PHENTERMINE HYDROCHLORIDE 37.5 MG/1
TABLET ORAL
COMMUNITY
Start: 2024-04-25

## 2024-05-28 RX ORDER — GABAPENTIN 300 MG/1
300 CAPSULE ORAL 3 TIMES DAILY
Qty: 90 CAPSULE | Refills: 0 | Status: CANCELLED | OUTPATIENT
Start: 2024-05-28

## 2024-05-28 RX ORDER — TRAMADOL HYDROCHLORIDE 50 MG/1
TABLET ORAL
COMMUNITY
Start: 2024-03-26

## 2024-05-28 NOTE — PROGRESS NOTES
Subjective   Patient ID: Leanna Pagan is a 58 y.o. female who presents for Med Refill.    Pt is in to go over her xanax and tylenol medication. Pt is requesting a refill.     Pt would like to go over her blood work test results.     Med Refill  Pertinent negatives include no abdominal pain, arthralgias, chest pain, congestion, coughing, fatigue, fever, headaches, myalgias, rash or sore throat.        Review of Systems   Constitutional: Negative.  Negative for activity change, appetite change, fatigue and fever.   HENT:  Negative for congestion, dental problem, ear discharge, ear pain, mouth sores, rhinorrhea, sinus pressure, sinus pain, sore throat, tinnitus and trouble swallowing.    Eyes:  Negative for photophobia, pain and visual disturbance.   Respiratory:  Negative for cough, chest tightness, shortness of breath and stridor.    Cardiovascular:  Negative for chest pain and palpitations.   Gastrointestinal:  Negative for abdominal distention, abdominal pain, blood in stool, constipation, diarrhea and rectal pain.   Endocrine: Negative for cold intolerance, heat intolerance, polydipsia, polyphagia and polyuria.   Genitourinary:  Negative for dysuria, flank pain, hematuria and urgency.   Musculoskeletal:  Negative for arthralgias, gait problem, myalgias and neck stiffness.   Skin:  Negative for color change and rash.   Allergic/Immunologic: Negative for environmental allergies and food allergies.   Neurological:  Negative for dizziness, seizures, syncope, speech difficulty and headaches.   Hematological:  Negative for adenopathy.   Psychiatric/Behavioral:  Positive for agitation and dysphoric mood. Negative for confusion, decreased concentration and sleep disturbance. The patient is nervous/anxious.        Objective   There were no vitals taken for this visit.    Physical Exam  Constitutional:       Appearance: She is obese.   HENT:      Head: Normocephalic.      Right Ear: External ear normal.      Left Ear:  External ear normal.      Nose: Nose normal.   Eyes:      Extraocular Movements: Extraocular movements intact.      Pupils: Pupils are equal, round, and reactive to light.   Abdominal:      General: There is distension.   Musculoskeletal:      Cervical back: Normal range of motion.   Neurological:      Mental Status: She is alert and oriented to person, place, and time.   Psychiatric:         Thought Content: Thought content normal.         Judgment: Judgment normal.      Comments: Anxious/depressed mood         Assessment/Plan   Problem List Items Addressed This Visit             ICD-10-CM    Allergic rhinitis J30.9    Relevant Medications    albuterol 90 mcg/actuation inhaler    Recurrent major depressive disorder, in partial remission (CMS-HCC) F33.41    Relevant Orders    Follow Up In [facility] - PCP - Established    Hyperglycemia R73.9    Relevant Medications    blood sugar diagnostic strip    Insomnia G47.00    Relevant Medications    traZODone (Desyrel) 50 mg tablet     Other Visit Diagnoses         Codes    Renal azotemia    -  Primary R79.89    Relevant Orders    Comprehensive Metabolic Panel    Vitamin D deficiency, unspecified     E55.9    Relevant Medications    cholecalciferol (Vitamin D-3) 25 MCG (1000 UT) capsule    Anxiety disorder, unspecified     F41.9    Relevant Medications    DULoxetine (Cymbalta) 30 mg DR capsule    Low back pain, unspecified     M54.50    Radiculopathy, lumbar region     M54.16    Relevant Medications    tiZANidine (Zanaflex) 4 mg tablet    Radiculopathy, cervical region     M54.12    Relevant Medications    tiZANidine (Zanaflex) 4 mg tablet    Drug-induced acute pancreatitis, unspecified complication status ([facility])     K85.30

## 2024-05-29 ENCOUNTER — TELEPHONE (OUTPATIENT)
Dept: PRIMARY CARE | Facility: CLINIC | Age: 59
End: 2024-05-29
Payer: COMMERCIAL

## 2024-05-29 ENCOUNTER — OFFICE VISIT (OUTPATIENT)
Dept: PAIN MANAGEMENT | Age: 59
End: 2024-05-29
Payer: COMMERCIAL

## 2024-05-29 VITALS
OXYGEN SATURATION: 96 % | HEIGHT: 62 IN | HEART RATE: 89 BPM | TEMPERATURE: 97 F | WEIGHT: 200 LBS | DIASTOLIC BLOOD PRESSURE: 100 MMHG | SYSTOLIC BLOOD PRESSURE: 152 MMHG | BODY MASS INDEX: 36.8 KG/M2

## 2024-05-29 DIAGNOSIS — M54.16 LUMBAR RADICULOPATHY: Primary | ICD-10-CM

## 2024-05-29 PROCEDURE — 3017F COLORECTAL CA SCREEN DOC REV: CPT | Performed by: PAIN MEDICINE

## 2024-05-29 PROCEDURE — 99203 OFFICE O/P NEW LOW 30 MIN: CPT | Performed by: PAIN MEDICINE

## 2024-05-29 PROCEDURE — G8417 CALC BMI ABV UP PARAM F/U: HCPCS | Performed by: PAIN MEDICINE

## 2024-05-29 PROCEDURE — 3077F SYST BP >= 140 MM HG: CPT | Performed by: PAIN MEDICINE

## 2024-05-29 PROCEDURE — 1036F TOBACCO NON-USER: CPT | Performed by: PAIN MEDICINE

## 2024-05-29 PROCEDURE — G8427 DOCREV CUR MEDS BY ELIG CLIN: HCPCS | Performed by: PAIN MEDICINE

## 2024-05-29 PROCEDURE — 3080F DIAST BP >= 90 MM HG: CPT | Performed by: PAIN MEDICINE

## 2024-05-29 RX ORDER — DULOXETIN HYDROCHLORIDE 30 MG/1
30 CAPSULE, DELAYED RELEASE ORAL DAILY
Qty: 90 CAPSULE | Refills: 1 | Status: SHIPPED | OUTPATIENT
Start: 2024-05-29

## 2024-05-29 RX ORDER — VIT C/E/ZN/COPPR/LUTEIN/ZEAXAN 250MG-90MG
25 CAPSULE ORAL 2 TIMES DAILY
Qty: 180 CAPSULE | Refills: 1 | Status: SHIPPED | OUTPATIENT
Start: 2024-05-29

## 2024-05-29 RX ORDER — TRAZODONE HYDROCHLORIDE 50 MG/1
50 TABLET ORAL NIGHTLY
Qty: 90 TABLET | Refills: 1 | Status: SHIPPED | OUTPATIENT
Start: 2024-05-29

## 2024-05-29 RX ORDER — ALBUTEROL SULFATE 90 UG/1
2 AEROSOL, METERED RESPIRATORY (INHALATION) EVERY 6 HOURS PRN
Qty: 18 G | Refills: 1 | Status: SHIPPED | OUTPATIENT
Start: 2024-05-29

## 2024-05-29 RX ORDER — TIZANIDINE 4 MG/1
4 TABLET ORAL EVERY 6 HOURS PRN
Qty: 30 TABLET | Refills: 0 | Status: SHIPPED | OUTPATIENT
Start: 2024-05-29

## 2024-05-29 ASSESSMENT — ENCOUNTER SYMPTOMS
CHEST TIGHTNESS: 0
DYSURIA: 0
MYALGIAS: 0
COLOR CHANGE: 0
DIZZINESS: 0
FLANK PAIN: 0
RHINORRHEA: 0
RESPIRATORY NEGATIVE: 1
BLOOD IN STOOL: 0
PHOTOPHOBIA: 0
FEVER: 0
DECREASED CONCENTRATION: 0
PALPITATIONS: 0
HEADACHES: 0
ABDOMINAL DISTENTION: 0
ADENOPATHY: 0
ACTIVITY CHANGE: 0
COUGH: 0
SEIZURES: 0
RECTAL PAIN: 0
CONFUSION: 0
SLEEP DISTURBANCE: 0
DIARRHEA: 0
CONSTITUTIONAL NEGATIVE: 1
ABDOMINAL PAIN: 0
APPETITE CHANGE: 0
HEMATURIA: 0
NECK STIFFNESS: 0
ALLERGIC/IMMUNOLOGIC NEGATIVE: 1
ARTHRALGIAS: 0
SHORTNESS OF BREATH: 0
POLYPHAGIA: 0
STRIDOR: 0
GASTROINTESTINAL NEGATIVE: 1
FATIGUE: 0
SINUS PRESSURE: 0
SPEECH DIFFICULTY: 0
SINUS PAIN: 0
SORE THROAT: 0
CONSTIPATION: 0
TROUBLE SWALLOWING: 0
EYE PAIN: 0
POLYDIPSIA: 0
EYES NEGATIVE: 1
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
## 2024-05-29 NOTE — TELEPHONE ENCOUNTER
PT IS CALLING BACK B/C SHE WAS WAITING ON [facility] TO CALL HER YESTERDAY FOR HER VV, SAID SHE DID TALK TO THE NURSE BUT THEN HER PHONE  AFTER 630. SHE WOULD LIKE A VV OR PHONE CALL

## 2024-05-29 NOTE — PATIENT INSTRUCTIONS
Follow up in 4 weeks    Continue current medications and therapy for chronic medical conditions.    Patient was advised importance of proper diet/nutrition in addition adequate hydration. Patient was encouraged moderate exercise program to include 30 minutes daily for 5 days of the week or 150 minutes weekly. Patient will follow-up with us as scheduled.    I personally reviewed the OARRS report for this patient. I have considered the risks of abuse, dependence, addiction, and diversion.    UDS/CSA:    Review lab results from May 2024    Obtain CMP prior to next office visit    Start trazodone 50 mg nightly

## 2024-05-31 ENCOUNTER — PATIENT MESSAGE (OUTPATIENT)
Dept: PRIMARY CARE | Facility: CLINIC | Age: 59
End: 2024-05-31
Payer: COMMERCIAL

## 2024-05-31 DIAGNOSIS — F41.1 GENERALIZED ANXIETY DISORDER: ICD-10-CM

## 2024-05-31 DIAGNOSIS — Z76.0 MEDICATION REFILL: ICD-10-CM

## 2024-06-02 ASSESSMENT — ENCOUNTER SYMPTOMS
DYSPHORIC MOOD: 1
NERVOUS/ANXIOUS: 1
AGITATION: 1

## 2024-06-03 ENCOUNTER — TELEPHONE (OUTPATIENT)
Dept: PAIN MANAGEMENT | Age: 59
End: 2024-06-03

## 2024-06-03 DIAGNOSIS — M54.16 LUMBAR RADICULOPATHY: Primary | ICD-10-CM

## 2024-06-03 NOTE — TELEPHONE ENCOUNTER
WE NEED PATIENTS PHYSICAL THERAPY NOTES/CHIROPRACTIC NOTES TO SUBMIT TO INSURANCE    THIS IS REQUIRED FOR HER INSURANCE

## 2024-06-03 NOTE — GROUP NOTE
Group Therapy Note    Date: 10/19/2021    Group Start Time: 1100  Group End Time: 1200  Group Topic: Psychoeducation    [facility]    ERLINDA Mendosa LSW        Group Therapy Note    Attendees: 13/18         Patient's Goal:  To participate in a goal oriented group therapy    Notes:  Patient left the group early without participating. .    Status After Intervention:  Unchanged    Participation Level: Minimal    Participation Quality: Resistant      Speech:  mute      Thought Process/Content: Logical      Affective Functioning: Flat      Mood: calm      Level of consciousness:  Alert      Response to Learning: Able to verbalize current knowledge/experience      Endings: None Reported    Modes of Intervention: Education      Discipline Responsible: /Counselor      Signature:  ERLINDA Mendosa LSW Patient complaining of near syncopal episode.  Patient relates he was studying taking a practice test for his USMLE step 2 when he developed dizziness lightheadedness shortness of breath throat tightening blurring of his vision felt like he was going to pass out.  Patient reports feeling better at this time.  No fevers chills chest pain cough abdominal pain nausea vomiting edema calf pain travel.  Patient relates he feels as if he was having a panic attack he does not want anything for anxiety    Plan EKG chest x-ray labs IV fluids    Differential including but not limited to anxiety attack vasovagal arrhythmia PE electrolyte abnormality dehydration

## 2024-06-04 NOTE — TELEPHONE ENCOUNTER
Left message for the patient to please contact the office    WE NEED PATIENTS PHYSICAL THERAPY NOTES/CHIROPRACTIC NOTES TO SUBMIT TO INSURANCE

## 2024-06-05 RX ORDER — ALPRAZOLAM 1 MG/1
1 TABLET ORAL 3 TIMES DAILY PRN
Qty: 45 TABLET | Refills: 0 | Status: SHIPPED | OUTPATIENT
Start: 2024-06-05 | End: 2024-09-03

## 2024-06-10 ENCOUNTER — APPOINTMENT (OUTPATIENT)
Dept: PRIMARY CARE | Facility: CLINIC | Age: 59
End: 2024-06-10
Payer: COMMERCIAL

## 2024-06-10 DIAGNOSIS — E78.5 HYPERLIPIDEMIA, UNSPECIFIED: ICD-10-CM

## 2024-06-10 DIAGNOSIS — J30.9 ALLERGIC RHINITIS, UNSPECIFIED: ICD-10-CM

## 2024-06-10 RX ORDER — LISINOPRIL 30 MG/1
30 TABLET ORAL DAILY
Qty: 90 TABLET | Refills: 1 | Status: SHIPPED | OUTPATIENT
Start: 2024-06-10

## 2024-06-10 RX ORDER — CETIRIZINE HYDROCHLORIDE 10 MG/1
10 TABLET ORAL DAILY
Qty: 90 TABLET | Refills: 1 | Status: SHIPPED | OUTPATIENT
Start: 2024-06-10

## 2024-06-10 NOTE — TELEPHONE ENCOUNTER
PT states that she would like to continue physical therapy. She is asking for an order for physical therapy, chiropractic therapy, and water therapy to be faxed to [facility] fax #[phone].

## 2024-06-19 DIAGNOSIS — B37.2 YEAST DERMATITIS: ICD-10-CM

## 2024-06-19 DIAGNOSIS — N95.2 POSTMENOPAUSAL ATROPHIC VAGINITIS: ICD-10-CM

## 2024-06-19 DIAGNOSIS — M54.16 RADICULOPATHY, LUMBAR REGION: ICD-10-CM

## 2024-06-19 DIAGNOSIS — M54.12 RADICULOPATHY, CERVICAL REGION: ICD-10-CM

## 2024-06-22 RX ORDER — NYSTATIN TOPICAL POWDER 100000 U/G
POWDER TOPICAL 2 TIMES DAILY
Qty: 60 G | Refills: 1 | Status: SHIPPED | OUTPATIENT
Start: 2024-06-22

## 2024-06-22 RX ORDER — CONJUGATED ESTROGENS 0.62 MG/G
CREAM VAGINAL
Qty: 30 G | Refills: 1 | Status: SHIPPED | OUTPATIENT
Start: 2024-06-22

## 2024-06-22 RX ORDER — TIZANIDINE 4 MG/1
4 TABLET ORAL EVERY 6 HOURS PRN
Qty: 30 TABLET | Refills: 0 | Status: SHIPPED | OUTPATIENT
Start: 2024-06-22

## 2024-06-26 DIAGNOSIS — M54.50 LOW BACK PAIN, UNSPECIFIED: ICD-10-CM

## 2024-06-26 DIAGNOSIS — M54.16 RADICULOPATHY, LUMBAR REGION: ICD-10-CM

## 2024-06-28 RX ORDER — HYDROXYZINE PAMOATE 50 MG/1
CAPSULE ORAL
COMMUNITY
Start: 2024-06-18

## 2024-06-30 RX ORDER — GABAPENTIN 300 MG/1
300 CAPSULE ORAL 3 TIMES DAILY
Qty: 90 CAPSULE | Refills: 0 | Status: SHIPPED | OUTPATIENT
Start: 2024-06-30

## 2024-06-30 RX ORDER — ACETAMINOPHEN AND CODEINE PHOSPHATE 300; 30 MG/1; MG/1
1 TABLET ORAL EVERY 4 HOURS PRN
Qty: 28 TABLET | Refills: 0 | Status: SHIPPED | OUTPATIENT
Start: 2024-06-30

## 2024-07-01 ENCOUNTER — APPOINTMENT (OUTPATIENT)
Dept: PRIMARY CARE | Facility: CLINIC | Age: 59
End: 2024-07-01
Payer: COMMERCIAL

## 2024-07-01 VITALS
SYSTOLIC BLOOD PRESSURE: 132 MMHG | HEIGHT: 62 IN | DIASTOLIC BLOOD PRESSURE: 74 MMHG | OXYGEN SATURATION: 97 % | TEMPERATURE: 97.9 F | HEART RATE: 71 BPM | RESPIRATION RATE: 16 BRPM | WEIGHT: 203 LBS | BODY MASS INDEX: 37.36 KG/M2

## 2024-07-01 DIAGNOSIS — I10 HTN (HYPERTENSION), BENIGN: Primary | ICD-10-CM

## 2024-07-01 DIAGNOSIS — F34.1 PERSISTENT DEPRESSIVE DISORDER: ICD-10-CM

## 2024-07-01 DIAGNOSIS — F41.1 GENERALIZED ANXIETY DISORDER: ICD-10-CM

## 2024-07-01 DIAGNOSIS — E78.2 MIXED HYPERLIPIDEMIA: ICD-10-CM

## 2024-07-01 DIAGNOSIS — E66.01 MORBID OBESITY (MULTI): ICD-10-CM

## 2024-07-01 PROCEDURE — 99214 OFFICE O/P EST MOD 30 MIN: CPT | Performed by: FAMILY MEDICINE

## 2024-07-01 RX ORDER — HYDROCHLOROTHIAZIDE 25 MG/1
25 TABLET ORAL DAILY
Qty: 90 TABLET | Refills: 1 | Status: SHIPPED | OUTPATIENT
Start: 2024-07-01

## 2024-07-01 RX ORDER — ALPRAZOLAM 1 MG/1
1 TABLET ORAL 3 TIMES DAILY PRN
Qty: 45 TABLET | Refills: 1 | Status: SHIPPED | OUTPATIENT
Start: 2024-07-01 | End: 2024-09-29

## 2024-07-01 ASSESSMENT — PATIENT HEALTH QUESTIONNAIRE - PHQ9
4. FEELING TIRED OR HAVING LITTLE ENERGY: NEARLY EVERY DAY
2. FEELING DOWN, DEPRESSED OR HOPELESS: NEARLY EVERY DAY
3. TROUBLE FALLING OR STAYING ASLEEP OR SLEEPING TOO MUCH: NEARLY EVERY DAY
SUM OF ALL RESPONSES TO PHQ QUESTIONS 1-9: 18
10. IF YOU CHECKED OFF ANY PROBLEMS, HOW DIFFICULT HAVE THESE PROBLEMS MADE IT FOR YOU TO DO YOUR WORK, TAKE CARE OF THINGS AT HOME, OR GET ALONG WITH OTHER PEOPLE: VERY DIFFICULT
8. MOVING OR SPEAKING SO SLOWLY THAT OTHER PEOPLE COULD HAVE NOTICED. OR THE OPPOSITE, BEING SO FIGETY OR RESTLESS THAT YOU HAVE BEEN MOVING AROUND A LOT MORE THAN USUAL: NOT AT ALL
7. TROUBLE CONCENTRATING ON THINGS, SUCH AS READING THE NEWSPAPER OR WATCHING TELEVISION: NEARLY EVERY DAY
6. FEELING BAD ABOUT YOURSELF - OR THAT YOU ARE A FAILURE OR HAVE LET YOURSELF OR YOUR FAMILY DOWN: MORE THAN HALF THE DAYS
1. LITTLE INTEREST OR PLEASURE IN DOING THINGS: NOT AT ALL
SUM OF ALL RESPONSES TO PHQ9 QUESTIONS 1 AND 2: 3
9. THOUGHTS THAT YOU WOULD BE BETTER OFF DEAD, OR OF HURTING YOURSELF: SEVERAL DAYS
5. POOR APPETITE OR OVEREATING: NEARLY EVERY DAY

## 2024-07-01 ASSESSMENT — ENCOUNTER SYMPTOMS
CONSTITUTIONAL NEGATIVE: 1
SHORTNESS OF BREATH: 0
CONSTIPATION: 0
DYSURIA: 0
AGITATION: 0
STRIDOR: 0
ABDOMINAL PAIN: 0
FATIGUE: 0
SINUS PAIN: 0
BLURRED VISION: 0
HEADACHES: 0
COUGH: 0
NECK STIFFNESS: 0
POLYPHAGIA: 0
SORE THROAT: 0
SEIZURES: 0
DIZZINESS: 0
APPETITE CHANGE: 0
ACTIVITY CHANGE: 0
ABDOMINAL DISTENTION: 0
LOSS OF SENSATION IN FEET: 0
POLYDIPSIA: 0
OCCASIONAL FEELINGS OF UNSTEADINESS: 0
CHEST TIGHTNESS: 0
MYALGIAS: 0
DIARRHEA: 0
DECREASED CONCENTRATION: 0
BLOOD IN STOOL: 0
HYPERTENSION: 1
PALPITATIONS: 0
FEVER: 0
FLANK PAIN: 0
RECTAL PAIN: 0
COLOR CHANGE: 0
CONFUSION: 0
HEMATURIA: 0
RHINORRHEA: 0
DEPRESSION: 0
ADENOPATHY: 0
SINUS PRESSURE: 0
PHOTOPHOBIA: 0
SPEECH DIFFICULTY: 0
TROUBLE SWALLOWING: 0
ARTHRALGIAS: 0
EYE PAIN: 0

## 2024-07-01 NOTE — PATIENT INSTRUCTIONS
Follow up in 3 months    Continue current medications and therapy for chronic medical conditions.    Patient was advised importance of proper diet/nutrition in addition adequate hydration. Patient was encouraged moderate exercise program to include 30 minutes daily for 5 days of the week or 150 minutes weekly. Patient will follow-up with us as scheduled.    I personally reviewed the OARRS report for this patient. I have considered the risks of abuse, dependence, addiction, and diversion.    UDS: 10/16/2023  CSA: 07/01/2024    Continue alprazolam as directed    Advise psychiatry referral    Consider Spravato therapy

## 2024-07-01 NOTE — PROGRESS NOTES
Subjective   Patient ID: Leanna Pagan is a 58 y.o. female who presents for Hypertension.    Patient would like discuss her medication.    Patient states needs refills of her xanax.    Patient states does not like her new psychiatrist.     Patient denies any other symptoms or concerns today.    Hypertension  This is a recurrent problem. The current episode started more than 1 year ago. The problem is unchanged. The problem is controlled. Pertinent negatives include no blurred vision, chest pain, headaches, palpitations or shortness of breath. There are no associated agents to hypertension.        Review of Systems   Constitutional: Negative.  Negative for activity change, appetite change, fatigue and fever.   HENT:  Negative for congestion, dental problem, ear discharge, ear pain, mouth sores, rhinorrhea, sinus pressure, sinus pain, sore throat, tinnitus and trouble swallowing.    Eyes:  Negative for blurred vision, photophobia, pain and visual disturbance.   Respiratory:  Negative for cough, chest tightness, shortness of breath and stridor.    Cardiovascular:  Negative for chest pain and palpitations.   Gastrointestinal:  Negative for abdominal distention, abdominal pain, blood in stool, constipation, diarrhea and rectal pain.   Endocrine: Negative for cold intolerance, heat intolerance, polydipsia, polyphagia and polyuria.   Genitourinary:  Negative for dysuria, flank pain, hematuria and urgency.   Musculoskeletal:  Negative for arthralgias, gait problem, myalgias and neck stiffness.   Skin:  Negative for color change and rash.   Allergic/Immunologic: Negative for environmental allergies and food allergies.   Neurological:  Negative for dizziness, seizures, syncope, speech difficulty and headaches.   Hematological:  Negative for adenopathy.   Psychiatric/Behavioral:  Positive for dysphoric mood and sleep disturbance. Negative for agitation, confusion and decreased concentration. The patient is nervous/anxious.   "      Objective   /74 (BP Location: Right arm, Patient Position: Sitting, BP Cuff Size: Adult)   Pulse 71   Temp 36.6 °C (97.9 °F)   Resp 16   Ht 1.575 m (5' 2\")   Wt 92.1 kg (203 lb)   SpO2 97%   BMI 37.13 kg/m²     Physical Exam  Vitals reviewed.   Constitutional:       General: She is not in acute distress.     Appearance: She is obese. She is not ill-appearing or diaphoretic.   HENT:      Head: Normocephalic.      Right Ear: Tympanic membrane and external ear normal.      Left Ear: Tympanic membrane and external ear normal.      Nose: Nose normal. No congestion.      Mouth/Throat:      Pharynx: No posterior oropharyngeal erythema.   Eyes:      General:         Right eye: No discharge.         Left eye: No discharge.      Extraocular Movements: Extraocular movements intact.      Conjunctiva/sclera: Conjunctivae normal.      Pupils: Pupils are equal, round, and reactive to light.   Cardiovascular:      Rate and Rhythm: Normal rate and regular rhythm.      Pulses: Normal pulses.      Heart sounds: Normal heart sounds. No murmur heard.  Pulmonary:      Effort: Pulmonary effort is normal. No respiratory distress.      Breath sounds: Normal breath sounds. No wheezing or rales.   Chest:      Chest wall: No tenderness.   Abdominal:      General: Bowel sounds are normal. There is distension.      Palpations: There is no mass.      Tenderness: There is no abdominal tenderness. There is no guarding.   Musculoskeletal:         General: No tenderness. Normal range of motion.      Cervical back: Normal range of motion and neck supple. No tenderness.      Right lower leg: No edema.      Left lower leg: No edema.   Skin:     General: Skin is dry.      Coloration: Skin is not jaundiced.      Findings: No bruising, erythema or rash.   Neurological:      General: No focal deficit present.      Mental Status: She is alert and oriented to person, place, and time. Mental status is at baseline.      Cranial Nerves: No " cranial nerve deficit.      Sensory: No sensory deficit.      Coordination: Coordination normal.      Gait: Gait normal.   Psychiatric:         Thought Content: Thought content normal.         Judgment: Judgment normal.      Comments: Tearful, anxious, depressed/flat affect         Assessment/Plan   Problem List Items Addressed This Visit             ICD-10-CM    Anxiety disorder F41.9    Relevant Medications    ALPRAZolam (Xanax) 1 mg tablet    HTN (hypertension), benign - Primary I10    Hyperlipidemia E78.5    Morbid obesity (Multi) E66.01    Depression F32.A    BMI 37.0-37.9, adult Z68.37     Scribe Attestation  By signing my name below, I, Sergo Alexander DO , Scribe   attest that this documentation has been prepared under the direction and in the presence of Sergo Alexander DO.    Provider Attestation - Scribe documentation    All medical record entries made by the Scribe were at my direction and personally dictated by me. I have reviewed the chart and agree that the record accurately reflects my personal performance of the history, physical exam, discussion and plan.

## 2024-07-07 ASSESSMENT — ENCOUNTER SYMPTOMS
SLEEP DISTURBANCE: 1
NERVOUS/ANXIOUS: 1
DYSPHORIC MOOD: 1

## 2024-07-08 ENCOUNTER — LAB (OUTPATIENT)
Dept: LAB | Facility: LAB | Age: 59
End: 2024-07-08
Payer: COMMERCIAL

## 2024-07-08 DIAGNOSIS — R91.1 NODULE OF LOWER LOBE OF RIGHT LUNG: ICD-10-CM

## 2024-07-08 DIAGNOSIS — R79.89 RENAL AZOTEMIA: ICD-10-CM

## 2024-07-08 LAB
ALBUMIN SERPL BCP-MCNC: 4.4 G/DL (ref 3.4–5)
ALP SERPL-CCNC: 84 U/L (ref 33–110)
ALT SERPL W P-5'-P-CCNC: 31 U/L (ref 7–45)
ANION GAP SERPL CALC-SCNC: 13 MMOL/L (ref 10–20)
AST SERPL W P-5'-P-CCNC: 29 U/L (ref 9–39)
BILIRUB SERPL-MCNC: 0.5 MG/DL (ref 0–1.2)
BUN SERPL-MCNC: 12 MG/DL (ref 6–23)
CALCIUM SERPL-MCNC: 9.4 MG/DL (ref 8.6–10.3)
CHLORIDE SERPL-SCNC: 107 MMOL/L (ref 98–107)
CO2 SERPL-SCNC: 26 MMOL/L (ref 21–32)
CREAT SERPL-MCNC: 0.96 MG/DL (ref 0.5–1.05)
EGFRCR SERPLBLD CKD-EPI 2021: 69 ML/MIN/1.73M*2
GLUCOSE SERPL-MCNC: 140 MG/DL (ref 74–99)
POTASSIUM SERPL-SCNC: 3.7 MMOL/L (ref 3.5–5.3)
PROT SERPL-MCNC: 6.8 G/DL (ref 6.4–8.2)
SODIUM SERPL-SCNC: 142 MMOL/L (ref 136–145)

## 2024-07-08 PROCEDURE — 36415 COLL VENOUS BLD VENIPUNCTURE: CPT

## 2024-07-08 PROCEDURE — 80053 COMPREHEN METABOLIC PANEL: CPT

## 2024-07-11 ENCOUNTER — APPOINTMENT (OUTPATIENT)
Dept: GENERAL RADIOLOGY | Age: 59
End: 2024-07-11
Payer: COMMERCIAL

## 2024-07-11 ENCOUNTER — HOSPITAL ENCOUNTER (EMERGENCY)
Age: 59
Discharge: HOME | End: 2024-07-12
Payer: COMMERCIAL

## 2024-07-11 VITALS
TEMPERATURE: 98.2 F | WEIGHT: 204 LBS | RESPIRATION RATE: 22 BRPM | BODY MASS INDEX: 37.54 KG/M2 | HEIGHT: 62 IN | SYSTOLIC BLOOD PRESSURE: 148 MMHG | DIASTOLIC BLOOD PRESSURE: 96 MMHG | HEART RATE: 70 BPM | OXYGEN SATURATION: 100 %

## 2024-07-11 DIAGNOSIS — S62.629A CLOSED AVULSION FRACTURE OF MIDDLE PHALANX OF FINGER, INITIAL ENCOUNTER: Primary | ICD-10-CM

## 2024-07-11 PROCEDURE — 29130 APPL FINGER SPLINT STATIC: CPT

## 2024-07-11 PROCEDURE — 99283 EMERGENCY DEPT VISIT LOW MDM: CPT

## 2024-07-11 PROCEDURE — 6370000000 HC RX 637 (ALT 250 FOR IP): Performed by: PHYSICIAN ASSISTANT

## 2024-07-11 PROCEDURE — 73130 X-RAY EXAM OF HAND: CPT

## 2024-07-11 RX ORDER — HYDROCODONE BITARTRATE AND ACETAMINOPHEN 5; 325 MG/1; MG/1
1 TABLET ORAL EVERY 6 HOURS PRN
Qty: 10 TABLET | Refills: 0 | Status: SHIPPED | OUTPATIENT
Start: 2024-07-11 | End: 2024-07-14

## 2024-07-11 RX ORDER — HYDROCODONE BITARTRATE AND ACETAMINOPHEN 5; 325 MG/1; MG/1
1 TABLET ORAL ONCE
Status: COMPLETED | OUTPATIENT
Start: 2024-07-11 | End: 2024-07-11

## 2024-07-11 RX ADMIN — HYDROCODONE BITARTRATE AND ACETAMINOPHEN 1 TABLET: 5; 325 TABLET ORAL at 23:13

## 2024-07-11 ASSESSMENT — PAIN - FUNCTIONAL ASSESSMENT: PAIN_FUNCTIONAL_ASSESSMENT: 0-10

## 2024-07-11 ASSESSMENT — LIFESTYLE VARIABLES
HOW OFTEN DO YOU HAVE A DRINK CONTAINING ALCOHOL: NEVER
HOW MANY STANDARD DRINKS CONTAINING ALCOHOL DO YOU HAVE ON A TYPICAL DAY: PATIENT DOES NOT DRINK

## 2024-07-11 ASSESSMENT — PAIN DESCRIPTION - LOCATION: LOCATION: FINGER (COMMENT WHICH ONE)

## 2024-07-11 ASSESSMENT — PAIN DESCRIPTION - DESCRIPTORS: DESCRIPTORS: ACHING

## 2024-07-11 ASSESSMENT — PAIN DESCRIPTION - ORIENTATION: ORIENTATION: LEFT;MID

## 2024-07-11 ASSESSMENT — PAIN SCALES - GENERAL
PAINLEVEL_OUTOF10: 10
PAINLEVEL_OUTOF10: 10

## 2024-07-12 NOTE — ED PROVIDER NOTES
[facility] ED  eMERGENCYdEPARTMENT eNCOUnter        Pt Name: Lizbet Dial  MRN: [id number]  Birthdate 1965of evaluation: 7/11/2024  Provider:Jono Munoz PA-C  11:18 PM EDT    CHIEF COMPLAINT       Chief Complaint   Patient presents with    Hand Injury     Pt states she dropped new cage for her dog and injured right finger to left hand. Swelling and discoloration noted. Unable to move pts finger without increased pain. Denies taking any pain medications prior to arrival          HISTORY OF PRESENT ILLNESS  (Location/Symptom, Timing/Onset, Context/Setting, Quality, Duration, Modifying Factors, Severity.)   Lizbet Dial is a 58 y.o. female who presents to the emergency department with pain to the left fourth finger that radiates into tiredly through the hand after having a hyperextension type of injury when carrying a metal crate.  Patient did not take anything for the symptoms.  She rates her pain at a 10 out of 10    HPI    Nursing Notes were reviewed and I agree.    REVIEW OF SYSTEMS    (2-9 systems for level 4, 10 or more for level 5)     Review of Systems   Musculoskeletal:  Positive for joint swelling.   Neurological:  Negative for weakness and numbness.   All other systems reviewed and are negative.       as noted above the remainder of the review of systems was reviewed and negative.       PAST MEDICAL HISTORY     Past Medical History:   Diagnosis Date    Anemia     C. difficile colitis 01/2013    Chronic fatigue syndrome     Depression     Janet    Diabetes mellitus (HCC)     Diverticulitis large intestine 2001    Fibromyalgia     Fibromyalgia 03/24/2015    GERD (gastroesophageal reflux disease)     HTN (hypertension)     Hyperlipidemia     Hypothyroidism     Palpitations     Pulmonary embolus (HCC) 10/2012    Following GYN procedure    Vitamin D deficiency          SURGICAL HISTORY       Past Surgical History:   Procedure Laterality Date    ANUS SURGERY  12/14/2011    anal fissure     TRAZODONE (DESYREL) 50 MG TABLET    Take 1 tablet by mouth nightly as needed for Sleep       ALLERGIES     Ciprofloxacin, Erythromycin, Ketorolac tromethamine, Mobic [meloxicam], Other, Pcn [penicillins], Pseudoephedrine hcl, Sudafed [pseudoephedrine hcl], Sympathomimetics, and Thorazine [chlorpromazine]    HISTORY       Family History   Problem Relation Age of Onset    COPD Mother     Lung Cancer Father     COPD Maternal Grandmother     Cancer Maternal Grandfather         COLON    Colon Cancer Paternal Grandfather     Cancer Paternal Aunt         LUNG    Cancer Other         BLOOD CANCER- UNSPECIFIED    Coronary Art Dis Maternal Aunt     Other Maternal Aunt         Brain aneurysm          SOCIAL HISTORY       Social History     Socioeconomic History    Marital status:      Spouse name: None    Number of children: None    Years of education: None    Highest education level: None   Tobacco Use    Smoking status: Never     Passive exposure: Never    Smokeless tobacco: Never   Vaping Use    Vaping Use: Never used   Substance and Sexual Activity    Alcohol use: Not Currently     Comment: OCC.    Drug use: No       SCREENINGS    Nohemi Coma Scale  Eye Opening: Spontaneous  Best Verbal Response: Oriented  Best Motor Response: Obeys commands  Nohemi Coma Scale Score: 15      PHYSICAL EXAM    (up to 7 forlevel 4, 8 or more for level 5)     ED Triage Vitals [07/11/24 2259]   BP Temp Temp Source Pulse Respirations SpO2 Height Weight - Scale   -- 98.2 °F (36.8 °C) Oral 70 22 100 % 1.575 m (5' 2\") 92.5 kg (204 lb)       Physical Exam  Vitals and nursing note reviewed.   Constitutional:       General: She is not in acute distress.     Appearance: She is not diaphoretic.   HENT:      Head: Normocephalic and atraumatic.      Mouth/Throat:      Mouth: Mucous membranes are moist.   Eyes:      Conjunctiva/sclera: Conjunctivae normal.   Cardiovascular:      Rate and Rhythm: Normal rate.   Pulmonary:      Effort: Pulmonary

## 2024-07-20 DIAGNOSIS — J30.9 ALLERGIC RHINITIS, UNSPECIFIED SEASONALITY, UNSPECIFIED TRIGGER: ICD-10-CM

## 2024-07-22 ENCOUNTER — APPOINTMENT (OUTPATIENT)
Dept: PRIMARY CARE | Facility: CLINIC | Age: 59
End: 2024-07-22
Payer: COMMERCIAL

## 2024-07-22 RX ORDER — ALBUTEROL SULFATE 90 UG/1
2 AEROSOL, METERED RESPIRATORY (INHALATION) EVERY 6 HOURS PRN
Qty: 18 G | Refills: 2 | Status: SHIPPED | OUTPATIENT
Start: 2024-07-22

## 2024-07-26 DIAGNOSIS — E03.9 ACQUIRED HYPOTHYROIDISM: ICD-10-CM

## 2024-07-26 RX ORDER — LEVOTHYROXINE SODIUM 112 UG/1
112 TABLET ORAL DAILY
Qty: 90 TABLET | Refills: 1 | Status: SHIPPED | OUTPATIENT
Start: 2024-07-26

## 2024-07-31 DIAGNOSIS — M54.50 LOW BACK PAIN, UNSPECIFIED: ICD-10-CM

## 2024-07-31 DIAGNOSIS — F41.1 GENERALIZED ANXIETY DISORDER: ICD-10-CM

## 2024-07-31 DIAGNOSIS — M54.12 RADICULOPATHY, CERVICAL REGION: ICD-10-CM

## 2024-07-31 DIAGNOSIS — M54.16 RADICULOPATHY, LUMBAR REGION: ICD-10-CM

## 2024-08-02 RX ORDER — ACETAMINOPHEN AND CODEINE PHOSPHATE 300; 30 MG/1; MG/1
1 TABLET ORAL EVERY 4 HOURS PRN
Qty: 28 TABLET | Refills: 0 | Status: SHIPPED | OUTPATIENT
Start: 2024-08-02

## 2024-08-08 RX ORDER — GABAPENTIN 300 MG/1
300 CAPSULE ORAL 3 TIMES DAILY
Qty: 90 CAPSULE | Refills: 0 | Status: SHIPPED | OUTPATIENT
Start: 2024-08-08

## 2024-08-08 RX ORDER — ALPRAZOLAM 1 MG/1
1 TABLET ORAL 3 TIMES DAILY PRN
Qty: 45 TABLET | Refills: 1 | Status: SHIPPED | OUTPATIENT
Start: 2024-08-08 | End: 2024-11-06

## 2024-08-08 RX ORDER — TIZANIDINE 4 MG/1
4 TABLET ORAL EVERY 6 HOURS PRN
Qty: 30 TABLET | Refills: 0 | Status: SHIPPED | OUTPATIENT
Start: 2024-08-08

## 2024-08-09 ENCOUNTER — HOSPITAL ENCOUNTER (OUTPATIENT)
Dept: RADIOLOGY | Facility: HOSPITAL | Age: 59
Discharge: HOME | End: 2024-08-09
Payer: COMMERCIAL

## 2024-08-09 ENCOUNTER — APPOINTMENT (OUTPATIENT)
Dept: RADIOLOGY | Facility: HOSPITAL | Age: 59
End: 2024-08-09
Payer: COMMERCIAL

## 2024-08-09 DIAGNOSIS — R91.1 NODULE OF LOWER LOBE OF RIGHT LUNG: ICD-10-CM

## 2024-08-09 PROCEDURE — 2550000001 HC RX 255 CONTRASTS: Performed by: FAMILY MEDICINE

## 2024-08-09 PROCEDURE — 71260 CT THORAX DX C+: CPT

## 2024-08-12 ENCOUNTER — TELEPHONE (OUTPATIENT)
Dept: PRIMARY CARE | Facility: CLINIC | Age: 59
End: 2024-08-12
Payer: COMMERCIAL

## 2024-08-13 DIAGNOSIS — K76.0 FATTY LIVER: Primary | ICD-10-CM

## 2024-09-03 DIAGNOSIS — B37.2 YEAST DERMATITIS: ICD-10-CM

## 2024-09-03 DIAGNOSIS — M54.16 RADICULOPATHY, LUMBAR REGION: ICD-10-CM

## 2024-09-03 DIAGNOSIS — N95.2 POSTMENOPAUSAL ATROPHIC VAGINITIS: ICD-10-CM

## 2024-09-03 DIAGNOSIS — E11.65 INADEQUATELY CONTROLLED DIABETES MELLITUS (HCC): ICD-10-CM

## 2024-09-03 DIAGNOSIS — E03.9 ACQUIRED HYPOTHYROIDISM: ICD-10-CM

## 2024-09-03 RX ORDER — NYSTATIN TOPICAL POWDER 100000 U/G
POWDER TOPICAL 2 TIMES DAILY
Qty: 60 G | Refills: 0 | Status: SHIPPED | OUTPATIENT
Start: 2024-09-03

## 2024-09-03 RX ORDER — PHENTERMINE HYDROCHLORIDE 37.5 MG/1
37.5 TABLET ORAL
Qty: 30 TABLET | Refills: 2 | OUTPATIENT
Start: 2024-09-03 | End: 2024-10-03

## 2024-09-03 RX ORDER — CONJUGATED ESTROGENS 0.62 MG/G
CREAM VAGINAL
Qty: 30 G | Refills: 0 | Status: SHIPPED | OUTPATIENT
Start: 2024-09-03

## 2024-09-06 ENCOUNTER — HOSPITAL ENCOUNTER (INPATIENT)
Age: 59
LOS: 6 days | Discharge: HOME | DRG: 751 | End: 2024-09-12
Attending: EMERGENCY MEDICINE | Admitting: PSYCHIATRY & NEUROLOGY
Payer: COMMERCIAL

## 2024-09-06 DIAGNOSIS — F10.920 ACUTE ALCOHOLIC INTOXICATION WITHOUT COMPLICATION (HCC): Primary | ICD-10-CM

## 2024-09-06 LAB
ALBUMIN SERPL-MCNC: 4.1 G/DL (ref 3.5–4.6)
ALP SERPL-CCNC: 88 U/L (ref 40–130)
ALT SERPL-CCNC: 28 U/L (ref 0–33)
AMPHET UR QL SCN: NORMAL
ANION GAP SERPL CALCULATED.3IONS-SCNC: 14 MEQ/L (ref 9–15)
APAP SERPL-MCNC: <5 UG/ML (ref 10–30)
AST SERPL-CCNC: 24 U/L (ref 0–35)
BARBITURATES UR QL SCN: NORMAL
BASOPHILS # BLD: 0 K/UL (ref 0–0.2)
BASOPHILS NFR BLD: 0.5 %
BENZODIAZ UR QL SCN: NORMAL
BILIRUB SERPL-MCNC: 0.3 MG/DL (ref 0.2–0.7)
BILIRUB UR QL STRIP: NEGATIVE
BUN SERPL-MCNC: 12 MG/DL (ref 6–20)
CALCIUM SERPL-MCNC: 9.3 MG/DL (ref 8.5–9.9)
CANNABINOIDS UR QL SCN: NORMAL
CHLORIDE SERPL-SCNC: 102 MEQ/L (ref 95–107)
CHOLEST SERPL-MCNC: 234 MG/DL (ref 0–199)
CK SERPL-CCNC: 103 U/L (ref 0–170)
CLARITY UR: CLEAR
CO2 SERPL-SCNC: 21 MEQ/L (ref 20–31)
COCAINE UR QL SCN: NORMAL
COLOR UR: YELLOW
CREAT SERPL-MCNC: 0.86 MG/DL (ref 0.5–0.9)
DRUG SCREEN COMMENT UR-IMP: NORMAL
EOSINOPHIL # BLD: 0.1 K/UL (ref 0–0.7)
EOSINOPHIL NFR BLD: 0.8 %
ERYTHROCYTE [DISTWIDTH] IN BLOOD BY AUTOMATED COUNT: 12.5 % (ref 11.5–14.5)
ESTIMATED AVERAGE GLUCOSE: 140 MG/DL
ETHANOL PERCENT: 0.11 G/DL
ETHANOL PERCENT: 0.16 G/DL
ETHANOLAMINE SERPL-MCNC: 121 MG/DL (ref 0–0.08)
ETHANOLAMINE SERPL-MCNC: 183 MG/DL (ref 0–0.08)
FENTANYL SCREEN, URINE: NORMAL
GLOBULIN SER CALC-MCNC: 2.6 G/DL (ref 2.3–3.5)
GLUCOSE BLD-MCNC: 125 MG/DL (ref 70–99)
GLUCOSE SERPL-MCNC: 222 MG/DL (ref 70–99)
GLUCOSE UR STRIP-MCNC: NEGATIVE MG/DL
HBA1C MFR BLD: 6.5 % (ref 4–6)
HCG UR QL: NEGATIVE
HCT VFR BLD AUTO: 36.1 % (ref 37–47)
HDLC SERPL-MCNC: 32 MG/DL (ref 40–59)
HGB BLD-MCNC: 12.4 G/DL (ref 12–16)
HGB UR QL STRIP: NEGATIVE
KETONES UR STRIP-MCNC: NEGATIVE MG/DL
LDLC SERPL CALC-MCNC: 152 MG/DL (ref 0–129)
LEUKOCYTE ESTERASE UR QL STRIP: NEGATIVE
LYMPHOCYTES # BLD: 3.1 K/UL (ref 1–4.8)
LYMPHOCYTES NFR BLD: 43 %
MCH RBC QN AUTO: 29.9 PG (ref 27–31.3)
MCHC RBC AUTO-ENTMCNC: 34.3 % (ref 33–37)
MCV RBC AUTO: 87 FL (ref 79.4–94.8)
METHADONE UR QL SCN: NORMAL
MONOCYTES # BLD: 0.5 K/UL (ref 0.2–0.8)
MONOCYTES NFR BLD: 6.7 %
NEUTROPHILS # BLD: 3.6 K/UL (ref 1.4–6.5)
NEUTS SEG NFR BLD: 48.7 %
NITRITE UR QL STRIP: NEGATIVE
OPIATES UR QL SCN: NORMAL
OXYCODONE UR QL SCN: NORMAL
PCP UR QL SCN: NORMAL
PERFORMED ON: ABNORMAL
PH UR STRIP: 5.5 [PH] (ref 5–9)
PLATELET # BLD AUTO: 268 K/UL (ref 130–400)
POTASSIUM SERPL-SCNC: 3.5 MEQ/L (ref 3.4–4.9)
PROPOXYPH UR QL SCN: NORMAL
PROT SERPL-MCNC: 6.7 G/DL (ref 6.3–8)
PROT UR STRIP-MCNC: NEGATIVE MG/DL
RBC # BLD AUTO: 4.15 M/UL (ref 4.2–5.4)
SALICYLATES SERPL-MCNC: <0.3 MG/DL (ref 15–30)
SODIUM SERPL-SCNC: 137 MEQ/L (ref 135–144)
SP GR UR STRIP: 1 (ref 1–1.03)
TRIGL SERPL-MCNC: 248 MG/DL (ref 0–150)
TSH SERPL-MCNC: 4.93 UIU/ML (ref 0.44–3.86)
URINE REFLEX TO CULTURE: NORMAL
UROBILINOGEN UR STRIP-ACNC: 0.2 E.U./DL
WBC # BLD AUTO: 7.3 K/UL (ref 4.8–10.8)

## 2024-09-06 PROCEDURE — 6360000002 HC RX W HCPCS: Performed by: EMERGENCY MEDICINE

## 2024-09-06 PROCEDURE — 6370000000 HC RX 637 (ALT 250 FOR IP): Performed by: EMERGENCY MEDICINE

## 2024-09-06 PROCEDURE — 99285 EMERGENCY DEPT VISIT HI MDM: CPT

## 2024-09-06 PROCEDURE — 83036 HEMOGLOBIN GLYCOSYLATED A1C: CPT

## 2024-09-06 PROCEDURE — 80061 LIPID PANEL: CPT

## 2024-09-06 PROCEDURE — 90471 IMMUNIZATION ADMIN: CPT | Performed by: EMERGENCY MEDICINE

## 2024-09-06 PROCEDURE — 81003 URINALYSIS AUTO W/O SCOPE: CPT

## 2024-09-06 PROCEDURE — 84703 CHORIONIC GONADOTROPIN ASSAY: CPT

## 2024-09-06 PROCEDURE — 80053 COMPREHEN METABOLIC PANEL: CPT

## 2024-09-06 PROCEDURE — 90715 TDAP VACCINE 7 YRS/> IM: CPT | Performed by: EMERGENCY MEDICINE

## 2024-09-06 PROCEDURE — 36415 COLL VENOUS BLD VENIPUNCTURE: CPT

## 2024-09-06 PROCEDURE — 80307 DRUG TEST PRSMV CHEM ANLYZR: CPT

## 2024-09-06 PROCEDURE — 84443 ASSAY THYROID STIM HORMONE: CPT

## 2024-09-06 PROCEDURE — 80143 DRUG ASSAY ACETAMINOPHEN: CPT

## 2024-09-06 PROCEDURE — 85025 COMPLETE CBC W/AUTO DIFF WBC: CPT

## 2024-09-06 PROCEDURE — 6370000000 HC RX 637 (ALT 250 FOR IP): Performed by: PSYCHIATRY & NEUROLOGY

## 2024-09-06 PROCEDURE — 6370000000 HC RX 637 (ALT 250 FOR IP): Performed by: REGISTERED NURSE

## 2024-09-06 PROCEDURE — 82550 ASSAY OF CK (CPK): CPT

## 2024-09-06 PROCEDURE — 82077 ASSAY SPEC XCP UR&BREATH IA: CPT

## 2024-09-06 PROCEDURE — 80179 DRUG ASSAY SALICYLATE: CPT

## 2024-09-06 PROCEDURE — 1240000000 HC EMOTIONAL WELLNESS R&B

## 2024-09-06 RX ORDER — HYDROXYZINE PAMOATE 50 MG/1
50 CAPSULE ORAL EVERY 6 HOURS PRN
Status: DISCONTINUED | OUTPATIENT
Start: 2024-09-06 | End: 2024-09-12 | Stop reason: HOSPADM

## 2024-09-06 RX ORDER — MAGNESIUM HYDROXIDE/ALUMINUM HYDROXICE/SIMETHICONE 120; 1200; 1200 MG/30ML; MG/30ML; MG/30ML
30 SUSPENSION ORAL PRN
Status: DISCONTINUED | OUTPATIENT
Start: 2024-09-06 | End: 2024-09-12 | Stop reason: HOSPADM

## 2024-09-06 RX ORDER — DULOXETIN HYDROCHLORIDE 30 MG/1
30 CAPSULE, DELAYED RELEASE ORAL DAILY
Status: DISCONTINUED | OUTPATIENT
Start: 2024-09-06 | End: 2024-09-12 | Stop reason: HOSPADM

## 2024-09-06 RX ORDER — AMLODIPINE BESYLATE 5 MG/1
5 TABLET ORAL DAILY
Status: DISCONTINUED | OUTPATIENT
Start: 2024-09-06 | End: 2024-09-11

## 2024-09-06 RX ORDER — GABAPENTIN 300 MG/1
300 CAPSULE ORAL 3 TIMES DAILY
Status: DISCONTINUED | OUTPATIENT
Start: 2024-09-06 | End: 2024-09-12 | Stop reason: HOSPADM

## 2024-09-06 RX ORDER — BENZTROPINE MESYLATE 1 MG/ML
2 INJECTION, SOLUTION INTRAMUSCULAR; INTRAVENOUS 2 TIMES DAILY PRN
Status: DISCONTINUED | OUTPATIENT
Start: 2024-09-06 | End: 2024-09-12 | Stop reason: HOSPADM

## 2024-09-06 RX ORDER — PANTOPRAZOLE SODIUM 40 MG/1
40 TABLET, DELAYED RELEASE ORAL
Status: DISCONTINUED | OUTPATIENT
Start: 2024-09-07 | End: 2024-09-12 | Stop reason: HOSPADM

## 2024-09-06 RX ORDER — CETIRIZINE HYDROCHLORIDE 10 MG/1
10 TABLET ORAL DAILY
COMMUNITY
Start: 2024-08-24

## 2024-09-06 RX ORDER — CETIRIZINE HYDROCHLORIDE 10 MG/1
10 TABLET ORAL DAILY
Status: DISCONTINUED | OUTPATIENT
Start: 2024-09-06 | End: 2024-09-12 | Stop reason: HOSPADM

## 2024-09-06 RX ORDER — HALOPERIDOL 5 MG/ML
5 INJECTION INTRAMUSCULAR EVERY 6 HOURS PRN
Status: DISCONTINUED | OUTPATIENT
Start: 2024-09-06 | End: 2024-09-12 | Stop reason: HOSPADM

## 2024-09-06 RX ORDER — LORAZEPAM 2 MG/1
2 TABLET ORAL ONCE
Status: COMPLETED | OUTPATIENT
Start: 2024-09-06 | End: 2024-09-06

## 2024-09-06 RX ORDER — TRAZODONE HYDROCHLORIDE 50 MG/1
50 TABLET, FILM COATED ORAL NIGHTLY PRN
Status: DISCONTINUED | OUTPATIENT
Start: 2024-09-06 | End: 2024-09-12 | Stop reason: HOSPADM

## 2024-09-06 RX ORDER — NEOMYCIN/BACITRACIN/POLYMYXINB 3.5-400-5K
OINTMENT (GRAM) TOPICAL 2 TIMES DAILY
Status: DISCONTINUED | OUTPATIENT
Start: 2024-09-06 | End: 2024-09-12 | Stop reason: HOSPADM

## 2024-09-06 RX ORDER — ACETAMINOPHEN AND CODEINE PHOSPHATE 300; 30 MG/1; MG/1
1 TABLET ORAL EVERY 4 HOURS PRN
Qty: 28 TABLET | Refills: 0 | Status: SHIPPED | OUTPATIENT
Start: 2024-09-06

## 2024-09-06 RX ORDER — ALBUTEROL SULFATE 90 UG/1
2 AEROSOL, METERED RESPIRATORY (INHALATION) EVERY 6 HOURS PRN
Status: DISCONTINUED | OUTPATIENT
Start: 2024-09-06 | End: 2024-09-12 | Stop reason: HOSPADM

## 2024-09-06 RX ORDER — UBIDECARENONE 100 MG
1000 CAPSULE ORAL 2 TIMES DAILY
COMMUNITY
Start: 2024-06-05

## 2024-09-06 RX ORDER — ALPRAZOLAM 0.5 MG
1 TABLET ORAL 3 TIMES DAILY PRN
Status: DISCONTINUED | OUTPATIENT
Start: 2024-09-06 | End: 2024-09-12 | Stop reason: HOSPADM

## 2024-09-06 RX ORDER — HYDROCODONE BITARTRATE AND ACETAMINOPHEN 5; 325 MG/1; MG/1
1 TABLET ORAL EVERY 6 HOURS PRN
Status: DISCONTINUED | OUTPATIENT
Start: 2024-09-06 | End: 2024-09-12 | Stop reason: HOSPADM

## 2024-09-06 RX ORDER — HYDROXYZINE HYDROCHLORIDE 50 MG/ML
50 INJECTION, SOLUTION INTRAMUSCULAR EVERY 6 HOURS PRN
Status: DISCONTINUED | OUTPATIENT
Start: 2024-09-06 | End: 2024-09-12 | Stop reason: HOSPADM

## 2024-09-06 RX ORDER — ACETAMINOPHEN 325 MG/1
650 TABLET ORAL EVERY 4 HOURS PRN
Status: DISCONTINUED | OUTPATIENT
Start: 2024-09-06 | End: 2024-09-12 | Stop reason: HOSPADM

## 2024-09-06 RX ORDER — HALOPERIDOL 5 MG/1
5 TABLET ORAL EVERY 6 HOURS PRN
Status: DISCONTINUED | OUTPATIENT
Start: 2024-09-06 | End: 2024-09-12 | Stop reason: HOSPADM

## 2024-09-06 RX ORDER — HYDROXYZINE PAMOATE 50 MG/1
50 CAPSULE ORAL 2 TIMES DAILY PRN
COMMUNITY
Start: 2024-06-18

## 2024-09-06 RX ORDER — ACETAMINOPHEN AND CODEINE PHOSPHATE 300; 30 MG/1; MG/1
1 TABLET ORAL EVERY 4 HOURS PRN
COMMUNITY
Start: 2024-09-06

## 2024-09-06 RX ORDER — ARIPIPRAZOLE 2 MG/1
2 TABLET ORAL DAILY
Status: ON HOLD | COMMUNITY
Start: 2024-09-02 | End: 2024-09-12 | Stop reason: HOSPADM

## 2024-09-06 RX ORDER — LEVOTHYROXINE SODIUM 112 UG/1
112 TABLET ORAL DAILY
Status: DISCONTINUED | OUTPATIENT
Start: 2024-09-06 | End: 2024-09-12 | Stop reason: HOSPADM

## 2024-09-06 RX ORDER — DULOXETIN HYDROCHLORIDE 30 MG/1
30 CAPSULE, DELAYED RELEASE ORAL DAILY
Status: ON HOLD | COMMUNITY
Start: 2024-09-03 | End: 2024-09-12 | Stop reason: HOSPADM

## 2024-09-06 RX ORDER — ALPRAZOLAM 1 MG
1 TABLET ORAL 3 TIMES DAILY PRN
COMMUNITY
Start: 2024-08-24

## 2024-09-06 RX ORDER — ESTRADIOL 0.1 MG/G
1 CREAM VAGINAL
Status: DISCONTINUED | OUTPATIENT
Start: 2024-09-06 | End: 2024-09-12 | Stop reason: HOSPADM

## 2024-09-06 RX ORDER — VITAMIN B COMPLEX
1000 TABLET ORAL 2 TIMES DAILY
Status: DISCONTINUED | OUTPATIENT
Start: 2024-09-06 | End: 2024-09-12 | Stop reason: HOSPADM

## 2024-09-06 RX ORDER — ATORVASTATIN CALCIUM 10 MG/1
10 TABLET, FILM COATED ORAL NIGHTLY
Status: DISCONTINUED | OUTPATIENT
Start: 2024-09-06 | End: 2024-09-12 | Stop reason: HOSPADM

## 2024-09-06 RX ADMIN — TIZANIDINE 4 MG: 4 TABLET ORAL at 21:23

## 2024-09-06 RX ADMIN — DULOXETINE HYDROCHLORIDE 30 MG: 30 CAPSULE, DELAYED RELEASE ORAL at 16:07

## 2024-09-06 RX ADMIN — LORAZEPAM 2 MG: 2 TABLET ORAL at 03:33

## 2024-09-06 RX ADMIN — AMLODIPINE BESYLATE 5 MG: 5 TABLET ORAL at 16:07

## 2024-09-06 RX ADMIN — HYDROCODONE BITARTRATE AND ACETAMINOPHEN 1 TABLET: 5; 325 TABLET ORAL at 16:57

## 2024-09-06 RX ADMIN — ALPRAZOLAM 1 MG: 0.5 TABLET ORAL at 16:10

## 2024-09-06 RX ADMIN — LISINOPRIL 30 MG: 10 TABLET ORAL at 16:06

## 2024-09-06 RX ADMIN — CETIRIZINE HYDROCHLORIDE 10 MG: 10 TABLET, FILM COATED ORAL at 16:07

## 2024-09-06 RX ADMIN — HYDROXYZINE PAMOATE 50 MG: 50 CAPSULE ORAL at 21:26

## 2024-09-06 RX ADMIN — ATORVASTATIN CALCIUM 10 MG: 10 TABLET, FILM COATED ORAL at 21:22

## 2024-09-06 RX ADMIN — BACITRACIN ZINC, NEOMYCIN, POLYMYXIN B 1 G: 400; 3.5; 5 OINTMENT TOPICAL at 21:22

## 2024-09-06 RX ADMIN — TETANUS TOXOID, REDUCED DIPHTHERIA TOXOID AND ACELLULAR PERTUSSIS VACCINE, ADSORBED 0.5 ML: 5; 2.5; 8; 8; 2.5 SUSPENSION INTRAMUSCULAR at 05:43

## 2024-09-06 RX ADMIN — Medication 1000 UNITS: at 21:24

## 2024-09-06 ASSESSMENT — PAIN DESCRIPTION - LOCATION
LOCATION: GENERALIZED
LOCATION: GENERALIZED

## 2024-09-06 ASSESSMENT — PAIN SCALES - GENERAL
PAINLEVEL_OUTOF10: 8
PAINLEVEL_OUTOF10: 8
PAINLEVEL_OUTOF10: 0

## 2024-09-06 ASSESSMENT — PAIN DESCRIPTION - ORIENTATION
ORIENTATION: LEFT;RIGHT
ORIENTATION: LEFT;RIGHT

## 2024-09-06 ASSESSMENT — SLEEP AND FATIGUE QUESTIONNAIRES
DO YOU HAVE DIFFICULTY SLEEPING: YES
DO YOU USE A SLEEP AID: YES
SLEEP PATTERN: DISTURBED/INTERRUPTED SLEEP;DIFFICULTY FALLING ASLEEP
AVERAGE NUMBER OF SLEEP HOURS: 4

## 2024-09-06 ASSESSMENT — PAIN DESCRIPTION - DESCRIPTORS
DESCRIPTORS: ACHING
DESCRIPTORS: ACHING

## 2024-09-06 ASSESSMENT — LIFESTYLE VARIABLES
HOW MANY STANDARD DRINKS CONTAINING ALCOHOL DO YOU HAVE ON A TYPICAL DAY: 1 OR 2
HOW OFTEN DO YOU HAVE A DRINK CONTAINING ALCOHOL: MONTHLY OR LESS

## 2024-09-06 NOTE — GROUP NOTE
Group Therapy Note    Date: 9/6/2024    Group Start Time: 1715  Group End Time: 1810  Group Topic: Recreational    [facility] Naa Childs        Group Therapy Note    Attendees: 7/16     Notes:  Pt did not attend.     Discipline Responsible: Behavorial Health Tech      Signature:  Naa Means
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
## 2024-09-06 NOTE — ED NOTES
Up to bathroom earlier. Gait steady. Tolerated repeat alcohol lab draw well. Voices no complaints.

## 2024-09-06 NOTE — ED PROVIDER NOTES
Normocephalic and atraumatic.      Nose: Nose normal. No congestion.      Mouth/Throat:      Mouth: Mucous membranes are moist.      Pharynx: Oropharynx is clear.   Eyes:      General: No scleral icterus.  Cardiovascular:      Rate and Rhythm: Regular rhythm. Tachycardia present.      Heart sounds:      No gallop.   Pulmonary:      Effort: Pulmonary effort is normal.      Breath sounds: Normal breath sounds.   Abdominal:      General: There is no distension.   Musculoskeletal:         General: No tenderness or deformity.      Cervical back: No tenderness.      Comments: An abrasion to the dorsum of her left foot without tenderness   Skin:     Findings: No bruising.   Neurological:      General: No focal deficit present.      Mental Status: She is alert and oriented to person, place, and time.      Gait: Gait normal.   Psychiatric:         Attention and Perception: She does not perceive auditory or visual hallucinations.         Mood and Affect: Mood is anxious and depressed. Affect is tearful.         Speech: Speech is slurred.         Behavior: Behavior is not combative.         Thought Content: Thought content includes suicidal ideation. Thought content includes suicidal plan.         DIAGNOSTIC RESULTS     EKG: All EKG's are interpreted by the Emergency Department Physician who either signs or Co-signs this chart in the absence of a cardiologist.    ***    RADIOLOGY:   Non-plain film images such as CT, Ultrasound and MRI are read by the radiologist. Plain radiographic images are visualized and preliminarily interpreted by the emergency physician with the below findings:      Interpretation per the Radiologist below, if available at the time of this note:    No orders to display         ED BEDSIDE ULTRASOUND:   Performed by ED Physician - none    LABS:  Labs Reviewed   ACETAMINOPHEN LEVEL - Abnormal; Notable for the following components:       Result Value    Acetaminophen Level <5 (*)     All other components  other components within normal limits   POCT GLUCOSE - Abnormal; Notable for the following components:    POC Glucose 117 (*)     All other components within normal limits   POCT GLUCOSE - Abnormal; Notable for the following components:    POC Glucose 129 (*)     All other components within normal limits   POCT GLUCOSE - Abnormal; Notable for the following components:    POC Glucose 133 (*)     All other components within normal limits   CK   ETHANOL   PREGNANCY, URINE   URINE DRUG SCREEN   URINALYSIS WITH REFLEX TO CULTURE   ETHANOL   POCT GLUCOSE   POCT GLUCOSE   POCT GLUCOSE       All other labs were within normal range or not returned as of this dictation.    EMERGENCY DEPARTMENT COURSE and DIFFERENTIAL DIAGNOSIS/MDM:   Vitals:    Vitals:    09/08/24 1615 09/08/24 1808 09/08/24 2125 09/09/24 0822   BP: (!) 134/92 123/77 (!) 173/96 (!) 142/102   Pulse: 58 72 68 64   Resp: 16 16 18 16   Temp:    98.1 °F (36.7 °C)   TempSrc:       SpO2: 99% 96% 96% 97%   Weight:           Hyperglycemia without DKA findings  No UTI  Medical Decision Making  Amount and/or Complexity of Data Reviewed  Labs: ordered.  ECG/medicine tests: ordered.    Risk  Prescription drug management.    Patient has a history of depression, previous suicide attempt via overdose.  Making suicidal threats here, intoxicated, tearful, currently believe the patient would benefit from inpatient psychiatric care.  She does require reevaluation once sober however.  Medical clearance pursued.  Disposition per psychiatry.  medically stable      REASSESSMENT     ED Course as of 09/09/24 1001   Fri Sep 06, 2024   0722 Will require admission [NA]      ED Course User Index  [NA] Joaquin Menendez MD Dr saravanan accepts    CONSULTS:  IP CONSULT TO HOSPITALIST  IP CONSULT TO SOCIAL WORK    PROCEDURES:  Unless otherwise noted below, none     Procedures      FINAL IMPRESSION      1. Acute alcoholic intoxication without complication (HCC)
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
## 2024-09-06 NOTE — RT PROTOCOL NOTE
RT Inhaler-Nebulizer Bronchodilator Protocol Note    There is a bronchodilator order in the chart from a provider indicating to follow the RT Bronchodilator Protocol and there is an “Initiate RT Inhaler-Nebulizer Bronchodilator Protocol” order as well (see protocol at bottom of note).    CXR Findings:  No results found.    The findings from the last RT Protocol Assessment were as follows:   History Pulmonary Disease:    Respiratory Pattern:    Breath Sounds:    Cough:    Indication for Bronchodilator Therapy:    Bronchodilator Assessment Score:      Aerosolized bronchodilator medication orders have been revised according to the RT Inhaler-Nebulizer Bronchodilator Protocol below.    Respiratory Therapist to perform RT Therapy Protocol Assessment initially then follow the protocol.  Repeat RT Therapy Protocol Assessment PRN for score 0-3 or on second treatment, BID, and PRN for scores above 3.    No Indications - adjust the frequency to every 6 hours PRN wheezing or bronchospasm, if no treatments needed after 48 hours then discontinue using Per Protocol order mode.     If indication present, adjust the RT bronchodilator orders based on the Bronchodilator Assessment Score as indicated below.  Use Inhaler orders unless patient has one or more of the following: on home nebulizer, not able to hold breath for 10 seconds, is not alert and oriented, cannot activate and use MDI correctly, or respiratory rate 25 breaths per minute or more, then use the equivalent nebulizer order(s) with same Frequency and PRN reasons based on the score.  If a patient is on this medication at home then do not decrease Frequency below that used at home.    0-3 - enter or revise RT bronchodilator order(s) to equivalent RT Bronchodilator order with Frequency of every 4 hours PRN for wheezing or increased work of breathing using Per Protocol order mode.        4-6 - enter or revise RT Bronchodilator order(s) to two equivalent RT bronchodilator  orders with one order with BID Frequency and one order with Frequency of every 4 hours PRN wheezing or increased work of breathing using Per Protocol order mode.        7-10 - enter or revise RT Bronchodilator order(s) to two equivalent RT bronchodilator orders with one order with TID Frequency and one order with Frequency of every 4 hours PRN wheezing or increased work of breathing using Per Protocol order mode.       11-13 - enter or revise RT Bronchodilator order(s) to one equivalent RT bronchodilator order with QID Frequency and an Albuterol order with Frequency of every 4 hours PRN wheezing or increased work of breathing using Per Protocol order mode.      Greater than 13 - enter or revise RT Bronchodilator order(s) to one equivalent RT bronchodilator order with every 4 hours Frequency and an Albuterol order with Frequency of every 2 hours PRN wheezing or increased work of breathing using Per Protocol order mode.     RT to enter RT Home Evaluation for COPD & MDI Assessment order using Per Protocol order mode.    Electronically signed by Sridevi Benedict RCP on 9/6/2024 at 4:21 PM

## 2024-09-06 NOTE — PLAN OF CARE
suicidal risk via Frequent Screener    Outcome: Progressing  Flowsheets (Taken 9/6/2024 1738)  Will have no self-injury during hospital stay:   Maintain a safe environment   Secure patient belongings   Every shift and PRN: Re-assess suicidal risk via Frequent Screener     Problem: Risk for Elopement  Goal: Patient will not exit the unit/facility without proper excort  Outcome: Progressing  Flowsheets  Taken 9/6/2024 1738 by Lula Lujan, RN  Nursing Interventions for Elopement Risk:   Assist with personal care needs such as toileting, eating, dressing, as needed to reduce the risk of wandering   Collaborate with treatment team for drug withdrawal symptoms treatment   Collaborate with treatment team for nicotine replacement   Communicate/escalate to charge nurse the risk of elopement   Reduce environmental triggers   Shoes and clothing collected and placed in gown attire   Place patient in room far away from exits and stairways   Make sure patient has all necessary personal care items   Escort with two staff members if patient must leave the unit   Communicate to physician the risk for elopement   Communicate/escalate to nursing supervisor the risk of elopement   Communicate/escalate to /other team member the risk of elopement  Taken 9/6/2024 0827 by Ashely Morse RN  Nursing Interventions for Elopement Risk:   Assist with personal care needs such as toileting, eating, dressing, as needed to reduce the risk of wandering   Collaborate with treatment team for drug withdrawal symptoms treatment   Collaborate with treatment team for nicotine replacement   Communicate/escalate to charge nurse the risk of elopement   Reduce environmental triggers   Shoes and clothing collected and placed in gown attire   Place patient in room far away from exits and stairways   Make sure patient has all necessary personal care items   Escort with two staff members if patient must leave the unit   Communicate to  physician the risk for elopement   Communicate/escalate to nursing supervisor the risk of elopement   Communicate/escalate to /other team member the risk of elopement     Problem: Chronic Conditions and Co-morbidities  Goal: Patient's chronic conditions and co-morbidity symptoms are monitored and maintained or improved  Outcome: Progressing     Problem: Pain  Goal: Verbalizes/displays adequate comfort level or baseline comfort level  Outcome: Progressing     Problem: Discharge Planning  Goal: Discharge to home or other facility with appropriate resources  Outcome: Progressing

## 2024-09-06 NOTE — ED NOTES
Pt arrived barefoot, she does have scant blood on her L foot. Appears to have a small open area to top of foot. Bleeding controlled. She does not know what happened.

## 2024-09-06 NOTE — ED NOTES
Patient reviewed with Dr. Mejias. Discussed events leading to admit, current assessment, previous history, labs and home medications. Received order to admit to 3W. Call placed to 3w and bed assignment received. RN to return all for report.

## 2024-09-06 NOTE — ED NOTES
Awaiting alcohol redraw per report from Chris PORTILLO. Resting quietly with no acute distress noted @ this time.

## 2024-09-06 NOTE — CONSULTS
Consult Note            Date:2024        Patient Name:Lizbet Dial     YOB: 1965     Age:59 y.o.    Reason for Consult: Evaluation recommendations of chronic disease management    Chief Complaint     Chief Complaint   Patient presents with    Suicidal     SI          History Obtained From   patient, electronic medical record    History of Present Illness   Patient is a 59-year-old female admitted for psychiatric evaluation for thoughts of suicide ideation.  Patient's cat just  she had a few drinks.  Patient has a past medical history of LASHONDA, anemia, hypertension, fibromyalgia, hyperlipidemia, thyroid disease, palpitations and GERD.  Patient denies chest pain, palpitations, lightheadedness, headache, dizziness, shortness of breath, cough, N/V/D, and changes in appetite.  Patient also denies smoking, illicit drug, and alcohol use.  Denies self-inflicted injuries or wounds.   Hospitalist consulted for evaluation and recommendations for acute and chronic disease management while receiving inpatient psych services.      Past Medical History     Past Medical History:   Diagnosis Date    Anemia     C. difficile colitis 2013    Chronic fatigue syndrome     Depression     Janet    Diabetes mellitus (HCC)     Diverticulitis large intestine     Fibromyalgia     Fibromyalgia 2015    GERD (gastroesophageal reflux disease)     HTN (hypertension)     Hyperlipidemia     Hypothyroidism     Palpitations     Pulmonary embolus (HCC) 10/2012    Following GYN procedure    Vitamin D deficiency         Past Surgical History     Past Surgical History:   Procedure Laterality Date    ANUS SURGERY  2011    anal fissure    COLON SURGERY      BIPOSY    COLONOSCOPY  2011    DILATION AND CURETTAGE OF UTERUS      MISCARRIAGE X2    HEMORRHOID SURGERY   8/24/10    EXTERIOR    LAPAROSCOPY      SIGMOIDOSCOPY  11    RESULTING IN FISSURECTOMY        Medications     Prior to Admission medications   (ZANAFLEX) 4 MG tablet Take 1 tablet by mouth every 6 hours as needed (muscle spasms) 12/1/20   Provider, MD Emily        HYDROcodone-acetaminophen (NORCO) 5-325 MG per tablet 1 tablet, Q6H PRN  albuterol sulfate HFA (PROVENTIL;VENTOLIN;PROAIR) 108 (90 Base) MCG/ACT inhaler 2 puff, Q6H PRN  ALPRAZolam (XANAX) tablet 1 mg, TID PRN  amLODIPine (NORVASC) tablet 5 mg, Daily  cetirizine (ZYRTEC) tablet 10 mg, Daily  Vitamin D (CHOLECALCIFEROL) tablet 1,000 Units, BID  DULoxetine (CYMBALTA) extended release capsule 30 mg, Daily  gabapentin (NEURONTIN) capsule 300 mg, TID  levothyroxine (SYNTHROID) tablet 112 mcg, Daily  lisinopril (PRINIVIL;ZESTRIL) tablet 30 mg, Daily  [START ON 9/7/2024] pantoprazole (PROTONIX) tablet 40 mg, QAM AC  atorvastatin (LIPITOR) tablet 10 mg, Nightly  tiZANidine (ZANAFLEX) tablet 4 mg, Q6H PRN  estradiol (ESTRACE) vaginal cream 1 g, Once per day on Monday Wednesday Friday  hydrOXYzine pamoate (VISTARIL) capsule 50 mg, Q6H PRN   Or  hydrOXYzine (VISTARIL) injection 50 mg, Q6H PRN  acetaminophen (TYLENOL) tablet 650 mg, Q4H PRN  magnesium hydroxide (MILK OF MAGNESIA) 400 MG/5ML suspension 30 mL, Daily PRN  aluminum & magnesium hydroxide-simethicone (MAALOX) 200-200-20 MG/5ML suspension 30 mL, PRN  haloperidol (HALDOL) tablet 5 mg, Q6H PRN   Or  haloperidol lactate (HALDOL) injection 5 mg, Q6H PRN  benztropine mesylate (COGENTIN) injection 2 mg, BID PRN  traZODone (DESYREL) tablet 50 mg, Nightly PRN        Allergies   Ciprofloxacin, Erythromycin, Ketorolac tromethamine, Mobic [meloxicam], Other, Pcn [penicillins], Pseudoephedrine hcl, Sudafed [pseudoephedrine hcl], Sympathomimetics, and Thorazine [chlorpromazine]    Social History     Social History       Tobacco History       Smoking Status  Never      Passive Exposure  Never      Smokeless Tobacco Use  Never              Alcohol History       Alcohol Use Status  Not Currently Comment  OCC.              Drug Use       Drug Use Status  No
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
## 2024-09-06 NOTE — GROUP NOTE
Group Therapy Note    Date: 9/6/2024    Group Start Time: 1145  Group End Time: 1220  Group Topic:  Group    [facility] Yenifer Silver LSW        Group Therapy Note  Patient's identified self affirmations needed to help reframe thinking and improve self esteem.  Attendees: 8       Patient's Goal:  Patient did not attend      Signature:  KAILEY Viramontes

## 2024-09-07 DIAGNOSIS — K21.9 GASTRO-ESOPHAGEAL REFLUX DISEASE WITHOUT ESOPHAGITIS: ICD-10-CM

## 2024-09-07 LAB
GLUCOSE BLD-MCNC: 117 MG/DL (ref 70–99)
PERFORMED ON: ABNORMAL

## 2024-09-07 PROCEDURE — 6370000000 HC RX 637 (ALT 250 FOR IP): Performed by: PSYCHIATRY & NEUROLOGY

## 2024-09-07 PROCEDURE — 1240000000 HC EMOTIONAL WELLNESS R&B

## 2024-09-07 PROCEDURE — 99223 1ST HOSP IP/OBS HIGH 75: CPT | Performed by: PSYCHIATRY & NEUROLOGY

## 2024-09-07 PROCEDURE — 6370000000 HC RX 637 (ALT 250 FOR IP): Performed by: REGISTERED NURSE

## 2024-09-07 RX ORDER — LISINOPRIL 20 MG/1
20 TABLET ORAL DAILY
Status: DISCONTINUED | OUTPATIENT
Start: 2024-09-08 | End: 2024-09-12 | Stop reason: HOSPADM

## 2024-09-07 RX ADMIN — LISINOPRIL 30 MG: 10 TABLET ORAL at 08:48

## 2024-09-07 RX ADMIN — Medication 1000 UNITS: at 08:48

## 2024-09-07 RX ADMIN — LEVOTHYROXINE SODIUM 112 MCG: 0.11 TABLET ORAL at 06:06

## 2024-09-07 RX ADMIN — ACETAMINOPHEN 325MG 650 MG: 325 TABLET ORAL at 08:49

## 2024-09-07 RX ADMIN — AMLODIPINE BESYLATE 5 MG: 5 TABLET ORAL at 08:49

## 2024-09-07 RX ADMIN — HYDROXYZINE PAMOATE 50 MG: 50 CAPSULE ORAL at 18:41

## 2024-09-07 RX ADMIN — HYDROCODONE BITARTRATE AND ACETAMINOPHEN 1 TABLET: 5; 325 TABLET ORAL at 18:28

## 2024-09-07 RX ADMIN — PANTOPRAZOLE SODIUM 40 MG: 40 TABLET, DELAYED RELEASE ORAL at 06:06

## 2024-09-07 RX ADMIN — ALPRAZOLAM 1 MG: 0.5 TABLET ORAL at 20:54

## 2024-09-07 RX ADMIN — Medication 1000 UNITS: at 20:43

## 2024-09-07 RX ADMIN — TIZANIDINE 4 MG: 4 TABLET ORAL at 10:28

## 2024-09-07 RX ADMIN — ALPRAZOLAM 1 MG: 0.5 TABLET ORAL at 11:04

## 2024-09-07 RX ADMIN — BACITRACIN ZINC, NEOMYCIN, POLYMYXIN B 1 G: 400; 3.5; 5 OINTMENT TOPICAL at 08:49

## 2024-09-07 RX ADMIN — DULOXETINE HYDROCHLORIDE 30 MG: 30 CAPSULE, DELAYED RELEASE ORAL at 08:49

## 2024-09-07 RX ADMIN — TIZANIDINE 4 MG: 4 TABLET ORAL at 21:57

## 2024-09-07 RX ADMIN — ATORVASTATIN CALCIUM 10 MG: 10 TABLET, FILM COATED ORAL at 20:42

## 2024-09-07 RX ADMIN — HYDROCODONE BITARTRATE AND ACETAMINOPHEN 1 TABLET: 5; 325 TABLET ORAL at 06:05

## 2024-09-07 RX ADMIN — BACITRACIN ZINC, NEOMYCIN, POLYMYXIN B 1 G: 400; 3.5; 5 OINTMENT TOPICAL at 20:55

## 2024-09-07 ASSESSMENT — PAIN DESCRIPTION - DESCRIPTORS
DESCRIPTORS: ACHING;OTHER (COMMENT)
DESCRIPTORS: ACHING
DESCRIPTORS: ACHING

## 2024-09-07 ASSESSMENT — PAIN DESCRIPTION - ORIENTATION
ORIENTATION: LEFT;RIGHT
ORIENTATION: LEFT;RIGHT

## 2024-09-07 ASSESSMENT — PAIN SCALES - GENERAL
PAINLEVEL_OUTOF10: 0
PAINLEVEL_OUTOF10: 8
PAINLEVEL_OUTOF10: 0
PAINLEVEL_OUTOF10: 9

## 2024-09-07 ASSESSMENT — PAIN DESCRIPTION - LOCATION
LOCATION: BACK
LOCATION: GENERALIZED
LOCATION: LEG;HIP;BACK
LOCATION: BACK;LEG

## 2024-09-07 NOTE — PLAN OF CARE
Patient calm and cooperative with shift assessment.  Patient appeared sad, depressed, helpless, hopeless, dismissive, withdrawn and exhibiting a flat affect.  Poor eye contact.  Patient rating anxiety 10/10 and depression 9/10 on a scale of 1-10 with ten  being the worst.  Patient denying SI/HI/AVH and contracts for safety.   Patient observed ambulating with a strong steady gait.  Patient out on division isolating to self.  No behavior issues noted.  Patient encouraged to shower and attend groups today.  Will continue to monitor.  Problem: Self Harm/Suicidality  Goal: Will have no self-injury during hospital stay  Description: INTERVENTIONS:  1.  Ensure constant observer at bedside with Q15M safety checks  2.  Maintain a safe environment  3.  Secure patient belongings  4.  Ensure family/visitors adhere to safety recommendations  5.  Ensure safety tray has been added to patient's diet order  6.  Every shift and PRN: Re-assess suicidal risk via Frequent Screener    9/7/2024 1024 by Shabbir Stone RN  Outcome: Progressing  Flowsheets (Taken 9/7/2024 1011)  Will have no self-injury during hospital stay:   Maintain a safe environment   Secure patient belongings  9/6/2024 2320 by Cara Reese RN  Outcome: Progressing     Problem: Risk for Elopement  Goal: Patient will not exit the unit/facility without proper excort  9/7/2024 1024 by Shabbir Stone RN  Outcome: Progressing  Flowsheets (Taken 9/7/2024 1011)  Nursing Interventions for Elopement Risk:   Make sure patient has all necessary personal care items   Reduce environmental triggers   Communicate/escalate to nursing supervisor the risk of elopement  9/6/2024 2320 by Cara Reese RN  Outcome: Progressing     Problem: Chronic Conditions and Co-morbidities  Goal: Patient's chronic conditions and co-morbidity symptoms are monitored and maintained or improved  9/7/2024 1024 by Shabbir Stone RN  Outcome: Progressing  9/6/2024 2320 by Cara Reese RN  Outcome:

## 2024-09-07 NOTE — H&P
of consciousness:  within normal limits   Appearance:  ill-appearing  Behavior/Motor:  no abnormalities noted  Attitude toward examiner:  cooperative  Speech:  slow   Mood: constricted, decreased range and depressed  Affect:  mood congruent  Thought processes:  linear and goal directed   Thought content:  Suicidal Ideation:  active  Delusions:  no evidence of delusions  Perceptual Disturbance:  denies any perceptual disturbance  Cognition:  oriented to person, place, and time   Concentration intact  Memory intact  Mini Mental Status 30/30  Insight poor   Judgement good   Fund of Knowledge good      DIAGNOSIS:     (Axis I):       Major depressive disorder; recurrent and severe   Generalized anxiety disorder   PTSD    Axis II:     Borderline traits    RISK ASSESSMENT:    SUICIDE: high   HOMICIDE: low  AGITATION/VIOLENCE: low  ELOPEMENT: low    LABS:  Recent Labs     09/06/24  0357   WBC 7.3   HGB 12.4        Recent Labs     09/06/24  0357      K 3.5      CO2 21   BUN 12   CREATININE 0.86   GLUCOSE 222*     Recent Labs     09/06/24  0357   BILITOT 0.3   ALKPHOS 88   AST 24   ALT 28     Lab Results   Component Value Date/Time    LABAMPH NotDTCD 09/14/2012 11:35 AM    BARBSCNU Neg 09/06/2024 03:39 AM    LABBENZ Neg 09/06/2024 03:39 AM    LABBENZ NotDTCD 09/14/2012 11:35 AM    LABMETH Neg 09/06/2024 03:39 AM    ETOH 121 09/06/2024 08:08 AM     Lab Results   Component Value Date/Time    TSH 4.930 09/06/2024 03:57 AM     No results found for: \"LITHIUM\"  No results found for: \"VALPROATE\", \"CBMZ\"  No results found for: \"LITHIUM\", \"VALPROATE\"    Radiology  Xr Chest Standard (2 Vw)    Result Date: 10/23/2018  EXAM: CHEST, 2 VIEWS COMPARISON: 7/29/2016 REASON FOR EXAMINATION: SMOKING HISTORY, UPPER RESPIRATORY CONGESTION, RESPIRATORY RHONCHI AND COUGH FINDINGS:   Two views of the chest demonstrate normal appearance of the heart and mediastinum. There is a 3 mm benign granuloma in the RLL which appears  friends.  Will obtain medical records as appropriate from out patient providers  Will consult the hospitalist for a physical exam to rule out any co-morbid physical condition.    Home medication Reconcilled    New Medications started during this admission :    See orders    Prn Haldol 5mg and Vistaril 50mg q6hr for extreme agitation.  Discussed with the patient risk, benefit, alternative and common side effects for the  proposed medication treatment. Patient is consenting to the treatment.    Psychotherapy:   Encourage participation in milieu and group therapy  Individual therapy as needed      GENERAL PATIENT/FAMILY EDUCATION    Goals:    Patient will understand basic signs and symptoms  Patient will understand their role in recovery          Electronically signed by HALLIE MUNIZ MD on 9/7/2024 at 9:04 AM

## 2024-09-07 NOTE — CONSULTS
Consult      Patient:  Lizbet Dial  YOB: 1965    MRN: [id number]     Acct: [id number]    Primary Care Physician: Mervin Maher DO    HISTORY OF PRESENT ILLNESS:   History obtained from chart review and the patient.    The patient is a 59 y.o. female whom I have been requested to see by Dr. Mejias for evaluation of HTN/hypothyroidism. Patient was admitted over night due to suicidal ideation, severe depression. Was compliant with her BP meds but not with meds for depression due to she didn't like how they make her to feel. Denied fever/dyspnea, CP     Past Medical History:        Diagnosis Date    Anemia     C. difficile colitis 01/2013    Chronic fatigue syndrome     Depression     Ismay    Diabetes mellitus (HCC)     Diverticulitis large intestine 2001    Fibromyalgia     Fibromyalgia 03/24/2015    GERD (gastroesophageal reflux disease)     HTN (hypertension)     Hyperlipidemia     Hypothyroidism     Palpitations     Pulmonary embolus (HCC) 10/2012    Following GYN procedure    Vitamin D deficiency        Past Surgical History:        Procedure Laterality Date    ANUS SURGERY  12/14/2011    anal fissure    COLON SURGERY      BIPOSY    COLONOSCOPY  8/2011    DILATION AND CURETTAGE OF UTERUS      MISCARRIAGE X2    HEMORRHOID SURGERY   8/24/10    EXTERIOR    LAPAROSCOPY      SIGMOIDOSCOPY  12/14/11    RESULTING IN FISSURECTOMY       Medications:    No current facility-administered medications on file prior to encounter.     Current Outpatient Medications on File Prior to Encounter   Medication Sig Dispense Refill    acetaminophen-codeine (TYLENOL #3) 300-30 MG per tablet Take 1 tablet by mouth every 4 hours as needed. Max Daily Amount: 6 tablets      ALPRAZolam (XANAX) 1 MG tablet Take 1 tablet by mouth 3 times daily as needed for Anxiety. Max Daily Amount: 3 mg      ARIPiprazole (ABILIFY) 2 MG tablet Take 1 tablet by mouth daily      cetirizine (ZYRTEC) 10 MG tablet Take 1 tablet by mouth
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
## 2024-09-08 VITALS
OXYGEN SATURATION: 96 % | SYSTOLIC BLOOD PRESSURE: 173 MMHG | WEIGHT: 254 LBS | RESPIRATION RATE: 18 BRPM | TEMPERATURE: 97.9 F | HEART RATE: 68 BPM | DIASTOLIC BLOOD PRESSURE: 96 MMHG | BODY MASS INDEX: 46.46 KG/M2

## 2024-09-08 LAB
GLUCOSE BLD-MCNC: 129 MG/DL (ref 70–99)
PERFORMED ON: ABNORMAL

## 2024-09-08 PROCEDURE — 99232 SBSQ HOSP IP/OBS MODERATE 35: CPT | Performed by: PSYCHIATRY & NEUROLOGY

## 2024-09-08 PROCEDURE — 1240000000 HC EMOTIONAL WELLNESS R&B

## 2024-09-08 PROCEDURE — 93005 ELECTROCARDIOGRAM TRACING: CPT | Performed by: PSYCHIATRY & NEUROLOGY

## 2024-09-08 PROCEDURE — 6370000000 HC RX 637 (ALT 250 FOR IP): Performed by: INTERNAL MEDICINE

## 2024-09-08 PROCEDURE — 6370000000 HC RX 637 (ALT 250 FOR IP): Performed by: PSYCHIATRY & NEUROLOGY

## 2024-09-08 PROCEDURE — 6370000000 HC RX 637 (ALT 250 FOR IP): Performed by: REGISTERED NURSE

## 2024-09-08 RX ADMIN — BACITRACIN ZINC, NEOMYCIN, POLYMYXIN B 1 G: 400; 3.5; 5 OINTMENT TOPICAL at 21:30

## 2024-09-08 RX ADMIN — Medication 1000 UNITS: at 08:53

## 2024-09-08 RX ADMIN — TIZANIDINE 4 MG: 4 TABLET ORAL at 21:29

## 2024-09-08 RX ADMIN — HYDROCODONE BITARTRATE AND ACETAMINOPHEN 1 TABLET: 5; 325 TABLET ORAL at 09:28

## 2024-09-08 RX ADMIN — AMLODIPINE BESYLATE 5 MG: 5 TABLET ORAL at 08:54

## 2024-09-08 RX ADMIN — BACITRACIN ZINC, NEOMYCIN, POLYMYXIN B 1 G: 400; 3.5; 5 OINTMENT TOPICAL at 08:52

## 2024-09-08 RX ADMIN — TIZANIDINE 4 MG: 4 TABLET ORAL at 10:12

## 2024-09-08 RX ADMIN — DULOXETINE HYDROCHLORIDE 30 MG: 30 CAPSULE, DELAYED RELEASE ORAL at 08:53

## 2024-09-08 RX ADMIN — LISINOPRIL 20 MG: 20 TABLET ORAL at 08:53

## 2024-09-08 RX ADMIN — Medication 1000 UNITS: at 21:29

## 2024-09-08 RX ADMIN — ATORVASTATIN CALCIUM 10 MG: 10 TABLET, FILM COATED ORAL at 21:29

## 2024-09-08 RX ADMIN — PANTOPRAZOLE SODIUM 40 MG: 40 TABLET, DELAYED RELEASE ORAL at 06:25

## 2024-09-08 RX ADMIN — HYDROCODONE BITARTRATE AND ACETAMINOPHEN 1 TABLET: 5; 325 TABLET ORAL at 18:12

## 2024-09-08 RX ADMIN — LEVOTHYROXINE SODIUM 112 MCG: 0.11 TABLET ORAL at 06:25

## 2024-09-08 RX ADMIN — ALPRAZOLAM 1 MG: 0.5 TABLET ORAL at 11:29

## 2024-09-08 ASSESSMENT — PAIN SCALES - GENERAL
PAINLEVEL_OUTOF10: 7

## 2024-09-08 ASSESSMENT — PAIN DESCRIPTION - LOCATION
LOCATION: BACK;KNEE
LOCATION: BACK
LOCATION: SHOULDER;BACK
LOCATION: BACK

## 2024-09-08 ASSESSMENT — PAIN DESCRIPTION - DESCRIPTORS
DESCRIPTORS: ACHING
DESCRIPTORS: ACHING

## 2024-09-08 NOTE — PLAN OF CARE
Problem: Self Harm/Suicidality  Goal: Will have no self-injury during hospital stay  Outcome: Progressing     Problem: Risk for Elopement  Goal: Patient will not exit the unit/facility without proper excort  Outcome: Progressing     Problem: Chronic Conditions and Co-morbidities  Goal: Patient's chronic conditions and co-morbidity symptoms are monitored and maintained or improved  Outcome: Progressing     Problem: Pain  Goal: Verbalizes/displays adequate comfort level or baseline comfort level  Outcome: Progressing     Problem: Discharge Planning  Goal: Discharge to home or other facility with appropriate resources  Outcome: Progressing

## 2024-09-08 NOTE — FLOWSHEET NOTE
The patient requested Xanax due to anxiety rated #10/10. Administered per mar with Xanax 1 mg po PRN.

## 2024-09-08 NOTE — FLOWSHEET NOTE
The patient requested Zanaflex due to muscle spasms and pain rated #8/10. Administered per mar with Zanaflex 4 mg po PRN.

## 2024-09-08 NOTE — CARE COORDINATION
Brief Intervention and Referral to Treatment Summary    Patient was provided PHQ-9, AUDIT-C and DAST Screening:      PHQ-9 Score: not assessed   AUDIT-C Score:  2  DAST Score:  0    Patient’s substance use is considered     Moderate Risk      Patient’s depression is considered:     Not assessed    Brief Education Was Provided    Patient was receptive      Brief Intervention Is Provided (Only for AUDIT-C or DAST)     Patient reports readiness to decrease and/or stop use and a plan was discussed   Patient denies readiness to decrease and/or stop use and a plan was not discussed x    Recommendations/Referrals for Brief and/or Specialized Treatment Provided to Patient:      Pt denies any need for CARINE treatment at this time.

## 2024-09-08 NOTE — PLAN OF CARE
Patient calm and cooperative with shift assessment.  Sad with a flat affect. Poor eye contact.  Patient rating anxiety 10/10 and depression 5/10 on a scale of 1-10 with ten being the worst.  Patient denying SI/HI/AVH and contracts for safety.  Hopeless, helpless, withdrawn and dismissive.  Patient isolating mostly to self and room today.  Patient not going to group despite encouragement.  No behavior issues.  Will continue to monitor.    Problem: Self Harm/Suicidality  Goal: Will have no self-injury during hospital stay  Description: INTERVENTIONS:  1.  Ensure constant observer at bedside with Q15M safety checks  2.  Maintain a safe environment  3.  Secure patient belongings  4.  Ensure family/visitors adhere to safety recommendations  5.  Ensure safety tray has been added to patient's diet order  6.  Every shift and PRN: Re-assess suicidal risk via Frequent Screener    9/8/2024 1343 by Shabbir Stone RN  Outcome: Progressing  9/8/2024 0040 by Cara Reese RN  Outcome: Progressing     Problem: Risk for Elopement  Goal: Patient will not exit the unit/facility without proper excort  9/8/2024 1343 by Shabbir Stone RN  Outcome: Progressing  Flowsheets (Taken 9/8/2024 1136)  Nursing Interventions for Elopement Risk:   Communicate/escalate to nursing supervisor the risk of elopement   Reduce environmental triggers   Make sure patient has all necessary personal care items  9/8/2024 0040 by Cara Reese RN  Outcome: Progressing     Problem: Chronic Conditions and Co-morbidities  Goal: Patient's chronic conditions and co-morbidity symptoms are monitored and maintained or improved  9/8/2024 1343 by Shabbir Stone RN  Outcome: Progressing  9/8/2024 0040 by Cara Reese RN  Outcome: Progressing     Problem: Pain  Goal: Verbalizes/displays adequate comfort level or baseline comfort level  9/8/2024 1343 by Shabbir Stone RN  Outcome: Progressing  9/8/2024 0040 by Cara Reese RN  Outcome: Progressing     Problem:
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
## 2024-09-08 NOTE — GROUP NOTE
Group Therapy Note    Date: 9/8/2024    Group Start Time: 1030  Group End Time: 1100  Group Topic: Activity    [facility] 3W Noemí Mckeon        Group Therapy Note    Attendees: 8       Patient's Goal:  To attend group    Notes:  Pt was attentive     Status After Intervention:  Unchanged    Participation Level: Interactive    Participation Quality: Attentive      Speech:  normal      Thought Process/Content: Logical      Affective Functioning: Congruent      Mood: euthymic      Level of consciousness:  Alert      Response to Learning: Able to verbalize current knowledge/experience      Endings: None Reported    Modes of Intervention: Activity      Discipline Responsible: Behavorial Health Tech      Signature:  Noemí Salazar

## 2024-09-09 LAB
EKG ATRIAL RATE: 56 BPM
EKG P AXIS: 48 DEGREES
EKG P-R INTERVAL: 170 MS
EKG Q-T INTERVAL: 404 MS
EKG QRS DURATION: 80 MS
EKG QTC CALCULATION (BAZETT): 389 MS
EKG R AXIS: -15 DEGREES
EKG T AXIS: 27 DEGREES
EKG VENTRICULAR RATE: 56 BPM
GLUCOSE BLD-MCNC: 133 MG/DL (ref 70–99)
PERFORMED ON: ABNORMAL

## 2024-09-09 PROCEDURE — 1240000000 HC EMOTIONAL WELLNESS R&B

## 2024-09-09 PROCEDURE — 99232 SBSQ HOSP IP/OBS MODERATE 35: CPT | Performed by: PSYCHIATRY & NEUROLOGY

## 2024-09-09 PROCEDURE — 6370000000 HC RX 637 (ALT 250 FOR IP): Performed by: INTERNAL MEDICINE

## 2024-09-09 PROCEDURE — 6370000000 HC RX 637 (ALT 250 FOR IP): Performed by: PSYCHIATRY & NEUROLOGY

## 2024-09-09 PROCEDURE — 93010 ELECTROCARDIOGRAM REPORT: CPT | Performed by: INTERNAL MEDICINE

## 2024-09-09 PROCEDURE — 6370000000 HC RX 637 (ALT 250 FOR IP): Performed by: REGISTERED NURSE

## 2024-09-09 RX ORDER — OMEPRAZOLE 20 MG/1
20 CAPSULE, DELAYED RELEASE ORAL DAILY
Qty: 90 CAPSULE | Refills: 1 | Status: SHIPPED | OUTPATIENT
Start: 2024-09-09

## 2024-09-09 RX ADMIN — Medication 1000 UNITS: at 21:41

## 2024-09-09 RX ADMIN — LISINOPRIL 20 MG: 20 TABLET ORAL at 10:34

## 2024-09-09 RX ADMIN — ALPRAZOLAM 1 MG: 0.5 TABLET ORAL at 06:27

## 2024-09-09 RX ADMIN — LEVOTHYROXINE SODIUM 112 MCG: 0.11 TABLET ORAL at 06:09

## 2024-09-09 RX ADMIN — ATORVASTATIN CALCIUM 10 MG: 10 TABLET, FILM COATED ORAL at 21:42

## 2024-09-09 RX ADMIN — PANTOPRAZOLE SODIUM 40 MG: 40 TABLET, DELAYED RELEASE ORAL at 06:09

## 2024-09-09 RX ADMIN — DULOXETINE HYDROCHLORIDE 30 MG: 30 CAPSULE, DELAYED RELEASE ORAL at 10:34

## 2024-09-09 RX ADMIN — TIZANIDINE 4 MG: 4 TABLET ORAL at 16:17

## 2024-09-09 RX ADMIN — BACITRACIN ZINC, NEOMYCIN, POLYMYXIN B 1 G: 400; 3.5; 5 OINTMENT TOPICAL at 21:43

## 2024-09-09 RX ADMIN — ALPRAZOLAM 1 MG: 0.5 TABLET ORAL at 22:46

## 2024-09-09 RX ADMIN — Medication 1000 UNITS: at 10:34

## 2024-09-09 RX ADMIN — ALPRAZOLAM 1 MG: 0.5 TABLET ORAL at 15:10

## 2024-09-09 RX ADMIN — HYDROCODONE BITARTRATE AND ACETAMINOPHEN 1 TABLET: 5; 325 TABLET ORAL at 10:43

## 2024-09-09 RX ADMIN — AMLODIPINE BESYLATE 5 MG: 5 TABLET ORAL at 10:35

## 2024-09-09 ASSESSMENT — PAIN DESCRIPTION - LOCATION: LOCATION: BACK

## 2024-09-09 ASSESSMENT — PAIN DESCRIPTION - ORIENTATION: ORIENTATION: LOWER

## 2024-09-09 ASSESSMENT — PAIN SCALES - GENERAL
PAINLEVEL_OUTOF10: 6
PAINLEVEL_OUTOF10: 7

## 2024-09-09 ASSESSMENT — PAIN DESCRIPTION - DESCRIPTORS: DESCRIPTORS: STABBING

## 2024-09-09 NOTE — PLAN OF CARE
Visible on the unit, social with peers. Walks with strong and steady gait. Denies SI, HI, AVH. Appears flat, anxious, sad. Remains cooperative, guarded, preoccupied. Rates anxiety \"high\", depression 8/10. Reports poor sleep and appetite. Night staff reports pt slept for 8 hours. Encouraged to perform daily hygiene care and to attend groups.       Problem: Self Harm/Suicidality  Goal: Will have no self-injury during hospital stay  Description: INTERVENTIONS:  1.  Ensure constant observer at bedside with Q15M safety checks  2.  Maintain a safe environment  3.  Secure patient belongings  4.  Ensure family/visitors adhere to safety recommendations  5.  Ensure safety tray has been added to patient's diet order  6.  Every shift and PRN: Re-assess suicidal risk via Frequent Screener    Outcome: Progressing     Problem: Risk for Elopement  Goal: Patient will not exit the unit/facility without proper excort  Outcome: Progressing     Problem: Chronic Conditions and Co-morbidities  Goal: Patient's chronic conditions and co-morbidity symptoms are monitored and maintained or improved  Outcome: Progressing     Problem: Pain  Goal: Verbalizes/displays adequate comfort level or baseline comfort level  Outcome: Progressing     Problem: Discharge Planning  Goal: Discharge to home or other facility with appropriate resources  Outcome: Progressing

## 2024-09-09 NOTE — GROUP NOTE
Group Therapy Note    Date: 9/9/2024    Group Start Time: 1205  Group End Time: 1250  Group Topic: Cognitive Skills    [facility] 3[facility] Viktoriya Mcbride, RN        Group Therapy Note    Attendees: 10/21         Patient's Goal:  to learn \"warning skills\" of emotional illness exacerbation.        Status After Intervention:  Improved    Participation Level: Active Listener and Interactive    Participation Quality: Appropriate      Speech:  normal      Thought Process/Content: Logical      Affective Functioning: Congruent      Mood: euthymic      Level of consciousness:  Alert and Oriented x4      Response to Learning: Progressing to goal      Endings: None Reported    Modes of Intervention: Education, Support, and Socialization      Discipline Responsible: Registered Nurse    Signature:  Viktoriya Alexander, RN

## 2024-09-09 NOTE — FLOWSHEET NOTE
The patient requested Zanaflex due to muscle spasms and pain rated #7/10. Administered per mar with Zanaflex 4 mg po PRN.

## 2024-09-09 NOTE — PLAN OF CARE
Problem: Self Harm/Suicidality  Goal: Will have no self-injury during hospital stay  9/8/2024 2020 by Cara Reese RN  Outcome: Progressing  9/8/2024 1343 by Shabbir Stone RN  Outcome: Progressing     Problem: Risk for Elopement  Goal: Patient will not exit the unit/facility without proper excort  9/8/2024 2020 by Cara Reese RN  Outcome: Progressing  9/8/2024 1343 by Shabbir Stone RN  Outcome: Progressing     Problem: Chronic Conditions and Co-morbidities  Goal: Patient's chronic conditions and co-morbidity symptoms are monitored and maintained or improved  9/8/2024 2020 by Cara Reese RN  Outcome: Progressing  9/8/2024 1343 by Shabbir Stone RN  Outcome: Progressing     Problem: Pain  Goal: Verbalizes/displays adequate comfort level or baseline comfort level  9/8/2024 2020 by Cara Reese RN  Outcome: Progressing  9/8/2024 1343 by Shabbir Stone RN  Outcome: Progressing     Problem: Discharge Planning  Goal: Discharge to home or other facility with appropriate resources  9/8/2024 2020 by Cara Reese RN  Outcome: Progressing  9/8/2024 1343 by Shabbir Stone RN  Outcome: Progressing

## 2024-09-09 NOTE — GROUP NOTE
Date: 9/9/2024    Group Start Time: 0945  Group End Time: 1040  Group Topic: Psychoeducation    [facility] 3W Aurea Hicks    Stages of Change: Diagnosing Songs into the Stages  Clarification, Education, Exploration, Snow Analysis/Discussion, Receptive Music Listening    Patients will listen to \"It's Not the Same Anymore\" by Conner Guajardo and identify&discuss lyrics/themes/interpretations derived from the song. Patients will be educated on the 5 Stages of Change (pre-contemplation, contemplation, preparation, action, and maintenance). Patients will identify any changes they want to make in the future and determine what stage of change they are currently in. Patients will be asked to listen to a variety of songs and \"diagnose\" them into a specific stage of change. Patients and MT will discuss why songs might fit into one/multiple stage(s).      Focus: Addiction Recovery, Breaking Negative Cycles, Motivation, & Processing    Goals: Increase Community Building/Socialization, Improve Mood, Increase Future Thinking, Improve Coping Skills, Improve Insight/Self-Awareness, Improve Self-Expression, Improve Attention to Task, Improve/Maintain Cognitive Skills    Patient listened to peer song discussions and explanations about the stages of change. Patient provided some relevant responses as to what stage she felt was represented in the music. Patient mostly kept to herself, but was pleasant when approached. Patient left group early and did not return.     Attended: 1/2-3/4 attendance  Participation Level: Interactive  Participation Quality: Attentive and Sharing  Affect/Mood: Blunted/Quiet and Reserved  Speech: normal rate and normal volume   Thought Content/Processes: Vague  Level of consciousness: Alert  Response: Able to verbalize current knowledge/experience  Outcomes: Progressing towards goal and Actively participated in the group experience    Discipline Responsible: Music Therapist  Signature: Aurea Back

## 2024-09-10 LAB
GLUCOSE BLD-MCNC: 165 MG/DL (ref 70–99)
PERFORMED ON: ABNORMAL

## 2024-09-10 PROCEDURE — 6370000000 HC RX 637 (ALT 250 FOR IP): Performed by: PSYCHIATRY & NEUROLOGY

## 2024-09-10 PROCEDURE — 6370000000 HC RX 637 (ALT 250 FOR IP): Performed by: REGISTERED NURSE

## 2024-09-10 PROCEDURE — 6370000000 HC RX 637 (ALT 250 FOR IP): Performed by: INTERNAL MEDICINE

## 2024-09-10 PROCEDURE — 1240000000 HC EMOTIONAL WELLNESS R&B

## 2024-09-10 PROCEDURE — 99232 SBSQ HOSP IP/OBS MODERATE 35: CPT | Performed by: PSYCHIATRY & NEUROLOGY

## 2024-09-10 RX ADMIN — TIZANIDINE 4 MG: 4 TABLET ORAL at 00:52

## 2024-09-10 RX ADMIN — TIZANIDINE 4 MG: 4 TABLET ORAL at 22:39

## 2024-09-10 RX ADMIN — BACITRACIN ZINC, NEOMYCIN, POLYMYXIN B 1 G: 400; 3.5; 5 OINTMENT TOPICAL at 08:47

## 2024-09-10 RX ADMIN — AMLODIPINE BESYLATE 5 MG: 5 TABLET ORAL at 08:48

## 2024-09-10 RX ADMIN — ALPRAZOLAM 1 MG: 0.5 TABLET ORAL at 12:03

## 2024-09-10 RX ADMIN — ATORVASTATIN CALCIUM 10 MG: 10 TABLET, FILM COATED ORAL at 21:06

## 2024-09-10 RX ADMIN — BACITRACIN ZINC, NEOMYCIN, POLYMYXIN B 1 G: 400; 3.5; 5 OINTMENT TOPICAL at 21:05

## 2024-09-10 RX ADMIN — Medication 1000 UNITS: at 08:48

## 2024-09-10 RX ADMIN — DULOXETINE HYDROCHLORIDE 30 MG: 30 CAPSULE, DELAYED RELEASE ORAL at 08:48

## 2024-09-10 RX ADMIN — ALPRAZOLAM 1 MG: 0.5 TABLET ORAL at 21:05

## 2024-09-10 RX ADMIN — LEVOTHYROXINE SODIUM 112 MCG: 0.11 TABLET ORAL at 08:48

## 2024-09-10 RX ADMIN — PANTOPRAZOLE SODIUM 40 MG: 40 TABLET, DELAYED RELEASE ORAL at 08:48

## 2024-09-10 RX ADMIN — Medication 1000 UNITS: at 21:06

## 2024-09-10 RX ADMIN — HYDROCODONE BITARTRATE AND ACETAMINOPHEN 1 TABLET: 5; 325 TABLET ORAL at 18:05

## 2024-09-10 RX ADMIN — HYDROCODONE BITARTRATE AND ACETAMINOPHEN 1 TABLET: 5; 325 TABLET ORAL at 10:05

## 2024-09-10 RX ADMIN — LISINOPRIL 20 MG: 20 TABLET ORAL at 08:48

## 2024-09-10 ASSESSMENT — PAIN DESCRIPTION - DESCRIPTORS
DESCRIPTORS: ACHING
DESCRIPTORS: ACHING;THROBBING
DESCRIPTORS: ACHING;SPASM

## 2024-09-10 ASSESSMENT — PAIN SCALES - GENERAL
PAINLEVEL_OUTOF10: 7
PAINLEVEL_OUTOF10: 8
PAINLEVEL_OUTOF10: 6

## 2024-09-10 ASSESSMENT — PAIN DESCRIPTION - ORIENTATION
ORIENTATION: MID
ORIENTATION: RIGHT;LEFT

## 2024-09-10 ASSESSMENT — PAIN DESCRIPTION - LOCATION
LOCATION: BACK
LOCATION: BACK
LOCATION: BACK;LEG

## 2024-09-10 ASSESSMENT — PAIN - FUNCTIONAL ASSESSMENT
PAIN_FUNCTIONAL_ASSESSMENT: PREVENTS OR INTERFERES WITH MANY ACTIVE NOT PASSIVE ACTIVITIES
PAIN_FUNCTIONAL_ASSESSMENT: PREVENTS OR INTERFERES SOME ACTIVE ACTIVITIES AND ADLS

## 2024-09-10 NOTE — GROUP NOTE
Patient did not attend group.    Date: 9/10/2024    Group Start Time: 0945  Group End Time: 1030  Group Topic: Music Therapy    [facility] 3W Aurea Hicks    Challenging Negative Self-Talk  Active Music Making, Composition, Live Music Listening, Snow Analysis & Discussion, Songwriting    Patients will listen to \"Hand in My Pocket\" by Radha Burden and identify lyrics/themes/interpretations derived from the song. Patients will identify positive and negative qualities about themselves to fill in a snow substitution. Patients will be encouraged to contribute lyrics to a group songwriting and perform the new song. Patients will have the option to add an instrumental component to the song as well. Patients will discuss the experience as a group.    Focus: Building Positive Experiences, Challenging Negative Self-Talk, Self-Esteem    Goals: Improve Self-Expression, Improve Self-Esteem, Improve Mood, Increase Group Cohesion/Socialization, Develop Coping Skills, Improve Self-Awareness/Insight, Increase Attention to Task, Decrease Impulsivity, Increase Sensory Stimulation    Documentation completed by Aurea Back, MT-BC, PsychoEd Spec

## 2024-09-10 NOTE — PLAN OF CARE
LINDSEY Oneill)  Discharge to home or other facility with appropriate resources: Identify barriers to discharge with patient and caregiver

## 2024-09-10 NOTE — GROUP NOTE
Group Therapy Note    Date: 9/9/2024    Group Start Time: 2100  Group End Time: 2130  Group Topic: Wrap-Up    [facility] 3[facility] Naa Childs        Group Therapy Note    Attendees: 5/20     Notes:  Pt did not attend.     Discipline Responsible: Behavorial Health Tech      Signature:  Naa Means

## 2024-09-10 NOTE — GROUP NOTE
Date: 9/10/2024    Group Start Time: 1310  Group End Time: 1415  Group Topic: Music Therapy    [facility] 3[facility] Aurea Hicks    Active Music Making, Live Music Listening, and Music Reminiscence  Patients will be given a songbook and offered the opportunity to select (a) song(s) of their choice for live music listening on [employer]. Patients will be encouraged to sing along, move along, and listen to the music.     Focus: Building Positive Experiences, Processing    Goals: Improve Mood, Decrease Isolation, Increase Sense of Community/Socialization, Improve Self-Esteem, Improve Self-Expression, Provide Sense of Autonomy, Develop Coping Skills    Patient selected songs for live music listening. Patient sang along to familiar music, and verbalized support for peers' song choices. Patient brightened throughout group AEB socializing with peers/staff, engaging with the music, and smiling. Patient expressed appreciation for group.     Attended: 1/2-3/4 attendance  Participation Level: Active Listener and Interactive  Participation Quality: Attentive, Sharing, and Supportive  Affect/Mood: Constricted/Quiet and Reserved  Speech: spontaneous, normal rate, and normal volume   Thought Content/Processes: Linear  Level of consciousness: Alert  Response: Able to verbalize current knowledge/experience and Able to retain information  Outcomes: Progressing towards goal, Successfully internalized the purpose of intervention, and Actively participated in the group experience    Discipline Responsible: Music Therapist  Signature: ZENIA Gordon, PsychEd Spec

## 2024-09-10 NOTE — GROUP NOTE
Group Therapy Note    Date: 9/10/2024    Group Start Time: 1500  Group End Time: 1530  Group Topic: Healthy Living/Wellness    [facility] 3W Bryce Mack RN        Group Therapy Note    Attendees: 12/16       Patient's Goal:  Pt learning about the benefits of self care- handout given for future use.      Status After Intervention:  Improved    Participation Level: Interactive    Participation Quality: Attentive      Speech:  normal      Thought Process/Content: Logical      Affective Functioning: Congruent      Mood: anxious      Level of consciousness:  Oriented x4      Response to Learning: Able to verbalize current knowledge/experience      Endings: None Reported    Modes of Intervention: Education, Support, and Socialization      Discipline Responsible: Registered Nurse      Signature:  Bryce Martinez RN

## 2024-09-10 NOTE — PLAN OF CARE
resources  9/10/2024 0003 by Janna Kohler, RN  Outcome: Progressing  Flowsheets (Taken 9/9/2024 1834 by Mai Saavedra, RN)  Discharge to home or other facility with appropriate resources: Identify barriers to discharge with patient and caregiver  9/9/2024 1830 by Mai Saavedra, RN  Outcome: Progressing

## 2024-09-11 LAB
GLUCOSE BLD-MCNC: 128 MG/DL (ref 70–99)
PERFORMED ON: ABNORMAL

## 2024-09-11 PROCEDURE — 6370000000 HC RX 637 (ALT 250 FOR IP): Performed by: REGISTERED NURSE

## 2024-09-11 PROCEDURE — 1240000000 HC EMOTIONAL WELLNESS R&B

## 2024-09-11 PROCEDURE — 99232 SBSQ HOSP IP/OBS MODERATE 35: CPT | Performed by: PSYCHIATRY & NEUROLOGY

## 2024-09-11 PROCEDURE — 90833 PSYTX W PT W E/M 30 MIN: CPT | Performed by: PSYCHIATRY & NEUROLOGY

## 2024-09-11 PROCEDURE — 6370000000 HC RX 637 (ALT 250 FOR IP): Performed by: PSYCHIATRY & NEUROLOGY

## 2024-09-11 PROCEDURE — 6370000000 HC RX 637 (ALT 250 FOR IP): Performed by: INTERNAL MEDICINE

## 2024-09-11 RX ORDER — AMLODIPINE BESYLATE 10 MG/1
10 TABLET ORAL DAILY
Status: DISCONTINUED | OUTPATIENT
Start: 2024-09-12 | End: 2024-09-12 | Stop reason: HOSPADM

## 2024-09-11 RX ADMIN — Medication 1000 UNITS: at 21:30

## 2024-09-11 RX ADMIN — LISINOPRIL 20 MG: 20 TABLET ORAL at 09:44

## 2024-09-11 RX ADMIN — TIZANIDINE 4 MG: 4 TABLET ORAL at 23:41

## 2024-09-11 RX ADMIN — AMLODIPINE BESYLATE 5 MG: 5 TABLET ORAL at 09:44

## 2024-09-11 RX ADMIN — PANTOPRAZOLE SODIUM 40 MG: 40 TABLET, DELAYED RELEASE ORAL at 06:19

## 2024-09-11 RX ADMIN — ALPRAZOLAM 1 MG: 0.5 TABLET ORAL at 12:42

## 2024-09-11 RX ADMIN — HYDROCODONE BITARTRATE AND ACETAMINOPHEN 1 TABLET: 5; 325 TABLET ORAL at 09:43

## 2024-09-11 RX ADMIN — LEVOTHYROXINE SODIUM 112 MCG: 0.11 TABLET ORAL at 06:19

## 2024-09-11 RX ADMIN — Medication 1000 UNITS: at 09:44

## 2024-09-11 RX ADMIN — DULOXETINE HYDROCHLORIDE 30 MG: 30 CAPSULE, DELAYED RELEASE ORAL at 09:44

## 2024-09-11 RX ADMIN — ALPRAZOLAM 1 MG: 0.5 TABLET ORAL at 20:15

## 2024-09-11 RX ADMIN — ATORVASTATIN CALCIUM 10 MG: 10 TABLET, FILM COATED ORAL at 21:30

## 2024-09-11 RX ADMIN — BACITRACIN ZINC, NEOMYCIN, POLYMYXIN B 1 G: 400; 3.5; 5 OINTMENT TOPICAL at 21:30

## 2024-09-11 RX ADMIN — HYDROCODONE BITARTRATE AND ACETAMINOPHEN 1 TABLET: 5; 325 TABLET ORAL at 17:27

## 2024-09-11 RX ADMIN — BACITRACIN ZINC, NEOMYCIN, POLYMYXIN B 1 G: 400; 3.5; 5 OINTMENT TOPICAL at 09:44

## 2024-09-11 ASSESSMENT — PAIN DESCRIPTION - LOCATION
LOCATION: BACK

## 2024-09-11 ASSESSMENT — PAIN SCALES - GENERAL
PAINLEVEL_OUTOF10: 5
PAINLEVEL_OUTOF10: 8
PAINLEVEL_OUTOF10: 8
PAINLEVEL_OUTOF10: 10

## 2024-09-11 ASSESSMENT — PAIN DESCRIPTION - DESCRIPTORS
DESCRIPTORS: ACHING;BURNING
DESCRIPTORS: ACHING;STABBING
DESCRIPTORS: ACHING

## 2024-09-11 ASSESSMENT — PAIN DESCRIPTION - ORIENTATION
ORIENTATION: LOWER
ORIENTATION: RIGHT;LEFT;LOWER

## 2024-09-11 NOTE — GROUP NOTE
Patient did not attend group.    Date: 9/11/2024    Group Start Time: 0945  Group End Time: 1030  Group Topic: Music Therapy    [facility] 3[facility] Aurea Hicks    Emotion Identification Through Classical Music Listening  Clarifying, Education, Exploration, Music Analysis/Reminiscence, & Receptive Music Listening    Patients will learn and discuss the wheel of emotions, and differences between emotion words. Patients will then listen to a series of classical instrumental pieces and identify what emotion the composer is trying to represent. Patients will be provided with an explanation of different musical qualities (tempo, pitch, rhythm, dynamics) that may aid in describing why they chose a specific emotion. Patients will discuss the benefits of emotion identification and mindful music listening / the similarities between interpreting classical music and nonverbal cues.     Pieces used:  \"Danielito Tell Overture\" by Gadiel Portillo  \"The Planets: I. Mars, The Bringer of War\" by Robby Carbajal  \"Symphony No. 40 in G minor, K.550, I. Molto Allegro\" by Srinivas  \"In the [place]\" by Lorie Velazquez  \"Spring\" by Dion  \"Adagio for Strings, Op.11\" by Charli Guevara  \"Merry-Go-Round of Life\" from [employer] [place]  \"Daphne de Lune\" by Claude Debussy    Focus: Building Positive Experiences, Emotion Identification & Regulation, Processing,   Goals: Improve Mood, Improve Insight/Self-Awareness, Increase Socialization/Community Building, Improve Self-Expression, Improve Attention to Task, Improve Coping Skills/Develop Coping Skills     Documentation completed by Aurea Back, MT-BC, PsychoEd Spec

## 2024-09-11 NOTE — PLAN OF CARE
Problem: Self Harm/Suicidality  Goal: Will have no self-injury during hospital stay  Description: INTERVENTIONS:  1.  Ensure constant observer at bedside with Q15M safety checks  2.  Maintain a safe environment  3.  Secure patient belongings  4.  Ensure family/visitors adhere to safety recommendations  5.  Ensure safety tray has been added to patient's diet order  6.  Every shift and PRN: Re-assess suicidal risk via Frequent Screener    9/10/2024 2140 by Nicolasa Traore RN  Outcome: Progressing  Flowsheets (Taken 9/10/2024 1013 by Mai Saavedra, RN)  Will have no self-injury during hospital stay:   Maintain a safe environment   Every shift and PRN: Re-assess suicidal risk via Frequent Screener  9/10/2024 1011 by Mai Saavedra, RN  Outcome: Progressing     Problem: Risk for Elopement  Goal: Patient will not exit the unit/facility without proper excort  9/10/2024 2140 by Nicolasa Traore RN  Outcome: Progressing  Flowsheets (Taken 9/10/2024 1013 by Mai Saavedra, RN)  Nursing Interventions for Elopement Risk:   Reduce environmental triggers   Make sure patient has all necessary personal care items  9/10/2024 1011 by Mai Saavedra, RN  Outcome: Progressing     Problem: Chronic Conditions and Co-morbidities  Goal: Patient's chronic conditions and co-morbidity symptoms are monitored and maintained or improved  9/10/2024 2140 by Nicolasa Traore RN  Outcome: Progressing  Flowsheets (Taken 9/10/2024 1013 by Mai Saavedra, RN)  Care Plan - Patient's Chronic Conditions and Co-Morbidity Symptoms are Monitored and Maintained or Improved: Monitor and assess patient's chronic conditions and comorbid symptoms for stability, deterioration, or improvement  9/10/2024 1011 by Mai Saavedra, RN  Outcome: Progressing     Problem: Pain  Goal: Verbalizes/displays adequate comfort level or baseline comfort level  9/10/2024 2140 by Nicolasa Traore RN  Outcome: Progressing  9/10/2024 1011 by Mai Saavedra, RN  Outcome: Progressing

## 2024-09-11 NOTE — GROUP NOTE
Group Therapy Note    Date: 9/11/2024    Group Start Time: 1720  Group End Time: 1800  Group Topic: Recreational    [facility] 3W Naa Childs        Group Therapy Note    Attendees: 10/23     Notes:  Pt attended today's group. Pt was called out of group by staff multiple times, resulting in disconnect from today's session upon her return.    Discipline Responsible: Behavorial Health Tech      Signature:  Naa Means

## 2024-09-11 NOTE — FLOWSHEET NOTE
The patient requested Norco due to lower back pain rated #8/10. Administered per mar and request po PRN.

## 2024-09-11 NOTE — PLAN OF CARE
Problem: Self Harm/Suicidality  Goal: Will have no self-injury during hospital stay  Description: INTERVENTIONS:  1.  Ensure constant observer at bedside with Q15M safety checks  2.  Maintain a safe environment  3.  Secure patient belongings  4.  Ensure family/visitors adhere to safety recommendations  5.  Ensure safety tray has been added to patient's diet order  6.  Every shift and PRN: Re-assess suicidal risk via Frequent Screener    Outcome: Progressing  Flowsheets (Taken 9/11/2024 1732)  Will have no self-injury during hospital stay:   Secure patient belongings   Maintain a safe environment     Problem: Risk for Elopement  Goal: Patient will not exit the unit/facility without proper excort  Outcome: Progressing  Flowsheets (Taken 9/11/2024 1732)  Nursing Interventions for Elopement Risk:   Communicate/escalate to nursing supervisor the risk of elopement   Reduce environmental triggers   Make sure patient has all necessary personal care items     Problem: Chronic Conditions and Co-morbidities  Goal: Patient's chronic conditions and co-morbidity symptoms are monitored and maintained or improved  Outcome: Progressing     Problem: Pain  Goal: Verbalizes/displays adequate comfort level or baseline comfort level  Outcome: Progressing     Problem: Discharge Planning  Goal: Discharge to home or other facility with appropriate resources  Outcome: Progressing

## 2024-09-12 VITALS
BODY MASS INDEX: 38.83 KG/M2 | RESPIRATION RATE: 18 BRPM | SYSTOLIC BLOOD PRESSURE: 134 MMHG | HEART RATE: 68 BPM | TEMPERATURE: 98.4 F | OXYGEN SATURATION: 95 % | DIASTOLIC BLOOD PRESSURE: 96 MMHG | HEIGHT: 62 IN | WEIGHT: 211 LBS

## 2024-09-12 DIAGNOSIS — F41.1 GENERALIZED ANXIETY DISORDER: ICD-10-CM

## 2024-09-12 LAB
GLUCOSE BLD-MCNC: 124 MG/DL (ref 70–99)
PERFORMED ON: ABNORMAL

## 2024-09-12 PROCEDURE — 6370000000 HC RX 637 (ALT 250 FOR IP): Performed by: INTERNAL MEDICINE

## 2024-09-12 PROCEDURE — 99239 HOSP IP/OBS DSCHRG MGMT >30: CPT | Performed by: PSYCHIATRY & NEUROLOGY

## 2024-09-12 PROCEDURE — 6370000000 HC RX 637 (ALT 250 FOR IP): Performed by: PSYCHIATRY & NEUROLOGY

## 2024-09-12 PROCEDURE — 6370000000 HC RX 637 (ALT 250 FOR IP): Performed by: REGISTERED NURSE

## 2024-09-12 RX ORDER — DULOXETIN HYDROCHLORIDE 30 MG/1
30 CAPSULE, DELAYED RELEASE ORAL DAILY
Qty: 30 CAPSULE | Refills: 1 | Status: SHIPPED | OUTPATIENT
Start: 2024-09-13

## 2024-09-12 RX ORDER — ALPRAZOLAM 1 MG/1
1 TABLET ORAL 3 TIMES DAILY PRN
Qty: 45 TABLET | Refills: 1 | Status: SHIPPED | OUTPATIENT
Start: 2024-09-12 | End: 2024-12-11

## 2024-09-12 RX ORDER — SIMVASTATIN 20 MG
20 TABLET ORAL NIGHTLY
Qty: 30 TABLET | Refills: 1 | Status: SHIPPED | OUTPATIENT
Start: 2024-09-12

## 2024-09-12 RX ADMIN — LEVOTHYROXINE SODIUM 112 MCG: 0.11 TABLET ORAL at 06:14

## 2024-09-12 RX ADMIN — DULOXETINE HYDROCHLORIDE 30 MG: 30 CAPSULE, DELAYED RELEASE ORAL at 08:47

## 2024-09-12 RX ADMIN — ALPRAZOLAM 1 MG: 0.5 TABLET ORAL at 10:54

## 2024-09-12 RX ADMIN — LISINOPRIL 20 MG: 20 TABLET ORAL at 08:43

## 2024-09-12 RX ADMIN — AMLODIPINE BESYLATE 10 MG: 10 TABLET ORAL at 08:42

## 2024-09-12 RX ADMIN — HYDROCODONE BITARTRATE AND ACETAMINOPHEN 1 TABLET: 5; 325 TABLET ORAL at 08:43

## 2024-09-12 RX ADMIN — PANTOPRAZOLE SODIUM 40 MG: 40 TABLET, DELAYED RELEASE ORAL at 06:14

## 2024-09-12 RX ADMIN — BACITRACIN ZINC, NEOMYCIN, POLYMYXIN B 1 G: 400; 3.5; 5 OINTMENT TOPICAL at 08:42

## 2024-09-12 RX ADMIN — Medication 1000 UNITS: at 08:43

## 2024-09-12 ASSESSMENT — PAIN SCALES - GENERAL: PAINLEVEL_OUTOF10: 8

## 2024-09-12 ASSESSMENT — PAIN DESCRIPTION - LOCATION: LOCATION: BACK

## 2024-09-12 NOTE — TRANSITION OF CARE
MG tablet  Commonly known as: ZOCOR  Take 1 tablet by mouth nightly     tiZANidine 4 MG tablet  Commonly known as: ZANAFLEX            STOP taking these medications      ARIPiprazole 2 MG tablet  Commonly known as: ABILIFY               Where to Get Your Medications        These medications were sent to [facility] - [place], [place] - [address] - P [phone] - F [phone]  [address] [place] [address]      Phone: [phone]   DULoxetine 30 MG extended release capsule  simvastatin 20 MG tablet         Unresulted Labs (24h ago, onward)      None            To obtain results of studies pending at discharge, please contact [facility] My Chart.    Follow-up Information       Follow up With Specialties Details Why Contact Info    all[facility] behavioral health services  Follow up on 9/18/2024 @12noon with Varinder Morse for counseling-- in office [address], [place]     fax     [facility]  Follow up on 9/16/2024 @10am for intake assessment and then be scheduled with providers/if ytou do not make this timet there is same day access Monday throughThursday from 8am to 2pm and Friday from  8am to 1pm [address] [id number] [phone]             Advanced Directive:   Does the patient have an appointed surrogate decision maker? No  Does the patient have a Medical Advance Directive? No  Does the patient have a Psychiatric Advance Directive? No  If the patient does not have a surrogate or Medical Advance Directive AND Psychiatric Advance Directive, the patient was offered information on these advance directives Patient declined to complete    Patient Instructions: Please continue all medications until otherwise directed by physician.      Tobacco Cessation Discharge Plan:   Is the patient a tobacco user  and needs referral for tobacco cessation? No  Patient referred to the following for tobacco cessation with an appointment?

## 2024-09-12 NOTE — DISCHARGE INSTRUCTIONS
Someone from [facility] will be calling you tomorrow to follow up on your care. If you don't hear from us, give us a call! [phone].    Keep all follow up appointments, take medications as ordered, utilize positive supports, abstain from use of alcohol and drugs. If symptoms return or you feel at risk to yourself or others, please call 911, return the nearest emergency room, or call your local crisis hotline:  [facility]: [phone]  [facility]: [phone]  [facility]: [phone]    Due to the Covid-19 Pandemic, [facility] Smoking Cessation Group is not currently available. For assistance with quitting smoking please go to https://smokefree.gov. A prescription for an FDA-approved tobacco cessation medication was offered at discharge and the patient refused.    You were offered a flu vaccine but declined

## 2024-09-12 NOTE — GROUP NOTE
Group Therapy Note    Date: 9/11/2024    Group Start Time: 2000  Group End Time: 2035  Group Topic: Wrap-Up    [facility] 3W Naa Childs        Group Therapy Note    Attendees: 12/23    Notes:  Pt did not attend.     Discipline Responsible: Behavorial Health Tech      Signature:  Naa Means

## 2024-09-12 NOTE — GROUP NOTE
Date: 9/12/2024    Group Start Time: 0945  Group End Time: 1055  Group Topic: Music Therapy    [facility] 3W Aurea Hicks    Motivation Playlist  Music Interventions: Music Reminiscence, Receptive Music Listening, and Playlist Building    Patients will discuss what motivation means to them, and express what things in their lives motivate them. Patients will be invited to select a song that aligns with something they need today. Patients will explain their connection to the song and the experience of listening to it in this setting.     Focus: Building Positive Experiences, Motivation, Processing    Goals: Improve Mood, Improve Insight/Self-Awareness, Increase Socialization/Community Building, Improve Self-Expression, Improve Attention to Task, Improve Coping Skills/Develop Coping Skills, Improve Emotion Regulation Skills    Patient listened to peer discussions about what motivation means to them. Patient expressed that her grandchildren and dogs motivate her to keep going. Patient shared a motivational song with the group (\"I Hope You Dance\" by InaMaren Yates) and stated that she used to play it for her daughters to inspire them. Patient was social with a select peer ([phone]) and pleasant throughout group. Patient sang and moved to the beat of familiar music.     Attended: 3/4-full attendance  Participation Level: Active Listener and Interactive  Participation Quality: Attentive, Sharing, and Supportive  Affect/Mood: Congruent/Euthymic  Speech: spontaneous, normal rate, and normal volume   Thought Content/Processes: Linear  Level of consciousness: Alert  Response: Able to verbalize current knowledge/experience  Outcomes: Successfully internalized the purpose of intervention, Actively participated in the group experience, and Anticipated discharge today    Discipline Responsible: Music Therapist  Signature: Aurea Back, ZENIA, PsychEd Spec

## 2024-09-12 NOTE — PLAN OF CARE
Problem: Self Harm/Suicidality  Goal: Will have no self-injury during hospital stay  Description: INTERVENTIONS:  1.  Ensure constant observer at bedside with Q15M safety checks  2.  Maintain a safe environment  3.  Secure patient belongings  4.  Ensure family/visitors adhere to safety recommendations  5.  Ensure safety tray has been added to patient's diet order  6.  Every shift and PRN: Re-assess suicidal risk via Frequent Screener    9/11/2024 2029 by Nicolasa Traore RN  Outcome: Progressing  9/11/2024 1739 by Shabbir Stone RN  Outcome: Progressing  Flowsheets (Taken 9/11/2024 1732)  Will have no self-injury during hospital stay:   Secure patient belongings   Maintain a safe environment     Problem: Risk for Elopement  Goal: Patient will not exit the unit/facility without proper excort  9/11/2024 2029 by Nicolasa Traore RN  Outcome: Progressing  9/11/2024 1739 by Shabbir Stone RN  Outcome: Progressing  Flowsheets (Taken 9/11/2024 1732)  Nursing Interventions for Elopement Risk:   Communicate/escalate to nursing supervisor the risk of elopement   Reduce environmental triggers   Make sure patient has all necessary personal care items     Problem: Chronic Conditions and Co-morbidities  Goal: Patient's chronic conditions and co-morbidity symptoms are monitored and maintained or improved  9/11/2024 2029 by Nicolasa Traore RN  Outcome: Progressing  9/11/2024 1739 by Shabbir Stone RN  Outcome: Progressing     Problem: Pain  Goal: Verbalizes/displays adequate comfort level or baseline comfort level  9/11/2024 2029 by Nicolasa Traore RN  Outcome: Progressing  9/11/2024 1739 by Shabbir Stone RN  Outcome: Progressing     Problem: Discharge Planning  Goal: Discharge to home or other facility with appropriate resources  9/11/2024 2029 by Nicolasa Traore RN  Outcome: Progressing  9/11/2024 1739 by Shabbir Stone RN  Outcome: Progressing   Pt oou,or on phone. Pt denies active SI,thou admits to passive death

## 2024-09-12 NOTE — TELEPHONE ENCOUNTER
DR HU PT    PT PHONED OFFICE AND STATED SHE IS BEING DISCHARGED FROM THE HOSPITAL TODAY AND NEEDS HER XANAX REFILLED.      [facility] [address]

## 2024-09-12 NOTE — DISCHARGE SUMMARY
Patient's family member was contacted prior to the discharge.         Medication List        CONTINUE taking these medications      acetaminophen-codeine 300-30 MG per tablet  Commonly known as: TYLENOL #3     albuterol sulfate  (90 Base) MCG/ACT inhaler  Commonly known as: PROVENTIL;VENTOLIN;PROAIR     ALPRAZolam 1 MG tablet  Commonly known as: XANAX     amLODIPine 10 MG tablet  Commonly known as: NORVASC     cetirizine 10 MG tablet  Commonly known as: ZYRTEC     D3-1000 25 MCG (1000 UT) Caps  Generic drug: vitamin D     DULoxetine 30 MG extended release capsule  Commonly known as: CYMBALTA  Take 1 capsule by mouth daily  Start taking on: September 13, 2024     gabapentin 300 MG capsule  Commonly known as: NEURONTIN     hydrOXYzine pamoate 50 MG capsule  Commonly known as: VISTARIL     levothyroxine 112 MCG tablet  Commonly known as: SYNTHROID  TAKE 1 TABLET BY MOUTH EVERY DAY     lisinopril 30 MG tablet  Commonly known as: PRINIVIL;ZESTRIL     omeprazole 20 MG delayed release capsule  Commonly known as: PRILOSEC     Premarin 0.625 MG/GM Crea vaginal cream  Generic drug: estrogens conjugated     simvastatin 20 MG tablet  Commonly known as: ZOCOR  Take 1 tablet by mouth nightly     tiZANidine 4 MG tablet  Commonly known as: ZANAFLEX            STOP taking these medications      ARIPiprazole 2 MG tablet  Commonly known as: ABILIFY               Where to Get Your Medications        These medications were sent to [facility] - [place], [place] - [address] - P [phone] - F [phone]  [address] [place] [address] [place] [address]      Phone: [phone]   DULoxetine 30 MG extended release capsule  simvastatin 20 MG tablet           Reason for more than one antipsychotic:   [x] N/A  [] 3 failed monotherapy(drugs tried):  [] Cross over to a new antipsychotic  [] Taper to monotherapy from polypharmacy  [] Augmentation of Clozapine therapy due to treatment resistance to single therapy        TIME

## 2024-09-12 NOTE — DISCHARGE INSTR - DIET
Good nutrition is important when healing from an illness, injury, or surgery.  Follow any nutrition recommendations given to you during your hospital stay.   If you were given an oral nutrition supplement while in the hospital, continue to take this supplement at home.  You can take it with meals, in-between meals, and/or before bedtime. These supplements can be purchased at most local grocery stores, pharmacies, and chain [employer]-stores.   If you have any questions about your diet or nutrition, call the hospital and ask for the dietitian.  Resume as tolerated

## 2024-09-12 NOTE — CARE COORDINATION
FAMILY COLLATERAL NOTE    Family/Support Name: Edith Lopez  Contact #: [phone]  Relationship to Pt:: Boyfriend      Family/Support contact aware of hospitalization: Yes    Presenting Symptoms/Current Concerns:  -Very Depressed quickly  -Can not handle change  -Argumentative  -Patients emotions change from happy to sad to angry fast.    Top 3 Life Stressors:   -Son is in a different and has a felony rape case and pending divorce and wants to take a plea deal and patient is worried about him.  -Cat recently .    Background History Relevant to Current Hospitalization:  Hx of inpatient admissions 3W    Family Mental Health/Substance Use History:   None that he knows of    Discharge Plan:   Return home with boyfriend    Support Network Supportive of Discharge Plan:   Yes    Support can confirm Safety of Location and Security of Weapons:   No weapons    Support agreeable to Safeguard and Monitor Medications (including Prescription and OTC):   Yes    Recommendations for Support Network:   Available for follow up call      KAILEY Childress

## 2024-09-12 NOTE — PROGRESS NOTES
[facility]  BEHAVIORAL HEALTH FOLLOW-UP NOTE     9/11/2024     Patient was seen and examined in person, Chart reviewed   Patient's case discussed with staff/team    Chief Complaint: SI derepression    Interim History:     Patient report feeling less depressed  Less intense hopeless or worthless feeling  Patient wants to talk to her son on discharge from the hospital  Slept better last night  Has been interacting with peers and attending groups and other milieu activities    Appetite:   [x] Normal/Unchanged  [] Increased  [] Decreased      Sleep:       [x] Normal/Unchanged  [] Fair       [] Poor              Energy:    [x] Normal/Unchanged  [] Increased  [] Decreased        SI [] Present  [x] Absent    HI  []Present  [x] Absent     Aggression:  [] yes  [x] no    Patient is [x] able  [] unable to CONTRACT FOR SAFETY     PAST MEDICAL/PSYCHIATRIC HISTORY:   Past Medical History:   Diagnosis Date    Anemia     C. difficile colitis 01/2013    Chronic fatigue syndrome     Depression     [place]    Diabetes mellitus (HCC)     Diverticulitis large intestine 2001    Fibromyalgia     Fibromyalgia 03/24/2015    GERD (gastroesophageal reflux disease)     HTN (hypertension)     Hyperlipidemia     Hypothyroidism     Palpitations     Pulmonary embolus (HCC) 10/2012    Following GYN procedure    Vitamin D deficiency        FAMILY/SOCIAL HISTORY:  Family History   Problem Relation Age of Onset    COPD Mother     Lung Cancer Father     COPD Maternal Grandmother     Cancer Maternal Grandfather         COLON    Colon Cancer Paternal Grandfather     Cancer Paternal Aunt         LUNG    Cancer Other         BLOOD CANCER- UNSPECIFIED    Coronary Art Dis Maternal Aunt     Other Maternal Aunt         Brain aneurysm     Social History     Socioeconomic History    Marital status:      Spouse name: Not on file    Number of children: Not on file    Years of education: Not on file    Highest education level: Not 
                  [facility] FOLLOW-UP NOTE     9/10/2024     Patient was seen and examined in person, Chart reviewed   Patient's case discussed with staff/team    Chief Complaint: SI derepression    Interim History:     Patient is still feeling depressed  Ruminating about the legal situation of her son  Patient is going to reach out to him but she does not have his phone number  Slept better last night  Trying to come out of her room more often and interact with people    Appetite:   [x] Normal/Unchanged  [] Increased  [] Decreased      Sleep:       [x] Normal/Unchanged  [] Fair       [] Poor              Energy:    [x] Normal/Unchanged  [] Increased  [] Decreased        SI [] Present  [x] Absent    HI  []Present  [x] Absent     Aggression:  [] yes  [x] no    Patient is [x] able  [] unable to CONTRACT FOR SAFETY     PAST MEDICAL/PSYCHIATRIC HISTORY:   Past Medical History:   Diagnosis Date    Anemia     C. difficile colitis 01/2013    Chronic fatigue syndrome     Depression     [place]    Diabetes mellitus (HCC)     Diverticulitis large intestine 2001    Fibromyalgia     Fibromyalgia 03/24/2015    GERD (gastroesophageal reflux disease)     HTN (hypertension)     Hyperlipidemia     Hypothyroidism     Palpitations     Pulmonary embolus (HCC) 10/2012    Following GYN procedure    Vitamin D deficiency        FAMILY/SOCIAL HISTORY:  Family History   Problem Relation Age of Onset    COPD Mother     Lung Cancer Father     COPD Maternal Grandmother     Cancer Maternal Grandfather         COLON    Colon Cancer Paternal Grandfather     Cancer Paternal Aunt         LUNG    Cancer Other         BLOOD CANCER- UNSPECIFIED    Coronary Art Dis Maternal Aunt     Other Maternal Aunt         Brain aneurysm     Social History     Socioeconomic History    Marital status:      Spouse name: Not on file    Number of children: Not on file    Years of education: Not on file    Highest education level: Not 
                  [facility] FOLLOW-UP NOTE   THE PATIENT WAS SEEN THROUGH TELEPSYCHIATRY, IN A REAL-TIME, AUDIO-VIDEO ENCOUNTER, WITH THE PATIENT IN [place], [place] AND THE PROVIDER IN [place], [place]      9/8/2024     Patient was seen and examined in person, Chart reviewed   Patient's case discussed with staff/team    Chief Complaint: SI derepression    Interim History:     Patient is still feeling depressed, lethargic and tired  Ruminating about the legal issues of her son  Planning to talk to the  before she visited him on the 23rd  Pt sleep is disturbed- less intense hopeless  Passive SI  Anxious ++    Appetite:   [x] Normal/Unchanged  [] Increased  [] Decreased      Sleep:       [x] Normal/Unchanged  [] Fair       [] Poor              Energy:    [x] Normal/Unchanged  [] Increased  [] Decreased        SI [] Present  [x] Absent    HI  []Present  [x] Absent     Aggression:  [] yes  [x] no    Patient is [x] able  [] unable to CONTRACT FOR SAFETY     PAST MEDICAL/PSYCHIATRIC HISTORY:   Past Medical History:   Diagnosis Date    Anemia     C. difficile colitis 01/2013    Chronic fatigue syndrome     Depression     [place]    Diabetes mellitus (HCC)     Diverticulitis large intestine 2001    Fibromyalgia     Fibromyalgia 03/24/2015    GERD (gastroesophageal reflux disease)     HTN (hypertension)     Hyperlipidemia     Hypothyroidism     Palpitations     Pulmonary embolus (HCC) 10/2012    Following GYN procedure    Vitamin D deficiency        FAMILY/SOCIAL HISTORY:  Family History   Problem Relation Age of Onset    COPD Mother     Lung Cancer Father     COPD Maternal Grandmother     Cancer Maternal Grandfather         COLON    Colon Cancer Paternal Grandfather     Cancer Paternal Aunt         LUNG    Cancer Other         BLOOD CANCER- UNSPECIFIED    Coronary Art Dis Maternal Aunt     Other Maternal Aunt         Brain aneurysm     Social History     Socioeconomic History    Marital status: 
      Behavioral Services  Medicare Certification Upon Admission    I certify that this patient's inpatient psychiatric hospital admission is medically necessary for:    [x] (1) Treatment which could reasonably be expected to improve this patient's condition,       [x] (2) Or for diagnostic study;     AND     [x](2) The inpatient psychiatric services are provided while the individual is under the care of a physician and are included in the individualized plan of care.    Estimated length of stay/service 3-5    Plan for post-hospital care Op care    Electronically signed by HALLIE MUNIZ MD on 9/7/2024 at 9:03 AM      
     Nutrition Note    Pt reported poor po intake pta with wt loss pta. Per review of EMR and stated admission wt, no wt loss present. Noted appetite to be fair to good during admission. Anticipate continued improvement in appetite with inpatient behavioral health treatment. RDN available if future needs arise     Electronically signed by Priscilla Acosta RD, LD on 9/9/24 at 3:44 PM EDT        
Assumed care of pt at 2330.   
Explained and gave all am meds gave norco 5 325 as requested  
Explained and gave am meds, gave norco as ordered per request, pt reports depression #5, anxiety #10, pt reports waking up x2 last night and is able to go back to sleep, pt reports her children cause her much worry, pt is eating and has good adls.  
Found pt resting in bed eyes closed, awaken to give Zanaflex 4mg as requested   
Found pt to be sleeping awaken for am meds, pt refuses zyrtec and Neurontin 300, requested norco for pain ,pt asked if she has to ask for all her prns, pt was informed yes they are as needed.  
Gave 2-0.5 tablets xanax for generalized anxiety  
Gave norco as requested for lower back pain , pt stated depression #7, anxiety always 8-9, pt denied voices denied suicidal thoughts, expressed great trauma stress over her one son, dealing with legal things, and being so far away making it hard for pt to talk with her son in [place]. Pt expressed loving her kids so much she felt out of control upon coming to the hospital , mad enough to hurt someone, but denies now, pt reports falling asleep a lot at home , sometimes with food in her mouth , and using alcohol, pt expressed she has to be careful that the Zanaflex puts her out, that she usually uses at bedtime with 1/2 tylenol 3 tablet. That many times she falls asleep with her chin on her chest and it wakes her up gasping for air.   
Gave xanax 2-0.5 mg tablets for c/o anxiety #8 as requested.  
Norco given for c/o shoulder pain and back pain.  
On a scale 1 through 10, with #10 being the highest, the patient rates her anxiety a #10/10 and her depression a #8/10. The patient denies SI/HI/AVH and contracts for safety on the unit. The patient reports fair sleep, fair appetite, and taking a shower today. The patient is seen out on the unit with sad/flat affect social with select peers. The patient is cooperative and calm during assessment.   
On a scale 1 through 10, with #10 being the highest, the patient rates her anxiety a #7/10 and rates her depression a #6/10. The patient denies SI/HI/AVH and contracts for safety. The patient reports appetite in normal range, sleep in normal range, and taking a shower yesterday. The patient is seen out on the unit social with select peers and goes to groups.   
Patient given Xanax 1 mg PO for anxiety she rates a 9 out of 10.   
Patient up to the nursing station requesting her Zanaflex for lower back pain and spasms she rates a 9 out of 10. Patient given Zanaflex 4 mg PO and she returned to her room.  
Prn meds appeared to be helpful , pt asked to take a shower and put on makeup, pt is less sleepy looking today as prns were not given at the same time,   
Progress Note    Date:9/11/2024       Room:[facility]/W371-01  Patient Name:Lizbet Dial     YOB: 1965     Age:59 y.o.    Assessment        Hospital Problems             Last Modified POA    * (Principal) Major depression, recurrent (HCC) 9/6/2024 Yes       Plan:      Major depressive disorder, recurrent  -Per psychiatry's recommendations  -Provide emotional support  -Encourage therapy participation      LASHONDA  -No CPAP use; denies shortness of breath; on albuterol as needed     Palpitations  -EKG sinus rhythm on 11/19/2023; denies chest pain or shortness of breath   -HR 64     Hypertension, HLD   -/97  -on amlodipine 5 mg, lisinopril 20 mg, and low intensity statin; increase amlodipine to 10 mg daily     Anemia  -Hg 12.4; denies headache, dizziness weakness or fatigue and pallor  -Monitor CBC     Fibromyalgia  -On gabapentin with as needed Tylenol     Thyroid disease  -Levothyroxine 112 mcg  -TSH 4.930     GERD  -On Protonix     Left foot abrasion  -Tetanus booster  -Wound care; bacitracin twice daily  Additional work up or/and treatment plan may be added today or then after based on clinical progression by other providers or specialists.  Patient will need to follow-up with PCP for chronic disease management.     I have spent greater than 70% of time  spent focused exclusively on this patient ,reviewing  chart, labs/diagnostics, reconciling medications, &  answering questions with patient and discussing plan.    Subjective   Interval History Status: Patient denies chest pain, palpitations, headache, dizziness, shortness of breath, cough, fever, chills, N/V/D, and changes in appetite.  Blood pressure remains elevated; increase amlodipine to 10 mg daily.  Patient agreeable  .         Review of Systems   12 point review of systems reviewed with patient; negative other than as mentioned    Medications   Scheduled Meds:    [START ON 9/12/2024] amLODIPine  10 mg Oral Daily    lisinopril  20 mg Oral Daily 
Pt awakened easily to call of her name, explained and gave am meds, gave 0700 Protonix and synthroid as was not removed from omni and not signed off, pt is poor historian.   
Pt given xanax per her request for anxiety of10/10 waiting for effects at this time.  
Pt is eating lunch in the dinning room, pt stated talking with doctor helped her feel better and she has a plan of how to get her concerns told to him, gave xanax 2-0.5 tablets for #100 anxiety,  
Pt is noted resting in bed awakens to call of her name, Bp obtained, explained and gave most am meds, pt refused Neurontin 300 ,stated she is not on that one, pt also refused zyrtec, takes as prn per pt, offered and gave tylenol as pt asked for norco and was told she had this am and its every 6 hours,   
Pt is sleeping , resp even unlabored eyes closed  
Pt oou social with male peer until 0055,then retired to room.  
Pt resting in bed answers to call of her name, Gave Zanaflex 4 mg  as ordered  
Pt was noted in afternoon music group as encouraged, appeared calm and focussed from xanax that was given 1203  
Received prn xanax for anxiety rated 10/10, per request. Currently eating snack in the dayroom.   
Xanax given at 1104 for #10 anxiety  
1,000 Units Oral BID    DULoxetine  30 mg Oral Daily    gabapentin  300 mg Oral TID    levothyroxine  112 mcg Oral Daily    pantoprazole  40 mg Oral QAM AC    atorvastatin  10 mg Oral Nightly    estradiol  1 g Vaginal Once per day on     neomycin-bacitracin-polymyxin   Topical BID     Continuous Infusions:   PRN Meds: HYDROcodone 5 mg - acetaminophen, albuterol sulfate HFA, ALPRAZolam, tiZANidine, hydrOXYzine pamoate **OR** hydrOXYzine, acetaminophen, magnesium hydroxide, aluminum & magnesium hydroxide-simethicone, haloperidol **OR** haloperidol lactate, benztropine mesylate, traZODone    Past History    Past Medical History:   has a past medical history of Anemia, C. difficile colitis, Chronic fatigue syndrome, Depression, Diabetes mellitus (HCC), Diverticulitis large intestine, Fibromyalgia, Fibromyalgia, GERD (gastroesophageal reflux disease), HTN (hypertension), Hyperlipidemia, Hypothyroidism, Palpitations, Pulmonary embolus (HCC), and Vitamin D deficiency.    Social History:   reports that she has never smoked. She has never been exposed to tobacco smoke. She has never used smokeless tobacco. She reports that she does not currently use alcohol. She reports that she does not use drugs.     Family History:   Family History   Problem Relation Age of Onset    COPD Mother     Lung Cancer Father     COPD Maternal Grandmother     Cancer Maternal Grandfather         COLON    Colon Cancer Paternal Grandfather     Cancer Paternal Aunt         LUNG    Cancer Other         BLOOD CANCER- UNSPECIFIED    Coronary Art Dis Maternal Aunt     Other Maternal Aunt         Brain aneurysm       Physical Examination      Vitals:  BP (!) 134/96   Pulse 68   Temp 98.4 °F (36.9 °C)   Resp 18   Ht 1.575 m (5' 2\")   Wt 95.7 kg (211 lb)   LMP 2013   SpO2 95%   BMI 38.59 kg/m²   Temp (24hrs), Av.3 °F (36.8 °C), Min:98.2 °F (36.8 °C), Max:98.4 °F (36.9 °C)      I/O (24Hr):  No intake or output data in 
 albuterol sulfate HFA (PROVENTIL;VENTOLIN;PROAIR) 108 (90 Base) MCG/ACT inhaler 2 puff, 2 puff, Inhalation, Q6H PRN, Hardy Mejias MD    ALPRAZolam (XANAX) tablet 1 mg, 1 mg, Oral, TID PRN, Hardy Mejias MD, 1 mg at 09/09/24 0627    amLODIPine (NORVASC) tablet 5 mg, 5 mg, Oral, Daily, Hardy Mejias MD, 5 mg at 09/09/24 1035    cetirizine (ZYRTEC) tablet 10 mg, 10 mg, Oral, Daily, Hardy Mejias MD, 10 mg at 09/06/24 1607    Vitamin D (CHOLECALCIFEROL) tablet 1,000 Units, 1,000 Units, Oral, BID, Hardy Mejias MD, 1,000 Units at 09/09/24 1034    DULoxetine (CYMBALTA) extended release capsule 30 mg, 30 mg, Oral, Daily, Hardy Mejias MD, 30 mg at 09/09/24 1034    gabapentin (NEURONTIN) capsule 300 mg, 300 mg, Oral, TID, Hardy Mejias MD    levothyroxine (SYNTHROID) tablet 112 mcg, 112 mcg, Oral, Daily, Hardy Mejias MD, 112 mcg at 09/09/24 0609    pantoprazole (PROTONIX) tablet 40 mg, 40 mg, Oral, QAM AC, Hardy Mejias MD, 40 mg at 09/09/24 0609    atorvastatin (LIPITOR) tablet 10 mg, 10 mg, Oral, Nightly, Hardy Mejias MD, 10 mg at 09/08/24 2129    tiZANidine (ZANAFLEX) tablet 4 mg, 4 mg, Oral, Q6H PRN, Hardy Mejias MD, 4 mg at 09/08/24 2129    estradiol (ESTRACE) vaginal cream 1 g, 1 g, Vaginal, Once per day on Monday Wednesday Friday, Hardy Mejias MD    hydrOXYzine pamoate (VISTARIL) capsule 50 mg, 50 mg, Oral, Q6H PRN, 50 mg at 09/07/24 1841 **OR** hydrOXYzine (VISTARIL) injection 50 mg, 50 mg, IntraMUSCular, Q6H PRN, Hardy Mejias MD    acetaminophen (TYLENOL) tablet 650 mg, 650 mg, Oral, Q4H PRN, Hardy Mejias MD, 650 mg at 09/07/24 0849    magnesium hydroxide (MILK OF MAGNESIA) 400 MG/5ML suspension 30 mL, 30 mL, Oral, Daily PRN, Hardy Mejias MD    aluminum & magnesium hydroxide-simethicone (MAALOX) 200-200-20 MG/5ML suspension 30 mL, 30 mL, Oral, PRN, Hardy Mejias MD    haloperidol (HALDOL) tablet 5 mg, 5 mg, Oral, Q6H PRN **OR** 
kg/m²   Temp (24hrs), Av.1 °F (36.7 °C), Min:98.1 °F (36.7 °C), Max:98.1 °F (36.7 °C)      I/O (24Hr):  No intake or output data in the 24 hours ending 24 1635      CONSTITUTIONAL:  Awake, alert, no apparent distress, and appears stated age  EYES: pupils equal, round and reactive to light   NECK:  Supple   LUNGS:  No increased work of breathing, good air exchange, clear to auscultation bilaterally, no crackles or wheezing  CARDIOVASCULAR:  Normal apical impulse, regular rate and rhythm  ABDOMEN:  No scars, normal bowel sounds, soft, non-distended, non-tender  MUSCULOSKELETAL:  There is no redness, warmth, or swelling of the joints.  Full range of motion noted  NEUROLOGIC:  Awake, alert.  No focal deficits noted  SKIN:  No bruising or bleeding, normal skin color, texture, turgor, no redness, warmth, or swelling, no rashes, and no lesions    Labs/Imaging/Diagnostics   Labs:  CBC:No results for input(s): \"WBC\", \"RBC\", \"HGB\", \"HCT\", \"MCV\", \"RDW\", \"PLT\" in the last 72 hours.  CHEMISTRIES:No results for input(s): \"NA\", \"K\", \"CL\", \"CO2\", \"BUN\", \"CREATININE\", \"GLUCOSE\", \"PHOS\", \"MG\" in the last 72 hours.    Invalid input(s): \"CA\"  PT/INR:No results for input(s): \"PROTIME\", \"INR\" in the last 72 hours.  APTT:No results for input(s): \"APTT\" in the last 72 hours.  LIVER PROFILE:No results for input(s): \"AST\", \"ALT\", \"BILIDIR\", \"BILITOT\", \"ALKPHOS\" in the last 72 hours.    Imaging Last 24 Hours:  No results found.    Electronically signed by TYRA Hayes CNP on 24 at 4:35 PM EDT

## 2024-09-17 DIAGNOSIS — M54.16 RADICULOPATHY, LUMBAR REGION: ICD-10-CM

## 2024-09-17 DIAGNOSIS — M54.12 RADICULOPATHY, CERVICAL REGION: ICD-10-CM

## 2024-09-17 RX ORDER — TIZANIDINE 4 MG/1
4 TABLET ORAL EVERY 6 HOURS PRN
Qty: 30 TABLET | Refills: 1 | Status: SHIPPED | OUTPATIENT
Start: 2024-09-17

## 2024-09-26 ENCOUNTER — TELEPHONE (OUTPATIENT)
Dept: PRIMARY CARE | Facility: CLINIC | Age: 59
End: 2024-09-26

## 2024-09-26 DIAGNOSIS — M54.16 RADICULOPATHY, LUMBAR REGION: ICD-10-CM

## 2024-10-02 DIAGNOSIS — I10 HTN (HYPERTENSION), BENIGN: ICD-10-CM

## 2024-10-02 DIAGNOSIS — E78.5 HYPERLIPIDEMIA, UNSPECIFIED: ICD-10-CM

## 2024-10-02 DIAGNOSIS — J30.9 ALLERGIC RHINITIS: ICD-10-CM

## 2024-10-02 RX ORDER — LISINOPRIL 30 MG/1
30 TABLET ORAL DAILY
Qty: 90 TABLET | Refills: 0 | Status: SHIPPED | OUTPATIENT
Start: 2024-10-02

## 2024-10-02 RX ORDER — ACETAMINOPHEN AND CODEINE PHOSPHATE 300; 30 MG/1; MG/1
1 TABLET ORAL EVERY 4 HOURS PRN
Qty: 28 TABLET | Refills: 0 | OUTPATIENT
Start: 2024-10-02

## 2024-10-02 RX ORDER — CETIRIZINE HYDROCHLORIDE 10 MG/1
10 TABLET ORAL DAILY
Qty: 90 TABLET | Refills: 1 | Status: SHIPPED | OUTPATIENT
Start: 2024-10-02

## 2024-10-02 RX ORDER — HYDROCHLOROTHIAZIDE 25 MG/1
25 TABLET ORAL DAILY
Qty: 90 TABLET | Refills: 1 | Status: SHIPPED | OUTPATIENT
Start: 2024-10-02

## 2024-10-03 ENCOUNTER — PATIENT OUTREACH (OUTPATIENT)
Dept: CARE COORDINATION | Facility: CLINIC | Age: 59
End: 2024-10-03
Payer: COMMERCIAL

## 2024-10-03 NOTE — PROGRESS NOTES
Discharge Facility: [facility]   Discharge Diagnosis: BIPOLAR    Admission Date: 9/27/24  Discharge Date: 10/2/24    PCP Appointment Date: Sergo Alexander DO 10/7/24  Specialist Appointment Date: D  Hospital Encounter and Summary Linked: Yes    Two attempts were made to reach patient within two business days after discharge. Voicemail left with contact information for patient to call back with any non-emergent questions or concerns.

## 2024-10-07 ENCOUNTER — APPOINTMENT (OUTPATIENT)
Dept: PRIMARY CARE | Facility: CLINIC | Age: 59
End: 2024-10-07
Payer: COMMERCIAL

## 2024-10-07 VITALS
TEMPERATURE: 97.7 F | DIASTOLIC BLOOD PRESSURE: 80 MMHG | RESPIRATION RATE: 16 BRPM | HEIGHT: 62 IN | BODY MASS INDEX: 36.55 KG/M2 | SYSTOLIC BLOOD PRESSURE: 120 MMHG | WEIGHT: 198.6 LBS | HEART RATE: 80 BPM | OXYGEN SATURATION: 98 %

## 2024-10-07 DIAGNOSIS — Z23 NEED FOR INFLUENZA VACCINATION: ICD-10-CM

## 2024-10-07 DIAGNOSIS — E78.2 MIXED HYPERLIPIDEMIA: ICD-10-CM

## 2024-10-07 DIAGNOSIS — M25.562 ACUTE PAIN OF LEFT KNEE: ICD-10-CM

## 2024-10-07 DIAGNOSIS — Z12.39 ENCOUNTER FOR SCREENING BREAST EXAMINATION: ICD-10-CM

## 2024-10-07 DIAGNOSIS — F33.41 RECURRENT MAJOR DEPRESSIVE DISORDER, IN PARTIAL REMISSION (CMS-HCC): ICD-10-CM

## 2024-10-07 DIAGNOSIS — F41.1 GENERALIZED ANXIETY DISORDER: ICD-10-CM

## 2024-10-07 DIAGNOSIS — Z79.899 MEDICATION MANAGEMENT: Primary | ICD-10-CM

## 2024-10-07 DIAGNOSIS — M54.16 RADICULOPATHY, LUMBAR REGION: ICD-10-CM

## 2024-10-07 DIAGNOSIS — R10.9 BILATERAL FLANK PAIN: ICD-10-CM

## 2024-10-07 LAB
POC APPEARANCE, URINE: CLEAR
POC BILIRUBIN, URINE: NEGATIVE
POC BLOOD, URINE: NEGATIVE
POC COLOR, URINE: YELLOW
POC GLUCOSE, URINE: NEGATIVE MG/DL
POC KETONES, URINE: NEGATIVE MG/DL
POC LEUKOCYTES, URINE: NEGATIVE
POC NITRITE,URINE: NEGATIVE
POC PH, URINE: 5.5 PH
POC PROTEIN, URINE: NEGATIVE MG/DL
POC SPECIFIC GRAVITY, URINE: 1.02
POC UROBILINOGEN, URINE: 0.2 EU/DL

## 2024-10-07 PROCEDURE — 80346 BENZODIAZEPINES1-12: CPT

## 2024-10-07 PROCEDURE — 3079F DIAST BP 80-89 MM HG: CPT | Performed by: FAMILY MEDICINE

## 2024-10-07 PROCEDURE — 80354 DRUG SCREENING FENTANYL: CPT

## 2024-10-07 PROCEDURE — 90471 IMMUNIZATION ADMIN: CPT | Performed by: FAMILY MEDICINE

## 2024-10-07 PROCEDURE — 80361 OPIATES 1 OR MORE: CPT

## 2024-10-07 PROCEDURE — 80358 DRUG SCREENING METHADONE: CPT

## 2024-10-07 PROCEDURE — 82570 ASSAY OF URINE CREATININE: CPT

## 2024-10-07 PROCEDURE — 80365 DRUG SCREENING OXYCODONE: CPT

## 2024-10-07 PROCEDURE — 1036F TOBACCO NON-USER: CPT | Performed by: FAMILY MEDICINE

## 2024-10-07 PROCEDURE — 3074F SYST BP LT 130 MM HG: CPT | Performed by: FAMILY MEDICINE

## 2024-10-07 PROCEDURE — 80373 DRUG SCREENING TRAMADOL: CPT

## 2024-10-07 PROCEDURE — 3008F BODY MASS INDEX DOCD: CPT | Performed by: FAMILY MEDICINE

## 2024-10-07 PROCEDURE — 80307 DRUG TEST PRSMV CHEM ANLYZR: CPT

## 2024-10-07 PROCEDURE — 80368 SEDATIVE HYPNOTICS: CPT

## 2024-10-07 PROCEDURE — 90656 IIV3 VACC NO PRSV 0.5 ML IM: CPT | Performed by: FAMILY MEDICINE

## 2024-10-07 PROCEDURE — 81003 URINALYSIS AUTO W/O SCOPE: CPT | Performed by: FAMILY MEDICINE

## 2024-10-07 PROCEDURE — 99215 OFFICE O/P EST HI 40 MIN: CPT | Performed by: FAMILY MEDICINE

## 2024-10-07 RX ORDER — DULOXETIN HYDROCHLORIDE 20 MG/1
20 CAPSULE, DELAYED RELEASE ORAL 2 TIMES DAILY
Qty: 60 CAPSULE | Refills: 2 | Status: SHIPPED | OUTPATIENT
Start: 2024-10-07 | End: 2024-12-06

## 2024-10-07 RX ORDER — PHENTERMINE HYDROCHLORIDE 37.5 MG/1
37.5 TABLET ORAL
Qty: 30 TABLET | Refills: 0 | Status: SHIPPED | OUTPATIENT
Start: 2024-10-07

## 2024-10-07 RX ORDER — DULOXETIN HYDROCHLORIDE 30 MG/1
60 CAPSULE, DELAYED RELEASE ORAL DAILY
Qty: 60 CAPSULE | Refills: 1 | Status: CANCELLED | OUTPATIENT
Start: 2024-10-07

## 2024-10-07 RX ORDER — ARIPIPRAZOLE 5 MG/1
5 TABLET ORAL
COMMUNITY
Start: 2024-10-02

## 2024-10-07 RX ORDER — ATORVASTATIN CALCIUM 40 MG/1
40 TABLET, FILM COATED ORAL DAILY
Qty: 30 TABLET | Refills: 2 | Status: SHIPPED | OUTPATIENT
Start: 2024-10-07

## 2024-10-07 RX ORDER — ACETAMINOPHEN AND CODEINE PHOSPHATE 300; 30 MG/1; MG/1
1 TABLET ORAL EVERY 4 HOURS PRN
Qty: 28 TABLET | Refills: 0 | Status: SHIPPED | OUTPATIENT
Start: 2024-10-07

## 2024-10-07 ASSESSMENT — ENCOUNTER SYMPTOMS
ARTHRALGIAS: 0
POLYPHAGIA: 0
DIARRHEA: 0
HEADACHES: 0
ADENOPATHY: 0
DYSURIA: 0
SLEEP DISTURBANCE: 1
COUGH: 0
SHORTNESS OF BREATH: 0
PALPITATIONS: 0
FLANK PAIN: 1
CHEST TIGHTNESS: 0
FATIGUE: 0
BLOOD IN STOOL: 0
NECK STIFFNESS: 0
CONSTIPATION: 0
EYE PAIN: 0
HEMATURIA: 0
SEIZURES: 0
FEVER: 0
DYSPHORIC MOOD: 1
TROUBLE SWALLOWING: 0
APPETITE CHANGE: 0
COLOR CHANGE: 0
SINUS PRESSURE: 0
DIZZINESS: 0
ABDOMINAL DISTENTION: 0
ACTIVITY CHANGE: 0
PHOTOPHOBIA: 0
POLYDIPSIA: 0
STRIDOR: 0
AGITATION: 0
NERVOUS/ANXIOUS: 1
ABDOMINAL PAIN: 0
MYALGIAS: 0
CONFUSION: 0
SINUS PAIN: 0
CONSTITUTIONAL NEGATIVE: 1
RECTAL PAIN: 0
DECREASED CONCENTRATION: 0
SORE THROAT: 0
SPEECH DIFFICULTY: 0
RHINORRHEA: 0

## 2024-10-07 NOTE — PROGRESS NOTES
"Subjective   Patient ID: Leanna Pagan is a 59 y.o. female who presents for Medication Problem.    HPI   The patient is in office to discuss her Abilify. The patient states she does not want to take the medication anymore. She states the medication makes her feel sick.     Review of Systems   Constitutional: Negative.  Negative for activity change, appetite change, fatigue and fever.   HENT:  Negative for congestion, dental problem, ear discharge, ear pain, mouth sores, rhinorrhea, sinus pressure, sinus pain, sore throat, tinnitus and trouble swallowing.    Eyes:  Negative for photophobia, pain and visual disturbance.   Respiratory:  Negative for cough, chest tightness, shortness of breath and stridor.    Cardiovascular:  Negative for chest pain and palpitations.   Gastrointestinal:  Negative for abdominal distention, abdominal pain, blood in stool, constipation, diarrhea and rectal pain.   Endocrine: Negative for cold intolerance, heat intolerance, polydipsia, polyphagia and polyuria.   Genitourinary:  Positive for flank pain. Negative for dysuria, hematuria and urgency.   Musculoskeletal:  Positive for gait problem. Negative for arthralgias, myalgias and neck stiffness.   Skin:  Negative for color change and rash.   Allergic/Immunologic: Negative for environmental allergies and food allergies.   Neurological:  Negative for dizziness, seizures, syncope, speech difficulty and headaches.   Hematological:  Negative for adenopathy.   Psychiatric/Behavioral:  Positive for dysphoric mood and sleep disturbance. Negative for agitation, confusion and decreased concentration. The patient is nervous/anxious.      Objective   /80 (BP Location: Right arm, Patient Position: Sitting, BP Cuff Size: Large adult)   Pulse 80   Temp 36.5 °C (97.7 °F) (Temporal)   Resp 16   Ht 1.575 m (5' 2\")   Wt 90.1 kg (198 lb 9.6 oz)   SpO2 98%   BMI 36.32 kg/m²     Physical Exam  Vitals reviewed.   Constitutional:       General: She " is not in acute distress.     Appearance: She is obese. She is not ill-appearing or diaphoretic.   HENT:      Head: Normocephalic.      Right Ear: Tympanic membrane and external ear normal.      Left Ear: Tympanic membrane and external ear normal.      Nose: Nose normal. No congestion.      Mouth/Throat:      Pharynx: No posterior oropharyngeal erythema.   Eyes:      General:         Right eye: No discharge.         Left eye: No discharge.      Extraocular Movements: Extraocular movements intact.      Conjunctiva/sclera: Conjunctivae normal.      Pupils: Pupils are equal, round, and reactive to light.   Cardiovascular:      Rate and Rhythm: Normal rate and regular rhythm.      Pulses: Normal pulses.      Heart sounds: Normal heart sounds. No murmur heard.  Pulmonary:      Effort: Pulmonary effort is normal. No respiratory distress.      Breath sounds: Normal breath sounds. No wheezing or rales.   Chest:      Chest wall: No tenderness.   Abdominal:      General: Bowel sounds are normal. There is distension.      Palpations: There is no mass.      Tenderness: There is abdominal tenderness. There is no guarding.   Musculoskeletal:         General: No tenderness. Normal range of motion.      Cervical back: Normal range of motion and neck supple. No tenderness.      Right lower leg: No edema.      Left lower leg: No edema.   Skin:     General: Skin is dry.      Coloration: Skin is not jaundiced.      Findings: No bruising, erythema or rash.   Neurological:      General: No focal deficit present.      Mental Status: She is alert and oriented to person, place, and time. Mental status is at baseline.      Cranial Nerves: No cranial nerve deficit.      Sensory: No sensory deficit.      Coordination: Coordination abnormal.      Gait: Gait abnormal.   Psychiatric:         Mood and Affect: Mood is anxious and depressed. Affect is tearful.         Thought Content: Thought content normal.         Judgment: Judgment normal.       Comments: Anxious, tearful, depressed, flat affect         Assessment/Plan   Problem List Items Addressed This Visit             ICD-10-CM    Anxiety disorder, unspecified F41.9    Relevant Medications    DULoxetine (Cymbalta) 20 mg DR capsule    Hyperlipidemia E78.5    Relevant Medications    atorvastatin (Lipitor) 40 mg tablet    Recurrent major depressive disorder, in partial remission (CMS-HCC) F33.41    Relevant Medications    DULoxetine (Cymbalta) 20 mg DR capsule     Other Visit Diagnoses         Codes    Medication management    -  Primary Z79.899    Relevant Orders    Opiate/Opioid/Benzo Prescription Compliance    OOB Internal Tracking    Radiculopathy, lumbar region     M54.16    Relevant Medications    acetaminophen-codeine (Tylenol w/ Codeine #3) 300-30 mg tablet    Other Relevant Orders    Disability Placard    BMI 38.0-38.9,adult     Z68.38    Relevant Medications    phentermine (Adipex-P) 37.5 mg tablet    Encounter for screening breast examination     Z12.39    Relevant Orders    BI mammo bilateral screening tomosynthesis    Need for influenza vaccination     Z23    Relevant Orders    Flu vaccine, trivalent, preservative free, age 6 months and greater (Fluarix/Fluzone/Flulaval) (Completed)    Acute pain of left knee     M25.562    Relevant Orders    XR knee left 1-2 views    Disability Placard    Bilateral flank pain     R10.9    Relevant Orders    CT kidney wo IV contrast    POCT UA Automated manually resulted (Completed)              Scribe Attestation  By signing my name below, I, LESLIE Hall , Omidibe   attest that this documentation has been prepared under the direction and in the presence of Sergo Alexander DO.   Provider Attestation - Scribe documentation    All medical record entries made by the Scribe were at my direction and personally dictated by me. I have reviewed the chart and agree that the record accurately reflects my personal performance of the history, physical exam,  discussion and plan.

## 2024-10-08 LAB
AMPHETAMINES UR QL SCN: NORMAL
BARBITURATES UR QL SCN: NORMAL
BZE UR QL SCN: NORMAL
CANNABINOIDS UR QL SCN: NORMAL
CREAT UR-MCNC: 112.3 MG/DL (ref 20–320)
PCP UR QL SCN: NORMAL

## 2024-10-09 ENCOUNTER — TELEPHONE (OUTPATIENT)
Dept: PRIMARY CARE | Facility: CLINIC | Age: 59
End: 2024-10-09
Payer: COMMERCIAL

## 2024-10-09 DIAGNOSIS — R10.9 BILATERAL FLANK PAIN: ICD-10-CM

## 2024-10-09 NOTE — TELEPHONE ENCOUNTER
[facility] [phone]    CT ABDOMEN DENIAL     MISSING SOUND WAVE ULTRASOUND, SHOWING PROBLEM OR NOT EXPLAINING THE SYMPTOMS     CT OF ABDOMEN WITH CLINICAL NOTES WERE DENIED, THEY ARE LOOKING FOR A ULTRASOUND BEING DONE BEFORE THE CT

## 2024-10-09 NOTE — TELEPHONE ENCOUNTER
----- Message from Nurse Libia ALFARO sent at 10/9/2024  2:19 PM EDT -----  Regarding: new CT order  We have this pt on our CT schedule 10/22/2024 . The order is for a kidney without contrast and that only scans to the crest and not the bladder. The protocol states it should include the bladder and we will need a new order for CT ABD and pelvis without contrast instead. Will you please change the order ASAP so it can get precerted and let us? I appreciate it, thanks!   LUKE: Patient is independent and lives with spouse. Noted NGT was removed yesterday. Noted GI, cardiology, and nephrology are following. Anticipate discharge home with family when medically stable. Noted patient transfer to . Care management will continue to follow for discharge planning.     Christal Andino, TIFFANYW/CRM

## 2024-10-11 ENCOUNTER — HOSPITAL ENCOUNTER (OUTPATIENT)
Dept: RADIOLOGY | Facility: HOSPITAL | Age: 59
Discharge: HOME | End: 2024-10-11
Payer: COMMERCIAL

## 2024-10-11 DIAGNOSIS — M25.562 ACUTE PAIN OF LEFT KNEE: ICD-10-CM

## 2024-10-11 LAB
1OH-MIDAZOLAM UR CFM-MCNC: <25 NG/ML
6MAM UR CFM-MCNC: <25 NG/ML
7AMINOCLONAZEPAM UR CFM-MCNC: <25 NG/ML
A-OH ALPRAZ UR CFM-MCNC: 210 NG/ML
ALPRAZ UR CFM-MCNC: 257 NG/ML
CHLORDIAZEP UR CFM-MCNC: <25 NG/ML
CLONAZEPAM UR CFM-MCNC: <25 NG/ML
CODEINE UR CFM-MCNC: >2500 NG/ML
DIAZEPAM UR CFM-MCNC: <25 NG/ML
EDDP UR CFM-MCNC: <25 NG/ML
FENTANYL UR CFM-MCNC: <2.5 NG/ML
HYDROCODONE CTO UR CFM-MCNC: <25 NG/ML
HYDROMORPHONE UR CFM-MCNC: <25 NG/ML
LORAZEPAM UR CFM-MCNC: 78 NG/ML
METHADONE UR CFM-MCNC: <25 NG/ML
MIDAZOLAM UR CFM-MCNC: <25 NG/ML
MORPHINE UR CFM-MCNC: >2500 NG/ML
NORDIAZEPAM UR CFM-MCNC: <25 NG/ML
NORFENTANYL UR CFM-MCNC: <2.5 NG/ML
NORHYDROCODONE UR CFM-MCNC: 25 NG/ML
NOROXYCODONE UR CFM-MCNC: <25 NG/ML
NORTRAMADOL UR-MCNC: <50 NG/ML
OXAZEPAM UR CFM-MCNC: <25 NG/ML
OXYCODONE UR CFM-MCNC: <25 NG/ML
OXYMORPHONE UR CFM-MCNC: <25 NG/ML
TEMAZEPAM UR CFM-MCNC: <25 NG/ML
TRAMADOL UR CFM-MCNC: <50 NG/ML
ZOLPIDEM UR CFM-MCNC: <25 NG/ML
ZOLPIDEM UR-MCNC: <25 NG/ML

## 2024-10-11 PROCEDURE — 73564 X-RAY EXAM KNEE 4 OR MORE: CPT | Mod: LT

## 2024-10-14 DIAGNOSIS — F41.1 GENERALIZED ANXIETY DISORDER: ICD-10-CM

## 2024-10-15 ENCOUNTER — DOCUMENTATION (OUTPATIENT)
Dept: CARE COORDINATION | Facility: CLINIC | Age: 59
End: 2024-10-15
Payer: COMMERCIAL

## 2024-10-15 ENCOUNTER — PATIENT OUTREACH (OUTPATIENT)
Dept: CARE COORDINATION | Facility: CLINIC | Age: 59
End: 2024-10-15
Payer: COMMERCIAL

## 2024-10-16 ENCOUNTER — TELEPHONE (OUTPATIENT)
Dept: PRIMARY CARE | Facility: CLINIC | Age: 59
End: 2024-10-16

## 2024-10-16 DIAGNOSIS — F41.1 GENERALIZED ANXIETY DISORDER: ICD-10-CM

## 2024-10-16 NOTE — TELEPHONE ENCOUNTER
PT [facility]      PT would like to know why xanax isnt called in. And wants to have a full 30 day supply. Keeps sending 15 days at a time.        XANAX      [facility] [address] [place]

## 2024-10-16 NOTE — TELEPHONE ENCOUNTER
PT [facility]     PT HAS BEEN DENIED FOR ABDOMEN CT     PEER TO PEER IS NEEDED ASAP     [facility]   REF#[id number]  [phone]

## 2024-10-17 RX ORDER — ALPRAZOLAM 1 MG/1
1 TABLET ORAL 3 TIMES DAILY PRN
Qty: 45 TABLET | Refills: 0 | Status: SHIPPED | OUTPATIENT
Start: 2024-10-17

## 2024-10-17 RX ORDER — ALPRAZOLAM 1 MG/1
1 TABLET ORAL 3 TIMES DAILY PRN
Qty: 45 TABLET | Refills: 0 | OUTPATIENT
Start: 2024-10-17

## 2024-10-22 ENCOUNTER — HOSPITAL ENCOUNTER (OUTPATIENT)
Dept: RADIOLOGY | Facility: HOSPITAL | Age: 59
End: 2024-10-22
Payer: COMMERCIAL

## 2024-10-22 ENCOUNTER — APPOINTMENT (OUTPATIENT)
Dept: RADIOLOGY | Facility: CLINIC | Age: 59
End: 2024-10-22
Payer: COMMERCIAL

## 2024-10-22 ENCOUNTER — APPOINTMENT (OUTPATIENT)
Dept: RADIOLOGY | Facility: HOSPITAL | Age: 59
End: 2024-10-22
Payer: COMMERCIAL

## 2024-10-28 ENCOUNTER — APPOINTMENT (OUTPATIENT)
Dept: RADIOLOGY | Facility: CLINIC | Age: 59
End: 2024-10-28
Payer: COMMERCIAL

## 2024-10-30 ENCOUNTER — OFFICE VISIT (OUTPATIENT)
Dept: PRIMARY CARE | Facility: CLINIC | Age: 59
End: 2024-10-30
Payer: COMMERCIAL

## 2024-10-30 ENCOUNTER — HOSPITAL ENCOUNTER (OUTPATIENT)
Dept: RADIOLOGY | Facility: CLINIC | Age: 59
Discharge: HOME | End: 2024-10-30
Payer: COMMERCIAL

## 2024-10-30 VITALS
TEMPERATURE: 97.9 F | OXYGEN SATURATION: 96 % | HEART RATE: 78 BPM | HEIGHT: 62 IN | DIASTOLIC BLOOD PRESSURE: 90 MMHG | SYSTOLIC BLOOD PRESSURE: 124 MMHG | BODY MASS INDEX: 36.91 KG/M2 | WEIGHT: 200.6 LBS | RESPIRATION RATE: 16 BRPM

## 2024-10-30 VITALS — HEIGHT: 62 IN | WEIGHT: 198 LBS | BODY MASS INDEX: 36.44 KG/M2

## 2024-10-30 DIAGNOSIS — L29.89 PRURITIC ERYTHEMATOUS RASH: ICD-10-CM

## 2024-10-30 DIAGNOSIS — Z12.39 ENCOUNTER FOR SCREENING BREAST EXAMINATION: ICD-10-CM

## 2024-10-30 DIAGNOSIS — B02.9 HERPES ZOSTER WITHOUT COMPLICATION: Primary | ICD-10-CM

## 2024-10-30 DIAGNOSIS — E66.01 CLASS 2 SEVERE OBESITY WITH SERIOUS COMORBIDITY AND BODY MASS INDEX (BMI) OF 36.0 TO 36.9 IN ADULT, UNSPECIFIED OBESITY TYPE: ICD-10-CM

## 2024-10-30 DIAGNOSIS — R21 BLISTERING RASH: ICD-10-CM

## 2024-10-30 DIAGNOSIS — E66.812 CLASS 2 SEVERE OBESITY WITH SERIOUS COMORBIDITY AND BODY MASS INDEX (BMI) OF 36.0 TO 36.9 IN ADULT, UNSPECIFIED OBESITY TYPE: ICD-10-CM

## 2024-10-30 PROCEDURE — 77067 SCR MAMMO BI INCL CAD: CPT

## 2024-10-30 PROCEDURE — 77067 SCR MAMMO BI INCL CAD: CPT | Performed by: RADIOLOGY

## 2024-10-30 PROCEDURE — 77063 BREAST TOMOSYNTHESIS BI: CPT | Performed by: RADIOLOGY

## 2024-10-30 PROCEDURE — 99214 OFFICE O/P EST MOD 30 MIN: CPT | Performed by: NURSE PRACTITIONER

## 2024-10-30 RX ORDER — VALACYCLOVIR HYDROCHLORIDE 1 G/1
1000 TABLET, FILM COATED ORAL 3 TIMES DAILY
Qty: 21 TABLET | Refills: 0 | OUTPATIENT
Start: 2024-10-30 | End: 2024-11-01

## 2024-10-30 RX ORDER — HYDROCORTISONE 25 MG/G
CREAM TOPICAL 2 TIMES DAILY PRN
Qty: 30 G | Refills: 2 | Status: SHIPPED | OUTPATIENT
Start: 2024-10-30 | End: 2025-10-30

## 2024-10-30 ASSESSMENT — ENCOUNTER SYMPTOMS
OCCASIONAL FEELINGS OF UNSTEADINESS: 0
LOSS OF SENSATION IN FEET: 0
DEPRESSION: 0

## 2024-10-30 ASSESSMENT — PATIENT HEALTH QUESTIONNAIRE - PHQ9
1. LITTLE INTEREST OR PLEASURE IN DOING THINGS: NOT AT ALL
1. LITTLE INTEREST OR PLEASURE IN DOING THINGS: NOT AT ALL
2. FEELING DOWN, DEPRESSED OR HOPELESS: NOT AT ALL
2. FEELING DOWN, DEPRESSED OR HOPELESS: NOT AT ALL
SUM OF ALL RESPONSES TO PHQ9 QUESTIONS 1 AND 2: 0
SUM OF ALL RESPONSES TO PHQ9 QUESTIONS 1 AND 2: 0

## 2024-10-30 ASSESSMENT — PAIN SCALES - GENERAL: PAINLEVEL_OUTOF10: 7

## 2024-10-31 ENCOUNTER — APPOINTMENT (OUTPATIENT)
Dept: RADIOLOGY | Facility: CLINIC | Age: 59
End: 2024-10-31
Payer: COMMERCIAL

## 2024-10-31 DIAGNOSIS — F33.41 RECURRENT MAJOR DEPRESSIVE DISORDER, IN PARTIAL REMISSION (CMS-HCC): ICD-10-CM

## 2024-10-31 DIAGNOSIS — E78.2 MIXED HYPERLIPIDEMIA: ICD-10-CM

## 2024-10-31 DIAGNOSIS — F41.1 GENERALIZED ANXIETY DISORDER: ICD-10-CM

## 2024-10-31 RX ORDER — ATORVASTATIN CALCIUM 40 MG/1
40 TABLET, FILM COATED ORAL DAILY
Qty: 90 TABLET | Refills: 1 | Status: SHIPPED | OUTPATIENT
Start: 2024-10-31

## 2024-10-31 RX ORDER — DULOXETIN HYDROCHLORIDE 20 MG/1
20 CAPSULE, DELAYED RELEASE ORAL 2 TIMES DAILY
Qty: 180 CAPSULE | Refills: 1 | Status: SHIPPED | OUTPATIENT
Start: 2024-10-31

## 2024-11-01 ENCOUNTER — HOSPITAL ENCOUNTER (EMERGENCY)
Facility: HOSPITAL | Age: 59
Discharge: HOME | End: 2024-11-01
Attending: EMERGENCY MEDICINE
Payer: COMMERCIAL

## 2024-11-01 VITALS
WEIGHT: 200 LBS | OXYGEN SATURATION: 98 % | BODY MASS INDEX: 36.8 KG/M2 | TEMPERATURE: 97.2 F | SYSTOLIC BLOOD PRESSURE: 135 MMHG | HEIGHT: 62 IN | DIASTOLIC BLOOD PRESSURE: 100 MMHG | RESPIRATION RATE: 20 BRPM | HEART RATE: 75 BPM

## 2024-11-01 DIAGNOSIS — M54.16 RADICULOPATHY, LUMBAR REGION: ICD-10-CM

## 2024-11-01 DIAGNOSIS — B02.22 TRIGEMINAL HERPES ZOSTER: Primary | ICD-10-CM

## 2024-11-01 PROCEDURE — 99283 EMERGENCY DEPT VISIT LOW MDM: CPT

## 2024-11-01 PROCEDURE — 99284 EMERGENCY DEPT VISIT MOD MDM: CPT | Performed by: EMERGENCY MEDICINE

## 2024-11-01 PROCEDURE — 2500000005 HC RX 250 GENERAL PHARMACY W/O HCPCS: Performed by: EMERGENCY MEDICINE

## 2024-11-01 RX ORDER — VALACYCLOVIR HYDROCHLORIDE 1 G/1
1000 TABLET, FILM COATED ORAL 3 TIMES DAILY
Qty: 30 TABLET | Refills: 0 | Status: SHIPPED | OUTPATIENT
Start: 2024-11-01 | End: 2024-11-11

## 2024-11-01 RX ORDER — TETRACAINE HYDROCHLORIDE 5 MG/ML
1 SOLUTION OPHTHALMIC ONCE
Status: COMPLETED | OUTPATIENT
Start: 2024-11-01 | End: 2024-11-01

## 2024-11-01 ASSESSMENT — LIFESTYLE VARIABLES
TOTAL SCORE: 0
EVER FELT BAD OR GUILTY ABOUT YOUR DRINKING: NO
HAVE YOU EVER FELT YOU SHOULD CUT DOWN ON YOUR DRINKING: NO
EVER HAD A DRINK FIRST THING IN THE MORNING TO STEADY YOUR NERVES TO GET RID OF A HANGOVER: NO
HAVE PEOPLE ANNOYED YOU BY CRITICIZING YOUR DRINKING: NO

## 2024-11-01 ASSESSMENT — COLUMBIA-SUICIDE SEVERITY RATING SCALE - C-SSRS
1. IN THE PAST MONTH, HAVE YOU WISHED YOU WERE DEAD OR WISHED YOU COULD GO TO SLEEP AND NOT WAKE UP?: NO
6. HAVE YOU EVER DONE ANYTHING, STARTED TO DO ANYTHING, OR PREPARED TO DO ANYTHING TO END YOUR LIFE?: NO
2. HAVE YOU ACTUALLY HAD ANY THOUGHTS OF KILLING YOURSELF?: NO

## 2024-11-01 ASSESSMENT — VISUAL ACUITY
OD: 20/30
OU: 20/25
OS: 20/30

## 2024-11-01 ASSESSMENT — PAIN DESCRIPTION - PAIN TYPE: TYPE: ACUTE PAIN

## 2024-11-01 ASSESSMENT — PAIN - FUNCTIONAL ASSESSMENT: PAIN_FUNCTIONAL_ASSESSMENT: 0-10

## 2024-11-01 ASSESSMENT — PAIN DESCRIPTION - LOCATION: LOCATION: BREAST

## 2024-11-01 ASSESSMENT — PAIN SCALES - GENERAL: PAINLEVEL_OUTOF10: 10 - WORST POSSIBLE PAIN

## 2024-11-02 RX ORDER — ACETAMINOPHEN AND CODEINE PHOSPHATE 300; 30 MG/1; MG/1
1 TABLET ORAL EVERY 4 HOURS PRN
Qty: 28 TABLET | Refills: 0 | Status: SHIPPED | OUTPATIENT
Start: 2024-11-02

## 2024-11-04 DIAGNOSIS — F41.1 GENERALIZED ANXIETY DISORDER: ICD-10-CM

## 2024-11-04 DIAGNOSIS — Z76.0 MEDICATION REFILL: ICD-10-CM

## 2024-11-04 NOTE — TELEPHONE ENCOUNTER
Dr. Alexander patient  Refill for ALPRAZolam (Xanax) 1 mg tablet  [facility]/pharmacy #[id number] - [place], [place] - [address]    Patient says she is out of medication.

## 2024-11-05 RX ORDER — ALPRAZOLAM 0.5 MG/1
TABLET ORAL
Qty: 15 TABLET | Refills: 0 | OUTPATIENT
Start: 2024-11-04 | End: 2024-11-12

## 2024-11-05 NOTE — TELEPHONE ENCOUNTER
[facility]/pharmacy #[id number] - [place], [place] - [address] [place] [address]   Pt needs refill on ALPRAZolam (Xanax) 1 mg tablet   3 times daily

## 2024-11-07 NOTE — TELEPHONE ENCOUNTER
Patient is calling back about this request.    Please advise as she has been out of medication since Friday.

## 2024-11-08 ENCOUNTER — TELEPHONE (OUTPATIENT)
Dept: PRIMARY CARE | Facility: CLINIC | Age: 59
End: 2024-11-08

## 2024-11-08 DIAGNOSIS — F41.1 GENERALIZED ANXIETY DISORDER: ICD-10-CM

## 2024-11-08 DIAGNOSIS — R10.9 BILATERAL FLANK PAIN: ICD-10-CM

## 2024-11-08 NOTE — TELEPHONE ENCOUNTER
"Pt states she takes it 1mg TID. Pt states she has \"that \" today and her \"nerves are shot\" and she is out of meds.   "

## 2024-11-09 DIAGNOSIS — B37.2 YEAST DERMATITIS: ICD-10-CM

## 2024-11-09 RX ORDER — ALPRAZOLAM 1 MG/1
1 TABLET ORAL 3 TIMES DAILY PRN
Qty: 45 TABLET | Refills: 0 | Status: SHIPPED | OUTPATIENT
Start: 2024-11-09

## 2024-11-12 RX ORDER — ALPRAZOLAM 1 MG/1
1 TABLET ORAL 3 TIMES DAILY PRN
Qty: 45 TABLET | Refills: 1 | OUTPATIENT
Start: 2024-11-12

## 2024-11-12 RX ORDER — PHENTERMINE HYDROCHLORIDE 37.5 MG/1
37.5 TABLET ORAL
Qty: 30 TABLET | Refills: 0 | Status: SHIPPED | OUTPATIENT
Start: 2024-11-12

## 2024-11-12 RX ORDER — NYSTATIN TOPICAL POWDER 100000 U/G
POWDER TOPICAL 2 TIMES DAILY
Qty: 60 G | Refills: 0 | Status: SHIPPED | OUTPATIENT
Start: 2024-11-12

## 2024-11-25 DIAGNOSIS — F41.1 GENERALIZED ANXIETY DISORDER: ICD-10-CM

## 2024-11-25 DIAGNOSIS — M54.16 RADICULOPATHY, LUMBAR REGION: ICD-10-CM

## 2024-11-27 DIAGNOSIS — M54.16 RADICULOPATHY, LUMBAR REGION: ICD-10-CM

## 2024-11-27 DIAGNOSIS — F41.1 GENERALIZED ANXIETY DISORDER: ICD-10-CM

## 2024-11-27 RX ORDER — ALPRAZOLAM 1 MG/1
1 TABLET ORAL 3 TIMES DAILY PRN
Qty: 45 TABLET | Refills: 0 | Status: SHIPPED | OUTPATIENT
Start: 2024-11-27

## 2024-11-27 RX ORDER — ACETAMINOPHEN AND CODEINE PHOSPHATE 300; 30 MG/1; MG/1
1 TABLET ORAL EVERY 4 HOURS PRN
Qty: 28 TABLET | Refills: 0 | OUTPATIENT
Start: 2024-11-27

## 2024-11-27 RX ORDER — ALPRAZOLAM 1 MG/1
1 TABLET ORAL 3 TIMES DAILY PRN
Qty: 45 TABLET | Refills: 0 | OUTPATIENT
Start: 2024-11-27

## 2024-11-27 RX ORDER — ACETAMINOPHEN AND CODEINE PHOSPHATE 300; 30 MG/1; MG/1
1 TABLET ORAL EVERY 4 HOURS PRN
Qty: 28 TABLET | Refills: 0 | Status: SHIPPED | OUTPATIENT
Start: 2024-11-27

## 2024-11-27 NOTE — TELEPHONE ENCOUNTER
Pt needs refills       ALPRAZolam (Xanax) 1 mg tablet      acetaminophen-codeine (Tylenol w/ Codeine #3) 300-30 mg tablet     Pharmacy    [id number] [place] [place] - [address] [address]  Phone: [phone]  Fax: [phone]

## 2024-11-27 NOTE — TELEPHONE ENCOUNTER
Recent Visits  Date Type Provider Dept   10/07/24 Office Visit Sergo Alexander,  [facility]   07/01/24 Office Visit Sergo Alexander, DO [facility]   Showing recent visits within past 180 days and meeting all other requirements  Future Appointments  Date Type Provider Dept   01/13/25 Appointment Sergo Alexander,  [facility]   Showing future appointments within next 90 days and meeting all other requirements

## 2024-12-09 ENCOUNTER — HOSPITAL ENCOUNTER (OUTPATIENT)
Dept: RADIOLOGY | Facility: CLINIC | Age: 59
Discharge: HOME | End: 2024-12-09
Payer: COMMERCIAL

## 2024-12-09 DIAGNOSIS — R10.9 BILATERAL FLANK PAIN: ICD-10-CM

## 2024-12-09 PROCEDURE — 74176 CT ABD & PELVIS W/O CONTRAST: CPT

## 2024-12-09 PROCEDURE — 74176 CT ABD & PELVIS W/O CONTRAST: CPT | Performed by: RADIOLOGY

## 2024-12-12 ENCOUNTER — APPOINTMENT (OUTPATIENT)
Dept: PRIMARY CARE | Facility: CLINIC | Age: 59
End: 2024-12-12
Payer: COMMERCIAL

## 2024-12-17 DIAGNOSIS — M54.16 RADICULOPATHY, LUMBAR REGION: ICD-10-CM

## 2024-12-17 NOTE — TELEPHONE ENCOUNTER
Recent Visits  Date Type Provider Dept   10/07/24 Office Visit Sergo Alexander,  [facility]   07/01/24 Office Visit Sergo Alexander, DO [facility]   Showing recent visits within past 180 days and meeting all other requirements  Future Appointments  Date Type Provider Dept   01/13/25 Appointment Sergo Alxeander,  [facility]   Showing future appointments within next 90 days and meeting all other requirements

## 2024-12-19 RX ORDER — ACETAMINOPHEN AND CODEINE PHOSPHATE 300; 30 MG/1; MG/1
1 TABLET ORAL EVERY 4 HOURS PRN
Qty: 28 TABLET | Refills: 0 | Status: SHIPPED | OUTPATIENT
Start: 2024-12-19

## 2024-12-26 DIAGNOSIS — F41.1 GENERALIZED ANXIETY DISORDER: ICD-10-CM

## 2024-12-27 DIAGNOSIS — N95.2 POSTMENOPAUSAL ATROPHIC VAGINITIS: ICD-10-CM

## 2024-12-27 DIAGNOSIS — G47.00 INSOMNIA, UNSPECIFIED TYPE: ICD-10-CM

## 2024-12-27 DIAGNOSIS — F41.1 GENERALIZED ANXIETY DISORDER: ICD-10-CM

## 2024-12-27 DIAGNOSIS — B37.2 YEAST DERMATITIS: ICD-10-CM

## 2024-12-27 DIAGNOSIS — M54.16 RADICULOPATHY, LUMBAR REGION: ICD-10-CM

## 2024-12-27 DIAGNOSIS — M54.12 RADICULOPATHY, CERVICAL REGION: ICD-10-CM

## 2024-12-27 NOTE — TELEPHONE ENCOUNTER
Recent Visits  Date Type Provider Dept   10/07/24 Office Visit Sergo Alexander, DO [facility]   07/01/24 Office Visit Sergo Alexander, DO [facility]   04/09/24 Office Visit Sergo Alexander, DO [facility]   02/20/24 Office Visit Sergo Alexander, DO [facility]   01/02/24 Office Visit Sergo Alexander, DO [facility]   Showing recent visits within past 365 days and meeting all other requirements  Future Appointments  Date Type Provider Dept   01/13/25 Appointment Sergo Alexander, DO [facility]   Showing future appointments within next 90 days and meeting all other requirements

## 2024-12-28 RX ORDER — ALPRAZOLAM 1 MG/1
1 TABLET ORAL 3 TIMES DAILY PRN
Refills: 0 | OUTPATIENT
Start: 2024-12-28

## 2024-12-28 RX ORDER — ALPRAZOLAM 1 MG/1
1 TABLET ORAL 3 TIMES DAILY PRN
Qty: 45 TABLET | Refills: 0 | Status: SHIPPED | OUTPATIENT
Start: 2024-12-28

## 2024-12-28 RX ORDER — PHENTERMINE HYDROCHLORIDE 37.5 MG/1
37.5 TABLET ORAL
Qty: 30 TABLET | Refills: 0 | OUTPATIENT
Start: 2024-12-28

## 2024-12-28 RX ORDER — NYSTATIN TOPICAL POWDER 100000 U/G
POWDER TOPICAL 2 TIMES DAILY
Qty: 60 G | Refills: 0 | Status: SHIPPED | OUTPATIENT
Start: 2024-12-28

## 2024-12-28 RX ORDER — TIZANIDINE 4 MG/1
4 TABLET ORAL EVERY 6 HOURS PRN
Qty: 30 TABLET | Refills: 1 | Status: SHIPPED | OUTPATIENT
Start: 2024-12-28

## 2024-12-28 RX ORDER — TRAZODONE HYDROCHLORIDE 50 MG/1
50 TABLET ORAL NIGHTLY
Qty: 90 TABLET | Refills: 1 | Status: SHIPPED | OUTPATIENT
Start: 2024-12-28

## 2024-12-28 RX ORDER — CONJUGATED ESTROGENS 0.62 MG/G
CREAM VAGINAL
Qty: 30 G | Refills: 0 | Status: SHIPPED | OUTPATIENT
Start: 2024-12-28

## 2025-01-06 ENCOUNTER — APPOINTMENT (OUTPATIENT)
Dept: GASTROENTEROLOGY | Facility: CLINIC | Age: 60
End: 2025-01-06
Payer: COMMERCIAL

## 2025-01-13 ENCOUNTER — APPOINTMENT (OUTPATIENT)
Dept: PRIMARY CARE | Facility: CLINIC | Age: 60
End: 2025-01-13
Payer: COMMERCIAL

## 2025-01-15 ENCOUNTER — OFFICE VISIT (OUTPATIENT)
Dept: PRIMARY CARE | Facility: CLINIC | Age: 60
End: 2025-01-15
Payer: COMMERCIAL

## 2025-01-15 VITALS
WEIGHT: 205 LBS | SYSTOLIC BLOOD PRESSURE: 110 MMHG | HEIGHT: 62 IN | DIASTOLIC BLOOD PRESSURE: 82 MMHG | HEART RATE: 77 BPM | OXYGEN SATURATION: 95 % | BODY MASS INDEX: 37.73 KG/M2

## 2025-01-15 DIAGNOSIS — I10 HTN (HYPERTENSION), BENIGN: Primary | ICD-10-CM

## 2025-01-15 DIAGNOSIS — I10 HTN (HYPERTENSION), BENIGN: ICD-10-CM

## 2025-01-15 DIAGNOSIS — R10.9 BILATERAL FLANK PAIN: ICD-10-CM

## 2025-01-15 DIAGNOSIS — E55.9 VITAMIN D DEFICIENCY: ICD-10-CM

## 2025-01-15 DIAGNOSIS — F41.1 GENERALIZED ANXIETY DISORDER: ICD-10-CM

## 2025-01-15 DIAGNOSIS — F33.3 SEVERE RECURRENT MAJOR DEPRESSION WITH PSYCHOTIC FEATURES (MULTI): ICD-10-CM

## 2025-01-15 DIAGNOSIS — E78.2 MIXED HYPERLIPIDEMIA: ICD-10-CM

## 2025-01-15 DIAGNOSIS — M54.16 RADICULOPATHY, LUMBAR REGION: ICD-10-CM

## 2025-01-15 DIAGNOSIS — R73.9 HYPERGLYCEMIA: ICD-10-CM

## 2025-01-15 DIAGNOSIS — E11.69 TYPE 2 DIABETES MELLITUS WITH OTHER SPECIFIED COMPLICATION, WITHOUT LONG-TERM CURRENT USE OF INSULIN: ICD-10-CM

## 2025-01-15 LAB
POC APPEARANCE, URINE: CLEAR
POC BILIRUBIN, URINE: NEGATIVE
POC BLOOD, URINE: NEGATIVE
POC COLOR, URINE: YELLOW
POC FINGERSTICK BLOOD GLUCOSE: 118 MG/DL (ref 70–100)
POC GLUCOSE, URINE: NEGATIVE MG/DL
POC HEMOGLOBIN A1C: 6.4 % (ref 4.2–6.5)
POC KETONES, URINE: NEGATIVE MG/DL
POC LEUKOCYTES, URINE: NEGATIVE
POC NITRITE,URINE: NEGATIVE
POC PH, URINE: 6 PH
POC PROTEIN, URINE: NEGATIVE MG/DL
POC SPECIFIC GRAVITY, URINE: 1.02
POC UROBILINOGEN, URINE: 0.2 EU/DL

## 2025-01-15 PROCEDURE — 99214 OFFICE O/P EST MOD 30 MIN: CPT | Performed by: FAMILY MEDICINE

## 2025-01-15 PROCEDURE — 81003 URINALYSIS AUTO W/O SCOPE: CPT | Performed by: FAMILY MEDICINE

## 2025-01-15 PROCEDURE — 82962 GLUCOSE BLOOD TEST: CPT | Performed by: FAMILY MEDICINE

## 2025-01-15 PROCEDURE — 83036 HEMOGLOBIN GLYCOSYLATED A1C: CPT | Performed by: FAMILY MEDICINE

## 2025-01-15 RX ORDER — PHENTERMINE HYDROCHLORIDE 37.5 MG/1
37.5 TABLET ORAL
Qty: 30 TABLET | Refills: 0 | Status: SHIPPED | OUTPATIENT
Start: 2025-01-15

## 2025-01-15 RX ORDER — ALPRAZOLAM 1 MG/1
1 TABLET ORAL 3 TIMES DAILY PRN
Qty: 90 TABLET | Refills: 0 | Status: SHIPPED | OUTPATIENT
Start: 2025-01-15

## 2025-01-15 RX ORDER — ACETAMINOPHEN AND CODEINE PHOSPHATE 300; 30 MG/1; MG/1
1 TABLET ORAL EVERY 4 HOURS PRN
Qty: 28 TABLET | Refills: 0 | Status: SHIPPED | OUTPATIENT
Start: 2025-01-15

## 2025-01-15 ASSESSMENT — ENCOUNTER SYMPTOMS
ARTHRALGIAS: 1
TROUBLE SWALLOWING: 0
AGITATION: 0
RECTAL PAIN: 0
FLANK PAIN: 1
HEADACHES: 1
DECREASED CONCENTRATION: 0
CONSTIPATION: 0
APPETITE CHANGE: 0
RHINORRHEA: 0
SPEECH DIFFICULTY: 0
BLOOD IN STOOL: 0
FATIGUE: 1
MYALGIAS: 0
DYSPHORIC MOOD: 1
SORE THROAT: 0
CHEST TIGHTNESS: 0
NERVOUS/ANXIOUS: 1
HEMATURIA: 0
ABDOMINAL DISTENTION: 0
DIZZINESS: 1
POLYPHAGIA: 0
ABDOMINAL PAIN: 0
SINUS PRESSURE: 0
SLEEP DISTURBANCE: 0
EYE PAIN: 0
ACTIVITY CHANGE: 0
COUGH: 0
ADENOPATHY: 0
FEVER: 0
PHOTOPHOBIA: 0
SEIZURES: 0
DYSURIA: 0
COLOR CHANGE: 0
SINUS PAIN: 0
STRIDOR: 0
PALPITATIONS: 0
CONFUSION: 0
NECK STIFFNESS: 0
POLYDIPSIA: 0
SHORTNESS OF BREATH: 0
DIARRHEA: 0
BACK PAIN: 1

## 2025-01-15 NOTE — PROGRESS NOTES
Subjective   Patient ID: Leanna Pagan is a 59 y.o. female who presents for Anxiety, Diabetes, and Hypertension.    HPI   Follow up diabetes   States she does not monitor her blood sugar regularly  States she has been experiencing dizziness, chest pain, fatigue, visual disturbances (floaters) and headaches. Onset 1 month ago, worsening. States she believes it is all stress related. Currently taking xanax 1 mg 3 times a day as needed.  States she went to the dentist last week and they were unable to do any dental work due to her blood pressure being elevated. States she is compliant with blood pressure medication. Does not monitor her blood pressure at home.    Patient states she has been taking atorvastatin 40 mg and simvastatin 20 mg together.    Patient still experiencing right flank pain. Has hx of kidney stones.    Patient wants her fingernails looked at. Has concerns with their appearance.     Review of Systems   Constitutional:  Positive for fatigue. Negative for activity change, appetite change and fever.   HENT:  Negative for congestion, dental problem, ear discharge, ear pain, mouth sores, rhinorrhea, sinus pressure, sinus pain, sore throat, tinnitus and trouble swallowing.    Eyes:  Positive for visual disturbance. Negative for photophobia and pain.   Respiratory:  Negative for cough, chest tightness, shortness of breath and stridor.    Cardiovascular:  Positive for chest pain. Negative for palpitations.   Gastrointestinal:  Negative for abdominal distention, abdominal pain, blood in stool, constipation, diarrhea and rectal pain.   Endocrine: Negative for cold intolerance, heat intolerance, polydipsia, polyphagia and polyuria.   Genitourinary:  Positive for flank pain. Negative for dysuria, hematuria and urgency.   Musculoskeletal:  Positive for arthralgias and back pain. Negative for gait problem, myalgias and neck stiffness.   Skin:  Negative for color change and rash.   Allergic/Immunologic: Negative  "for environmental allergies and food allergies.   Neurological:  Positive for dizziness and headaches. Negative for seizures, syncope and speech difficulty.   Hematological:  Negative for adenopathy.   Psychiatric/Behavioral:  Positive for dysphoric mood. Negative for agitation, confusion, decreased concentration and sleep disturbance. The patient is nervous/anxious.        Objective   /82 (BP Location: Right arm, Patient Position: Sitting, BP Cuff Size: Adult)   Pulse 77   Ht 1.575 m (5' 2\")   Wt 93 kg (205 lb)   SpO2 95%   BMI 37.49 kg/m²     Physical Exam  Vitals reviewed.   Constitutional:       General: She is not in acute distress.     Appearance: Normal appearance. She is normal weight. She is not ill-appearing or diaphoretic.   HENT:      Head: Normocephalic.      Right Ear: Tympanic membrane and external ear normal.      Left Ear: Tympanic membrane and external ear normal.      Nose: Nose normal. No congestion.      Mouth/Throat:      Pharynx: No posterior oropharyngeal erythema.   Eyes:      General:         Right eye: No discharge.         Left eye: No discharge.      Extraocular Movements: Extraocular movements intact.      Conjunctiva/sclera: Conjunctivae normal.      Pupils: Pupils are equal, round, and reactive to light.   Cardiovascular:      Rate and Rhythm: Normal rate and regular rhythm.      Pulses: Normal pulses.      Heart sounds: Normal heart sounds. No murmur heard.  Pulmonary:      Effort: Pulmonary effort is normal. No respiratory distress.      Breath sounds: Normal breath sounds. No wheezing or rales.   Chest:      Chest wall: No tenderness.   Abdominal:      General: Abdomen is flat. Bowel sounds are normal. There is no distension.      Palpations: There is no mass.      Tenderness: There is no abdominal tenderness. There is no guarding.   Musculoskeletal:         General: No tenderness. Normal range of motion.      Cervical back: Normal range of motion and neck supple. No " tenderness.      Right lower leg: No edema.      Left lower leg: No edema.   Skin:     General: Skin is dry.      Coloration: Skin is not jaundiced.      Findings: No bruising, erythema or rash.   Neurological:      General: No focal deficit present.      Mental Status: She is alert and oriented to person, place, and time. Mental status is at baseline.      Cranial Nerves: No cranial nerve deficit.      Sensory: No sensory deficit.      Coordination: Coordination normal.      Gait: Gait normal.   Psychiatric:         Mood and Affect: Mood normal.         Thought Content: Thought content normal.         Judgment: Judgment normal.         Assessment/Plan   Problem List Items Addressed This Visit             ICD-10-CM    Anxiety disorder, unspecified F41.9    Hyperglycemia R73.9    Relevant Orders    POCT glycosylated hemoglobin (Hb A1C) manually resulted (Completed)    POCT fingerstick glucose manually resulted (Completed)     Other Visit Diagnoses         Codes    Radiculopathy, lumbar region     M54.16              Scribe Attestation  By signing my name below, IChayo RMA , Scribe   attest that this documentation has been prepared under the direction and in the presence of Sergo Alexander DO.   Provider Attestation - Scribe documentation    All medical record entries made by the Scribe were at my direction and personally dictated by me. I have reviewed the chart and agree that the record accurately reflects my personal performance of the history, physical exam, discussion and plan.     LESLIE Ziegler , Scribe   attest that this documentation has been prepared under the direction and in the presence of Sergo Alexander DO.   Provider Attestation - Scribe documentation    All medical record entries made by the Scribe were at my direction and personally dictated by me. I have reviewed the chart and agree that the record accurately reflects my personal performance of the history, physical exam, discussion and plan.

## 2025-01-15 NOTE — PATIENT INSTRUCTIONS
Follow up in    Continue current medications and therapy for chronic medical conditions.    Patient was advised importance of proper diet/nutrition in addition adequate hydration. Patient was encouraged moderate exercise program to include 30 minutes daily for 5 days of the week or 150 minutes weekly. Patient will follow-up with us as scheduled.    I personally reviewed the OARRS report for this patient. I have considered the risks of abuse, dependence, addiction, and diversion.    UDS: 10/07/2024  CSA: 07/01/2024    SIERRA GANNON today

## 2025-01-16 DIAGNOSIS — J34.89 STUFFY AND RUNNY NOSE: ICD-10-CM

## 2025-01-16 DIAGNOSIS — R05.9 COUGH, UNSPECIFIED TYPE: ICD-10-CM

## 2025-01-16 NOTE — TELEPHONE ENCOUNTER
DR HU PT    PT WAS SEEN YESTERDAY AND WOKE UP TODAY WITH A SUFFY NOSE AND COUGHING. PT WANTS A ANTIBIOTIC CALLED IN FOR THIS.    [facility] [address]

## 2025-01-18 RX ORDER — DOXYCYCLINE 100 MG/1
100 CAPSULE ORAL 2 TIMES DAILY
Qty: 14 CAPSULE | Refills: 0 | Status: SHIPPED | OUTPATIENT
Start: 2025-01-18 | End: 2025-01-25

## 2025-01-21 ENCOUNTER — APPOINTMENT (OUTPATIENT)
Dept: PRIMARY CARE | Facility: CLINIC | Age: 60
End: 2025-01-21
Payer: COMMERCIAL

## 2025-01-22 PROBLEM — F33.3 SEVERE RECURRENT MAJOR DEPRESSION WITH PSYCHOTIC FEATURES (MULTI): Status: ACTIVE | Noted: 2025-01-22

## 2025-01-22 PROBLEM — E11.69 TYPE 2 DIABETES MELLITUS WITH OTHER SPECIFIED COMPLICATION, WITHOUT LONG-TERM CURRENT USE OF INSULIN: Status: ACTIVE | Noted: 2025-01-22

## 2025-01-22 ASSESSMENT — ENCOUNTER SYMPTOMS: ABDOMINAL PAIN: 1

## 2025-01-28 ENCOUNTER — TELEPHONE (OUTPATIENT)
Dept: PRIMARY CARE | Facility: CLINIC | Age: 60
End: 2025-01-28

## 2025-01-28 ENCOUNTER — APPOINTMENT (OUTPATIENT)
Dept: PRIMARY CARE | Facility: CLINIC | Age: 60
End: 2025-01-28
Payer: COMMERCIAL

## 2025-01-31 ENCOUNTER — PROCEDURE VISIT (OUTPATIENT)
Dept: PRIMARY CARE | Facility: CLINIC | Age: 60
End: 2025-01-31
Payer: COMMERCIAL

## 2025-01-31 VITALS
TEMPERATURE: 97 F | BODY MASS INDEX: 37.84 KG/M2 | OXYGEN SATURATION: 99 % | SYSTOLIC BLOOD PRESSURE: 158 MMHG | DIASTOLIC BLOOD PRESSURE: 90 MMHG | RESPIRATION RATE: 16 BRPM | WEIGHT: 205.6 LBS | HEART RATE: 60 BPM | HEIGHT: 62 IN

## 2025-01-31 DIAGNOSIS — Z01.419 WELL WOMAN EXAM WITH ROUTINE GYNECOLOGICAL EXAM: Primary | ICD-10-CM

## 2025-01-31 DIAGNOSIS — N64.4 BREAST PAIN: ICD-10-CM

## 2025-01-31 DIAGNOSIS — E66.812 CLASS 2 OBESITY WITH BODY MASS INDEX (BMI) OF 37.0 TO 37.9 IN ADULT, UNSPECIFIED OBESITY TYPE, UNSPECIFIED WHETHER SERIOUS COMORBIDITY PRESENT: ICD-10-CM

## 2025-01-31 DIAGNOSIS — J30.9 ALLERGIC RHINITIS: ICD-10-CM

## 2025-01-31 DIAGNOSIS — Z12.4 SCREENING FOR CERVICAL CANCER: ICD-10-CM

## 2025-01-31 DIAGNOSIS — R10.2 PELVIC PAIN IN FEMALE: ICD-10-CM

## 2025-01-31 PROCEDURE — 87624 HPV HI-RISK TYP POOLED RSLT: CPT

## 2025-01-31 PROCEDURE — 99396 PREV VISIT EST AGE 40-64: CPT | Performed by: NURSE PRACTITIONER

## 2025-01-31 RX ORDER — CETIRIZINE HYDROCHLORIDE 10 MG/1
10 TABLET ORAL DAILY
Qty: 90 TABLET | Refills: 0 | Status: SHIPPED | OUTPATIENT
Start: 2025-01-31

## 2025-01-31 NOTE — PROGRESS NOTES
"Subjective   Patient ID: Leanna Pagan is a 59 y.o. female who presents for Gynecologic Exam.    Patient is being seen today for regular pap exam, She does complain of abdominal cramping (menstrual like cramping) and back pain.      Gynecologic Exam  The patient's primary symptoms include pelvic pain. The patient's pertinent negatives include no vaginal discharge. Associated symptoms include back pain. Pertinent negatives include no abdominal pain, chills, constipation, diarrhea, dysuria, fever, headaches, nausea, rash, sore throat, urgency or vomiting.   59 y.o. female who presents for her annual exam.  Concerns Today:  Pelvic cramping (Menstrual like cramping a lot of the time)    Prior Pap History:  3-4 years ago- ?Abnormal cells, + HPV.     LMP: 12 years ago.     Breasts feel asymmetrical and she has been experiencing left breast pain. Normal mammogram 10/30/24    Review of Systems   Constitutional:  Negative for chills, fatigue and fever.   HENT:  Negative for congestion, sinus pressure, sinus pain and sore throat.    Respiratory:  Negative for cough, shortness of breath and wheezing.    Cardiovascular:  Negative for chest pain and palpitations.   Gastrointestinal:  Negative for abdominal pain, constipation, diarrhea, nausea and vomiting.   Endocrine: Negative for polydipsia, polyphagia and polyuria.   Genitourinary:  Positive for pelvic pain. Negative for dyspareunia, dysuria, menstrual problem, urgency, vaginal discharge and vaginal pain.   Musculoskeletal:  Positive for back pain. Negative for myalgias.   Skin:  Negative for rash.   Neurological:  Negative for dizziness, weakness and headaches.       Objective   /90 (BP Location: Left arm, Patient Position: Sitting, BP Cuff Size: Large adult)   Pulse 60   Temp 36.1 °C (97 °F) (Temporal)   Resp 16   Ht 1.575 m (5' 2\")   Wt 93.3 kg (205 lb 9.6 oz)   SpO2 99%   BMI 37.60 kg/m²     Physical Exam  Vitals and nursing note reviewed. Exam conducted with " a chaperone present.   Constitutional:       General: She is not in acute distress.     Appearance: Normal appearance.   Cardiovascular:      Rate and Rhythm: Normal rate and regular rhythm.      Heart sounds: Normal heart sounds.   Pulmonary:      Effort: Pulmonary effort is normal.      Breath sounds: Normal breath sounds.   Chest:   Breasts:     Right: No mass, skin change or tenderness.      Left: Tenderness present. No mass or skin change.   Abdominal:      General: Abdomen is flat. Bowel sounds are normal. There is no distension.      Palpations: Abdomen is soft.      Tenderness: There is generalized abdominal tenderness.      Hernia: No hernia is present.   Genitourinary:     Pubic Area: No rash.       Vagina: No vaginal discharge or tenderness.      Cervix: No cervical motion tenderness, friability or lesion.      Uterus: Normal. Tender.       Adnexa:         Right: Tenderness present.         Left: Tenderness present.    Lymphadenopathy:      Cervical: No cervical adenopathy.      Upper Body:      Right upper body: No axillary adenopathy.      Left upper body: No axillary adenopathy.   Skin:     General: Skin is warm and dry.   Neurological:      Mental Status: She is alert.   Psychiatric:         Mood and Affect: Mood normal.         Behavior: Behavior normal.         Assessment/Plan   Problem List Items Addressed This Visit    None  Visit Diagnoses         Codes    Well woman exam with routine gynecological exam    -  Primary Z01.419    Breast pain     N64.4    Relevant Orders    BI mammo left diagnostic    BI US breast limited left    Screening for cervical cancer     Z12.4    Relevant Orders    THINPREP PAP TEST    Pelvic pain in female     R10.2    Relevant Orders    US pelvis    Class 2 obesity with body mass index (BMI) of 37.0 to 37.9 in adult, unspecified obesity type, unspecified whether serious comorbidity present     E66.812, Z68.37        Annual Exam  Pap - Collected    Pelvic pain  Check  pelvic US.     Breast pain  Normal mammogram 10/2024. Check left diagnostic mammogram and US if indicated.   Encouraged regular self breast exams  Follow up with PCP as needed with any additional concerns.

## 2025-02-02 ASSESSMENT — ENCOUNTER SYMPTOMS
POLYDIPSIA: 0
POLYPHAGIA: 0
SHORTNESS OF BREATH: 0
DIARRHEA: 0
WHEEZING: 0
CHILLS: 0
HEADACHES: 0
NAUSEA: 0
WEAKNESS: 0
FATIGUE: 0
PALPITATIONS: 0
SINUS PRESSURE: 0
BACK PAIN: 1
MYALGIAS: 0
SINUS PAIN: 0
VOMITING: 0
DYSURIA: 0
ABDOMINAL PAIN: 0
CONSTIPATION: 0
DIZZINESS: 0
SORE THROAT: 0
COUGH: 0
FEVER: 0

## 2025-02-12 DIAGNOSIS — N95.2 POSTMENOPAUSAL ATROPHIC VAGINITIS: ICD-10-CM

## 2025-02-12 DIAGNOSIS — F41.1 GENERALIZED ANXIETY DISORDER: ICD-10-CM

## 2025-02-12 DIAGNOSIS — M54.16 RADICULOPATHY, LUMBAR REGION: ICD-10-CM

## 2025-02-12 NOTE — TELEPHONE ENCOUNTER
Recent Visits  Date Type Provider Dept   01/15/25 Office Visit Sergo Alexander, DO [facility]   10/07/24 Office Visit Sergo Alexander, DO [facility]   07/01/24 Office Visit Sergo Alexander, DO [facility]   04/09/24 Office Visit Sergo Alexander, DO [facility]   02/20/24 Office Visit Sergo Alexander, DO [facility]   Showing recent visits within past 365 days and meeting all other requirements  Future Appointments  Date Type Provider Dept   04/21/25 Appointment Sergo Alexander, DO [facility]   Showing future appointments within next 90 days and meeting all other requirements

## 2025-02-13 DIAGNOSIS — R87.810 ASCUS WITH POSITIVE HIGH RISK HPV CERVICAL: ICD-10-CM

## 2025-02-13 DIAGNOSIS — R87.610 ATYPICAL SQUAMOUS CELLS OF UNDETERMINED SIGNIFICANCE ON CYTOLOGIC SMEAR OF CERVIX (ASC-US): Primary | ICD-10-CM

## 2025-02-13 DIAGNOSIS — R87.610 ASCUS WITH POSITIVE HIGH RISK HPV CERVICAL: ICD-10-CM

## 2025-02-13 LAB
CYTOLOGY CMNT CVX/VAG CYTO-IMP: NORMAL
HPV HR 12 DNA GENITAL QL NAA+PROBE: POSITIVE
HPV HR GENOTYPES PNL CVX NAA+PROBE: POSITIVE
HPV16 DNA SPEC QL NAA+PROBE: NEGATIVE
HPV18 DNA SPEC QL NAA+PROBE: NEGATIVE
LAB AP HPV GENOTYPE QUESTION: YES
LAB AP HPV HR: NORMAL
LABORATORY COMMENT REPORT: NORMAL
PATH REPORT.TOTAL CANCER: NORMAL

## 2025-02-13 NOTE — TELEPHONE ENCOUNTER
Dr. Alexander patient  Refill for: ALPRAZolam (Xanax) 1 mg tablet  lidocaine (Lidoderm) 5 % patch  hydrOXYzine pamoate (Vistaril) 50 mg capsule  acetaminophen-codeine (Tylenol w/ Codeine #3) 300-30 mg tablet   cholecalciferol (Vitamin D-3) 25 MCG (1000 UT) capsule  [facility]/pharmacy #[id number] - [place], [place] - [address]

## 2025-02-15 ENCOUNTER — APPOINTMENT (OUTPATIENT)
Dept: RADIOLOGY | Facility: HOSPITAL | Age: 60
End: 2025-02-15
Payer: COMMERCIAL

## 2025-02-16 RX ORDER — ACETAMINOPHEN AND CODEINE PHOSPHATE 300; 30 MG/1; MG/1
1 TABLET ORAL EVERY 4 HOURS PRN
Qty: 28 TABLET | Refills: 0 | Status: SHIPPED | OUTPATIENT
Start: 2025-02-16

## 2025-02-16 RX ORDER — CONJUGATED ESTROGENS 0.62 MG/G
CREAM VAGINAL
Qty: 30 G | Refills: 0 | Status: SHIPPED | OUTPATIENT
Start: 2025-02-16

## 2025-02-16 RX ORDER — ALPRAZOLAM 1 MG/1
1 TABLET ORAL 3 TIMES DAILY PRN
Qty: 90 TABLET | Refills: 0 | Status: SHIPPED | OUTPATIENT
Start: 2025-02-16

## 2025-02-19 ENCOUNTER — APPOINTMENT (OUTPATIENT)
Dept: RADIOLOGY | Facility: HOSPITAL | Age: 60
End: 2025-02-19
Payer: COMMERCIAL

## 2025-02-21 ENCOUNTER — TELEPHONE (OUTPATIENT)
Dept: PRIMARY CARE | Facility: CLINIC | Age: 60
End: 2025-02-21
Payer: COMMERCIAL

## 2025-02-21 NOTE — TELEPHONE ENCOUNTER
I called and spoke to patient about her Pap results. Referral to GYN placed for further evaluation. Patient verbalized understanding.

## 2025-02-24 ENCOUNTER — HOSPITAL ENCOUNTER (OUTPATIENT)
Dept: RADIOLOGY | Facility: CLINIC | Age: 60
Discharge: HOME | End: 2025-02-24
Payer: COMMERCIAL

## 2025-02-24 DIAGNOSIS — R10.2 PELVIC PAIN IN FEMALE: ICD-10-CM

## 2025-02-24 PROCEDURE — 76830 TRANSVAGINAL US NON-OB: CPT | Performed by: STUDENT IN AN ORGANIZED HEALTH CARE EDUCATION/TRAINING PROGRAM

## 2025-02-24 PROCEDURE — 76856 US EXAM PELVIC COMPLETE: CPT | Performed by: STUDENT IN AN ORGANIZED HEALTH CARE EDUCATION/TRAINING PROGRAM

## 2025-02-24 PROCEDURE — 76830 TRANSVAGINAL US NON-OB: CPT

## 2025-02-25 ENCOUNTER — HOSPITAL ENCOUNTER (OUTPATIENT)
Dept: RADIOLOGY | Facility: HOSPITAL | Age: 60
Discharge: HOME | End: 2025-02-25
Payer: COMMERCIAL

## 2025-02-25 DIAGNOSIS — N64.4 BREAST PAIN: ICD-10-CM

## 2025-02-25 PROCEDURE — 76642 ULTRASOUND BREAST LIMITED: CPT | Mod: 59,LT

## 2025-02-25 PROCEDURE — 76981 USE PARENCHYMA: CPT | Mod: LT

## 2025-02-25 PROCEDURE — 77061 BREAST TOMOSYNTHESIS UNI: CPT | Mod: LT

## 2025-02-25 NOTE — RESULT ENCOUNTER NOTE
Left ovary was not visualized, but otherwise, ultrasound is normal. Please follow up with GYN as referred.

## 2025-03-03 ENCOUNTER — CLINICAL SUPPORT (OUTPATIENT)
Dept: GASTROENTEROLOGY | Facility: CLINIC | Age: 60
End: 2025-03-03
Payer: COMMERCIAL

## 2025-03-03 DIAGNOSIS — K76.0 FATTY LIVER: ICD-10-CM

## 2025-03-03 PROCEDURE — 91200 LIVER ELASTOGRAPHY: CPT | Performed by: INTERNAL MEDICINE

## 2025-03-03 NOTE — LETTER
March 5, 2025     Sergo Alexander DO  [address]   [place] [address]    Patient: Leanna Pagan   YOB: 1965   Date of Visit: 3/3/2025       Dear Dr. Sergo Alexander DO:    Thank you for referring Leanna Pagan for evaluation with [facility]. Below are my notes for this consultation.  If you have questions, please do not hesitate to call me.        Sincerely,     [facility] [id number] [facility]      CC: No Recipients  ______________________________________________________________________________________      Results:  E (median liver stiffness measurement):   4.7  kPa  CAP (controlled attenuation parameter):  223  dB/m    Interpretation:  This was a technically adequate study. The Fibrosis score is consistent with Metavir F0. The CAP score is consistent with 0 - 4% hepatocyte steatosis (steatosis grade 0 of 3) .    Catarino eHrmosillo MD  Hepatology

## 2025-03-05 NOTE — PROGRESS NOTES
Results:  E (median liver stiffness measurement):   4.7  kPa  CAP (controlled attenuation parameter):  223  dB/m    Interpretation:  This was a technically adequate study. The Fibrosis score is consistent with Metavir F0. The CAP score is consistent with 0 - 4% hepatocyte steatosis (steatosis grade 0 of 3) .    Catarino Hermosillo MD  Hepatology

## 2025-03-07 DIAGNOSIS — M54.16 RADICULOPATHY, LUMBAR REGION: ICD-10-CM

## 2025-03-07 RX ORDER — PHENTERMINE HYDROCHLORIDE 37.5 MG/1
37.5 TABLET ORAL
Qty: 30 TABLET | Refills: 0 | OUTPATIENT
Start: 2025-03-07

## 2025-03-07 RX ORDER — ACETAMINOPHEN AND CODEINE PHOSPHATE 300; 30 MG/1; MG/1
1 TABLET ORAL EVERY 4 HOURS PRN
Qty: 28 TABLET | Refills: 0 | Status: SHIPPED | OUTPATIENT
Start: 2025-03-07

## 2025-03-12 DIAGNOSIS — E55.9 VITAMIN D DEFICIENCY, UNSPECIFIED: ICD-10-CM

## 2025-03-13 ENCOUNTER — APPOINTMENT (OUTPATIENT)
Dept: OBSTETRICS AND GYNECOLOGY | Facility: CLINIC | Age: 60
End: 2025-03-13
Payer: COMMERCIAL

## 2025-03-13 VITALS
BODY MASS INDEX: 37.84 KG/M2 | SYSTOLIC BLOOD PRESSURE: 128 MMHG | HEIGHT: 62 IN | DIASTOLIC BLOOD PRESSURE: 84 MMHG | WEIGHT: 205.6 LBS

## 2025-03-13 DIAGNOSIS — R87.810 ASCUS WITH POSITIVE HIGH RISK HPV CERVICAL: ICD-10-CM

## 2025-03-13 DIAGNOSIS — R87.610 ASCUS WITH POSITIVE HIGH RISK HPV CERVICAL: ICD-10-CM

## 2025-03-13 DIAGNOSIS — T83.511A URINARY TRACT INFECTION ASSOCIATED WITH CATHETERIZATION OF URINARY TRACT, UNSPECIFIED INDWELLING URINARY CATHETER TYPE, INITIAL ENCOUNTER (CMS-HCC): Primary | ICD-10-CM

## 2025-03-13 DIAGNOSIS — K59.01 SLOW TRANSIT CONSTIPATION: ICD-10-CM

## 2025-03-13 DIAGNOSIS — N39.0 URINARY TRACT INFECTION ASSOCIATED WITH CATHETERIZATION OF URINARY TRACT, UNSPECIFIED INDWELLING URINARY CATHETER TYPE, INITIAL ENCOUNTER (CMS-HCC): Primary | ICD-10-CM

## 2025-03-13 DIAGNOSIS — R10.84 GENERALIZED ABDOMINAL PAIN: ICD-10-CM

## 2025-03-13 DIAGNOSIS — R87.610 ATYPICAL SQUAMOUS CELLS OF UNDETERMINED SIGNIFICANCE ON CYTOLOGIC SMEAR OF CERVIX (ASC-US): ICD-10-CM

## 2025-03-13 LAB
POC APPEARANCE, URINE: CLEAR
POC BILIRUBIN, URINE: NEGATIVE
POC BLOOD, URINE: NEGATIVE
POC COLOR, URINE: YELLOW
POC GLUCOSE, URINE: NEGATIVE MG/DL
POC KETONES, URINE: NEGATIVE MG/DL
POC LEUKOCYTES, URINE: NEGATIVE
POC NITRITE,URINE: NEGATIVE
POC PH, URINE: 5.5 PH
POC PROTEIN, URINE: ABNORMAL MG/DL
POC SPECIFIC GRAVITY, URINE: >=1.03
POC UROBILINOGEN, URINE: 0.2 EU/DL

## 2025-03-13 PROCEDURE — 3074F SYST BP LT 130 MM HG: CPT | Performed by: STUDENT IN AN ORGANIZED HEALTH CARE EDUCATION/TRAINING PROGRAM

## 2025-03-13 PROCEDURE — 81003 URINALYSIS AUTO W/O SCOPE: CPT | Performed by: STUDENT IN AN ORGANIZED HEALTH CARE EDUCATION/TRAINING PROGRAM

## 2025-03-13 PROCEDURE — 99204 OFFICE O/P NEW MOD 45 MIN: CPT | Performed by: STUDENT IN AN ORGANIZED HEALTH CARE EDUCATION/TRAINING PROGRAM

## 2025-03-13 PROCEDURE — 1036F TOBACCO NON-USER: CPT | Performed by: STUDENT IN AN ORGANIZED HEALTH CARE EDUCATION/TRAINING PROGRAM

## 2025-03-13 PROCEDURE — 4010F ACE/ARB THERAPY RXD/TAKEN: CPT | Performed by: STUDENT IN AN ORGANIZED HEALTH CARE EDUCATION/TRAINING PROGRAM

## 2025-03-13 PROCEDURE — 3079F DIAST BP 80-89 MM HG: CPT | Performed by: STUDENT IN AN ORGANIZED HEALTH CARE EDUCATION/TRAINING PROGRAM

## 2025-03-13 PROCEDURE — 3008F BODY MASS INDEX DOCD: CPT | Performed by: STUDENT IN AN ORGANIZED HEALTH CARE EDUCATION/TRAINING PROGRAM

## 2025-03-13 RX ORDER — DOCUSATE SODIUM 100 MG/1
100 CAPSULE, LIQUID FILLED ORAL 2 TIMES DAILY
Qty: 60 CAPSULE | Refills: 0 | Status: SHIPPED | OUTPATIENT
Start: 2025-03-13

## 2025-03-13 ASSESSMENT — ENCOUNTER SYMPTOMS
EYES NEGATIVE: 0
PSYCHIATRIC NEGATIVE: 0
NEUROLOGICAL NEGATIVE: 0
GASTROINTESTINAL NEGATIVE: 0
ALLERGIC/IMMUNOLOGIC NEGATIVE: 0
CONSTITUTIONAL NEGATIVE: 0
HEMATOLOGIC/LYMPHATIC NEGATIVE: 0
MUSCULOSKELETAL NEGATIVE: 0
CARDIOVASCULAR NEGATIVE: 0
ENDOCRINE NEGATIVE: 0
RESPIRATORY NEGATIVE: 0

## 2025-03-13 ASSESSMENT — PATIENT HEALTH QUESTIONNAIRE - PHQ9
2. FEELING DOWN, DEPRESSED OR HOPELESS: NOT AT ALL
SUM OF ALL RESPONSES TO PHQ9 QUESTIONS 1 AND 2: 0
1. LITTLE INTEREST OR PLEASURE IN DOING THINGS: NOT AT ALL

## 2025-03-13 ASSESSMENT — PAIN SCALES - GENERAL: PAINLEVEL_OUTOF10: 7

## 2025-03-13 NOTE — PROGRESS NOTES
"Subjective   Patient ID: Karina Pagan \"Leanna\" is a 59 y.o. female who presents for referral from PCP for abnormal pap smear.     Most recent pap: 01/2025 ASCUS other HR HPV pos.  Prior: 05/2021 NILM other HR HPV pos.  Per ASCCP guidelines, recommend colposcopy.  Reviewed most recent PCP notes  Reviewed TVUS 02/2025 - wnl    Leanna also reports 7/10 lower back and abdominal cramping pain today. Not currently sexually active.     History of prior cone biopsy and abnormal pap smears. Prior D&C complicated by pulmonary embolism.     Known hx endometriosis, prolapse.    Objective   Physical Exam  Constitutional:       General: She is not in acute distress.     Appearance: Normal appearance.   HENT:      Head: Normocephalic.   Cardiovascular:      Rate and Rhythm: Normal rate.   Pulmonary:      Effort: Pulmonary effort is normal. No respiratory distress.   Skin:     General: Skin is warm and dry.   Neurological:      Mental Status: She is alert and oriented to person, place, and time.   Psychiatric:         Mood and Affect: Mood normal.         Behavior: Behavior normal.         Thought Content: Thought content normal.         Judgment: Judgment normal.         Assessment/Plan   Problem List Items Addressed This Visit             ICD-10-CM    ASCUS with positive high risk HPV cervical R87.610, R87.810     Explained guidelines recommending colposcopy and role of colposcopy in prevention of cervical cancer  Offered today, declines - wishes to schedule follow up appointment  Will perform colpo at pt convenience         Generalized abdominal pain R10.84     Stool softener rx sent  Reviewed increased fluid intakes  Discussed possible MSK component - will examine pelvic floor at time of colposcopy            Other Visit Diagnoses         Codes    Slow transit constipation    -  Primary K59.01    Relevant Medications    docusate sodium (Dulcolax Stool Softener, dss,) 100 mg capsule    Atypical squamous cells of undetermined " significance on cytologic smear of cervix (ASC-US)     R87.610                 Servando Marmolejo MD 03/13/25 1:35 PM

## 2025-03-13 NOTE — TELEPHONE ENCOUNTER
Recent Visits  Date Type Provider Dept   01/15/25 Office Visit Sergo Alexander, DO [facility]   10/07/24 Office Visit Sergo Alexander, DO [facility]   07/01/24 Office Visit Sergo Alexander, DO [facility]   04/09/24 Office Visit Sergo Alexander, DO [facility]   Showing recent visits within past 365 days and meeting all other requirements  Future Appointments  Date Type Provider Dept   04/21/25 Appointment Sergo Alexander, DO [facility]   Showing future appointments within next 90 days and meeting all other requirements

## 2025-03-13 NOTE — ASSESSMENT & PLAN NOTE
Stool softener rx sent  Reviewed increased fluid intakes  Discussed possible MSK component - will examine pelvic floor at time of colposcopy

## 2025-03-13 NOTE — ASSESSMENT & PLAN NOTE
Explained guidelines recommending colposcopy and role of colposcopy in prevention of cervical cancer  Offered today, declines - wishes to schedule follow up appointment  Will perform colpo at pt convenience

## 2025-03-14 RX ORDER — AMPICILLIN TRIHYDRATE 500 MG
CAPSULE ORAL
Qty: 180 CAPSULE | Refills: 1 | Status: SHIPPED | OUTPATIENT
Start: 2025-03-14

## 2025-03-14 NOTE — PROGRESS NOTES
A (CATHETER STD JR4 CRV OD6 FR LG LUM RADOPQ FLXB DIST SEG L100) guide catheter was introduced. Disp: , Rfl:     oxyBUTYnin (DITROPAN-XL) 10 MG extended release tablet, Take 1 tablet by mouth daily, Disp: , Rfl:     omeprazole (PRILOSEC) 20 MG delayed release capsule, Take by mouth daily, Disp: , Rfl:     nitroGLYCERIN (NITROSTAT) 0.3 MG SL tablet, Place 1 tablet under the tongue every 5 minutes as needed for Chest pain up to max of 3 total doses. If no relief after 1 dose, call 911., Disp: 30 tablet, Rfl: 3    dulaglutide (TRULICITY) 1.5 MG/0.5ML SC injection, Inject 0.5 mLs into the skin once a week, Disp: 4 Adjustable Dose Pre-filled Pen Syringe, Rfl: 3    acetaminophen-codeine (TYLENOL #3) 300-30 MG per tablet, Take 1 tablet by mouth every 4 hours as needed., Disp: , Rfl:     amLODIPine (NORVASC) 10 MG tablet, Take 1 tablet by mouth daily, Disp: , Rfl:     Dulaglutide (TRULICITY) 0.75 MG/0.5ML SOPN, Inject 0.75 mg into the skin once a week, Disp: 4 Adjustable Dose Pre-filled Pen Syringe, Rfl: 3    DULoxetine 40 MG CPEP, Take 40 mg by mouth daily, Disp: 15 capsule, Rfl: 3    traZODone (DESYREL) 50 MG tablet, Take 1 tablet by mouth nightly as needed for Sleep, Disp: 15 tablet, Rfl: 2    gabapentin (NEURONTIN) 300 MG capsule, Take 1 capsule by mouth 2 times daily., Disp: , Rfl:     albuterol sulfate HFA (PROVENTIL;VENTOLIN;PROAIR) 108 (90 Base) MCG/ACT inhaler, Inhale 2 puffs into the lungs every 6 hours as needed for Wheezing or Shortness of Breath, Disp: , Rfl:     simvastatin (ZOCOR) 20 MG tablet, Take 1 tablet by mouth nightly, Disp: , Rfl:     levothyroxine (SYNTHROID) 112 MCG tablet, Take 1 tablet by mouth Daily, Disp: , Rfl:     Biotin 1 MG CAPS, Take by mouth, Disp: , Rfl:     hydroCHLOROthiazide (HYDRODIURIL) 25 MG tablet, Take 1 tablet by mouth daily, Disp: 30 tablet, Rfl: 1    tiZANidine (ZANAFLEX) 4 MG tablet, TAKE 1 TABLET BY MOUTH TWICE A DAY, Disp: , Rfl:     Cholecalciferol (VITAMIN D3) 2000 units CAPS, Take 1 capsule by mouth daily, Disp: , Rfl:     omeprazole (PRILOSEC OTC) 20 MG tablet,

## 2025-03-17 DIAGNOSIS — M54.16 RADICULOPATHY, LUMBAR REGION: ICD-10-CM

## 2025-03-17 DIAGNOSIS — M54.12 RADICULOPATHY, CERVICAL REGION: ICD-10-CM

## 2025-03-17 DIAGNOSIS — F41.1 GENERALIZED ANXIETY DISORDER: ICD-10-CM

## 2025-03-17 NOTE — TELEPHONE ENCOUNTER
[facility]     PT CALLED IN FOR MED REFILL    XANAX   TIZANIDINE     [facility]

## 2025-03-18 DIAGNOSIS — E55.9 VITAMIN D DEFICIENCY: ICD-10-CM

## 2025-03-18 DIAGNOSIS — F41.1 GENERALIZED ANXIETY DISORDER: ICD-10-CM

## 2025-03-18 NOTE — TELEPHONE ENCOUNTER
Recent Visits  Date Type Provider Dept   01/15/25 Office Visit Sergo Alexander,  [facility]   10/07/24 Office Visit Sergo Alexander, DO [facility]   Showing recent visits within past 180 days and meeting all other requirements  Future Appointments  Date Type Provider Dept   04/21/25 Appointment Sergo Alexander,  [facility]   Showing future appointments within next 90 days and meeting all other requirements

## 2025-03-19 DIAGNOSIS — K21.9 GASTRO-ESOPHAGEAL REFLUX DISEASE WITHOUT ESOPHAGITIS: ICD-10-CM

## 2025-03-19 RX ORDER — TIZANIDINE 4 MG/1
4 TABLET ORAL EVERY 6 HOURS PRN
Qty: 30 TABLET | Refills: 1 | Status: SHIPPED | OUTPATIENT
Start: 2025-03-19

## 2025-03-19 RX ORDER — ACETAMINOPHEN 500 MG
5000 TABLET ORAL DAILY
Qty: 30 TABLET | Refills: 3 | Status: SHIPPED | OUTPATIENT
Start: 2025-03-19

## 2025-03-19 RX ORDER — ALPRAZOLAM 1 MG/1
1 TABLET ORAL 3 TIMES DAILY PRN
Qty: 90 TABLET | Refills: 0 | Status: SHIPPED | OUTPATIENT
Start: 2025-03-19

## 2025-03-19 NOTE — TELEPHONE ENCOUNTER
Recent Visits  Date Type Provider Dept   01/15/25 Office Visit Sergo Alexander, DO [facility]   10/07/24 Office Visit Sergo Alexander, DO [facility]   07/01/24 Office Visit Sergo Alexander, DO [facility]   04/09/24 Office Visit Srego Alexander, DO [facility]   Showing recent visits within past 365 days and meeting all other requirements  Future Appointments  Date Type Provider Dept   04/21/25 Appointment Sergo Alexander, DO [facility]   Showing future appointments within next 90 days and meeting all other requirements

## 2025-03-20 RX ORDER — OMEPRAZOLE 20 MG/1
20 CAPSULE, DELAYED RELEASE ORAL DAILY
Qty: 90 CAPSULE | Refills: 1 | Status: SHIPPED | OUTPATIENT
Start: 2025-03-20

## 2025-04-02 ENCOUNTER — HOSPITAL ENCOUNTER (OUTPATIENT)
Dept: RADIOLOGY | Facility: CLINIC | Age: 60
Discharge: HOME | End: 2025-04-02
Payer: COMMERCIAL

## 2025-04-02 ENCOUNTER — OFFICE VISIT (OUTPATIENT)
Dept: PRIMARY CARE | Facility: CLINIC | Age: 60
End: 2025-04-02
Payer: COMMERCIAL

## 2025-04-02 VITALS
RESPIRATION RATE: 19 BRPM | WEIGHT: 206 LBS | SYSTOLIC BLOOD PRESSURE: 132 MMHG | TEMPERATURE: 97.9 F | DIASTOLIC BLOOD PRESSURE: 80 MMHG | OXYGEN SATURATION: 97 % | HEART RATE: 74 BPM | BODY MASS INDEX: 37.91 KG/M2 | HEIGHT: 62 IN

## 2025-04-02 DIAGNOSIS — J40 BRONCHITIS: ICD-10-CM

## 2025-04-02 DIAGNOSIS — E03.9 HYPOTHYROIDISM, UNSPECIFIED: ICD-10-CM

## 2025-04-02 DIAGNOSIS — I10 HTN (HYPERTENSION), BENIGN: ICD-10-CM

## 2025-04-02 DIAGNOSIS — M54.16 LUMBAR RADICULOPATHY: ICD-10-CM

## 2025-04-02 DIAGNOSIS — J40 BRONCHITIS: Primary | ICD-10-CM

## 2025-04-02 DIAGNOSIS — E11.65 TYPE 2 DIABETES MELLITUS WITH HYPERGLYCEMIA, WITHOUT LONG-TERM CURRENT USE OF INSULIN: ICD-10-CM

## 2025-04-02 PROCEDURE — 71046 X-RAY EXAM CHEST 2 VIEWS: CPT

## 2025-04-02 PROCEDURE — 99214 OFFICE O/P EST MOD 30 MIN: CPT | Performed by: FAMILY MEDICINE

## 2025-04-02 PROCEDURE — 71046 X-RAY EXAM CHEST 2 VIEWS: CPT | Performed by: RADIOLOGY

## 2025-04-02 RX ORDER — BENZONATATE 100 MG/1
100 CAPSULE ORAL
COMMUNITY
Start: 2025-03-31

## 2025-04-02 RX ORDER — CLINDAMYCIN HYDROCHLORIDE 300 MG/1
1 CAPSULE ORAL
COMMUNITY
Start: 2025-03-28

## 2025-04-02 RX ORDER — AMLODIPINE BESYLATE 10 MG/1
10 TABLET ORAL DAILY
Qty: 90 TABLET | Refills: 1 | Status: SHIPPED | OUTPATIENT
Start: 2025-04-02

## 2025-04-02 RX ORDER — HYDROCHLOROTHIAZIDE 25 MG/1
50 TABLET ORAL DAILY
Qty: 180 TABLET | Refills: 1 | Status: SHIPPED | OUTPATIENT
Start: 2025-04-02

## 2025-04-02 RX ORDER — PREDNISONE 10 MG/1
TABLET ORAL
Qty: 25 TABLET | Refills: 0 | Status: SHIPPED | OUTPATIENT
Start: 2025-04-02

## 2025-04-02 RX ORDER — LEVOTHYROXINE SODIUM 112 UG/1
112 TABLET ORAL DAILY
Qty: 90 TABLET | Refills: 1 | Status: SHIPPED | OUTPATIENT
Start: 2025-04-02

## 2025-04-02 RX ORDER — LIDOCAINE 50 MG/G
1 PATCH TOPICAL DAILY
Qty: 30 PATCH | Refills: 2 | Status: SHIPPED | OUTPATIENT
Start: 2025-04-02

## 2025-04-02 ASSESSMENT — PATIENT HEALTH QUESTIONNAIRE - PHQ9
1. LITTLE INTEREST OR PLEASURE IN DOING THINGS: NOT AT ALL
SUM OF ALL RESPONSES TO PHQ9 QUESTIONS 1 AND 2: 2
2. FEELING DOWN, DEPRESSED OR HOPELESS: MORE THAN HALF THE DAYS
10. IF YOU CHECKED OFF ANY PROBLEMS, HOW DIFFICULT HAVE THESE PROBLEMS MADE IT FOR YOU TO DO YOUR WORK, TAKE CARE OF THINGS AT HOME, OR GET ALONG WITH OTHER PEOPLE: SOMEWHAT DIFFICULT

## 2025-04-02 ASSESSMENT — ENCOUNTER SYMPTOMS
LOSS OF SENSATION IN FEET: 0
DEPRESSION: 1
OCCASIONAL FEELINGS OF UNSTEADINESS: 0

## 2025-04-02 NOTE — PROGRESS NOTES
"Subjective   Patient ID: Karina Pagan \"Henry" is a 59 y.o. female who presents for ER Follow-up (ER 03/30/2025; Viral URI; still have symptom; pain in her back; shortness of breath.).  History of Present Illness      Review of Systems   Constitutional: Negative.  Negative for activity change, appetite change, fatigue and fever.   HENT:  Negative for congestion, dental problem, ear discharge, ear pain, mouth sores, rhinorrhea, sinus pressure, sinus pain, sore throat, tinnitus and trouble swallowing.    Eyes:  Negative for photophobia, pain and visual disturbance.   Respiratory:  Negative for cough, chest tightness, shortness of breath and stridor.    Cardiovascular:  Negative for chest pain and palpitations.   Gastrointestinal:  Negative for abdominal distention, abdominal pain, blood in stool, constipation, diarrhea and rectal pain.   Endocrine: Negative for cold intolerance, heat intolerance, polydipsia, polyphagia and polyuria.   Genitourinary:  Negative for dysuria, flank pain, hematuria and urgency.   Musculoskeletal:  Negative for arthralgias, gait problem, myalgias and neck stiffness.   Skin:  Negative for color change and rash.   Allergic/Immunologic: Negative for environmental allergies and food allergies.   Neurological:  Negative for dizziness, seizures, syncope, speech difficulty and headaches.   Hematological:  Negative for adenopathy.   Psychiatric/Behavioral:  Negative for agitation, confusion, decreased concentration, dysphoric mood and sleep disturbance. The patient is not nervous/anxious.    The above were reviewed and noted negative except as noted in HPI and Problem List.    Objective     /80 (BP Location: Right arm, Patient Position: Sitting, BP Cuff Size: Adult)   Pulse 74   Temp 36.6 °C (97.9 °F) (Skin)   Resp 19   Ht 1.575 m (5' 2\")   Wt 93.4 kg (206 lb)   SpO2 97%   BMI 37.68 kg/m²      Physical Exam    Constitutional: Well developed, well nourished, alert and in no acute " distress   Eyes: Normal external exam. Pupils equally round and reactive to light with normal accommodation and extraocular movements intact.  Neck: Supple, no lymphadenopathy or masses.   Cardiovascular: Regular rate and rhythm, normal S1 and S2, no murmurs, gallops, or rubs. Radial pulses normal. No peripheral edema.  Pulmonary: No respiratory distress, lungs clear to auscultation bilaterally. No wheezes, rhonchi, rales.  Abdomen: soft,non tender, non distended, without masses or HSM  Skin: Warm, well perfused, normal skin turgor and color.   Neurologic: Cranial nerves II-XII grossly intact.   Psychiatric: Mood calm and affect normal  Musculoskeletal: Moving all extremities without restriction  The above were reviewed and noted negative except as noted in HPI and Problem List.    Assessment & Plan      Results         Sergo Alexander DO     This medical note was created with the assistance of artificial intelligence (AI) for documentation purposes. The content has been reviewed and confirmed by the healthcare provider for accuracy and completeness. Patient consented to the use of audio recording and use of AI during their visit.

## 2025-04-07 ENCOUNTER — APPOINTMENT (OUTPATIENT)
Dept: OBSTETRICS AND GYNECOLOGY | Facility: CLINIC | Age: 60
End: 2025-04-07
Payer: COMMERCIAL

## 2025-04-07 VITALS
DIASTOLIC BLOOD PRESSURE: 82 MMHG | HEIGHT: 62 IN | SYSTOLIC BLOOD PRESSURE: 144 MMHG | WEIGHT: 210 LBS | BODY MASS INDEX: 38.64 KG/M2

## 2025-04-07 DIAGNOSIS — R87.610 ASCUS WITH POSITIVE HIGH RISK HPV CERVICAL: ICD-10-CM

## 2025-04-07 DIAGNOSIS — R87.810 ASCUS WITH POSITIVE HIGH RISK HPV CERVICAL: ICD-10-CM

## 2025-04-07 PROCEDURE — 57456 ENDOCERV CURETTAGE W/SCOPE: CPT | Performed by: STUDENT IN AN ORGANIZED HEALTH CARE EDUCATION/TRAINING PROGRAM

## 2025-04-07 ASSESSMENT — PAIN SCALES - GENERAL: PAINLEVEL_OUTOF10: 8

## 2025-04-07 NOTE — PROGRESS NOTES
"Patient ID: Karina Pagan \"Henry" is a 59 y.o. female.    Colposcopy    Date/Time: 4/7/2025 2:20 PM    Performed by: Servando Marmolejo MD  Authorized by: Servando Marmolejo MD    Procedure location: cervix    Consent:     Patient questions answered: yes      Risks and benefits of the procedure and its alternatives discussed: yes      Consent obtained:  Written    Consent given by:  Patient  Universal Protocol:     A time out verifies correct patient, procedure, equipment, support staff, and site/side marked as required: yes      Patient states understanding of procedure being performed: yes      Relevant documents present and verified: yes      Test results available and properly labeled: yes      Required blood products, implants, devices, and special equipment available: yes    Indication:     Cervical indication(s): high-risk HPV positive and ASCUS    Pre-procedure:     Speculum was placed in the vagina: yes      Prep solution(s): acetic acid    Procedure:     Colposcopy with: endocervical curettage      Colposcopy details:  Unable to completely visualize SCJ. Nabothian cyst present at 5:00. No acetowhite changes or abnormal vasculature visualized.    Cervix visibility: fully visualized      SCJ visibility: not fully visualized      Cervical impression: normal/benign    Post-procedure:     Patient tolerance of procedure:  Patient tolerated the procedure well with no immediate complications    Instructions and paperwork completed: yes      Educational handouts given: yes      "

## 2025-04-13 ENCOUNTER — HOSPITAL ENCOUNTER (EMERGENCY)
Age: 60
Discharge: HOME | End: 2025-04-13
Payer: COMMERCIAL

## 2025-04-13 ENCOUNTER — APPOINTMENT (OUTPATIENT)
Dept: GENERAL RADIOLOGY | Age: 60
End: 2025-04-13
Payer: COMMERCIAL

## 2025-04-13 VITALS
TEMPERATURE: 98.2 F | BODY MASS INDEX: 39.38 KG/M2 | HEIGHT: 62 IN | WEIGHT: 214 LBS | HEART RATE: 78 BPM | SYSTOLIC BLOOD PRESSURE: 131 MMHG | DIASTOLIC BLOOD PRESSURE: 93 MMHG | RESPIRATION RATE: 18 BRPM | OXYGEN SATURATION: 99 %

## 2025-04-13 DIAGNOSIS — S92.512A CLOSED DISPLACED FRACTURE OF PROXIMAL PHALANX OF LESSER TOE OF LEFT FOOT, INITIAL ENCOUNTER: Primary | ICD-10-CM

## 2025-04-13 PROCEDURE — 73630 X-RAY EXAM OF FOOT: CPT

## 2025-04-13 PROCEDURE — 99283 EMERGENCY DEPT VISIT LOW MDM: CPT

## 2025-04-13 PROCEDURE — 6370000000 HC RX 637 (ALT 250 FOR IP)

## 2025-04-13 RX ORDER — TRAMADOL HYDROCHLORIDE 50 MG/1
50 TABLET ORAL EVERY 8 HOURS PRN
Qty: 9 TABLET | Refills: 0 | Status: SHIPPED | OUTPATIENT
Start: 2025-04-13 | End: 2025-04-16

## 2025-04-13 RX ORDER — TRAMADOL HYDROCHLORIDE 50 MG/1
50 TABLET ORAL ONCE
Status: COMPLETED | OUTPATIENT
Start: 2025-04-13 | End: 2025-04-13

## 2025-04-13 RX ORDER — TRAMADOL HYDROCHLORIDE 50 MG/1
50 TABLET ORAL EVERY 8 HOURS PRN
Qty: 9 TABLET | Refills: 0 | Status: SHIPPED | OUTPATIENT
Start: 2025-04-13 | End: 2025-04-13

## 2025-04-13 RX ADMIN — TRAMADOL HYDROCHLORIDE 50 MG: 50 TABLET, COATED ORAL at 19:44

## 2025-04-13 ASSESSMENT — PAIN DESCRIPTION - LOCATION: LOCATION: FOOT

## 2025-04-13 ASSESSMENT — PAIN - FUNCTIONAL ASSESSMENT: PAIN_FUNCTIONAL_ASSESSMENT: 0-10

## 2025-04-13 ASSESSMENT — LIFESTYLE VARIABLES
HOW MANY STANDARD DRINKS CONTAINING ALCOHOL DO YOU HAVE ON A TYPICAL DAY: PATIENT DOES NOT DRINK
HOW OFTEN DO YOU HAVE A DRINK CONTAINING ALCOHOL: NEVER

## 2025-04-13 ASSESSMENT — PAIN SCALES - GENERAL
PAINLEVEL_OUTOF10: 8
PAINLEVEL_OUTOF10: 9

## 2025-04-13 ASSESSMENT — PAIN DESCRIPTION - ORIENTATION: ORIENTATION: LEFT

## 2025-04-13 NOTE — ED PROVIDER NOTES
[facility] EMERGENCY DEPARTMENT  EMERGENCY DEPARTMENT ENCOUNTER      Pt Name: Lizbet Dial  MRN: [id number]  Birthdate 1965  Date of evaluation: 4/13/2025  Provider: DUONG Cedillo  7:07 PM EDT      CHIEF COMPLAINT       Chief Complaint   Patient presents with    Foot Injury     Dropped a bed board on left foot this morning         HISTORY OF PRESENT ILLNESS   (Location/Symptom, Timing/Onset, Context/Setting, Quality, Duration, Modifying Factors, Severity)  Note limiting factors.   Lizbet Dial is a 59 y.o. female who presents to the emergency department for evaluation of left lateral foot pain.  Patient states that she accidentally dropped a wooden bed board on her left foot this morning.  It struck the lateral side of her left foot.  She states she has been having pain and swelling to that area since.  She has been able to ambulate however just doing so with a limp.  She denies numbness.    HPI    Nursing Notes were reviewed.    REVIEW OF SYSTEMS    (2-9 systems for level 4, 10 or more for level 5)     Review of Systems   Constitutional:  Negative for chills and fever.   HENT:  Negative for congestion.    Eyes:  Negative for photophobia.   Respiratory:  Negative for cough and shortness of breath.    Cardiovascular:  Negative for chest pain.   Gastrointestinal:  Negative for abdominal pain, diarrhea, nausea and vomiting.   Genitourinary:  Negative for difficulty urinating.   Musculoskeletal:  Negative for myalgias.        Left lateral foot pain   Neurological:  Negative for headaches.   Psychiatric/Behavioral:  Negative for confusion.        Except as noted above the remainder of the review of systems was reviewed and negative.       PAST MEDICAL HISTORY     Past Medical History:   Diagnosis Date    Anemia     C. difficile colitis 01/2013    Chronic fatigue syndrome     Depression     [place]    Diabetes mellitus (HCC)     Diverticulitis large intestine 2001    Fibromyalgia     Fibromyalgia 03/24/2015
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
## 2025-04-15 ENCOUNTER — APPOINTMENT (OUTPATIENT)
Dept: PRIMARY CARE | Facility: CLINIC | Age: 60
End: 2025-04-15
Payer: COMMERCIAL

## 2025-04-15 LAB
LABORATORY COMMENT REPORT: NORMAL
PATH REPORT.FINAL DX SPEC: NORMAL
PATH REPORT.GROSS SPEC: NORMAL
PATH REPORT.RELEVANT HX SPEC: NORMAL
PATH REPORT.TOTAL CANCER: NORMAL

## 2025-04-17 ENCOUNTER — TELEPHONE (OUTPATIENT)
Dept: PRIMARY CARE | Facility: CLINIC | Age: 60
End: 2025-04-17
Payer: COMMERCIAL

## 2025-04-21 ENCOUNTER — APPOINTMENT (OUTPATIENT)
Dept: PRIMARY CARE | Facility: CLINIC | Age: 60
End: 2025-04-21
Payer: COMMERCIAL

## 2025-04-22 DIAGNOSIS — F41.1 GENERALIZED ANXIETY DISORDER: ICD-10-CM

## 2025-04-22 DIAGNOSIS — M54.16 RADICULOPATHY, LUMBAR REGION: ICD-10-CM

## 2025-04-22 NOTE — TELEPHONE ENCOUNTER
Dr. Alexander Pt      PT CALLED IN FOR MED REFILL     XANAX  TYLENOL 3  *Pt is completely out*      [facility] [address]

## 2025-04-22 NOTE — TELEPHONE ENCOUNTER
Recent Visits  Date Type Provider Dept   04/02/25 Office Visit Sergo Alexander, DO [facility]   01/31/25 Procedure Visit Gena Ennis, APRN-CNP [facility]   01/15/25 Office Visit Sergo Alexander, DO [facility]   10/30/24 Office Visit Jossue De La Paz, APRN-CNP [facility]   10/07/24 Office Visit Sergo Alexander, DO [facility]   07/01/24 Office Visit Sergo Alexander, DO [facility]   Showing recent visits within past 365 days and meeting all other requirements  Future Appointments  Date Type Provider Dept   05/20/25 Appointment Sergo Alexander, DO [facility]   06/02/25 Appointment Sergo Alexander, DO [facility]   Showing future appointments within next 90 days and meeting all other requirements

## 2025-04-23 ENCOUNTER — OFFICE VISIT (OUTPATIENT)
Dept: ORTHOPEDIC SURGERY | Facility: CLINIC | Age: 60
End: 2025-04-23
Payer: COMMERCIAL

## 2025-04-23 ENCOUNTER — HOSPITAL ENCOUNTER (OUTPATIENT)
Dept: RADIOLOGY | Facility: CLINIC | Age: 60
Discharge: HOME | End: 2025-04-23
Payer: COMMERCIAL

## 2025-04-23 DIAGNOSIS — M79.672 LEFT FOOT PAIN: ICD-10-CM

## 2025-04-23 DIAGNOSIS — S92.515A CLOSED NONDISPLACED FRACTURE OF PROXIMAL PHALANX OF LESSER TOE OF LEFT FOOT, INITIAL ENCOUNTER: Primary | ICD-10-CM

## 2025-04-23 PROCEDURE — 28510 TREATMENT OF TOE FRACTURE: CPT | Performed by: INTERNAL MEDICINE

## 2025-04-23 PROCEDURE — 73660 X-RAY EXAM OF TOE(S): CPT | Mod: LEFT SIDE | Performed by: INTERNAL MEDICINE

## 2025-04-23 PROCEDURE — 99212 OFFICE O/P EST SF 10 MIN: CPT | Performed by: INTERNAL MEDICINE

## 2025-04-23 PROCEDURE — 99204 OFFICE O/P NEW MOD 45 MIN: CPT | Performed by: INTERNAL MEDICINE

## 2025-04-23 PROCEDURE — L3260 AMBULATORY SURGICAL BOOT EAC: HCPCS | Performed by: INTERNAL MEDICINE

## 2025-04-23 PROCEDURE — 3044F HG A1C LEVEL LT 7.0%: CPT | Performed by: INTERNAL MEDICINE

## 2025-04-23 PROCEDURE — 73660 X-RAY EXAM OF TOE(S): CPT | Mod: LT

## 2025-04-23 PROCEDURE — 4010F ACE/ARB THERAPY RXD/TAKEN: CPT | Performed by: INTERNAL MEDICINE

## 2025-04-23 RX ORDER — HYDROCODONE BITARTRATE AND ACETAMINOPHEN 5; 325 MG/1; MG/1
1 TABLET ORAL EVERY 12 HOURS PRN
Qty: 14 TABLET | Refills: 0 | Status: SHIPPED | OUTPATIENT
Start: 2025-04-23 | End: 2025-04-30

## 2025-04-23 RX ORDER — ALPRAZOLAM 1 MG/1
1 TABLET ORAL 3 TIMES DAILY PRN
Qty: 90 TABLET | Refills: 0 | Status: SHIPPED | OUTPATIENT
Start: 2025-04-23

## 2025-04-23 RX ORDER — ACETAMINOPHEN AND CODEINE PHOSPHATE 300; 30 MG/1; MG/1
1 TABLET ORAL EVERY 4 HOURS PRN
Qty: 28 TABLET | Refills: 0 | Status: SHIPPED | OUTPATIENT
Start: 2025-04-23
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
## 2025-05-15 ENCOUNTER — DOCUMENTATION (OUTPATIENT)
Dept: ORTHOPEDIC SURGERY | Facility: CLINIC | Age: 60
End: 2025-05-15
Payer: COMMERCIAL

## 2025-05-15 ENCOUNTER — OFFICE VISIT (OUTPATIENT)
Dept: ORTHOPEDIC SURGERY | Facility: CLINIC | Age: 60
End: 2025-05-15
Payer: COMMERCIAL

## 2025-05-15 ENCOUNTER — HOSPITAL ENCOUNTER (OUTPATIENT)
Dept: RADIOLOGY | Facility: CLINIC | Age: 60
Discharge: HOME | End: 2025-05-15
Payer: COMMERCIAL

## 2025-05-15 DIAGNOSIS — S92.515A CLOSED NONDISPLACED FRACTURE OF PROXIMAL PHALANX OF LESSER TOE OF LEFT FOOT, INITIAL ENCOUNTER: ICD-10-CM

## 2025-05-15 PROCEDURE — 99212 OFFICE O/P EST SF 10 MIN: CPT | Performed by: INTERNAL MEDICINE

## 2025-05-15 PROCEDURE — 73660 X-RAY EXAM OF TOE(S): CPT | Mod: LT

## 2025-05-15 RX ORDER — ACETAMINOPHEN AND CODEINE PHOSPHATE 300; 30 MG/1; MG/1
1 TABLET ORAL EVERY 12 HOURS PRN
Qty: 14 TABLET | Refills: 0 | Status: SHIPPED | OUTPATIENT
Start: 2025-05-15 | End: 2025-05-22
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
## 2025-05-15 NOTE — PROGRESS NOTES
05/15/25  Pt was to be fit today in dr. Cardoza's clinic w/ a carbon plate.  She did not have proper shoes and will call DME to schedule for either 05/16/25 or 05/19/25.

## 2025-05-17 DIAGNOSIS — F33.41 RECURRENT MAJOR DEPRESSIVE DISORDER, IN PARTIAL REMISSION: ICD-10-CM

## 2025-05-17 DIAGNOSIS — F41.1 GENERALIZED ANXIETY DISORDER: ICD-10-CM

## 2025-05-19 NOTE — TELEPHONE ENCOUNTER
Recent Visits  Date Type Provider Dept   04/02/25 Office Visit Sergo Alexander, DO [facility]   Showing recent visits within past 90 days and meeting all other requirements  Future Appointments  Date Type Provider Dept   05/20/25 Appointment Sergo Alexander, DO [facility]   06/02/25 Appointment Sergo Alexander, DO [facility]   Showing future appointments within next 90 days and meeting all other requirements

## 2025-05-20 ENCOUNTER — APPOINTMENT (OUTPATIENT)
Dept: PRIMARY CARE | Facility: CLINIC | Age: 60
End: 2025-05-20
Payer: COMMERCIAL

## 2025-05-20 VITALS
HEIGHT: 62 IN | TEMPERATURE: 98.2 F | RESPIRATION RATE: 19 BRPM | DIASTOLIC BLOOD PRESSURE: 100 MMHG | SYSTOLIC BLOOD PRESSURE: 138 MMHG | HEART RATE: 65 BPM | WEIGHT: 218 LBS | BODY MASS INDEX: 40.12 KG/M2 | OXYGEN SATURATION: 98 %

## 2025-05-20 DIAGNOSIS — E66.09 EXOGENOUS OBESITY: ICD-10-CM

## 2025-05-20 DIAGNOSIS — M80.80XA LOCALIZED OSTEOPOROSIS WITH CURRENT PATHOLOGICAL FRACTURE, INITIAL ENCOUNTER: ICD-10-CM

## 2025-05-20 DIAGNOSIS — N81.4 UTERINE PROLAPSE: ICD-10-CM

## 2025-05-20 DIAGNOSIS — E11.69 TYPE 2 DIABETES MELLITUS WITH OTHER SPECIFIED COMPLICATION, WITHOUT LONG-TERM CURRENT USE OF INSULIN: ICD-10-CM

## 2025-05-20 DIAGNOSIS — F33.41 RECURRENT MAJOR DEPRESSIVE DISORDER, IN PARTIAL REMISSION: ICD-10-CM

## 2025-05-20 DIAGNOSIS — F33.2 MAJOR DEPRESSIVE DISORDER, RECURRENT SEVERE WITHOUT PSYCHOTIC FEATURES (MULTI): ICD-10-CM

## 2025-05-20 DIAGNOSIS — F60.3 BORDERLINE PERSONALITY DISORDER (MULTI): ICD-10-CM

## 2025-05-20 DIAGNOSIS — N95.2 POSTMENOPAUSAL ATROPHIC VAGINITIS: ICD-10-CM

## 2025-05-20 DIAGNOSIS — F41.1 GENERALIZED ANXIETY DISORDER: ICD-10-CM

## 2025-05-20 DIAGNOSIS — E11.9 TYPE 2 DIABETES MELLITUS WITHOUT COMPLICATION, WITHOUT LONG-TERM CURRENT USE OF INSULIN: Primary | ICD-10-CM

## 2025-05-20 DIAGNOSIS — M54.16 RADICULOPATHY, LUMBAR REGION: ICD-10-CM

## 2025-05-20 PROBLEM — F33.3 SEVERE RECURRENT MAJOR DEPRESSION WITH PSYCHOTIC FEATURES (MULTI): Status: RESOLVED | Noted: 2025-01-22 | Resolved: 2025-05-20

## 2025-05-20 LAB
POC FINGERSTICK BLOOD GLUCOSE: 123 MG/DL (ref 70–100)
POC HEMOGLOBIN A1C: 6.7 % (ref 4.2–6.5)

## 2025-05-20 PROCEDURE — 83036 HEMOGLOBIN GLYCOSYLATED A1C: CPT | Performed by: FAMILY MEDICINE

## 2025-05-20 PROCEDURE — 99214 OFFICE O/P EST MOD 30 MIN: CPT | Performed by: FAMILY MEDICINE

## 2025-05-20 PROCEDURE — 3075F SYST BP GE 130 - 139MM HG: CPT | Performed by: FAMILY MEDICINE

## 2025-05-20 PROCEDURE — 3080F DIAST BP >= 90 MM HG: CPT | Performed by: FAMILY MEDICINE

## 2025-05-20 PROCEDURE — 3008F BODY MASS INDEX DOCD: CPT | Performed by: FAMILY MEDICINE

## 2025-05-20 PROCEDURE — 4010F ACE/ARB THERAPY RXD/TAKEN: CPT | Performed by: FAMILY MEDICINE

## 2025-05-20 PROCEDURE — 82962 GLUCOSE BLOOD TEST: CPT | Performed by: FAMILY MEDICINE

## 2025-05-20 PROCEDURE — 1036F TOBACCO NON-USER: CPT | Performed by: FAMILY MEDICINE

## 2025-05-20 PROCEDURE — 3044F HG A1C LEVEL LT 7.0%: CPT | Performed by: FAMILY MEDICINE

## 2025-05-20 RX ORDER — TIRZEPATIDE 2.5 MG/.5ML
2.5 INJECTION, SOLUTION SUBCUTANEOUS WEEKLY
Qty: 2 ML | Refills: 1 | Status: SHIPPED | OUTPATIENT
Start: 2025-05-20 | End: 2025-05-20 | Stop reason: SDUPTHER

## 2025-05-20 RX ORDER — ALPRAZOLAM 1 MG/1
1 TABLET ORAL 3 TIMES DAILY PRN
Qty: 90 TABLET | Refills: 0 | Status: SHIPPED | OUTPATIENT
Start: 2025-05-20

## 2025-05-20 RX ORDER — DULOXETIN HYDROCHLORIDE 20 MG/1
20 CAPSULE, DELAYED RELEASE ORAL 2 TIMES DAILY
Qty: 180 CAPSULE | Refills: 0 | Status: SHIPPED | OUTPATIENT
Start: 2025-05-20

## 2025-05-20 RX ORDER — ACETAMINOPHEN AND CODEINE PHOSPHATE 300; 30 MG/1; MG/1
1 TABLET ORAL EVERY 4 HOURS PRN
Qty: 28 TABLET | Refills: 0 | Status: SHIPPED | OUTPATIENT
Start: 2025-05-20

## 2025-05-20 RX ORDER — DULOXETIN HYDROCHLORIDE 20 MG/1
20 CAPSULE, DELAYED RELEASE ORAL 2 TIMES DAILY
Qty: 180 CAPSULE | Refills: 1 | Status: SHIPPED | OUTPATIENT
Start: 2025-05-20

## 2025-05-20 ASSESSMENT — ENCOUNTER SYMPTOMS
OCCASIONAL FEELINGS OF UNSTEADINESS: 0
LOSS OF SENSATION IN FEET: 0
DEPRESSION: 1

## 2025-05-20 NOTE — PROGRESS NOTES
"Subjective   Patient ID: Karina Pagan \"Henry" is a 59 y.o. female who presents for Weight Loss (Discuss weight loss option) and Toe Injury (left nondisplaced base of the 5th proximal phalanx fracture).  History of Present Illness  The patient is a 59-year-old female who presents for a possible weight loss program. She also wishes to discuss a left nondisplaced fracture of the fifth phalanx.    Review of Systems   Constitutional: Negative.  Negative for activity change, appetite change, fatigue and fever.   HENT:  Negative for congestion, dental problem, ear discharge, ear pain, mouth sores, rhinorrhea, sinus pressure, sinus pain, sore throat, tinnitus and trouble swallowing.    Eyes:  Negative for photophobia, pain and visual disturbance.   Respiratory:  Negative for cough, chest tightness, shortness of breath and stridor.    Cardiovascular:  Negative for chest pain and palpitations.   Gastrointestinal:  Negative for abdominal distention, abdominal pain, blood in stool, constipation, diarrhea and rectal pain.   Endocrine: Negative for cold intolerance, heat intolerance, polydipsia, polyphagia and polyuria.   Genitourinary:  Negative for dysuria, flank pain, hematuria and urgency.   Musculoskeletal:  Negative for arthralgias, gait problem, myalgias and neck stiffness.   Skin:  Negative for color change and rash.   Allergic/Immunologic: Negative for environmental allergies and food allergies.   Neurological:  Negative for dizziness, seizures, syncope, speech difficulty and headaches.   Hematological:  Negative for adenopathy.   Psychiatric/Behavioral:  Negative for agitation, confusion, decreased concentration, dysphoric mood and sleep disturbance. The patient is not nervous/anxious.    The above were reviewed and noted negative except as noted in HPI and Problem List.    Objective     BP (!) 138/100 (BP Location: Right arm, Patient Position: Sitting, BP Cuff Size: Adult)   Pulse 65   Temp 36.8 °C (98.2 °F) " "(Temporal)   Resp 19   Ht 1.575 m (5' 2\")   Wt 98.9 kg (218 lb)   SpO2 98%   BMI 39.87 kg/m²      Physical Exam    Constitutional: Well developed, well nourished, alert and in no acute distress   Eyes: Normal external exam. Pupils equally round and reactive to light with normal accommodation and extraocular movements intact.  Neck: Supple, no lymphadenopathy or masses.   Cardiovascular: Regular rate and rhythm, normal S1 and S2, no murmurs, gallops, or rubs. Radial pulses normal. No peripheral edema.  Pulmonary: No respiratory distress, lungs clear to auscultation bilaterally. No wheezes, rhonchi, rales.  Abdomen: soft,non tender, non distended, without masses or HSM  Skin: Warm, well perfused, normal skin turgor and color.   Neurologic: Cranial nerves II-XII grossly intact.   Psychiatric: Mood calm and affect normal  Musculoskeletal: Moving all extremities without restriction  The above were reviewed and noted negative except as noted in HPI and Problem List.    Problem List Items Addressed This Visit       Anxiety disorder, unspecified    Relevant Medications    ALPRAZolam (Xanax) 1 mg tablet    DULoxetine (Cymbalta) 20 mg DR capsule    Recurrent major depressive disorder, in partial remission (CMS-[facility])    Relevant Medications    DULoxetine (Cymbalta) 20 mg DR capsule    RESOLVED: Severe recurrent major depression with psychotic features (Multi)    moniotred         Type 2 diabetes mellitus without complication, without long-term current use of insulin - Primary    Monitored/Hgba1c 6.7         Relevant Medications    tirzepatide (Mounjaro) 2.5 mg/0.5 mL pen injector    Borderline personality disorder (Multi)    moniotred          Other Visit Diagnoses         Radiculopathy, lumbar region        Relevant Medications    acetaminophen-codeine (Tylenol w/ Codeine #3) 300-30 mg tablet      Postmenopausal atrophic vaginitis        Relevant Medications    estrogens, conjugated, (Premarin) vaginal cream      Uterine " prolapse        Relevant Orders    Referral to Gynecology      Exogenous obesity          BMI 39.0-39.9,adult                 Assessment & Plan      Results         Sergo Alexander DO     This medical note was created with the assistance of artificial intelligence (AI) for documentation purposes. The content has been reviewed and confirmed by the healthcare provider for accuracy and completeness. Patient consented to the use of audio recording and use of AI during their visit.

## 2025-05-20 NOTE — PATIENT INSTRUCTIONS
Follow up in 3 months    Continue current medications and therapy for chronic medical conditions.    Patient was advised importance of proper diet/nutrition in addition adequate hydration. Patient was encouraged moderate exercise program to include 30 minutes daily for 5 days of the week or 150 minutes weekly. Patient will follow-up with us as scheduled.

## 2025-05-21 ENCOUNTER — OFFICE VISIT (OUTPATIENT)
Age: 60
End: 2025-05-21
Payer: COMMERCIAL

## 2025-05-21 VITALS
SYSTOLIC BLOOD PRESSURE: 146 MMHG | WEIGHT: 216.05 LBS | DIASTOLIC BLOOD PRESSURE: 99 MMHG | HEIGHT: 62 IN | HEART RATE: 85 BPM | BODY MASS INDEX: 39.76 KG/M2

## 2025-05-21 DIAGNOSIS — E11.65 INADEQUATELY CONTROLLED DIABETES MELLITUS (HCC): ICD-10-CM

## 2025-05-21 DIAGNOSIS — E11.65 INADEQUATELY CONTROLLED DIABETES MELLITUS (HCC): Primary | ICD-10-CM

## 2025-05-21 DIAGNOSIS — E03.9 ACQUIRED HYPOTHYROIDISM: ICD-10-CM

## 2025-05-21 DIAGNOSIS — E66.9 OBESITY (BMI 30-39.9): ICD-10-CM

## 2025-05-21 DIAGNOSIS — I10 PRIMARY HYPERTENSION: ICD-10-CM

## 2025-05-21 LAB
ANION GAP SERPL CALCULATED.3IONS-SCNC: 13 MEQ/L (ref 9–15)
BUN SERPL-MCNC: 12 MG/DL (ref 6–20)
CALCIUM SERPL-MCNC: 9.4 MG/DL (ref 8.5–9.9)
CHLORIDE SERPL-SCNC: 106 MEQ/L (ref 95–107)
CHP ED QC CHECK: NORMAL
CO2 SERPL-SCNC: 24 MEQ/L (ref 20–31)
CREAT SERPL-MCNC: 0.84 MG/DL (ref 0.5–0.9)
GLUCOSE BLD-MCNC: 170 MG/DL
GLUCOSE SERPL-MCNC: 143 MG/DL (ref 70–99)
HBA1C MFR BLD: 6.8 %
POTASSIUM SERPL-SCNC: 3.4 MEQ/L (ref 3.4–4.9)
SODIUM SERPL-SCNC: 143 MEQ/L (ref 135–144)
T4 FREE SERPL-MCNC: 1.2 NG/DL (ref 0.84–1.68)
TSH SERPL-MCNC: 3 UIU/ML (ref 0.44–3.86)

## 2025-05-21 PROCEDURE — 3080F DIAST BP >= 90 MM HG: CPT | Performed by: INTERNAL MEDICINE

## 2025-05-21 PROCEDURE — 99214 OFFICE O/P EST MOD 30 MIN: CPT | Performed by: INTERNAL MEDICINE

## 2025-05-21 PROCEDURE — PBSHW POCT GLUCOSE: Performed by: INTERNAL MEDICINE

## 2025-05-21 PROCEDURE — 82962 GLUCOSE BLOOD TEST: CPT | Performed by: INTERNAL MEDICINE

## 2025-05-21 PROCEDURE — 3077F SYST BP >= 140 MM HG: CPT | Performed by: INTERNAL MEDICINE

## 2025-05-21 PROCEDURE — G8427 DOCREV CUR MEDS BY ELIG CLIN: HCPCS | Performed by: INTERNAL MEDICINE

## 2025-05-21 PROCEDURE — 1036F TOBACCO NON-USER: CPT | Performed by: INTERNAL MEDICINE

## 2025-05-21 PROCEDURE — 2022F DILAT RTA XM EVC RTNOPTHY: CPT | Performed by: INTERNAL MEDICINE

## 2025-05-21 PROCEDURE — 3017F COLORECTAL CA SCREEN DOC REV: CPT | Performed by: INTERNAL MEDICINE

## 2025-05-21 PROCEDURE — G8417 CALC BMI ABV UP PARAM F/U: HCPCS | Performed by: INTERNAL MEDICINE

## 2025-05-21 PROCEDURE — 3044F HG A1C LEVEL LT 7.0%: CPT | Performed by: INTERNAL MEDICINE

## 2025-05-21 PROCEDURE — PBSHW POCT GLYCOSYLATED HEMOGLOBIN (HGB A1C): Performed by: INTERNAL MEDICINE

## 2025-05-21 PROCEDURE — 83036 HEMOGLOBIN GLYCOSYLATED A1C: CPT | Performed by: INTERNAL MEDICINE

## 2025-05-21 PROCEDURE — 99213 OFFICE O/P EST LOW 20 MIN: CPT | Performed by: INTERNAL MEDICINE

## 2025-05-21 RX ORDER — AMLODIPINE BESYLATE 10 MG/1
5 TABLET ORAL DAILY
Qty: 30 TABLET | Refills: 4 | Status: SHIPPED | OUTPATIENT
Start: 2025-05-21

## 2025-05-21 RX ORDER — ATORVASTATIN CALCIUM 10 MG/1
10 TABLET, FILM COATED ORAL DAILY
COMMUNITY

## 2025-05-21 NOTE — PROGRESS NOTES
2025    Assessment:       Diagnosis Orders   1. Inadequately controlled diabetes mellitus (HCC)  POCT Glucose    POCT glycosylated hemoglobin (Hb A1C)    Basic Metabolic Panel    Hemoglobin A1C    Damián Linares MD, Bariatric Surgery, [place]      2. Acquired hypothyroidism  TSH    T4, Free    Damián Linares MD, Bariatric Surgery, [place]      3. Obesity (BMI 30-39.9)  Semaglutide,0.25 or 0.5MG/DOS, 2 MG/3ML SOPN    Damián Linares MD, Bariatric Surgery, [place]      4. Primary hypertension              PLAN:     Orders Placed This Encounter   Procedures    Basic Metabolic Panel     Standing Status:   Future     Number of Occurrences:   1     Expected Date:   2025     Expiration Date:   2026    Hemoglobin A1C     Standing Status:   Future     Number of Occurrences:   1     Expected Date:   2025     Expiration Date:   2026    TSH     Standing Status:   Future     Number of Occurrences:   1     Expected Date:   2025     Expiration Date:   2026    T4, Free     Standing Status:   Future     Number of Occurrences:   1     Expected Date:   2025     Expiration Date:   2026    Damián Linares MD, Bariatric Surgery, [place]     Referral Priority:   Routine     Referral Type:   Eval and Treat     Referral Reason:   Specialty Services Required     Referred to Provider:   Damián Pitt MD     Requested Specialty:   Bariatric Surgery     Number of Visits Requested:   1    POCT Glucose    POCT glycosylated hemoglobin (Hb A1C)     Orders Placed This Encounter   Medications    Semaglutide,0.25 or 0.5MG/DOS, 2 MG/3ML SOPN     Si.5 mg once a week     Dispense:  3 mL     Refill:  4    amLODIPine (NORVASC) 10 MG tablet     Sig: Take 0.5 tablets by mouth daily     Dispense:  30 tablet     Refill:  4   Continue current dose of thyroid replacement Norvasc lisinopril prescription given to [facility]    Continue with Norvasc 10 mg daily

## 2025-05-22 LAB
ESTIMATED AVERAGE GLUCOSE: 143 MG/DL
HBA1C MFR BLD: 6.6 % (ref 4–6)

## 2025-05-23 NOTE — TELEPHONE ENCOUNTER
Patient of Dr. Alexander    Prior auth  mounjaro     It was denied due to patient not trying 3 formulary medicaitons  Patient did try metformin and had diarrhea and nausea  Tried trulicity and she wasn't able to find the medication offten    She has to try one more formulary medication which she wants to to try Victoza if that can be sent to her pharmacy.     [facility] in [place]    I will work on an appeal for the patient for the mounjaro

## 2025-05-24 RX ORDER — TIRZEPATIDE 2.5 MG/.5ML
2.5 INJECTION, SOLUTION SUBCUTANEOUS WEEKLY
Qty: 2 ML | Refills: 0 | COMMUNITY
Start: 2025-05-24

## 2025-05-26 RX ORDER — LIRAGLUTIDE 6 MG/ML
0.6 INJECTION SUBCUTANEOUS DAILY
Qty: 1 ML | Refills: 1 | Status: SHIPPED | OUTPATIENT
Start: 2025-05-26 | End: 2025-05-27

## 2025-05-27 RX ORDER — LIRAGLUTIDE 6 MG/ML
0.6 INJECTION SUBCUTANEOUS DAILY
Qty: 1 ML | Refills: 1 | Status: SHIPPED | OUTPATIENT
Start: 2025-05-27

## 2025-05-29 DIAGNOSIS — F41.1 GENERALIZED ANXIETY DISORDER: ICD-10-CM

## 2025-05-29 NOTE — TELEPHONE ENCOUNTER
Pt is going through a lot of stress right now with neighbors and police. Would like to know if you can increase her   ALPRAZolam (Xanax) 1 mg tablet     Has been taking it 4 times a day instead of 3.   Please advise.

## 2025-05-30 NOTE — TELEPHONE ENCOUNTER
Spoke with patient, she understands that she cannot have an increase in Alprazolam. She is asking if she can restart Hydroxyzine and take this 2 hours or so after her Alprazolam due to her increase in stress.

## 2025-05-31 RX ORDER — HYDROXYZINE PAMOATE 50 MG/1
50 CAPSULE ORAL EVERY 6 HOURS PRN
Qty: 30 CAPSULE | Refills: 1 | Status: SHIPPED | OUTPATIENT
Start: 2025-05-31

## 2025-06-02 ENCOUNTER — APPOINTMENT (OUTPATIENT)
Dept: PRIMARY CARE | Facility: CLINIC | Age: 60
End: 2025-06-02
Payer: COMMERCIAL

## 2025-06-06 ENCOUNTER — APPOINTMENT (OUTPATIENT)
Dept: UROLOGY | Facility: CLINIC | Age: 60
End: 2025-06-06
Payer: COMMERCIAL

## 2025-06-11 DIAGNOSIS — E78.2 MIXED HYPERLIPIDEMIA: ICD-10-CM

## 2025-06-11 DIAGNOSIS — B37.2 YEAST DERMATITIS: ICD-10-CM

## 2025-06-11 DIAGNOSIS — J30.9 ALLERGIC RHINITIS: ICD-10-CM

## 2025-06-11 DIAGNOSIS — M54.16 RADICULOPATHY, LUMBAR REGION: ICD-10-CM

## 2025-06-11 NOTE — TELEPHONE ENCOUNTER
Recent Visits  Date Type Provider Dept   05/20/25 Office Visit Sergo Alexander, DO [facility]   04/02/25 Office Visit Sergo Alexander, DO [facility]   01/15/25 Office Visit Sergo Alexander, DO [facility]   Showing recent visits within past 180 days and meeting all other requirements  Future Appointments  No visits were found meeting these conditions.  Showing future appointments within next 90 days and meeting all other requirements

## 2025-06-14 RX ORDER — NYSTATIN TOPICAL POWDER 100000 U/G
POWDER TOPICAL 2 TIMES DAILY
Qty: 60 G | Refills: 0 | Status: SHIPPED | OUTPATIENT
Start: 2025-06-14

## 2025-06-14 RX ORDER — CETIRIZINE HYDROCHLORIDE 10 MG/1
10 TABLET ORAL DAILY
Qty: 90 TABLET | Refills: 0 | Status: SHIPPED | OUTPATIENT
Start: 2025-06-14

## 2025-06-14 RX ORDER — ACETAMINOPHEN AND CODEINE PHOSPHATE 300; 30 MG/1; MG/1
1 TABLET ORAL EVERY 4 HOURS PRN
Qty: 28 TABLET | Refills: 0 | Status: SHIPPED | OUTPATIENT
Start: 2025-06-14

## 2025-06-14 RX ORDER — ATORVASTATIN CALCIUM 40 MG/1
40 TABLET, FILM COATED ORAL DAILY
Qty: 90 TABLET | Refills: 0 | Status: SHIPPED | OUTPATIENT
Start: 2025-06-14

## 2025-06-17 DIAGNOSIS — F41.1 GENERALIZED ANXIETY DISORDER: ICD-10-CM

## 2025-06-17 RX ORDER — ALPRAZOLAM 1 MG/1
1 TABLET ORAL 3 TIMES DAILY PRN
Qty: 90 TABLET | Refills: 0 | Status: SHIPPED | OUTPATIENT
Start: 2025-06-17

## 2025-06-17 NOTE — TELEPHONE ENCOUNTER
[id number] - [place], [place] - [address]   Pt needs refill on  ALPRAZolam (Xanax) 1 mg tablet

## 2025-06-18 ENCOUNTER — TELEPHONE (OUTPATIENT)
Dept: PRIMARY CARE | Facility: CLINIC | Age: 60
End: 2025-06-18

## 2025-06-18 NOTE — TELEPHONE ENCOUNTER
Pt requesting samples of     tirzepatide (Mounjaro) 2.5 mg/0.5 mL pen injector     Please call when ready

## 2025-06-26 ENCOUNTER — TELEPHONE (OUTPATIENT)
Age: 60
End: 2025-06-26

## 2025-06-26 NOTE — TELEPHONE ENCOUNTER
Attempted to call patient to reschedule her follow up appointment with Dr. Oneill. No answer, voicemail left for patient to call back.

## 2025-07-08 ENCOUNTER — TELEPHONE (OUTPATIENT)
Dept: PRIMARY CARE | Facility: CLINIC | Age: 60
End: 2025-07-08

## 2025-07-08 ENCOUNTER — TELEMEDICINE (OUTPATIENT)
Dept: PRIMARY CARE | Facility: CLINIC | Age: 60
End: 2025-07-08
Payer: COMMERCIAL

## 2025-07-08 DIAGNOSIS — E66.09 EXOGENOUS OBESITY: ICD-10-CM

## 2025-07-08 DIAGNOSIS — G47.00 INSOMNIA, UNSPECIFIED TYPE: ICD-10-CM

## 2025-07-08 DIAGNOSIS — E11.69 TYPE 2 DIABETES MELLITUS WITH OTHER SPECIFIED COMPLICATION, WITHOUT LONG-TERM CURRENT USE OF INSULIN: Primary | ICD-10-CM

## 2025-07-08 DIAGNOSIS — R13.19 ESOPHAGEAL DYSPHAGIA: ICD-10-CM

## 2025-07-08 DIAGNOSIS — E11.9 TYPE 2 DIABETES MELLITUS WITHOUT COMPLICATION, WITHOUT LONG-TERM CURRENT USE OF INSULIN: ICD-10-CM

## 2025-07-08 DIAGNOSIS — F33.41 RECURRENT MAJOR DEPRESSIVE DISORDER, IN PARTIAL REMISSION: ICD-10-CM

## 2025-07-08 DIAGNOSIS — F41.1 GENERALIZED ANXIETY DISORDER: ICD-10-CM

## 2025-07-08 DIAGNOSIS — M54.12 RADICULOPATHY, CERVICAL REGION: ICD-10-CM

## 2025-07-08 DIAGNOSIS — F60.3 BORDERLINE PERSONALITY DISORDER (MULTI): ICD-10-CM

## 2025-07-08 DIAGNOSIS — M54.16 RADICULOPATHY, LUMBAR REGION: ICD-10-CM

## 2025-07-08 PROCEDURE — 99213 OFFICE O/P EST LOW 20 MIN: CPT | Performed by: FAMILY MEDICINE

## 2025-07-08 RX ORDER — PHENTERMINE HYDROCHLORIDE 37.5 MG/1
37.5 TABLET ORAL
Qty: 30 TABLET | Refills: 0 | Status: CANCELLED | OUTPATIENT
Start: 2025-07-08

## 2025-07-08 RX ORDER — TIRZEPATIDE 2.5 MG/.5ML
2.5 INJECTION, SOLUTION SUBCUTANEOUS WEEKLY
Qty: 2 ML | Refills: 0 | Status: CANCELLED | OUTPATIENT
Start: 2025-07-08

## 2025-07-08 RX ORDER — TRAZODONE HYDROCHLORIDE 50 MG/1
50 TABLET ORAL NIGHTLY
Qty: 90 TABLET | Refills: 0 | Status: SHIPPED | OUTPATIENT
Start: 2025-07-08

## 2025-07-08 RX ORDER — TIZANIDINE 4 MG/1
4 TABLET ORAL EVERY 6 HOURS PRN
Qty: 30 TABLET | Refills: 1 | Status: SHIPPED | OUTPATIENT
Start: 2025-07-08

## 2025-07-08 NOTE — PROGRESS NOTES
"Subjective   Patient ID: Karina Pagan \"Leanna\" is a 59 y.o. female who presents for Weight Loss (Patient would lioke to request samples of Mounjaro and refill of Phentermine today. Patient weight reported today is 218.0 lbs.//Patient also mentioned that she received a prescription for Victoza that she is not using at this moment due to lack of supplies (needles) and lack of instructions.).      Virtual or Telephone Consent    An interactive audio and video telecommunication system which permits real time communications between the patient (at the originating site) and provider (at the distant site) was utilized to provide this telehealth service.   Verbal consent was requested and obtained from Karina Pagan on this date, 07/09/25 for a telehealth visit and the patient's location was confirmed at the time of the visit.     History of Present Illness  The patient is a 59-year-old  female who presents via virtual visit to consider Mounjaro for weight loss and a refill of phentermine. Her weight today is 218 pounds. She reports that she received a prescription for Victoza but is not using it due to a lack of needles.    She believes that stress is contributing to her weight gain, as she maintains a minimal diet but struggles to lose weight. She has previously used phentermine and Adipex and is interested in resuming these medications. She was prescribed a daily injection of liraglutide for 120 days but did not receive the necessary needles. She is unsure if her insurance covers Victoza but confirms it does cover the generic version, liraglutide. She is considering trying both Mounjaro and liraglutide due to her current weight of 220 pounds. She has previously tried Trulicity and metformin.    She reports feeling extremely fatigued recently and wonders if this could be due to stress.    Review of Systems   Constitutional: Negative.  Negative for activity change, appetite change, fatigue and fever.   HENT:  " Negative for congestion, dental problem, ear discharge, ear pain, mouth sores, rhinorrhea, sinus pressure, sinus pain, sore throat, tinnitus and trouble swallowing.    Eyes:  Negative for photophobia, pain and visual disturbance.   Respiratory:  Negative for cough, chest tightness, shortness of breath and stridor.    Cardiovascular:  Negative for chest pain and palpitations.   Gastrointestinal:  Negative for abdominal distention, abdominal pain, blood in stool, constipation, diarrhea and rectal pain.   Endocrine: Negative for cold intolerance, heat intolerance, polydipsia, polyphagia and polyuria.   Genitourinary:  Negative for dysuria, flank pain, hematuria and urgency.   Musculoskeletal:  Positive for arthralgias and back pain. Negative for gait problem, myalgias and neck stiffness.   Skin:  Negative for color change and rash.   Allergic/Immunologic: Negative for environmental allergies and food allergies.   Neurological:  Negative for dizziness, seizures, syncope, speech difficulty and headaches.   Hematological:  Negative for adenopathy.   Psychiatric/Behavioral:  Positive for decreased concentration and dysphoric mood. Negative for agitation, confusion and sleep disturbance. The patient is nervous/anxious.        Objective     There were no vitals taken for this visit.     Physical Exam  Vital Signs  Weight is 218 pounds.  Alert, oriented x 3, affect appropriate/anxious  Problem List Items Addressed This Visit       Anxiety disorder, unspecified    Esophageal dysphagia    Recurrent major depressive disorder, in partial remission    Type 2 diabetes mellitus without complication, without long-term current use of insulin    Borderline personality disorder (Multi)     Other Visit Diagnoses         Type 2 diabetes mellitus with other specified complication, without long-term current use of insulin    -  Primary      Exogenous obesity          BMI 39.0-39.9,adult                 Assessment & Plan  1. Weight  management.  - Interested in considering Mounjaro for weight loss and a refill of phentermine.  - Current weight is 218 pounds; previously used phentermine and Adipex.  - Prescribed a daily injection of liraglutide for 120 days but did not receive the necessary needles.  - Prescription for needles will be sent to the pharmacy; an MA visit will be scheduled for a blood pressure and weight check before prescribing phentermine.    2. Diabetes mellitus.  - Last hemoglobin A1c was 6.6 on 05/21/2025, with several readings above 6.5.  - Injection will be placed under diabetes to ensure insurance coverage.  - Previously been on Trulicity and metformin.  - Considering trying both Mounjaro and liraglutide due to current weight of 220 pounds.    3. Fatigue.  - Reports feeling extremely fatigued recently.  - Wonders if this could be due to stress.    Follow-up  - A follow-up appointment is scheduled for 1 month from now.    Sergo Alexander, DO     This medical note was created with the assistance of artificial intelligence (AI) for documentation purposes. The content has been reviewed and confirmed by the healthcare provider for accuracy and completeness. Patient consented to the use of audio recording and use of AI during their visit.

## 2025-07-09 PROBLEM — E66.01 MORBID OBESITY (MULTI): Status: RESOLVED | Noted: 2023-04-10 | Resolved: 2025-07-09

## 2025-07-09 ASSESSMENT — ENCOUNTER SYMPTOMS
POLYPHAGIA: 0
DYSURIA: 0
HEADACHES: 0
HEMATURIA: 0
ARTHRALGIAS: 1
ACTIVITY CHANGE: 0
EYE PAIN: 0
SEIZURES: 0
STRIDOR: 0
NERVOUS/ANXIOUS: 1
CONSTITUTIONAL NEGATIVE: 1
DIZZINESS: 0
ADENOPATHY: 0
NECK STIFFNESS: 0
COLOR CHANGE: 0
RECTAL PAIN: 0
SLEEP DISTURBANCE: 0
SINUS PAIN: 0
RHINORRHEA: 0
BACK PAIN: 1
CHEST TIGHTNESS: 0
PALPITATIONS: 0
APPETITE CHANGE: 0
TROUBLE SWALLOWING: 0
BLOOD IN STOOL: 0
DIARRHEA: 0
FLANK PAIN: 0
MYALGIAS: 0
DYSPHORIC MOOD: 1
ABDOMINAL PAIN: 0
SINUS PRESSURE: 0
AGITATION: 0
SHORTNESS OF BREATH: 0
CONFUSION: 0
FEVER: 0
COUGH: 0
FATIGUE: 0
ABDOMINAL DISTENTION: 0
PHOTOPHOBIA: 0
POLYDIPSIA: 0
CONSTIPATION: 0
SPEECH DIFFICULTY: 0
DECREASED CONCENTRATION: 1
SORE THROAT: 0

## 2025-07-10 ENCOUNTER — CLINICAL SUPPORT (OUTPATIENT)
Dept: PRIMARY CARE | Facility: CLINIC | Age: 60
End: 2025-07-10
Payer: COMMERCIAL

## 2025-07-10 VITALS — SYSTOLIC BLOOD PRESSURE: 130 MMHG | WEIGHT: 213 LBS | DIASTOLIC BLOOD PRESSURE: 84 MMHG | BODY MASS INDEX: 38.96 KG/M2

## 2025-07-10 DIAGNOSIS — E11.69 TYPE 2 DIABETES MELLITUS WITH OTHER SPECIFIED COMPLICATION, WITHOUT LONG-TERM CURRENT USE OF INSULIN: ICD-10-CM

## 2025-07-10 DIAGNOSIS — E11.65 TYPE 2 DIABETES MELLITUS WITH HYPERGLYCEMIA, WITHOUT LONG-TERM CURRENT USE OF INSULIN: ICD-10-CM

## 2025-07-10 RX ORDER — TIRZEPATIDE 2.5 MG/.5ML
2.5 INJECTION, SOLUTION SUBCUTANEOUS WEEKLY
Qty: 2 ML | Refills: 0 | Status: SHIPPED | OUTPATIENT
Start: 2025-07-10 | End: 2025-07-11

## 2025-07-10 RX ORDER — AMLODIPINE BESYLATE 10 MG/1
10 TABLET ORAL DAILY
Qty: 90 TABLET | Refills: 1 | Status: SHIPPED | OUTPATIENT
Start: 2025-07-10

## 2025-07-11 ENCOUNTER — PATIENT MESSAGE (OUTPATIENT)
Dept: PRIMARY CARE | Facility: CLINIC | Age: 60
End: 2025-07-11
Payer: COMMERCIAL

## 2025-07-11 DIAGNOSIS — E11.69 TYPE 2 DIABETES MELLITUS WITH OTHER SPECIFIED COMPLICATION, WITHOUT LONG-TERM CURRENT USE OF INSULIN: ICD-10-CM

## 2025-07-11 RX ORDER — TIRZEPATIDE 2.5 MG/.5ML
2.5 INJECTION, SOLUTION SUBCUTANEOUS WEEKLY
Qty: 2 ML | Refills: 0 | Status: SHIPPED | OUTPATIENT
Start: 2025-07-11

## 2025-07-11 NOTE — TELEPHONE ENCOUNTER
Recent Visits  Date Type Provider Dept   07/10/25 Clinical Support Katiuska Dash MA [facility]   05/20/25 Office Visit Sergo Alexander,  [facility]   Showing recent visits within past 90 days and meeting all other requirements  Future Appointments  Date Type Provider Dept   08/08/25 Appointment Sergo Alexander,  [facility]   08/14/25 Appointment Sergo Alexander,  [facility]   Showing future appointments within next 90 days and meeting all other requirements

## 2025-07-11 NOTE — TELEPHONE ENCOUNTER
Patient of DR. Alexander    Patient stated she needs the needles for the victoza prescription. She did not know what size she needed. She stated she does not have the mounjaro    Patient also needs prescription for adipex sent to pharmacy for both prescription     [facility] [place]

## 2025-07-14 RX ORDER — BLOOD SUGAR DIAGNOSTIC
1 STRIP MISCELLANEOUS DAILY
Qty: 100 EACH | Refills: 0 | Status: SHIPPED | OUTPATIENT
Start: 2025-07-14

## 2025-07-16 DIAGNOSIS — F41.1 GENERALIZED ANXIETY DISORDER: ICD-10-CM

## 2025-07-16 NOTE — TELEPHONE ENCOUNTER
Recent Visits  Date Type Provider Dept   07/10/25 Clinical Support Katiuska Dash MA [facility]   05/20/25 Office Visit Sergo Alexander, DO [facility]   04/02/25 Office Visit Sergo Alexander, DO [facility]   Showing recent visits within past 180 days and meeting all other requirements  Future Appointments  Date Type Provider Dept   08/08/25 Appointment Sergo Alexander,  [facility]   08/14/25 Appointment Sergo Alexadner, DO [facility]   Showing future appointments within next 90 days and meeting all other requirements

## 2025-07-17 ENCOUNTER — APPOINTMENT (OUTPATIENT)
Dept: ORTHOPEDIC SURGERY | Facility: CLINIC | Age: 60
End: 2025-07-17
Payer: COMMERCIAL

## 2025-07-17 DIAGNOSIS — F41.1 GENERALIZED ANXIETY DISORDER: ICD-10-CM

## 2025-07-17 DIAGNOSIS — M54.16 RADICULOPATHY, LUMBAR REGION: ICD-10-CM

## 2025-07-18 NOTE — TELEPHONE ENCOUNTER
Dr Alexander pt    Pt phoned office and stated she does not need the mounjaro she needs the adipex. Pt also is checking status of the other pended meds.    [facility]

## 2025-07-19 RX ORDER — PHENTERMINE HYDROCHLORIDE 37.5 MG/1
37.5 TABLET ORAL
Qty: 30 TABLET | Refills: 0 | Status: SHIPPED | OUTPATIENT
Start: 2025-07-19

## 2025-07-19 RX ORDER — ACETAMINOPHEN AND CODEINE PHOSPHATE 300; 30 MG/1; MG/1
1 TABLET ORAL EVERY 4 HOURS PRN
Qty: 28 TABLET | Refills: 0 | Status: SHIPPED | OUTPATIENT
Start: 2025-07-19

## 2025-07-19 RX ORDER — ALPRAZOLAM 1 MG/1
1 TABLET ORAL 3 TIMES DAILY PRN
Qty: 90 TABLET | Refills: 0 | Status: SHIPPED | OUTPATIENT
Start: 2025-07-19

## 2025-07-19 RX ORDER — ALPRAZOLAM 1 MG/1
1 TABLET ORAL 3 TIMES DAILY PRN
Qty: 90 TABLET | Refills: 0 | OUTPATIENT
Start: 2025-07-19

## 2025-07-22 ENCOUNTER — APPOINTMENT (OUTPATIENT)
Dept: UROLOGY | Facility: CLINIC | Age: 60
End: 2025-07-22
Payer: COMMERCIAL

## 2025-07-28 DIAGNOSIS — K59.01 SLOW TRANSIT CONSTIPATION: ICD-10-CM

## 2025-07-28 RX ORDER — DOCUSATE SODIUM 100 MG/1
100 CAPSULE, LIQUID FILLED ORAL 2 TIMES DAILY
Qty: 60 CAPSULE | Refills: 0 | Status: SHIPPED | OUTPATIENT
Start: 2025-07-28

## 2025-08-08 ENCOUNTER — ANCILLARY PROCEDURE (OUTPATIENT)
Dept: ORTHOPEDIC SURGERY | Facility: CLINIC | Age: 60
End: 2025-08-08
Payer: COMMERCIAL

## 2025-08-08 ENCOUNTER — HOSPITAL ENCOUNTER (OUTPATIENT)
Dept: RADIOLOGY | Facility: CLINIC | Age: 60
Discharge: HOME | End: 2025-08-08
Payer: COMMERCIAL

## 2025-08-08 ENCOUNTER — OFFICE VISIT (OUTPATIENT)
Dept: ORTHOPEDIC SURGERY | Facility: CLINIC | Age: 60
End: 2025-08-08
Payer: COMMERCIAL

## 2025-08-08 ENCOUNTER — APPOINTMENT (OUTPATIENT)
Dept: PRIMARY CARE | Facility: CLINIC | Age: 60
End: 2025-08-08
Payer: COMMERCIAL

## 2025-08-08 DIAGNOSIS — S90.32XA CONTUSION OF LEFT FOOT, INITIAL ENCOUNTER: Primary | ICD-10-CM

## 2025-08-08 DIAGNOSIS — M79.672 LEFT FOOT PAIN: ICD-10-CM

## 2025-08-08 PROCEDURE — 73630 X-RAY EXAM OF FOOT: CPT | Mod: LT

## 2025-08-08 PROCEDURE — 99212 OFFICE O/P EST SF 10 MIN: CPT | Performed by: FAMILY MEDICINE

## 2025-08-08 RX ORDER — HYDROCODONE BITARTRATE AND ACETAMINOPHEN 5; 325 MG/1; MG/1
1 TABLET ORAL EVERY 12 HOURS PRN
Qty: 10 TABLET | Refills: 0 | Status: CANCELLED | OUTPATIENT
Start: 2025-08-08 | End: 2025-08-13

## 2025-08-08 NOTE — PROGRESS NOTES
Acute Injury New Patient Visit  Assessment & Plan  Left foot contusion  Contusion of the left foot affecting the fourth toe and third and fourth metatarsals. X-ray confirmed no fracture, indicating a bruise.  - Refilled [place] for pain management.  - Advised icing the affected area.  - Provided a sandal or post-op shoe for support.  - Buddy taped the affected toes for additional support.    At this time we will offer the patient a follow-up as needed should there be worsening or persistent pain she is to call or return she can wean out of the postop shoe and buddy taping as able.  She is very busy with a very sick  that she often has to care for so we will defer any plan to scheduled follow-up again unless she is having worsening issues or problems.    Healing fracture of left fifth toe  Healing fracture of the left fifth toe, initially nondisplaced with a small flap now stitching together. Healing is slow.  - Recommended vitamin D supplementation to aid fracture healing.  - Advised against using a carbon fiber insert unless regular shoes are worn.    Orders Placed This Encounter    Post-op shoe    XR foot left 3+ views     Procedures   At the conclusion of the visit there were no further questions by the patient/family regarding their plan of care.  Patient was instructed to call or return with any issues, questions, or concerns regarding their injury and/or treatment plan described above.    PHYSICAL EXAM:  General:  Patient is awake, alert, and oriented to person place and time.  Patient appears well nourished and well kept.  Affect Calm, Not Acutely Distressed.  Heent:  Normocephalic, Atraumatic, EOMI  Cardiovascular:  Hemodynamically stable.  Respiratory:  Normal respirations with unlabored breathing.  Neuro: Gross sensation intact to the lower extremities bilaterally.  Extremity: Left foot exam demonstrates soft tissue swelling fullness and some bruising over the distal fourth metatarsal and fourth  "toe.  There is no visible angulation or rotational deformity moderate tenderness palpation noted at the 4th and 5th digits.  No pain at the first MTP joint midfoot and distal metatarsals have some soft tissue discomfort but no bony pain.  Obvious antalgic gait noted.  Physical Exam  MUSCULOSKELETAL: Fourth toe bruised, no obvious fracture on x-ray.    IMAGING:   Results  RADIOLOGY  Foot X-ray (08/06/2025): Fourth toe shows no obvious fracture, no dislocation, and appears normal. Bruising is present.  Foot X-ray (04/23/2025): Fracture of the fifth toe (pinky toe) is healing but not fully healed; improvement noted compared to previous imaging.  XR foot left 3+ views  Narrative: Interpreted By:  Budinsky, Cole,   STUDY:  XR FOOT LEFT 3+ VIEWS; ;  8/8/2025 2:03 pm      INDICATION:  Signs/Symptoms:pain.      ACCESSION NUMBER(S):  [id number]      ORDERING CLINICIAN:  COLE BUDINSKY      Impression: Repeat left foot films demonstrate stable interval healing of the  subacute minimally displaced left proximal 5th toe fracture. Subtle  increased sclerotic callus formation is noted but still fracture gap  remains present. There is no additional acute bony abnormality seen  to the remaining aspect of the foot or toes. Overall impression  negative for acute fracture or dislocation in the setting of previous  left 5th proximal phalanx avulsion fracture.          Signed by: Cole Budinsky 8/8/2025 2:44 PM  Dictation workstation:   [id number]      Patient ID: Karina Pagan \"Henry" is a 59 y.o. female who presents for Pain of the Left Foot.  History of Present Illness  Karina Pagan \"Henry" is a 59 year old female who presents with left foot pain following a recent injury.    Two days ago, she injured her left foot when a heavy object fell on her toes while she was cleaning barefoot. She has since experienced increasing pain and bruising, particularly around the fourth toe and the third and fourth metatarsals. The pain is " worsening, and the area has become more ecchymotic.    She has a history of a previous fracture in the same foot, specifically the pinky toe, which was treated by Dr. Garcia. This previous injury was a nondisplaced fracture that took approximately eight weeks to heal. During that time, she was prescribed Vicodin for pain management, which she found helpful. A metal piece was suggested for her shoes to aid in healing, but she dislikes wearing shoes.    Currently, she is not taking any specific medications for her foot injury. She is busy caring for her , who has his own medical issues, which has impacted her ability to focus on her own health needs.    No pain in the heel and big toe side, but she reports pain in the area of the fourth toe. She is concerned about the slow healing of her previous fracture, which she describes as a 'little flap' that is still in the process of healing.        This medical note was created with the assistance of artificial intelligence (AI) for documentation purposes. The content has been reviewed and confirmed by the healthcare provider for accuracy and completeness. Patient consented to the use of audio recording and use of AI during their visit.   08/08/25 at 5:35 PM - Cole C Budinsky, MD  Office:  [phone]

## 2025-08-13 DIAGNOSIS — B37.2 YEAST DERMATITIS: ICD-10-CM

## 2025-08-13 DIAGNOSIS — M54.16 RADICULOPATHY, LUMBAR REGION: ICD-10-CM

## 2025-08-14 ENCOUNTER — APPOINTMENT (OUTPATIENT)
Dept: PRIMARY CARE | Facility: CLINIC | Age: 60
End: 2025-08-14
Payer: COMMERCIAL

## 2025-08-14 VITALS
HEIGHT: 62 IN | WEIGHT: 210 LBS | SYSTOLIC BLOOD PRESSURE: 132 MMHG | DIASTOLIC BLOOD PRESSURE: 86 MMHG | OXYGEN SATURATION: 99 % | BODY MASS INDEX: 38.64 KG/M2 | HEART RATE: 74 BPM | TEMPERATURE: 97.7 F | RESPIRATION RATE: 18 BRPM

## 2025-08-14 DIAGNOSIS — M54.16 RADICULOPATHY, LUMBAR REGION: ICD-10-CM

## 2025-08-14 DIAGNOSIS — F41.1 GENERALIZED ANXIETY DISORDER: Primary | ICD-10-CM

## 2025-08-14 DIAGNOSIS — E03.9 HYPOTHYROIDISM, UNSPECIFIED: ICD-10-CM

## 2025-08-14 DIAGNOSIS — E55.9 VITAMIN D DEFICIENCY, UNSPECIFIED: ICD-10-CM

## 2025-08-14 DIAGNOSIS — E11.9 TYPE 2 DIABETES MELLITUS WITHOUT COMPLICATION, WITHOUT LONG-TERM CURRENT USE OF INSULIN: ICD-10-CM

## 2025-08-14 DIAGNOSIS — E78.2 MIXED HYPERLIPIDEMIA: ICD-10-CM

## 2025-08-14 DIAGNOSIS — E66.09 EXOGENOUS OBESITY: ICD-10-CM

## 2025-08-14 DIAGNOSIS — E11.65 TYPE 2 DIABETES MELLITUS WITH HYPERGLYCEMIA, WITHOUT LONG-TERM CURRENT USE OF INSULIN: ICD-10-CM

## 2025-08-14 DIAGNOSIS — Z79.899 MEDICATION MANAGEMENT: ICD-10-CM

## 2025-08-14 DIAGNOSIS — E03.9 ACQUIRED HYPOTHYROIDISM: ICD-10-CM

## 2025-08-14 DIAGNOSIS — J30.9 ALLERGIC RHINITIS: ICD-10-CM

## 2025-08-14 DIAGNOSIS — I10 HTN (HYPERTENSION), BENIGN: ICD-10-CM

## 2025-08-14 DIAGNOSIS — K59.01 SLOW TRANSIT CONSTIPATION: ICD-10-CM

## 2025-08-14 DIAGNOSIS — J30.9 ALLERGIC RHINITIS, UNSPECIFIED SEASONALITY, UNSPECIFIED TRIGGER: ICD-10-CM

## 2025-08-14 DIAGNOSIS — F33.41 RECURRENT MAJOR DEPRESSIVE DISORDER, IN PARTIAL REMISSION: ICD-10-CM

## 2025-08-14 DIAGNOSIS — B37.2 YEAST DERMATITIS: ICD-10-CM

## 2025-08-14 LAB
POC FINGERSTICK BLOOD GLUCOSE: 100 MG/DL (ref 70–100)
POC HEMOGLOBIN A1C: 6.4 % (ref 4.2–6.5)

## 2025-08-14 PROCEDURE — 83036 HEMOGLOBIN GLYCOSYLATED A1C: CPT | Performed by: FAMILY MEDICINE

## 2025-08-14 PROCEDURE — 82962 GLUCOSE BLOOD TEST: CPT | Performed by: FAMILY MEDICINE

## 2025-08-14 PROCEDURE — 99214 OFFICE O/P EST MOD 30 MIN: CPT | Performed by: FAMILY MEDICINE

## 2025-08-14 RX ORDER — PHENTERMINE HYDROCHLORIDE 37.5 MG/1
37.5 TABLET ORAL
Qty: 30 TABLET | Refills: 0 | Status: SHIPPED | OUTPATIENT
Start: 2025-08-14 | End: 2025-09-13

## 2025-08-14 RX ORDER — HYDROCHLOROTHIAZIDE 25 MG/1
50 TABLET ORAL DAILY
Qty: 180 TABLET | Refills: 1 | Status: SHIPPED | OUTPATIENT
Start: 2025-08-14

## 2025-08-14 RX ORDER — LIRAGLUTIDE 6 MG/ML
1.2 INJECTION SUBCUTANEOUS DAILY
Qty: 3 ML | Refills: 1 | Status: SHIPPED | OUTPATIENT
Start: 2025-08-14

## 2025-08-14 RX ORDER — ACETAMINOPHEN AND CODEINE PHOSPHATE 300; 30 MG/1; MG/1
1 TABLET ORAL EVERY 4 HOURS PRN
Qty: 28 TABLET | Refills: 0 | Status: SHIPPED | OUTPATIENT
Start: 2025-08-14

## 2025-08-14 RX ORDER — ALPRAZOLAM 1 MG/1
1 TABLET ORAL 3 TIMES DAILY PRN
Qty: 90 TABLET | Refills: 0 | Status: SHIPPED | OUTPATIENT
Start: 2025-08-14

## 2025-08-14 RX ORDER — VIT C/E/ZN/COPPR/LUTEIN/ZEAXAN 250MG-90MG
25 CAPSULE ORAL DAILY
Qty: 180 CAPSULE | Refills: 1 | Status: SHIPPED | OUTPATIENT
Start: 2025-08-14

## 2025-08-14 RX ORDER — DOCUSATE SODIUM 100 MG/1
100 CAPSULE, LIQUID FILLED ORAL 2 TIMES DAILY
Qty: 60 CAPSULE | Refills: 0 | Status: SHIPPED | OUTPATIENT
Start: 2025-08-14

## 2025-08-14 RX ORDER — CETIRIZINE HYDROCHLORIDE 10 MG/1
10 TABLET ORAL DAILY
Qty: 90 TABLET | Refills: 0 | Status: SHIPPED | OUTPATIENT
Start: 2025-08-14

## 2025-08-14 RX ORDER — NYSTATIN 100000 [USP'U]/G
POWDER TOPICAL 2 TIMES DAILY
Qty: 60 G | Refills: 0 | Status: SHIPPED | OUTPATIENT
Start: 2025-08-14

## 2025-08-14 RX ORDER — AMLODIPINE BESYLATE 10 MG/1
10 TABLET ORAL DAILY
Qty: 90 TABLET | Refills: 1 | Status: SHIPPED | OUTPATIENT
Start: 2025-08-14

## 2025-08-14 RX ORDER — ALBUTEROL SULFATE 90 UG/1
2 INHALANT RESPIRATORY (INHALATION) EVERY 6 HOURS PRN
Qty: 18 G | Refills: 2 | Status: SHIPPED | OUTPATIENT
Start: 2025-08-14

## 2025-08-14 RX ORDER — LEVOTHYROXINE SODIUM 112 UG/1
112 TABLET ORAL DAILY
Qty: 90 TABLET | Refills: 1 | Status: SHIPPED | OUTPATIENT
Start: 2025-08-14

## 2025-08-14 ASSESSMENT — PATIENT HEALTH QUESTIONNAIRE - PHQ9
1. LITTLE INTEREST OR PLEASURE IN DOING THINGS: NOT AT ALL
2. FEELING DOWN, DEPRESSED OR HOPELESS: SEVERAL DAYS
SUM OF ALL RESPONSES TO PHQ9 QUESTIONS 1 AND 2: 1

## 2025-08-14 ASSESSMENT — ENCOUNTER SYMPTOMS
DEPRESSION: 1
OCCASIONAL FEELINGS OF UNSTEADINESS: 0
LOSS OF SENSATION IN FEET: 0

## 2025-08-16 LAB
1OH-MIDAZOLAM UR-MCNC: NEGATIVE NG/ML
7AMINOCLONAZEPAM UR-MCNC: NEGATIVE NG/ML
A-OH ALPRAZ UR-MCNC: 743 NG/ML
A-OH-TRIAZOLAM UR-MCNC: NEGATIVE NG/ML
AMPHETAMINES UR QL: NEGATIVE NG/ML
BARBITURATES UR QL: NEGATIVE NG/ML
BZE UR QL: NEGATIVE NG/ML
CODEINE UR-MCNC: NEGATIVE NG/ML
CREAT UR-MCNC: 203.6 MG/DL
DRUG SCREEN COMMENT UR-IMP: ABNORMAL
EDDP UR-MCNC: NEGATIVE NG/ML
FENTANYL UR-MCNC: ABNORMAL NG/ML
HYDROCODONE UR-MCNC: NEGATIVE NG/ML
HYDROMORPHONE UR-MCNC: NEGATIVE NG/ML
LORAZEPAM UR-MCNC: NEGATIVE NG/ML
METHADONE UR-MCNC: NEGATIVE NG/ML
MORPHINE UR-MCNC: NEGATIVE NG/ML
NORDIAZEPAM UR-MCNC: NEGATIVE NG/ML
NORFENTANYL UR-MCNC: ABNORMAL NG/ML
NORHYDROCODONE UR CFM-MCNC: NEGATIVE NG/ML
NOROXYCODONE UR CFM-MCNC: NEGATIVE NG/ML
NORTRAMADOL UR-MCNC: NEGATIVE NG/ML
OH-ETHYLFLURAZ UR-MCNC: NEGATIVE NG/ML
OXAZEPAM UR-MCNC: NEGATIVE NG/ML
OXIDANTS UR QL: NEGATIVE MCG/ML
OXYCODONE UR CFM-MCNC: NEGATIVE NG/ML
OXYMORPHONE UR CFM-MCNC: NEGATIVE NG/ML
PCP UR QL: NEGATIVE NG/ML
PH UR: 5.8 [PH] (ref 4.5–9)
QUEST 6 ACETYLMORPHINE: ABNORMAL
QUEST NOTES AND COMMENTS: ABNORMAL
QUEST PATIENT HISTORICAL REPORT: ABNORMAL
QUEST ZOLPIDEM: ABNORMAL
TEMAZEPAM UR-MCNC: NEGATIVE NG/ML
THC UR QL: NEGATIVE NG/ML
TRAMADOL UR-MCNC: NEGATIVE NG/ML
ZOLPIDEM PHENYL-4-CARB UR CFM-MCNC: ABNORMAL NG/ML

## 2025-08-16 RX ORDER — NYSTATIN TOPICAL POWDER 100000 U/G
POWDER TOPICAL 2 TIMES DAILY
Qty: 60 G | Refills: 1 | Status: SHIPPED | OUTPATIENT
Start: 2025-08-16

## 2025-08-16 RX ORDER — ACETAMINOPHEN AND CODEINE PHOSPHATE 300; 30 MG/1; MG/1
1 TABLET ORAL EVERY 4 HOURS PRN
Qty: 28 TABLET | Refills: 0 | Status: SHIPPED | OUTPATIENT
Start: 2025-08-16

## 2025-08-20 LAB
1OH-MIDAZOLAM UR-MCNC: NEGATIVE NG/ML
7AMINOCLONAZEPAM UR-MCNC: NEGATIVE NG/ML
A-OH ALPRAZ UR-MCNC: 743 NG/ML
A-OH-TRIAZOLAM UR-MCNC: NEGATIVE NG/ML
AMPHETAMINES UR QL: NEGATIVE NG/ML
BARBITURATES UR QL: NEGATIVE NG/ML
BZE UR QL: NEGATIVE NG/ML
CODEINE UR-MCNC: NEGATIVE NG/ML
CREAT UR-MCNC: 203.6 MG/DL
DRUG SCREEN COMMENT UR-IMP: ABNORMAL
EDDP UR-MCNC: NEGATIVE NG/ML
FENTANYL UR-MCNC: NEGATIVE NG/ML
HYDROCODONE UR-MCNC: NEGATIVE NG/ML
HYDROMORPHONE UR-MCNC: NEGATIVE NG/ML
LORAZEPAM UR-MCNC: NEGATIVE NG/ML
METHADONE UR-MCNC: NEGATIVE NG/ML
MORPHINE UR-MCNC: NEGATIVE NG/ML
NORDIAZEPAM UR-MCNC: NEGATIVE NG/ML
NORFENTANYL UR-MCNC: NEGATIVE NG/ML
NORHYDROCODONE UR CFM-MCNC: NEGATIVE NG/ML
NOROXYCODONE UR CFM-MCNC: NEGATIVE NG/ML
NORTRAMADOL UR-MCNC: NEGATIVE NG/ML
OH-ETHYLFLURAZ UR-MCNC: NEGATIVE NG/ML
OXAZEPAM UR-MCNC: NEGATIVE NG/ML
OXIDANTS UR QL: NEGATIVE MCG/ML
OXYCODONE UR CFM-MCNC: NEGATIVE NG/ML
OXYMORPHONE UR CFM-MCNC: NEGATIVE NG/ML
PCP UR QL: NEGATIVE NG/ML
PH UR: 5.8 [PH] (ref 4.5–9)
QUEST 6 ACETYLMORPHINE: NEGATIVE NG/ML
QUEST NOTES AND COMMENTS: ABNORMAL
QUEST ZOLPIDEM: NEGATIVE NG/ML
TEMAZEPAM UR-MCNC: NEGATIVE NG/ML
THC UR QL: NEGATIVE NG/ML
TRAMADOL UR-MCNC: NEGATIVE NG/ML
ZOLPIDEM PHENYL-4-CARB UR CFM-MCNC: NEGATIVE NG/ML

## 2025-09-02 DIAGNOSIS — E78.5 HYPERLIPIDEMIA, UNSPECIFIED: ICD-10-CM

## 2025-09-02 RX ORDER — LISINOPRIL 30 MG/1
30 TABLET ORAL DAILY
Qty: 90 TABLET | Refills: 0 | Status: SHIPPED | OUTPATIENT
Start: 2025-09-02

## 2025-09-04 ENCOUNTER — APPOINTMENT (OUTPATIENT)
Dept: ORTHOPEDIC SURGERY | Facility: CLINIC | Age: 60
End: 2025-09-04
Payer: COMMERCIAL

## 2025-09-05 ENCOUNTER — APPOINTMENT (OUTPATIENT)
Dept: UROLOGY | Facility: CLINIC | Age: 60
End: 2025-09-05
Payer: COMMERCIAL

## 2025-11-14 ENCOUNTER — APPOINTMENT (OUTPATIENT)
Dept: PRIMARY CARE | Facility: CLINIC | Age: 60
End: 2025-11-14
Payer: COMMERCIAL